# Patient Record
Sex: FEMALE | Race: WHITE | Employment: OTHER | URBAN - METROPOLITAN AREA
[De-identification: names, ages, dates, MRNs, and addresses within clinical notes are randomized per-mention and may not be internally consistent; named-entity substitution may affect disease eponyms.]

---

## 2017-08-07 ENCOUNTER — OP HISTORICAL/CONVERTED ENCOUNTER (OUTPATIENT)
Dept: OTHER | Age: 42
End: 2017-08-07

## 2018-01-10 ENCOUNTER — OP HISTORICAL/CONVERTED ENCOUNTER (OUTPATIENT)
Dept: OTHER | Age: 43
End: 2018-01-10

## 2018-01-18 ENCOUNTER — ED HISTORICAL/CONVERTED ENCOUNTER (OUTPATIENT)
Dept: OTHER | Age: 43
End: 2018-01-18

## 2018-01-18 ENCOUNTER — OP HISTORICAL/CONVERTED ENCOUNTER (OUTPATIENT)
Dept: OTHER | Age: 43
End: 2018-01-18

## 2018-01-20 ENCOUNTER — ED HISTORICAL/CONVERTED ENCOUNTER (OUTPATIENT)
Dept: OTHER | Age: 43
End: 2018-01-20

## 2018-02-09 ENCOUNTER — OP HISTORICAL/CONVERTED ENCOUNTER (OUTPATIENT)
Dept: OTHER | Age: 43
End: 2018-02-09

## 2018-03-23 ENCOUNTER — OP HISTORICAL/CONVERTED ENCOUNTER (OUTPATIENT)
Dept: OTHER | Age: 43
End: 2018-03-23

## 2018-04-11 ENCOUNTER — ED HISTORICAL/CONVERTED ENCOUNTER (OUTPATIENT)
Dept: OTHER | Age: 43
End: 2018-04-11

## 2018-06-26 ENCOUNTER — ED HISTORICAL/CONVERTED ENCOUNTER (OUTPATIENT)
Dept: OTHER | Age: 43
End: 2018-06-26

## 2018-09-12 ENCOUNTER — OP HISTORICAL/CONVERTED ENCOUNTER (OUTPATIENT)
Dept: OTHER | Age: 43
End: 2018-09-12

## 2018-10-02 ENCOUNTER — OP HISTORICAL/CONVERTED ENCOUNTER (OUTPATIENT)
Dept: OTHER | Age: 43
End: 2018-10-02

## 2018-11-29 ENCOUNTER — ED HISTORICAL/CONVERTED ENCOUNTER (OUTPATIENT)
Dept: OTHER | Age: 43
End: 2018-11-29

## 2019-01-08 ENCOUNTER — OP HISTORICAL/CONVERTED ENCOUNTER (OUTPATIENT)
Dept: OTHER | Age: 44
End: 2019-01-08

## 2019-03-05 ENCOUNTER — OP HISTORICAL/CONVERTED ENCOUNTER (OUTPATIENT)
Dept: OTHER | Age: 44
End: 2019-03-05

## 2020-09-03 ENCOUNTER — ED HISTORICAL/CONVERTED ENCOUNTER (OUTPATIENT)
Dept: OTHER | Age: 45
End: 2020-09-03

## 2020-09-04 ENCOUNTER — ED HISTORICAL/CONVERTED ENCOUNTER (OUTPATIENT)
Dept: OTHER | Age: 45
End: 2020-09-04

## 2020-12-14 ENCOUNTER — TRANSCRIBE ORDER (OUTPATIENT)
Dept: SCHEDULING | Age: 45
End: 2020-12-14

## 2020-12-14 DIAGNOSIS — Z12.31 BREAST CANCER SCREENING BY MAMMOGRAM: Primary | ICD-10-CM

## 2020-12-16 ENCOUNTER — HOSPITAL ENCOUNTER (EMERGENCY)
Age: 45
Discharge: HOME | End: 2020-12-16
Admitting: EMERGENCY MEDICINE
Payer: MEDICARE

## 2020-12-16 ENCOUNTER — HOSPITAL ENCOUNTER (OUTPATIENT)
Dept: GENERAL RADIOLOGY | Age: 45
Discharge: HOME | End: 2020-12-16
Attending: EMERGENCY MEDICINE

## 2020-12-16 VITALS
RESPIRATION RATE: 18 BRPM | HEIGHT: 69 IN | OXYGEN SATURATION: 97 % | BODY MASS INDEX: 39.55 KG/M2 | SYSTOLIC BLOOD PRESSURE: 130 MMHG | WEIGHT: 267 LBS | TEMPERATURE: 98.1 F | HEART RATE: 61 BPM | DIASTOLIC BLOOD PRESSURE: 72 MMHG

## 2020-12-16 DIAGNOSIS — K29.70 GASTRITIS WITHOUT BLEEDING, UNSPECIFIED CHRONICITY, UNSPECIFIED GASTRITIS TYPE: Primary | ICD-10-CM

## 2020-12-16 LAB
ALBUMIN SERPL-MCNC: 3.1 G/DL (ref 3.5–5)
ALBUMIN/GLOB SERPL: 0.7 {RATIO} (ref 1.1–2.2)
ALP SERPL-CCNC: 79 U/L (ref 45–117)
ALT SERPL-CCNC: 17 U/L (ref 12–78)
ANION GAP SERPL CALC-SCNC: 8 MMOL/L (ref 5–15)
AST SERPL W P-5'-P-CCNC: 14 U/L (ref 15–37)
BASOPHILS # BLD: 0 K/UL (ref 0–0.1)
BASOPHILS NFR BLD: 0 % (ref 0–1)
BILIRUB SERPL-MCNC: 0.5 MG/DL (ref 0.2–1)
BUN SERPL-MCNC: 63 MG/DL (ref 6–20)
BUN/CREAT SERPL: 23 (ref 12–20)
CA-I BLD-MCNC: 9.5 MG/DL (ref 8.5–10.1)
CHLORIDE SERPL-SCNC: 101 MMOL/L (ref 97–108)
CO2 SERPL-SCNC: 27 MMOL/L (ref 21–32)
CREAT SERPL-MCNC: 2.79 MG/DL (ref 0.55–1.02)
DIFFERENTIAL METHOD BLD: ABNORMAL
EOSINOPHIL # BLD: 0.1 K/UL (ref 0–0.4)
EOSINOPHIL NFR BLD: 1 % (ref 0–7)
ERYTHROCYTE [DISTWIDTH] IN BLOOD BY AUTOMATED COUNT: 14.1 % (ref 11.5–14.5)
GLOBULIN SER CALC-MCNC: 4.5 G/DL (ref 2–4)
GLUCOSE SERPL-MCNC: 177 MG/DL (ref 65–100)
HCT VFR BLD AUTO: 32.8 % (ref 35–47)
HGB BLD-MCNC: 10.5 G/DL (ref 11.5–16)
IMM GRANULOCYTES # BLD AUTO: 0 K/UL (ref 0–0.04)
IMM GRANULOCYTES NFR BLD AUTO: 0 % (ref 0–0.5)
LIPASE SERPL-CCNC: 340 U/L (ref 73–393)
LYMPHOCYTES # BLD: 1.8 K/UL (ref 0.8–3.5)
LYMPHOCYTES NFR BLD: 21 % (ref 12–49)
MCH RBC QN AUTO: 28.2 PG (ref 26–34)
MCHC RBC AUTO-ENTMCNC: 32 G/DL (ref 30–36.5)
MCV RBC AUTO: 88.2 FL (ref 80–99)
MONOCYTES # BLD: 0.8 K/UL (ref 0–1)
MONOCYTES NFR BLD: 9 % (ref 5–13)
NEUTS SEG # BLD: 6 K/UL (ref 1.8–8)
NEUTS SEG NFR BLD: 69 % (ref 32–75)
PLATELET # BLD AUTO: 275 K/UL (ref 150–400)
PMV BLD AUTO: 11.5 FL (ref 8.9–12.9)
POTASSIUM SERPL-SCNC: 3.7 MMOL/L (ref 3.5–5.1)
PROT SERPL-MCNC: 7.6 G/DL (ref 6.4–8.2)
RBC # BLD AUTO: 3.72 M/UL (ref 3.8–5.2)
SODIUM SERPL-SCNC: 136 MMOL/L (ref 136–145)
WBC # BLD AUTO: 8.7 K/UL (ref 3.6–11)

## 2020-12-16 PROCEDURE — 85025 COMPLETE CBC W/AUTO DIFF WBC: CPT

## 2020-12-16 PROCEDURE — 99282 EMERGENCY DEPT VISIT SF MDM: CPT

## 2020-12-16 PROCEDURE — 83690 ASSAY OF LIPASE: CPT

## 2020-12-16 PROCEDURE — 36415 COLL VENOUS BLD VENIPUNCTURE: CPT

## 2020-12-16 PROCEDURE — 80053 COMPREHEN METABOLIC PANEL: CPT

## 2020-12-16 PROCEDURE — 74018 RADEX ABDOMEN 1 VIEW: CPT

## 2020-12-16 RX ORDER — KETOROLAC TROMETHAMINE 15 MG/ML
INJECTION, SOLUTION INTRAMUSCULAR; INTRAVENOUS
Status: DISCONTINUED
Start: 2020-12-16 | End: 2020-12-16 | Stop reason: HOSPADM

## 2020-12-16 RX ORDER — FAMOTIDINE 10 MG/ML
INJECTION INTRAVENOUS
Status: DISCONTINUED
Start: 2020-12-16 | End: 2020-12-16 | Stop reason: HOSPADM

## 2020-12-16 RX ORDER — LIDOCAINE HYDROCHLORIDE 20 MG/ML
SOLUTION OROPHARYNGEAL
Status: DISCONTINUED
Start: 2020-12-16 | End: 2020-12-16 | Stop reason: HOSPADM

## 2020-12-16 RX ORDER — ADHESIVE BANDAGE
BANDAGE TOPICAL
Status: DISCONTINUED
Start: 2020-12-16 | End: 2020-12-16 | Stop reason: HOSPADM

## 2020-12-17 NOTE — ED PROVIDER NOTES
EMERGENCY DEPARTMENT HISTORY AND PHYSICAL EXAM      Date: 2020  Patient Name: Charu Meier    History of Presenting Illness     Chief Complaint   Patient presents with    Abdominal Pain       History Provided By: Patient    HPI: Charu Meier, 39 y.o. female with a past medical history significant Diabetes, hypertension, stage IV renal failure, CVA presents to the ED with cc of patient reports this morning began with upper abdominal pain described as a fullness in her upper abdomen. Associated with some nausea no vomiting. Radiates somewhat into her chest as a burning pain as well. No loose stools or diarrhea. She states she was recently at outside facility where she had her new fistula in her left arm arteriogram was complicated by worsening renal function and volume overload. She was discharged several days ago started on several new medications including an antibiotic which she finished. There are no other complaints, changes, or physical findings at this time. PCP: Ricardo Rosa MD    No current facility-administered medications on file prior to encounter. No current outpatient medications on file prior to encounter.        Past History     Past Medical History:  Past Medical History:   Diagnosis Date    Allergic rhinitis     [facility]A [facility]cere[facility] accident) ([facility] [address] 2014    Diabetes ([facility] [address]     Dyslipidemia     Hypertension     IBS (irritable bowel syndrome)     Renal failure        Past Surgical History:  Past Surgical History:   Procedure Laterality Date    HX  SECTION      HX CHOLECYSTECTOMY      HX TONSILLECTOMY  26       Family History:  Family History   Problem Relation Age of Onset    Hypertension Mother     Diabetes Mother     Heart Disease Mother     Hypertension Father     Diabetes Father     Heart Disease Father        Social History:  Social History     Tobacco Use    Smoking status: Never Smoker    Smokeless tobacco: Never Used   Substance Use Topics    Alcohol use: Never     Frequency: Never    Drug use: Never       Allergies: Allergies   Allergen Reactions    Fentanyl Anxiety    Zjcyhdrqc-Yt-Pm-Acetaminophen Nausea and Vomiting    Sulfa (Sulfonamide Antibiotics) Nausea and Vomiting    Morphine Itching         Review of Systems     Review of Systems   Constitutional: Positive for activity change. Negative for appetite change, fatigue and fever. HENT: Negative for congestion, ear pain, sinus pressure, sinus pain and sore throat. Eyes: Negative for redness and visual disturbance. Respiratory: Negative for cough, chest tightness and shortness of breath. Cardiovascular: Positive for chest pain. Negative for palpitations and leg swelling. Gastrointestinal: Positive for abdominal pain and nausea. Negative for diarrhea and vomiting. Genitourinary: Negative for dysuria and flank pain. Musculoskeletal: Negative for arthralgias, back pain, gait problem and myalgias. Skin: Negative for rash. Neurological: Negative for dizziness, speech difficulty, light-headedness, numbness and headaches. Psychiatric/Behavioral: Negative for suicidal ideas. The patient is not nervous/anxious. All other systems reviewed and are negative. Physical Exam     Physical Exam  Vitals signs and nursing note reviewed. Constitutional:       General: She is not in acute distress. Appearance: Normal appearance. She is not ill-appearing. Comments: Uncomfortable appearing female   HENT:      Head: Normocephalic and atraumatic. Nose: Nose normal.      Mouth/Throat:      Mouth: Mucous membranes are moist.   Eyes:      Pupils: Pupils are equal, round, and reactive to light. Neck:      Musculoskeletal: Normal range of motion and neck supple. Cardiovascular:      Rate and Rhythm: Normal rate and regular rhythm. Pulmonary:      Effort: Pulmonary effort is normal.      Breath sounds: Normal breath sounds.    Abdominal:      General: Abdomen is flat. Bowel sounds are normal.      Palpations: Abdomen is soft. There is no shifting dullness or fluid wave. Tenderness: There is abdominal tenderness in the right upper quadrant, epigastric area and left upper quadrant. There is guarding. There is no rebound. Musculoskeletal: Normal range of motion. Skin:     General: Skin is warm and dry. Capillary Refill: Capillary refill takes less than 2 seconds. Neurological:      General: No focal deficit present. Mental Status: She is alert. Psychiatric:         Mood and Affect: Mood normal.         Lab and Diagnostic Study Results     Labs -   Results for Michelle Chawla (MRN [id number]) as of 12/17/2020 03:33   Ref. Range 12/16/2020 03:00 12/16/2020 03:20   WBC Latest Ref Range: 3.6 - 11.0 K/uL 8.7    RBC Latest Ref Range: 3.80 - 5.20 M/uL 3.72 (L)    HGB Latest Ref Range: 11.5 - 16.0 g/dL 10.5 (L)    HCT Latest Ref Range: 35.0 - 47.0 % 32.8 (L)    MCV Latest Ref Range: 80.0 - 99.0 FL 88.2    MCH Latest Ref Range: 26.0 - 34.0 PG 28.2    MCHC Latest Ref Range: 30.0 - 36.5 g/dL 32.0    RDW Latest Ref Range: 11.5 - 14.5 % 14.1    PLATELET Latest Ref Range: 150 - 400 K/uL 275    MPV Latest Ref Range: 8.9 - 12.9 FL 11.5    NEUTROPHILS Latest Ref Range: 32 - 75 % 69    LYMPHOCYTES Latest Ref Range: 12 - 49 % 21    MONOCYTES Latest Ref Range: 5 - 13 % 9    EOSINOPHILS Latest Ref Range: 0 - 7 % 1    BASOPHILS Latest Ref Range: 0 - 1 % 0    IMMATURE GRANULOCYTES Latest Ref Range: 0.0 - 0.5 % 0    DF Latest Units:   AUTOMATED    ABS. NEUTROPHILS Latest Ref Range: 1.8 - 8.0 K/UL 6.0    ABS. IMM. GRANS. Latest Ref Range: 0.00 - 0.04 K/UL 0    ABS. LYMPHOCYTES Latest Ref Range: 0.8 - 3.5 K/UL 1.8    ABS. MONOCYTES Latest Ref Range: 0.0 - 1.0 K/UL 0.8    ABS. EOSINOPHILS Latest Ref Range: 0.0 - 0.4 K/UL 0.1    ABS.  BASOPHILS Latest Ref Range: 0.0 - 0.1 K/UL 0.0    Sodium Latest Ref Range: 136 - 145 mmol/L 136    Potassium Latest Ref Range: 3.5 - 5.1 mmol/L 3.7 Chloride Latest Ref Range: 97 - 108 mmol/L 101    CO2 Latest Ref Range: 21 - 32 mmol/L 27    Anion gap Latest Ref Range: 5 - 15 mmol/L 8    Glucose Latest Ref Range: 65 - 100 mg/dL 177 (H)    BUN Latest Ref Range: 6 - 20 mg/dL 63 (H)    Creatinine Latest Ref Range: 0.55 - 1.02 mg/dL 2.79 (H)    BUN/Creatinine ratio Latest Ref Range: 12 - 20   23 (H)    Calcium Latest Ref Range: 8.5 - 10.1 mg/dL 9.5    GFR est non-AA Latest Ref Range: >60 ml/min/1.73m2 18 (L)    GFR est AA Latest Ref Range: >60 ml/min/1.73m2 22 (L)    Bilirubin, total Latest Ref Range: 0.2 - 1.0 mg/dL 0.5    Protein, total Latest Ref Range: 6.4 - 8.2 g/dL 7.6    Albumin Latest Ref Range: 3.5 - 5.0 g/dL 3.1 (L)    Globulin Latest Ref Range: 2.0 - 4.0 g/dL 4.5 (H)    A-G Ratio Latest Ref Range: 1.1 - 2.2   0.7 (L)    ALT Latest Ref Range: 12 - 78 U/L 17    AST Latest Ref Range: 15 - 37 U/L 14 (L)    Alk. phosphatase Latest Ref Range: 45 - 117 U/L 79    Lipase Latest Ref Range: 73 - 393 U/L 340        Radiologic Studies -   [email or web address], 2 views     Bowel gas pattern unremarkable; no plain film evidence for bowel obstruction. No  excess stool through colon. Vascular calcifications. On submitted two-view study, there is no free intraperitoneal air. Medical Decision Making   - I am the first provider for this patient. - I reviewed the vital signs, available nursing notes, past medical history, past surgical history, family history and social history. - Initial assessment performed. The patients presenting problems have been discussed, and they are in agreement with the care plan formulated and outlined with them. I have encouraged them to ask questions as they arise throughout their visit. Vital Signs-Reviewed the patient's vital signs. No data found.     Records Reviewed: Nursing Notes       ED Course:          Provider Notes (Medical Decision Making):   66-year-old female presenting with epigastric abdominal pain most consistent with acute gastritis or GERD however she did not report any relief with GI cocktail given her medical history we did labs which were pretty consistent with her baseline and she is reporting more relief after second dose of medications. Denies any indication for imaging at this time. We will follow-up with her primary doctor. MDM           Disposition   Disposition:       Discharged    DISCHARGE PLAN:  1. Cannot display discharge medications since this patient is not currently admitted. 2.   Follow-up Information    None       3. Return to ED if worse   4. There are no discharge medications for this patient. Diagnosis     Clinical Impression:   1. Gastritis without bleeding, unspecified chronicity, unspecified gastritis type        Attestations:    Jovi Lora MD    Please note that this dictation was completed with [employer], the computer voice recognition software. Quite often unanticipated grammatical, syntax, homophones, and other interpretive errors are inadvertently transcribed by the computer software. Please disregard these errors. Please excuse any errors that have escaped final proofreading. Thank you.

## 2022-06-29 ENCOUNTER — HOSPITAL ENCOUNTER (OUTPATIENT)
Age: 47
Setting detail: OBSERVATION
Discharge: HOME | End: 2022-07-01
Attending: STUDENT IN AN ORGANIZED HEALTH CARE EDUCATION/TRAINING PROGRAM | Admitting: HOSPITALIST
Payer: MEDICARE

## 2022-06-29 ENCOUNTER — APPOINTMENT (OUTPATIENT)
Dept: CT IMAGING | Age: 47
End: 2022-06-29
Attending: PHYSICIAN ASSISTANT
Payer: MEDICARE

## 2022-06-29 DIAGNOSIS — R19.7 DIARRHEA OF PRESUMED INFECTIOUS ORIGIN: ICD-10-CM

## 2022-06-29 DIAGNOSIS — R10.32 ABDOMINAL PAIN, LLQ (LEFT LOWER QUADRANT): ICD-10-CM

## 2022-06-29 DIAGNOSIS — K52.9 GASTROENTERITIS: Primary | ICD-10-CM

## 2022-06-29 PROBLEM — R11.2 INTRACTABLE NAUSEA AND VOMITING: Status: ACTIVE | Noted: 2022-06-29

## 2022-06-29 LAB
ALBUMIN SERPL-MCNC: 3 G/DL (ref 3.5–5)
ALBUMIN/GLOB SERPL: 0.7 {RATIO} (ref 1.1–2.2)
ALP SERPL-CCNC: 142 U/L (ref 45–117)
ALT SERPL-CCNC: 10 U/L (ref 12–78)
ANION GAP SERPL CALC-SCNC: 7 MMOL/L (ref 5–15)
AST SERPL W P-5'-P-CCNC: 9 U/L (ref 15–37)
BASOPHILS # BLD: 0.1 K/UL (ref 0–0.1)
BASOPHILS NFR BLD: 1 % (ref 0–1)
BILIRUB SERPL-MCNC: 0.4 MG/DL (ref 0.2–1)
BUN SERPL-MCNC: 30 MG/DL (ref 6–20)
BUN/CREAT SERPL: 9 (ref 12–20)
CA-I BLD-MCNC: 9.6 MG/DL (ref 8.5–10.1)
CHLORIDE SERPL-SCNC: 97 MMOL/L (ref 97–108)
CO2 SERPL-SCNC: 28 MMOL/L (ref 21–32)
CREAT SERPL-MCNC: 3.3 MG/DL (ref 0.55–1.02)
DIFFERENTIAL METHOD BLD: ABNORMAL
EOSINOPHIL # BLD: 0.3 K/UL (ref 0–0.4)
EOSINOPHIL NFR BLD: 2 % (ref 0–7)
ERYTHROCYTE [DISTWIDTH] IN BLOOD BY AUTOMATED COUNT: 13.2 % (ref 11.5–14.5)
GLOBULIN SER CALC-MCNC: 4.6 G/DL (ref 2–4)
GLUCOSE BLD STRIP.AUTO-MCNC: 179 MG/DL (ref 65–117)
GLUCOSE BLD STRIP.AUTO-MCNC: 195 MG/DL (ref 65–117)
GLUCOSE SERPL-MCNC: 342 MG/DL (ref 65–100)
HCT VFR BLD AUTO: 31.9 % (ref 35–47)
HGB BLD-MCNC: 10.3 G/DL (ref 11.5–16)
IMM GRANULOCYTES # BLD AUTO: 0.1 K/UL (ref 0–0.04)
IMM GRANULOCYTES NFR BLD AUTO: 0 % (ref 0–0.5)
LIPASE SERPL-CCNC: 170 U/L (ref 73–393)
LYMPHOCYTES # BLD: 2 K/UL (ref 0.8–3.5)
LYMPHOCYTES NFR BLD: 15 % (ref 12–49)
MCH RBC QN AUTO: 29.9 PG (ref 26–34)
MCHC RBC AUTO-ENTMCNC: 32.3 G/DL (ref 30–36.5)
MCV RBC AUTO: 92.7 FL (ref 80–99)
MONOCYTES # BLD: 0.8 K/UL (ref 0–1)
MONOCYTES NFR BLD: 6 % (ref 5–13)
NEUTS SEG # BLD: 10.9 K/UL (ref 1.8–8)
NEUTS SEG NFR BLD: 76 % (ref 32–75)
NRBC # BLD: 0 K/UL (ref 0–0.01)
NRBC BLD-RTO: 0 PER 100 WBC
PERFORMED BY, TECHID: ABNORMAL
PERFORMED BY, TECHID: ABNORMAL
PLATELET # BLD AUTO: 457 K/UL (ref 150–400)
PMV BLD AUTO: 9.6 FL (ref 8.9–12.9)
POTASSIUM SERPL-SCNC: 4.2 MMOL/L (ref 3.5–5.1)
PROT SERPL-MCNC: 7.6 G/DL (ref 6.4–8.2)
RBC # BLD AUTO: 3.44 M/UL (ref 3.8–5.2)
SODIUM SERPL-SCNC: 132 MMOL/L (ref 136–145)
WBC # BLD AUTO: 14.1 K/UL (ref 3.6–11)

## 2022-06-29 PROCEDURE — 99285 EMERGENCY DEPT VISIT HI MDM: CPT

## 2022-06-29 PROCEDURE — 74011000636 HC RX REV CODE- 636: Performed by: STUDENT IN AN ORGANIZED HEALTH CARE EDUCATION/TRAINING PROGRAM

## 2022-06-29 PROCEDURE — 87340 HEPATITIS B SURFACE AG IA: CPT

## 2022-06-29 PROCEDURE — 74011250637 HC RX REV CODE- 250/637

## 2022-06-29 PROCEDURE — C9113 INJ PANTOPRAZOLE SODIUM, VIA: HCPCS | Performed by: NURSE PRACTITIONER

## 2022-06-29 PROCEDURE — G0378 HOSPITAL OBSERVATION PER HR: HCPCS

## 2022-06-29 PROCEDURE — 74011636637 HC RX REV CODE- 636/637: Performed by: NURSE PRACTITIONER

## 2022-06-29 PROCEDURE — 96372 THER/PROPH/DIAG INJ SC/IM: CPT

## 2022-06-29 PROCEDURE — G0257 UNSCHED DIALYSIS ESRD PT HOS: HCPCS

## 2022-06-29 PROCEDURE — 83690 ASSAY OF LIPASE: CPT

## 2022-06-29 PROCEDURE — 96374 THER/PROPH/DIAG INJ IV PUSH: CPT

## 2022-06-29 PROCEDURE — 80053 COMPREHEN METABOLIC PANEL: CPT

## 2022-06-29 PROCEDURE — 83036 HEMOGLOBIN GLYCOSYLATED A1C: CPT

## 2022-06-29 PROCEDURE — 36415 COLL VENOUS BLD VENIPUNCTURE: CPT

## 2022-06-29 PROCEDURE — 74011000250 HC RX REV CODE- 250: Performed by: NURSE PRACTITIONER

## 2022-06-29 PROCEDURE — 74011250637 HC RX REV CODE- 250/637: Performed by: NURSE PRACTITIONER

## 2022-06-29 PROCEDURE — 74011250636 HC RX REV CODE- 250/636: Performed by: NURSE PRACTITIONER

## 2022-06-29 PROCEDURE — 74177 CT ABD & PELVIS W/CONTRAST: CPT

## 2022-06-29 PROCEDURE — 85025 COMPLETE CBC W/AUTO DIFF WBC: CPT

## 2022-06-29 PROCEDURE — 82962 GLUCOSE BLOOD TEST: CPT

## 2022-06-29 PROCEDURE — 74011250636 HC RX REV CODE- 250/636: Performed by: PHYSICIAN ASSISTANT

## 2022-06-29 PROCEDURE — 96376 TX/PRO/DX INJ SAME DRUG ADON: CPT

## 2022-06-29 RX ORDER — DEXTROSE MONOHYDRATE 100 MG/ML
0-250 INJECTION, SOLUTION INTRAVENOUS AS NEEDED
Status: DISCONTINUED | OUTPATIENT
Start: 2022-06-29 | End: 2022-07-01 | Stop reason: HOSPADM

## 2022-06-29 RX ORDER — FUROSEMIDE 40 MG/1
80 TABLET ORAL DAILY
Status: DISCONTINUED | OUTPATIENT
Start: 2022-06-30 | End: 2022-07-01 | Stop reason: HOSPADM

## 2022-06-29 RX ORDER — ATORVASTATIN CALCIUM 20 MG/1
20 TABLET, FILM COATED ORAL DAILY
COMMUNITY

## 2022-06-29 RX ORDER — MAGNESIUM SULFATE 100 %
4 CRYSTALS MISCELLANEOUS AS NEEDED
Status: DISCONTINUED | OUTPATIENT
Start: 2022-06-29 | End: 2022-07-01 | Stop reason: HOSPADM

## 2022-06-29 RX ORDER — INSULIN LISPRO 100 [IU]/ML
INJECTION, SOLUTION INTRAVENOUS; SUBCUTANEOUS
Status: DISCONTINUED | OUTPATIENT
Start: 2022-06-29 | End: 2022-07-01 | Stop reason: HOSPADM

## 2022-06-29 RX ORDER — METRONIDAZOLE 500 MG/100ML
500 INJECTION, SOLUTION INTRAVENOUS EVERY 8 HOURS
Status: DISCONTINUED | OUTPATIENT
Start: 2022-06-29 | End: 2022-07-01 | Stop reason: HOSPADM

## 2022-06-29 RX ORDER — SEVELAMER CARBONATE 800 MG/1
800 TABLET, FILM COATED ORAL
Status: DISCONTINUED | OUTPATIENT
Start: 2022-06-29 | End: 2022-07-01 | Stop reason: HOSPADM

## 2022-06-29 RX ORDER — ONDANSETRON 2 MG/ML
4 INJECTION INTRAMUSCULAR; INTRAVENOUS
Status: DISCONTINUED | OUTPATIENT
Start: 2022-06-29 | End: 2022-07-01 | Stop reason: HOSPADM

## 2022-06-29 RX ORDER — SODIUM CHLORIDE 0.9 % (FLUSH) 0.9 %
5-40 SYRINGE (ML) INJECTION AS NEEDED
Status: DISCONTINUED | OUTPATIENT
Start: 2022-06-29 | End: 2022-07-01 | Stop reason: HOSPADM

## 2022-06-29 RX ORDER — ACETAMINOPHEN 650 MG/1
650 SUPPOSITORY RECTAL
Status: DISCONTINUED | OUTPATIENT
Start: 2022-06-29 | End: 2022-07-01 | Stop reason: HOSPADM

## 2022-06-29 RX ORDER — GABAPENTIN 300 MG/1
300 CAPSULE ORAL
COMMUNITY

## 2022-06-29 RX ORDER — HYDROXYZINE 25 MG/1
TABLET, FILM COATED ORAL
COMMUNITY
Start: 2022-06-08

## 2022-06-29 RX ORDER — INSULIN GLARGINE 100 [IU]/ML
62 INJECTION, SOLUTION SUBCUTANEOUS
Status: DISCONTINUED | OUTPATIENT
Start: 2022-06-29 | End: 2022-07-01 | Stop reason: HOSPADM

## 2022-06-29 RX ORDER — SEVELAMER CARBONATE 800 MG/1
800 TABLET, FILM COATED ORAL
COMMUNITY
Start: 2022-05-14

## 2022-06-29 RX ORDER — HYDROXYZINE 25 MG/1
25 TABLET, FILM COATED ORAL
Status: DISCONTINUED | OUTPATIENT
Start: 2022-06-29 | End: 2022-07-01 | Stop reason: HOSPADM

## 2022-06-29 RX ORDER — SPIRONOLACTONE 50 MG/1
50 TABLET, FILM COATED ORAL DAILY
COMMUNITY
Start: 2022-06-10

## 2022-06-29 RX ORDER — POLYETHYLENE GLYCOL 3350 17 G/17G
17 POWDER, FOR SOLUTION ORAL DAILY PRN
Status: DISCONTINUED | OUTPATIENT
Start: 2022-06-29 | End: 2022-07-01 | Stop reason: HOSPADM

## 2022-06-29 RX ORDER — DEXTROAMPHETAMINE SACCHARATE, AMPHETAMINE ASPARTATE, DEXTROAMPHETAMINE SULFATE AND AMPHETAMINE SULFATE 5; 5; 5; 5 MG/1; MG/1; MG/1; MG/1
20 TABLET ORAL 3 TIMES DAILY
COMMUNITY

## 2022-06-29 RX ORDER — CARVEDILOL 12.5 MG/1
12.5 TABLET ORAL 2 TIMES DAILY
Status: DISCONTINUED | OUTPATIENT
Start: 2022-06-29 | End: 2022-07-01 | Stop reason: HOSPADM

## 2022-06-29 RX ORDER — ALPRAZOLAM 0.25 MG/1
TABLET ORAL
Status: COMPLETED
Start: 2022-06-29 | End: 2022-06-29

## 2022-06-29 RX ORDER — CARVEDILOL 12.5 MG/1
12.5 TABLET ORAL 2 TIMES DAILY
Status: ON HOLD | COMMUNITY
Start: 2022-04-24 | End: 2022-07-01 | Stop reason: SDUPTHER

## 2022-06-29 RX ORDER — ONDANSETRON 2 MG/ML
4 INJECTION INTRAMUSCULAR; INTRAVENOUS
Status: COMPLETED | OUTPATIENT
Start: 2022-06-29 | End: 2022-06-29

## 2022-06-29 RX ORDER — ACETAMINOPHEN 325 MG/1
650 TABLET ORAL
Status: DISCONTINUED | OUTPATIENT
Start: 2022-06-29 | End: 2022-07-01 | Stop reason: HOSPADM

## 2022-06-29 RX ORDER — OMEPRAZOLE 20 MG/1
20 CAPSULE, DELAYED RELEASE ORAL DAILY
COMMUNITY

## 2022-06-29 RX ORDER — FUROSEMIDE 80 MG/1
80 TABLET ORAL DAILY
COMMUNITY
Start: 2022-06-10

## 2022-06-29 RX ORDER — ONDANSETRON 4 MG/1
4 TABLET, ORALLY DISINTEGRATING ORAL
Qty: 10 TABLET | Refills: 0 | Status: SHIPPED | OUTPATIENT
Start: 2022-06-29

## 2022-06-29 RX ORDER — INSULIN DETEMIR 100 [IU]/ML
62 INJECTION, SOLUTION SUBCUTANEOUS
COMMUNITY
Start: 2022-05-14

## 2022-06-29 RX ORDER — SPIRONOLACTONE 25 MG/1
50 TABLET ORAL DAILY
Status: DISCONTINUED | OUTPATIENT
Start: 2022-06-30 | End: 2022-07-01 | Stop reason: HOSPADM

## 2022-06-29 RX ORDER — HEPARIN SODIUM 5000 [USP'U]/ML
5000 INJECTION, SOLUTION INTRAVENOUS; SUBCUTANEOUS EVERY 8 HOURS
Status: DISCONTINUED | OUTPATIENT
Start: 2022-06-29 | End: 2022-07-01 | Stop reason: HOSPADM

## 2022-06-29 RX ORDER — HYDRALAZINE HYDROCHLORIDE 20 MG/ML
20 INJECTION INTRAMUSCULAR; INTRAVENOUS
Status: DISCONTINUED | OUTPATIENT
Start: 2022-06-29 | End: 2022-07-01 | Stop reason: HOSPADM

## 2022-06-29 RX ORDER — ONDANSETRON 4 MG/1
4 TABLET, ORALLY DISINTEGRATING ORAL
Status: DISCONTINUED | OUTPATIENT
Start: 2022-06-29 | End: 2022-07-01 | Stop reason: HOSPADM

## 2022-06-29 RX ORDER — ALPRAZOLAM 0.5 MG/1
1 TABLET ORAL
Status: COMPLETED | OUTPATIENT
Start: 2022-06-29 | End: 2022-06-29

## 2022-06-29 RX ORDER — SODIUM CHLORIDE 0.9 % (FLUSH) 0.9 %
5-40 SYRINGE (ML) INJECTION EVERY 8 HOURS
Status: DISCONTINUED | OUTPATIENT
Start: 2022-06-29 | End: 2022-07-01 | Stop reason: HOSPADM

## 2022-06-29 RX ORDER — LEVOFLOXACIN 500 MG/1
500 TABLET, FILM COATED ORAL DAILY
Qty: 3 TABLET | Refills: 0 | Status: SHIPPED | OUTPATIENT
Start: 2022-06-29 | End: 2022-07-02

## 2022-06-29 RX ORDER — TRAMADOL HYDROCHLORIDE 50 MG/1
50 TABLET ORAL
COMMUNITY

## 2022-06-29 RX ADMIN — SODIUM CHLORIDE, PRESERVATIVE FREE 10 ML: 5 INJECTION INTRAVENOUS at 21:16

## 2022-06-29 RX ADMIN — INSULIN GLARGINE 62 UNITS: 100 INJECTION, SOLUTION SUBCUTANEOUS at 21:00

## 2022-06-29 RX ADMIN — HYDROXYZINE HYDROCHLORIDE 25 MG: 25 TABLET ORAL at 21:00

## 2022-06-29 RX ADMIN — HEPARIN SODIUM 5000 UNITS: 5000 INJECTION INTRAVENOUS; SUBCUTANEOUS at 21:00

## 2022-06-29 RX ADMIN — ONDANSETRON 4 MG: 2 INJECTION INTRAMUSCULAR; INTRAVENOUS at 10:11

## 2022-06-29 RX ADMIN — IOPAMIDOL 100 ML: 755 INJECTION, SOLUTION INTRAVENOUS at 11:24

## 2022-06-29 RX ADMIN — SODIUM CHLORIDE, PRESERVATIVE FREE 10 ML: 5 INJECTION INTRAVENOUS at 15:50

## 2022-06-29 RX ADMIN — CARVEDILOL 12.5 MG: 12.5 TABLET, FILM COATED ORAL at 20:59

## 2022-06-29 RX ADMIN — ACETAMINOPHEN 650 MG: 325 TABLET ORAL at 21:00

## 2022-06-29 RX ADMIN — ALPRAZOLAM 1 MG: 0.5 TABLET ORAL at 14:21

## 2022-06-29 RX ADMIN — METRONIDAZOLE 500 MG: 500 INJECTION, SOLUTION INTRAVENOUS at 18:49

## 2022-06-29 RX ADMIN — SODIUM CHLORIDE, PRESERVATIVE FREE 40 MG: 5 INJECTION INTRAVENOUS at 21:01

## 2022-06-29 RX ADMIN — SEVELAMER CARBONATE 800 MG: 800 TABLET, FILM COATED ORAL at 18:49

## 2022-06-29 RX ADMIN — INSULIN LISPRO 3 UNITS: 100 INJECTION, SOLUTION INTRAVENOUS; SUBCUTANEOUS at 18:49

## 2022-06-29 RX ADMIN — ALPRAZOLAM 1 MG: 0.25 TABLET ORAL at 14:21

## 2022-06-29 NOTE — ED PROVIDER NOTES
EMERGENCY DEPARTMENT HISTORY AND PHYSICAL EXAM      Date: 6/29/2022  Patient Name: Aroldo Da Silva    History of Presenting Illness     Chief Complaint   Patient presents with    Vomiting    Nausea    Diarrhea    Abdominal Pain       History Provided By: Patient    HPI: Aroldo Da Silva, 52 y.o. female with a past medical history significant DM, HTN, renal failure on dialysis, CVA, PAD presents to the ED with cc of left lower quadrant abdominal pain onset 2 days ago with associated diarrhea. Patient also reports nausea without vomiting. Patient was not fully dialyzed on Monday due to her feeling ill. She is Monday, Wednesday, Friday dialysis. She missed dialysis today due to illness. She was told that she could not have dialysis tomorrow because her dialysis center would be closed. She specifically denies fever, chills, body aches, blood in stool, sick contacts, chest pain, shortness of breath. There are no other complaints, changes, or physical findings at this time. PCP: Corby Hightower NP    No current facility-administered medications on file prior to encounter. Current Outpatient Medications on File Prior to Encounter   Medication Sig Dispense Refill    furosemide (LASIX) 80 mg tablet Take 80 mg by mouth daily.  hydrOXYzine HCL (ATARAX) 25 mg tablet Take  by mouth every eight (8) hours as needed. Take 1-2 tablets      Levemir FlexTouch U-100 Insuln 100 unit/mL (3 mL) inpn 62 Units by SubCUTAneous route nightly.  sevelamer carbonate (RENVELA) 800 mg tab tab Take 800 mg by mouth three (3) times daily (with meals).  spironolactone (ALDACTONE) 50 mg tablet Take 50 mg by mouth daily.  dextroamphetamine-amphetamine (AdderalL) 20 mg tablet Take 20 mg by mouth three (3) times daily.  traMADoL (ULTRAM) 50 mg tablet Take 50 mg by mouth two (2) times daily as needed for Pain.  atorvastatin (LIPITOR) 20 mg tablet Take 20 mg by mouth daily.       gabapentin (NEURONTIN) 300 mg capsule Take 300 mg by mouth nightly.  omeprazole (PRILOSEC) 20 mg capsule Take 20 mg by mouth daily.  [DISCONTINUED] carvediloL (COREG) 12.5 mg tablet Take 12.5 mg by mouth two (2) times a day. Past History     Past Medical History:  Past Medical History:   Diagnosis Date    Allergic rhinitis     CVA (cerebral vascular accident) ([facility] [address] 2014    Diabetes ([facility] [address]     Dyslipidemia     Hypertension     IBS (irritable bowel syndrome)     Renal failure        Past Surgical History:  Past Surgical History:   Procedure Laterality Date    HX  SECTION      HX CHOLECYSTECTOMY      HX TONSILLECTOMY  26       Family History:  Family History   Problem Relation Age of Onset    Hypertension Mother     Diabetes Mother     Heart Disease Mother     Hypertension Father     Diabetes Father     Heart Disease Father        Social History:  Social History     Tobacco Use    Smoking status: Never Smoker    Smokeless tobacco: Never Used   Substance Use Topics    Alcohol use: Never    Drug use: Never       Allergies: Allergies   Allergen Reactions    Fentanyl Anxiety    Loqmidxvr-Xi-Dp-Acetaminophen Nausea and Vomiting    Sulfa (Sulfonamide Antibiotics) Nausea and Vomiting    Zithromax [Azithromycin] Unknown (comments)    Morphine Itching         Review of Systems   Review of Systems   Constitutional: Positive for fatigue. Negative for activity change, chills and fever. HENT: Negative for congestion, ear pain, rhinorrhea, sneezing and sore throat. Eyes: Negative for pain and visual disturbance. Respiratory: Negative for cough and shortness of breath. Cardiovascular: Negative for chest pain. Gastrointestinal: Positive for abdominal pain, diarrhea and nausea. Negative for blood in stool and vomiting. Genitourinary: Negative for dysuria and hematuria. Musculoskeletal: Negative for gait problem. Skin: Negative for rash.    Neurological: Negative for speech difficulty, weakness and headaches. Psychiatric/Behavioral: The patient is not nervous/anxious. All other systems reviewed and are negative. Physical Exam   Physical Exam  Vitals and nursing note reviewed. Constitutional:       General: She is not in acute distress. Appearance: Normal appearance. She is obese. She is not ill-appearing or toxic-appearing. HENT:      Head: Normocephalic and atraumatic. Nose: Nose normal.      Mouth/Throat:      Mouth: Mucous membranes are moist.   Eyes:      Extraocular Movements: Extraocular movements intact. Conjunctiva/sclera: Conjunctivae normal.      Pupils: Pupils are equal, round, and reactive to light. Cardiovascular:      Rate and Rhythm: Normal rate. Pulses: Decreased pulses (PAD). Heart sounds: Normal heart sounds. Pulmonary:      Effort: Pulmonary effort is normal. No respiratory distress. Breath sounds: Normal breath sounds. Abdominal:      General: Bowel sounds are normal.      Palpations: Abdomen is soft. Tenderness: There is abdominal tenderness in the left lower quadrant. There is no guarding or rebound. Musculoskeletal:         General: No deformity or signs of injury. Normal range of motion. Cervical back: Normal range of motion. Right lower leg: No edema. Left lower leg: No edema. Comments: Splint noted to right ankle   Skin:     General: Skin is warm and dry. Capillary Refill: Capillary refill takes less than 2 seconds. Findings: No rash. Neurological:      General: No focal deficit present. Mental Status: She is alert and oriented to person, place, and time. Cranial Nerves: No cranial nerve deficit.    Psychiatric:         Mood and Affect: Mood normal.         Diagnostic Study Results     Labs -     Recent Results (from the past 48 hour(s))   GLUCOSE, POC    Collection Time: 06/29/22  6:16 PM   Result Value Ref Range    Glucose (POC) 195 (H) 65 - 117 mg/dL Performed by Debora Holbrook    GLUCOSE, POC    Collection Time: 06/29/22  9:04 PM   Result Value Ref Range    Glucose (POC) 179 (H) 65 - 117 mg/dL    Performed by [address], POC    Collection Time: 06/30/22  8:13 AM   Result Value Ref Range    Glucose (POC) 331 (H) 65 - 117 mg/dL    Performed by [place], POC    Collection Time: 06/30/22 11:44 AM   Result Value Ref Range    Glucose (POC) 317 (H) 65 - 117 mg/dL    Performed by [facility]    CBC WITH AUTOMATED DIFF    Collection Time: 06/30/22 12:26 PM   Result Value Ref Range    WBC 12.5 (H) 3.6 - 11.0 K/uL    RBC 3.33 (L) 3.80 - 5.20 M/uL    HGB 9.9 (L) 11.5 - 16.0 g/dL    HCT 31.7 (L) 35.0 - 47.0 %    MCV 95.2 80.0 - 99.0 FL    MCH 29.7 26.0 - 34.0 PG    MCHC 31.2 30.0 - 36.5 g/dL    RDW 13.2 11.5 - 14.5 %    PLATELET 982 959 - 675 K/uL    MPV 9.6 8.9 - 12.9 FL    NRBC 0.0 0.0  WBC    ABSOLUTE NRBC 0.00 0.00 - 0.01 K/uL    NEUTROPHILS 76 (H) 32 - 75 %    LYMPHOCYTES 13 12 - 49 %    MONOCYTES 7 5 - 13 %    EOSINOPHILS 2 0 - 7 %    BASOPHILS 1 0 - 1 %    IMMATURE GRANULOCYTES 1 (H) 0 - 0.5 %    ABS. NEUTROPHILS 9.6 (H) 1.8 - 8.0 K/UL    ABS. LYMPHOCYTES 1.7 0.8 - 3.5 K/UL    ABS. MONOCYTES 0.9 0.0 - 1.0 K/UL    ABS. EOSINOPHILS 0.3 0.0 - 0.4 K/UL    ABS. BASOPHILS 0.1 0.0 - 0.1 K/UL    ABS. IMM.  GRANS. 0.1 (H) 0.00 - 0.04 K/UL    DF AUTOMATED     GLUCOSE, POC    Collection Time: 06/30/22  4:51 PM   Result Value Ref Range    Glucose (POC) 272 (H) 65 - 117 mg/dL    Performed by [address] [place] [address], POC    Collection Time: 06/30/22  8:04 PM   Result Value Ref Range    Glucose (POC) 238 (H) 65 - 117 mg/dL    Performed by Flor Bryant    GLUCOSE, POC    Collection Time: 07/01/22  8:06 AM   Result Value Ref Range    Glucose (POC) 266 (H) 65 - 117 mg/dL    Performed by [place], POC    Collection Time: 07/01/22  1:14 PM   Result Value Ref Range    Glucose (POC) 118 (H) 65 - 117 mg/dL Performed by Araceli Leavitt        Radiologic Studies -   Results from [facility] Encounter encounter on 12/16/20    XR ABD (KUB)    Narrative  Abdomen, 2 views    Bowel gas pattern unremarkable; no plain film evidence for bowel obstruction. No  excess stool through colon. Vascular calcifications. On submitted two-view study, there is no free intraperitoneal air. CT Results  (Last 48 hours)    None          Medical Decision Making   I am the first provider for this patient. I reviewed the vital signs, available nursing notes, past medical history, past surgical history, family history and social history. Vital Signs-Reviewed the patient's vital signs. Patient Vitals for the past 12 hrs:   Temp Pulse Resp BP SpO2   07/01/22 1215 -- 73 -- (!) 126/54 --   07/01/22 1145 -- 68 -- (!) 122/49 --   07/01/22 1115 -- 69 -- 124/62 --   07/01/22 1045 -- 72 -- (!) 104/49 --   07/01/22 1015 -- 73 -- (!) 111/58 --   07/01/22 0945 98.2 °F (36.8 °C) 74 20 129/78 --   07/01/22 0746 98.2 °F (36.8 °C) 80 20 132/62 96 %       Records Reviewed: Nursing Notes and Old Medical Records    Provider Notes (Medical Decision Making):     MDM  Number of Diagnoses or Management Options  Abdominal pain, LLQ (left lower quadrant)  Diarrhea of presumed infectious origin  Gastroenteritis  Diagnosis management comments: 25-year-old female presenting with left lower quadrant abdominal pain as well as diarrhea for a few days. Differential considerations include colitis, diverticulitis, dehydration, UTI, gastroenteritis. Discussed the case with nephrology because patient missed dialysis today and I would like to give her IV contrast.  Cleared for CT with IV contrast by nephrology. The patient is afebrile, mildly elevated white count. Her CT reveals no evidence of diverticulitis however secondary to her elevated white blood cell count and pain, I suspect an infectious source of diarrhea.   Patient was dialyzed, I spoke with her nephrologist who suggested medical admission for observation. Discussed case with hospitalist who agreed with admission. Patient stable and in dialysis unit at time of admission. Amount and/or Complexity of Data Reviewed  Clinical lab tests: ordered and reviewed  Tests in the radiology section of CPT®: ordered and reviewed  Review and summarize past medical records: yes        ED Course:   Initial assessment performed. The patients presenting problems have been discussed, and they are in agreement with the care plan formulated and outlined with them. I have encouraged them to ask questions as they arise throughout their visit. 10:33 AM  Spoke with Dr. Lopez Unger, nephrology, who agrees with giving IV contrast and he will find a way for the patient to be dialyzed as she missed dialysis today. ED Course as of 07/01/22 1602   Wed Jun 29, 2022   1251 12:51 PM  Patient is a planned discharge, she will be going to dialysis in 15-20 minutes. Will send Zofran and Flagyl to pharmacy for presumed infectious source of diarrhea 2/2 leukocytosis. Her CT scan is negative for acute process [EC]   1528 3:28 PM  Spoke with Dr. Lopez Unger who is seeing the patient in dialysis and reports that patient appears un-well and is still c/o significant abdominal pain. Will reach out to hospitalist for observation [EC]   [id number] 18 [id number] 19 3:29 PM  Spoke with Dr. Guicho Taveras who agrees with OBS for gatroenteritis and abdominal pain [EC]      ED Course User Index  [EC] Priscilla Kuhn PA-C         PROCEDURES    Procedures       Disposition     Disposition: Admitted to Floor Medical Floor the case was discussed with the admitting physician Dr. Guicho Taveras    Admitted         Diagnosis     Clinical Impression:   1. Gastroenteritis    2. Diarrhea of presumed infectious origin    3.  Abdominal pain, LLQ (left lower quadrant)

## 2022-06-29 NOTE — CONSULTS
Renal Consult Note    Admit Date: 2022      HPI:   52 yr old lady with h/o ESRD on HD on a MWF schedule at [facility] presented to the ER with abdominal pain and diarrhea. She had a CT of the abdomen done and it showed no acute findings. WBC was elevated at 14.1. At present she is c/o pain in the L lower quadrant of her abdomen. Appetite is fairly good. No sob. No cp. No dysuria. No current facility-administered medications for this encounter. Current Outpatient Medications   Medication Sig    ondansetron (ZOFRAN ODT) 4 mg disintegrating tablet Take 1 Tablet by mouth every eight (8) hours as needed for Nausea or Vomiting.  levoFLOXacin (Levaquin) 500 mg tablet Take 1 Tablet by mouth daily for 3 days. Past Medical History:   Diagnosis Date    Allergic rhinitis     CVA (cerebral vascular accident) ([facility] [address] 2014    Diabetes ([facility] [address]     Dyslipidemia     Hypertension     IBS (irritable bowel syndrome)     Renal failure       Past Surgical History:   Procedure Laterality Date    HX  SECTION      HX CHOLECYSTECTOMY      HX TONSILLECTOMY  1983     Family History   Problem Relation Age of Onset    Hypertension Mother     Diabetes Mother     Heart Disease Mother     Hypertension Father     Diabetes Father     Heart Disease Father       Social History     Tobacco Use    Smoking status: Never Smoker    Smokeless tobacco: Never Used   Substance Use Topics    Alcohol use: Never         Review of Systems    Review of Systems   Constitutional: Negative for fever. Respiratory: Negative for cough and shortness of breath. Gastrointestinal: Positive for abdominal pain and diarrhea. Negative for vomiting. Neurological: Negative for headaches. Physical Exam:     Physical Exam  Cardiovascular:      Rate and Rhythm: Regular rhythm. Pulmonary:      Breath sounds: Normal breath sounds.    Abdominal:      General: Bowel sounds are normal.      Palpations: Abdomen is soft. Tenderness: There is abdominal tenderness. Musculoskeletal:         General: No swelling. Neurological:      Mental Status: She is alert. L foot in  immobilizer  L UA AVG functioning well. Patient Vitals for the past 8 hrs:   BP Temp Temp src Pulse Resp SpO2 Height Weight   06/29/22 1345 (!) 111/99 98.2 °F (36.8 °C) Oral 81 16 97 % -- --   06/29/22 0835 117/67 98 °F (36.7 °C) -- 83 18 97 % 5' 9\" (1.753 m) 122.5 kg (270 lb)     No intake/output data recorded. No intake/output data recorded. CT ABD PELV W CONT   Final Result   No acute process is definitively identified. Other findings as   above. Data Review   Recent Labs     06/29/22  0959   WBC 14.1*   HGB 10.3*   HCT 31.9*   *     Recent Labs     06/29/22  0959   *   K 4.2   CL 97   CO2 28   *   BUN 30*   CREA 3.30*   CA 9.6   ALB 3.0*   ALT 10*     No components found for: GLPOC  No results for input(s): PH, PCO2, PO2, HCO3, FIO2 in the last 72 hours. No results for input(s): INR, INREXT, INREXT in the last 72 hours. Assessment:           Active Problems:    * No active hospital problems. *  ESRD  HD dependent. On a MWF schedule. Anemia    DM    HTN    Abdominal pain with leucocytosis and negative CT of the abdomen  ? etiology    Plan:   HD today for 3.5 Hrs  No need for EVI  Volume status is OK  ? Need for obs admission - defer to ER.

## 2022-06-29 NOTE — ED NOTES
TRANSFER - OUT REPORT:    Verbal report given to sweetie (name) on Melissa Sylvester  being transferred to (unit) for routine progression of care       Report consisted of patients Situation, Background, Assessment and   Recommendations(SBAR). Information from the following report(s) SBAR and ED Summary was reviewed with the receiving nurse. Lines:   Peripheral IV 06/29/22 Right Antecubital (Active)   Site Assessment Clean, dry, & intact 06/29/22 0958   Phlebitis Assessment 0 06/29/22 0958   Infiltration Assessment 0 06/29/22 0958   Dressing Status Clean, dry, & intact 06/29/22 0958   Hub Color/Line Status Blue 06/29/22 2611        Opportunity for questions and clarification was provided.       Patient transported with:   Monitor  Tech

## 2022-06-29 NOTE — H&P
Giuseppe DANIELS, BRANDIP-C    History and Physical      Patient: Alexis Robles MRN: [id number]  SSN: [id number]    YOB: 1975  Age: 52 y.o. Sex: female        Chief Complaint   Patient presents with    Vomiting    Nausea    Diarrhea    Abdominal Pain       Subjective:      Alexis Robles is a 52 y.o. female who presents to the ED with cc of intractable nausea and vomiting, diarrhea accompanied with left lower quadrant pain x 1 week. She reports onset after eating at a restaurant in [place]. She reports due to illness she has been unable to complete dialysis treatment. Past medical history significant DM, HTN, renal failure on dialysis, CVA, PAD. Patient was not fully dialyzed on Monday due to her feeling ill. She is Monday, Wednesday, Friday dialysis. She missed dialysis today due to illness. She was told that she could not have dialysis tomorrow because her dialysis center would be closed. Of note she reports recent thrombectomy approximately 2 weeks ago. She endorses no fever, chills, or orthopnea. Denies contact with recent sick individuals. Routine lab work shows leukocytosis and ESRD. Vitals on admission /69 P 70, currently /80 P 73. CT abd/pelv negative for acute findings. Hospitalist team consulted for further evaluation and treatment. Nephrology consulted.   Past Medical History:   Diagnosis Date    Allergic rhinitis     [facility]A [facility]cerebral vascular accident) ([facility] [address] 2014    Diabetes ([facility] [address]     Dyslipidemia     Hypertension     IBS (irritable bowel syndrome)     Renal failure      Past Surgical History:   Procedure Laterality Date    HX  SECTION      HX CHOLECYSTECTOMY      HX TONSILLECTOMY  1983      Family History   Problem Relation Age of Onset    Hypertension Mother     Diabetes Mother     Heart Disease Mother     Hypertension Father     Diabetes Father     Heart Disease Father      Social History     Tobacco Use    Smoking status: Never Smoker    Smokeless tobacco: Never Used   Substance Use Topics    Alcohol use: Never      Prior to Admission medications    Medication Sig Start Date End Date Taking? Authorizing Provider   ondansetron (ZOFRAN ODT) 4 mg disintegrating tablet Take 1 Tablet by mouth every eight (8) hours as needed for Nausea or Vomiting. 6/29/22  Yes Tiana Iqbal PA-C   levoFLOXacin (Levaquin) 500 mg tablet Take 1 Tablet by mouth daily for 3 days. 6/29/22 7/2/22 Yes Tiana Iqbal PA-C   carvediloL (COREG) 12.5 mg tablet Take 12.5 mg by mouth two (2) times a day. 4/24/22   Provider, Historical   furosemide (LASIX) 80 mg tablet TAKE 1 TABLET BY MOUTH 1 TIME EACH DAY. 6/10/22   Provider, Historical   hydrOXYzine HCL (ATARAX) 25 mg tablet 1 OR 2 TABLETS AS NEEDED ORALLY EVERY 8 HRS 30 DAY(S) 6/8/22   Provider, Historical   Levemir FlexTouch U-100 Insuln 100 unit/mL (3 mL) inpn INJECT 62 UNITS SUBCUTANEOUSLY AT BEDTIME X 30 DAYS 5/14/22   Provider, Historical   sevelamer carbonate (RENVELA) 800 mg tab tab PLEASE SEE ATTACHED FOR DETAILED DIRECTIONS 5/14/22   Provider, Historical   spironolactone (ALDACTONE) 50 mg tablet Take 50 mg by mouth daily. 6/10/22   Provider, Historical        Allergies   Allergen Reactions    Fentanyl Anxiety    Ekgquarhl-Io-Mm-Acetaminophen Nausea and Vomiting    Sulfa (Sulfonamide Antibiotics) Nausea and Vomiting    Zithromax [Azithromycin] Unknown (comments)    Morphine Itching       Review of Systems:  Review of Systems   Constitutional: Negative for chills and fever. Respiratory: Negative for cough. Cardiovascular: Negative for chest pain. Gastrointestinal: Positive for abdominal pain, diarrhea and nausea. Genitourinary: Positive for dysuria. Musculoskeletal: Negative for myalgias.         Objective:     Vitals:    06/29/22 1345 06/29/22 1415 06/29/22 1445 06/29/22 1515   BP: (!) 111/99 (!) (P) 179/69 (!) (P) 171/76 (P) 108/80   Pulse: 81 (P) 70 (P) 72 (P) 73   Resp: 16      Temp: 98.2 °F (36.8 °C)      TempSrc: Oral      SpO2: 97%      Weight:       Height:            Physical Exam:  Physical Exam  Vitals and nursing note reviewed. Constitutional:       Appearance: She is obese. Eyes:      Extraocular Movements: Extraocular movements intact. Cardiovascular:      Rate and Rhythm: Normal rate. Heart sounds: Normal heart sounds. Comments: GERALD licona  Pulmonary:      Breath sounds: Normal breath sounds. Abdominal:      General: Bowel sounds are normal.   Skin:     General: Skin is warm and dry. Comments: Right ankle soft cast   Neurological:      Mental Status: She is oriented to person, place, and time. Psychiatric:         Behavior: Behavior normal.          Assessment:     Hospital Problems  Never Reviewed          Codes Class Noted POA    Intractable nausea and vomiting ICD-10-CM: R11.2  ICD-9-CM: 536.2  6/29/2022 Unknown            Intractable nausea and vomiting  Abdominal pain  Acute gastritis  -ct neg for acute findings  -start IV flagyl prophy    DM Type 2  Ac/hs accu check, SSI  Basal insulin    Hypertension  bp currently at goal  Continue lasix, spironolactone, coreg    ESRD on HD  Acute hyponatremia noted  Currently receiving dialysis on examination  MWF HD patient  Nephrology consult for management    Morbid obesity  Dietary and lifestyle discussion to be held      Full Code  GI PROPHYLAXIS protonix  DVT PROPHYLAXIS heparin SQ  Home medications reviewed and reconciled    Above treatment plan reviewed and discussed with patient in detail at bedside, all questions answered.      Time Spent: > 70 minutes    Signed By: Radha Devine NP     June 29, 2022

## 2022-06-30 LAB
BASOPHILS # BLD: 0.1 K/UL (ref 0–0.1)
BASOPHILS NFR BLD: 1 % (ref 0–1)
DIFFERENTIAL METHOD BLD: ABNORMAL
EOSINOPHIL # BLD: 0.3 K/UL (ref 0–0.4)
EOSINOPHIL NFR BLD: 2 % (ref 0–7)
ERYTHROCYTE [DISTWIDTH] IN BLOOD BY AUTOMATED COUNT: 13.2 % (ref 11.5–14.5)
EST. AVERAGE GLUCOSE BLD GHB EST-MCNC: 243 MG/DL
GLUCOSE BLD STRIP.AUTO-MCNC: 238 MG/DL (ref 65–117)
GLUCOSE BLD STRIP.AUTO-MCNC: 272 MG/DL (ref 65–117)
GLUCOSE BLD STRIP.AUTO-MCNC: 317 MG/DL (ref 65–117)
GLUCOSE BLD STRIP.AUTO-MCNC: 331 MG/DL (ref 65–117)
HBA1C MFR BLD: 10.1 % (ref 4–5.6)
HBV SURFACE AG SER QL: <0.1 INDEX
HBV SURFACE AG SER QL: NEGATIVE
HCT VFR BLD AUTO: 31.7 % (ref 35–47)
HGB BLD-MCNC: 9.9 G/DL (ref 11.5–16)
IMM GRANULOCYTES # BLD AUTO: 0.1 K/UL (ref 0–0.04)
IMM GRANULOCYTES NFR BLD AUTO: 1 % (ref 0–0.5)
LYMPHOCYTES # BLD: 1.7 K/UL (ref 0.8–3.5)
LYMPHOCYTES NFR BLD: 13 % (ref 12–49)
MCH RBC QN AUTO: 29.7 PG (ref 26–34)
MCHC RBC AUTO-ENTMCNC: 31.2 G/DL (ref 30–36.5)
MCV RBC AUTO: 95.2 FL (ref 80–99)
MONOCYTES # BLD: 0.9 K/UL (ref 0–1)
MONOCYTES NFR BLD: 7 % (ref 5–13)
NEUTS SEG # BLD: 9.6 K/UL (ref 1.8–8)
NEUTS SEG NFR BLD: 76 % (ref 32–75)
NRBC # BLD: 0 K/UL (ref 0–0.01)
NRBC BLD-RTO: 0 PER 100 WBC
PERFORMED BY, TECHID: ABNORMAL
PLATELET # BLD AUTO: 356 K/UL (ref 150–400)
PMV BLD AUTO: 9.6 FL (ref 8.9–12.9)
RBC # BLD AUTO: 3.33 M/UL (ref 3.8–5.2)
WBC # BLD AUTO: 12.5 K/UL (ref 3.6–11)

## 2022-06-30 PROCEDURE — 85025 COMPLETE CBC W/AUTO DIFF WBC: CPT

## 2022-06-30 PROCEDURE — 74011250636 HC RX REV CODE- 250/636: Performed by: NURSE PRACTITIONER

## 2022-06-30 PROCEDURE — 36415 COLL VENOUS BLD VENIPUNCTURE: CPT

## 2022-06-30 PROCEDURE — G0378 HOSPITAL OBSERVATION PER HR: HCPCS

## 2022-06-30 PROCEDURE — 74011636637 HC RX REV CODE- 636/637: Performed by: NURSE PRACTITIONER

## 2022-06-30 PROCEDURE — 96372 THER/PROPH/DIAG INJ SC/IM: CPT

## 2022-06-30 PROCEDURE — 74011250637 HC RX REV CODE- 250/637: Performed by: INTERNAL MEDICINE

## 2022-06-30 PROCEDURE — 96376 TX/PRO/DX INJ SAME DRUG ADON: CPT

## 2022-06-30 PROCEDURE — C9113 INJ PANTOPRAZOLE SODIUM, VIA: HCPCS | Performed by: NURSE PRACTITIONER

## 2022-06-30 PROCEDURE — 74011000250 HC RX REV CODE- 250: Performed by: NURSE PRACTITIONER

## 2022-06-30 PROCEDURE — 74011250637 HC RX REV CODE- 250/637: Performed by: NURSE PRACTITIONER

## 2022-06-30 PROCEDURE — 82962 GLUCOSE BLOOD TEST: CPT

## 2022-06-30 RX ORDER — DICYCLOMINE HYDROCHLORIDE 10 MG/1
20 CAPSULE ORAL 3 TIMES DAILY
Status: DISCONTINUED | OUTPATIENT
Start: 2022-06-30 | End: 2022-07-01 | Stop reason: HOSPADM

## 2022-06-30 RX ORDER — CHOLESTYRAMINE 4 G/4.8G
4 POWDER, FOR SUSPENSION ORAL 2 TIMES DAILY WITH MEALS
Status: DISCONTINUED | OUTPATIENT
Start: 2022-06-30 | End: 2022-06-30

## 2022-06-30 RX ORDER — GABAPENTIN 300 MG/1
300 CAPSULE ORAL
Status: DISCONTINUED | OUTPATIENT
Start: 2022-06-30 | End: 2022-07-01 | Stop reason: HOSPADM

## 2022-06-30 RX ORDER — LOPERAMIDE HYDROCHLORIDE 2 MG/1
2 CAPSULE ORAL
Status: DISCONTINUED | OUTPATIENT
Start: 2022-06-30 | End: 2022-07-01 | Stop reason: HOSPADM

## 2022-06-30 RX ORDER — CHOLESTYRAMINE 4 G/4.8G
4 POWDER, FOR SUSPENSION ORAL
Status: DISCONTINUED | OUTPATIENT
Start: 2022-06-30 | End: 2022-07-01 | Stop reason: HOSPADM

## 2022-06-30 RX ADMIN — DICYCLOMINE HYDROCHLORIDE 20 MG: 10 CAPSULE ORAL at 21:29

## 2022-06-30 RX ADMIN — HEPARIN SODIUM 5000 UNITS: 5000 INJECTION INTRAVENOUS; SUBCUTANEOUS at 21:29

## 2022-06-30 RX ADMIN — INSULIN LISPRO 10 UNITS: 100 INJECTION, SOLUTION INTRAVENOUS; SUBCUTANEOUS at 08:40

## 2022-06-30 RX ADMIN — SEVELAMER CARBONATE 800 MG: 800 TABLET, FILM COATED ORAL at 08:40

## 2022-06-30 RX ADMIN — GABAPENTIN 300 MG: 300 CAPSULE ORAL at 21:29

## 2022-06-30 RX ADMIN — HEPARIN SODIUM 5000 UNITS: 5000 INJECTION INTRAVENOUS; SUBCUTANEOUS at 05:13

## 2022-06-30 RX ADMIN — INSULIN LISPRO 10 UNITS: 100 INJECTION, SOLUTION INTRAVENOUS; SUBCUTANEOUS at 12:37

## 2022-06-30 RX ADMIN — SPIRONOLACTONE 50 MG: 25 TABLET ORAL at 08:39

## 2022-06-30 RX ADMIN — GABAPENTIN 300 MG: 300 CAPSULE ORAL at 05:13

## 2022-06-30 RX ADMIN — SODIUM CHLORIDE, PRESERVATIVE FREE 40 MG: 5 INJECTION INTRAVENOUS at 08:40

## 2022-06-30 RX ADMIN — FUROSEMIDE 80 MG: 40 TABLET ORAL at 08:40

## 2022-06-30 RX ADMIN — SODIUM CHLORIDE, PRESERVATIVE FREE 10 ML: 5 INJECTION INTRAVENOUS at 05:25

## 2022-06-30 RX ADMIN — SODIUM CHLORIDE, PRESERVATIVE FREE 5 ML: 5 INJECTION INTRAVENOUS at 21:44

## 2022-06-30 RX ADMIN — SEVELAMER CARBONATE 800 MG: 800 TABLET, FILM COATED ORAL at 12:37

## 2022-06-30 RX ADMIN — INSULIN LISPRO 7 UNITS: 100 INJECTION, SOLUTION INTRAVENOUS; SUBCUTANEOUS at 17:43

## 2022-06-30 RX ADMIN — SEVELAMER CARBONATE 800 MG: 800 TABLET, FILM COATED ORAL at 17:43

## 2022-06-30 RX ADMIN — SODIUM CHLORIDE, PRESERVATIVE FREE 10 ML: 5 INJECTION INTRAVENOUS at 14:00

## 2022-06-30 RX ADMIN — METRONIDAZOLE 500 MG: 500 INJECTION, SOLUTION INTRAVENOUS at 17:44

## 2022-06-30 RX ADMIN — HEPARIN SODIUM 5000 UNITS: 5000 INJECTION INTRAVENOUS; SUBCUTANEOUS at 14:45

## 2022-06-30 RX ADMIN — INSULIN GLARGINE 62 UNITS: 100 INJECTION, SOLUTION SUBCUTANEOUS at 21:40

## 2022-06-30 RX ADMIN — SODIUM CHLORIDE, PRESERVATIVE FREE 40 MG: 5 INJECTION INTRAVENOUS at 21:29

## 2022-06-30 RX ADMIN — CHOLESTYRAMINE 4 G: 4 POWDER, FOR SUSPENSION ORAL at 18:54

## 2022-06-30 RX ADMIN — METRONIDAZOLE 500 MG: 500 INJECTION, SOLUTION INTRAVENOUS at 00:20

## 2022-06-30 RX ADMIN — INSULIN LISPRO 2 UNITS: 100 INJECTION, SOLUTION INTRAVENOUS; SUBCUTANEOUS at 21:40

## 2022-06-30 RX ADMIN — LOPERAMIDE HYDROCHLORIDE 2 MG: 2 CAPSULE ORAL at 16:15

## 2022-06-30 RX ADMIN — METRONIDAZOLE 500 MG: 500 INJECTION, SOLUTION INTRAVENOUS at 08:43

## 2022-06-30 NOTE — PROGRESS NOTES
DC order noted pending GI clearance. Reason for Admission:                     RUR Score:   N/A                  Plan for utilizing home health:  None       PCP: First and Last name:  Urvashi Solis NP     Name of Practice:    Are you a current patient: Yes/No:    Approximate date of last visit:    Can you participate in a virtual visit with your PCP:                     Current Advanced Directive/Advance Care Plan: Full Code    Healthcare Decision Maker:   Click here to complete [address] including selection of the [address] Relationship (ie \"Primary\")           Spouse: Norma Wild- [phone]                  Transition of Care Plan: CM met with patient at bedside to complete assessment. Address verified on chart. Patient states that she lives with her spouse. Patient drives, uses no DME and is independent in all care. DCP: home, self care and patient needs a Medicaid cab called upon DC as her car is in the shop.

## 2022-06-30 NOTE — CONSULTS
Gastroenterology Consult     Referring Physician: Madonna Post NP     Consult Date: 2022     Subjective:     Chief Complaint: vomiting, nausea, diarrhea, and abdominal pain    History of Present Illness: Felicity Mancilla is a 52 y.o. female who is seen in consultation for Nausea, vomiting, diarrhea, and abdominal pain. Ms. Fabiola Christopher has a past medical history significant for type 2 diabetes, hypertension ESRD on hemodialysis, CVA, PAD, and chronic constipation. She presented to the ED on  with complaint of abdominal pain, nausea, vomiting, and diarrhea which she reports started on Tuesday and has progressively worsened. She reports the pain is increased on the left lower quadrant. She states she is having diarrhea 4-5 times daily. She was started on Flagy on . CT of abdomen showing no acute process. Her last colonoscopy and EGD were in . EGD showed esophagitis and gastritis, colonoscopy showed hemorrhoids otherwise normal. At time of exam she reports her nausea and vomiting have improved but she continues with the diarrhea and left lower quadrant pain. Started on dicyclomine and Welchol. Stool panel pending. CT abdomen 22: IMPRESSION  No acute process is definitively identified. Other findings as  above.     Past Medical History:   Diagnosis Date    Allergic rhinitis     CVA (cerebral vascular accident) ([facility] [address] 2014    Diabetes ([facility] [address]     Dyslipidemia     Hypertension     IBS (irritable bowel syndrome)     Renal failure      Past Surgical History:   Procedure Laterality Date    HX  SECTION      HX CHOLECYSTECTOMY      HX TONSILLECTOMY  1983      Family History   Problem Relation Age of Onset    Hypertension Mother     Diabetes Mother     Heart Disease Mother     Hypertension Father     Diabetes Father     Heart Disease Father      Social History     Tobacco Use    Smoking status: Never Smoker    Smokeless tobacco: Never Used   Substance Use Topics    Alcohol use: Never      Allergies   Allergen Reactions    Fentanyl Anxiety    Pkweidibb-Lu-Vm-Acetaminophen Nausea and Vomiting    Sulfa (Sulfonamide Antibiotics) Nausea and Vomiting    Zithromax [Azithromycin] Unknown (comments)    Morphine Itching     Current Facility-Administered Medications   Medication Dose Route Frequency    gabapentin (NEURONTIN) capsule 300 mg  300 mg Oral QHS    loperamide (IMODIUM) capsule 2 mg  2 mg Oral Q6H PRN    sodium chloride (NS) flush 5-40 mL  5-40 mL IntraVENous Q8H    sodium chloride (NS) flush 5-40 mL  5-40 mL IntraVENous PRN    acetaminophen (TYLENOL) tablet 650 mg  650 mg Oral Q6H PRN    Or    acetaminophen (TYLENOL) suppository 650 mg  650 mg Rectal Q6H PRN    polyethylene glycol (MIRALAX) packet 17 g  17 g Oral DAILY PRN    ondansetron (ZOFRAN ODT) tablet 4 mg  4 mg Oral Q8H PRN    Or    ondansetron (ZOFRAN) injection 4 mg  4 mg IntraVENous Q6H PRN    heparin (porcine) injection 5,000 Units  5,000 Units SubCUTAneous Q8H    [Held by provider] carvediloL (COREG) tablet 12.5 mg  12.5 mg Oral BID    furosemide (LASIX) tablet 80 mg  80 mg Oral DAILY    hydrOXYzine HCL (ATARAX) tablet 25 mg  25 mg Oral TID PRN    insulin glargine (LANTUS) injection 62 Units  62 Units SubCUTAneous QHS    sevelamer carbonate (RENVELA) tab 800 mg  800 mg Oral TID WITH MEALS    spironolactone (ALDACTONE) tablet 50 mg  50 mg Oral DAILY    metroNIDAZOLE (FLAGYL) IVPB premix 500 mg  500 mg IntraVENous Q8H    glucose chewable tablet 16 g  4 Tablet Oral PRN    dextrose 10% infusion 0-250 mL  0-250 mL IntraVENous PRN    glucagon (GLUCAGEN) injection 1 mg  1 mg IntraMUSCular PRN    insulin lispro (HUMALOG) injection   SubCUTAneous AC&HS    pantoprazole (PROTONIX) 40 mg in 0.9% sodium chloride 10 mL injection  40 mg IntraVENous Q12H    hydrALAZINE (APRESOLINE) 20 mg/mL injection 20 mg  20 mg IntraVENous Q6H PRN        Review of Systems:  A detailed 10 organ review of systems is obtained with pertinent positives as listed in the History of Present Illness and Past Medical History. All others are negative. Objective:     Physical Exam:  Visit Vitals  BP (!) 141/54 (BP 1 Location: Left upper arm, BP Patient Position: At rest;Lying)   Pulse 75   Temp 98.4 °F (36.9 °C)   Resp 18   Ht 5' 9\" (1.753 m)   Wt 122.5 kg (270 lb)   SpO2 98%   Breastfeeding No   BMI 39.87 kg/m²        Skin:  Extremities and face reveal no rashes. No guerrero erythema. No telangiectasias on the chest wall. HEENT: Sclerae anicteric. Extra-occular muscles are intact. No oral ulcers. No abnormal pigmentation of the lips. The neck is supple. Cardiovascular: Regular rate and rhythm. Respiratory:  Comfortable breathing with no accessory muscle use. GI:  Abdomen nondistended, soft, and + tenderness LLQ. Normal active bowel sounds. No enlargement of the liver or spleen. No masses palpable. Rectal:  Deferred  Musculoskeletal: Extremities have good range of motion. Neurological:  Gross memory appears intact. Patient is alert and oriented. Psychiatric:  Mood appears appropriate with judgement intact. Lymphatic:  No cervical or supraclavicular adenopathy. Lab/Data Review:  CMP: No results found for: NA, K, CL, CO2, AGAP, GLU, BUN, CREA, GFRAA, GFRNA, CA, MG, PHOS, ALB, TBIL, TP, ALB, GLOB, AGRAT, ALT  CBC:   Lab Results   Component Value Date/Time    WBC 12.5 (H) 06/30/2022 12:26 PM    HGB 9.9 (L) 06/30/2022 12:26 PM    HCT 31.7 (L) 06/30/2022 12:26 PM     06/30/2022 12:26 PM         Assessment/Plan:   1. Nausea/Vomiting (Resolved)      Symptom management  2. Diarrhea     Welchol TID      Imodium as needed      Stool panel pending      Follow up with GI post discharge  3. Abdominal pain     Dicyclomine 20 mg BID  4. ESRD      Hemodialysis as scheduled    From GI standpoint patient may discharge tomorrow with dicyclomine, Welchol and follow up with GI in 2 weeks.       Thank you for allowing me to participate in this patients care. Plan discussed with Dr. Alex Church and he approves.

## 2022-06-30 NOTE — PROGRESS NOTES
Renal Daily Progress Note    Admit Date: 6/29/2022      Subjective:   She is complaining of diarrhea last night associated with abdominal pain. No vomiting. No fever or chills. Current Facility-Administered Medications   Medication Dose Route Frequency    gabapentin (NEURONTIN) capsule 300 mg  300 mg Oral QHS    sodium chloride (NS) flush 5-40 mL  5-40 mL IntraVENous Q8H    sodium chloride (NS) flush 5-40 mL  5-40 mL IntraVENous PRN    acetaminophen (TYLENOL) tablet 650 mg  650 mg Oral Q6H PRN    Or    acetaminophen (TYLENOL) suppository 650 mg  650 mg Rectal Q6H PRN    polyethylene glycol (MIRALAX) packet 17 g  17 g Oral DAILY PRN    ondansetron (ZOFRAN ODT) tablet 4 mg  4 mg Oral Q8H PRN    Or    ondansetron (ZOFRAN) injection 4 mg  4 mg IntraVENous Q6H PRN    heparin (porcine) injection 5,000 Units  5,000 Units SubCUTAneous Q8H    [Held by provider] carvediloL (COREG) tablet 12.5 mg  12.5 mg Oral BID    furosemide (LASIX) tablet 80 mg  80 mg Oral DAILY    hydrOXYzine HCL (ATARAX) tablet 25 mg  25 mg Oral TID PRN    insulin glargine (LANTUS) injection 62 Units  62 Units SubCUTAneous QHS    sevelamer carbonate (RENVELA) tab 800 mg  800 mg Oral TID WITH MEALS    spironolactone (ALDACTONE) tablet 50 mg  50 mg Oral DAILY    metroNIDAZOLE (FLAGYL) IVPB premix 500 mg  500 mg IntraVENous Q8H    glucose chewable tablet 16 g  4 Tablet Oral PRN    dextrose 10% infusion 0-250 mL  0-250 mL IntraVENous PRN    glucagon (GLUCAGEN) injection 1 mg  1 mg IntraMUSCular PRN    insulin lispro (HUMALOG) injection   SubCUTAneous AC&HS    pantoprazole (PROTONIX) 40 mg in 0.9% sodium chloride 10 mL injection  40 mg IntraVENous Q12H    hydrALAZINE (APRESOLINE) 20 mg/mL injection 20 mg  20 mg IntraVENous Q6H PRN        Review of Systems    Review of Systems   Respiratory: Negative for shortness of breath. Cardiovascular: Negative for chest pain. Gastrointestinal: Positive for abdominal pain and diarrhea. Neurological: Negative for headaches. Objective:     Patient Vitals for the past 8 hrs:   BP Temp Pulse Resp SpO2   06/30/22 0737 (!) 120/52 98.4 °F (36.9 °C) 71 20 100 %     No intake/output data recorded. No intake/output data recorded. Physical Exam:   Physical Exam  Constitutional:       Appearance: She is obese. Cardiovascular:      Rate and Rhythm: Regular rhythm. Pulmonary:      Breath sounds: Normal breath sounds. Abdominal:      General: Bowel sounds are normal.      Palpations: Abdomen is soft. Musculoskeletal:         General: No swelling. Neurological:      General: No focal deficit present. Mental Status: She is alert. L UA AVF + thrill  Less tender in the left lower quadrant than yesterday. CT ABD PELV W CONT   Final Result   No acute process is definitively identified. Other findings as   above. Data Review   Recent Labs     06/29/22  0959   WBC 14.1*   HGB 10.3*   HCT 31.9*   *     Recent Labs     06/29/22  0959   *   K 4.2   CL 97   CO2 28   *   BUN 30*   CREA 3.30*   CA 9.6   ALB 3.0*   ALT 10*     No components found for: GLPOC  No results for input(s): PH, PCO2, PO2, HCO3, FIO2 in the last 72 hours. No results for input(s): INR, INREXT, INREXT in the last 72 hours. Assessment:           Principal Problem:    Intractable nausea and vomiting (6/29/2022)    ESRD on HD on Monday Wednesday Friday schedule    Diabetes mellitus under suboptimal control    Anemia of chronic disease    Secondary hyperparathyroidism    Hypertension. Blood pressure was low earlier this morning. Coreg on hold    Abdominal pain with diarrhea ? Etiology . currently being treated with IV Flagyl    Modest hyponatremia. Will correct this on hemodialysis. Plan:   Repeat a CBC today. Hemodialysis on a Monday Wednesday Friday schedule.

## 2022-06-30 NOTE — PROGRESS NOTES
Medicare Outpatient Observation Notice (MOON)/ [place] Outpatient Observation Notice (Lisa Clark) provided to patient/representative with verbal explanation of the notice. Time allotted for questions regarding the notice. Patient /representative provided a completed copy of the MOON/VOON notice. Copy placed on bedside chart.

## 2022-06-30 NOTE — DISCHARGE SUMMARY
Admit date: 6/29/2022   Admitting Provider: Donavon Nguyen MD    Discharge date: 6/30/2022  Discharging Provider: Belvin Councilman, NP      * Admission Diagnoses: Intractable nausea and vomiting [R11.2]    * Discharge Diagnoses:    Hospital Problems as of 6/30/2022 Never Reviewed          Codes Class Noted - Resolved POA    * (Principal) Intractable nausea and vomiting ICD-10-CM: R11.2  ICD-9-CM: 536.2  6/29/2022 - Present Unknown              * Hospital Course: Alexis Robles is a 52 y.o. female who presents to the ED with cc of intractable nausea and vomiting, diarrhea accompanied with left lower quadrant pain x 1 week. She reports onset after eating at a restaurant in [place]. She reports due to illness she has been unable to complete dialysis treatment. Past medical history significant DM, HTN, renal failure on dialysis, CVA, PAD. Patient was not fully dialyzed on Monday due to her feeling ill.  She is Monday, Wednesday, Friday dialysis.  She missed dialysis today due to illness. Cassie Alfaro was told that she could not have dialysis tomorrow because her dialysis center would be closed. Of note she reports recent thrombectomy approximately 2 weeks ago. She endorses no fever, chills, or orthopnea. Denies contact with recent sick individuals.        Routine lab work shows leukocytosis and ESRD. Vitals on admission /69 P 70, currently /80 P 73. CT abd/pelv negative for acute findings. Hospitalist team consulted for further evaluation and Meredith Godoy is a 52 y.o. female who presents to the ED with cc of intractable nausea and vomiting, diarrhea accompanied with left lower quadrant pain x 1 week. She reports onset after eating at a restaurant in [place]. She reports due to illness she has been unable to complete dialysis treatment. Past medical history significant DM, HTN, renal failure on dialysis, CVA, PAD.  Patient was not fully dialyzed on Monday due to her feeling ill. Cassie Alfaro is Monday, Wednesday, Friday dialysis.  She missed dialysis today due to illness. Michelle Grady was told that she could not have dialysis tomorrow because her dialysis center would be closed. Of note she reports recent thrombectomy approximately 2 weeks ago. She endorses no fever, chills, or orthopnea. Denies contact with recent sick individuals.        Routine lab work shows leukocytosis and ESRD. Vitals on admission /69 P 70, currently /80 P 73. CT abd/pelv negative for acute findings. Hospitalist team consulted for further evaluation and treatment. Patient underwent HD, tolerated without incidence. PRN zofran for nausea. Outpatient Gi follow up. * Procedures:   * No surgery found *      Consults: GI and Nephrology    Significant Diagnostic Studies:   CT Results  (Last 48 hours)               06/29/22 1125  CT ABD PELV W CONT Final result    Impression:  No acute process is definitively identified. Other findings as   above. Narrative:  CT abdomen and pelvis, 6/29/2022       History: Left lower quadrant pain. Diarrhea. Technique: Oral contrast was not given. Multiple contiguous axial images were   obtained from the diaphragm to the inferior pubic rami following intravenous   administration of 100 mL Isovue 370 contrast material.  Coronal and sagittal   reconstruction of images was made. All CT scans at this facility are performed using dose reduction optimization   techniques as appropriate to perform the exam including the following: Automated   exposure control, adjustments of the mA and/or kV according to patient size, or   use iterative reconstruction technique. Comparison:  Including CT abdomen pelvis 4/20/2020. Findings: The included lung bases show minimal atelectasis. Coronary artery   calcifications are noted. Pericardial effusion measures up to 1.6 cm in   thickness at the base. There is a small band of hypoattenuation between the liver and spleen, possibly   artifact. The liver, spleen, pancreas, adrenal glands, kidneys otherwise   demonstrate no focal abnormality. Cholecystectomy is noted. Fat-containing umbilical hernia measures almost 3 cm in widest dimension. The stomach is decompressed. There is no evidence of mechanical bowel   obstruction. The terminal ileum is within normal limits. The appendix images   normally. There is no specific CT evidence of colitis or diverticulitis. The uterus and ovaries show no focal abnormality. Small hypodensities in the   left ovary suggest follicles. No free air or free fluid is present. The abdominal aorta is normal in caliber. Calcified atherosclerotic plaque is   seen in the abdominal aorta, mesenteric and iliac vessels. Degenerative changes are seen in the spine. Severe disc space narrowing and   endplate osteophytes are noted at L5-S1. Chronic appearing compression   deformity involving the L1 superior endplate is noted. Discharge Exam:  [facility] 1690 note reviewed. Constitutional:       Appearance: She is obese. Eyes:      Extraocular Movements: Extraocular movements intact. Cardiovascular:      Rate and Rhythm: Normal rate.      Heart sounds: Normal heart sounds.      Comments: GERALD fustula  Pulmonary:      Breath sounds: Normal breath sounds. Abdominal:      General: Bowel sounds are normal.   Skin:     General: Skin is warm and dry.      Comments: Right ankle soft cast   Neurological:      Mental Status: She is oriented to person, place, and time. Psychiatric:         Behavior: Behavior normal    * Discharge Condition: improved  * Disposition: Home    Discharge Medications:  Current Discharge Medication List      START taking these medications    Details   ondansetron (ZOFRAN ODT) 4 mg disintegrating tablet Take 1 Tablet by mouth every eight (8) hours as needed for Nausea or Vomiting.   Qty: 10 Tablet, Refills: 0  Start date: 6/29/2022      levoFLOXacin (Levaquin) 500 mg tablet Take 1 Tablet by mouth daily for 3 days. Qty: 3 Tablet, Refills: 0  Start date: 6/29/2022, End date: 7/2/2022         CONTINUE these medications which have NOT CHANGED    Details   carvediloL (COREG) 12.5 mg tablet Take 12.5 mg by mouth two (2) times a day. furosemide (LASIX) 80 mg tablet Take 80 mg by mouth daily. hydrOXYzine HCL (ATARAX) 25 mg tablet Take  by mouth every eight (8) hours as needed. Take 1-2 tablets      Levemir FlexTouch U-100 Insuln 100 unit/mL (3 mL) inpn 62 Units by SubCUTAneous route nightly. sevelamer carbonate (RENVELA) 800 mg tab tab Take 800 mg by mouth three (3) times daily (with meals). spironolactone (ALDACTONE) 50 mg tablet Take 50 mg by mouth daily. dextroamphetamine-amphetamine (AdderalL) 20 mg tablet Take 20 mg by mouth three (3) times daily. traMADoL (ULTRAM) 50 mg tablet Take 50 mg by mouth two (2) times daily as needed for Pain. atorvastatin (LIPITOR) 20 mg tablet Take 20 mg by mouth daily. gabapentin (NEURONTIN) 300 mg capsule Take 300 mg by mouth nightly. omeprazole (PRILOSEC) 20 mg capsule Take 20 mg by mouth daily. * Follow-up Care/Patient Instructions:   Activity: Activity as tolerated  Diet: Diabetic Diet  Wound Care: None needed    Follow-up Information     Follow up With Specialties Details Why Contact Info    Rodger Hutson NP Nurse Practitioner Schedule an appointment as soon as possible for a visit  for follow up from ER visit SHELDON Schmidt [phone] EMERGENCY DEPT Emergency Medicine  As needed, If symptoms worsen Peter Alba [address]  Rodríguez Dalal MD Gastroenterology In 1 week abdominal pain [address]  [address] [address]  [phone]            Time Spent: > 35 minutes    Signed:  Vitaly Gama NP  6/30/2022  2:25 PM

## 2022-06-30 NOTE — ADT AUTH CERT NOTES
NEW OBSERVATION PATIENT -22    PLEASE USE FAX#[phone]  ATT: Krista [address]    [address] Name: [facility]             NPI- [id number]     ORDER PATIENT CLASS IS OBSERVATION [387]  DX: Intractable nausea and vomiting [R11.2]    Admitting provider: Jim Abreu MD- HOSPITALIST                    Patient Demographics    Patient Name   Jim Abreu Legal Sex   Female    1975 Address   [address] [address] Phone   [phone] (Home)   [phone] [facility])     Hospital Account    Name Acct ID Class Status Primary Coverage   Jim Abreu [id number] [facility]            Guarantor Account (for Hospital Account [de-identified])    Name Relation to Pt Service Area Active? Acct Type   Jim Crabtreeira Self [facility] Yes Personal/Family   Address Phone     [address]   [phone](B)              Coverage Information (for Hospital Account [de-identified])      1. [facility] [address] [facility] MEDICARE HMO    F/O Payor/Plan Subscriber  Subscriber Sex Precert #   BLUE CROSS [id number] [facility]    Subscriber Subscriber #   Jim Abreu NAME [facility] # Group Name       Address Phone   [address] [facility] [address] [facility] [phone]   Policy Number Status Effective Date Benefits Phone   [facility] [id number] [facility]  -   Auth/Cert        2. [facility]/[place] ANTHEM [facility]    F/O Payor/Plan Subscriber  Subscriber Sex Precert #   [facility] MEDICAID/VA ANTHEM [facility] 75 F    Subscriber Subscriber #   Jim Abreu [id number]   [facility] # Group Name   [id number]    Address Phone   [address]    Policy Number Status Effective Date Benefits Phone   [id number] -  -   Auth/Cert                 Diagnosis     Codes Comments   Gastroenteritis  ICD-10-CM: K52.9   ICD-9-CM: 414. 9             Admission Information    Arrival Date/Time: 2022 0819 Admit Date/Time: 2022 4453 IP Adm. Date/Time:    Admission Type: Emergency Point of Origin: Non-health Care Facility/self Admit Category:    Means of Arrival: Car Primary Service: Medicine Secondary Service: N/A   Transfer Source:  Service Area: [facility] Unit: [address] Provider: Fernando Romero MD Attending Provider: Robert Aaron MD Referring Provider:      Admission Information    Attending Provider Admission Dx Admitted on   Fernando Romero MD Intractable nausea and vomiting 22   Service Isolation Code Status   Medicine -- Full Code   Allergies Advance Care Planning    Fentanyl, Ollmovqyv-na-gs-acetaminophen, Sulfa (Sulfonamide Antibiotics), Zithromax [Azithromycin], Morphine Jump to the Activity       Admission Information    Unit/Bed: [address]/ Service: Medicine   Admitting provider: Fernando Romero MD Phone: [phone]   Attending provider: Fernando Romero MD Phone: [phone]   PCP: Urvashi Solis NP Phone: [phone]   Admission dx:  Patient class: V   Admission type: ER       Patient Demographics    Patient Name   Juan Diego Ibarra   [id number] Legal Sex   Female    1975 Address   [address] [place] [address] #   [address] [place] [address] Phone   [phone] (Home)   [phone] (Mobile)     H&P Notes       H&P by Mariama Arias NP at 22 6920 documented on ED to Hosp-Admission (Current) from 2022 in [facility]    Author: Mariama Arias NP Author Type: Nurse Practitioner Filed: 22 [id number]   Note Status: Cosign Needed Cosign: Cosign Required Date of Service: 22 2135   : Mariama Arias NP (Nurse Practitioner)                    JULISSA Panchal     History and Physical        Patient: Haja Cox MRN: [id number]  SSN: [id number]    YOB: 1975  Age: 52 y.o.   Sex: female              Chief Complaint Patient presents with    Vomiting    Nausea    Diarrhea    Abdominal Pain         Subjective:      Deidra Borges is a 52 y.o. female who presents to the ED with cc of intractable nausea and vomiting, diarrhea accompanied with left lower quadrant pain x 1 week. She reports onset after eating at a restaurant in [place]. She reports due to illness she has been unable to complete dialysis treatment. Past medical history significant DM, HTN, renal failure on dialysis, CVA, PAD. Patient was not fully dialyzed on Monday due to her feeling ill.  She is Monday, Wednesday, Friday dialysis.  She missed dialysis today due to illness. Lia Yan was told that she could not have dialysis tomorrow because her dialysis center would be closed. Of note she reports recent thrombectomy approximately 2 weeks ago. She endorses no fever, chills, or orthopnea. Denies contact with recent sick individuals.        Routine lab work shows leukocytosis and ESRD. Vitals on admission /69 P 70, currently /80 P 73. CT abd/pelv negative for acute findings. Hospitalist team consulted for further evaluation and treatment. Nephrology consulted.        Past Medical History:   Diagnosis Date    Allergic rhinitis      [facility]    Diabetes ([facility]      Dyslipidemia      Hypertension      IBS (irritable bowel syndrome)      Renal failure              Past Surgical History:   Procedure Laterality Date    HX  SECTION       HX CHOLECYSTECTOMY       HX TONSILLECTOMY   26            Family History   Problem Relation Age of Onset    Hypertension Mother      Diabetes Mother      Heart Disease Mother      Hypertension Father      Diabetes Father      Heart Disease Father        Social History           Tobacco Use    Smoking status: Never Smoker    Smokeless tobacco: Never Used   Substance Use Topics    Alcohol use: Never              Prior to Admission medications Medication Sig Start Date End Date Taking? Authorizing Provider   ondansetron (ZOFRAN ODT) 4 mg disintegrating tablet Take 1 Tablet by mouth every eight (8) hours as needed for Nausea or Vomiting. 6/29/22   Yes Tiana Iqbal PA-C   levoFLOXacin (Levaquin) 500 mg tablet Take 1 Tablet by mouth daily for 3 days. 6/29/22 7/2/22 Yes Tiana Iqbal PA-C   carvediloL (COREG) 12.5 mg tablet Take 12.5 mg by mouth two (2) times a day. 4/24/22     Provider, Historical   furosemide (LASIX) 80 mg tablet TAKE 1 TABLET BY MOUTH 1 TIME EACH DAY. 6/10/22     Provider, Historical   hydrOXYzine HCL (ATARAX) 25 mg tablet 1 OR 2 TABLETS AS NEEDED ORALLY EVERY 8 HRS 30 DAY(S) 6/8/22     Provider, Historical   Levemir FlexTouch U-100 Insuln 100 unit/mL (3 mL) inpn INJECT 62 UNITS SUBCUTANEOUSLY AT BEDTIME X 30 DAYS 5/14/22     Provider, Historical   sevelamer carbonate (RENVELA) 800 mg tab tab PLEASE SEE ATTACHED FOR DETAILED DIRECTIONS 5/14/22     Provider, Historical   spironolactone (ALDACTONE) 50 mg tablet Take 50 mg by mouth daily. 6/10/22     Provider, Historical              Allergies   Allergen Reactions    Fentanyl Anxiety    Oeekvjegf-Af-Tx-Acetaminophen Nausea and Vomiting    Sulfa (Sulfonamide Antibiotics) Nausea and Vomiting    Zithromax [Azithromycin] Unknown (comments)    Morphine Itching         Review of Systems:  Review of Systems   Constitutional: Negative for chills and fever. Respiratory: Negative for cough. Cardiovascular: Negative for chest pain. Gastrointestinal: Positive for abdominal pain, diarrhea and nausea. Genitourinary: Positive for dysuria.    Musculoskeletal: Negative for myalgias.         Objective:             Vitals:     06/29/22 1345 06/29/22 1415 06/29/22 1445 06/29/22 1515   BP: (!) 111/99 (!) (P) 179/69 (!) (P) 171/76 (P) 108/80   Pulse: 81 (P) 70 (P) 72 (P) 73   Resp: 16         Temp: 98.2 °F (36.8 °C)         TempSrc: Oral         SpO2: 97%         Weight:           Height:                 Physical Exam:  Physical Exam  Vitals and nursing note reviewed. Constitutional:       Appearance: She is obese. Eyes:      Extraocular Movements: Extraocular movements intact. Cardiovascular:      Rate and Rhythm: Normal rate. Heart sounds: Normal heart sounds. Comments: GERALD fustula  Pulmonary:      Breath sounds: Normal breath sounds. Abdominal:      General: Bowel sounds are normal.   Skin:     General: Skin is warm and dry. Comments: Right ankle soft cast   Neurological:      Mental Status: She is oriented to person, place, and time.    Psychiatric:         Behavior: Behavior normal.            Assessment:                  Hospital Problems  Never Reviewed           Codes Class Noted POA     Intractable nausea and vomiting ICD-10-CM: R11.2  ICD-9-CM: 536.2   2022 Unknown                Intractable nausea and vomiting  Abdominal pain  Acute gastritis  -ct neg for acute findings  -start IV flagyl prophy     DM Type 2  Ac/hs accu check, SSI  Basal insulin     Hypertension  bp currently at goal  Continue lasix, spironolactone, coreg     ESRD on HD  Acute hyponatremia noted  Currently receiving dialysis on examination  MWF HD patient  Nephrology consult for management     Morbid obesity  Dietary and lifestyle discussion to be held        Full Code  GI PROPHYLAXIS protonix  DVT PROPHYLAXIS heparin SQ  Home medications reviewed and reconciled     Above treatment plan reviewed and discussed with patient in detail at bedside, all questions answered.      Time Spent: > 70 minutes     Signed By: Shorty Mckeon, ELDER      2022                    Patient Demographics    Patient Name   Ericka Swift   [id number] Legal Sex   Female    1975 Address   [address] [place] [address] [address] [address] Phone   [phone] (Home)   [phone] (Mobile)   CSN:   [id number]     [address] [place] [address] Date: Admit Time Room Bed   2022  8:38  [77712] 01 [32084]       Attending Providers    Provider Pager From To   Terry Allen MD  06/29/22 06/29/22   Srinivasan Duffy NP  06/29/22 06/30/22   Bakari Mejia MD  06/30/22      Emergency Contact(s)    Name Relation Home Work CHRISTY Valdes Spouse [phone]       Utilization Reviews         Vomiting - Care Day 1 (6/29/2022) by Johanna Chaparro       Review Entered Review Status   6/30/2022 08:58 Completed      Criteria Review      Care Day: 1 Care Date: 6/29/2022 Level of Care:    Guideline Day 1    Clinical Status    ( ) * Clinical Indications met    Activity    (X) Activity as tolerated    6/30/2022 08:58:19 EDT by Myrna Bruno up ad farhat    Routes    ( ) NPO or liquid diet    6/30/2022 08:58:19 EDT by Johanna Chaparro      ADULT DIET Regular    Interventions    (X) CBC, chemistries    6/30/2022 08:58:19 EDT by Johanna Chaparro      6/29/2022 09:59  WBC: 14.1 (H)  RBC: 3.44 (L)  HGB: 10.3 (L)  HCT: 31.9 (L)  PLATELET: 421 (H)  NEUTROPHILS: 76 (H)  ABS. NEUTROPHILS: 10.9 (H)  ABS. IMM. GRANS.: 0.1 (H)  Sodium: 132 (L)  Glucose: 342 (H)  BUN: 30 (H)  Creatinine: 3.30 (H)    (X) Possible abdominal imaging    6/30/2022 08:58:19 EDT by Johanna Chaparro      Abd/Pel CT - No acute process is definitively identified.  Other findings as  above. * Milestone   Additional Notes   DATE: 6/29/2022 Initial OBS Review/Medical      Internal medicine progress note:   Chief Complaint/ED Presentation: Vomiting, Nausea, Diarrhea and Abdominal Pain      Physical Exam   Vitals and nursing note reviewed. Constitutional:        Appearance: She is obese    Eyes:       Extraocular Movements: Extraocular movements intact. Cardiovascular:       Rate and Rhythm: Normal rate.       Heart sounds: Normal heart sounds.       Comments: GERALD fustula   Pulmonary:       Breath sounds: Normal breath sounds.     Abdominal:       General: Bowel sounds are normal.    Skin:      General: Skin is warm and dry.       Comments: Right ankle soft cast    Neurological:       Mental Status: She is oriented to person, place, and time. Psychiatric:          Behavior: Behavior normal.        Assessment:     Intractable nausea and vomiting ICD-10-CM: R11.2   ICD-9-CM: 536.2   6/29/2022 Unknown       Intractable nausea and vomiting   Abdominal pain   Acute gastritis   -ct neg for acute findings   -start IV flagyl prophy       DM Type 2   Ac/hs accu check, SSI   Basal insulin       Hypertension   bp currently at goal   Continue lasix, spironolactone, coreg       ESRD on HD   Acute hyponatremia noted   Currently receiving dialysis on examination   MWF HD patient   Nephrology consult for management      Relevant baselines: (lab values, vitals, o2 amount/delivery, etc.)   6/29/2022 09:59   BUN/Creatinine ratio: 9 (L)   GFR est non-AA: 15 (L)   GFR est AA: 18 (L)   Albumin: 3.0 (L)   Globulin: 4.6 (H)   A-G Ratio: 0.7 (L)   ALT: 10 (L)   AST: 9 (L)   Alk.  phosphatase: 142 (H)   Lipase: 170      Vitals: Temp 98.2, HR 73, R 18, B/P 111/99, 171/76, 110/42 and O2 97% RA      Medications:   Tylenol 650mg PO x1   Coreg 12.5mg PO BID   Heparin 5,000units SC TID   Hydroxyzine 25mg PO x1   Lantus 62units SC QD   Humalog SSI SC QID   Flagyl 500mg IV TID   Protonix 40mg IV BID   Renvela 800mg PO TID   Zofran 4mg IV x1              Vomiting - Clinical Indications for Admission to Inpatient Care by Antonio Taveras       Review Entered Review Status   6/30/2022 08:54 Completed      Criteria Review      Clinical Indications for Admission to Inpatient Care    Most Recent : Antonio Taveras Most Recent Date: 6/30/2022 08:54:29 EDT

## 2022-06-30 NOTE — PROGRESS NOTES
Hospitalist Progress Note         JEREMIAH Garcia, FNP-C    Daily Progress Note: 6/30/2022      Subjective:   Subjective   Patient examined alert and oriented lying in bed. Continues to report abdominal pain. No acute distress noted on examination. Review of Systems:   Review of Systems   Constitutional: Negative for fever. HENT: Negative for hearing loss. Respiratory: Negative for cough and shortness of breath. Cardiovascular: Negative for chest pain. Gastrointestinal: Positive for abdominal pain and nausea. Genitourinary: Negative for dysuria. Musculoskeletal: Negative for myalgias. Objective:   Objective      Vitals:  Patient Vitals for the past 12 hrs:   Temp Pulse Resp BP SpO2   06/30/22 0737 98.4 °F (36.9 °C) 71 20 (!) 120/52 100 %        Physical Exam:  Physical Exam   Vitals and nursing note reviewed. Constitutional:       Appearance: She is obese. Eyes:      Extraocular Movements: Extraocular movements intact. Cardiovascular:      Rate and Rhythm: Normal rate. Heart sounds: Normal heart sounds. Comments: GERALD fustula  Pulmonary:      Breath sounds: Normal breath sounds. Abdominal:      General: Bowel sounds are normal.   Skin:     General: Skin is warm and dry. Comments: Right ankle soft cast   Neurological:      Mental Status: She is oriented to person, place, and time. Psychiatric:         Behavior: Behavior normal  Lab Results:  Recent Results (from the past 24 hour(s))   HEP B SURFACE AG    Collection Time: 06/29/22  2:32 PM   Result Value Ref Range    Hepatitis B surface Ag <0.10 Index    Hep B surface Ag Interp.  Negative Negative   GLUCOSE, POC    Collection Time: 06/29/22  6:16 PM   Result Value Ref Range    Glucose (POC) 195 (H) 65 - 117 mg/dL    Performed by [place], POC    Collection Time: 06/29/22  9:04 PM   Result Value Ref Range    Glucose (POC) 179 (H) 65 - 117 mg/dL    Performed by [address], [facility] Collection Time: 06/30/22  8:13 AM   Result Value Ref Range    Glucose (POC) 331 (H) 65 - 117 mg/dL    Performed by Moy Rome    GLUCOSE, POC    Collection Time: 06/30/22 11:44 AM   Result Value Ref Range    Glucose (POC) 317 (H) 65 - 117 mg/dL    Performed by Magi Stallings           Diagnostic Images:  CT Results  (Last 48 hours)               06/29/22 1125  CT ABD PELV W CONT Final result    Impression:  No acute process is definitively identified. Other findings as   above. Narrative:  CT abdomen and pelvis, 6/29/2022       History: Left lower quadrant pain. Diarrhea. Technique: Oral contrast was not given. Multiple contiguous axial images were   obtained from the diaphragm to the inferior pubic rami following intravenous   administration of 100 mL Isovue 370 contrast material.  Coronal and sagittal   reconstruction of images was made. All CT scans at this facility are performed using dose reduction optimization   techniques as appropriate to perform the exam including the following: Automated   exposure control, adjustments of the mA and/or kV according to patient size, or   use iterative reconstruction technique. Comparison:  Including CT abdomen pelvis 4/20/2020. Findings: The included lung bases show minimal atelectasis. Coronary artery   calcifications are noted. Pericardial effusion measures up to 1.6 cm in   thickness at the base. There is a small band of hypoattenuation between the liver and spleen, possibly   artifact. The liver, spleen, pancreas, adrenal glands, kidneys otherwise   demonstrate no focal abnormality. Cholecystectomy is noted. Fat-containing umbilical hernia measures almost 3 cm in widest dimension. The stomach is decompressed. There is no evidence of mechanical bowel   obstruction. The terminal ileum is within normal limits. The appendix images   normally. There is no specific CT evidence of colitis or diverticulitis. The uterus and ovaries show no focal abnormality. Small hypodensities in the   left ovary suggest follicles. No free air or free fluid is present. The abdominal aorta is normal in caliber. Calcified atherosclerotic plaque is   seen in the abdominal aorta, mesenteric and iliac vessels. Degenerative changes are seen in the spine. Severe disc space narrowing and   endplate osteophytes are noted at L5-S1. Chronic appearing compression   deformity involving the L1 superior endplate is noted.                  Current Medications:    Current Facility-Administered Medications:     gabapentin (NEURONTIN) capsule 300 mg, 300 mg, Oral, QHS, Kait, Therese Dubin, MD, 300 mg at 06/30/22 0513    sodium chloride (NS) flush 5-40 mL, 5-40 mL, IntraVENous, Q8H, Pooja Fuentes NP, 10 mL at 06/30/22 0525    sodium chloride (NS) flush 5-40 mL, 5-40 mL, IntraVENous, PRN, Isai Angel NP    acetaminophen (TYLENOL) tablet 650 mg, 650 mg, Oral, Q6H PRN, 650 mg at 06/29/22 2100 **OR** acetaminophen (TYLENOL) suppository 650 mg, 650 mg, Rectal, Q6H PRN, Isai Angel NP    polyethylene glycol (MIRALAX) packet 17 g, 17 g, Oral, DAILY PRN, Isai Angel NP    ondansetron (ZOFRAN ODT) tablet 4 mg, 4 mg, Oral, Q8H PRN **OR** ondansetron (ZOFRAN) injection 4 mg, 4 mg, IntraVENous, Q6H PRN, Isai Angel NP    heparin (porcine) injection 5,000 Units, 5,000 Units, SubCUTAneous, Q8H, Pooja Fuentes NP, 5,000 Units at 06/30/22 0513    [Held by provider] carvediloL (COREG) tablet 12.5 mg, 12.5 mg, Oral, BID, Isai Angel NP, 12.5 mg at 06/29/22 2059    furosemide (LASIX) tablet 80 mg, 80 mg, Oral, DAILY, Pooja Fuentes NP, 80 mg at 06/30/22 0840    hydrOXYzine HCL (ATARAX) tablet 25 mg, 25 mg, Oral, TID PRN, Isai Angel NP, 25 mg at 06/29/22 2100    insulin glargine (LANTUS) injection 62 Units, 62 Units, SubCUTAneous, QHS, Isai Angel NP, 62 Units at 06/29/22 2100    sevelamer carbonate (RENVELA) tab 800 mg, 800 mg, Oral, TID WITH MEALS, Elizebeth Barley, NP, 800 mg at 06/30/22 0840    spironolactone (ALDACTONE) tablet 50 mg, 50 mg, Oral, DAILY, Elizebeth Barley, NP, 50 mg at 06/30/22 0839    metroNIDAZOLE (FLAGYL) IVPB premix 500 mg, 500 mg, IntraVENous, Q8H, Stacy Grijalva, NP, Last Rate: 100 mL/hr at 06/30/22 0843, 500 mg at 06/30/22 0843    glucose chewable tablet 16 g, 4 Tablet, Oral, PRN, Stacy Grijalva, NP    dextrose 10% infusion 0-250 mL, 0-250 mL, IntraVENous, PRN, Stacy Grijalva, NP    glucagon (GLUCAGEN) injection 1 mg, 1 mg, IntraMUSCular, PRN, Emmybesteven Grijalva, NP    insulin lispro (HUMALOG) injection, , SubCUTAneous, AC&HS, Stacy Grijalva, NP, 10 Units at 06/30/22 0840    pantoprazole (PROTONIX) 40 mg in 0.9% sodium chloride 10 mL injection, 40 mg, IntraVENous, Q12H, Bugg, Talbot Runner, NP, 40 mg at 06/30/22 0840    hydrALAZINE (APRESOLINE) 20 mg/mL injection 20 mg, 20 mg, IntraVENous, Q6H PRN, Stacy Grijalva NP       ASSESSMENT:  Steve Freedman is a 52 y.o. female who presents to the ED with cc of intractable nausea and vomiting, diarrhea accompanied with left lower quadrant pain x 1 week. She reports onset after eating at a restaurant in [place]. She reports due to illness she has been unable to complete dialysis treatment. Past medical history significant DM, HTN, renal failure on dialysis, CVA, PAD. Patient was not fully dialyzed on Monday due to her feeling ill.  She is Monday, Wednesday, Friday dialysis.  She missed dialysis today due to illness. Charlotte Cerrato was told that she could not have dialysis tomorrow because her dialysis center would be closed. Of note she reports recent thrombectomy approximately 2 weeks ago. She endorses no fever, chills, or orthopnea. Denies contact with recent sick individuals.        Routine lab work shows leukocytosis and ESRD. Vitals on admission /69 P 70, currently /80 P 73. CT abd/pelv negative for acute findings.  Hospitalist team consulted for further evaluation and treatment. Nephrology consulted. Intractable nausea and vomiting  Abdominal pain  Acute gastritis  -ct neg for acute findings  -continue IV flagyl prophy  -consult GI     DM Type 2  Ac/hs accu check, SSI  Basal insulin     Hypertension  bp currently at goal  Continue lasix, spironolactone, coreg     ESRD on HD  Acute hyponatremia noted  Currently receiving dialysis on examination  MWF HD patient  Nephrology consult for management     Morbid obesity  Dietary and lifestyle discussion to be held        Full Code  GI PROPHYLAXIS protonix  DVT PROPHYLAXIS heparin SQ  Discharge barrier:  -pending GI eval    Above treatment plan reviewed and discussed with patient in detail at bedside, all questions answered. Care Plan discussed with: Interdisciplinary team    Total time spent with patient: 35 minutes.     Greyson Treviño NP

## 2022-07-01 VITALS
DIASTOLIC BLOOD PRESSURE: 54 MMHG | BODY MASS INDEX: 41.86 KG/M2 | RESPIRATION RATE: 20 BRPM | WEIGHT: 282.63 LBS | OXYGEN SATURATION: 96 % | HEIGHT: 69 IN | SYSTOLIC BLOOD PRESSURE: 126 MMHG | HEART RATE: 73 BPM | TEMPERATURE: 98.2 F

## 2022-07-01 LAB
GLUCOSE BLD STRIP.AUTO-MCNC: 118 MG/DL (ref 65–117)
GLUCOSE BLD STRIP.AUTO-MCNC: 266 MG/DL (ref 65–117)
PERFORMED BY, TECHID: ABNORMAL
PERFORMED BY, TECHID: ABNORMAL

## 2022-07-01 PROCEDURE — 90935 HEMODIALYSIS ONE EVALUATION: CPT

## 2022-07-01 PROCEDURE — 74011636637 HC RX REV CODE- 636/637: Performed by: NURSE PRACTITIONER

## 2022-07-01 PROCEDURE — 74011250636 HC RX REV CODE- 250/636: Performed by: NURSE PRACTITIONER

## 2022-07-01 PROCEDURE — 96376 TX/PRO/DX INJ SAME DRUG ADON: CPT

## 2022-07-01 PROCEDURE — G0378 HOSPITAL OBSERVATION PER HR: HCPCS

## 2022-07-01 PROCEDURE — 74011250637 HC RX REV CODE- 250/637: Performed by: NURSE PRACTITIONER

## 2022-07-01 PROCEDURE — 74011000250 HC RX REV CODE- 250: Performed by: NURSE PRACTITIONER

## 2022-07-01 PROCEDURE — 82962 GLUCOSE BLOOD TEST: CPT

## 2022-07-01 PROCEDURE — 96372 THER/PROPH/DIAG INJ SC/IM: CPT

## 2022-07-01 RX ORDER — CHOLESTYRAMINE 4 G/4.8G
4 POWDER, FOR SUSPENSION ORAL
Qty: 63 PACKET | Refills: 0 | Status: SHIPPED | OUTPATIENT
Start: 2022-07-01 | End: 2022-07-22

## 2022-07-01 RX ORDER — CARVEDILOL 12.5 MG/1
6.25 TABLET ORAL 2 TIMES DAILY
Qty: 30 TABLET | Refills: 0 | Status: SHIPPED | OUTPATIENT
Start: 2022-07-01 | End: 2022-07-31

## 2022-07-01 RX ORDER — DICYCLOMINE HYDROCHLORIDE 10 MG/1
20 CAPSULE ORAL 3 TIMES DAILY
Qty: 180 CAPSULE | Refills: 0 | Status: SHIPPED | OUTPATIENT
Start: 2022-07-01 | End: 2022-07-31

## 2022-07-01 RX ADMIN — INSULIN LISPRO 7 UNITS: 100 INJECTION, SOLUTION INTRAVENOUS; SUBCUTANEOUS at 08:55

## 2022-07-01 RX ADMIN — METRONIDAZOLE 500 MG: 500 INJECTION, SOLUTION INTRAVENOUS at 00:13

## 2022-07-01 RX ADMIN — SEVELAMER CARBONATE 800 MG: 800 TABLET, FILM COATED ORAL at 13:19

## 2022-07-01 RX ADMIN — HEPARIN SODIUM 5000 UNITS: 5000 INJECTION INTRAVENOUS; SUBCUTANEOUS at 06:00

## 2022-07-01 RX ADMIN — SODIUM CHLORIDE, PRESERVATIVE FREE 10 ML: 5 INJECTION INTRAVENOUS at 06:00

## 2022-07-01 NOTE — PROGRESS NOTES
Problem: Falls - Risk of  Goal: *Absence of Falls  Description: Document Guilherme Valdes Fall Risk and appropriate interventions in the flowsheet. Outcome: Progressing Towards Goal  Note: Fall Risk Interventions:  Mobility Interventions: Utilize walker, cane, or other assistive device,Patient to call before getting OOB         Medication Interventions: Teach patient to arise slowly         History of Falls Interventions:  Investigate reason for fall,Bed/chair exit alarm         Problem: Patient Education: Go to Patient Education Activity  Goal: Patient/Family Education  Outcome: Progressing Towards Goal

## 2022-07-01 NOTE — PROGRESS NOTES
Tx initiated via a patent LUAF. No s/s of infection or skin breakdown.  Net fluid removal goal is 1kg and 3.5 hours

## 2022-07-01 NOTE — DISCHARGE SUMMARY
Admit date: 6/29/2022   Admitting Provider: Leanna Stark MD    Discharge date: 7/1/2022  Discharging Provider: Tammy Granados NP      * Admission Diagnoses: Intractable nausea and vomiting [R11.2]    * Discharge Diagnoses:    Hospital Problems as of 7/1/2022 Never Reviewed          Codes Class Noted - Resolved POA    * (Principal) Intractable nausea and vomiting ICD-10-CM: R11.2  ICD-9-CM: 536.2  6/29/2022 - Present Unknown              * Hospital Course: Haja Cox is a 52 y.o. female who presents to the ED with cc of intractable nausea and vomiting, diarrhea accompanied with left lower quadrant pain x 1 week. She reports onset after eating at a restaurant in [place]. She reports due to illness she has been unable to complete dialysis treatment. Past medical history significant DM, HTN, renal failure on dialysis, CVA, PAD. Patient was not fully dialyzed on Monday due to her feeling ill.  She is Monday, Wednesday, Friday dialysis.  She missed dialysis today due to illness. Ramona Ho was told that she could not have dialysis tomorrow because her dialysis center would be closed. Of note she reports recent thrombectomy approximately 2 weeks ago. She endorses no fever, chills, or orthopnea. Denies contact with recent sick individuals.        Routine lab work shows leukocytosis and ESRD. Vitals on admission /69 P 70, currently /80 P 73. CT abd/pelv negative for acute findings. Hospitalist team consulted for further evaluation and Kelsy Calhoun is a 52 y.o. female who presents to the ED with cc of intractable nausea and vomiting, diarrhea accompanied with left lower quadrant pain x 1 week. She reports onset after eating at a restaurant in [place]. She reports due to illness she has been unable to complete dialysis treatment. Past medical history significant DM, HTN, renal failure on dialysis, CVA, PAD.  Patient was not fully dialyzed on Monday due to her feeling ill. Ramona Ho is Monday, Wednesday, Friday dialysis.  She missed dialysis today due to illness. Shailesh Moore was told that she could not have dialysis tomorrow because her dialysis center would be closed. Of note she reports recent thrombectomy approximately 2 weeks ago. She endorses no fever, chills, or orthopnea. Denies contact with recent sick individuals.        Routine lab work shows leukocytosis and ESRD. Vitals on admission /69 P 70, currently /80 P 73. CT abd/pelv negative for acute findings. Hospitalist team consulted for further evaluation and treatment. Patient underwent HD, tolerated without incidence. PRN zofran for nausea. Gi consulted, Started on dicyclomine and Welchol. Outpatient Gi follow up. * Procedures:   * No surgery found *      Consults: GI and Nephrology    Significant Diagnostic Studies:   CT Results  (Last 48 hours)               06/29/22 1125  CT ABD PELV W CONT Final result    Impression:  No acute process is definitively identified. Other findings as   above. Narrative:  CT abdomen and pelvis, 6/29/2022       History: Left lower quadrant pain. Diarrhea. Technique: Oral contrast was not given. Multiple contiguous axial images were   obtained from the diaphragm to the inferior pubic rami following intravenous   administration of 100 mL Isovue 370 contrast material.  Coronal and sagittal   reconstruction of images was made. All CT scans at this facility are performed using dose reduction optimization   techniques as appropriate to perform the exam including the following: Automated   exposure control, adjustments of the mA and/or kV according to patient size, or   use iterative reconstruction technique. Comparison:  Including CT abdomen pelvis 4/20/2020. Findings: The included lung bases show minimal atelectasis. Coronary artery   calcifications are noted. Pericardial effusion measures up to 1.6 cm in   thickness at the base.        There is a small band of hypoattenuation between the liver and spleen, possibly   artifact. The liver, spleen, pancreas, adrenal glands, kidneys otherwise   demonstrate no focal abnormality. Cholecystectomy is noted. Fat-containing umbilical hernia measures almost 3 cm in widest dimension. The stomach is decompressed. There is no evidence of mechanical bowel   obstruction. The terminal ileum is within normal limits. The appendix images   normally. There is no specific CT evidence of colitis or diverticulitis. The uterus and ovaries show no focal abnormality. Small hypodensities in the   left ovary suggest follicles. No free air or free fluid is present. The abdominal aorta is normal in caliber. Calcified atherosclerotic plaque is   seen in the abdominal aorta, mesenteric and iliac vessels. Degenerative changes are seen in the spine. Severe disc space narrowing and   endplate osteophytes are noted at L5-S1. Chronic appearing compression   deformity involving the L1 superior endplate is noted. Discharge Exam:  [facility] 1690 note reviewed. Constitutional:       Appearance: She is obese. Eyes:      Extraocular Movements: Extraocular movements intact. Cardiovascular:      Rate and Rhythm: Normal rate.      Heart sounds: Normal heart sounds.      Comments: GERALD fustula  Pulmonary:      Breath sounds: Normal breath sounds. Abdominal:      General: Bowel sounds are normal.   Skin:     General: Skin is warm and dry.      Comments: Right ankle soft cast   Neurological:      Mental Status: She is oriented to person, place, and time. Psychiatric:         Behavior: Behavior normal    * Discharge Condition: improved  * Disposition: Home    Discharge Medications:  Current Discharge Medication List      START taking these medications    Details   cholestyramine-aspartame (QUESTRAN LIGHT) 4 gram packet Take 1 Packet by mouth three (3) times daily (with meals) for 21 days.   Qty: 61 Packet, Refills: 0  Start date: 7/1/2022, End date: 7/22/2022      dicyclomine (BENTYL) 10 mg capsule Take 2 Capsules by mouth three (3) times daily for 30 days. Qty: 180 Capsule, Refills: 0  Start date: 7/1/2022, End date: 7/31/2022      ondansetron (ZOFRAN ODT) 4 mg disintegrating tablet Take 1 Tablet by mouth every eight (8) hours as needed for Nausea or Vomiting. Qty: 10 Tablet, Refills: 0  Start date: 6/29/2022      levoFLOXacin (Levaquin) 500 mg tablet Take 1 Tablet by mouth daily for 3 days. Qty: 3 Tablet, Refills: 0  Start date: 6/29/2022, End date: 7/2/2022         CONTINUE these medications which have CHANGED    Details   carvediloL (COREG) 12.5 mg tablet Take 0.5 Tablets by mouth two (2) times a day for 30 days. Qty: 30 Tablet, Refills: 0  Start date: 7/1/2022, End date: 7/31/2022         CONTINUE these medications which have NOT CHANGED    Details   furosemide (LASIX) 80 mg tablet Take 80 mg by mouth daily. hydrOXYzine HCL (ATARAX) 25 mg tablet Take  by mouth every eight (8) hours as needed. Take 1-2 tablets      Levemir FlexTouch U-100 Insuln 100 unit/mL (3 mL) inpn 62 Units by SubCUTAneous route nightly. sevelamer carbonate (RENVELA) 800 mg tab tab Take 800 mg by mouth three (3) times daily (with meals). spironolactone (ALDACTONE) 50 mg tablet Take 50 mg by mouth daily. dextroamphetamine-amphetamine (AdderalL) 20 mg tablet Take 20 mg by mouth three (3) times daily. traMADoL (ULTRAM) 50 mg tablet Take 50 mg by mouth two (2) times daily as needed for Pain. atorvastatin (LIPITOR) 20 mg tablet Take 20 mg by mouth daily. gabapentin (NEURONTIN) 300 mg capsule Take 300 mg by mouth nightly. omeprazole (PRILOSEC) 20 mg capsule Take 20 mg by mouth daily. * Follow-up Care/Patient Instructions:   Activity: Activity as tolerated  Diet: Diabetic Diet  Wound Care: None needed    Follow-up Information     Follow up With Specialties Details Why Jessica Dang, Anna Hackett NP Nurse Practitioner Schedule an appointment as soon as possible for a visit  for follow up from ER visit [address] [facility]  [phone]      [facility] EMERGENCY DEPT Emergency Medicine  As needed, If symptoms worsen Peter [address] 29  Bhupinder Monroy MD Gastroenterology In 1 week abdominal pain [address] [facility]  [address] [facility]  [phone]            Time Spent: > 35 minutes    Signed:  Reina Hope NP  7/1/2022  2:25 PM

## 2022-07-01 NOTE — PROGRESS NOTES
5783: Chart reviewed. Discharge summary/order noted. 06/30/2022 GI note cleared patient for discharge. Patient to discharge home, self care via Medicaid cab. : Spouse, Dolly Jack [phone]. Discharge plan of care/case management plan validated with provider discharge order.

## 2022-07-01 NOTE — DIALYSIS
Pt had 2.5 hours of dialysis tolerated fairly well. Pt clotted the machine in the last hour of tx. Pt may need access reassessed. Pt requested to get off and was educated on the risk of cutting tx. Pt still wanted to get off the machine. Pt AMA signed. Pt had 680ml removed with 300ml rinseback.

## 2022-07-01 NOTE — PROGRESS NOTES
Renal Daily Progress Note    Admit Date: 6/29/2022      Subjective:   She was seen on hemodialysis today. She was seen by GI and started on dicyclomine and Welchol. No diarrhea at this morning. She denies abdominal pain.     Current Facility-Administered Medications   Medication Dose Route Frequency    gabapentin (NEURONTIN) capsule 300 mg  300 mg Oral QHS    loperamide (IMODIUM) capsule 2 mg  2 mg Oral Q6H PRN    dicyclomine (BENTYL) capsule 20 mg  20 mg Oral TID    cholestyramine-aspartame (QUESTRAN LIGHT) packet 4 g  4 g Oral TID WITH MEALS    sodium chloride (NS) flush 5-40 mL  5-40 mL IntraVENous Q8H    sodium chloride (NS) flush 5-40 mL  5-40 mL IntraVENous PRN    acetaminophen (TYLENOL) tablet 650 mg  650 mg Oral Q6H PRN    Or    acetaminophen (TYLENOL) suppository 650 mg  650 mg Rectal Q6H PRN    polyethylene glycol (MIRALAX) packet 17 g  17 g Oral DAILY PRN    ondansetron (ZOFRAN ODT) tablet 4 mg  4 mg Oral Q8H PRN    Or    ondansetron (ZOFRAN) injection 4 mg  4 mg IntraVENous Q6H PRN    heparin (porcine) injection 5,000 Units  5,000 Units SubCUTAneous Q8H    [Held by provider] carvediloL (COREG) tablet 12.5 mg  12.5 mg Oral BID    furosemide (LASIX) tablet 80 mg  80 mg Oral DAILY    hydrOXYzine HCL (ATARAX) tablet 25 mg  25 mg Oral TID PRN    insulin glargine (LANTUS) injection 62 Units  62 Units SubCUTAneous QHS    sevelamer carbonate (RENVELA) tab 800 mg  800 mg Oral TID WITH MEALS    spironolactone (ALDACTONE) tablet 50 mg  50 mg Oral DAILY    metroNIDAZOLE (FLAGYL) IVPB premix 500 mg  500 mg IntraVENous Q8H    glucose chewable tablet 16 g  4 Tablet Oral PRN    dextrose 10% infusion 0-250 mL  0-250 mL IntraVENous PRN    glucagon (GLUCAGEN) injection 1 mg  1 mg IntraMUSCular PRN    insulin lispro (HUMALOG) injection   SubCUTAneous AC&HS    pantoprazole (PROTONIX) 40 mg in 0.9% sodium chloride 10 mL injection  40 mg IntraVENous Q12H    hydrALAZINE (APRESOLINE) 20 mg/mL injection 20 mg  20 mg IntraVENous Q6H PRN        Review of Systems    Review of Systems   Respiratory: Negative for shortness of breath. Cardiovascular: Negative for chest pain. Gastrointestinal: Negative for abdominal pain. Neurological: Negative for headaches. Objective:     Patient Vitals for the past 8 hrs:   BP Temp Temp src Pulse Resp SpO2 Weight   07/01/22 1015 (!) 111/58 -- -- 73 -- -- --   07/01/22 0945 129/78 98.2 °F (36.8 °C) Oral 74 20 -- --   07/01/22 0746 132/62 98.2 °F (36.8 °C) -- 80 20 96 % --   07/01/22 0558 -- -- -- -- -- -- 128.2 kg (282 lb 10.1 oz)     No intake/output data recorded. No intake/output data recorded. Physical Exam:   Physical Exam  Constitutional:       Appearance: She is obese. Cardiovascular:      Rate and Rhythm: Regular rhythm. Pulmonary:      Breath sounds: Normal breath sounds. Abdominal:      General: Bowel sounds are normal.      Palpations: Abdomen is soft. Musculoskeletal:         General: No swelling. Neurological:      General: No focal deficit present. Mental Status: She is alert. L UA AVF functioning well. CT ABD PELV W CONT   Final Result   No acute process is definitively identified. Other findings as   above. Data Review   Recent Labs     06/30/22  1226 06/29/22  0959   WBC 12.5* 14.1*   HGB 9.9* 10.3*   HCT 31.7* 31.9*    457*     Recent Labs     06/29/22  0959   *   K 4.2   CL 97   CO2 28   *   BUN 30*   CREA 3.30*   CA 9.6   ALB 3.0*   ALT 10*     No components found for: GLPOC  No results for input(s): PH, PCO2, PO2, HCO3, FIO2 in the last 72 hours. No results for input(s): INR, INREXT, INREXT, INREXT in the last 72 hours. Assessment:           Principal Problem:    Intractable nausea and vomiting (6/29/2022)    ESRD on HD on Monday Wednesday Friday schedule    Diabetes mellitus under suboptimal control    Anemia of chronic disease    Secondary hyperparathyroidism    Hypertension. Abdominal pain with diarrhea -as per GI on dicyclomine and WelChol    Modest hyponatremia. Will correct this on hemodialysis. Plan:   Stable on hemodialysis.   Leukocytosis is improving.  stable from a renal standpoint to be discharged home

## 2022-07-01 NOTE — DIALYSIS
This pt has Hep B antibodies (324) as of 08/13/2021 as cited from [address] [facility] in the chart.

## 2022-08-09 ENCOUNTER — HOSPITAL ENCOUNTER (EMERGENCY)
Age: 47
Discharge: HOME | End: 2022-08-09
Attending: EMERGENCY MEDICINE
Payer: MEDICARE

## 2022-08-09 ENCOUNTER — APPOINTMENT (OUTPATIENT)
Dept: CT IMAGING | Age: 47
End: 2022-08-09
Attending: NURSE PRACTITIONER
Payer: MEDICARE

## 2022-08-09 VITALS
SYSTOLIC BLOOD PRESSURE: 139 MMHG | HEART RATE: 85 BPM | OXYGEN SATURATION: 97 % | WEIGHT: 270 LBS | TEMPERATURE: 98.4 F | RESPIRATION RATE: 18 BRPM | DIASTOLIC BLOOD PRESSURE: 71 MMHG | BODY MASS INDEX: 39.99 KG/M2 | HEIGHT: 69 IN

## 2022-08-09 DIAGNOSIS — R10.84 ABDOMINAL PAIN, GENERALIZED: Primary | ICD-10-CM

## 2022-08-09 LAB
ALBUMIN SERPL-MCNC: 2.7 G/DL (ref 3.5–5)
ALBUMIN/GLOB SERPL: 0.6 {RATIO} (ref 1.1–2.2)
ALP SERPL-CCNC: 103 U/L (ref 45–117)
ALT SERPL-CCNC: 13 U/L (ref 12–78)
ANION GAP SERPL CALC-SCNC: 10 MMOL/L (ref 5–15)
APPEARANCE UR: ABNORMAL
AST SERPL W P-5'-P-CCNC: 16 U/L (ref 15–37)
BACTERIA URNS QL MICRO: ABNORMAL /HPF
BASOPHILS # BLD: 0 K/UL (ref 0–0.1)
BASOPHILS NFR BLD: 0 % (ref 0–1)
BILIRUB SERPL-MCNC: 0.3 MG/DL (ref 0.2–1)
BILIRUB UR QL: NEGATIVE
BUN SERPL-MCNC: 44 MG/DL (ref 6–20)
BUN/CREAT SERPL: 11 (ref 12–20)
CA-I BLD-MCNC: 8.7 MG/DL (ref 8.5–10.1)
CHLORIDE SERPL-SCNC: 96 MMOL/L (ref 97–108)
CO2 SERPL-SCNC: 23 MMOL/L (ref 21–32)
COLOR UR: YELLOW
CREAT SERPL-MCNC: 4 MG/DL (ref 0.55–1.02)
DIFFERENTIAL METHOD BLD: ABNORMAL
EOSINOPHIL # BLD: 0.2 K/UL (ref 0–0.4)
EOSINOPHIL NFR BLD: 2 % (ref 0–7)
ERYTHROCYTE [DISTWIDTH] IN BLOOD BY AUTOMATED COUNT: 13.2 % (ref 11.5–14.5)
GLOBULIN SER CALC-MCNC: 4.3 G/DL (ref 2–4)
GLUCOSE BLD STRIP.AUTO-MCNC: 225 MG/DL (ref 65–117)
GLUCOSE SERPL-MCNC: 400 MG/DL (ref 65–100)
GLUCOSE UR STRIP.AUTO-MCNC: >1000 MG/DL
HCT VFR BLD AUTO: 31 % (ref 35–47)
HGB BLD-MCNC: 10.1 G/DL (ref 11.5–16)
HGB UR QL STRIP: ABNORMAL
IMM GRANULOCYTES # BLD AUTO: 0 K/UL (ref 0–0.04)
IMM GRANULOCYTES NFR BLD AUTO: 0 % (ref 0–0.5)
KETONES UR QL STRIP.AUTO: NEGATIVE MG/DL
LEUKOCYTE ESTERASE UR QL STRIP.AUTO: NEGATIVE
LIPASE SERPL-CCNC: 232 U/L (ref 73–393)
LYMPHOCYTES # BLD: 2 K/UL (ref 0.8–3.5)
LYMPHOCYTES NFR BLD: 16 % (ref 12–49)
MCH RBC QN AUTO: 29.9 PG (ref 26–34)
MCHC RBC AUTO-ENTMCNC: 32.6 G/DL (ref 30–36.5)
MCV RBC AUTO: 91.7 FL (ref 80–99)
MONOCYTES # BLD: 0.6 K/UL (ref 0–1)
MONOCYTES NFR BLD: 5 % (ref 5–13)
MUCOUS THREADS URNS QL MICRO: ABNORMAL /LPF
NEUTS SEG # BLD: 9.4 K/UL (ref 1.8–8)
NEUTS SEG NFR BLD: 77 % (ref 32–75)
NITRITE UR QL STRIP.AUTO: NEGATIVE
NRBC # BLD: 0 K/UL (ref 0–0.01)
NRBC BLD-RTO: 0 PER 100 WBC
PERFORMED BY, TECHID: ABNORMAL
PH UR STRIP: 5 [PH] (ref 5–8)
PLATELET # BLD AUTO: 324 K/UL (ref 150–400)
PMV BLD AUTO: 10.7 FL (ref 8.9–12.9)
POTASSIUM SERPL-SCNC: 3.9 MMOL/L (ref 3.5–5.1)
PROT SERPL-MCNC: 7 G/DL (ref 6.4–8.2)
PROT UR STRIP-MCNC: ABNORMAL MG/DL
RBC # BLD AUTO: 3.38 M/UL (ref 3.8–5.2)
RBC #/AREA URNS HPF: ABNORMAL /HPF (ref 0–5)
SODIUM SERPL-SCNC: 129 MMOL/L (ref 136–145)
SP GR UR REFRACTOMETRY: 1.01 (ref 1–1.03)
UA: UC IF INDICATED,UAUC: ABNORMAL
UROBILINOGEN UR QL STRIP.AUTO: NEGATIVE EU/DL (ref 0.1–1)
WBC # BLD AUTO: 12.2 K/UL (ref 3.6–11)
WBC URNS QL MICRO: ABNORMAL /HPF (ref 0–4)
YEAST URNS QL MICRO: PRESENT

## 2022-08-09 PROCEDURE — 93005 ELECTROCARDIOGRAM TRACING: CPT

## 2022-08-09 PROCEDURE — 74176 CT ABD & PELVIS W/O CONTRAST: CPT

## 2022-08-09 PROCEDURE — 36415 COLL VENOUS BLD VENIPUNCTURE: CPT

## 2022-08-09 PROCEDURE — 74011636637 HC RX REV CODE- 636/637: Performed by: NURSE PRACTITIONER

## 2022-08-09 PROCEDURE — 74011000250 HC RX REV CODE- 250: Performed by: NURSE PRACTITIONER

## 2022-08-09 PROCEDURE — 82962 GLUCOSE BLOOD TEST: CPT

## 2022-08-09 PROCEDURE — 74011250637 HC RX REV CODE- 250/637: Performed by: NURSE PRACTITIONER

## 2022-08-09 PROCEDURE — 99284 EMERGENCY DEPT VISIT MOD MDM: CPT

## 2022-08-09 PROCEDURE — 85025 COMPLETE CBC W/AUTO DIFF WBC: CPT

## 2022-08-09 PROCEDURE — 80053 COMPREHEN METABOLIC PANEL: CPT

## 2022-08-09 PROCEDURE — 83690 ASSAY OF LIPASE: CPT

## 2022-08-09 PROCEDURE — 81001 URINALYSIS AUTO W/SCOPE: CPT

## 2022-08-09 RX ORDER — MAG HYDROX/ALUMINUM HYD/SIMETH 200-200-20
30 SUSPENSION, ORAL (FINAL DOSE FORM) ORAL ONCE
Status: COMPLETED | OUTPATIENT
Start: 2022-08-09 | End: 2022-08-09

## 2022-08-09 RX ORDER — ONDANSETRON 4 MG/1
4 TABLET, ORALLY DISINTEGRATING ORAL
Qty: 10 TABLET | Refills: 0 | Status: SHIPPED | OUTPATIENT
Start: 2022-08-09

## 2022-08-09 RX ORDER — HYDROCODONE BITARTRATE AND ACETAMINOPHEN 5; 325 MG/1; MG/1
1 TABLET ORAL
Qty: 12 TABLET | Refills: 0 | Status: SHIPPED | OUTPATIENT
Start: 2022-08-09 | End: 2022-08-12

## 2022-08-09 RX ORDER — LIDOCAINE HYDROCHLORIDE 20 MG/ML
15 SOLUTION OROPHARYNGEAL
Status: COMPLETED | OUTPATIENT
Start: 2022-08-09 | End: 2022-08-09

## 2022-08-09 RX ADMIN — INSULIN HUMAN 15 UNITS: 100 INJECTION, SOLUTION PARENTERAL at 10:07

## 2022-08-09 RX ADMIN — LIDOCAINE HYDROCHLORIDE 15 ML: 20 SOLUTION ORAL; TOPICAL at 08:58

## 2022-08-09 RX ADMIN — ALUMINUM HYDROXIDE, MAGNESIUM HYDROXIDE, AND SIMETHICONE 30 ML: 200; 200; 20 SUSPENSION ORAL at 08:58

## 2022-08-09 NOTE — ED TRIAGE NOTES
Referred by: Milo Stroud PA-C; Medical Diagnosis (from order):      Physical Therapy -  Daily Treatment Note    Visit:  5     SUBJECTIVE                                                                                                             Patient states that she has been on her feet all day today. She is very sore. She was only able to ice her right achilles once today. She reports feeling better after last appointment.       OBJECTIVE                                                                                                                        TREATMENT                                                                                                                  Manual Therapy:  Soft tissue mobilization along right medial achilles tendon insertion with patient in sitting position.     Skilled input: verbal instruction/cues, as detailed above and tactile instruction/cues  clinical decision-making and application of: iontophoresis     Writer verbally educated and received verbal consent for hand placement, positioning of patient, and techniques to be performed today from patient for clothing adjustments for techniques, hand placement and palpation for techniques and modality application as described above and how they are pertinent to the patient's plan of care.    Home Exercise Program:   Access Code: [email or web address]   URL: [email or web address]   Date: 11/19/2020   Prepared by: Lynn Rob      Program Notes   Ice right heel for 10-15 minutes 3x/day     Exercises Supine Single Leg Ankle Pumps- 10 reps- 3x daily- 7x weekly   Supine Ankle Inversion and Eversion AROM- 10 reps- 3x daily- 7x weekly   Seated Ankle Circles- 10 reps- 3x daily- 7x weekly   Isometric Ankle Inversion- 10 reps- 5 sec hold- 2x daily- 7x weekly   Isometric Ankle Dorsiflexion and Plantarflexion- 10 reps- 5 sec hold- 2x daily- 7x weekly   Isometric Ankle Eversion with Self Resistance- 10 reps- 5 sec hold- 2x daily-  Reports she has been having abd  pain - LUQ - since last Thursday. Denies n/v but has had \"watery stools\",last stool last pm. Reports admitted a couple of weeks ago for same sx but never told what was wrong. Reports she has followed up with GI who wants to do an Endoscopy.  Reports she missed Dialysis yesterday, tried to get it done last PM at [facility] but unsuccessful 7x weekly   Long Sitting Isometric Ankle Plantarflexion with Ball at [place]- 10 reps- 5 sec hold- 2x daily- 7x weekly       Iontophoresis (00469)  Number of applications: 3  Location: right achilles   2.0 mL dexamethasone   Dosage: 40 mA min  Time: 14 minutes     Results: decreased pain  Reaction: no adverse reaction to treatment      ASSESSMENT                                                                                                             Patient continues to respond well to manual therapy and iontophoresis. However, when patient increases her activity level her pain returns. Patient reported less pain following today's appointment. Per patient, her pain was \"120\" at start of session and \"10\" at the end.     Patient Education:   Results of above outlined education: Verbalizes understanding and Demonstrates understanding      PLAN                                                                                                                           Suggestions for next session as indicated: Progress per plan of care. Continue with iontophoresis and soft tissue mobilization.           Procedures and total treatment time documented Time Entry flowsheet.

## 2022-08-09 NOTE — ED PROVIDER NOTES
EMERGENCY DEPARTMENT HISTORY AND PHYSICAL EXAM      Date: 8/9/2022  Patient Name: Kaylee Mcclain    History of Presenting Illness     Chief Complaint   Patient presents with    Abdominal Pain       History Provided By: Patient    HPI: Kaylee Mcclain, 52 y.o. female with a significant past medical history of hypertension, end-stage renal disease presents to the ED with diarrhea reports she has been having abd  pain - LUQ - since last Thursday. Denies n/v but has had \"watery stools\",last stool last pm. Reports admitted a couple of weeks ago for same sx but never told what was wrong. Reports she has followed up with GI who wants to do an Endoscopy. Reports she missed Dialysis yesterday, tried to get it done last PM at [facility] but unsuccessful    There are no other complaints, changes, or physical findings at this time. PCP: Junior Joshua NP    No current facility-administered medications on file prior to encounter. Current Outpatient Medications on File Prior to Encounter   Medication Sig Dispense Refill    carvediloL (COREG) 12.5 mg tablet Take 0.5 Tablets by mouth two (2) times a day for 30 days. 30 Tablet 0    ondansetron (ZOFRAN ODT) 4 mg disintegrating tablet Take 1 Tablet by mouth every eight (8) hours as needed for Nausea or Vomiting. 10 Tablet 0    furosemide (LASIX) 80 mg tablet Take 80 mg by mouth daily. hydrOXYzine HCL (ATARAX) 25 mg tablet Take  by mouth every eight (8) hours as needed. Take 1-2 tablets      Levemir FlexTouch U-100 Insuln 100 unit/mL (3 mL) inpn 62 Units by SubCUTAneous route nightly. sevelamer carbonate (RENVELA) 800 mg tab tab Take 800 mg by mouth three (3) times daily (with meals). spironolactone (ALDACTONE) 50 mg tablet Take 50 mg by mouth daily. dextroamphetamine-amphetamine (AdderalL) 20 mg tablet Take 20 mg by mouth three (3) times daily. traMADoL (ULTRAM) 50 mg tablet Take 50 mg by mouth two (2) times daily as needed for Pain.       atorvastatin (LIPITOR) 20 mg tablet Take 20 mg by mouth daily. gabapentin (NEURONTIN) 300 mg capsule Take 300 mg by mouth nightly. omeprazole (PRILOSEC) 20 mg capsule Take 20 mg by mouth daily. Past History     Past Medical History:  Past Medical History:   Diagnosis Date    Allergic rhinitis     CVA (cerebral vascular accident) ([facility] [address] 2014    Diabetes ([facility] [address]     Dyslipidemia     Hypertension     IBS (irritable bowel syndrome)     Renal failure        Past Surgical History:  Past Surgical History:   Procedure Laterality Date    HX  SECTION      HX CHOLECYSTECTOMY      HX TONSILLECTOMY  1983       Family History:  Family History   Problem Relation Age of Onset    Hypertension Mother     Diabetes Mother     Heart Disease Mother     Hypertension Father     Diabetes Father     Heart Disease Father        Social History:  Social History     Tobacco Use    Smoking status: Never    Smokeless tobacco: Never   Substance Use Topics    Alcohol use: Never    Drug use: Never       Allergies: Allergies   Allergen Reactions    Fentanyl Anxiety    Buseskohc-Po-Vr-Acetaminophen Nausea and Vomiting    Sulfa (Sulfonamide Antibiotics) Nausea and Vomiting    Zithromax [Azithromycin] Unknown (comments)    Morphine Itching       Review of Systems   Review of Systems   Constitutional:  Negative for chills, fatigue and fever. HENT:  Negative for congestion, sinus pressure and trouble swallowing. Eyes:  Negative for photophobia and pain. Respiratory:  Negative for cough and shortness of breath. Cardiovascular:  Negative for chest pain and leg swelling. Gastrointestinal:  Positive for diarrhea. Negative for abdominal pain, nausea and vomiting. Endocrine: Negative for polydipsia, polyphagia and polyuria. Genitourinary:  Negative for decreased urine volume, difficulty urinating, dysuria, hematuria and urgency. Musculoskeletal:  Negative for back pain, gait problem, myalgias and neck pain.    Skin: Negative for pallor and rash. Allergic/Immunologic: Negative for environmental allergies and food allergies. Neurological:  Negative for dizziness, facial asymmetry, speech difficulty, numbness and headaches. Hematological:  Negative for adenopathy. Does not bruise/bleed easily. Psychiatric/Behavioral:  Negative for agitation, self-injury and suicidal ideas. The patient is not nervous/anxious. Physical Exam   Physical Exam  Vitals and nursing note reviewed. Constitutional:       Appearance: Normal appearance. HENT:      Head: Atraumatic. Right Ear: Tympanic membrane and external ear normal.      Left Ear: Tympanic membrane and external ear normal.      Nose: Nose normal.      Mouth/Throat:      Mouth: Mucous membranes are moist.   Eyes:      Extraocular Movements: Extraocular movements intact. Pupils: Pupils are equal, round, and reactive to light. Cardiovascular:      Rate and Rhythm: Normal rate and regular rhythm. Pulses: Normal pulses. Heart sounds: Normal heart sounds. Pulmonary:      Breath sounds: Normal breath sounds. Abdominal:      General: Abdomen is flat. Bowel sounds are increased. Palpations: Abdomen is soft. Tenderness: There is generalized abdominal tenderness. Musculoskeletal:         General: Normal range of motion. Cervical back: Normal range of motion and neck supple. Skin:     General: Skin is warm and dry. Capillary Refill: Capillary refill takes less than 2 seconds. Neurological:      General: No focal deficit present. Mental Status: She is alert and oriented to person, place, and time. Mental status is at baseline. Psychiatric:         Mood and Affect: Mood normal.         Behavior: Behavior normal.       Lab and Diagnostic Study Results   Labs -   No results found for this or any previous visit (from the past 12 hour(s)).       Radiologic Studies -   @lastxrresult@  CT Results  (Last 48 hours)      None          CXR Results  (Last 48 hours)      None            Medical Decision Making and ED Course   - I am the first provider for this patient. I reviewed the vital signs, available nursing notes, past medical history, past surgical history, family history and social history. - Initial assessment performed. The patients presenting problems have been discussed, and they are in agreement with the care plan formulated and outlined with them. I have encouraged them to ask questions as they arise throughout their visit. Vital Signs-Reviewed the patient's vital signs. No data found. Differential Diagnosis & Medical Decision Making Provider Note:   Pt presents with acute abdominal pain; vital signs stable with currently a non-peritoneal exam; DDx includes: Gastroenteritis, hepatitis, pancreatitis, obstruction, appendicitis, viral illness, IBD, diverticulitis, mesenteric ischemia, AAA or descending dissection, ACS, kidney stone. Will get labs, treat symptomatically and obtain serial abdominal exams to determine if additional imaging is indicated. Will reassess and monitor closely. [facility]       ED Course:          Procedures   Performed by: Nayana Zarate NP  Procedures      Disposition   Disposition: DC- Adult Discharges: All of the diagnostic tests were reviewed and questions answered. Diagnosis, care plan and treatment options were discussed. The patient understands the instructions and will follow up as directed. The patients results have been reviewed with them. They have been counseled regarding their diagnosis. The patient verbally convey understanding and agreement of the signs, symptoms, diagnosis, treatment and prognosis and additionally agrees to follow up as recommended with their PCP in 24 - 48 hours. They also agree with the care-plan and convey that all of their questions have been answered.   I have also put together some discharge instructions for them that include: 1) educational information regarding their diagnosis, 2) how to care for their diagnosis at home, as well a 3) list of reasons why they would want to return to the ED prior to their follow-up appointment, should their condition change. Discharged    DISCHARGE PLAN:  1. Current Discharge Medication List        CONTINUE these medications which have NOT CHANGED    Details   carvediloL (COREG) 12.5 mg tablet Take 0.5 Tablets by mouth two (2) times a day for 30 days. Qty: 30 Tablet, Refills: 0      ondansetron (ZOFRAN ODT) 4 mg disintegrating tablet Take 1 Tablet by mouth every eight (8) hours as needed for Nausea or Vomiting. Qty: 10 Tablet, Refills: 0      furosemide (LASIX) 80 mg tablet Take 80 mg by mouth daily. hydrOXYzine HCL (ATARAX) 25 mg tablet Take  by mouth every eight (8) hours as needed. Take 1-2 tablets      Levemir FlexTouch U-100 Insuln 100 unit/mL (3 mL) inpn 62 Units by SubCUTAneous route nightly. sevelamer carbonate (RENVELA) 800 mg tab tab Take 800 mg by mouth three (3) times daily (with meals). spironolactone (ALDACTONE) 50 mg tablet Take 50 mg by mouth daily. dextroamphetamine-amphetamine (AdderalL) 20 mg tablet Take 20 mg by mouth three (3) times daily. traMADoL (ULTRAM) 50 mg tablet Take 50 mg by mouth two (2) times daily as needed for Pain. atorvastatin (LIPITOR) 20 mg tablet Take 20 mg by mouth daily. gabapentin (NEURONTIN) 300 mg capsule Take 300 mg by mouth nightly. omeprazole (PRILOSEC) 20 mg capsule Take 20 mg by mouth daily. 2.   Follow-up Information       Follow up With Specialties Details Why Contact Eliazar Roland NP Nurse Practitioner   [phone]            3. Return to ED if worse   4.    Discharge Medication List as of 8/9/2022 12:07 PM        START taking these medications    Details   HYDROcodone-acetaminophen (Lorcet, HYDROcodone,) 5-325 mg per tablet Take 1 Tablet by mouth every six (6) hours as needed for Pain for up to 3 days. Max Daily Amount: 4 Tablets., Normal, Disp-12 Tablet, R-0      !! ondansetron (ZOFRAN ODT) 4 mg disintegrating tablet Take 1 Tablet by mouth every eight (8) hours as needed for Nausea or Vomiting., Normal, Disp-10 Tablet, R-0       !! - Potential duplicate medications found. Please discuss with provider. CONTINUE these medications which have NOT CHANGED    Details   carvediloL (COREG) 12.5 mg tablet Take 0.5 Tablets by mouth two (2) times a day for 30 days. , Normal, Disp-30 Tablet, R-0      !! ondansetron (ZOFRAN ODT) 4 mg disintegrating tablet Take 1 Tablet by mouth every eight (8) hours as needed for Nausea or Vomiting., Normal, Disp-10 Tablet, R-0      furosemide (LASIX) 80 mg tablet Take 80 mg by mouth daily. , Historical Med      hydrOXYzine HCL (ATARAX) 25 mg tablet Take  by mouth every eight (8) hours as needed. Take 1-2 tablets, Historical Med      Levemir FlexTouch U-100 Insuln 100 unit/mL (3 mL) inpn 62 Units by SubCUTAneous route nightly., Historical Med, BRANDI      sevelamer carbonate (RENVELA) 800 mg tab tab Take 800 mg by mouth three (3) times daily (with meals). , Historical Med      spironolactone (ALDACTONE) 50 mg tablet Take 50 mg by mouth daily. , Historical Med      dextroamphetamine-amphetamine (AdderalL) 20 mg tablet Take 20 mg by mouth three (3) times daily. , Historical Med      traMADoL (ULTRAM) 50 mg tablet Take 50 mg by mouth two (2) times daily as needed for Pain., Historical Med      atorvastatin (LIPITOR) 20 mg tablet Take 20 mg by mouth daily. , Historical Med      gabapentin (NEURONTIN) 300 mg capsule Take 300 mg by mouth nightly., Historical Med      omeprazole (PRILOSEC) 20 mg capsule Take 20 mg by mouth daily. , Historical Med       !! - Potential duplicate medications found. Please discuss with provider. Diagnosis/Clinical Impression     Clinical Impression:   1.  Abdominal pain, generalized        Attestations: I, Ty Short, NP, am the primary clinician of record. Please note that this dictation was completed with [employer], the computer voice recognition software. Quite often unanticipated grammatical, syntax, homophones, and other interpretive errors are inadvertently transcribed by the computer software. Please disregard these errors. Please excuse any errors that have escaped final proofreading. Thank you.

## 2022-08-10 LAB
ATRIAL RATE: 77 BPM
CALCULATED P AXIS, ECG09: 12 DEGREES
CALCULATED R AXIS, ECG10: -12 DEGREES
CALCULATED T AXIS, ECG11: 106 DEGREES
DIAGNOSIS, 93000: NORMAL
P-R INTERVAL, ECG05: 170 MS
Q-T INTERVAL, ECG07: 386 MS
QRS DURATION, ECG06: 84 MS
QTC CALCULATION (BEZET), ECG08: 436 MS
VENTRICULAR RATE, ECG03: 77 BPM

## 2022-09-15 NOTE — PERIOP NOTES
During PAT pt stated she is currently taking xarelto and stated she was not told when to stop taking medication prior to procedure. Pt was informed she would need to call Dr. Leonid Kumari office to clarify when to stop her Xarelto prior to egd procedure.

## 2022-09-20 ENCOUNTER — APPOINTMENT (OUTPATIENT)
Dept: ENDOSCOPY | Age: 47
End: 2022-09-20
Attending: INTERNAL MEDICINE
Payer: MEDICARE

## 2022-09-20 ENCOUNTER — ANESTHESIA (OUTPATIENT)
Dept: ENDOSCOPY | Age: 47
End: 2022-09-20
Payer: MEDICARE

## 2022-09-20 ENCOUNTER — ANESTHESIA EVENT (OUTPATIENT)
Dept: ENDOSCOPY | Age: 47
End: 2022-09-20
Payer: MEDICARE

## 2022-09-20 ENCOUNTER — HOSPITAL ENCOUNTER (OUTPATIENT)
Age: 47
Setting detail: OUTPATIENT SURGERY
Discharge: HOME | End: 2022-09-20
Attending: INTERNAL MEDICINE | Admitting: INTERNAL MEDICINE
Payer: MEDICARE

## 2022-09-20 VITALS
TEMPERATURE: 98.1 F | WEIGHT: 260 LBS | SYSTOLIC BLOOD PRESSURE: 168 MMHG | DIASTOLIC BLOOD PRESSURE: 68 MMHG | HEIGHT: 69 IN | BODY MASS INDEX: 38.51 KG/M2 | OXYGEN SATURATION: 98 % | RESPIRATION RATE: 18 BRPM | HEART RATE: 76 BPM

## 2022-09-20 LAB
CA-I BLD-MCNC: 1.03 MMOL/L (ref 1.12–1.32)
CHLORIDE BLD-SCNC: 102 MMOL/L (ref 98–107)
CREAT UR-MCNC: 3.81 MG/DL (ref 0.6–1.3)
GLUCOSE BLD STRIP.AUTO-MCNC: 217 MG/DL (ref 65–100)
GLUCOSE BLD STRIP.AUTO-MCNC: 222 MG/DL (ref 65–100)
PERFORMED BY, TECHID: ABNORMAL
POTASSIUM BLD-SCNC: 4.5 MMOL/L (ref 3.5–5.5)
SODIUM BLD-SCNC: 138 MMOL/L (ref 136–145)

## 2022-09-20 PROCEDURE — 76040000007: Performed by: INTERNAL MEDICINE

## 2022-09-20 PROCEDURE — 80047 BASIC METABLC PNL IONIZED CA: CPT

## 2022-09-20 PROCEDURE — 77030021593 HC FCPS BIOP ENDOSC BSC -A: Performed by: INTERNAL MEDICINE

## 2022-09-20 PROCEDURE — 77030019988 HC FCPS ENDOSC DISP BSC -B: Performed by: INTERNAL MEDICINE

## 2022-09-20 PROCEDURE — 82962 GLUCOSE BLOOD TEST: CPT

## 2022-09-20 PROCEDURE — 76060000032 HC ANESTHESIA 0.5 TO 1 HR: Performed by: INTERNAL MEDICINE

## 2022-09-20 PROCEDURE — 88305 TISSUE EXAM BY PATHOLOGIST: CPT

## 2022-09-20 PROCEDURE — 74011000250 HC RX REV CODE- 250: Performed by: NURSE PRACTITIONER

## 2022-09-20 PROCEDURE — 74011000258 HC RX REV CODE- 258: Performed by: NURSE PRACTITIONER

## 2022-09-20 PROCEDURE — 74011250636 HC RX REV CODE- 250/636: Performed by: NURSE PRACTITIONER

## 2022-09-20 PROCEDURE — 2709999900 HC NON-CHARGEABLE SUPPLY: Performed by: INTERNAL MEDICINE

## 2022-09-20 RX ORDER — SODIUM CHLORIDE 0.9 % (FLUSH) 0.9 %
5-40 SYRINGE (ML) INJECTION AS NEEDED
Status: DISCONTINUED | OUTPATIENT
Start: 2022-09-20 | End: 2022-09-20 | Stop reason: HOSPADM

## 2022-09-20 RX ORDER — PROPOFOL 10 MG/ML
INJECTION, EMULSION INTRAVENOUS AS NEEDED
Status: DISCONTINUED | OUTPATIENT
Start: 2022-09-20 | End: 2022-09-20 | Stop reason: HOSPADM

## 2022-09-20 RX ORDER — LIDOCAINE HYDROCHLORIDE 20 MG/ML
INJECTION, SOLUTION EPIDURAL; INFILTRATION; INTRACAUDAL; PERINEURAL AS NEEDED
Status: DISCONTINUED | OUTPATIENT
Start: 2022-09-20 | End: 2022-09-20 | Stop reason: HOSPADM

## 2022-09-20 RX ORDER — GLYCOPYRROLATE 0.2 MG/ML
INJECTION INTRAMUSCULAR; INTRAVENOUS AS NEEDED
Status: DISCONTINUED | OUTPATIENT
Start: 2022-09-20 | End: 2022-09-20 | Stop reason: HOSPADM

## 2022-09-20 RX ORDER — SODIUM CHLORIDE, SODIUM LACTATE, POTASSIUM CHLORIDE, CALCIUM CHLORIDE 600; 310; 30; 20 MG/100ML; MG/100ML; MG/100ML; MG/100ML
75 INJECTION, SOLUTION INTRAVENOUS CONTINUOUS
Status: DISCONTINUED | OUTPATIENT
Start: 2022-09-20 | End: 2022-09-20 | Stop reason: HOSPADM

## 2022-09-20 RX ORDER — SODIUM CHLORIDE 0.9 % (FLUSH) 0.9 %
5-40 SYRINGE (ML) INJECTION EVERY 8 HOURS
Status: CANCELLED | OUTPATIENT
Start: 2022-09-20

## 2022-09-20 RX ORDER — SODIUM CHLORIDE 0.9 % (FLUSH) 0.9 %
5-40 SYRINGE (ML) INJECTION AS NEEDED
Status: CANCELLED | OUTPATIENT
Start: 2022-09-20

## 2022-09-20 RX ORDER — SODIUM CHLORIDE 0.9 % (FLUSH) 0.9 %
5-40 SYRINGE (ML) INJECTION EVERY 8 HOURS
Status: DISCONTINUED | OUTPATIENT
Start: 2022-09-20 | End: 2022-09-20 | Stop reason: HOSPADM

## 2022-09-20 RX ORDER — SODIUM CHLORIDE 9 MG/ML
INJECTION, SOLUTION INTRAVENOUS
Status: DISCONTINUED | OUTPATIENT
Start: 2022-09-20 | End: 2022-09-20 | Stop reason: HOSPADM

## 2022-09-20 RX ORDER — SODIUM CHLORIDE 9 MG/ML
25 INJECTION, SOLUTION INTRAVENOUS CONTINUOUS
Status: DISCONTINUED | OUTPATIENT
Start: 2022-09-20 | End: 2022-09-20

## 2022-09-20 RX ADMIN — SODIUM CHLORIDE: 9 INJECTION, SOLUTION INTRAVENOUS at 08:25

## 2022-09-20 RX ADMIN — PROPOFOL 50 MG: 10 INJECTION, EMULSION INTRAVENOUS at 08:28

## 2022-09-20 RX ADMIN — PROPOFOL 50 MG: 10 INJECTION, EMULSION INTRAVENOUS at 08:29

## 2022-09-20 RX ADMIN — LIDOCAINE HYDROCHLORIDE 45 MG: 20 INJECTION, SOLUTION EPIDURAL; INFILTRATION; INTRACAUDAL; PERINEURAL at 08:26

## 2022-09-20 RX ADMIN — GLYCOPYRROLATE 0.2 MG: 0.2 INJECTION INTRAMUSCULAR; INTRAVENOUS at 08:26

## 2022-09-20 RX ADMIN — PROPOFOL 50 MG: 10 INJECTION, EMULSION INTRAVENOUS at 08:27

## 2022-09-20 NOTE — ANESTHESIA POSTPROCEDURE EVALUATION
Procedure(s):  ESOPHAGOGASTRODUODENOSCOPY (EGD). MAC    Anesthesia Post Evaluation      Multimodal analgesia: multimodal analgesia not used between 6 hours prior to anesthesia start to PACU discharge  Patient location during evaluation: bedside (Endoscopy suite)  Patient participation: complete - patient cannot participate  Level of consciousness: sleepy but conscious  Pain score: 0  Pain management: adequate  Airway patency: patent  Anesthetic complications: no  Cardiovascular status: acceptable  Respiratory status: acceptable and nasal cannula  Hydration status: acceptable  Comments: This patient remained on the stretcher. The patient was handed off to the endoscopy nursing team.  All questions regarding pre-, intra-, and postoperative care were answered. Post anesthesia nausea and vomiting:  none      INITIAL Post-op Vital signs: No vitals data found for the desired time range.

## 2022-09-20 NOTE — PROGRESS NOTES
Discharge instructions reviewed with , brain, verbalized understanding, IV out and waiting for Dr. Donnald Heimlich to be seen at bedside.

## 2022-09-20 NOTE — ANESTHESIA PREPROCEDURE EVALUATION
Relevant Problems   No relevant active problems       Anesthetic History     PONV          Review of Systems / Medical History  Patient summary reviewed, nursing notes reviewed and pertinent labs reviewed    Pulmonary                   Neuro/Psych       CVA  TIA     Cardiovascular    Hypertension          Hyperlipidemia         GI/Hepatic/Renal         Renal disease: CRI       Endo/Other    Diabetes    Morbid obesity     Other Findings                   Anesthetic Plan    ASA: 3  Anesthesia type: MAC          Induction: Intravenous  Anesthetic plan and risks discussed with: Patient

## 2022-12-28 ENCOUNTER — TRANSCRIBE ORDER (OUTPATIENT)
Dept: SCHEDULING | Age: 47
End: 2022-12-28

## 2022-12-28 DIAGNOSIS — K31.84 GASTROPARESIS: Primary | ICD-10-CM

## 2023-01-12 ENCOUNTER — APPOINTMENT (OUTPATIENT)
Dept: GENERAL RADIOLOGY | Age: 48
DRG: 177 | End: 2023-01-12
Attending: EMERGENCY MEDICINE
Payer: MEDICARE

## 2023-01-12 ENCOUNTER — HOSPITAL ENCOUNTER (INPATIENT)
Age: 48
LOS: 6 days | Discharge: HOME | DRG: 177 | End: 2023-01-18
Attending: STUDENT IN AN ORGANIZED HEALTH CARE EDUCATION/TRAINING PROGRAM | Admitting: FAMILY MEDICINE
Payer: MEDICARE

## 2023-01-12 DIAGNOSIS — J96.01 ACUTE HYPOXEMIC RESPIRATORY FAILURE (HCC): ICD-10-CM

## 2023-01-12 DIAGNOSIS — E87.70 HYPERVOLEMIA, UNSPECIFIED HYPERVOLEMIA TYPE: ICD-10-CM

## 2023-01-12 DIAGNOSIS — R53.1 WEAKNESS GENERALIZED: ICD-10-CM

## 2023-01-12 DIAGNOSIS — U07.1 COVID-19: ICD-10-CM

## 2023-01-12 DIAGNOSIS — R09.02 HYPOXIA: Primary | ICD-10-CM

## 2023-01-12 LAB
ALBUMIN SERPL-MCNC: 2.8 G/DL (ref 3.5–5)
ALBUMIN/GLOB SERPL: 0.7 (ref 1.1–2.2)
ALP SERPL-CCNC: 84 U/L (ref 45–117)
ALT SERPL-CCNC: 8 U/L (ref 12–78)
ANION GAP SERPL CALC-SCNC: 9 MMOL/L (ref 5–15)
AST SERPL W P-5'-P-CCNC: ABNORMAL U/L (ref 15–37)
ATRIAL RATE: 70 BPM
BASOPHILS # BLD: 0 K/UL (ref 0–0.1)
BASOPHILS NFR BLD: 0 % (ref 0–1)
BILIRUB SERPL-MCNC: 0.7 MG/DL (ref 0.2–1)
BNP SERPL-MCNC: ABNORMAL PG/ML
BUN SERPL-MCNC: 48 MG/DL (ref 6–20)
BUN/CREAT SERPL: 10 (ref 12–20)
CA-I BLD-MCNC: 8.5 MG/DL (ref 8.5–10.1)
CALCULATED P AXIS, ECG09: 20 DEGREES
CALCULATED R AXIS, ECG10: -19 DEGREES
CALCULATED T AXIS, ECG11: 79 DEGREES
CHLORIDE SERPL-SCNC: 94 MMOL/L (ref 97–108)
CO2 SERPL-SCNC: 27 MMOL/L (ref 21–32)
COVID-19 RAPID TEST, COVR: DETECTED
CREAT SERPL-MCNC: 5.02 MG/DL (ref 0.55–1.02)
DIAGNOSIS, 93000: NORMAL
DIFFERENTIAL METHOD BLD: ABNORMAL
EOSINOPHIL # BLD: 0 K/UL (ref 0–0.4)
EOSINOPHIL NFR BLD: 0 % (ref 0–7)
ERYTHROCYTE [DISTWIDTH] IN BLOOD BY AUTOMATED COUNT: 13.2 % (ref 11.5–14.5)
FLUAV AG NPH QL IA: NEGATIVE
FLUBV AG NOSE QL IA: NEGATIVE
GLOBULIN SER CALC-MCNC: 4 G/DL (ref 2–4)
GLUCOSE BLD STRIP.AUTO-MCNC: 209 MG/DL (ref 65–100)
GLUCOSE BLD STRIP.AUTO-MCNC: 321 MG/DL (ref 65–100)
GLUCOSE SERPL-MCNC: 372 MG/DL (ref 65–100)
HCT VFR BLD AUTO: 29.7 % (ref 35–47)
HGB BLD-MCNC: 9.3 G/DL (ref 11.5–16)
IMM GRANULOCYTES # BLD AUTO: 0.1 K/UL (ref 0–0.04)
IMM GRANULOCYTES NFR BLD AUTO: 1 % (ref 0–0.5)
LACTATE SERPL-SCNC: 2.4 MMOL/L (ref 0.4–2)
LYMPHOCYTES # BLD: 1.4 K/UL (ref 0.8–3.5)
LYMPHOCYTES NFR BLD: 10 % (ref 12–49)
MCH RBC QN AUTO: 29.4 PG (ref 26–34)
MCHC RBC AUTO-ENTMCNC: 31.3 G/DL (ref 30–36.5)
MCV RBC AUTO: 94 FL (ref 80–99)
MONOCYTES # BLD: 1 K/UL (ref 0–1)
MONOCYTES NFR BLD: 7 % (ref 5–13)
MRSA DNA SPEC QL NAA+PROBE: NOT DETECTED
NEUTS SEG # BLD: 11.9 K/UL (ref 1.8–8)
NEUTS SEG NFR BLD: 82 % (ref 32–75)
NRBC # BLD: 0 K/UL (ref 0–0.01)
NRBC BLD-RTO: 0 PER 100 WBC
P-R INTERVAL, ECG05: 162 MS
PERFORMED BY, TECHID: ABNORMAL
PERFORMED BY, TECHID: ABNORMAL
PLATELET # BLD AUTO: 335 K/UL (ref 150–400)
PMV BLD AUTO: 10.9 FL (ref 8.9–12.9)
POTASSIUM SERPL-SCNC: ABNORMAL MMOL/L (ref 3.5–5.1)
PROT SERPL-MCNC: 6.8 G/DL (ref 6.4–8.2)
Q-T INTERVAL, ECG07: 406 MS
QRS DURATION, ECG06: 74 MS
QTC CALCULATION (BEZET), ECG08: 438 MS
RBC # BLD AUTO: 3.16 M/UL (ref 3.8–5.2)
SODIUM SERPL-SCNC: 130 MMOL/L (ref 136–145)
TROPONIN-HIGH SENSITIVITY: 55 NG/L (ref 0–51)
VENTRICULAR RATE, ECG03: 70 BPM
WBC # BLD AUTO: 14.4 K/UL (ref 3.6–11)

## 2023-01-12 PROCEDURE — 84484 ASSAY OF TROPONIN QUANT: CPT

## 2023-01-12 PROCEDURE — 85025 COMPLETE CBC W/AUTO DIFF WBC: CPT

## 2023-01-12 PROCEDURE — 83036 HEMOGLOBIN GLYCOSYLATED A1C: CPT

## 2023-01-12 PROCEDURE — 74011636637 HC RX REV CODE- 636/637: Performed by: FAMILY MEDICINE

## 2023-01-12 PROCEDURE — 83605 ASSAY OF LACTIC ACID: CPT

## 2023-01-12 PROCEDURE — 36415 COLL VENOUS BLD VENIPUNCTURE: CPT

## 2023-01-12 PROCEDURE — 74011000258 HC RX REV CODE- 258: Performed by: FAMILY MEDICINE

## 2023-01-12 PROCEDURE — 74011250636 HC RX REV CODE- 250/636: Performed by: FAMILY MEDICINE

## 2023-01-12 PROCEDURE — 74011250637 HC RX REV CODE- 250/637: Performed by: INTERNAL MEDICINE

## 2023-01-12 PROCEDURE — 80053 COMPREHEN METABOLIC PANEL: CPT

## 2023-01-12 PROCEDURE — 83880 ASSAY OF NATRIURETIC PEPTIDE: CPT

## 2023-01-12 PROCEDURE — 77010033711 HC HIGH FLOW OXYGEN

## 2023-01-12 PROCEDURE — 87040 BLOOD CULTURE FOR BACTERIA: CPT

## 2023-01-12 PROCEDURE — 87340 HEPATITIS B SURFACE AG IA: CPT

## 2023-01-12 PROCEDURE — 90935 HEMODIALYSIS ONE EVALUATION: CPT

## 2023-01-12 PROCEDURE — 71045 X-RAY EXAM CHEST 1 VIEW: CPT

## 2023-01-12 PROCEDURE — 65610000006 HC RM INTENSIVE CARE

## 2023-01-12 PROCEDURE — 82962 GLUCOSE BLOOD TEST: CPT

## 2023-01-12 PROCEDURE — 87641 MR-STAPH DNA AMP PROBE: CPT

## 2023-01-12 PROCEDURE — 74011250637 HC RX REV CODE- 250/637: Performed by: FAMILY MEDICINE

## 2023-01-12 PROCEDURE — 87635 SARS-COV-2 COVID-19 AMP PRB: CPT

## 2023-01-12 PROCEDURE — 99222 1ST HOSP IP/OBS MODERATE 55: CPT | Performed by: INTERNAL MEDICINE

## 2023-01-12 PROCEDURE — 99285 EMERGENCY DEPT VISIT HI MDM: CPT

## 2023-01-12 PROCEDURE — 93005 ELECTROCARDIOGRAM TRACING: CPT

## 2023-01-12 PROCEDURE — 87804 INFLUENZA ASSAY W/OPTIC: CPT

## 2023-01-12 RX ORDER — INSULIN GLARGINE 100 [IU]/ML
40 INJECTION, SOLUTION SUBCUTANEOUS EVERY 12 HOURS
Status: DISCONTINUED | OUTPATIENT
Start: 2023-01-12 | End: 2023-01-18 | Stop reason: HOSPADM

## 2023-01-12 RX ORDER — PANTOPRAZOLE SODIUM 20 MG/1
20 TABLET, DELAYED RELEASE ORAL DAILY
Status: DISCONTINUED | OUTPATIENT
Start: 2023-01-13 | End: 2023-01-18 | Stop reason: HOSPADM

## 2023-01-12 RX ORDER — SEVELAMER CARBONATE 800 MG/1
800 TABLET, FILM COATED ORAL
Status: DISCONTINUED | OUTPATIENT
Start: 2023-01-12 | End: 2023-01-18 | Stop reason: HOSPADM

## 2023-01-12 RX ORDER — DEXAMETHASONE SODIUM PHOSPHATE 4 MG/ML
4 INJECTION, SOLUTION INTRA-ARTICULAR; INTRALESIONAL; INTRAMUSCULAR; INTRAVENOUS; SOFT TISSUE EVERY 8 HOURS
Status: DISCONTINUED | OUTPATIENT
Start: 2023-01-12 | End: 2023-01-12

## 2023-01-12 RX ORDER — INSULIN LISPRO 100 [IU]/ML
INJECTION, SOLUTION INTRAVENOUS; SUBCUTANEOUS
Status: DISCONTINUED | OUTPATIENT
Start: 2023-01-12 | End: 2023-01-13

## 2023-01-12 RX ORDER — ONDANSETRON 2 MG/ML
4 INJECTION INTRAMUSCULAR; INTRAVENOUS
Status: DISCONTINUED | OUTPATIENT
Start: 2023-01-12 | End: 2023-01-18 | Stop reason: HOSPADM

## 2023-01-12 RX ORDER — NIFEDIPINE 60 MG/1
60 TABLET, EXTENDED RELEASE ORAL DAILY
COMMUNITY

## 2023-01-12 RX ORDER — ATORVASTATIN CALCIUM 20 MG/1
20 TABLET, FILM COATED ORAL DAILY
Status: DISCONTINUED | OUTPATIENT
Start: 2023-01-13 | End: 2023-01-18 | Stop reason: HOSPADM

## 2023-01-12 RX ORDER — HEPARIN SODIUM 5000 [USP'U]/ML
5000 INJECTION, SOLUTION INTRAVENOUS; SUBCUTANEOUS EVERY 8 HOURS
Status: DISCONTINUED | OUTPATIENT
Start: 2023-01-12 | End: 2023-01-12

## 2023-01-12 RX ORDER — GABAPENTIN 300 MG/1
300 CAPSULE ORAL
Status: DISCONTINUED | OUTPATIENT
Start: 2023-01-12 | End: 2023-01-18 | Stop reason: HOSPADM

## 2023-01-12 RX ORDER — ACETAMINOPHEN 650 MG/1
650 SUPPOSITORY RECTAL
Status: DISCONTINUED | OUTPATIENT
Start: 2023-01-12 | End: 2023-01-18 | Stop reason: HOSPADM

## 2023-01-12 RX ORDER — ACETAMINOPHEN 325 MG/1
650 TABLET ORAL
Status: DISCONTINUED | OUTPATIENT
Start: 2023-01-12 | End: 2023-01-18 | Stop reason: HOSPADM

## 2023-01-12 RX ORDER — TRAMADOL HYDROCHLORIDE 50 MG/1
50 TABLET ORAL
Status: DISCONTINUED | OUTPATIENT
Start: 2023-01-12 | End: 2023-01-12

## 2023-01-12 RX ORDER — BENZONATATE 100 MG/1
100 CAPSULE ORAL
Status: DISCONTINUED | OUTPATIENT
Start: 2023-01-12 | End: 2023-01-18 | Stop reason: HOSPADM

## 2023-01-12 RX ORDER — TRAMADOL HYDROCHLORIDE 50 MG/1
50 TABLET ORAL
Status: DISCONTINUED | OUTPATIENT
Start: 2023-01-13 | End: 2023-01-18 | Stop reason: HOSPADM

## 2023-01-12 RX ORDER — ACETAMINOPHEN 325 MG/1
650 TABLET ORAL
Status: DISCONTINUED | OUTPATIENT
Start: 2023-01-12 | End: 2023-01-13 | Stop reason: SDUPTHER

## 2023-01-12 RX ORDER — OMEPRAZOLE 20 MG/1
20 CAPSULE, DELAYED RELEASE ORAL DAILY
Status: DISCONTINUED | OUTPATIENT
Start: 2023-01-13 | End: 2023-01-12

## 2023-01-12 RX ORDER — POLYETHYLENE GLYCOL 3350 17 G/17G
17 POWDER, FOR SOLUTION ORAL DAILY PRN
Status: DISCONTINUED | OUTPATIENT
Start: 2023-01-12 | End: 2023-01-18 | Stop reason: HOSPADM

## 2023-01-12 RX ORDER — IBUPROFEN 200 MG
4 TABLET ORAL AS NEEDED
Status: DISCONTINUED | OUTPATIENT
Start: 2023-01-12 | End: 2023-01-18 | Stop reason: HOSPADM

## 2023-01-12 RX ORDER — DEXTROMETHORPHAN POLISTIREX 30 MG/5ML
30 SUSPENSION ORAL EVERY 12 HOURS
Status: DISCONTINUED | OUTPATIENT
Start: 2023-01-12 | End: 2023-01-18 | Stop reason: HOSPADM

## 2023-01-12 RX ORDER — DEXAMETHASONE SODIUM PHOSPHATE 4 MG/ML
6 INJECTION, SOLUTION INTRA-ARTICULAR; INTRALESIONAL; INTRAMUSCULAR; INTRAVENOUS; SOFT TISSUE DAILY
Status: DISCONTINUED | OUTPATIENT
Start: 2023-01-12 | End: 2023-01-17

## 2023-01-12 RX ORDER — FUROSEMIDE 40 MG/1
80 TABLET ORAL DAILY
Status: DISCONTINUED | OUTPATIENT
Start: 2023-01-13 | End: 2023-01-18 | Stop reason: HOSPADM

## 2023-01-12 RX ORDER — ONDANSETRON 4 MG/1
4 TABLET, ORALLY DISINTEGRATING ORAL
Status: DISCONTINUED | OUTPATIENT
Start: 2023-01-12 | End: 2023-01-18 | Stop reason: HOSPADM

## 2023-01-12 RX ADMIN — TRAMADOL HYDROCHLORIDE 50 MG: 50 TABLET, COATED ORAL at 17:25

## 2023-01-12 RX ADMIN — PIPERACILLIN AND TAZOBACTAM 4.5 G: 4; .5 INJECTION, POWDER, FOR SOLUTION INTRAVENOUS at 17:25

## 2023-01-12 RX ADMIN — PIPERACILLIN AND TAZOBACTAM 3.38 G: 3; .375 INJECTION, POWDER, LYOPHILIZED, FOR SOLUTION INTRAVENOUS at 22:43

## 2023-01-12 RX ADMIN — DEXAMETHASONE SODIUM PHOSPHATE 6 MG: 4 INJECTION, SOLUTION INTRA-ARTICULAR; INTRALESIONAL; INTRAMUSCULAR; INTRAVENOUS; SOFT TISSUE at 17:25

## 2023-01-12 RX ADMIN — GABAPENTIN 300 MG: 300 CAPSULE ORAL at 21:04

## 2023-01-12 RX ADMIN — Medication 30 MG: at 21:03

## 2023-01-12 RX ADMIN — EPOETIN ALFA-EPBX 8000 UNITS: 4000 INJECTION, SOLUTION INTRAVENOUS; SUBCUTANEOUS at 22:42

## 2023-01-12 RX ADMIN — INSULIN GLARGINE 40 UNITS: 100 INJECTION, SOLUTION SUBCUTANEOUS at 18:13

## 2023-01-12 RX ADMIN — RIVAROXABAN 2.5 MG: 2.5 TABLET, FILM COATED ORAL at 21:04

## 2023-01-12 RX ADMIN — SEVELAMER CARBONATE 800 MG: 800 TABLET, FILM COATED ORAL at 17:25

## 2023-01-12 RX ADMIN — INSULIN LISPRO 4 UNITS: 100 INJECTION, SOLUTION INTRAVENOUS; SUBCUTANEOUS at 18:13

## 2023-01-12 RX ADMIN — INSULIN LISPRO 5 UNITS: 100 INJECTION, SOLUTION INTRAVENOUS; SUBCUTANEOUS at 22:41

## 2023-01-12 RX ADMIN — ACETAMINOPHEN 650 MG: 325 TABLET ORAL at 13:57

## 2023-01-12 NOTE — MED STUDENT NOTES
History and Physical    NAME: Edna Mata   :  1975   MRN:  [id number]     Date/Time:  2023 12:56 PM    Patient PCP: Marlin Glynn NP  ______________________________________________________________________             Subjective:     CHIEF COMPLAINT:     Shortness of breath    HISTORY OF PRESENT ILLNESS:       Patient is a 52y.o. year old female with past medical history of CVA, diabetes, CKD stage 5,on HD  hypertension, and cardiomegaly who presented to the ED today with shortness of breath. Patient states that over the last few days she has been feeling very sick and because of that she could only get 2 hours of dialysis done on Monday and 30 minutes yesterday. And then today she started feeling very short of breath and called EMS. On arrival to the ED patient was noted to be hypoxic in the 60s. She was placed on non-breather with improvement of oxygenation. Pt was tested positive for COVID test done in the ER. This is her first time getting diagnosed with COVID. She also has chills, cough and chest pain associated with it, nausea, and vomiting. She is currently on high flow oxygen. CBC shows leukocytosis 14,400, BNP 27,904, lactic acid 2.4, sodium 130, chloride 94, troponin 55. Glucose 372. Chest x ray done in the ED shows:  1. Slightly asymmetric CHF pattern is suspected. Alternatively, correlate  clinically for bilateral infection, right greater than left. .  .  2. Stable cardiomegaly. 3. Technically compromised by patient size and position. A standard 2 view  examination would be more useful when clinically possible.     Past Medical History:   Diagnosis Date    Allergic rhinitis     Blood clot in vein     CVA (cerebral vascular accident) ([facility] [address] 2014    Diabetes ([facility] [address]     Dyslipidemia     Hypertension     IBS (irritable bowel syndrome)     Renal failure         Past Surgical History:   Procedure Laterality Date    HX  SECTION      HX CHOLECYSTECTOMY      HX TONSILLECTOMY  1983    ME UNLISTED PROCEDURE VASCULAR SURGERY         Social History     Tobacco Use    Smoking status: Never    Smokeless tobacco: Never   Substance Use Topics    Alcohol use: Never        Family History   Problem Relation Age of Onset    Hypertension Mother     Diabetes Mother     Heart Disease Mother     Hypertension Father     Diabetes Father     Heart Disease Father        Allergies   Allergen Reactions    Fentanyl Anxiety    Srlptmrsg-Gy-Pn-Acetaminophen Nausea and Vomiting    Sulfa (Sulfonamide Antibiotics) Nausea and Vomiting    Zithromax [Azithromycin] Unknown (comments)    Morphine Itching        Prior to Admission medications    Medication Sig Start Date End Date Taking? Authorizing Provider   rivaroxaban (XARELTO) 10 mg tablet Take  by mouth daily. Provider, Historical   ondansetron (ZOFRAN ODT) 4 mg disintegrating tablet Take 1 Tablet by mouth every eight (8) hours as needed for Nausea or Vomiting. 8/9/22   Kwan Bullard NP   carvediloL (COREG) 12.5 mg tablet Take 0.5 Tablets by mouth two (2) times a day for 30 days. 7/1/22 7/31/22  Lian Shaffer NP   ondansetron (ZOFRAN ODT) 4 mg disintegrating tablet Take 1 Tablet by mouth every eight (8) hours as needed for Nausea or Vomiting. 6/29/22   Laurel LARSON PA-C   furosemide (LASIX) 80 mg tablet Take 80 mg by mouth daily. 6/10/22   Provider, Historical   hydrOXYzine HCL (ATARAX) 25 mg tablet Take  by mouth every eight (8) hours as needed. Take 1-2 tablets 6/8/22   Provider, Historical   Levemir FlexTouch U-100 Insuln 100 unit/mL (3 mL) inpn 62 Units by SubCUTAneous route nightly. 5/14/22   Provider, Historical   sevelamer carbonate (RENVELA) 800 mg tab tab Take 800 mg by mouth three (3) times daily (with meals). 5/14/22   Provider, Historical   spironolactone (ALDACTONE) 50 mg tablet Take 50 mg by mouth daily.  6/10/22   Provider, Historical   dextroamphetamine-amphetamine (ADDERALL) 20 mg tablet Take 20 mg by mouth three (3) times daily.    Provider, Historical   traMADoL (ULTRAM) 50 mg tablet Take 50 mg by mouth two (2) times daily as needed for Pain. Provider, Historical   atorvastatin (LIPITOR) 20 mg tablet Take 20 mg by mouth daily. Provider, Historical   gabapentin (NEURONTIN) 300 mg capsule Take 300 mg by mouth nightly. Provider, Historical   omeprazole (PRILOSEC) 20 mg capsule Take 20 mg by mouth daily.     Provider, Historical         Current Facility-Administered Medications:     epoetin jodie-epbx (RETACRIT) injection 8,000 Units, 8,000 Units, IntraVENous, DIALYSIS TUE, THU & SAT, Jax Brito MD    acetaminophen (TYLENOL) tablet 650 mg, 650 mg, Oral, Q6H PRN, Justin Brito MD, 650 mg at 01/12/23 1357    traMADoL (ULTRAM) tablet 50 mg, 50 mg, Oral, BID PRN, Jax Brito MD Aurelia Robert ON 1/13/2023] atorvastatin (LIPITOR) tablet 20 mg, 20 mg, Oral, DAILY, Jax Brito MD    [START ON 1/13/2023] furosemide (LASIX) tablet 80 mg, 80 mg, Oral, DAILY, Justin Brito MD    gabapentin (NEURONTIN) capsule 300 mg, 300 mg, Oral, QHS, Justin Brito MD    .PHARMACY TO SUBSTITUTE PER PROTOCOL (Reordered from: Levemir FlexTouch U-100 Insuln 100 unit/mL (3 mL) inpn), , , Per Protocol, Jax Brito MD Aurelia Robert ON 1/13/2023] omeprazole (PRILOSEC) capsule 20 mg, 20 mg, Oral, DAILY, Jax Brito MD    sevelamer carbonate (RENVELA) tab 800 mg, 800 mg, Oral, TID WITH MEALS, Jax Brito MD    insulin lispro (HUMALOG) injection, , SubCUTAneous, AC&HS, Jax Brito MD    glucose chewable tablet 16 g, 4 Tablet, Oral, PRN, Justin Brito MD    glucagon (GLUCAGEN) injection 1 mg, 1 mg, IntraMUSCular, PRN, Justin Brito MD    acetaminophen (TYLENOL) tablet 650 mg, 650 mg, Oral, Q6H PRN **OR** acetaminophen (TYLENOL) suppository 650 mg, 650 mg, Rectal, Q6H PRN, Justin Brito MD    polyethylene glycol (MIRALAX) packet 17 g, 17 g, Oral, DAILY PRN, Jax Brito MD    ondansetron (ZOFRAN ODT) tablet 4 mg, 4 mg, Oral, Q8H PRN **OR** ondansetron (ZOFRAN) injection 4 mg, 4 mg, IntraVENous, Q6H PRN, Justin Brito MD    heparin (porcine) injection 5,000 Units, 5,000 Units, SubCUTAneous, Q8H, Justin Brito MD    dexamethasone (DECADRON) 4 mg/mL injection 4 mg, 4 mg, IntraVENous, Q8H, Justin Brito MD    piperacillin-tazobactam (ZOSYN) 2.25 g in 0.9% sodium chloride (MBP/ADV) 50 mL MBP, 2.25 g, IntraVENous, Q12H, Justin Brito MD    LAB DATA REVIEWED:    Recent Results (from the past 24 hour(s))   INFLUENZA A & B AG (RAPID TEST)    Collection Time: 01/12/23 10:10 AM   Result Value Ref Range    Influenza A Antigen Negative Negative      Influenza B Antigen Negative Negative     COVID-19 RAPID TEST    Collection Time: 01/12/23 10:10 AM   Result Value Ref Range    COVID-19 rapid test DETECTED (A) Not Detected     CBC WITH AUTOMATED DIFF    Collection Time: 01/12/23 10:11 AM   Result Value Ref Range    WBC 14.4 (H) 3.6 - 11.0 K/uL    RBC 3.16 (L) 3.80 - 5.20 M/uL    HGB 9.3 (L) 11.5 - 16.0 g/dL    HCT 29.7 (L) 35.0 - 47.0 %    MCV 94.0 80.0 - 99.0 FL    MCH 29.4 26.0 - 34.0 PG    MCHC 31.3 30.0 - 36.5 g/dL    RDW 13.2 11.5 - 14.5 %    PLATELET 364 147 - 779 K/uL    MPV 10.9 8.9 - 12.9 FL    NRBC 0.0 0.0  WBC    ABSOLUTE NRBC 0.00 0.00 - 0.01 K/uL    NEUTROPHILS 82 (H) 32 - 75 %    LYMPHOCYTES 10 (L) 12 - 49 %    MONOCYTES 7 5 - 13 %    EOSINOPHILS 0 0 - 7 %    BASOPHILS 0 0 - 1 %    IMMATURE GRANULOCYTES 1 (H) 0 - 0.5 %    ABS. NEUTROPHILS 11.9 (H) 1.8 - 8.0 K/UL    ABS. LYMPHOCYTES 1.4 0.8 - 3.5 K/UL    ABS. MONOCYTES 1.0 0.0 - 1.0 K/UL    ABS. EOSINOPHILS 0.0 0.0 - 0.4 K/UL    ABS. BASOPHILS 0.0 0.0 - 0.1 K/UL    ABS. IMM.  GRANS. 0.1 (H) 0.00 - 0.04 K/UL    DF AUTOMATED     METABOLIC PANEL, COMPREHENSIVE    Collection Time: 01/12/23 10:11 AM   Result Value Ref Range    Sodium 130 (L) 136 - 145 mmol/L    Potassium Specimen hemolyzed, results may be affected 3.5 - 5.1 mmol/L    Chloride 94 (L) 97 - 108 mmol/L    CO2 27 21 - 32 mmol/L    Anion gap 9 5 - 15 mmol/L    Glucose 372 (H) 65 - 100 mg/dL    BUN 48 (H) 6 - 20 mg/dL    Creatinine 5.02 (H) 0.55 - 1.02 mg/dL    BUN/Creatinine ratio 10 (L) 12 - 20      eGFR 10 (L) >60 ml/min/1.73m2    Calcium 8.5 8.5 - 10.1 mg/dL    Bilirubin, total 0.7 0.2 - 1.0 mg/dL    AST (SGOT) Specimen hemolyzed, results may be affected 15 - 37 U/L    ALT (SGPT) 8 (L) 12 - 78 U/L    Alk. phosphatase 84 45 - 117 U/L    Protein, total 6.8 6.4 - 8.2 g/dL    Albumin 2.8 (L) 3.5 - 5.0 g/dL    Globulin 4.0 2.0 - 4.0 g/dL    A-G Ratio 0.7 (L) 1.1 - 2.2     LACTIC ACID    Collection Time: 01/12/23 10:11 AM   Result Value Ref Range    Lactic acid 2.4 (HH) 0.4 - 2.0 mmol/L   TROPONIN-HIGH SENSITIVITY    Collection Time: 01/12/23 10:11 AM   Result Value Ref Range    Troponin-High Sensitivity 55 (H) 0 - 51 ng/L   NT-PRO BNP    Collection Time: 01/12/23 10:11 AM   Result Value Ref Range    NT pro-BNP 27,904 (H) <125 pg/mL       XR Results (most recent):  Results from Hospital Encounter encounter on 01/12/23    XR CHEST SNGL V    Narrative  INDICATION:  weakness/sob/hypoxia    EXAM: Chest single view. COMPARISON: 11/29/2018. FINDINGS: A single frontal view of the chest at 1018 hours shows diffuse  moderately severe interstitial infiltrate with poor inspiratory effort and  central vascular engorgement, new since the prior study, worse on the right than  on the left. .. The heart, mediastinum and pulmonary vasculature are stable with  cardiomegaly. .  The bony thorax is unremarkable for age. . Image quality is  degraded by large patient size and portable technique. Impression  1. Slightly asymmetric CHF pattern is suspected. Alternatively, correlate  clinically for bilateral infection, right greater than left. .  .  2. Stable cardiomegaly. 3. Technically compromised by patient size and position. A standard 2 view  examination would be more useful when clinically possible.          XR CHEST SNGL V   Final Result      1. Slightly asymmetric CHF pattern is suspected. Alternatively, correlate   clinically for bilateral infection, right greater than left. .  .   2. Stable cardiomegaly. 3. Technically compromised by patient size and position. A standard 2 view   examination would be more useful when clinically possible. Review of Systems:  Constitutional: Negative for fever. Positive for chills. HENT: Negative. Eyes: Negative. Respiratory: Negative. Positive for cough and shortness of breath  Cardiovascular: Negative. Gastrointestinal: Negative for abdominal pain and nausea. Skin: Negative. Neurological: Negative. Objective:   VITALS:    Visit Vitals  BP (!) 127/55   Pulse 66   Temp 98.9 °F (37.2 °C)   Resp 19   Ht 5' 9\" (1.753 m)   Wt 117.9 kg (260 lb)   SpO2 97%   BMI 38.40 kg/m²       Physical Exam:   Constitutional: pt is oriented to person, place, and time. HENT:   Head: Normocephalic and atraumatic. Eyes: Pupils are equal, round, and reactive to light. EOM are normal.   Cardiovascular: Normal rate, regular rhythm and normal heart sounds. Mild leg edema bilaterally. Pulmonary/Chest: Decreased breath sounds. No wheezes. No rales. Exhibits no tenderness. Abdominal: Soft. Bowel sounds are normal. There is no abdominal tenderness. There is no rebound and no guarding. Musculoskeletal: Normal range of motion. Neurological: pt is alert and oriented to person, place, and time. Alert. Normal strength. No cranial nerve deficit or sensory deficit. Displays a negative Romberg sign. ASSESSMENT & PLAN:    1. COVID-19  - CXR shows bilateral infection, right greater than left  2. Acute hypoxic respiratory failure  - secondary to Covid infection  - currently on high flow oxygen  - SpO2 97%   3. Leukocytosis  - due to Covid infection  4. Lactic acidosis  - likely due to respiratory failure and renal failure  5.  Acute congestive heart failure/from fluid overload  - BNP 27,904  6. Type 2 diabetes  - pt on Lantus and Humalog outpatient   7. CKD Stage 5  - dialysis on M/W/F  - follows Dr. Anaya Sample outpatient  8. Hypertension    9. Hx of cardiomegaly  - CXR shows stable cardiomegaly  10.  Hx of CVA    Admit to ICU     Lasix 80 mg  Repeat BNP  Repeat labs  Sliding scale insulin  Start on Decadron   Start on heparin for DVT prophylaxis    Nephrology consult  Pulmonology consult  infectious disease  ________________________________________________________________________    Signed: Katharine Mccallum MD

## 2023-01-12 NOTE — Clinical Note
Status[de-identified] INPATIENT [101]   Type of Bed: Intensive Care [6]   Cardiac Monitoring Required?: Yes   Inpatient Hospitalization Certified Necessary for the Following Reasons: 4.  Patient requires ICU level of care interventions (further clarification in H&P documentation)   Admitting Diagnosis: Fluid overload [3150593]   Admitting Diagnosis: Hypoxia [854032]   Admitting Diagnosis: COVID-19 [[id number]]   Admitting Physician: Jami Garcia   Attending Physician: Constanza Luciano [[id number]]   Estimated Length of Stay: 2 Midnights   Discharge Plan[de-identified] Home with Office Follow-up

## 2023-01-12 NOTE — PROGRESS NOTES
Zosyn Extended-Infusion Dosing/Monitoring  Current regimen:  new start    Recent Labs     01/12/23  1011   CREA 5.02*   BUN 48*     Estimated CrCl:  n/a mL/min; ESRD on HD    Plan: Change to 4.5 g IV x 1 over 30 minutes followed by 3.375 g IV over 240 minutes every 12 hours per [facility] P&T Committee Protocol with respect to extended-infusion ?-lactam antibiotics. Pharmacy will continue to monitor patient daily and will make dosage adjustments based upon changing renal function.

## 2023-01-12 NOTE — ED PROVIDER NOTES
EMERGENCY DEPARTMENT HISTORY AND PHYSICAL EXAM      Date: 1/12/2023  Patient Name: Kelsi Yancey    History of Presenting Illness     Chief Complaint   Patient presents with    Shortness of Breath       History Provided By: Patient    HPI: Kelsi Yancey, 52 y.o. female with a past medical history significant  CVA, diabetes, end-stage renal disease  presents to the ED with cc of shortness of breath. Patient states that over the last several days she has felt worsening shortness of breath, states that she has had to cut short or miss dialysis entirely over the last 2 sessions due to generalized weakness. Last dialyzed 2 days ago, states she could only tolerate 20 minutes. Today complaining of worsening shortness of breath called EMS, on ER service arrival patient noted to be hypoxic in the 60s. Placed on nonrebreather with improvement of oxygenation. Denies any chest pain denies any fevers chills, nonproductive cough. There are no other complaints, changes, or physical findings at this time. PCP: Amauri Tsai NP    Current Facility-Administered Medications   Medication Dose Route Frequency Provider Last Rate Last Admin    epoetin jodie-epbx (RETACRIT) injection 8,000 Units  8,000 Units IntraVENous DIALYSIS GISELA BINGHAM & Cliff Gold MD         Current Outpatient Medications   Medication Sig Dispense Refill    rivaroxaban (XARELTO) 10 mg tablet Take  by mouth daily. ondansetron (ZOFRAN ODT) 4 mg disintegrating tablet Take 1 Tablet by mouth every eight (8) hours as needed for Nausea or Vomiting. 10 Tablet 0    carvediloL (COREG) 12.5 mg tablet Take 0.5 Tablets by mouth two (2) times a day for 30 days. 30 Tablet 0    ondansetron (ZOFRAN ODT) 4 mg disintegrating tablet Take 1 Tablet by mouth every eight (8) hours as needed for Nausea or Vomiting. 10 Tablet 0    furosemide (LASIX) 80 mg tablet Take 80 mg by mouth daily.       hydrOXYzine HCL (ATARAX) 25 mg tablet Take  by mouth every eight (8) hours as needed. Take 1-2 tablets      Levemir FlexTouch U-100 Insuln 100 unit/mL (3 mL) inpn 62 Units by SubCUTAneous route nightly. sevelamer carbonate (RENVELA) 800 mg tab tab Take 800 mg by mouth three (3) times daily (with meals). spironolactone (ALDACTONE) 50 mg tablet Take 50 mg by mouth daily. dextroamphetamine-amphetamine (ADDERALL) 20 mg tablet Take 20 mg by mouth three (3) times daily. traMADoL (ULTRAM) 50 mg tablet Take 50 mg by mouth two (2) times daily as needed for Pain. atorvastatin (LIPITOR) 20 mg tablet Take 20 mg by mouth daily. gabapentin (NEURONTIN) 300 mg capsule Take 300 mg by mouth nightly. omeprazole (PRILOSEC) 20 mg capsule Take 20 mg by mouth daily. Past History     Past Medical History:  Past Medical History:   Diagnosis Date    Allergic rhinitis     Blood clot in vein     CVA (cerebral vascular accident) ([facility] [address] 2014    Diabetes ([facility] [address]     Dyslipidemia     Hypertension     IBS (irritable bowel syndrome)     Renal failure        Past Surgical History:  Past Surgical History:   Procedure Laterality Date    HX  SECTION      HX CHOLECYSTECTOMY      HX TONSILLECTOMY  1983    OK UNLISTED PROCEDURE VASCULAR SURGERY         Family History:  Family History   Problem Relation Age of Onset    Hypertension Mother     Diabetes Mother     Heart Disease Mother     Hypertension Father     Diabetes Father     Heart Disease Father        Social History:  Social History     Tobacco Use    Smoking status: Never    Smokeless tobacco: Never   Substance Use Topics    Alcohol use: Never    Drug use: Never       Allergies: Allergies   Allergen Reactions    Fentanyl Anxiety    Kpfjdiggw-Up-Fs-Acetaminophen Nausea and Vomiting    Sulfa (Sulfonamide Antibiotics) Nausea and Vomiting    Zithromax [Azithromycin] Unknown (comments)    Morphine Itching         Review of Systems     Review of Systems   Constitutional:  Positive for chills.  Negative for activity change and fever. HENT:  Positive for congestion. Negative for sore throat. Eyes:  Negative for visual disturbance. Respiratory:  Positive for cough and shortness of breath. Negative for chest tightness. Cardiovascular:  Negative for chest pain and palpitations. Gastrointestinal:  Negative for abdominal pain, nausea and vomiting. Genitourinary:  Negative for dysuria. Musculoskeletal:  Negative for arthralgias, back pain and myalgias. Neurological:  Negative for dizziness, weakness and headaches. Physical Exam     Physical Exam  Vitals and nursing note reviewed. Constitutional:       General: She is in acute distress. Appearance: Normal appearance. She is normal weight. She is ill-appearing. HENT:      Head: Normocephalic and atraumatic. Nose: Nose normal.      Mouth/Throat:      Mouth: Mucous membranes are moist.   Eyes:      Extraocular Movements: Extraocular movements intact. Pupils: Pupils are equal, round, and reactive to light. Cardiovascular:      Rate and Rhythm: Normal rate and regular rhythm. Pulses: Normal pulses. Pulmonary:      Effort: Pulmonary effort is normal. Tachypnea present. Breath sounds: Decreased breath sounds present. Abdominal:      General: Abdomen is flat. Bowel sounds are normal.      Palpations: Abdomen is soft. Tenderness: There is no abdominal tenderness. There is no guarding. Musculoskeletal:         General: No tenderness. Normal range of motion. Cervical back: Normal range of motion and neck supple. No muscular tenderness. Right lower leg: No edema. Left lower leg: No edema. Skin:     General: Skin is warm and dry. Neurological:      General: No focal deficit present. Mental Status: She is alert and oriented to person, place, and time. Sensory: No sensory deficit. Motor: No weakness.        Diagnostic Study Results     Labs -     Recent Results (from the past 12 hour(s)) INFLUENZA A & B AG (RAPID TEST)    Collection Time: 01/12/23 10:10 AM   Result Value Ref Range    Influenza A Antigen Negative Negative      Influenza B Antigen Negative Negative     COVID-19 RAPID TEST    Collection Time: 01/12/23 10:10 AM   Result Value Ref Range    COVID-19 rapid test DETECTED (A) Not Detected     CBC WITH AUTOMATED DIFF    Collection Time: 01/12/23 10:11 AM   Result Value Ref Range    WBC 14.4 (H) 3.6 - 11.0 K/uL    RBC 3.16 (L) 3.80 - 5.20 M/uL    HGB 9.3 (L) 11.5 - 16.0 g/dL    HCT 29.7 (L) 35.0 - 47.0 %    MCV 94.0 80.0 - 99.0 FL    MCH 29.4 26.0 - 34.0 PG    MCHC 31.3 30.0 - 36.5 g/dL    RDW 13.2 11.5 - 14.5 %    PLATELET 907 458 - 908 K/uL    MPV 10.9 8.9 - 12.9 FL    NRBC 0.0 0.0  WBC    ABSOLUTE NRBC 0.00 0.00 - 0.01 K/uL    NEUTROPHILS 82 (H) 32 - 75 %    LYMPHOCYTES 10 (L) 12 - 49 %    MONOCYTES 7 5 - 13 %    EOSINOPHILS 0 0 - 7 %    BASOPHILS 0 0 - 1 %    IMMATURE GRANULOCYTES 1 (H) 0 - 0.5 %    ABS. NEUTROPHILS 11.9 (H) 1.8 - 8.0 K/UL    ABS. LYMPHOCYTES 1.4 0.8 - 3.5 K/UL    ABS. MONOCYTES 1.0 0.0 - 1.0 K/UL    ABS. EOSINOPHILS 0.0 0.0 - 0.4 K/UL    ABS. BASOPHILS 0.0 0.0 - 0.1 K/UL    ABS. IMM. GRANS. 0.1 (H) 0.00 - 0.04 K/UL    DF AUTOMATED     METABOLIC PANEL, COMPREHENSIVE    Collection Time: 01/12/23 10:11 AM   Result Value Ref Range    Sodium 130 (L) 136 - 145 mmol/L    Potassium Specimen hemolyzed, results may be affected 3.5 - 5.1 mmol/L    Chloride 94 (L) 97 - 108 mmol/L    CO2 27 21 - 32 mmol/L    Anion gap 9 5 - 15 mmol/L    Glucose 372 (H) 65 - 100 mg/dL    BUN 48 (H) 6 - 20 mg/dL    Creatinine 5.02 (H) 0.55 - 1.02 mg/dL    BUN/Creatinine ratio 10 (L) 12 - 20      eGFR 10 (L) >60 ml/min/1.73m2    Calcium 8.5 8.5 - 10.1 mg/dL    Bilirubin, total 0.7 0.2 - 1.0 mg/dL    AST (SGOT) Specimen hemolyzed, results may be affected 15 - 37 U/L    ALT (SGPT) 8 (L) 12 - 78 U/L    Alk.  phosphatase 84 45 - 117 U/L    Protein, total 6.8 6.4 - 8.2 g/dL    Albumin 2.8 (L) 3.5 - 5.0 g/dL    Globulin 4.0 2.0 - 4.0 g/dL    A-G Ratio 0.7 (L) 1.1 - 2.2     LACTIC ACID    Collection Time: 01/12/23 10:11 AM   Result Value Ref Range    Lactic acid 2.4 (HH) 0.4 - 2.0 mmol/L   TROPONIN-HIGH SENSITIVITY    Collection Time: 01/12/23 10:11 AM   Result Value Ref Range    Troponin-High Sensitivity 55 (H) 0 - 51 ng/L   NT-PRO BNP    Collection Time: 01/12/23 10:11 AM   Result Value Ref Range    NT pro-BNP 27,904 (H) <125 pg/mL       Radiologic Studies -   [unfilled]  CT Results  (Last 48 hours)      None          CXR Results  (Last 48 hours)                 01/12/23 1023  XR CHEST SNGL V Final result    Impression:      1. Slightly asymmetric CHF pattern is suspected. Alternatively, correlate   clinically for bilateral infection, right greater than left. .  .   2. Stable cardiomegaly. 3. Technically compromised by patient size and position. A standard 2 view   examination would be more useful when clinically possible. Narrative:  INDICATION:  weakness/sob/hypoxia        EXAM: Chest single view. COMPARISON: 11/29/2018. FINDINGS: A single frontal view of the chest at 1018 hours shows diffuse   moderately severe interstitial infiltrate with poor inspiratory effort and   central vascular engorgement, new since the prior study, worse on the right than   on the left. .. The heart, mediastinum and pulmonary vasculature are stable with   cardiomegaly. .  The bony thorax is unremarkable for age. . Image quality is   degraded by large patient size and portable technique. Medical Decision Making and ED Course   I am the first provider for this patient. I reviewed the vital signs, available nursing notes, past medical history, past surgical history, family history and social history. Vital Signs-Reviewed the patient's vital signs.   Patient Vitals for the past 12 hrs:   Temp Pulse Resp BP SpO2   01/12/23 1023 -- -- -- -- 99 %   01/12/23 1006 99.4 °F (37.4 °C) 72 18 127/68 93 %       EKG interpretation: (Preliminary)  Normal sinus rhythm 70, no ST elevations, normal axis    Records Reviewed: Nursing Notes and Old Medical Records    The patient presents with shortness of breath with a differential diagnosis of fluid overload, pneumonia, pulmonary edema, COVID-19, influenza      Provider Notes (Medical Decision Making):     MDM     80-year-old female, end-stage renal dialysis dependent, presents emergency department for evaluation of redness of breath. Patient missed 2 episodes of dialysis due to not feeling well, chills, productive cough. Patient noted to be hypoxic in field 60%, placed on nonrebreather with improvement of oxygenation. Physical exam shows tachypneic female however completing full sentences, no significant respiratory distress noted on nonrebreather. No significant lower extremity swelling noted, normotensive. Will attempt to transition patient to BiPAP and/or high flow, draw basic lab work, chest x-ray, COVID and influenza swab. ED Course:   Initial assessment performed. The patients presenting problems have been discussed, and they are in agreement with the care plan formulated and outlined with them. I have encouraged them to ask questions as they arise throughout their visit. ED Course as of 01/12/23 1126   Thu Jan 12, 2023   1041 Discussed case with Dr. Steven Phillip, will arrange for dialysis today, pending clinical improvement after dialysis will decide to admit or discharge. [PZ]   1556 Patient's lab work resulting, CBC shows leukocytosis 14,000, BNP 32,735, metabolic panel shows hemolyzed potassium, BUN 48 creatinine 5 bicarb 27. EKG does not show signs of acute hyperkalemic changes. Patient transition to high flow, tolerating well. COVID test resulted positive. Given hypoxia positive COVID and fluid overload feel that patient would benefit from inpatient admission.   Will admit patient, discussed case with Dr. Tho Sanchez.  [PZ]      ED Course User Index  [PZ] Ann Marie Cheung MD         Procedures       Shayan Alonzo MD  Procedures               CRITICAL CARE NOTE :  10:35 AM  Amount of Critical Care Time: __45__(minutes)__    IMPENDING DETERIORATION -Airway, Respiratory, Cardiovascular, and Renal  ASSOCIATED RISK FACTORS - Hypoxia, Dysrhythmia, and Metabolic changes  MANAGEMENT- Bedside Assessment and Supervision of Care  INTERPRETATION -  Xrays, ECG, and Blood Pressure  INTERVENTIONS - vent mngmt and Metobolic interventions  CASE REVIEW - Hospitalist/Intensivist and Medical Sub-Specialist  TREATMENT RESPONSE -Stable  PERFORMED BY - Self    NOTES   :  I have spent critical care time involved in lab review, consultations with specialist, family decision- making, bedside attention and documentation. This time excludes time spent in any separate billed procedures. During this entire length of time I was immediately available to the patient . Shayan Alonzo MD        Disposition     Admit  Admitted      Diagnosis     Clinical Impression:   1. Hypoxia    2. Hypervolemia, unspecified hypervolemia type    3. COVID-19        Attestations:    Shayan Alonzo MD    Please note that this dictation was completed with [employer], the computer voice recognition software. Quite often unanticipated grammatical, syntax, homophones, and other interpretive errors are inadvertently transcribed by the computer software. Please disregard these errors. Please excuse any errors that have escaped final proofreading. Thank you.

## 2023-01-12 NOTE — CONSULTS
Infectious Disease Consult Note    Reason for Consult: COVID +  Date of Consultation: January 12, 2023  Date of Admission: 1/12/2023  Referring Physician: Dr Marsha Jones     HPI:47 y.o WF who presented w dyspnea found to be positive for COVID 19 for which ID has been consulted. Her medical history significant for morbid obesity, ESRD on HD, DM and CVA. She did not receive COVID vaccination due to concerns of side effect, but wears a mask in public. She reports progressive dyspnea and generalized weakness since 01/10/23 whichu precluded completion of her last 2 HD treatments. She mentions not being able to breathe this morning which prompted her ED visit. She does not recall any sick contact, although her  also tested positive for COVID 23 and at home recuperating. Her O2Sats were in the 60s per EMS, improved with nonrebreather O2 mask. She was afebrile on assessment in the ED,  normothermic, O2 sats dropped on non-rebreather mask, currently on HFNC at 50% FIO2. Today's labs show WBC of 14.4, and acid 2.4. CXR findings are concerning for CHF and cardiomegaly, no focal infilrates reported. She is on Zosyn and decadron. . Pt was undergoing HD during my assessment, reported feeling much better since presentation.      Review of Systems:     Gen: generalized weakness, no fever/chills    CV:  Negative for chest pain, dyspnea on exertion, leg edema   Lungs: shortness of breath, non-productive cough   Abdomen: Negative for abdominal pain, nausea, vomiting, diarrhea, constipation   Genitourinary: Negative for genital pain or genital discharge     Neuro: headache, Negative for numbness, tingling, extremity weakness   Skin: Negative for rash, sores/open wounds   Musculoskeletal: Negative for joint pain, joint swelling, joint erythema    Psych: Negative for manic behavior       Past Medical History:  Past Medical History:   Diagnosis Date    Allergic rhinitis     Blood clot in vein     CVA (cerebral vascular accident) ([facility] [address] 2014    Diabetes ([facility]     Dyslipidemia     Hypertension     IBS (irritable bowel syndrome)     Renal failure        Past surgical history   Past Surgical History:   Procedure Laterality Date    HX  SECTION      HX CHOLECYSTECTOMY      HX TONSILLECTOMY      VT UNLISTED PROCEDURE VASCULAR SURGERY          Social History   Social History     Tobacco Use    Smoking status: Never    Smokeless tobacco: Never   Substance Use Topics    Alcohol use: Never    Drug use: Never        Family history   Family History   Problem Relation Age of Onset    Hypertension Mother     Diabetes Mother     Heart Disease Mother     Hypertension Father     Diabetes Father     Heart Disease Father           Allergies: Allergies   Allergen Reactions    Fentanyl Anxiety    Sulfa (Sulfonamide Antibiotics) Nausea and Vomiting    Zithromax [Azithromycin] Unknown (comments)    Morphine Itching         Medications:  No current facility-administered medications on file prior to encounter. Current Outpatient Medications on File Prior to Encounter   Medication Sig Dispense Refill    rivaroxaban (XARELTO) 10 mg tablet Take  by mouth daily. ondansetron (ZOFRAN ODT) 4 mg disintegrating tablet Take 1 Tablet by mouth every eight (8) hours as needed for Nausea or Vomiting. 10 Tablet 0    carvediloL (COREG) 12.5 mg tablet Take 0.5 Tablets by mouth two (2) times a day for 30 days. 30 Tablet 0    ondansetron (ZOFRAN ODT) 4 mg disintegrating tablet Take 1 Tablet by mouth every eight (8) hours as needed for Nausea or Vomiting. 10 Tablet 0    furosemide (LASIX) 80 mg tablet Take 80 mg by mouth daily. hydrOXYzine HCL (ATARAX) 25 mg tablet Take  by mouth every eight (8) hours as needed. Take 1-2 tablets      Levemir FlexTouch U-100 Insuln 100 unit/mL (3 mL) inpn 62 Units by SubCUTAneous route nightly. sevelamer carbonate (RENVELA) 800 mg tab tab Take 800 mg by mouth three (3) times daily (with meals). spironolactone (ALDACTONE) 50 mg tablet Take 50 mg by mouth daily. dextroamphetamine-amphetamine (ADDERALL) 20 mg tablet Take 20 mg by mouth three (3) times daily. traMADoL (ULTRAM) 50 mg tablet Take 50 mg by mouth two (2) times daily as needed for Pain. atorvastatin (LIPITOR) 20 mg tablet Take 20 mg by mouth daily. gabapentin (NEURONTIN) 300 mg capsule Take 300 mg by mouth nightly. omeprazole (PRILOSEC) 20 mg capsule Take 20 mg by mouth daily. Physical Exam:    Vitals: Patient Vitals for the past 24 hrs:   Temp Pulse Resp BP SpO2   01/12/23 1530 -- 66 15 (!) 115/59 96 %   01/12/23 1527 -- -- -- -- 95 %   01/12/23 1515 -- 64 17 (!) 115/51 97 %   01/12/23 1500 98.8 °F (37.1 °C) 66 22 111/80 96 %   01/12/23 1445 -- 67 18 (!) 116/47 96 %   01/12/23 1430 -- 65 16 (!) 116/53 98 %   01/12/23 1415 -- 67 18 (!) 119/49 95 %   01/12/23 1400 -- 66 19 (!) 127/55 97 %   01/12/23 1345 -- 66 26 (!) 119/53 98 %   01/12/23 1330 -- 68 17 (!) 126/59 94 %   01/12/23 1315 -- 67 19 (!) 113/43 97 %   01/12/23 1310 -- 67 28 (!) 118/47 97 %   01/12/23 1145 98.9 °F (37.2 °C) 72 20 114/65 95 %   01/12/23 1051 -- 71 23 (!) 109/49 --   01/12/23 1045 -- -- -- -- 99 %   01/12/23 1036 -- 74 23 107/63 (!) 86 %   01/12/23 1023 -- -- -- -- 99 %   01/12/23 1006 99.4 °F (37.4 °C) 72 18 127/68 93 %     GEN: NAD, AAO x 4  HEENT: NCAT, PERRLA, HFNC in place   HEART: S1, S2+, RRR, No murmur   Lungs: CTA B/l, decreased at the bases, no wheeze/rhonchi  Abdomen: soft, ND, NT, +BS   Genitourinary: no genital discharge, no odom  Extremities: no edema, left arm AVF   Skin: no chronic wounds or ulcers     Labs:   Recent Labs     01/12/23  1011   WBC 14.4*   HGB 9.3*   HCT 29.7*        Recent Labs     01/12/23  1011   BUN 48*   CREA 5.02*       Microbiology Data:   - Blood Cx 01/12: Pending                Imaging:   CXR 01/12:   1. Slightly asymmetric CHF pattern is suspected.  Alternatively, correlate  clinically for bilateral infection, right greater than left. .  .  2. Stable cardiomegaly. 3. Technically compromised by patient size and position. A standard 2 view  examination would be more useful when clinically possible. Assessment / Plan:     Acute hypoxic respiratory failure due to COVID pneumonitis w superimposed CHF         Worsening dyspnea prior to presentation, found to be COVID + in the ED         Afebrile, moderate leukocytosis, maintaining O2 sats on HFNC, + non-productive cough         CHF and cardiomegaly on CXR, no focal consolidation         Agree w Decadron, Baricitinib CI w renal failure, does not meet [facility] criteria for antiviral therapy         Will leave on Zosyn for now, but too early for bacteria superinfection         Routine labs and CXR in AM, delsym ordered for cough     2. Acute CHF exacerbation, Pulmonary edema and cardiomegaly on CXR       Consider 2 D echo per clinical progression     3. Generalized weakness, likely due to # 1    4. ESRD on HD, + left arm AVF, being evaluated by [facility] for transplant     5. H/o uncontrolled DM A1c of 10.1 in 06/2022    6. Headache frontal, ? Sinus related, requesting some stronger than tylenol.  Will defer mgt to attending     Ailyn Beasley MD     1/12/2023

## 2023-01-12 NOTE — CONSULTS
Renal Consult Note    Admit Date: 1/12/2023      HPI:   77-year-old unvaccinated ( COVID ) lady with history of end-stage renal failure who is on maintenance hemodialysis on a Tuesday Thursday Saturday schedule. She has been feeling poorly for the last 3 days. She did not go for dialysis on Tuesday of this week. She presents to the emergency room with shortness of breath. She had low-grade fever and her COVID test was positive. She denies anosmia and altered taste. She denies pleuritic chest pain. She does not have an appetite. She denies diarrhea. Her  is apparently sick at home with COVID as well.       Current Facility-Administered Medications   Medication Dose Route Frequency    epoetin jodie-epbx (RETACRIT) injection 8,000 Units  8,000 Units IntraVENous DIALYSIS TUE, THU & SAT    acetaminophen (TYLENOL) tablet 650 mg  650 mg Oral Q6H PRN    traMADoL (ULTRAM) tablet 50 mg  50 mg Oral Q12H PRN    [START ON 1/13/2023] atorvastatin (LIPITOR) tablet 20 mg  20 mg Oral DAILY    [START ON 1/13/2023] furosemide (LASIX) tablet 80 mg  80 mg Oral DAILY    gabapentin (NEURONTIN) capsule 300 mg  300 mg Oral QHS    sevelamer carbonate (RENVELA) tab 800 mg  800 mg Oral TID WITH MEALS    insulin lispro (HUMALOG) injection   SubCUTAneous AC&HS    glucose chewable tablet 16 g  4 Tablet Oral PRN    glucagon (GLUCAGEN) injection 1 mg  1 mg IntraMUSCular PRN    acetaminophen (TYLENOL) tablet 650 mg  650 mg Oral Q6H PRN    Or    acetaminophen (TYLENOL) suppository 650 mg  650 mg Rectal Q6H PRN    polyethylene glycol (MIRALAX) packet 17 g  17 g Oral DAILY PRN    ondansetron (ZOFRAN ODT) tablet 4 mg  4 mg Oral Q8H PRN    Or    ondansetron (ZOFRAN) injection 4 mg  4 mg IntraVENous Q6H PRN    heparin (porcine) injection 5,000 Units  5,000 Units SubCUTAneous Q8H    [START ON 1/13/2023] pantoprazole (PROTONIX) tablet 20 mg  20 mg Oral DAILY    piperacillin-tazobactam (ZOSYN) 4.5 g in 0.9% sodium chloride (MBP/ADV) 100 mL MBP  4.5 g IntraVENous ONCE    Followed by    piperacillin-tazobactam (ZOSYN) 3.375 g in 0.9% sodium chloride (MBP/ADV) 100 mL MBP  3.375 g IntraVENous Q12H    insulin glargine (LANTUS) injection 40 Units  40 Units SubCUTAneous Q12H    dexamethasone (DECADRON) 4 mg/mL injection 6 mg  6 mg IntraVENous DAILY        Past Medical History:   Diagnosis Date    Allergic rhinitis     Blood clot in vein     CVA (cerebral vascular accident) ([facility] [address] 2014    Diabetes ([facility] [address]     Dyslipidemia     Hypertension     IBS (irritable bowel syndrome)     Renal failure       Past Surgical History:   Procedure Laterality Date    HX  SECTION      HX CHOLECYSTECTOMY      HX TONSILLECTOMY      [place] UNLISTED PROCEDURE VASCULAR SURGERY       Family History   Problem Relation Age of Onset    Hypertension Mother     Diabetes Mother     Heart Disease Mother     Hypertension Father     Diabetes Father     Heart Disease Father       Social History     Tobacco Use    Smoking status: Never    Smokeless tobacco: Never   Substance Use Topics    Alcohol use: Never         Review of Systems    Review of Systems   Constitutional:  Negative for fever. Respiratory:  Positive for cough and shortness of breath. Cardiovascular:  Negative for chest pain. Gastrointestinal:  Negative for abdominal pain. Neurological:  Negative for headaches. Physical Exam:     Physical Exam  Cardiovascular:      Rate and Rhythm: Regular rhythm. Pulmonary:      Comments: Decreased BS  Abdominal:      General: Bowel sounds are normal.      Palpations: Abdomen is soft. Neurological:      General: No focal deficit present. Mental Status: She is alert. Left AV fistula functioning well. Minimal edema in the lower extremities.       Patient Vitals for the past 8 hrs:   BP Temp Pulse Resp SpO2 Height Weight   23 1530 (!) 115/59 -- 66 15 96 % -- --   23 1527 -- -- -- -- 95 % -- --   23 1515 (!) 115/51 -- 64 17 97 % -- --   01/12/23 1500 111/80 98.8 °F (37.1 °C) 66 22 96 % -- --   01/12/23 1445 (!) 116/47 -- 67 18 96 % -- --   01/12/23 1430 (!) 116/53 -- 65 16 98 % -- --   01/12/23 1415 (!) 119/49 -- 67 18 95 % -- --   01/12/23 1400 (!) 127/55 -- 66 19 97 % -- --   01/12/23 1345 (!) 119/53 -- 66 26 98 % -- --   01/12/23 1330 (!) 126/59 -- 68 17 94 % -- --   01/12/23 1315 (!) 113/43 -- 67 19 97 % -- --   01/12/23 1310 (!) 118/47 -- 67 28 97 % -- --   01/12/23 1145 114/65 98.9 °F (37.2 °C) 72 20 95 % -- --   01/12/23 1051 (!) 109/49 -- 71 23 -- -- --   01/12/23 1045 -- -- -- -- 99 % -- --   01/12/23 1036 107/63 -- 74 23 (!) 86 % -- --   01/12/23 1023 -- -- -- -- 99 % -- --   01/12/23 1006 127/68 99.4 °F (37.4 °C) 72 18 93 % 5' 9\" (1.753 m) 117.9 kg (260 lb)     No intake/output data recorded. No intake/output data recorded. XR CHEST SNGL V   Final Result      1. Slightly asymmetric CHF pattern is suspected. Alternatively, correlate   clinically for bilateral infection, right greater than left. .  .   2. Stable cardiomegaly. 3. Technically compromised by patient size and position. A standard 2 view   examination would be more useful when clinically possible. Data Review   Recent Labs     01/12/23  1011   WBC 14.4*   HGB 9.3*   HCT 29.7*        Recent Labs     01/12/23  1011   *   K Specimen hemolyzed, results may be affected   CL 94*   CO2 27   *   BUN 48*   CREA 5.02*   CA 8.5   ALB 2.8*   ALT 8*     No components found for: GLPOC  No results for input(s): PH, PCO2, PO2, HCO3, FIO2 in the last 72 hours. No results for input(s): INR, INREXT, INREXT in the last 72 hours.       Assessment:           Active Problems:    Hypoxia (1/12/2023)      Fluid overload (1/12/2023)      COVID-19 (1/12/2023)      Acute hypoxemic respiratory failure ([facility] [address] (1/12/2023)    ESRD hemodialysis dependent on a Tuesday Thursday Saturday schedule    COVID-19 infection    Anemia of chronic disease    Hypertension    Diabetes mellitus    Secondary hyperparathyroidism. Plan:     Hemodialysis today with 3 kg fluid removal.  Will follow electrolytes and renal function.   Check a phosphorus level  Retacrit on dialysis  Thank you for this consult

## 2023-01-12 NOTE — ACP (ADVANCE CARE PLANNING)
Advance Care Planning   Healthcare Decision Maker:       Primary Decision Maker: Yolette Briceño [facility] - [phone]

## 2023-01-12 NOTE — MED STUDENT NOTES
History and Physical    NAME: Neri Rodríguez   :  1975   MRN:  [id number]     Date/Time:  2023 12:56 PM    Patient PCP: Chema Pena NP  ______________________________________________________________________             Subjective:     CHIEF COMPLAINT:     Shortness of breath    HISTORY OF PRESENT ILLNESS:       Patient is a 52y.o. year old female with past medical history of CVA, diabetes, CKD stage 5, hypertension, and cardiomegaly who presented to the ED today with shortness of breath. Patient states that over the last few days she has been feeling very sick and because of that she could only get 2 hours of dialysis done on Monday and 30 minutes yesterday. And then today she started feeling very short of breath and called EMS. On arrival to the ED patient was noted to be hypoxic in the 60s. She was placed on non-breather with improvement of oxygenation. Pt was tested positive for COVID test done in the ER. This is her first time getting diagnosed with COVID. She also has chills, cough and chest pain associated with it, nausea, and vomiting. She is currently on high flow oxygen. CBC shows leukocytosis 14,400, BNP 27,904, lactic acid 2.4, sodium 130, chloride 94, troponin 55. Glucose 372. Chest x ray done in the ED shows:  1. Slightly asymmetric CHF pattern is suspected. Alternatively, correlate  clinically for bilateral infection, right greater than left. .  .  2. Stable cardiomegaly. 3. Technically compromised by patient size and position. A standard 2 view  examination would be more useful when clinically possible.     Past Medical History:   Diagnosis Date    Allergic rhinitis     Blood clot in vein     CVA (cerebral vascular accident) ([facility] [address] 2014    Diabetes ([facility] [address]     Dyslipidemia     Hypertension     IBS (irritable bowel syndrome)     Renal failure         Past Surgical History:   Procedure Laterality Date    HX  SECTION      HX CHOLECYSTECTOMY      HX TONSILLECTOMY  1983    [place] UNLISTED PROCEDURE VASCULAR SURGERY         Social History     Tobacco Use    Smoking status: Never    Smokeless tobacco: Never   Substance Use Topics    Alcohol use: Never        Family History   Problem Relation Age of Onset    Hypertension Mother     Diabetes Mother     Heart Disease Mother     Hypertension Father     Diabetes Father     Heart Disease Father        Allergies   Allergen Reactions    Fentanyl Anxiety    Apvrsuunk-Pm-Un-Acetaminophen Nausea and Vomiting    Sulfa (Sulfonamide Antibiotics) Nausea and Vomiting    Zithromax [Azithromycin] Unknown (comments)    Morphine Itching        Prior to Admission medications    Medication Sig Start Date End Date Taking? Authorizing Provider   rivaroxaban (XARELTO) 10 mg tablet Take  by mouth daily. Provider, Historical   ondansetron (ZOFRAN ODT) 4 mg disintegrating tablet Take 1 Tablet by mouth every eight (8) hours as needed for Nausea or Vomiting. 8/9/22   Dm Greco NP   carvediloL (COREG) 12.5 mg tablet Take 0.5 Tablets by mouth two (2) times a day for 30 days. 7/1/22 7/31/22  Kasey Clark NP   ondansetron (ZOFRAN ODT) 4 mg disintegrating tablet Take 1 Tablet by mouth every eight (8) hours as needed for Nausea or Vomiting. 6/29/22   Lilo LARSON PA-C   furosemide (LASIX) 80 mg tablet Take 80 mg by mouth daily. 6/10/22   Provider, Historical   hydrOXYzine HCL (ATARAX) 25 mg tablet Take  by mouth every eight (8) hours as needed. Take 1-2 tablets 6/8/22   Provider, Historical   Levemir FlexTouch U-100 Insuln 100 unit/mL (3 mL) inpn 62 Units by SubCUTAneous route nightly. 5/14/22   Provider, Historical   sevelamer carbonate (RENVELA) 800 mg tab tab Take 800 mg by mouth three (3) times daily (with meals). 5/14/22   Provider, Historical   spironolactone (ALDACTONE) 50 mg tablet Take 50 mg by mouth daily.  6/10/22   Provider, Historical   dextroamphetamine-amphetamine (ADDERALL) 20 mg tablet Take 20 mg by mouth three (3) times daily.    Provider, Historical   traMADoL (ULTRAM) 50 mg tablet Take 50 mg by mouth two (2) times daily as needed for Pain. Provider, Historical   atorvastatin (LIPITOR) 20 mg tablet Take 20 mg by mouth daily. Provider, Historical   gabapentin (NEURONTIN) 300 mg capsule Take 300 mg by mouth nightly. Provider, Historical   omeprazole (PRILOSEC) 20 mg capsule Take 20 mg by mouth daily. Provider, Historical         Current Facility-Administered Medications:     epoetin jodie-epbx (RETACRIT) injection 8,000 Units, 8,000 Units, IntraVENous, DIALYSIS TUE, THU & SAT, Cliff Blount MD    LAB DATA REVIEWED:    Recent Results (from the past 24 hour(s))   INFLUENZA A & B AG (RAPID TEST)    Collection Time: 01/12/23 10:10 AM   Result Value Ref Range    Influenza A Antigen Negative Negative      Influenza B Antigen Negative Negative     COVID-19 RAPID TEST    Collection Time: 01/12/23 10:10 AM   Result Value Ref Range    COVID-19 rapid test DETECTED (A) Not Detected     CBC WITH AUTOMATED DIFF    Collection Time: 01/12/23 10:11 AM   Result Value Ref Range    WBC 14.4 (H) 3.6 - 11.0 K/uL    RBC 3.16 (L) 3.80 - 5.20 M/uL    HGB 9.3 (L) 11.5 - 16.0 g/dL    HCT 29.7 (L) 35.0 - 47.0 %    MCV 94.0 80.0 - 99.0 FL    MCH 29.4 26.0 - 34.0 PG    MCHC 31.3 30.0 - 36.5 g/dL    RDW 13.2 11.5 - 14.5 %    PLATELET 787 470 - 274 K/uL    MPV 10.9 8.9 - 12.9 FL    NRBC 0.0 0.0  WBC    ABSOLUTE NRBC 0.00 0.00 - 0.01 K/uL    NEUTROPHILS 82 (H) 32 - 75 %    LYMPHOCYTES 10 (L) 12 - 49 %    MONOCYTES 7 5 - 13 %    EOSINOPHILS 0 0 - 7 %    BASOPHILS 0 0 - 1 %    IMMATURE GRANULOCYTES 1 (H) 0 - 0.5 %    ABS. NEUTROPHILS 11.9 (H) 1.8 - 8.0 K/UL    ABS. LYMPHOCYTES 1.4 0.8 - 3.5 K/UL    ABS. MONOCYTES 1.0 0.0 - 1.0 K/UL    ABS. EOSINOPHILS 0.0 0.0 - 0.4 K/UL    ABS. BASOPHILS 0.0 0.0 - 0.1 K/UL    ABS. IMM.  GRANS. 0.1 (H) 0.00 - 0.04 K/UL    DF AUTOMATED     METABOLIC PANEL, COMPREHENSIVE    Collection Time: 01/12/23 10:11 AM Result Value Ref Range    Sodium 130 (L) 136 - 145 mmol/L    Potassium Specimen hemolyzed, results may be affected 3.5 - 5.1 mmol/L    Chloride 94 (L) 97 - 108 mmol/L    CO2 27 21 - 32 mmol/L    Anion gap 9 5 - 15 mmol/L    Glucose 372 (H) 65 - 100 mg/dL    BUN 48 (H) 6 - 20 mg/dL    Creatinine 5.02 (H) 0.55 - 1.02 mg/dL    BUN/Creatinine ratio 10 (L) 12 - 20      eGFR 10 (L) >60 ml/min/1.73m2    Calcium 8.5 8.5 - 10.1 mg/dL    Bilirubin, total 0.7 0.2 - 1.0 mg/dL    AST (SGOT) Specimen hemolyzed, results may be affected 15 - 37 U/L    ALT (SGPT) 8 (L) 12 - 78 U/L    Alk. phosphatase 84 45 - 117 U/L    Protein, total 6.8 6.4 - 8.2 g/dL    Albumin 2.8 (L) 3.5 - 5.0 g/dL    Globulin 4.0 2.0 - 4.0 g/dL    A-G Ratio 0.7 (L) 1.1 - 2.2     LACTIC ACID    Collection Time: 01/12/23 10:11 AM   Result Value Ref Range    Lactic acid 2.4 (HH) 0.4 - 2.0 mmol/L   TROPONIN-HIGH SENSITIVITY    Collection Time: 01/12/23 10:11 AM   Result Value Ref Range    Troponin-High Sensitivity 55 (H) 0 - 51 ng/L   NT-PRO BNP    Collection Time: 01/12/23 10:11 AM   Result Value Ref Range    NT pro-BNP 27,904 (H) <125 pg/mL       XR Results (most recent):  Results from Hospital Encounter encounter on 01/12/23    XR CHEST SNGL V    Narrative  INDICATION:  weakness/sob/hypoxia    EXAM: Chest single view. COMPARISON: 11/29/2018. FINDINGS: A single frontal view of the chest at 1018 hours shows diffuse  moderately severe interstitial infiltrate with poor inspiratory effort and  central vascular engorgement, new since the prior study, worse on the right than  on the left. .. The heart, mediastinum and pulmonary vasculature are stable with  cardiomegaly. .  The bony thorax is unremarkable for age. . Image quality is  degraded by large patient size and portable technique. Impression  1. Slightly asymmetric CHF pattern is suspected. Alternatively, correlate  clinically for bilateral infection, right greater than left. .  .  2. Stable cardiomegaly. 3. Technically compromised by patient size and position. A standard 2 view  examination would be more useful when clinically possible. XR CHEST SNGL V   Final Result      1. Slightly asymmetric CHF pattern is suspected. Alternatively, correlate   clinically for bilateral infection, right greater than left. .  .   2. Stable cardiomegaly. 3. Technically compromised by patient size and position. A standard 2 view   examination would be more useful when clinically possible. Review of Systems:  Constitutional: Negative for fever. Positive for chills. HENT: Negative. Eyes: Negative. Respiratory: Negative. Positive for cough and shortness of breath  Cardiovascular: Negative. Gastrointestinal: Negative for abdominal pain and nausea. Skin: Negative. Neurological: Negative. Objective:   VITALS:    Visit Vitals  /65   Pulse 72   Temp 98.9 °F (37.2 °C)   Resp 20   Ht 5' 9\" (1.753 m)   Wt 117.9 kg (260 lb)   SpO2 95%   BMI 38.40 kg/m²       Physical Exam:   Constitutional: pt is oriented to person, place, and time. HENT:   Head: Normocephalic and atraumatic. Eyes: Pupils are equal, round, and reactive to light. EOM are normal.   Cardiovascular: Normal rate, regular rhythm and normal heart sounds. Mild leg edema bilaterally. Pulmonary/Chest: Decreased breath sounds. No wheezes. No rales. Exhibits no tenderness. Abdominal: Soft. Bowel sounds are normal. There is no abdominal tenderness. There is no rebound and no guarding. Musculoskeletal: Normal range of motion. Neurological: pt is alert and oriented to person, place, and time. Alert. Normal strength. No cranial nerve deficit or sensory deficit. Displays a negative Romberg sign. ASSESSMENT & PLAN:    1. COVID-19  - CXR shows bilateral infection, right greater than left  2. Acute hypoxic respiratory failure  - secondary to Covid infection  - currently on high flow oxygen  - SpO2 97%   3. Leukocytosis  - due to Covid infection  4. Lactic acidosis  - likely due to respiratory failure and renal failure  5. Acute congestive heart failure  - BNP 27,904  6. Type 2 diabetes  - pt on Lantus and Humalog outpatient   7. CKD Stage 5  - dialysis on M/W/F  - follows Dr. Cristobal Marking outpatient  8. Hypertension  - takes nifedipine and carvedilol per patient  9. Hx of cardiomegaly  - CXR shows stable cardiomegaly  10. Hx of CVA    Admit to ICU   IV Fluids  Lasix 80 mg  Repeat BNP  Repeat labs  Sliding scale insulin  Start on Decadron   Start on heparin for DVT prophylaxis    Nephrology consult  Pulmonology consult  ________________________________________________________________________    Signed: Beka Moran     *ATTENTION:  This note has been created by a medical student for educational purposes only. Please do not refer to the content of this note for clinical decision-making, billing, or other purposes. Please see attending physicians note to obtain clinical information on this patient. *

## 2023-01-12 NOTE — DIALYSIS
Patient tolerated treatment. 3000 ml of fluid was removed. Patient was alert and oriented during treatment. 66.5 liters of blood was processed. Report given to primary nurse Telma Matt.

## 2023-01-12 NOTE — ROUTINE PROCESS
TRANSFER - OUT REPORT:    Verbal report given to [facility] on Miley Wiggins  being transferred to [facility] for routine progression of care       Report consisted of patients Situation, Background, Assessment and   Recommendations(SBAR). Information from the following report(s) SBAR, ED Summary, and Med Rec Status was reviewed with the receiving nurse. Lines:   Peripheral IV 01/12/23 Right Antecubital (Active)   Site Assessment Clean, dry, & intact 01/12/23 1018   Phlebitis Assessment 0 01/12/23 1018   Infiltration Assessment 0 01/12/23 1018   Dressing Status Clean, dry, & intact 01/12/23 1018   Hub Color/Line Status Pink 01/12/23 1018        Opportunity for questions and clarification was provided.       Patient transported with:   Monitor  RN  RT High Maykel VUONG

## 2023-01-13 ENCOUNTER — APPOINTMENT (OUTPATIENT)
Dept: GENERAL RADIOLOGY | Age: 48
DRG: 177 | End: 2023-01-13
Attending: INTERNAL MEDICINE
Payer: MEDICARE

## 2023-01-13 LAB
ALBUMIN SERPL-MCNC: 2.5 G/DL (ref 3.5–5)
ALBUMIN/GLOB SERPL: 0.5 (ref 1.1–2.2)
ALP SERPL-CCNC: 76 U/L (ref 45–117)
ALT SERPL-CCNC: 7 U/L (ref 12–78)
ANION GAP SERPL CALC-SCNC: 8 MMOL/L (ref 5–15)
APPEARANCE UR: ABNORMAL
ARTERIAL PATENCY WRIST A: YES
AST SERPL W P-5'-P-CCNC: 5 U/L (ref 15–37)
BACTERIA URNS QL MICRO: NEGATIVE /HPF
BACTERIA URNS QL MICRO: NEGATIVE /HPF
BASE DEFICIT BLDA-SCNC: 1.1 MMOL/L
BASOPHILS # BLD: 0 K/UL (ref 0–0.1)
BASOPHILS NFR BLD: 0 % (ref 0–1)
BDY SITE: ABNORMAL
BILIRUB SERPL-MCNC: 0.6 MG/DL (ref 0.2–1)
BILIRUB UR QL: NEGATIVE
BODY TEMPERATURE: 98.6
BUN SERPL-MCNC: 41 MG/DL (ref 6–20)
BUN/CREAT SERPL: 9 (ref 12–20)
CA-I BLD-MCNC: 8.6 MG/DL (ref 8.5–10.1)
CHLORIDE SERPL-SCNC: 93 MMOL/L (ref 97–108)
CO2 SERPL-SCNC: 26 MMOL/L (ref 21–32)
COHGB MFR BLD: 0.4 % (ref 1–2)
COLOR UR: ABNORMAL
CREAT SERPL-MCNC: 4.62 MG/DL (ref 0.55–1.02)
CRP SERPL-MCNC: 24.4 MG/DL (ref 0–0.6)
DIFFERENTIAL METHOD BLD: ABNORMAL
EOSINOPHIL # BLD: 0 K/UL (ref 0–0.4)
EOSINOPHIL NFR BLD: 0 % (ref 0–7)
EPITH CASTS URNS QL MICRO: ABNORMAL /LPF
ERYTHROCYTE [DISTWIDTH] IN BLOOD BY AUTOMATED COUNT: 13.1 % (ref 11.5–14.5)
EST. AVERAGE GLUCOSE BLD GHB EST-MCNC: 214 MG/DL
FERRITIN SERPL-MCNC: 1107 NG/ML (ref 26–388)
FIO2 ON VENT: 50 %
GLOBULIN SER CALC-MCNC: 5 G/DL (ref 2–4)
GLUCOSE BLD STRIP.AUTO-MCNC: 306 MG/DL (ref 65–100)
GLUCOSE BLD STRIP.AUTO-MCNC: 318 MG/DL (ref 65–100)
GLUCOSE BLD STRIP.AUTO-MCNC: 327 MG/DL (ref 65–100)
GLUCOSE BLD STRIP.AUTO-MCNC: 403 MG/DL (ref 65–100)
GLUCOSE BLD STRIP.AUTO-MCNC: 433 MG/DL (ref 65–100)
GLUCOSE BLD STRIP.AUTO-MCNC: 98 MG/DL (ref 65–100)
GLUCOSE SERPL-MCNC: 430 MG/DL (ref 65–100)
GLUCOSE UR STRIP.AUTO-MCNC: 50 MG/DL
HBA1C MFR BLD: 9.1 % (ref 4–5.6)
HBV SURFACE AG SER QL: <0.1 INDEX
HBV SURFACE AG SER QL: NEGATIVE
HCO3 BLDA-SCNC: 26 MMOL/L (ref 22–26)
HCT VFR BLD AUTO: 29.1 % (ref 35–47)
HGB BLD-MCNC: 9.4 G/DL (ref 11.5–16)
HGB UR QL STRIP: ABNORMAL
IMM GRANULOCYTES # BLD AUTO: 0.1 K/UL (ref 0–0.04)
IMM GRANULOCYTES NFR BLD AUTO: 1 % (ref 0–0.5)
KETONES UR QL STRIP.AUTO: NEGATIVE MG/DL
LDH SERPL L TO P-CCNC: 183 U/L (ref 81–246)
LEUKOCYTE ESTERASE UR QL STRIP.AUTO: ABNORMAL
LYMPHOCYTES # BLD: 0.8 K/UL (ref 0.8–3.5)
LYMPHOCYTES NFR BLD: 6 % (ref 12–49)
MCH RBC QN AUTO: 29.7 PG (ref 26–34)
MCHC RBC AUTO-ENTMCNC: 32.3 G/DL (ref 30–36.5)
MCV RBC AUTO: 92.1 FL (ref 80–99)
METHGB MFR BLD: 0.3 % (ref 0–1.4)
MONOCYTES # BLD: 0.5 K/UL (ref 0–1)
MONOCYTES NFR BLD: 4 % (ref 5–13)
MUCOUS THREADS URNS QL MICRO: ABNORMAL /LPF
MUCOUS THREADS URNS QL MICRO: ABNORMAL /LPF
NEUTS SEG # BLD: 10.6 K/UL (ref 1.8–8)
NEUTS SEG NFR BLD: 89 % (ref 32–75)
NITRITE UR QL STRIP.AUTO: NEGATIVE
NRBC # BLD: 0 K/UL (ref 0–0.01)
NRBC BLD-RTO: 0 PER 100 WBC
OXYHGB MFR BLD: 94.8 % (ref 95–99)
PCO2 BLDA: 52 MMHG (ref 35–45)
PERFORMED BY, TECHID: ABNORMAL
PERFORMED BY, TECHID: NORMAL
PH BLDA: 7.32 (ref 7.35–7.45)
PH UR STRIP: 5 (ref 5–8)
PHOSPHATE SERPL-MCNC: 5.3 MG/DL (ref 2.6–4.7)
PLATELET # BLD AUTO: 304 K/UL (ref 150–400)
PMV BLD AUTO: 11 FL (ref 8.9–12.9)
PO2 BLDA: 87 MMHG (ref 80–100)
POTASSIUM SERPL-SCNC: 5.1 MMOL/L (ref 3.5–5.1)
PROCALCITONIN SERPL-MCNC: 4.07 NG/ML
PROT SERPL-MCNC: 7.5 G/DL (ref 6.4–8.2)
PROT UR STRIP-MCNC: NEGATIVE MG/DL
RBC # BLD AUTO: 3.16 M/UL (ref 3.8–5.2)
RBC #/AREA URNS HPF: ABNORMAL /HPF (ref 0–5)
RBC #/AREA URNS HPF: ABNORMAL /HPF (ref 0–5)
SAO2 % BLD: 96 % (ref 95–99)
SAO2% DEVICE SAO2% SENSOR NAME: ABNORMAL
SODIUM SERPL-SCNC: 127 MMOL/L (ref 136–145)
SP GR UR REFRACTOMETRY: 1.02 (ref 1–1.03)
SPECIMEN SITE: ABNORMAL
UROBILINOGEN UR QL STRIP.AUTO: 0.1 EU/DL (ref 0.1–1)
WBC # BLD AUTO: 11.9 K/UL (ref 3.6–11)
WBC URNS QL MICRO: ABNORMAL /HPF (ref 0–4)
WBC URNS QL MICRO: ABNORMAL /HPF (ref 0–4)

## 2023-01-13 PROCEDURE — 71045 X-RAY EXAM CHEST 1 VIEW: CPT

## 2023-01-13 PROCEDURE — 86140 C-REACTIVE PROTEIN: CPT

## 2023-01-13 PROCEDURE — 36415 COLL VENOUS BLD VENIPUNCTURE: CPT

## 2023-01-13 PROCEDURE — 80053 COMPREHEN METABOLIC PANEL: CPT

## 2023-01-13 PROCEDURE — 74011250637 HC RX REV CODE- 250/637: Performed by: FAMILY MEDICINE

## 2023-01-13 PROCEDURE — 83615 LACTATE (LD) (LDH) ENZYME: CPT

## 2023-01-13 PROCEDURE — 74011250636 HC RX REV CODE- 250/636: Performed by: FAMILY MEDICINE

## 2023-01-13 PROCEDURE — 36600 WITHDRAWAL OF ARTERIAL BLOOD: CPT

## 2023-01-13 PROCEDURE — 85025 COMPLETE CBC W/AUTO DIFF WBC: CPT

## 2023-01-13 PROCEDURE — 74011250637 HC RX REV CODE- 250/637: Performed by: INTERNAL MEDICINE

## 2023-01-13 PROCEDURE — 84145 PROCALCITONIN (PCT): CPT

## 2023-01-13 PROCEDURE — 74011000258 HC RX REV CODE- 258: Performed by: INTERNAL MEDICINE

## 2023-01-13 PROCEDURE — 99232 SBSQ HOSP IP/OBS MODERATE 35: CPT | Performed by: INTERNAL MEDICINE

## 2023-01-13 PROCEDURE — 82728 ASSAY OF FERRITIN: CPT

## 2023-01-13 PROCEDURE — 82962 GLUCOSE BLOOD TEST: CPT

## 2023-01-13 PROCEDURE — 74011000258 HC RX REV CODE- 258: Performed by: FAMILY MEDICINE

## 2023-01-13 PROCEDURE — 74011636637 HC RX REV CODE- 636/637: Performed by: INTERNAL MEDICINE

## 2023-01-13 PROCEDURE — 81001 URINALYSIS AUTO W/SCOPE: CPT

## 2023-01-13 PROCEDURE — 65610000006 HC RM INTENSIVE CARE

## 2023-01-13 PROCEDURE — 84100 ASSAY OF PHOSPHORUS: CPT

## 2023-01-13 PROCEDURE — 74011636637 HC RX REV CODE- 636/637: Performed by: FAMILY MEDICINE

## 2023-01-13 PROCEDURE — 82803 BLOOD GASES ANY COMBINATION: CPT

## 2023-01-13 PROCEDURE — 77010033711 HC HIGH FLOW OXYGEN

## 2023-01-13 RX ORDER — INSULIN LISPRO 100 [IU]/ML
INJECTION, SOLUTION INTRAVENOUS; SUBCUTANEOUS EVERY 4 HOURS
Status: DISCONTINUED | OUTPATIENT
Start: 2023-01-13 | End: 2023-01-18 | Stop reason: HOSPADM

## 2023-01-13 RX ORDER — INSULIN LISPRO 100 [IU]/ML
10 INJECTION, SOLUTION INTRAVENOUS; SUBCUTANEOUS
Status: DISCONTINUED | OUTPATIENT
Start: 2023-01-13 | End: 2023-01-18 | Stop reason: HOSPADM

## 2023-01-13 RX ORDER — INSULIN LISPRO 100 [IU]/ML
15 INJECTION, SOLUTION INTRAVENOUS; SUBCUTANEOUS
Status: COMPLETED | OUTPATIENT
Start: 2023-01-13 | End: 2023-01-13

## 2023-01-13 RX ORDER — SORBITOL SOLUTION 70 %
60 SOLUTION, ORAL MISCELLANEOUS DAILY PRN
Status: DISCONTINUED | OUTPATIENT
Start: 2023-01-13 | End: 2023-01-18 | Stop reason: HOSPADM

## 2023-01-13 RX ADMIN — DEXAMETHASONE SODIUM PHOSPHATE 6 MG: 4 INJECTION, SOLUTION INTRA-ARTICULAR; INTRALESIONAL; INTRAMUSCULAR; INTRAVENOUS; SOFT TISSUE at 09:39

## 2023-01-13 RX ADMIN — ATORVASTATIN CALCIUM 20 MG: 20 TABLET, FILM COATED ORAL at 09:39

## 2023-01-13 RX ADMIN — FUROSEMIDE 80 MG: 40 TABLET ORAL at 09:39

## 2023-01-13 RX ADMIN — Medication 30 MG: at 21:54

## 2023-01-13 RX ADMIN — INSULIN LISPRO 10 UNITS: 100 INJECTION, SOLUTION INTRAVENOUS; SUBCUTANEOUS at 15:46

## 2023-01-13 RX ADMIN — SEVELAMER CARBONATE 800 MG: 800 TABLET, FILM COATED ORAL at 09:42

## 2023-01-13 RX ADMIN — SORBITOL SOLUTION (BULK) 60 ML: 70 SOLUTION at 15:35

## 2023-01-13 RX ADMIN — GABAPENTIN 300 MG: 300 CAPSULE ORAL at 21:54

## 2023-01-13 RX ADMIN — TOCILIZUMAB 810 MG: 180 INJECTION, SOLUTION SUBCUTANEOUS at 10:58

## 2023-01-13 RX ADMIN — INSULIN LISPRO 5 UNITS: 100 INJECTION, SOLUTION INTRAVENOUS; SUBCUTANEOUS at 19:45

## 2023-01-13 RX ADMIN — ACETAMINOPHEN 650 MG: 325 TABLET ORAL at 04:41

## 2023-01-13 RX ADMIN — INSULIN LISPRO 5 UNITS: 100 INJECTION, SOLUTION INTRAVENOUS; SUBCUTANEOUS at 23:23

## 2023-01-13 RX ADMIN — SEVELAMER CARBONATE 800 MG: 800 TABLET, FILM COATED ORAL at 12:07

## 2023-01-13 RX ADMIN — INSULIN LISPRO 15 UNITS: 100 INJECTION, SOLUTION INTRAVENOUS; SUBCUTANEOUS at 12:49

## 2023-01-13 RX ADMIN — PIPERACILLIN AND TAZOBACTAM 3.38 G: 3; .375 INJECTION, POWDER, LYOPHILIZED, FOR SOLUTION INTRAVENOUS at 10:56

## 2023-01-13 RX ADMIN — TRAMADOL HYDROCHLORIDE 50 MG: 50 TABLET, COATED ORAL at 00:26

## 2023-01-13 RX ADMIN — PANTOPRAZOLE SODIUM 20 MG: 20 TABLET, DELAYED RELEASE ORAL at 09:39

## 2023-01-13 RX ADMIN — Medication 30 MG: at 09:39

## 2023-01-13 RX ADMIN — SEVELAMER CARBONATE 800 MG: 800 TABLET, FILM COATED ORAL at 15:44

## 2023-01-13 RX ADMIN — PIPERACILLIN AND TAZOBACTAM 3.38 G: 3; .375 INJECTION, POWDER, LYOPHILIZED, FOR SOLUTION INTRAVENOUS at 23:16

## 2023-01-13 RX ADMIN — INSULIN LISPRO 15 UNITS: 100 INJECTION, SOLUTION INTRAVENOUS; SUBCUTANEOUS at 09:00

## 2023-01-13 RX ADMIN — INSULIN GLARGINE 40 UNITS: 100 INJECTION, SOLUTION SUBCUTANEOUS at 09:00

## 2023-01-13 NOTE — ADT AUTH CERT NOTES
[facility]     FACILITY NPI : [id number]  FACILITY TAX ID : [id number]     [place] [address] [address]  [address] [address]  [address] [place] [address]: [phone]  FX: [phone]       Patient Name :Hellen Singh   : 1975 (47 yrs)  MRN : [id number]     Patient Mailing Address [address] [47] , [address]                                                               . Insurance Plan Payor: BLUE CROSS MEDICARE / [facility] [id number] / Product Type: Managed Care Medicare /      Primary Coverage Subscriber ID : [id number]     Secondary Coverage:  BLUE CROSS MEDICAID     Secondary Subscriber ID :  [id number]      Current Patient Class : INPATIENT  Admit Date : 2023     REQUESTED LEVEL OF CARE: INPATIENT [101]                                                           Diagnosis : Acute hypoxemic respiratory failure (HCC);COVID-19                          ICD10 Code : Fluid overload [E87.70]  Hypoxia [R09.02]  COVID-19 [U07.1]  Acute hypoxemic respiratory failure ([facility] [address] [J96.01]     Current Room and Bed 276/01     Admitting and Attending Info:  Admitting Provider : Belia Julio MD   NPI: [id number]  Admitting Provider Phone.  [phone]  Admitting Provider Address: [facility] access [facility] [address]     Attending Provider Servando Soriano, [place]   [id number]  Attending Provider Address:  [address] [address]     Attending Provider Phone: Tahmina shea phone: [phone]      Utilization Reviews       Viral Illness, Acute - Care Day 2 (2023) by Lizz Nguyen RN       Review Entered Review Status   2023 1302 Completed Chronic illness Criteria Review      Care Day: 2 Care Date: 1/13/2023 Level of Care:    Guideline Day 2    Level Of Care    ( ) Floor    1/13/2023 13:02:05 EST by Saroj Guidry      ICU BED 97.7, 64, RR  14, 131/65, 100% ON  50 % FI02 HEATED HIGH FLOW NC    Clinical Status    (X) * Hypotension absent    1/13/2023 13:02:05 EST by Saroj Guidry      131/65 - 144/78    (X) * No requirement for mechanical ventilation    ( ) * Oxygenation at baseline or improved    1/13/2023 13:02:06 EST by Saroj Guidry      REMAINS % ON  50 % FI02 HEATED HIGH FLOW NC    (X) * Mental status at baseline    Activity    (X) Advance activity as tolerated    1/13/2023 13:02:06 EST by Saroj Guidry      ACTIVITY AS TOLERATED WITH ASSIST, OOB WITH ASSIST    Routes    (X) * Oral hydration    (X) Oral or IV medications    1/13/2023 13:02:06 EST by Saroj Guidry      ACTEMRA  810 MG IV ONCE, ULTRAM 50 MG PO Q6H PRN - X1, HUMALOG  15U SQ NOW X2    (X) Usual diet    1/13/2023 13:02:06 EST by Saroj Guidry      DIABETIC CARDIAC DIET    Interventions    (X) Pulse oximetry    1/13/2023 13:02:06 EST by Saroj Guidry      OXIMETRY CHECK PRN    (X) Possible oxygen    Medications    (X) Possible antibiotic (eg, for bacterial coinfection or superinfection)    1/13/2023 13:02:06 EST by Manuel Ramos  3.375G IV Q12H    (X) Possible corticosteroid    1/13/2023 13:02:06 EST by Saroj Guidry      DECADRON  6 MG IV QD    1/13/2023 13:02:05 EST by Saroj Guidry    Subject: Additional Clinical Information    NEPHROLOGY CONSULT    NEUROVASCULAR CHECKS Q4H       * Milestone   Additional Notes   1/13      IM NOTE - 52y.o. year old female with past medical history of CVA, diabetes, CKD stage 5,on HD  hypertension, and cardiomegaly who presented to the ED today with shortness of breath.  Patient states that over the last few days she has been feeling very sick and because of that she could only get 2 hours of dialysis done on Monday and 30 minutes yesterday. And then today she started feeling very short of breath and called EMS. On arrival to the ED patient was noted to be hypoxic in the 60s. She was placed on non-breather with improvement of oxygenation. Pt was tested positive for COVID test done in the ER. This is her first time getting diagnosed with COVID. She also has chills, cough and chest pain associated with it, nausea, and vomiting. She is currently on high flow oxygen. Assessment:       Acute hypoxic respiratory failure on high flow   COVID-19 pneumonitis   Leukocytosis   Acute congestive heart failure from fluid overload   Hyperglycemia secondary to steroids   History of type 2 diabetes   End-stage renal disease on hemodialysis   Hypertension   History of CVA   Morbid obesity   Hyponatremia           Plan:       Lipitor 20 mg daily   Decadron 6 mg daily Lasix 80 mg daily   Lasix 80 mg daily   Gabapentin 300 mg at bedtime   Lantus 40 units subcu every 12 hours   Sliding scale insulin   Xarelto 2.5 mg daily           Repeat the labs continue dialysis 3 times a week         ID NOTE - Subjective:   Pt seen and examined at bedside. Stable, remains on HFNC, reports sore throat, headache and cough are better. Afebrile, leukocytosis trending down on todays labs    Assessment/Plan           1. Acute hypoxic respiratory failure due to COVID pneumonitis w superimposed CHF          Afebrile, remains on HFNC, Cardiomegaly and diffuse airspace disease on CXR          Denies productive cough, afebrile, leukocytosis trending down on todays labs          CRP 24.40, Procal 4.07, , ferritin is pending          Received a dose of Actemra today, remains on decadron and Zosyn          Continue on Zosyn. Routine labs including DD and CXR in AM        2. Acute CHF exacerbation, Pulmonary edema and cardiomegaly on CXR        3. Sore throat, No oral thrush or pharyngeal exudates on exam        Chloraseptic spay ordered        4.  ESRD on HD, + left arm AVF missed HD prior to presentation due to acute illness        5. H/o uncontrolled DM A1c of 10.1 in 06/2022       6. Headache frontal, continue symptomatic mgt            Viral Illness, Acute - Care Day 1 (1/12/2023) by Francesca Antunez RN       Review Entered Review Status   1/13/2023 0856 Completed      Criteria Review      Care Day: 1 Care Date: 1/12/2023 Level of Care:    Guideline Day 1    Level Of Care    (X) ICU or floor    1/13/2023 08:56:29 EST by Braxton Ewing      ICU BED 99.4, HR  72, RR  18-23, 127/68, 86% ON 10L NRB MASK , PLACED ON  50%FI02 HEATED HIGH FLOW NC 96%    Clinical Status    (X) * Clinical Indications met    ( ) Possible Fever    1/13/2023 08:56:29 EST by Braxton Ewing      99.4    (X) Possible Tachypnea    1/13/2023 08:56:29 EST by Braxton Ewing      RR 18-23    (X) Possible Hypoxemia    1/13/2023 08:56:29 EST by Braxton Ewing      hypoxic in the 62s.  PER EMS,  86% ON 10L NRB MASK , PLACED ON  50%FI02 HEATED HIGH FLOW NC 96%    Activity    (X) Activity as tolerated    Routes    (X) Oral or IV hydration    (X) Parenteral or oral medications    (X) Liquid or usual diet    1/13/2023 08:56:29 EST by Braxton Ewing      DIABETIC CARDIAC DIET    Interventions    (X) Possible Isolation    1/13/2023 08:56:29 EST by Braxton Ewing      DROPLET PLUS ISOLATION    (X) CBC with differential, chemistries, renal and hepatic function testing, C-reactive protein, coagulation panel    1/13/2023 08:56:29 EST by Braxton Ewing      BLOOD CULT PENDING  WBC  14.4, RBC  3.16, HGB  9.3 HCT  29.7, NEUT 82, , CL 94, GLUC 372, BUN 48 CRT  5.02, BUN/CRT RATIO 10, EGFR  10, ALB  2.8, A-G RATI  0.7, ALT 8, LACTIC ACID  2.4, HIGH SENS TROP  55, PRO BNP  27,904    (X) PCR for viral pathogens    1/13/2023 08:56:29 EST by Braxton Ewing      COVID 19 RAPID DETECTED    (X) Possible oxygen    (X) Possible chest x-ray    1/13/2023 08:56:29 EST by Braxton Ewing      Slightly asymmetric CHF pattern is suspected. Alternatively, correlate  clinically for bilateral infection, right greater than left. .  .  2. Stable cardiomegaly. 3. Technically compromised by patient size and position. A standard 2 view examination wou    Medications    (X) Possible antibiotic (eg, for bacterial coinfection or superinfection)    1/13/2023 08:56:29 EST by Gunner Ramos 4.5G IV ONCE THEN  3.375G IV Q12H    (X) Possible corticosteroid    1/13/2023 08:56:29 EST by Everet Lunch      DECADRON 6 MG IV QD    1/13/2023 08:56:29 EST by Everet Lunch    Subject: Additional Clinical Information    NEPHROLOGY CONSULT    NEUROVASCULAR CHECKS Q4H       * Milestone   Additional Notes   EKG- Normal sinus rhythm    Anterior infarct , age undetermined   CXR-Slightly asymmetric CHF pattern is suspected. Alternatively, correlate   clinically for bilateral infection, right greater than left. .  .   2. Stable cardiomegaly. 3. Technically compromised by patient size and position. A standard 2 view   examination would be more useful when clinically possible. ED NOTE - 52 y.o. female with a past medical history significant  CVA, diabetes, end-stage renal disease  presents to the ED with cc of shortness of breath. Patient states that over the last several days she has felt worsening shortness of breath, states that she has had to cut short or miss dialysis entirely over the last 2 sessions due to generalized weakness. Last dialyzed 2 days ago, states she could only tolerate 20 minutes. Today complaining of worsening shortness of breath called EMS, on ER service arrival patient noted to be hypoxic in the 60s. Placed on nonrebreather with improvement of oxygenation. Denies any chest pain denies any fevers chills, nonproductive cough. Physical Exam   Vitals and nursing note reviewed. Constitutional:        General: She is in acute distress. Appearance: Normal appearance. She is normal weight. She is ill-appearing. HENT:       Head: Normocephalic and atraumatic. Nose: Nose normal.       Mouth/Throat:       Mouth: Mucous membranes are moist.    Eyes:       Extraocular Movements: Extraocular movements intact. Pupils: Pupils are equal, round, and reactive to light. Cardiovascular:       Rate and Rhythm: Normal rate and regular rhythm. Pulses: Normal pulses. Pulmonary:       Effort: Pulmonary effort is normal. Tachypnea present. Breath sounds: Decreased breath sounds present. Abdominal:       General: Abdomen is flat. Bowel sounds are normal.       Palpations: Abdomen is soft. Tenderness: There is no abdominal tenderness. There is no guarding. Musculoskeletal:          General: No tenderness. Normal range of motion. Cervical back: Normal range of motion and neck supple. No muscular tenderness. Right lower leg: No edema. Left lower leg: No edema. Skin:      General: Skin is warm and dry. Neurological:       General: No focal deficit present. Mental Status: She is alert and oriented to person, place, and time. Sensory: No sensory deficit. Motor: No weakness. H&P- ASSESSMENT & PLAN:       1. COVID-19   - CXR shows bilateral infection, right greater than left   2. Acute hypoxic respiratory failure   - secondary to Covid infection   - currently on high flow oxygen   - SpO2 97%    3. Leukocytosis   - due to Covid infection   4. Lactic acidosis   - likely due to respiratory failure and renal failure   5. Acute congestive heart failure/from fluid overload   - BNP 27,904   6. Type 2 diabetes   - pt on Lantus and Humalog outpatient    7. CKD Stage 5   - dialysis on M/W/F   - follows Dr. Bishnu Gayle outpatient   8. Hypertension       9. Hx of cardiomegaly   - CXR shows stable cardiomegaly   10.  Hx of CVA       Admit to ICU        Lasix 80 mg   Repeat BNP   Repeat labs   Sliding scale insulin   Start on Decadron    Start on heparin for DVT prophylaxis Nephrology consult   Pulmonology consult   infectious disease      RENAL CONSULT - Assessment:                   Active Problems:     Hypoxia (1/12/2023)         Fluid overload (1/12/2023)         COVID-19 (1/12/2023)         Acute hypoxemic respiratory failure ([address] (1/12/2023)       ESRD hemodialysis dependent on a Tuesday Thursday Saturday schedule       COVID-19 infection       Anemia of chronic disease       Hypertension       Diabetes mellitus       Secondary hyperparathyroidism. Plan:       Hemodialysis today with 3 kg fluid removal.   Will follow electrolytes and renal function. Check a phosphorus level   Retacrit on dialysis         ID CONSULT - Assessment / Plan:        1. Acute hypoxic respiratory failure due to COVID pneumonitis w superimposed CHF          Worsening dyspnea prior to presentation, found to be COVID + in the ED          Afebrile, moderate leukocytosis, maintaining O2 sats on HFNC, + non-productive cough          CHF and cardiomegaly on CXR, no focal consolidation          Agree w Decadron, Baricitinib CI w renal failure, does not meet [facility] criteria for antiviral therapy          Will leave on Zosyn for now, but too early for bacteria superinfection          Routine labs and CXR in AM, delsym ordered for cough        2. Acute CHF exacerbation, Pulmonary edema and cardiomegaly on CXR        Consider 2 D echo per clinical progression        3. Generalized weakness, likely due to # 1       4. ESRD on HD, + left arm AVF, [place] [facility] [place]        5. H/o uncontrolled DM A1c of 6.9 in 06/2022       6. Headache frontal, ? Sinus related, requesting some stronger than tylenol. Will defer mgt to attending       ID CONSULT - 52 y.o WF who presented w dyspnea found to be positive for COVID 19 for which ID has been consulted. Her medical history significant for morbid obesity, ESRD on HD, DM and CVA.   She did not receive COVID vaccination due to concerns of side effect, but wears a mask in public. She reports progressive dyspnea and generalized weakness since 01/10/23 whichu precluded completion of her last 2 HD treatments. She mentions not being able to breathe this morning which prompted her ED visit. She does not recall any sick contact, although her  also tested positive for COVID 23 and at home recuperating. Her O2Sats were in the 60s per EMS, improved with nonrebreather O2 mask. She was afebrile on assessment in the ED,  normothermic, O2 sats dropped on non-rebreather mask, currently on HFNC at 50% FIO2. Today's labs show WBC of 14.4, and acid 2.4. CXR findings are concerning for CHF and cardiomegaly, no focal infilrates reported. She is on Zosyn and decadron. . Pt was undergoing HD during my assessment, reported feeling much better since presentation. Assessment / Plan:        1. Acute hypoxic respiratory failure due to COVID pneumonitis w superimposed CHF          Worsening dyspnea prior to presentation, found to be COVID + in the ED          Afebrile, moderate leukocytosis, maintaining O2 sats on HFNC, + non-productive cough          CHF and cardiomegaly on CXR, no focal consolidation          Agree w Decadron, Baricitinib CI w renal failure, does not meet [facility] criteria for antiviral therapy          Will leave on Zosyn for now, but too early for bacteria superinfection          Routine labs and CXR in AM, delsym ordered for cough        2. Acute CHF exacerbation, Pulmonary edema and cardiomegaly on CXR        Consider 2 D echo per clinical progression        3. Generalized weakness, likely due to # 1       4. ESRD on HD, + left arm AVF, being evaluated by [facility] for transplant        5. H/o uncontrolled DM A1c of 10.1 in 06/2022       6. Headache frontal, ? Sinus related, requesting some stronger than tylenol.  Will defer mgt to attending         Viral Illness, Acute - Clinical Indications for Admission to Inpatient Care by Cecil Ramos RN Review Entered Review Status   2023 0849 Completed      Criteria Review      Clinical Indications for Admission to Inpatient Care    Most Recent : Dennise Tomlinson Most Recent Date: 2023 08:49:12 EST    (X) Admission is indicated for  1 or more  of the following  [A] [B] (1) (2) (3) (4) (5) (6) (7)    (8):       (X) Pulmonary manifestation, as indicated by  1 or more  of the following :          (X) Hypoxemia          2023 08:49:12 EST by Dennise Tomlinson            hypoxic in the 62s. 86% ON 10L NRB MASK , PLACED ON  50%FI02 HEATED HIGH FLOW NC 96%          (X) Tachypnea that persists despite observation care          2023 08:49:12 EST by Lizzie Solis      H&P Notes     H&P by Jaylene Bland MD at 23 documented on ED to Hosp-Admission (Current) from 2023 in [facility] 2 CCU    Author: Jaylene Bland MD Author Type: Physician Filed: 23 [id number]   Note Status: Signed Cosign: Cosign Not Required Date of Service: 23   : Jaylene Bland MD (Physician)               Expand All Collapse All  History and Physical     NAME:            Ximena Redd   :               1975   MRN:               [id number]      Date/Time:      2023 12:56 PM     Patient PCP: Elvia Johnston NP  ______________________________________________________________________                            Subjective:      CHIEF COMPLAINT:      Shortness of breath     HISTORY OF PRESENT ILLNESS:        Patient is a 52y.o. year old female with past medical history of CVA, diabetes, CKD stage 5,on HD  hypertension, and cardiomegaly who presented to the ED today with shortness of breath. Patient states that over the last few days she has been feeling very sick and because of that she could only get 2 hours of dialysis done on Monday and 30 minutes yesterday. And then today she started feeling very short of breath and called EMS.  On arrival to the ED patient was noted to be hypoxic in the 60s. She was placed on non-breather with improvement of oxygenation. Pt was tested positive for COVID test done in the ER. This is her first time getting diagnosed with COVID. She also has chills, cough and chest pain associated with it, nausea, and vomiting. She is currently on high flow oxygen. CBC shows leukocytosis 14,400, BNP 27,904, lactic acid 2.4, sodium 130, chloride 94, troponin 55. Glucose 372. Chest x ray done in the ED shows:  1. Slightly asymmetric CHF pattern is suspected. Alternatively, correlate  clinically for bilateral infection, right greater than left. .  .  2. Stable cardiomegaly. 3. Technically compromised by patient size and position. A standard 2 view  examination would be more useful when clinically possible. Past Medical History:   Diagnosis Date    Allergic rhinitis      Blood clot in vein      Chronic kidney disease      CVA (cerebral vascular accident) ([facility] [address] 2014    Diabetes ([facility] [address]      Dyslipidemia      Hypertension      IBS (irritable bowel syndrome)      Ill-defined condition      Renal failure                 Past Surgical History:   Procedure Laterality Date    HX  SECTION       HX CHOLECYSTECTOMY       HX LAP CHOLECYSTECTOMY        HX TONSILLECTOMY       FL UNLISTED PROCEDURE VASCULAR SURGERY             Social History           Tobacco Use    Smoking status: Never    Smokeless tobacco: Never   Substance Use Topics    Alcohol use: Never               Family History   Problem Relation Age of Onset    Hypertension Mother      Diabetes Mother      Heart Disease Mother      Hypertension Father      Diabetes Father      Heart Disease Father                Allergies   Allergen Reactions    Fentanyl Anxiety    Sulfa (Sulfonamide Antibiotics) Nausea and Vomiting    Zithromax [Azithromycin] Unknown (comments)    Morphine Itching                 Prior to Admission medications    Medication Sig Start Date End Date Taking? Authorizing Provider   rivaroxaban (XARELTO) 2.5 mg tablet Take 2.5 mg by mouth two (2) times a day. Yes Provider, Historical   NIFEdipine ER (PROCARDIA XL) 60 mg ER tablet Take 60 mg by mouth daily. Yes Provider, Historical   carvediloL (COREG) 12.5 mg tablet Take 0.5 Tablets by mouth two (2) times a day for 30 days. 7/1/22 1/12/23 Yes Karan Gilmore NP   Levemir FlexTouch U-100 Insuln 100 unit/mL (3 mL) inpn 62 Units by SubCUTAneous route nightly. 5/14/22   Yes Provider, Historical   ondansetron (ZOFRAN ODT) 4 mg disintegrating tablet Take 1 Tablet by mouth every eight (8) hours as needed for Nausea or Vomiting. 8/9/22     Roney Rea NP   ondansetron (ZOFRAN ODT) 4 mg disintegrating tablet Take 1 Tablet by mouth every eight (8) hours as needed for Nausea or Vomiting. 6/29/22     Eden LARSON PA-C   furosemide (LASIX) 80 mg tablet Take 80 mg by mouth daily. 6/10/22     Provider, Historical   hydrOXYzine HCL (ATARAX) 25 mg tablet Take  by mouth every eight (8) hours as needed. Take 1-2 tablets 6/8/22     Provider, Historical   sevelamer carbonate (RENVELA) 800 mg tab tab Take 800 mg by mouth three (3) times daily (with meals). 5/14/22     Provider, Historical   spironolactone (ALDACTONE) 50 mg tablet Take 50 mg by mouth daily. 6/10/22     Provider, Historical   dextroamphetamine-amphetamine (ADDERALL) 20 mg tablet Take 20 mg by mouth three (3) times daily. Provider, Historical   traMADoL (ULTRAM) 50 mg tablet Take 50 mg by mouth two (2) times daily as needed for Pain. Provider, Historical   atorvastatin (LIPITOR) 20 mg tablet Take 20 mg by mouth daily. Provider, Historical   gabapentin (NEURONTIN) 300 mg capsule Take 300 mg by mouth nightly. Provider, Historical   omeprazole (PRILOSEC) 20 mg capsule Take 20 mg by mouth daily.        Provider, Historical            Current Facility-Administered Medications:     insulin lispro (HUMALOG) injection 15 Units, 15 Units, SubCUTAneous, NOW, Angela Brito MD    epoetin jodie-epbx (RETACRIT) injection 8,000 Units, 8,000 Units, IntraVENous, DIALYSIS TUE, THU & SAT, Justin Brito MD, 8,000 Units at 01/12/23 2242    acetaminophen (TYLENOL) tablet 650 mg, 650 mg, Oral, Q6H PRN, Justin Brito MD, 650 mg at 01/13/23 0441    atorvastatin (LIPITOR) tablet 20 mg, 20 mg, Oral, DAILY, Angela Brito MD    furosemide (LASIX) tablet 80 mg, 80 mg, Oral, DAILY, Angela Brito MD    gabapentin (NEURONTIN) capsule 300 mg, 300 mg, Oral, QHS, Justin Brito MD, 300 mg at 01/12/23 2104    sevelamer carbonate (RENVELA) tab 800 mg, 800 mg, Oral, TID WITH MEALS, Angela Brito MD, 800 mg at 01/12/23 1725    insulin lispro (HUMALOG) injection, , SubCUTAneous, AC&HS, Justin Brito MD, 5 Units at 01/12/23 2241    glucose chewable tablet 16 g, 4 Tablet, Oral, PRN, Angela Brito MD    glucagon (GLUCAGEN) injection 1 mg, 1 mg, IntraMUSCular, PRN, Angela Brito MD    acetaminophen (TYLENOL) tablet 650 mg, 650 mg, Oral, Q6H PRN **OR** acetaminophen (TYLENOL) suppository 650 mg, 650 mg, Rectal, Q6H PRN, Justin Brito MD    polyethylene glycol (MIRALAX) packet 17 g, 17 g, Oral, DAILY PRN, Justin Brito MD    ondansetron (ZOFRAN ODT) tablet 4 mg, 4 mg, Oral, Q8H PRN **OR** ondansetron (ZOFRAN) injection 4 mg, 4 mg, IntraVENous, Q6H PRN, Justin Brito MD    pantoprazole (PROTONIX) tablet 20 mg, 20 mg, Oral, DAILY, Justin Brito MD    [COMPLETED] piperacillin-tazobactam (ZOSYN) 4.5 g in 0.9% sodium chloride (MBP/ADV) 100 mL MBP, 4.5 g, IntraVENous, ONCE, Last Rate: 200 mL/hr at 01/12/23 1725, 4.5 g at 01/12/23 1725 **FOLLOWED BY** piperacillin-tazobactam (ZOSYN) 3.375 g in 0.9% sodium chloride (MBP/ADV) 100 mL MBP, 3.375 g, IntraVENous, Q12H, Justin Brito MD, Last Rate: 25 mL/hr at 01/12/23 2243, 3.375 g at 01/12/23 2243    insulin glargine (LANTUS) injection 40 Units, 40 Units, SubCUTAneous, Q12H, Daryl, Branda Oyster, MD, 40 Units at 01/12/23 1813    dexamethasone (DECADRON) 4 mg/mL injection 6 mg, 6 mg, IntraVENous, DAILY, Justin Brito MD, 6 mg at 01/12/23 1725    dextromethorphan (DELSYM) 30 mg/5 mL syrup 30 mg, 30 mg, Oral, Q12H, Ansley Nickerson MD, 30 mg at 01/12/23 2103    benzonatate (TESSALON) capsule 100 mg, 100 mg, Oral, TID PRN, Erica Ayala MD    rivaroxaban (XARELTO) tablet 2.5 mg, 2.5 mg, Oral, DAILY, Justin Brito MD, 2.5 mg at 01/12/23 2104    traMADoL (ULTRAM) tablet 50 mg, 50 mg, Oral, Q6H PRN, Justin Brito MD, 50 mg at 01/13/23 0026     LAB DATA REVIEWED:    Recent Results          Recent Results (from the past 24 hour(s))   INFLUENZA A & B AG (RAPID TEST)     Collection Time: 01/12/23 10:10 AM   Result Value Ref Range     Influenza A Antigen Negative Negative       Influenza B Antigen Negative Negative     COVID-19 RAPID TEST     Collection Time: 01/12/23 10:10 AM   Result Value Ref Range     COVID-19 rapid test DETECTED (A) Not Detected     CBC WITH AUTOMATED DIFF     Collection Time: 01/12/23 10:11 AM   Result Value Ref Range     WBC 14.4 (H) 3.6 - 11.0 K/uL     RBC 3.16 (L) 3.80 - 5.20 M/uL     HGB 9.3 (L) 11.5 - 16.0 g/dL     HCT 29.7 (L) 35.0 - 47.0 %     MCV 94.0 80.0 - 99.0 FL     MCH 29.4 26.0 - 34.0 PG     MCHC 31.3 30.0 - 36.5 g/dL     RDW 13.2 11.5 - 14.5 %     PLATELET 607 899 - 856 K/uL     MPV 10.9 8.9 - 12.9 FL     NRBC 0.0 0.0  WBC     ABSOLUTE NRBC 0.00 0.00 - 0.01 K/uL     NEUTROPHILS 82 (H) 32 - 75 %     LYMPHOCYTES 10 (L) 12 - 49 %     MONOCYTES 7 5 - 13 %     EOSINOPHILS 0 0 - 7 %     BASOPHILS 0 0 - 1 %     IMMATURE GRANULOCYTES 1 (H) 0 - 0.5 %     ABS. NEUTROPHILS 11.9 (H) 1.8 - 8.0 K/UL     ABS. LYMPHOCYTES 1.4 0.8 - 3.5 K/UL     ABS. MONOCYTES 1.0 0.0 - 1.0 K/UL     ABS. EOSINOPHILS 0.0 0.0 - 0.4 K/UL     ABS. BASOPHILS 0.0 0.0 - 0.1 K/UL     ABS. IMM.  GRANS. 0.1 (H) 0.00 - 0.04 K/UL     DF AUTOMATED     METABOLIC PANEL, COMPREHENSIVE     Collection Time: 01/12/23 10:11 AM   Result Value Ref Range     Sodium 130 (L) 136 - 145 mmol/L     Potassium Specimen hemolyzed, results may be affected 3.5 - 5.1 mmol/L     Chloride 94 (L) 97 - 108 mmol/L     CO2 27 21 - 32 mmol/L     Anion gap 9 5 - 15 mmol/L     Glucose 372 (H) 65 - 100 mg/dL     BUN 48 (H) 6 - 20 mg/dL     Creatinine 5.02 (H) 0.55 - 1.02 mg/dL     BUN/Creatinine ratio 10 (L) 12 - 20       eGFR 10 (L) >60 ml/min/1.73m2     Calcium 8.5 8.5 - 10.1 mg/dL     Bilirubin, total 0.7 0.2 - 1.0 mg/dL     AST (SGOT) Specimen hemolyzed, results may be affected 15 - 37 U/L     ALT (SGPT) 8 (L) 12 - 78 U/L     Alk.  phosphatase 84 45 - 117 U/L     Protein, total 6.8 6.4 - 8.2 g/dL     Albumin 2.8 (L) 3.5 - 5.0 g/dL     Globulin 4.0 2.0 - 4.0 g/dL     A-G Ratio 0.7 (L) 1.1 - 2.2     LACTIC ACID     Collection Time: 01/12/23 10:11 AM   Result Value Ref Range     Lactic acid 2.4 (HH) 0.4 - 2.0 mmol/L   TROPONIN-HIGH SENSITIVITY     Collection Time: 01/12/23 10:11 AM   Result Value Ref Range     Troponin-High Sensitivity 55 (H) 0 - 51 ng/L   NT-PRO BNP     Collection Time: 01/12/23 10:11 AM   Result Value Ref Range     NT pro-BNP 27,904 (H) <125 pg/mL   EKG, 12 LEAD, INITIAL     Collection Time: 01/12/23 10:14 AM   Result Value Ref Range     Ventricular Rate 70 BPM     Atrial Rate 70 BPM     P-R Interval 162 ms     QRS Duration 74 ms     Q-T Interval 406 ms     QTC Calculation (Bezet) 438 ms     Calculated P Axis 20 degrees     Calculated R Axis -19 degrees     Calculated T Axis 79 degrees     Diagnosis           Normal sinus rhythm  Anterior infarct , age undetermined  Abnormal ECG  When compared with ECG of 09-AUG-2022 08:53,  No significant change was found  Confirmed by Nati Stauffer ([id number]) on 1/12/2023 2:39:51 PM      GLUCOSE, POC     Collection Time: 01/12/23  5:33 PM   Result Value Ref Range     Glucose (POC) 209 (H) 65 - 100 mg/dL     Performed by Haritha Forrest     MRSA SCREEN - PCR (NASAL)     Collection Time: 01/12/23 7:51 PM   Result Value Ref Range     MRSA by PCR, Nasal Not Detected Not Detected     GLUCOSE, POC     Collection Time: 01/12/23 10:03 PM   Result Value Ref Range     Glucose (POC) 321 (H) 65 - 100 mg/dL     Performed by Timothy Balbuena (PCT)     METABOLIC PANEL, COMPREHENSIVE     Collection Time: 01/13/23  4:55 AM   Result Value Ref Range     Sodium 127 (L) 136 - 145 mmol/L     Potassium 5.1 3.5 - 5.1 mmol/L     Chloride 93 (L) 97 - 108 mmol/L     CO2 26 21 - 32 mmol/L     Anion gap 8 5 - 15 mmol/L     Glucose 430 (H) 65 - 100 mg/dL     BUN 41 (H) 6 - 20 mg/dL     Creatinine 4.62 (H) 0.55 - 1.02 mg/dL     BUN/Creatinine ratio 9 (L) 12 - 20       eGFR 11 (L) >60 ml/min/1.73m2     Calcium 8.6 8.5 - 10.1 mg/dL     Bilirubin, total 0.6 0.2 - 1.0 mg/dL     AST (SGOT) 5 (L) 15 - 37 U/L     ALT (SGPT) 7 (L) 12 - 78 U/L     Alk. phosphatase 76 45 - 117 U/L     Protein, total 7.5 6.4 - 8.2 g/dL     Albumin 2.5 (L) 3.5 - 5.0 g/dL     Globulin 5.0 (H) 2.0 - 4.0 g/dL     A-G Ratio 0.5 (L) 1.1 - 2.2     CBC WITH AUTOMATED DIFF     Collection Time: 01/13/23  4:55 AM   Result Value Ref Range     WBC 11.9 (H) 3.6 - 11.0 K/uL     RBC 3.16 (L) 3.80 - 5.20 M/uL     HGB 9.4 (L) 11.5 - 16.0 g/dL     HCT 29.1 (L) 35.0 - 47.0 %     MCV 92.1 80.0 - 99.0 FL     MCH 29.7 26.0 - 34.0 PG     MCHC 32.3 30.0 - 36.5 g/dL     RDW 13.1 11.5 - 14.5 %     PLATELET 118 672 - 390 K/uL     MPV 11.0 8.9 - 12.9 FL     NRBC 0.0 0.0  WBC     ABSOLUTE NRBC 0.00 0.00 - 0.01 K/uL     NEUTROPHILS 89 (H) 32 - 75 %     LYMPHOCYTES 6 (L) 12 - 49 %     MONOCYTES 4 (L) 5 - 13 %     EOSINOPHILS 0 0 - 7 %     BASOPHILS 0 0 - 1 %     IMMATURE GRANULOCYTES 1 (H) 0 - 0.5 %     ABS. NEUTROPHILS 10.6 (H) 1.8 - 8.0 K/UL     ABS. LYMPHOCYTES 0.8 0.8 - 3.5 K/UL     ABS. MONOCYTES 0.5 0.0 - 1.0 K/UL     ABS. EOSINOPHILS 0.0 0.0 - 0.4 K/UL     ABS. BASOPHILS 0.0 0.0 - 0.1 K/UL     ABS. IMM.  GRANS. 0.1 (H) 0.00 - 0.04 K/UL     DF AUTOMATED     LD     Collection Time: 01/13/23  4:55 AM   Result Value Ref Range      81 - 246 U/L   C REACTIVE PROTEIN, QT     Collection Time: 01/13/23  4:55 AM   Result Value Ref Range     C-Reactive protein 24.40 (H) 0.00 - 0.60 mg/dL   PROCALCITONIN     Collection Time: 01/13/23  4:55 AM   Result Value Ref Range     Procalcitonin 4.07 (H) 0 ng/mL   GLUCOSE, POC     Collection Time: 01/13/23  8:07 AM   Result Value Ref Range     Glucose (POC) 433 (H) 65 - 100 mg/dL     Performed by Marques Pendleton              XR Results (most recent):  Results from Hospital Encounter encounter on 01/12/23     XR CHEST PORT     Narrative  INDICATION: COVID PNA, pulmonary edema     EXAMINATION:  AP CHEST, PORTABLE     COMPARISON: 1/12/2023     FINDINGS: Single AP portable view of the chest demonstrates cardiomegaly. Lungs  are adequately expanded with diffuse bilateral airspace disease. No  pneumothorax. Impression  Cardiomegaly and diffuse airspace disease similar to prior study. XR CHEST PORT   Final Result   Cardiomegaly and diffuse airspace disease similar to prior study. XR CHEST SNGL V   Final Result       1. Slightly asymmetric CHF pattern is suspected. Alternatively, correlate   clinically for bilateral infection, right greater than left. .  .   2. Stable cardiomegaly. 3. Technically compromised by patient size and position. A standard 2 view   examination would be more useful when clinically possible. XR CHEST PORT    (Results Pending)         Review of Systems:  Constitutional: Negative for fever. Positive for chills. HENT: Negative. Eyes: Negative. Respiratory: Negative. Positive for cough and shortness of breath  Cardiovascular: Negative. Gastrointestinal: Negative for abdominal pain and nausea. Skin: Negative. Neurological: Negative.        Objective:   VITALS:    Visit Vitals  BP (!) 123/53 (BP 1 Location: Right lower arm, BP Patient Position: At rest)   Pulse 60   Temp (!) 60 °F (15.6 °C)   Resp 18 Ht 5' 8\" (1.727 m)   Wt 123.3 kg (271 lb 13.2 oz)   SpO2 98%   BMI 41.33 kg/m²         Physical Exam:   Constitutional: pt is oriented to person, place, and time. HENT:   Head: Normocephalic and atraumatic. Eyes: Pupils are equal, round, and reactive to light. EOM are normal.   Cardiovascular: Normal rate, regular rhythm and normal heart sounds. Mild leg edema bilaterally. Pulmonary/Chest: Decreased breath sounds. No wheezes. No rales. Exhibits no tenderness. Abdominal: Soft. Bowel sounds are normal. There is no abdominal tenderness. There is no rebound and no guarding. Musculoskeletal: Normal range of motion. Neurological: pt is alert and oriented to person, place, and time. Alert. Normal strength. No cranial nerve deficit or sensory deficit. Displays a negative Romberg sign. ASSESSMENT & PLAN:     1. COVID-19  - CXR shows bilateral infection, right greater than left  2. Acute hypoxic respiratory failure  - secondary to Covid infection  - currently on high flow oxygen  - SpO2 97%   3. Leukocytosis  - due to Covid infection  4. Lactic acidosis  - likely due to respiratory failure and renal failure  5. Acute congestive heart failure/from fluid overload  - BNP 27,904  6. Type 2 diabetes  - pt on Lantus and Humalog outpatient   7. CKD Stage 5  - dialysis on M/W/F  - follows Dr. Keira Lambert outpatient  8. Hypertension     9. Hx of cardiomegaly  - CXR shows stable cardiomegaly  10.  Hx of CVA     Admit to ICU      Lasix 80 mg  Repeat BNP  Repeat labs  Sliding scale insulin  Start on Decadron   Start on heparin for DVT prophylaxis     Nephrology consult  Pulmonology consult  infectious disease  ________________________________________________________________________     Signed: [id number] Teodoro Alejandra MD

## 2023-01-13 NOTE — MED STUDENT NOTES
General Daily Progress Note          Patient Name:   Cortney Gates       YOB: 1975       Age:  52 y.o. Admit Date: 1/12/2023      Subjective:     Patient is a 52y.o. year old female with past medical history of CVA, diabetes, CKD stage 5,on HD  hypertension, and cardiomegaly who presented to the ED today with shortness of breath. Patient states that over the last few days she has been feeling very sick and because of that she could only get 2 hours of dialysis done on Monday and 30 minutes yesterday. And then today she started feeling very short of breath and called EMS. On arrival to the ED patient was noted to be hypoxic in the 60s. She was placed on non-breather with improvement of oxygenation. Pt was tested positive for COVID test done in the ER. This is her first time getting diagnosed with COVID. She also has chills, cough and chest pain associated with it, nausea, and vomiting. She is currently on high flow oxygen. CBC shows leukocytosis 14,400, BNP 27,904, lactic acid 2.4, sodium 130, chloride 94, troponin 55. Glucose 372. Chest x ray done in the ED shows:  1. Slightly asymmetric CHF pattern is suspected. Alternatively, correlate  clinically for bilateral infection, right greater than left. .  .  2. Stable cardiomegaly. 3. Technically compromised by patient size and position. A standard 2 view  examination would be more useful when clinically possible. 1/13  Patient seen in the ICU today. She complains of sore throat, headache, and cough. She received tramadol for headache, tylenol for sore throat, and delsym for cough but according to patient they are not helping much. Patient is currently on 30L high flow oxygen. WBC 14.4 -> 11.9, Hgb, sodium 127, chloride 93, BUN 41, Creatinine 4.62, glucose 430. CXR shows cardiomegaly and diffuse airspace disease similar to prior study.       Objective:     Visit Vitals  /75 (BP 1 Location: Right lower arm, BP Patient Position: At rest)   Pulse 60   Temp 98.5 °F (36.9 °C)   Resp 9   Ht 5' 8\" (1.727 m)   Wt 123.3 kg (271 lb 13.2 oz)   SpO2 95%   BMI 41.33 kg/m²        Recent Results (from the past 24 hour(s))   INFLUENZA A & B AG (RAPID TEST)    Collection Time: 01/12/23 10:10 AM   Result Value Ref Range    Influenza A Antigen Negative Negative      Influenza B Antigen Negative Negative     COVID-19 RAPID TEST    Collection Time: 01/12/23 10:10 AM   Result Value Ref Range    COVID-19 rapid test DETECTED (A) Not Detected     CBC WITH AUTOMATED DIFF    Collection Time: 01/12/23 10:11 AM   Result Value Ref Range    WBC 14.4 (H) 3.6 - 11.0 K/uL    RBC 3.16 (L) 3.80 - 5.20 M/uL    HGB 9.3 (L) 11.5 - 16.0 g/dL    HCT 29.7 (L) 35.0 - 47.0 %    MCV 94.0 80.0 - 99.0 FL    MCH 29.4 26.0 - 34.0 PG    MCHC 31.3 30.0 - 36.5 g/dL    RDW 13.2 11.5 - 14.5 %    PLATELET 441 731 - 685 K/uL    MPV 10.9 8.9 - 12.9 FL    NRBC 0.0 0.0  WBC    ABSOLUTE NRBC 0.00 0.00 - 0.01 K/uL    NEUTROPHILS 82 (H) 32 - 75 %    LYMPHOCYTES 10 (L) 12 - 49 %    MONOCYTES 7 5 - 13 %    EOSINOPHILS 0 0 - 7 %    BASOPHILS 0 0 - 1 %    IMMATURE GRANULOCYTES 1 (H) 0 - 0.5 %    ABS. NEUTROPHILS 11.9 (H) 1.8 - 8.0 K/UL    ABS. LYMPHOCYTES 1.4 0.8 - 3.5 K/UL    ABS. MONOCYTES 1.0 0.0 - 1.0 K/UL    ABS. EOSINOPHILS 0.0 0.0 - 0.4 K/UL    ABS. BASOPHILS 0.0 0.0 - 0.1 K/UL    ABS. IMM.  GRANS. 0.1 (H) 0.00 - 0.04 K/UL    DF AUTOMATED     METABOLIC PANEL, COMPREHENSIVE    Collection Time: 01/12/23 10:11 AM   Result Value Ref Range    Sodium 130 (L) 136 - 145 mmol/L    Potassium Specimen hemolyzed, results may be affected 3.5 - 5.1 mmol/L    Chloride 94 (L) 97 - 108 mmol/L    CO2 27 21 - 32 mmol/L    Anion gap 9 5 - 15 mmol/L    Glucose 372 (H) 65 - 100 mg/dL    BUN 48 (H) 6 - 20 mg/dL    Creatinine 5.02 (H) 0.55 - 1.02 mg/dL    BUN/Creatinine ratio 10 (L) 12 - 20      eGFR 10 (L) >60 ml/min/1.73m2    Calcium 8.5 8.5 - 10.1 mg/dL    Bilirubin, total 0.7 0.2 - 1.0 mg/dL    AST (SGOT) Specimen hemolyzed, results may be affected 15 - 37 U/L    ALT (SGPT) 8 (L) 12 - 78 U/L    Alk.  phosphatase 84 45 - 117 U/L    Protein, total 6.8 6.4 - 8.2 g/dL    Albumin 2.8 (L) 3.5 - 5.0 g/dL    Globulin 4.0 2.0 - 4.0 g/dL    A-G Ratio 0.7 (L) 1.1 - 2.2     LACTIC ACID    Collection Time: 01/12/23 10:11 AM   Result Value Ref Range    Lactic acid 2.4 (HH) 0.4 - 2.0 mmol/L   TROPONIN-HIGH SENSITIVITY    Collection Time: 01/12/23 10:11 AM   Result Value Ref Range    Troponin-High Sensitivity 55 (H) 0 - 51 ng/L   NT-PRO BNP    Collection Time: 01/12/23 10:11 AM   Result Value Ref Range    NT pro-BNP 27,904 (H) <125 pg/mL   EKG, 12 LEAD, INITIAL    Collection Time: 01/12/23 10:14 AM   Result Value Ref Range    Ventricular Rate 70 BPM    Atrial Rate 70 BPM    P-R Interval 162 ms    QRS Duration 74 ms    Q-T Interval 406 ms    QTC Calculation (Bezet) 438 ms    Calculated P Axis 20 degrees    Calculated R Axis -19 degrees    Calculated T Axis 79 degrees    Diagnosis       Normal sinus rhythm  Anterior infarct , age undetermined  Abnormal ECG  When compared with ECG of 09-AUG-2022 08:53,  No significant change was found  Confirmed by Nati Stauffer ([id number]) on 1/12/2023 2:39:51 PM     GLUCOSE, POC    Collection Time: 01/12/23  5:33 PM   Result Value Ref Range    Glucose (POC) 209 (H) 65 - 100 mg/dL    Performed by Marcela Eldridge    MRSA SCREEN - PCR (NASAL)    Collection Time: 01/12/23  7:51 PM   Result Value Ref Range    MRSA by PCR, Nasal Not Detected Not Detected     GLUCOSE, POC    Collection Time: 01/12/23 10:03 PM   Result Value Ref Range    Glucose (POC) 321 (H) 65 - 100 mg/dL    Performed by Zafar Cortez ([facility])    METABOLIC PANEL, COMPREHENSIVE    Collection Time: 01/13/23  4:55 AM   Result Value Ref Range    Sodium 127 (L) 136 - 145 mmol/L    Potassium 5.1 3.5 - 5.1 mmol/L    Chloride 93 (L) 97 - 108 mmol/L    CO2 26 21 - 32 mmol/L    Anion gap 8 5 - 15 mmol/L    Glucose 430 (H) 65 - 100 mg/dL    BUN 41 (H) 6 - 20 mg/dL    Creatinine 4.62 (H) 0.55 - 1.02 mg/dL    BUN/Creatinine ratio 9 (L) 12 - 20      eGFR 11 (L) >60 ml/min/1.73m2    Calcium 8.6 8.5 - 10.1 mg/dL    Bilirubin, total 0.6 0.2 - 1.0 mg/dL    AST (SGOT) 5 (L) 15 - 37 U/L    ALT (SGPT) 7 (L) 12 - 78 U/L    Alk. phosphatase 76 45 - 117 U/L    Protein, total 7.5 6.4 - 8.2 g/dL    Albumin 2.5 (L) 3.5 - 5.0 g/dL    Globulin 5.0 (H) 2.0 - 4.0 g/dL    A-G Ratio 0.5 (L) 1.1 - 2.2     CBC WITH AUTOMATED DIFF    Collection Time: 01/13/23  4:55 AM   Result Value Ref Range    WBC 11.9 (H) 3.6 - 11.0 K/uL    RBC 3.16 (L) 3.80 - 5.20 M/uL    HGB 9.4 (L) 11.5 - 16.0 g/dL    HCT 29.1 (L) 35.0 - 47.0 %    MCV 92.1 80.0 - 99.0 FL    MCH 29.7 26.0 - 34.0 PG    MCHC 32.3 30.0 - 36.5 g/dL    RDW 13.1 11.5 - 14.5 %    PLATELET 666 744 - 608 K/uL    MPV 11.0 8.9 - 12.9 FL    NRBC 0.0 0.0  WBC    ABSOLUTE NRBC 0.00 0.00 - 0.01 K/uL    NEUTROPHILS 89 (H) 32 - 75 %    LYMPHOCYTES 6 (L) 12 - 49 %    MONOCYTES 4 (L) 5 - 13 %    EOSINOPHILS 0 0 - 7 %    BASOPHILS 0 0 - 1 %    IMMATURE GRANULOCYTES 1 (H) 0 - 0.5 %    ABS. NEUTROPHILS 10.6 (H) 1.8 - 8.0 K/UL    ABS. LYMPHOCYTES 0.8 0.8 - 3.5 K/UL    ABS. MONOCYTES 0.5 0.0 - 1.0 K/UL    ABS. EOSINOPHILS 0.0 0.0 - 0.4 K/UL    ABS. BASOPHILS 0.0 0.0 - 0.1 K/UL    ABS. IMM. GRANS. 0.1 (H) 0.00 - 0.04 K/UL    DF AUTOMATED     LD    Collection Time: 01/13/23  4:55 AM   Result Value Ref Range     81 - 246 U/L   C REACTIVE PROTEIN, QT    Collection Time: 01/13/23  4:55 AM   Result Value Ref Range    C-Reactive protein 24.40 (H) 0.00 - 0.60 mg/dL   PROCALCITONIN    Collection Time: 01/13/23  4:55 AM   Result Value Ref Range    Procalcitonin 4.07 (H) 0 ng/mL     [unfilled]      Review of Systems    Constitutional: Negative for chills and fever. HENT: Negative. Eyes: Negative. Respiratory: Negative. Cardiovascular: Negative. Gastrointestinal: Negative for abdominal pain and nausea. Skin: Negative. Neurological: Negative. Physical Exam:      Constitutional: pt is oriented to person, place, and time. HENT:   Head: Normocephalic and atraumatic. Eyes: Pupils are equal, round, and reactive to light. EOM are normal.   Cardiovascular: Normal rate, regular rhythm and normal heart sounds. Pulmonary/Chest: Breath sounds normal. No wheezes. No rales. Exhibits no tenderness. Abdominal: Soft. Bowel sounds are normal. There is no abdominal tenderness. There is no rebound and no guarding. Musculoskeletal: Normal range of motion. Neurological: pt is alert and oriented to person, place, and time. XR CHEST PORT   Final Result   Cardiomegaly and diffuse airspace disease similar to prior study. XR CHEST SNGL V   Final Result      1. Slightly asymmetric CHF pattern is suspected. Alternatively, correlate   clinically for bilateral infection, right greater than left. .  .   2. Stable cardiomegaly. 3. Technically compromised by patient size and position. A standard 2 view   examination would be more useful when clinically possible.               Recent Results (from the past 24 hour(s))   INFLUENZA A & B AG (RAPID TEST)    Collection Time: 01/12/23 10:10 AM   Result Value Ref Range    Influenza A Antigen Negative Negative      Influenza B Antigen Negative Negative     COVID-19 RAPID TEST    Collection Time: 01/12/23 10:10 AM   Result Value Ref Range    COVID-19 rapid test DETECTED (A) Not Detected     CBC WITH AUTOMATED DIFF    Collection Time: 01/12/23 10:11 AM   Result Value Ref Range    WBC 14.4 (H) 3.6 - 11.0 K/uL    RBC 3.16 (L) 3.80 - 5.20 M/uL    HGB 9.3 (L) 11.5 - 16.0 g/dL    HCT 29.7 (L) 35.0 - 47.0 %    MCV 94.0 80.0 - 99.0 FL    MCH 29.4 26.0 - 34.0 PG    MCHC 31.3 30.0 - 36.5 g/dL    RDW 13.2 11.5 - 14.5 %    PLATELET 697 124 - 319 K/uL    MPV 10.9 8.9 - 12.9 FL    NRBC 0.0 0.0  WBC    ABSOLUTE NRBC 0.00 0.00 - 0.01 K/uL    NEUTROPHILS 82 (H) 32 - 75 %    LYMPHOCYTES 10 (L) 12 - 49 %    MONOCYTES 7 5 - 13 %    EOSINOPHILS 0 0 - 7 %    BASOPHILS 0 0 - 1 %    IMMATURE GRANULOCYTES 1 (H) 0 - 0.5 %    ABS. NEUTROPHILS 11.9 (H) 1.8 - 8.0 K/UL    ABS. LYMPHOCYTES 1.4 0.8 - 3.5 K/UL    ABS. MONOCYTES 1.0 0.0 - 1.0 K/UL    ABS. EOSINOPHILS 0.0 0.0 - 0.4 K/UL    ABS. BASOPHILS 0.0 0.0 - 0.1 K/UL    ABS. IMM. GRANS. 0.1 (H) 0.00 - 0.04 K/UL    DF AUTOMATED     METABOLIC PANEL, COMPREHENSIVE    Collection Time: 01/12/23 10:11 AM   Result Value Ref Range    Sodium 130 (L) 136 - 145 mmol/L    Potassium Specimen hemolyzed, results may be affected 3.5 - 5.1 mmol/L    Chloride 94 (L) 97 - 108 mmol/L    CO2 27 21 - 32 mmol/L    Anion gap 9 5 - 15 mmol/L    Glucose 372 (H) 65 - 100 mg/dL    BUN 48 (H) 6 - 20 mg/dL    Creatinine 5.02 (H) 0.55 - 1.02 mg/dL    BUN/Creatinine ratio 10 (L) 12 - 20      eGFR 10 (L) >60 ml/min/1.73m2    Calcium 8.5 8.5 - 10.1 mg/dL    Bilirubin, total 0.7 0.2 - 1.0 mg/dL    AST (SGOT) Specimen hemolyzed, results may be affected 15 - 37 U/L    ALT (SGPT) 8 (L) 12 - 78 U/L    Alk.  phosphatase 84 45 - 117 U/L    Protein, total 6.8 6.4 - 8.2 g/dL    Albumin 2.8 (L) 3.5 - 5.0 g/dL    Globulin 4.0 2.0 - 4.0 g/dL    A-G Ratio 0.7 (L) 1.1 - 2.2     LACTIC ACID    Collection Time: 01/12/23 10:11 AM   Result Value Ref Range    Lactic acid 2.4 (HH) 0.4 - 2.0 mmol/L   TROPONIN-HIGH SENSITIVITY    Collection Time: 01/12/23 10:11 AM   Result Value Ref Range    Troponin-High Sensitivity 55 (H) 0 - 51 ng/L   NT-PRO BNP    Collection Time: 01/12/23 10:11 AM   Result Value Ref Range    NT pro-BNP 27,904 (H) <125 pg/mL   EKG, 12 LEAD, INITIAL    Collection Time: 01/12/23 10:14 AM   Result Value Ref Range    Ventricular Rate 70 BPM    Atrial Rate 70 BPM    P-R Interval 162 ms    QRS Duration 74 ms    Q-T Interval 406 ms    QTC Calculation (Bezet) 438 ms    Calculated P Axis 20 degrees    Calculated R Axis -19 degrees    Calculated T Axis 79 degrees    Diagnosis       Normal sinus rhythm  Anterior infarct , age undetermined  Abnormal ECG  When compared with ECG of 09-AUG-2022 08:53,  No significant change was found  Confirmed by Nati Stauffer ([id number]) on 1/12/2023 2:39:51 PM     GLUCOSE, POC    Collection Time: 01/12/23  5:33 PM   Result Value Ref Range    Glucose (POC) 209 (H) 65 - 100 mg/dL    Performed by Mackenzie Juniata    MRSA SCREEN - PCR (NASAL)    Collection Time: 01/12/23  7:51 PM   Result Value Ref Range    MRSA by PCR, Nasal Not Detected Not Detected     GLUCOSE, POC    Collection Time: 01/12/23 10:03 PM   Result Value Ref Range    Glucose (POC) 321 (H) 65 - 100 mg/dL    Performed by Georges Parker (PCT)    METABOLIC PANEL, COMPREHENSIVE    Collection Time: 01/13/23  4:55 AM   Result Value Ref Range    Sodium 127 (L) 136 - 145 mmol/L    Potassium 5.1 3.5 - 5.1 mmol/L    Chloride 93 (L) 97 - 108 mmol/L    CO2 26 21 - 32 mmol/L    Anion gap 8 5 - 15 mmol/L    Glucose 430 (H) 65 - 100 mg/dL    BUN 41 (H) 6 - 20 mg/dL    Creatinine 4.62 (H) 0.55 - 1.02 mg/dL    BUN/Creatinine ratio 9 (L) 12 - 20      eGFR 11 (L) >60 ml/min/1.73m2    Calcium 8.6 8.5 - 10.1 mg/dL    Bilirubin, total 0.6 0.2 - 1.0 mg/dL    AST (SGOT) 5 (L) 15 - 37 U/L    ALT (SGPT) 7 (L) 12 - 78 U/L    Alk.  phosphatase 76 45 - 117 U/L    Protein, total 7.5 6.4 - 8.2 g/dL    Albumin 2.5 (L) 3.5 - 5.0 g/dL    Globulin 5.0 (H) 2.0 - 4.0 g/dL    A-G Ratio 0.5 (L) 1.1 - 2.2     CBC WITH AUTOMATED DIFF    Collection Time: 01/13/23  4:55 AM   Result Value Ref Range    WBC 11.9 (H) 3.6 - 11.0 K/uL    RBC 3.16 (L) 3.80 - 5.20 M/uL    HGB 9.4 (L) 11.5 - 16.0 g/dL    HCT 29.1 (L) 35.0 - 47.0 %    MCV 92.1 80.0 - 99.0 FL    MCH 29.7 26.0 - 34.0 PG    MCHC 32.3 30.0 - 36.5 g/dL    RDW 13.1 11.5 - 14.5 %    PLATELET 527 325 - 163 K/uL    MPV 11.0 8.9 - 12.9 FL    NRBC 0.0 0.0  WBC    ABSOLUTE NRBC 0.00 0.00 - 0.01 K/uL    NEUTROPHILS 89 (H) 32 - 75 %    LYMPHOCYTES 6 (L) 12 - 49 %    MONOCYTES 4 (L) 5 - 13 %    EOSINOPHILS 0 0 - 7 %    BASOPHILS 0 0 - 1 %    IMMATURE GRANULOCYTES 1 (H) 0 - 0.5 %    ABS. NEUTROPHILS 10.6 (H) 1.8 - 8.0 K/UL    ABS. LYMPHOCYTES 0.8 0.8 - 3.5 K/UL    ABS. MONOCYTES 0.5 0.0 - 1.0 K/UL    ABS. EOSINOPHILS 0.0 0.0 - 0.4 K/UL    ABS. BASOPHILS 0.0 0.0 - 0.1 K/UL    ABS. IMM. GRANS. 0.1 (H) 0.00 - 0.04 K/UL    DF AUTOMATED     LD    Collection Time: 01/13/23  4:55 AM   Result Value Ref Range     81 - 246 U/L   C REACTIVE PROTEIN, QT    Collection Time: 01/13/23  4:55 AM   Result Value Ref Range    C-Reactive protein 24.40 (H) 0.00 - 0.60 mg/dL   PROCALCITONIN    Collection Time: 01/13/23  4:55 AM   Result Value Ref Range    Procalcitonin 4.07 (H) 0 ng/mL       Results       Procedure Component Value Units Date/Time    MRSA SCREEN - PCR (NASAL) [[id number]] Collected: 01/12/23 1951    Order Status: Completed Specimen: Swab Updated: 01/12/23 2148     MRSA by PCR, Nasal Not Detected       CULTURE, BLOOD #1 [[id number]]     Order Status: Sent Specimen: Blood     CULTURE, BLOOD #2 [[id number]]     Order Status: Sent Specimen: Blood     CULTURE, BLOOD, PAIRED [[id number]] Collected: 01/12/23 1011    Order Status: Sent Specimen: Blood Updated: 01/12/23 1019    INFLUENZA A & B AG (RAPID TEST) [[id number]] Collected: 01/12/23 1010    Order Status: Completed Specimen: Nasopharyngeal from Nasal washing Updated: 01/12/23 1036     Influenza A Antigen Negative        Influenza B Antigen Negative       COVID-19 RAPID TEST [[id number]]  (Abnormal) Collected: 01/12/23 1010    Order Status: Completed Specimen: Nasopharyngeal Updated: 01/12/23 1047     COVID-19 rapid test DETECTED        Comment: Rapid [employer] ID Now   The specimen is POSITIVE for SARS-CoV-2, the novel coronavirus associated with COVID-19. This test has been authorized by the FDA under an Emergency Use Authorization (EUA) for use by authorized laboratories.    Fact sheet for Healthcare Providers:  http://www.[facility]rizales-[place]as.[facility]ius/ Fact sheet for Patients: http://www.[place].cornelius/   Methodology: Isothermal Nucleic Acid Amplification Results verified, phoned to and read back by BRANDI MENA RN ON   1/12/2023 @1045/MAB                  Labs:     Recent Labs     01/13/23 0455 01/12/23  1011   WBC 11.9* 14.4*   HGB 9.4* 9.3*   HCT 29.1* 29.7*    335     Recent Labs     01/13/23 0455 01/12/23  1011   * 130*   K 5.1 Specimen hemolyzed, results may be affected   CL 93* 94*   CO2 26 27   BUN 41* 48*   CREA 4.62* 5.02*   * 372*   CA 8.6 8.5     Recent Labs     01/13/23 0455 01/12/23  1011   ALT 7* 8*   AP 76 84   TBILI 0.6 0.7   TP 7.5 6.8   ALB 2.5* 2.8*   GLOB 5.0* 4.0     No results for input(s): INR, PTP, APTT, INREXT in the last 72 hours. No results for input(s): FE, TIBC, PSAT, FERR in the last 72 hours. No results found for: FOL, RBCF   No results for input(s): PH, PCO2, PO2 in the last 72 hours. No results for input(s): CPK, CKNDX, TROIQ in the last 72 hours.     No lab exists for component: CPKMB  No results found for: CHOL, CHOLX, CHLST, CHOLV, HDL, HDLP, LDL, LDLC, DLDLP, TGLX, TRIGL, TRIGP, CHHD, CHHDX  Lab Results   Component Value Date/Time    Glucose (POC) 321 (H) 01/12/2023 10:03 PM    Glucose (POC) 209 (H) 01/12/2023 05:33 PM    Glucose (POC) 217 (H) 09/20/2022 07:47 AM    Glucose (POC) 225 (H) 08/09/2022 11:34 AM    Glucose (POC) 118 (H) 07/01/2022 01:14 PM    Glucose,  (H) 09/20/2022 07:52 AM     Lab Results   Component Value Date/Time    Color Yellow 08/09/2022 08:51 AM    Appearance Cloudy (A) 08/09/2022 08:51 AM    Specific gravity 1.015 08/09/2022 08:51 AM    pH (UA) 5.0 08/09/2022 08:51 AM    Protein Trace (A) 08/09/2022 08:51 AM    Glucose >1,000 (A) 08/09/2022 08:51 AM    Ketone Negative 08/09/2022 08:51 AM    Bilirubin Negative 08/09/2022 08:51 AM    Urobilinogen Negative 08/09/2022 08:51 AM    Nitrites Negative 08/09/2022 08:51 AM    Leukocyte Esterase Negative 08/09/2022 08:51 AM    Bacteria 2+ (A) 08/09/2022 08:51 AM    WBC 0-4 08/09/2022 08:51 AM    RBC 10-20 08/09/2022 08:51 AM         Assessment:     COVID-19  Acute hypoxic respiratory failure  Leukocytosis  Lactic acidosis  Acute congestive heart failure from fluid overload  Type 2 diabetes  CKD Stage 5  Hypertension  Hx of cardiomegaly  Hx of CVA      Plan:     Lipitor 20 mg daily  Decadron 6 mg daily Lasix 80 mg daily  Lasix 80 mg daily  Gabapentin 300 mg at bedtime  Lantus 40 units subcu every 12 hours  Sliding scale insulin  Xarelto 2.5 mg daily  Continue IV Zosyn for now    Repeat the labs continue dialysis 3 times a week      Current Facility-Administered Medications:     epoetin jodie-epbx (RETACRIT) injection 8,000 Units, 8,000 Units, IntraVENous, [facility], Carlo Zuñiga MD, 8,000 Units at 01/12/23 2242    acetaminophen (TYLENOL) tablet 650 mg, 650 mg, Oral, Q6H PRN, Justin Brito MD, 650 mg at 01/13/23 0441    atorvastatin (LIPITOR) tablet 20 mg, 20 mg, Oral, DAILY, Kierra Brito MD    furosemide (LASIX) tablet 80 mg, 80 mg, Oral, DAILY, Kierra Brito MD    gabapentin (NEURONTIN) capsule 300 mg, 300 mg, Oral, QHS, Justin Brito MD, 300 mg at 01/12/23 2104    sevelamer carbonate (RENVELA) tab 800 mg, 800 mg, Oral, TID WITH MEALS, Kierra Brito MD, 800 mg at 01/12/23 1725    insulin lispro (HUMALOG) injection, , SubCUTAneous, AC&HS, Justin Brito MD, 5 Units at 01/12/23 2241    glucose chewable tablet 16 g, 4 Tablet, Oral, PRN, Kierra Brito MD    glucagon (GLUCAGEN) injection 1 mg, 1 mg, IntraMUSCular, PRN, Kierra Brito MD    acetaminophen (TYLENOL) tablet 650 mg, 650 mg, Oral, Q6H PRN **OR** acetaminophen (TYLENOL) suppository 650 mg, 650 mg, Rectal, Q6H PRN, Justin Brito MD    polyethylene glycol (MIRALAX) packet 17 g, 17 g, Oral, DAILY PRN, Justin Brito MD    ondansetron (ZOFRAN ODT) tablet 4 mg, 4 mg, Oral, Q8H PRN **OR** ondansetron (ZOFRAN) injection 4 mg, 4 mg, IntraVENous, Q6H PRN, Bennie Martinez MD    pantoprazole (PROTONIX) tablet 20 mg, 20 mg, Oral, DAILY, Justin Brito MD    [COMPLETED] piperacillin-tazobactam (ZOSYN) 4.5 g in 0.9% sodium chloride (MBP/ADV) 100 mL MBP, 4.5 g, IntraVENous, ONCE, Last Rate: 200 mL/hr at 01/12/23 1725, 4.5 g at 01/12/23 1725 **FOLLOWED BY** piperacillin-tazobactam (ZOSYN) 3.375 g in 0.9% sodium chloride (MBP/ADV) 100 mL MBP, 3.375 g, IntraVENous, Q12H, Justin Brito MD, Last Rate: 25 mL/hr at 01/12/23 2243, 3.375 g at 01/12/23 2243    insulin glargine (LANTUS) injection 40 Units, 40 Units, SubCUTAneous, Q12H, Justin Brito MD, 40 Units at 01/12/23 1813    dexamethasone (DECADRON) 4 mg/mL injection 6 mg, 6 mg, IntraVENous, DAILY, Justin Brito MD, 6 mg at 01/12/23 1725    dextromethorphan (DELSYM) 30 mg/5 mL syrup 30 mg, 30 mg, Oral, Q12H, Ansley Nickerson MD, 30 mg at 01/12/23 2103    benzonatate (TESSALON) capsule 100 mg, 100 mg, Oral, TID PRN, Ansley Kevin MD    rivaroxaban (XARELTO) tablet 2.5 mg, 2.5 mg, Oral, DAILY, Justin Brito MD, 2.5 mg at 01/12/23 2104    traMADoL (ULTRAM) tablet 50 mg, 50 mg, Oral, Q6H PRN, Justin Brito MD, 50 mg at 01/13/23 0026                       *ATTENTION:  This note has been created by a medical student for educational purposes only. Please do not refer to the content of this note for clinical decision-making, billing, or other purposes. Please see attending physicians note to obtain clinical information on this patient. *

## 2023-01-13 NOTE — PROGRESS NOTES
General Daily Progress Note          Patient Name:   Edna Mata       YOB: 1975       Age:  52 y.o. Admit Date: 1/12/2023      Subjective:       Patient is a 52y.o. year old female with past medical history of CVA, diabetes, CKD stage 5,on HD  hypertension, and cardiomegaly who presented to the ED today with shortness of breath. Patient states that over the last few days she has been feeling very sick and because of that she could only get 2 hours of dialysis done on Monday and 30 minutes yesterday. And then today she started feeling very short of breath and called EMS. On arrival to the ED patient was noted to be hypoxic in the 60s. She was placed on non-breather with improvement of oxygenation. Pt was tested positive for COVID test done in the ER. This is her first time getting diagnosed with COVID. She also has chills, cough and chest pain associated with it, nausea, and vomiting. She is currently on high flow oxygen. CBC shows leukocytosis 14,400, BNP 27,904, lactic acid 2.4, sodium 130, chloride 94, troponin 55. Glucose 372. Chest x ray done in the ED shows:  1. Slightly asymmetric CHF pattern is suspected. Alternatively, correlate  clinically for bilateral infection, right greater than left. .  .  2. Stable cardiomegaly. 3. Technically compromised by patient size and position. A standard 2 view  examination would be more useful when clinically possible.            Objective:     Visit Vitals  BP (!) 123/53 (BP 1 Location: Right lower arm, BP Patient Position: At rest)   Pulse 60   Temp (!) 60 °F (15.6 °C)   Resp 18   Ht 5' 8\" (1.727 m)   Wt 123.3 kg (271 lb 13.2 oz)   SpO2 98%   BMI 41.33 kg/m²        Recent Results (from the past 24 hour(s))   INFLUENZA A & B AG (RAPID TEST)    Collection Time: 01/12/23 10:10 AM   Result Value Ref Range    Influenza A Antigen Negative Negative      Influenza B Antigen Negative Negative     COVID-19 RAPID TEST    Collection Time: 01/12/23 10:10 AM   Result Value Ref Range    COVID-19 rapid test DETECTED (A) Not Detected     CBC WITH AUTOMATED DIFF    Collection Time: 01/12/23 10:11 AM   Result Value Ref Range    WBC 14.4 (H) 3.6 - 11.0 K/uL    RBC 3.16 (L) 3.80 - 5.20 M/uL    HGB 9.3 (L) 11.5 - 16.0 g/dL    HCT 29.7 (L) 35.0 - 47.0 %    MCV 94.0 80.0 - 99.0 FL    MCH 29.4 26.0 - 34.0 PG    MCHC 31.3 30.0 - 36.5 g/dL    RDW 13.2 11.5 - 14.5 %    PLATELET 339 090 - 484 K/uL    MPV 10.9 8.9 - 12.9 FL    NRBC 0.0 0.0  WBC    ABSOLUTE NRBC 0.00 0.00 - 0.01 K/uL    NEUTROPHILS 82 (H) 32 - 75 %    LYMPHOCYTES 10 (L) 12 - 49 %    MONOCYTES 7 5 - 13 %    EOSINOPHILS 0 0 - 7 %    BASOPHILS 0 0 - 1 %    IMMATURE GRANULOCYTES 1 (H) 0 - 0.5 %    ABS. NEUTROPHILS 11.9 (H) 1.8 - 8.0 K/UL    ABS. LYMPHOCYTES 1.4 0.8 - 3.5 K/UL    ABS. MONOCYTES 1.0 0.0 - 1.0 K/UL    ABS. EOSINOPHILS 0.0 0.0 - 0.4 K/UL    ABS. BASOPHILS 0.0 0.0 - 0.1 K/UL    ABS. IMM. GRANS. 0.1 (H) 0.00 - 0.04 K/UL    DF AUTOMATED     METABOLIC PANEL, COMPREHENSIVE    Collection Time: 01/12/23 10:11 AM   Result Value Ref Range    Sodium 130 (L) 136 - 145 mmol/L    Potassium Specimen hemolyzed, results may be affected 3.5 - 5.1 mmol/L    Chloride 94 (L) 97 - 108 mmol/L    CO2 27 21 - 32 mmol/L    Anion gap 9 5 - 15 mmol/L    Glucose 372 (H) 65 - 100 mg/dL    BUN 48 (H) 6 - 20 mg/dL    Creatinine 5.02 (H) 0.55 - 1.02 mg/dL    BUN/Creatinine ratio 10 (L) 12 - 20      eGFR 10 (L) >60 ml/min/1.73m2    Calcium 8.5 8.5 - 10.1 mg/dL    Bilirubin, total 0.7 0.2 - 1.0 mg/dL    AST (SGOT) Specimen hemolyzed, results may be affected 15 - 37 U/L    ALT (SGPT) 8 (L) 12 - 78 U/L    Alk.  phosphatase 84 45 - 117 U/L    Protein, total 6.8 6.4 - 8.2 g/dL    Albumin 2.8 (L) 3.5 - 5.0 g/dL    Globulin 4.0 2.0 - 4.0 g/dL    A-G Ratio 0.7 (L) 1.1 - 2.2     LACTIC ACID    Collection Time: 01/12/23 10:11 AM   Result Value Ref Range    Lactic acid 2.4 (HH) 0.4 - 2.0 mmol/L   TROPONIN-HIGH SENSITIVITY    Collection Time: 01/12/23 10:11 AM   Result Value Ref Range    Troponin-High Sensitivity 55 (H) 0 - 51 ng/L   NT-PRO BNP    Collection Time: 01/12/23 10:11 AM   Result Value Ref Range    NT pro-BNP 27,904 (H) <125 pg/mL   EKG, 12 LEAD, INITIAL    Collection Time: 01/12/23 10:14 AM   Result Value Ref Range    Ventricular Rate 70 BPM    Atrial Rate 70 BPM    P-R Interval 162 ms    QRS Duration 74 ms    Q-T Interval 406 ms    QTC Calculation (Bezet) 438 ms    Calculated P Axis 20 degrees    Calculated R Axis -19 degrees    Calculated T Axis 79 degrees    Diagnosis       Normal sinus rhythm  Anterior infarct , age undetermined  Abnormal ECG  When compared with ECG of 09-AUG-2022 08:53,  No significant change was found  Confirmed by Nati Stauffer ([id number]) on 1/12/2023 2:39:51 PM     GLUCOSE, POC    Collection Time: 01/12/23  5:33 PM   Result Value Ref Range    Glucose (POC) 209 (H) 65 - 100 mg/dL    Performed by Mackenzie Ajith    MRSA SCREEN - PCR (NASAL)    Collection Time: 01/12/23  7:51 PM   Result Value Ref Range    MRSA by PCR, Nasal Not Detected Not Detected     GLUCOSE, POC    Collection Time: 01/12/23 10:03 PM   Result Value Ref Range    Glucose (POC) 321 (H) 65 - 100 mg/dL    Performed by Georges Parker (PCT)    METABOLIC PANEL, COMPREHENSIVE    Collection Time: 01/13/23  4:55 AM   Result Value Ref Range    Sodium 127 (L) 136 - 145 mmol/L    Potassium 5.1 3.5 - 5.1 mmol/L    Chloride 93 (L) 97 - 108 mmol/L    CO2 26 21 - 32 mmol/L    Anion gap 8 5 - 15 mmol/L    Glucose 430 (H) 65 - 100 mg/dL    BUN 41 (H) 6 - 20 mg/dL    Creatinine 4.62 (H) 0.55 - 1.02 mg/dL    BUN/Creatinine ratio 9 (L) 12 - 20      eGFR 11 (L) >60 ml/min/1.73m2    Calcium 8.6 8.5 - 10.1 mg/dL    Bilirubin, total 0.6 0.2 - 1.0 mg/dL    AST (SGOT) 5 (L) 15 - 37 U/L    ALT (SGPT) 7 (L) 12 - 78 U/L    Alk.  phosphatase 76 45 - 117 U/L    Protein, total 7.5 6.4 - 8.2 g/dL    Albumin 2.5 (L) 3.5 - 5.0 g/dL    Globulin 5.0 (H) 2.0 - 4.0 g/dL    A-G Ratio 0.5 (L) 1.1 - 2.2     CBC WITH AUTOMATED DIFF    Collection Time: 01/13/23  4:55 AM   Result Value Ref Range    WBC 11.9 (H) 3.6 - 11.0 K/uL    RBC 3.16 (L) 3.80 - 5.20 M/uL    HGB 9.4 (L) 11.5 - 16.0 g/dL    HCT 29.1 (L) 35.0 - 47.0 %    MCV 92.1 80.0 - 99.0 FL    MCH 29.7 26.0 - 34.0 PG    MCHC 32.3 30.0 - 36.5 g/dL    RDW 13.1 11.5 - 14.5 %    PLATELET 442 779 - 971 K/uL    MPV 11.0 8.9 - 12.9 FL    NRBC 0.0 0.0  WBC    ABSOLUTE NRBC 0.00 0.00 - 0.01 K/uL    NEUTROPHILS 89 (H) 32 - 75 %    LYMPHOCYTES 6 (L) 12 - 49 %    MONOCYTES 4 (L) 5 - 13 %    EOSINOPHILS 0 0 - 7 %    BASOPHILS 0 0 - 1 %    IMMATURE GRANULOCYTES 1 (H) 0 - 0.5 %    ABS. NEUTROPHILS 10.6 (H) 1.8 - 8.0 K/UL    ABS. LYMPHOCYTES 0.8 0.8 - 3.5 K/UL    ABS. MONOCYTES 0.5 0.0 - 1.0 K/UL    ABS. EOSINOPHILS 0.0 0.0 - 0.4 K/UL    ABS. BASOPHILS 0.0 0.0 - 0.1 K/UL    ABS. IMM. GRANS. 0.1 (H) 0.00 - 0.04 K/UL    DF AUTOMATED     LD    Collection Time: 01/13/23  4:55 AM   Result Value Ref Range     81 - 246 U/L   C REACTIVE PROTEIN, QT    Collection Time: 01/13/23  4:55 AM   Result Value Ref Range    C-Reactive protein 24.40 (H) 0.00 - 0.60 mg/dL   PROCALCITONIN    Collection Time: 01/13/23  4:55 AM   Result Value Ref Range    Procalcitonin 4.07 (H) 0 ng/mL   GLUCOSE, POC    Collection Time: 01/13/23  8:07 AM   Result Value Ref Range    Glucose (POC) 433 (H) 65 - 100 mg/dL    Performed by Kit Horton      [unfilled]      Review of Systems    Constitutional: Negative for chills and fever. HENT: Negative. Eyes: Negative. Respiratory: Negative. Cardiovascular: Negative. Gastrointestinal: Negative for abdominal pain and nausea. Skin: Negative. Neurological: Negative. Physical Exam:      Constitutional: pt is oriented to person, place, and time. HENT:   Head: Normocephalic and atraumatic. Eyes: Pupils are equal, round, and reactive to light.  EOM are normal.   Cardiovascular: Normal rate, regular rhythm and normal heart sounds. Pulmonary/Chest: Breath sounds normal. No wheezes. No rales. Exhibits no tenderness. Abdominal: Soft. Bowel sounds are normal. There is no abdominal tenderness. There is no rebound and no guarding. Musculoskeletal: Normal range of motion. Neurological: pt is alert and oriented to person, place, and time. XR CHEST PORT   Final Result   Cardiomegaly and diffuse airspace disease similar to prior study. XR CHEST SNGL V   Final Result      1. Slightly asymmetric CHF pattern is suspected. Alternatively, correlate   clinically for bilateral infection, right greater than left. .  .   2. Stable cardiomegaly. 3. Technically compromised by patient size and position. A standard 2 view   examination would be more useful when clinically possible. XR CHEST PORT    (Results Pending)        Recent Results (from the past 24 hour(s))   INFLUENZA A & B AG (RAPID TEST)    Collection Time: 01/12/23 10:10 AM   Result Value Ref Range    Influenza A Antigen Negative Negative      Influenza B Antigen Negative Negative     COVID-19 RAPID TEST    Collection Time: 01/12/23 10:10 AM   Result Value Ref Range    COVID-19 rapid test DETECTED (A) Not Detected     CBC WITH AUTOMATED DIFF    Collection Time: 01/12/23 10:11 AM   Result Value Ref Range    WBC 14.4 (H) 3.6 - 11.0 K/uL    RBC 3.16 (L) 3.80 - 5.20 M/uL    HGB 9.3 (L) 11.5 - 16.0 g/dL    HCT 29.7 (L) 35.0 - 47.0 %    MCV 94.0 80.0 - 99.0 FL    MCH 29.4 26.0 - 34.0 PG    MCHC 31.3 30.0 - 36.5 g/dL    RDW 13.2 11.5 - 14.5 %    PLATELET 541 961 - 521 K/uL    MPV 10.9 8.9 - 12.9 FL    NRBC 0.0 0.0  WBC    ABSOLUTE NRBC 0.00 0.00 - 0.01 K/uL    NEUTROPHILS 82 (H) 32 - 75 %    LYMPHOCYTES 10 (L) 12 - 49 %    MONOCYTES 7 5 - 13 %    EOSINOPHILS 0 0 - 7 %    BASOPHILS 0 0 - 1 %    IMMATURE GRANULOCYTES 1 (H) 0 - 0.5 %    ABS. NEUTROPHILS 11.9 (H) 1.8 - 8.0 K/UL    ABS. LYMPHOCYTES 1.4 0.8 - 3.5 K/UL    ABS. MONOCYTES 1.0 0.0 - 1.0 K/UL    ABS.  EOSINOPHILS 0.0 0.0 - 0.4 K/UL    ABS. BASOPHILS 0.0 0.0 - 0.1 K/UL    ABS. IMM. GRANS. 0.1 (H) 0.00 - 0.04 K/UL    DF AUTOMATED     METABOLIC PANEL, COMPREHENSIVE    Collection Time: 01/12/23 10:11 AM   Result Value Ref Range    Sodium 130 (L) 136 - 145 mmol/L    Potassium Specimen hemolyzed, results may be affected 3.5 - 5.1 mmol/L    Chloride 94 (L) 97 - 108 mmol/L    CO2 27 21 - 32 mmol/L    Anion gap 9 5 - 15 mmol/L    Glucose 372 (H) 65 - 100 mg/dL    BUN 48 (H) 6 - 20 mg/dL    Creatinine 5.02 (H) 0.55 - 1.02 mg/dL    BUN/Creatinine ratio 10 (L) 12 - 20      eGFR 10 (L) >60 ml/min/1.73m2    Calcium 8.5 8.5 - 10.1 mg/dL    Bilirubin, total 0.7 0.2 - 1.0 mg/dL    AST (SGOT) Specimen hemolyzed, results may be affected 15 - 37 U/L    ALT (SGPT) 8 (L) 12 - 78 U/L    Alk.  phosphatase 84 45 - 117 U/L    Protein, total 6.8 6.4 - 8.2 g/dL    Albumin 2.8 (L) 3.5 - 5.0 g/dL    Globulin 4.0 2.0 - 4.0 g/dL    A-G Ratio 0.7 (L) 1.1 - 2.2     LACTIC ACID    Collection Time: 01/12/23 10:11 AM   Result Value Ref Range    Lactic acid 2.4 (HH) 0.4 - 2.0 mmol/L   TROPONIN-HIGH SENSITIVITY    Collection Time: 01/12/23 10:11 AM   Result Value Ref Range    Troponin-High Sensitivity 55 (H) 0 - 51 ng/L   NT-PRO BNP    Collection Time: 01/12/23 10:11 AM   Result Value Ref Range    NT pro-BNP 27,904 (H) <125 pg/mL   EKG, 12 LEAD, INITIAL    Collection Time: 01/12/23 10:14 AM   Result Value Ref Range    Ventricular Rate 70 BPM    Atrial Rate 70 BPM    P-R Interval 162 ms    QRS Duration 74 ms    Q-T Interval 406 ms    QTC Calculation (Bezet) 438 ms    Calculated P Axis 20 degrees    Calculated R Axis -19 degrees    Calculated T Axis 79 degrees    Diagnosis       Normal sinus rhythm  Anterior infarct , age undetermined  Abnormal ECG  When compared with ECG of 09-AUG-2022 08:53,  No significant change was found  Confirmed by Nati Stauffer ([id number]) on 1/12/2023 2:39:51 PM     GLUCOSE, POC    Collection Time: 01/12/23  5:33 PM   Result Value Ref Range Glucose (POC) 209 (H) 65 - 100 mg/dL    Performed by [facility]    MRSA SCREEN - PCR (NASAL)    Collection Time: 01/12/23  7:51 PM   Result Value Ref Range    MRSA by PCR, Nasal Not Detected Not Detected     GLUCOSE, POC    Collection Time: 01/12/23 10:03 PM   Result Value Ref Range    Glucose (POC) 321 (H) 65 - 100 mg/dL    Performed by Carolyn Vazquez (PCT)    METABOLIC PANEL, COMPREHENSIVE    Collection Time: 01/13/23  4:55 AM   Result Value Ref Range    Sodium 127 (L) 136 - 145 mmol/L    Potassium 5.1 3.5 - 5.1 mmol/L    Chloride 93 (L) 97 - 108 mmol/L    CO2 26 21 - 32 mmol/L    Anion gap 8 5 - 15 mmol/L    Glucose 430 (H) 65 - 100 mg/dL    BUN 41 (H) 6 - 20 mg/dL    Creatinine 4.62 (H) 0.55 - 1.02 mg/dL    BUN/Creatinine ratio 9 (L) 12 - 20      eGFR 11 (L) >60 ml/min/1.73m2    Calcium 8.6 8.5 - 10.1 mg/dL    Bilirubin, total 0.6 0.2 - 1.0 mg/dL    AST (SGOT) 5 (L) 15 - 37 U/L    ALT (SGPT) 7 (L) 12 - 78 U/L    Alk. phosphatase 76 45 - 117 U/L    Protein, total 7.5 6.4 - 8.2 g/dL    Albumin 2.5 (L) 3.5 - 5.0 g/dL    Globulin 5.0 (H) 2.0 - 4.0 g/dL    A-G Ratio 0.5 (L) 1.1 - 2.2     CBC WITH AUTOMATED DIFF    Collection Time: 01/13/23  4:55 AM   Result Value Ref Range    WBC 11.9 (H) 3.6 - 11.0 K/uL    RBC 3.16 (L) 3.80 - 5.20 M/uL    HGB 9.4 (L) 11.5 - 16.0 g/dL    HCT 29.1 (L) 35.0 - 47.0 %    MCV 92.1 80.0 - 99.0 FL    MCH 29.7 26.0 - 34.0 PG    MCHC 32.3 30.0 - 36.5 g/dL    RDW 13.1 11.5 - 14.5 %    PLATELET 426 239 - 911 K/uL    MPV 11.0 8.9 - 12.9 FL    NRBC 0.0 0.0  WBC    ABSOLUTE NRBC 0.00 0.00 - 0.01 K/uL    NEUTROPHILS 89 (H) 32 - 75 %    LYMPHOCYTES 6 (L) 12 - 49 %    MONOCYTES 4 (L) 5 - 13 %    EOSINOPHILS 0 0 - 7 %    BASOPHILS 0 0 - 1 %    IMMATURE GRANULOCYTES 1 (H) 0 - 0.5 %    ABS. NEUTROPHILS 10.6 (H) 1.8 - 8.0 K/UL    ABS. LYMPHOCYTES 0.8 0.8 - 3.5 K/UL    ABS. MONOCYTES 0.5 0.0 - 1.0 K/UL    ABS. EOSINOPHILS 0.0 0.0 - 0.4 K/UL    ABS. BASOPHILS 0.0 0.0 - 0.1 K/UL    ABS. IMM. GRANS. 0.1 (H) 0.00 - 0.04 K/UL    DF AUTOMATED     LD    Collection Time: 01/13/23  4:55 AM   Result Value Ref Range     81 - 246 U/L   C REACTIVE PROTEIN, QT    Collection Time: 01/13/23  4:55 AM   Result Value Ref Range    C-Reactive protein 24.40 (H) 0.00 - 0.60 mg/dL   PROCALCITONIN    Collection Time: 01/13/23  4:55 AM   Result Value Ref Range    Procalcitonin 4.07 (H) 0 ng/mL   GLUCOSE, POC    Collection Time: 01/13/23  8:07 AM   Result Value Ref Range    Glucose (POC) 433 (H) 65 - 100 mg/dL    Performed by Bianca Wilhelm        Results       Procedure Component Value Units Date/Time    MRSA SCREEN - PCR (NASAL) [[id number]] Collected: 01/12/23 1951    Order Status: Completed Specimen: Swab Updated: 01/12/23 2148     MRSA by PCR, Nasal Not Detected       CULTURE, BLOOD #1 [[id number]]     Order Status: Sent Specimen: Blood     CULTURE, BLOOD #2 [[id number]]     Order Status: Sent Specimen: Blood     CULTURE, BLOOD, PAIRED [[id number]] Collected: 01/12/23 1011    Order Status: Sent Specimen: Blood Updated: 01/12/23 1019    INFLUENZA A & B AG (RAPID TEST) [[id number]] Collected: 01/12/23 1010    Order Status: Completed Specimen: Nasopharyngeal from Nasal washing Updated: 01/12/23 1036     Influenza A Antigen Negative        Influenza B Antigen Negative       COVID-19 RAPID TEST [[id number]]  (Abnormal) Collected: 01/12/23 1010    Order Status: Completed Specimen: Nasopharyngeal Updated: 01/12/23 1047     COVID-19 rapid test DETECTED        Comment: Rapid [employer] ID Now   The specimen is POSITIVE for SARS-CoV-2, the novel coronavirus associated with COVID-19. This test has been authorized by the FDA under an Emergency Use Authorization (EUA) for use by authorized laboratories.    Fact sheet for Healthcare Providers:  http://www.[facility]ille.[employer]elius/ Fact sheet for Patients: http://www.all-josue.cornelius/   Methodology: Isothermal Nucleic Acid Amplification Results verified, phoned to and read back by BRANDI MENA RN ON   1/12/2023 @1045/MAB                  Labs:     Recent Labs     01/13/23  0455 01/12/23  1011   WBC 11.9* 14.4*   HGB 9.4* 9.3*   HCT 29.1* 29.7*    335     Recent Labs     01/13/23  0455 01/12/23  1011   * 130*   K 5.1 Specimen hemolyzed, results may be affected   CL 93* 94*   CO2 26 27   BUN 41* 48*   CREA 4.62* 5.02*   * 372*   CA 8.6 8.5     Recent Labs     01/13/23  0455 01/12/23  1011   ALT 7* 8*   AP 76 84   TBILI 0.6 0.7   TP 7.5 6.8   ALB 2.5* 2.8*   GLOB 5.0* 4.0     No results for input(s): INR, PTP, APTT, INREXT in the last 72 hours. No results for input(s): FE, TIBC, PSAT, FERR in the last 72 hours. No results found for: FOL, RBCF   No results for input(s): PH, PCO2, PO2 in the last 72 hours. No results for input(s): CPK, CKNDX, TROIQ in the last 72 hours.     No lab exists for component: CPKMB  No results found for: CHOL, CHOLX, CHLST, CHOLV, HDL, HDLP, LDL, LDLC, DLDLP, TGLX, TRIGL, TRIGP, CHHD, CHHDX  Lab Results   Component Value Date/Time    Glucose (POC) 433 (H) 01/13/2023 08:07 AM    Glucose (POC) 321 (H) 01/12/2023 10:03 PM    Glucose (POC) 209 (H) 01/12/2023 05:33 PM    Glucose (POC) 217 (H) 09/20/2022 07:47 AM    Glucose (POC) 225 (H) 08/09/2022 11:34 AM    Glucose,  (H) 09/20/2022 07:52 AM     Lab Results   Component Value Date/Time    Color Yellow 08/09/2022 08:51 AM    Appearance Cloudy (A) 08/09/2022 08:51 AM    Specific gravity 1.015 08/09/2022 08:51 AM    pH (UA) 5.0 08/09/2022 08:51 AM    Protein Trace (A) 08/09/2022 08:51 AM    Glucose >1,000 (A) 08/09/2022 08:51 AM    Ketone Negative 08/09/2022 08:51 AM    Bilirubin Negative 08/09/2022 08:51 AM    Urobilinogen Negative 08/09/2022 08:51 AM    Nitrites Negative 08/09/2022 08:51 AM    Leukocyte Esterase Negative 08/09/2022 08:51 AM    Bacteria 2+ (A) 08/09/2022 08:51 AM    WBC 0-4 08/09/2022 08:51 AM    RBC 10-20 08/09/2022 08:51 AM         Assessment:     Acute hypoxic respiratory failure on high flow  COVID-19 pneumonitis  Leukocytosis  Acute congestive heart failure from fluid overload  Hyperglycemia secondary to steroids  History of type 2 diabetes  End-stage renal disease on hemodialysis  Hypertension  History of CVA  Morbid obesity  Hyponatremia      Plan:     Lipitor 20 mg daily  Decadron 6 mg daily Lasix 80 mg daily  Lasix 80 mg daily  Gabapentin 300 mg at bedtime  Lantus 40 units subcu every 12 hours  Sliding scale insulin  Xarelto 2.5 mg daily      Repeat the labs continue dialysis 3 times a week        Current Facility-Administered Medications:     insulin lispro (HUMALOG) injection 15 Units, 15 Units, SubCUTAneous, NOW, Justin Brito MD    epoetin jodie-epbx (RETACRIT) injection 8,000 Units, 8,000 Units, IntraVENous, DIALYSIS TUE, THU & SAT, Justin Brito MD, 8,000 Units at 01/12/23 2242    acetaminophen (TYLENOL) tablet 650 mg, 650 mg, Oral, Q6H PRN, Justin Brito MD, 650 mg at 01/13/23 0441    atorvastatin (LIPITOR) tablet 20 mg, 20 mg, Oral, DAILY, Philly Brito MD    furosemide (LASIX) tablet 80 mg, 80 mg, Oral, DAILY, Justin Birto MD    gabapentin (NEURONTIN) capsule 300 mg, 300 mg, Oral, QHS, Justin Brito MD, 300 mg at 01/12/23 2104    sevelamer carbonate (RENVELA) tab 800 mg, 800 mg, Oral, TID WITH MEALS, Justin Brito MD, 800 mg at 01/12/23 1725    insulin lispro (HUMALOG) injection, , SubCUTAneous, AC&HS, Justin Brito MD, 5 Units at 01/12/23 2241    glucose chewable tablet 16 g, 4 Tablet, Oral, PRN, Philly Brito MD    glucagon (GLUCAGEN) injection 1 mg, 1 mg, IntraMUSCular, PRN, Philly Brito MD    acetaminophen (TYLENOL) tablet 650 mg, 650 mg, Oral, Q6H PRN **OR** acetaminophen (TYLENOL) suppository 650 mg, 650 mg, Rectal, Q6H PRN, Justin Brito MD    polyethylene glycol (MIRALAX) packet 17 g, 17 g, Oral, DAILY PRN, Justin Brito MD    ondansetron (ZOFRAN ODT) tablet 4 mg, 4 mg, Oral, Q8H PRN **OR** ondansetron (ZOFRAN) injection 4 mg, 4 mg, IntraVENous, Q6H PRN, Justin Brito MD    pantoprazole (PROTONIX) tablet 20 mg, 20 mg, Oral, DAILY, Justin Brito MD    [COMPLETED] piperacillin-tazobactam (ZOSYN) 4.5 g in 0.9% sodium chloride (MBP/ADV) 100 mL MBP, 4.5 g, IntraVENous, ONCE, Last Rate: 200 mL/hr at 01/12/23 1725, 4.5 g at 01/12/23 1725 **FOLLOWED BY** piperacillin-tazobactam (ZOSYN) 3.375 g in 0.9% sodium chloride (MBP/ADV) 100 mL MBP, 3.375 g, IntraVENous, Q12H, Justin Brito MD, Last Rate: 25 mL/hr at 01/12/23 2243, 3.375 g at 01/12/23 2243    insulin glargine (LANTUS) injection 40 Units, 40 Units, SubCUTAneous, Q12H, Justin Brito MD, 40 Units at 01/12/23 1813    dexamethasone (DECADRON) 4 mg/mL injection 6 mg, 6 mg, IntraVENous, DAILY, Justin Brito MD, 6 mg at 01/12/23 1725    dextromethorphan (DELSYM) 30 mg/5 mL syrup 30 mg, 30 mg, Oral, Q12H, Ansley Nickerson MD, 30 mg at 01/12/23 2103    benzonatate (TESSALON) capsule 100 mg, 100 mg, Oral, TID PRN, Ansley Cabrera MD    rivaroxaban (XARELTO) tablet 2.5 mg, 2.5 mg, Oral, DAILY, Justin Brito MD, 2.5 mg at 01/12/23 2104    traMADoL (ULTRAM) tablet 50 mg, 50 mg, Oral, Q6H PRN, Justin Brito MD, 50 mg at 01/13/23 6050

## 2023-01-13 NOTE — PROGRESS NOTES
Infectious Disease Progress Note             Subjective:   Pt seen and examined at bedside. Stable, remains on HFNC, reports sore throat, headache and cough are better. Afebrile, leukocytosis trending down on todays labs   Objective:   Physical Exam:     Visit Vitals  /65   Pulse 64   Temp (!) 60 °F (15.6 °C)   Resp 14   Ht 5' 8\" (1.727 m)   Wt 271 lb 13.2 oz (123.3 kg)   SpO2 100%   BMI 41.33 kg/m²    O2 Flow Rate (L/min): 30 l/min O2 Device: Heated, Hi flow nasal cannula    Temp (24hrs), Av.8 °F (32.7 °C), Min:60 °F (15.6 °C), Max:98.8 °F (37.1 °C)    No intake/output data recorded.     190 -  0700  In: 120 [I.V.:120]  Out: 3000     General: NAD, AAO x 4  HEENT: PHILIP, Moist mucosa, HFNC in place   Lungs: CTA b/l, decreased at the bases, no whee/rhonchi   Heart: S1S2+, RRR, no murmur  Abdo: Soft, NT, ND, +BS   : No odom cath   Exts: Left arm AVF   Skin: No chronic wounds or ulcers       Data Review:       Recent Days:  Recent Labs     23  0455 23  1011   WBC 11.9* 14.4*   HGB 9.4* 9.3*   HCT 29.1* 29.7*    335     Recent Labs     23  0455 23  1011   BUN 41* 48*   CREA 4.62* 5.02*       Lab Results   Component Value Date/Time    C-Reactive protein 24.40 (H) 2023 04:55 AM      Microbiology     Results       Procedure Component Value Units Date/Time    MRSA SCREEN - PCR (NASAL) [[id number]] Collected: 23    Order Status: Completed Specimen: Swab Updated: 23     MRSA by PCR, Nasal Not Detected       CULTURE, BLOOD #1 [[id number]] Collected: 23 141    Order Status: Canceled Specimen: Blood     CULTURE, BLOOD #2 [[id number]] Collected: 23 1415    Order Status: Canceled Specimen: Blood     CULTURE, BLOOD, PAIRED [[id number]] Collected: 23 1011    Order Status: Sent Specimen: Blood Updated: 23 1019    INFLUENZA A & B AG (RAPID TEST) [[id number]] Collected: 23 1010    Order Status: Completed Specimen: Nasopharyngeal from Nasal washing Updated: 01/12/23 1036     Influenza A Antigen Negative        Influenza B Antigen Negative       COVID-19 RAPID TEST [[id number]]  (Abnormal) Collected: 01/12/23 1010    Order Status: Completed Specimen: Nasopharyngeal Updated: 01/12/23 1047     COVID-19 rapid test DETECTED        Comment: Rapid Abbott ID Now   The specimen is POSITIVE for SARS-CoV-2, the novel coronavirus associated with COVID-19. This test has been authorized by the FDA under an Emergency Use Authorization (EUA) for use by authorized laboratories. Fact sheet for Healthcare Providers:  http://www.sha[place]ille.cornelius/ Fact sheet for Patients: http://www.all-josue.cornelius/   Methodology: Isothermal Nucleic Acid Amplification Results verified, phoned to and read back by BRANDI MENA RN ON   1/12/2023 @[facility]                    Diagnostics   CXR Results  (Last 48 hours)                 01/13/23 0333  XR CHEST PORT Final result    Impression:  Cardiomegaly and diffuse airspace disease similar to prior study. Narrative:  INDICATION: COVID PNA, pulmonary edema       EXAMINATION:  AP CHEST, PORTABLE       COMPARISON: 1/12/2023       FINDINGS: Single AP portable view of the chest demonstrates cardiomegaly. Lungs   are adequately expanded with diffuse bilateral airspace disease. No   pneumothorax.           01/12/23 1023  XR CHEST SNGL V Final result    Impression:      1. Slightly asymmetric CHF pattern is suspected. Alternatively, correlate   clinically for bilateral infection, right greater than left. .  .   2. Stable cardiomegaly. 3. Technically compromised by patient size and position. A standard 2 view   examination would be more useful when clinically possible. Narrative:  INDICATION:  weakness/sob/hypoxia        EXAM: Chest single view. COMPARISON: 11/29/2018.        FINDINGS: A single frontal view of the chest at 1018 hours shows diffuse   moderately severe interstitial infiltrate with poor inspiratory effort and   central vascular engorgement, new since the prior study, worse on the right than   on the left. .. The heart, mediastinum and pulmonary vasculature are stable with   cardiomegaly. .  The bony thorax is unremarkable for age. . Image quality is   degraded by large patient size and portable technique. Assessment/Plan        Acute hypoxic respiratory failure due to COVID pneumonitis w superimposed CHF         Afebrile, remains on HFNC, Cardiomegaly and diffuse airspace disease on CXR         Denies productive cough, afebrile, leukocytosis trending down on todays labs         CRP 24.40, Procal 4.07, , ferritin is pending         Received a dose of Actemra today, remains on decadron and Zosyn         Continue on Zosyn. Routine labs including DD and CXR in AM      2. Acute CHF exacerbation, Pulmonary edema and cardiomegaly on CXR      3. Sore throat, No oral thrush or pharyngeal exudates on exam       Chloraseptic spay ordered      4. ESRD on HD, + left arm AVF missed HD prior to presentation due to acute illness      5. H/o uncontrolled DM A1c of 10.1 in 06/2022     6.  Headache frontal, continue symptomatic mgt     Luz Rose MD    1/13/2023

## 2023-01-13 NOTE — PROGRESS NOTES
Bedside and Verbal shift change report given to Tao Vizcarra RN (oncoming nurse) by Gilma Olivares RN (offgoing nurse). Report included the following information SBAR, Kardex, Procedure Summary, Intake/Output, MAR, Recent Results, Cardiac Rhythm  , and Dual Neuro Assessment.

## 2023-01-13 NOTE — PROGRESS NOTES
Renal Daily Progress Note    Admit Date: 1/12/2023      Subjective:   She is still on high flow nasal O2. She tells me that her breathing is better. She does not have an appetite. She denies chest pain or palpitation. Current Facility-Administered Medications   Medication Dose Route Frequency    insulin lispro (HUMALOG) injection   SubCUTAneous Q4H    phenol throat spray (CHLORASEPTIC) 1 Spray  1 Spray Oral PRN    sorbitoL 70 % solution 60 mL  60 mL Oral DAILY PRN    epoetin jodie-epbx (RETACRIT) injection 8,000 Units  8,000 Units IntraVENous DIALYSIS TUE, THU & SAT    atorvastatin (LIPITOR) tablet 20 mg  20 mg Oral DAILY    furosemide (LASIX) tablet 80 mg  80 mg Oral DAILY    gabapentin (NEURONTIN) capsule 300 mg  300 mg Oral QHS    sevelamer carbonate (RENVELA) tab 800 mg  800 mg Oral TID WITH MEALS    glucose chewable tablet 16 g  4 Tablet Oral PRN    glucagon (GLUCAGEN) injection 1 mg  1 mg IntraMUSCular PRN    acetaminophen (TYLENOL) tablet 650 mg  650 mg Oral Q6H PRN    Or    acetaminophen (TYLENOL) suppository 650 mg  650 mg Rectal Q6H PRN    polyethylene glycol (MIRALAX) packet 17 g  17 g Oral DAILY PRN    ondansetron (ZOFRAN ODT) tablet 4 mg  4 mg Oral Q8H PRN    Or    ondansetron (ZOFRAN) injection 4 mg  4 mg IntraVENous Q6H PRN    pantoprazole (PROTONIX) tablet 20 mg  20 mg Oral DAILY    piperacillin-tazobactam (ZOSYN) 3.375 g in 0.9% sodium chloride (MBP/ADV) 100 mL MBP  3.375 g IntraVENous Q12H    insulin glargine (LANTUS) injection 40 Units  40 Units SubCUTAneous Q12H    dexamethasone (DECADRON) 4 mg/mL injection 6 mg  6 mg IntraVENous DAILY    dextromethorphan (DELSYM) 30 mg/5 mL syrup 30 mg  30 mg Oral Q12H    benzonatate (TESSALON) capsule 100 mg  100 mg Oral TID PRN    rivaroxaban (XARELTO) tablet 2.5 mg  2.5 mg Oral DAILY    traMADoL (ULTRAM) tablet 50 mg  50 mg Oral Q6H PRN        Review of Systems    Review of Systems   Respiratory:  Positive for cough. Negative for shortness of breath. Cardiovascular:  Negative for chest pain. Gastrointestinal:  Negative for abdominal pain. Neurological:  Negative for headaches. Objective:     Patient Vitals for the past 8 hrs:   BP Temp Pulse Resp SpO2   01/13/23 1229 131/65 97.7 °F (36.5 °C) 64 14 100 %   01/13/23 1222 -- -- -- -- 100 %   01/13/23 1200 -- -- (!) 58 9 97 %   01/13/23 1100 -- -- 60 14 97 %   01/13/23 1000 (!) 144/78 -- 63 16 100 %   01/13/23 0900 (!) 116/105 -- 60 16 98 %   01/13/23 0800 (!) 127/58 -- 65 16 100 %   01/13/23 0700 (!) 123/53 97.5 °F (36.4 °C) 60 18 98 %     01/13 0701 - 01/13 1900  In: 204.5 [I.V.:204.5]  Out: 50 [Urine:50]  01/11 1901 - 01/13 0700  In: 220 [I.V.:220]  Out: 3000     Physical Exam:   Physical Exam  Cardiovascular:      Rate and Rhythm: Regular rhythm. Pulmonary:      Breath sounds: Normal breath sounds. Abdominal:      General: Bowel sounds are normal.      Palpations: Abdomen is soft. Neurological:      Mental Status: She is alert. No edema  Left upper arm AV fistula with good thrill and bruit        XR CHEST PORT   Final Result   Cardiomegaly and diffuse airspace disease similar to prior study. XR CHEST SNGL V   Final Result      1. Slightly asymmetric CHF pattern is suspected. Alternatively, correlate   clinically for bilateral infection, right greater than left. .  .   2. Stable cardiomegaly. 3. Technically compromised by patient size and position. A standard 2 view   examination would be more useful when clinically possible.          XR CHEST PORT    (Results Pending)        Data Review   Recent Labs     01/13/23  0455 01/12/23  1011   WBC 11.9* 14.4*   HGB 9.4* 9.3*   HCT 29.1* 29.7*    335     Recent Labs     01/13/23  0455 01/12/23  1011   * 130*   K 5.1 Specimen hemolyzed, results may be affected   CL 93* 94*   CO2 26 27   * 372*   BUN 41* 48*   CREA 4.62* 5.02*   CA 8.6 8.5   ALB 2.5* 2.8*   ALT 7* 8*     No components found for: [address] [place] [address] [address] 01/13/23  1051   PH 7.32*   PCO2 52*   PO2 87   HCO3 26   FIO2 50     No results for input(s): INR, INREXT, INREXT in the last 72 hours. Assessment:           Active Problems:    Hypoxia (1/12/2023)      Fluid overload (1/12/2023)      COVID-19 (1/12/2023)      Acute hypoxemic respiratory failure ([address] (1/12/2023)    ESRD hemodialysis dependent on a Tuesday Thursday Saturday schedule    Hypertension    COVID-19 infection    Secondary hyperparathyroidism    Anemia of chronic disease on Retacrit    Hyponatremia. Her corrected sodium is 131 mEq. Diabetes mellitus. Plan:   Hemodialysis on a Tuesday Thursday Saturday schedule. Will correct sodium on hemodialysis. Check a phosphorus level.

## 2023-01-13 NOTE — H&P
History and Physical    NAME: Tyrone Ricks   :  1975   MRN:  [id number]     Date/Time:  2023 12:56 PM    Patient PCP: Benjy Garcia NP  ______________________________________________________________________             Subjective:     CHIEF COMPLAINT:     Shortness of breath    HISTORY OF PRESENT ILLNESS:       Patient is a 52y.o. year old female with past medical history of CVA, diabetes, CKD stage 5,on HD  hypertension, and cardiomegaly who presented to the ED today with shortness of breath. Patient states that over the last few days she has been feeling very sick and because of that she could only get 2 hours of dialysis done on Monday and 30 minutes yesterday. And then today she started feeling very short of breath and called EMS. On arrival to the ED patient was noted to be hypoxic in the 60s. She was placed on non-breather with improvement of oxygenation. Pt was tested positive for COVID test done in the ER. This is her first time getting diagnosed with COVID. She also has chills, cough and chest pain associated with it, nausea, and vomiting. She is currently on high flow oxygen. CBC shows leukocytosis 14,400, BNP 27,904, lactic acid 2.4, sodium 130, chloride 94, troponin 55. Glucose 372. Chest x ray done in the ED shows:  1. Slightly asymmetric CHF pattern is suspected. Alternatively, correlate  clinically for bilateral infection, right greater than left. .  .  2. Stable cardiomegaly. 3. Technically compromised by patient size and position. A standard 2 view  examination would be more useful when clinically possible.     Past Medical History:   Diagnosis Date    Allergic rhinitis     Blood clot in vein     Chronic kidney disease     CVA (cerebral vascular accident) ([facility] [address] 2014    Diabetes ([facility] [address]     Dyslipidemia     Hypertension     IBS (irritable bowel syndrome)     Ill-defined condition     Renal failure         Past Surgical History:   Procedure Laterality Date    HX  SECTION  2001    HX CHOLECYSTECTOMY  2010    HX LAP CHOLECYSTECTOMY      HX TONSILLECTOMY  1983    [place] UNLISTED PROCEDURE VASCULAR SURGERY         Social History     Tobacco Use    Smoking status: Never    Smokeless tobacco: Never   Substance Use Topics    Alcohol use: Never        Family History   Problem Relation Age of Onset    Hypertension Mother     Diabetes Mother     Heart Disease Mother     Hypertension Father     Diabetes Father     Heart Disease Father        Allergies   Allergen Reactions    Fentanyl Anxiety    Sulfa (Sulfonamide Antibiotics) Nausea and Vomiting    Zithromax [Azithromycin] Unknown (comments)    Morphine Itching        Prior to Admission medications    Medication Sig Start Date End Date Taking? Authorizing Provider   rivaroxaban (XARELTO) 2.5 mg tablet Take 2.5 mg by mouth two (2) times a day. Yes Provider, Historical   NIFEdipine ER (PROCARDIA XL) 60 mg ER tablet Take 60 mg by mouth daily. Yes Provider, Historical   carvediloL (COREG) 12.5 mg tablet Take 0.5 Tablets by mouth two (2) times a day for 30 days. 7/1/22 1/12/23 Yes Maddison Don NP   Levemir FlexTouch U-100 Insuln 100 unit/mL (3 mL) inpn 62 Units by SubCUTAneous route nightly. 5/14/22  Yes Provider, Historical   ondansetron (ZOFRAN ODT) 4 mg disintegrating tablet Take 1 Tablet by mouth every eight (8) hours as needed for Nausea or Vomiting. 8/9/22   Luis Alas NP   ondansetron (ZOFRAN ODT) 4 mg disintegrating tablet Take 1 Tablet by mouth every eight (8) hours as needed for Nausea or Vomiting. 6/29/22   Dana LARSON PA-C   furosemide (LASIX) 80 mg tablet Take 80 mg by mouth daily. 6/10/22   Provider, Historical   hydrOXYzine HCL (ATARAX) 25 mg tablet Take  by mouth every eight (8) hours as needed. Take 1-2 tablets 6/8/22   Provider, Historical   sevelamer carbonate (RENVELA) 800 mg tab tab Take 800 mg by mouth three (3) times daily (with meals).  5/14/22   Provider, Historical   spironolactone (ALDACTONE) 50 mg tablet Take 50 mg by mouth daily. 6/10/22   Provider, Historical   dextroamphetamine-amphetamine (ADDERALL) 20 mg tablet Take 20 mg by mouth three (3) times daily. Provider, Historical   traMADoL (ULTRAM) 50 mg tablet Take 50 mg by mouth two (2) times daily as needed for Pain. Provider, Historical   atorvastatin (LIPITOR) 20 mg tablet Take 20 mg by mouth daily. Provider, Historical   gabapentin (NEURONTIN) 300 mg capsule Take 300 mg by mouth nightly. Provider, Historical   omeprazole (PRILOSEC) 20 mg capsule Take 20 mg by mouth daily.     Provider, Historical         Current Facility-Administered Medications:     insulin lispro (HUMALOG) injection 15 Units, 15 Units, SubCUTAneous, NOW, Justin Brito MD    epoetin jodie-epbx (RETACRIT) injection 8,000 Units, 8,000 Units, IntraVENous, DIALYSIS TUE, THU & SAT, Marcy Hylton MD, 8,000 Units at 01/12/23 2242    acetaminophen (TYLENOL) tablet 650 mg, 650 mg, Oral, Q6H PRN, Justin Brito MD, 650 mg at 01/13/23 0441    atorvastatin (LIPITOR) tablet 20 mg, 20 mg, Oral, DAILY, Justin Brito MD    furosemide (LASIX) tablet 80 mg, 80 mg, Oral, DAILY, Justin Brito MD    gabapentin (NEURONTIN) capsule 300 mg, 300 mg, Oral, QHS, Justin Brito MD, 300 mg at 01/12/23 2104    sevelamer carbonate (RENVELA) tab 800 mg, 800 mg, Oral, TID WITH MEALS, Justin Brito MD, 800 mg at 01/12/23 1725    insulin lispro (HUMALOG) injection, , SubCUTAneous, AC&HS, Justin Brito MD, 5 Units at 01/12/23 2241    glucose chewable tablet 16 g, 4 Tablet, Oral, PRN, Catia Brito MD    glucagon (GLUCAGEN) injection 1 mg, 1 mg, IntraMUSCular, PRN, Catia Brito MD    acetaminophen (TYLENOL) tablet 650 mg, 650 mg, Oral, Q6H PRN **OR** acetaminophen (TYLENOL) suppository 650 mg, 650 mg, Rectal, Q6H PRN, Justin Brito MD    polyethylene glycol (MIRALAX) packet 17 g, 17 g, Oral, DAILY PRN, Justin Brito MD    ondansetron (ZOFRAN ODT) tablet 4 mg, 4 mg, Oral, Q8H PRN **OR** ondansetron (ZOFRAN) injection 4 mg, 4 mg, IntraVENous, Q6H PRN, Justin Brito MD    pantoprazole (PROTONIX) tablet 20 mg, 20 mg, Oral, DAILY, Justin Brito MD    [COMPLETED] piperacillin-tazobactam (ZOSYN) 4.5 g in 0.9% sodium chloride (MBP/ADV) 100 mL MBP, 4.5 g, IntraVENous, ONCE, Last Rate: 200 mL/hr at 01/12/23 1725, 4.5 g at 01/12/23 1725 **FOLLOWED BY** piperacillin-tazobactam (ZOSYN) 3.375 g in 0.9% sodium chloride (MBP/ADV) 100 mL MBP, 3.375 g, IntraVENous, Q12H, Justin Brito MD, Last Rate: 25 mL/hr at 01/12/23 2243, 3.375 g at 01/12/23 2243    insulin glargine (LANTUS) injection 40 Units, 40 Units, SubCUTAneous, Q12H, Justin Brito MD, 40 Units at 01/12/23 1813    dexamethasone (DECADRON) 4 mg/mL injection 6 mg, 6 mg, IntraVENous, DAILY, Justin Brito MD, 6 mg at 01/12/23 1725    dextromethorphan (DELSYM) 30 mg/5 mL syrup 30 mg, 30 mg, Oral, Q12H, Ansley Nickerson MD, 30 mg at 01/12/23 2103    benzonatate (TESSALON) capsule 100 mg, 100 mg, Oral, TID PRN, Erica Araujo MD    rivaroxaban (XARELTO) tablet 2.5 mg, 2.5 mg, Oral, DAILY, Justin Brito MD, 2.5 mg at 01/12/23 2104    traMADoL (ULTRAM) tablet 50 mg, 50 mg, Oral, Q6H PRN, Justin Brito MD, 50 mg at 01/13/23 0026    LAB DATA REVIEWED:    Recent Results (from the past 24 hour(s))   INFLUENZA A & B AG (RAPID TEST)    Collection Time: 01/12/23 10:10 AM   Result Value Ref Range    Influenza A Antigen Negative Negative      Influenza B Antigen Negative Negative     COVID-19 RAPID TEST    Collection Time: 01/12/23 10:10 AM   Result Value Ref Range    COVID-19 rapid test DETECTED (A) Not Detected     CBC WITH AUTOMATED DIFF    Collection Time: 01/12/23 10:11 AM   Result Value Ref Range    WBC 14.4 (H) 3.6 - 11.0 K/uL    RBC 3.16 (L) 3.80 - 5.20 M/uL    HGB 9.3 (L) 11.5 - 16.0 g/dL    HCT 29.7 (L) 35.0 - 47.0 %    MCV 94.0 80.0 - 99.0 FL    MCH 29.4 26.0 - 34.0 PG    MCHC 31.3 30.0 - 36.5 g/dL    RDW 13.2 11.5 - 14.5 %    PLATELET 709 753 - 410 K/uL    MPV 10.9 8.9 - 12.9 FL    NRBC 0.0 0.0  WBC    ABSOLUTE NRBC 0.00 0.00 - 0.01 K/uL    NEUTROPHILS 82 (H) 32 - 75 %    LYMPHOCYTES 10 (L) 12 - 49 %    MONOCYTES 7 5 - 13 %    EOSINOPHILS 0 0 - 7 %    BASOPHILS 0 0 - 1 %    IMMATURE GRANULOCYTES 1 (H) 0 - 0.5 %    ABS. NEUTROPHILS 11.9 (H) 1.8 - 8.0 K/UL    ABS. LYMPHOCYTES 1.4 0.8 - 3.5 K/UL    ABS. MONOCYTES 1.0 0.0 - 1.0 K/UL    ABS. EOSINOPHILS 0.0 0.0 - 0.4 K/UL    ABS. BASOPHILS 0.0 0.0 - 0.1 K/UL    ABS. IMM. GRANS. 0.1 (H) 0.00 - 0.04 K/UL    DF AUTOMATED     METABOLIC PANEL, COMPREHENSIVE    Collection Time: 01/12/23 10:11 AM   Result Value Ref Range    Sodium 130 (L) 136 - 145 mmol/L    Potassium Specimen hemolyzed, results may be affected 3.5 - 5.1 mmol/L    Chloride 94 (L) 97 - 108 mmol/L    CO2 27 21 - 32 mmol/L    Anion gap 9 5 - 15 mmol/L    Glucose 372 (H) 65 - 100 mg/dL    BUN 48 (H) 6 - 20 mg/dL    Creatinine 5.02 (H) 0.55 - 1.02 mg/dL    BUN/Creatinine ratio 10 (L) 12 - 20      eGFR 10 (L) >60 ml/min/1.73m2    Calcium 8.5 8.5 - 10.1 mg/dL    Bilirubin, total 0.7 0.2 - 1.0 mg/dL    AST (SGOT) Specimen hemolyzed, results may be affected 15 - 37 U/L    ALT (SGPT) 8 (L) 12 - 78 U/L    Alk.  phosphatase 84 45 - 117 U/L    Protein, total 6.8 6.4 - 8.2 g/dL    Albumin 2.8 (L) 3.5 - 5.0 g/dL    Globulin 4.0 2.0 - 4.0 g/dL    A-G Ratio 0.7 (L) 1.1 - 2.2     LACTIC ACID    Collection Time: 01/12/23 10:11 AM   Result Value Ref Range    Lactic acid 2.4 (HH) 0.4 - 2.0 mmol/L   TROPONIN-HIGH SENSITIVITY    Collection Time: 01/12/23 10:11 AM   Result Value Ref Range    Troponin-High Sensitivity 55 (H) 0 - 51 ng/L   NT-PRO BNP    Collection Time: 01/12/23 10:11 AM   Result Value Ref Range    NT pro-BNP 27,904 (H) <125 pg/mL   EKG, 12 LEAD, INITIAL    Collection Time: 01/12/23 10:14 AM   Result Value Ref Range    Ventricular Rate 70 BPM    Atrial Rate 70 BPM    P-R Interval 162 ms    QRS Duration 74 ms Q-T Interval 406 ms    QTC Calculation (Bezet) 438 ms    Calculated P Axis 20 degrees    Calculated R Axis -19 degrees    Calculated T Axis 79 degrees    Diagnosis       Normal sinus rhythm  Anterior infarct , age undetermined  Abnormal ECG  When compared with ECG of 09-AUG-2022 08:53,  No significant change was found  Confirmed by Nati Stauffer ([id number]) on 1/12/2023 2:39:51 PM     GLUCOSE, POC    Collection Time: 01/12/23  5:33 PM   Result Value Ref Range    Glucose (POC) 209 (H) 65 - 100 mg/dL    Performed by Fili Reeder    MRSA SCREEN - PCR (NASAL)    Collection Time: 01/12/23  7:51 PM   Result Value Ref Range    MRSA by PCR, Nasal Not Detected Not Detected     GLUCOSE, POC    Collection Time: 01/12/23 10:03 PM   Result Value Ref Range    Glucose (POC) 321 (H) 65 - 100 mg/dL    Performed by Neville Parra (PCT)    METABOLIC PANEL, COMPREHENSIVE    Collection Time: 01/13/23  4:55 AM   Result Value Ref Range    Sodium 127 (L) 136 - 145 mmol/L    Potassium 5.1 3.5 - 5.1 mmol/L    Chloride 93 (L) 97 - 108 mmol/L    CO2 26 21 - 32 mmol/L    Anion gap 8 5 - 15 mmol/L    Glucose 430 (H) 65 - 100 mg/dL    BUN 41 (H) 6 - 20 mg/dL    Creatinine 4.62 (H) 0.55 - 1.02 mg/dL    BUN/Creatinine ratio 9 (L) 12 - 20      eGFR 11 (L) >60 ml/min/1.73m2    Calcium 8.6 8.5 - 10.1 mg/dL    Bilirubin, total 0.6 0.2 - 1.0 mg/dL    AST (SGOT) 5 (L) 15 - 37 U/L    ALT (SGPT) 7 (L) 12 - 78 U/L    Alk.  phosphatase 76 45 - 117 U/L    Protein, total 7.5 6.4 - 8.2 g/dL    Albumin 2.5 (L) 3.5 - 5.0 g/dL    Globulin 5.0 (H) 2.0 - 4.0 g/dL    A-G Ratio 0.5 (L) 1.1 - 2.2     CBC WITH AUTOMATED DIFF    Collection Time: 01/13/23  4:55 AM   Result Value Ref Range    WBC 11.9 (H) 3.6 - 11.0 K/uL    RBC 3.16 (L) 3.80 - 5.20 M/uL    HGB 9.4 (L) 11.5 - 16.0 g/dL    HCT 29.1 (L) 35.0 - 47.0 %    MCV 92.1 80.0 - 99.0 FL    MCH 29.7 26.0 - 34.0 PG    MCHC 32.3 30.0 - 36.5 g/dL    RDW 13.1 11.5 - 14.5 %    PLATELET 276 965 - 568 K/uL    MPV 11.0 8.9 - 12.9 FL    NRBC 0.0 0.0  WBC    ABSOLUTE NRBC 0.00 0.00 - 0.01 K/uL    NEUTROPHILS 89 (H) 32 - 75 %    LYMPHOCYTES 6 (L) 12 - 49 %    MONOCYTES 4 (L) 5 - 13 %    EOSINOPHILS 0 0 - 7 %    BASOPHILS 0 0 - 1 %    IMMATURE GRANULOCYTES 1 (H) 0 - 0.5 %    ABS. NEUTROPHILS 10.6 (H) 1.8 - 8.0 K/UL    ABS. LYMPHOCYTES 0.8 0.8 - 3.5 K/UL    ABS. MONOCYTES 0.5 0.0 - 1.0 K/UL    ABS. EOSINOPHILS 0.0 0.0 - 0.4 K/UL    ABS. BASOPHILS 0.0 0.0 - 0.1 K/UL    ABS. IMM. GRANS. 0.1 (H) 0.00 - 0.04 K/UL    DF AUTOMATED     LD    Collection Time: 01/13/23  4:55 AM   Result Value Ref Range     81 - 246 U/L   C REACTIVE PROTEIN, QT    Collection Time: 01/13/23  4:55 AM   Result Value Ref Range    C-Reactive protein 24.40 (H) 0.00 - 0.60 mg/dL   PROCALCITONIN    Collection Time: 01/13/23  4:55 AM   Result Value Ref Range    Procalcitonin 4.07 (H) 0 ng/mL   GLUCOSE, POC    Collection Time: 01/13/23  8:07 AM   Result Value Ref Range    Glucose (POC) 433 (H) 65 - 100 mg/dL    Performed by Alicia Lefort        XR Results (most recent):  Results from [place] encounter on 01/12/23    XR CHEST PORT    Narrative  INDICATION: COVID PNA, pulmonary edema    EXAMINATION:  AP CHEST, PORTABLE    COMPARISON: 1/12/2023    FINDINGS: Single AP portable view of the chest demonstrates cardiomegaly. Lungs  are adequately expanded with diffuse bilateral airspace disease. No  pneumothorax. Impression  Cardiomegaly and diffuse airspace disease similar to prior study. XR CHEST PORT   Final Result   Cardiomegaly and diffuse airspace disease similar to prior study. XR CHEST SNGL V   Final Result      1. Slightly asymmetric CHF pattern is suspected. Alternatively, correlate   clinically for bilateral infection, right greater than left. .  .   2. Stable cardiomegaly. 3. Technically compromised by patient size and position. A standard 2 view   examination would be more useful when clinically possible.          XR CHEST PORT (Results Pending)        Review of Systems:  Constitutional: Negative for fever. Positive for chills. HENT: Negative. Eyes: Negative. Respiratory: Negative. Positive for cough and shortness of breath  Cardiovascular: Negative. Gastrointestinal: Negative for abdominal pain and nausea. Skin: Negative. Neurological: Negative. Objective:   VITALS:    Visit Vitals  BP (!) 123/53 (BP 1 Location: Right lower arm, BP Patient Position: At rest)   Pulse 60   Temp (!) 60 °F (15.6 °C)   Resp 18   Ht 5' 8\" (1.727 m)   Wt 123.3 kg (271 lb 13.2 oz)   SpO2 98%   BMI 41.33 kg/m²       Physical Exam:   Constitutional: pt is oriented to person, place, and time. HENT:   Head: Normocephalic and atraumatic. Eyes: Pupils are equal, round, and reactive to light. EOM are normal.   Cardiovascular: Normal rate, regular rhythm and normal heart sounds. Mild leg edema bilaterally. Pulmonary/Chest: Decreased breath sounds. No wheezes. No rales. Exhibits no tenderness. Abdominal: Soft. Bowel sounds are normal. There is no abdominal tenderness. There is no rebound and no guarding. Musculoskeletal: Normal range of motion. Neurological: pt is alert and oriented to person, place, and time. Alert. Normal strength. No cranial nerve deficit or sensory deficit. Displays a negative Romberg sign. ASSESSMENT & PLAN:    1. COVID-19  - CXR shows bilateral infection, right greater than left  2. Acute hypoxic respiratory failure  - secondary to Covid infection  - currently on high flow oxygen  - SpO2 97%   3. Leukocytosis  - due to Covid infection  4. Lactic acidosis  - likely due to respiratory failure and renal failure  5. Acute congestive heart failure/from fluid overload  - BNP 27,904  6. Type 2 diabetes  - pt on Lantus and Humalog outpatient   7. CKD Stage 5  - dialysis on M/W/F  - follows Dr. Js Noonan outpatient  8. Hypertension    9. Hx of cardiomegaly  - CXR shows stable cardiomegaly  10.  Hx of CVA    Admit to ICU Lasix 80 mg  Repeat BNP  Repeat labs  Sliding scale insulin  Start on Decadron   Start on heparin for DVT prophylaxis    Nephrology consult  Pulmonology consult  infectious disease  ________________________________________________________________________    Signed: Aracelis Trimble MD

## 2023-01-13 NOTE — CONSULTS
IMPRESSION:   Acute hypoxic respiratory failure  COVID-19  Sepsis  CHF with fluid overload  Chronic kidney disease stage V on dialysis  Hypertension and diabetes by history  Additional workup outlined below  Pt is at high risk of sudden decline and decompensation with life threatening consequenses and continued end organ dysfunction and failure  Pt is critically ill. Time spent with pt and staff actively rendering care, managing pt and coordinating care as stated below;  55 minutes, exclusive of any procedures      RECOMMENDATIONS/PLAN:   ICU monitoring  63-year-old obese lady came in because of shortness of breath and dyspnea patient has COVID-19 also patient has history of chronic kidney disease on hemodialysis and she missed 2 dialysis before   Continue with dialysis she was getting it Monday Wednesday Friday we will repeat dialysis chest x-ray shows CHF fluid overload  For COVID-19 started patient on Decadron cannot give baricitinib we will try to give her Actemra  CRP 24.7 procalcitonin 4.0 BNP 90323  CXR shows bilateral congestive changes superimposed infiltrate  Intubate and place on vent if NIV fails  Agree with Empiric IV antibiotics pending culture results   Follow culture results  CVP monitoring  IV vasopressors for circulatory shock refractory to fluids to maintain SBP> 90  High flow nasal Cannula oxygen as salvage oxygen delivery device to provide high concentration of oxygen to overcome refractory hypoxia;    Patient requiring restraints due to threat of injury to self, interference with medical devices  Transfuse prn to maintain Hgb > 7  Labs to follow electrolytes, renal function and and blood counts  Bronchial hygiene with respiratory therapy techniques, bronchodilators  Pt needs IV fluids with additives and Drug therapy requiring intensive monitoring for toxicity  Prescription drug management with home med reconciliation reviewed  DVT, SUP prophylaxis  Will be available to assist in medical management while in the CCU pending disposition     [x] High complexity decision making was performed  [x] See my orders for details  HPI  59-year-old lady came in because of shortness of breath and dyspnea significant past medical history of chronic kidney disease stage V on hemodialysis hypertension CVA and diabetes mellitus. She went to [facility] and also she gets dialysis but she is to dialysis and then she was diagnosed with COVID-19 also having cough fever chills chest pain nausea and vomiting came to the emergency room she was put on high flow nasal cannula saturation was low BNP was elevated glucose was also elevated chest x-ray shows CHF fluid overload with superimposed possible COVID picture so admitted and critical care consult was called. PMH:  has a past medical history of Allergic rhinitis, Blood clot in vein, Chronic kidney disease, CVA (cerebral vascular accident) ([facility] [address] (2014), Diabetes ([facility] [address] Dyslipidemia, Hypertension, IBS (irritable bowel syndrome), Ill-defined condition, and Renal failure. PSH:   has a past surgical history that includes hx cholecystectomy (); hx tonsillectomy (); hx  section (); pr unlisted procedure vascular surgery; and hx lap cholecystectomy. FHX: family history includes Diabetes in her father and mother; Heart Disease in her father and mother; Hypertension in her father and mother. SHX:  reports that she has never smoked. She has never used smokeless tobacco. She reports that she does not drink alcohol and does not use drugs.     ALL:   Allergies   Allergen Reactions    Fentanyl Anxiety    Sulfa (Sulfonamide Antibiotics) Nausea and Vomiting    Zithromax [Azithromycin] Unknown (comments)    Morphine Itching        MEDS:   [x] Reviewed - As Below   [] Not reviewed    Current Facility-Administered Medications   Medication    epoetin jodie-epbx (RETACRIT) injection 8,000 Units    acetaminophen (TYLENOL) tablet 650 mg    atorvastatin (LIPITOR) tablet 20 mg    furosemide (LASIX) tablet 80 mg    gabapentin (NEURONTIN) capsule 300 mg    sevelamer carbonate (RENVELA) tab 800 mg    insulin lispro (HUMALOG) injection    glucose chewable tablet 16 g    glucagon (GLUCAGEN) injection 1 mg    acetaminophen (TYLENOL) tablet 650 mg    Or    acetaminophen (TYLENOL) suppository 650 mg    polyethylene glycol (MIRALAX) packet 17 g    ondansetron (ZOFRAN ODT) tablet 4 mg    Or    ondansetron (ZOFRAN) injection 4 mg    pantoprazole (PROTONIX) tablet 20 mg    piperacillin-tazobactam (ZOSYN) 3.375 g in 0.9% sodium chloride (MBP/ADV) 100 mL MBP    insulin glargine (LANTUS) injection 40 Units    dexamethasone (DECADRON) 4 mg/mL injection 6 mg    dextromethorphan (DELSYM) 30 mg/5 mL syrup 30 mg    benzonatate (TESSALON) capsule 100 mg    rivaroxaban (XARELTO) tablet 2.5 mg    traMADoL (ULTRAM) tablet 50 mg      MAR reviewed and pertinent medications noted or modified as needed   Current Facility-Administered Medications   Medication    epoetin jodie-epbx (RETACRIT) injection 8,000 Units    acetaminophen (TYLENOL) tablet 650 mg    atorvastatin (LIPITOR) tablet 20 mg    furosemide (LASIX) tablet 80 mg    gabapentin (NEURONTIN) capsule 300 mg    sevelamer carbonate (RENVELA) tab 800 mg    insulin lispro (HUMALOG) injection    glucose chewable tablet 16 g    glucagon (GLUCAGEN) injection 1 mg    acetaminophen (TYLENOL) tablet 650 mg    Or    acetaminophen (TYLENOL) suppository 650 mg    polyethylene glycol (MIRALAX) packet 17 g    ondansetron (ZOFRAN ODT) tablet 4 mg    Or    ondansetron (ZOFRAN) injection 4 mg    pantoprazole (PROTONIX) tablet 20 mg    piperacillin-tazobactam (ZOSYN) 3.375 g in 0.9% sodium chloride (MBP/ADV) 100 mL MBP    insulin glargine (LANTUS) injection 40 Units    dexamethasone (DECADRON) 4 mg/mL injection 6 mg    dextromethorphan (DELSYM) 30 mg/5 mL syrup 30 mg    benzonatate (TESSALON) capsule 100 mg    rivaroxaban (XARELTO) tablet 2.5 mg    traMADoL Melba Alvarado) tablet 50 mg      PMH:  has a past medical history of Allergic rhinitis, Blood clot in vein, Chronic kidney disease, CVA (cerebral vascular accident) ([facility] [address] (2014), Diabetes ([facility] [address] Dyslipidemia, Hypertension, IBS (irritable bowel syndrome), Ill-defined condition, and Renal failure. PSH:   has a past surgical history that includes hx cholecystectomy (); hx tonsillectomy (); hx  section (); pr unlisted procedure vascular surgery; and hx lap cholecystectomy. FHX: family history includes Diabetes in her father and mother; Heart Disease in her father and mother; Hypertension in her father and mother. SHX:  reports that she has never smoked. She has never used smokeless tobacco. She reports that she does not drink alcohol and does not use drugs. ROS:A comprehensive review of systems was negative except for that written in the HPI. Hemodynamics:    CO:    CI:    CVP:    SVR:   PAP Systolic:    PAP Diastolic:    PVR:    XO73:        Ventilator Settings:      Mode Rate TV Press PEEP FiO2 PIP Min. Vent               50 %              Vital Signs: Telemetry:    normal sinus rhythm Intake/Output:   Visit Vitals  BP (!) 123/53 (BP 1 Location: Right lower arm, BP Patient Position: At rest)   Pulse 60   Temp (!) 60 °F (15.6 °C)   Resp 18   Ht 5' 8\" (1.727 m)   Wt 123.3 kg (271 lb 13.2 oz)   SpO2 98%   BMI 41.33 kg/m²       Temp (24hrs), Av.2 °F (34 °C), Min:60 °F (15.6 °C), Max:99.4 °F (37.4 °C)        O2 Device: Heated, Hi flow nasal cannula O2 Flow Rate (L/min): 30 l/min       Wt Readings from Last 4 Encounters:   23 123.3 kg (271 lb 13.2 oz)   09/15/22 117.9 kg (260 lb)   22 122.5 kg (270 lb)   22 128.2 kg (282 lb 10.1 oz)          Intake/Output Summary (Last 24 hours) at 2023 0802  Last data filed at 2023 0600  Gross per 24 hour   Intake 120 ml   Output 3000 ml   Net -2880 ml       Last shift:      No intake/output data recorded.   Last 3 shifts: 01/11 1901 - 01/13 0700  In: 120 [I.V.:120]  Out: 3000        Physical Exam:     General: HFNC; alert awake  HEENT: NCAT, poor dentition, lips and mucosa dry  Eyes: anicteric; conjunctiva clear  Neck: no nodes,  trach midline; no accessory MM use. Chest: no deformity,   Cardiac: IR regular; no murmur;   Lungs: distant breath sounds; bilateral rales  Abd: soft, NT, hypoactive BS  Ext: no edema; no joint swelling;  No clubbing  : NO odom, clear urine  Neuro: Alert awake  Psych- no agitation, oriented to person;   Skin: warm, dry, no cyanosis;   Pulses: 1-2+ Bilateral pedal, radial  Capillary: brisk; pale      DATA:    MAR reviewed and pertinent medications noted or modified as needed  MEDS:   Current Facility-Administered Medications   Medication    epoetin jodie-epbx (RETACRIT) injection 8,000 Units    acetaminophen (TYLENOL) tablet 650 mg    atorvastatin (LIPITOR) tablet 20 mg    furosemide (LASIX) tablet 80 mg    gabapentin (NEURONTIN) capsule 300 mg    sevelamer carbonate (RENVELA) tab 800 mg    insulin lispro (HUMALOG) injection    glucose chewable tablet 16 g    glucagon (GLUCAGEN) injection 1 mg    acetaminophen (TYLENOL) tablet 650 mg    Or    acetaminophen (TYLENOL) suppository 650 mg    polyethylene glycol (MIRALAX) packet 17 g    ondansetron (ZOFRAN ODT) tablet 4 mg    Or    ondansetron (ZOFRAN) injection 4 mg    pantoprazole (PROTONIX) tablet 20 mg    piperacillin-tazobactam (ZOSYN) 3.375 g in 0.9% sodium chloride (MBP/ADV) 100 mL MBP    insulin glargine (LANTUS) injection 40 Units    dexamethasone (DECADRON) 4 mg/mL injection 6 mg    dextromethorphan (DELSYM) 30 mg/5 mL syrup 30 mg    benzonatate (TESSALON) capsule 100 mg    rivaroxaban (XARELTO) tablet 2.5 mg    traMADoL (ULTRAM) tablet 50 mg        Labs:    Recent Labs     01/13/23  0455 01/12/23  1011   WBC 11.9* 14.4*   HGB 9.4* 9.3*    335     Recent Labs     01/13/23  0455 01/12/23  1011   * 130*   K 5.1 Specimen hemolyzed, results may be affected   CL 93* 94*   CO2 26 27   * 372*   BUN 41* 48*   CREA 4.62* 5.02*   CA 8.6 8.5   LAC  --  2.4*   ALB 2.5* 2.8*   ALT 7* 8*     No results for input(s): PH, PCO2, PO2, HCO3, FIO2 in the last 72 hours. No results for input(s): CPK, CKNDX, TROIQ in the last 72 hours. No lab exists for component: CPKMB  No results found for: BNPP, BNP   No results found for: CULT  No results found for: TSH, TSHEXT     Imaging:    Results from Hospital Encounter encounter on 01/12/23    XR CHEST PORT    Narrative  INDICATION: COVID PNA, pulmonary edema    EXAMINATION:  AP CHEST, PORTABLE    COMPARISON: 1/12/2023    FINDINGS: Single AP portable view of the chest demonstrates cardiomegaly. Lungs  are adequately expanded with diffuse bilateral airspace disease. No  pneumothorax. Impression  Cardiomegaly and diffuse airspace disease similar to prior study. Results from [place] encounter on 08/09/22    CT ABD PELV WO CONT    Narrative  EXAM: CT ABD PELV WO CONT    INDICATION: left flank pain    COMPARISON: 6/29/2022    IV CONTRAST: None. ORAL CONTRAST:    TECHNIQUE:  Thin axial images were obtained through the abdomen and pelvis. Coronal and  sagittal reformats were generated. CT dose reduction was achieved through use of  a standardized protocol tailored for this examination and automatic exposure  control for dose modulation. The absence of intravenous contrast material reduces the sensitivity for  evaluation of the vasculature and solid organs. FINDINGS:  LOWER THORAX: Pericardial effusion is stable. Cardiomegaly is stable there is  minor left basilar atelectasis. LIVER: No mass. BILIARY TREE: Gallbladder is within normal limits. CBD is not dilated. SPLEEN: within normal limits. PANCREAS: No focal abnormality. ADRENALS: Unremarkable. KIDNEYS/URETERS: No calculus or hydronephrosis. STOMACH: Unremarkable. SMALL BOWEL: No dilatation or wall thickening.   COLON: No dilatation or wall thickening. Sigmoid colon is tortuous. APPENDIX: Normal  PERITONEUM: No ascites or pneumoperitoneum. RETROPERITONEUM: No lymphadenopathy or aortic aneurysm. There are extensive  atherosclerotic changes  REPRODUCTIVE ORGANS: Uterus is normal  URINARY BLADDER: Incompletely distended  BONES: No destructive bone lesion. ABDOMINAL WALL: There is a small umbilical hernia containing fat  ADDITIONAL COMMENTS: There is increased intra-abdominal fat    Impression  No acute abnormality identified. There has been no significant change. There are  extensive atherosclerotic changes. This care involved high complexity decision making which includes independently reviewing the patient's past medical records, current laboratory results, medication profiles that were immediately available to me and actual Xray images at the bedside in order to assess, support vital system function, and to treat this degree of vital organ system failure, and to prevent further life threatening deterioration of the patients condition. I was in direct communication with the nursing staff throughout this time. Medical Decision Making Today  Reviewed the flowsheet and previous days notes  Reviewed and summarized records or history from previous days note or discussions with staff, family  Parenteral controlled substances - Reviewed/ Adjusted / Rosenda Ni / Started  High Risk Drug therapy requiring intensive monitoring for toxicity: eg steroids, pressors, antibiotics  Review and order of Clinical lab tests  Review and Order of Radiology tests  Review and Order of Medicine tests  Independent visualization of radiologic Images  Reviewed Ventilator / NiPPV  I have personally reviewed the patients ECG / Telemetry  Diagnostic endoscopies with identified risk factors    I have provided total of 55  minutes of critical care time rendering care exclusive of any procedures.  During this entire length of time the patient's condition was unstable, unpredictable and critically ill in the CCU/ ICU. I was immediately available to the patient whose care required several interactions with nursing, multidisciplinary team members leading to multiple interventions with fluid resuscitation and medication adjustments to optimize respiratory support, hemodynamic treatment, medication changes based on repeat labs results, reviews, exams and assessments. The reason for providing this level of medical care was due to a critical illness that impaired one or more vital organ systems, such that there was a high probability of sudden or life threatening deterioration in the patient's condition.

## 2023-01-14 ENCOUNTER — APPOINTMENT (OUTPATIENT)
Dept: GENERAL RADIOLOGY | Age: 48
DRG: 177 | End: 2023-01-14
Attending: INTERNAL MEDICINE
Payer: MEDICARE

## 2023-01-14 LAB
ALBUMIN SERPL-MCNC: 2.8 G/DL (ref 3.5–5)
ALBUMIN/GLOB SERPL: 0.6 (ref 1.1–2.2)
ALP SERPL-CCNC: 80 U/L (ref 45–117)
ALT SERPL-CCNC: 8 U/L (ref 12–78)
ANION GAP SERPL CALC-SCNC: 9 MMOL/L (ref 5–15)
ARTERIAL PATENCY WRIST A: YES
AST SERPL W P-5'-P-CCNC: 4 U/L (ref 15–37)
BASE DEFICIT BLDA-SCNC: 3.8 MMOL/L
BASOPHILS # BLD: 0 K/UL (ref 0–0.1)
BASOPHILS NFR BLD: 0 % (ref 0–1)
BDY SITE: ABNORMAL
BILIRUB SERPL-MCNC: 0.5 MG/DL (ref 0.2–1)
BODY TEMPERATURE: 98
BUN SERPL-MCNC: 65 MG/DL (ref 6–20)
BUN/CREAT SERPL: 11 (ref 12–20)
CA-I BLD-MCNC: 8.4 MG/DL (ref 8.5–10.1)
CHLORIDE SERPL-SCNC: 93 MMOL/L (ref 97–108)
CO2 SERPL-SCNC: 25 MMOL/L (ref 21–32)
COHGB MFR BLD: 1.8 % (ref 1–2)
CREAT SERPL-MCNC: 5.79 MG/DL (ref 0.55–1.02)
DIFFERENTIAL METHOD BLD: ABNORMAL
EOSINOPHIL # BLD: 0 K/UL (ref 0–0.4)
EOSINOPHIL NFR BLD: 0 % (ref 0–7)
ERYTHROCYTE [DISTWIDTH] IN BLOOD BY AUTOMATED COUNT: 13.1 % (ref 11.5–14.5)
FIO2 ON VENT: 45 %
GAS FLOW.O2 O2 DELIVERY SYS: 35 L/MIN
GLOBULIN SER CALC-MCNC: 4.7 G/DL (ref 2–4)
GLUCOSE BLD STRIP.AUTO-MCNC: 164 MG/DL (ref 65–100)
GLUCOSE BLD STRIP.AUTO-MCNC: 218 MG/DL (ref 65–100)
GLUCOSE BLD STRIP.AUTO-MCNC: 263 MG/DL (ref 65–100)
GLUCOSE BLD STRIP.AUTO-MCNC: 280 MG/DL (ref 65–100)
GLUCOSE BLD STRIP.AUTO-MCNC: 286 MG/DL (ref 65–100)
GLUCOSE BLD STRIP.AUTO-MCNC: 301 MG/DL (ref 65–100)
GLUCOSE SERPL-MCNC: 339 MG/DL (ref 65–100)
HCO3 BLDA-SCNC: 20 MMOL/L (ref 22–26)
HCT VFR BLD AUTO: 32.1 % (ref 35–47)
HGB BLD-MCNC: 10.4 G/DL (ref 11.5–16)
IMM GRANULOCYTES # BLD AUTO: 0.1 K/UL (ref 0–0.04)
IMM GRANULOCYTES NFR BLD AUTO: 1 % (ref 0–0.5)
LYMPHOCYTES # BLD: 1 K/UL (ref 0.8–3.5)
LYMPHOCYTES NFR BLD: 8 % (ref 12–49)
MCH RBC QN AUTO: 29.1 PG (ref 26–34)
MCHC RBC AUTO-ENTMCNC: 32.4 G/DL (ref 30–36.5)
MCV RBC AUTO: 89.9 FL (ref 80–99)
METHGB MFR BLD: 0.3 % (ref 0–1.4)
MONOCYTES # BLD: 0.7 K/UL (ref 0–1)
MONOCYTES NFR BLD: 5 % (ref 5–13)
NEUTS SEG # BLD: 10.9 K/UL (ref 1.8–8)
NEUTS SEG NFR BLD: 86 % (ref 32–75)
NRBC # BLD: 0 K/UL (ref 0–0.01)
NRBC BLD-RTO: 0 PER 100 WBC
OXYHGB MFR BLD: 96.7 % (ref 95–99)
PCO2 BLDA: 32 MMHG (ref 35–45)
PERFORMED BY, TECHID: ABNORMAL
PH BLDA: 7.41 (ref 7.35–7.45)
PLATELET # BLD AUTO: 419 K/UL (ref 150–400)
PMV BLD AUTO: 10.6 FL (ref 8.9–12.9)
PO2 BLDA: 142 MMHG (ref 80–100)
POTASSIUM SERPL-SCNC: 5.1 MMOL/L (ref 3.5–5.1)
PROT SERPL-MCNC: 7.5 G/DL (ref 6.4–8.2)
RBC # BLD AUTO: 3.57 M/UL (ref 3.8–5.2)
SAO2 % BLD: 99 % (ref 95–99)
SAO2% DEVICE SAO2% SENSOR NAME: ABNORMAL
SODIUM SERPL-SCNC: 127 MMOL/L (ref 136–145)
SPECIMEN SITE: ABNORMAL
WBC # BLD AUTO: 12.7 K/UL (ref 3.6–11)

## 2023-01-14 PROCEDURE — 85025 COMPLETE CBC W/AUTO DIFF WBC: CPT

## 2023-01-14 PROCEDURE — 74011250636 HC RX REV CODE- 250/636: Performed by: FAMILY MEDICINE

## 2023-01-14 PROCEDURE — 74011250637 HC RX REV CODE- 250/637: Performed by: INTERNAL MEDICINE

## 2023-01-14 PROCEDURE — 74011636637 HC RX REV CODE- 636/637: Performed by: FAMILY MEDICINE

## 2023-01-14 PROCEDURE — 82803 BLOOD GASES ANY COMBINATION: CPT

## 2023-01-14 PROCEDURE — 80053 COMPREHEN METABOLIC PANEL: CPT

## 2023-01-14 PROCEDURE — 74011250637 HC RX REV CODE- 250/637: Performed by: FAMILY MEDICINE

## 2023-01-14 PROCEDURE — 74011000258 HC RX REV CODE- 258: Performed by: FAMILY MEDICINE

## 2023-01-14 PROCEDURE — 36600 WITHDRAWAL OF ARTERIAL BLOOD: CPT

## 2023-01-14 PROCEDURE — 36415 COLL VENOUS BLD VENIPUNCTURE: CPT

## 2023-01-14 PROCEDURE — 65610000006 HC RM INTENSIVE CARE

## 2023-01-14 PROCEDURE — 71045 X-RAY EXAM CHEST 1 VIEW: CPT

## 2023-01-14 PROCEDURE — 77010033711 HC HIGH FLOW OXYGEN

## 2023-01-14 PROCEDURE — 99232 SBSQ HOSP IP/OBS MODERATE 35: CPT | Performed by: INTERNAL MEDICINE

## 2023-01-14 PROCEDURE — 82962 GLUCOSE BLOOD TEST: CPT

## 2023-01-14 PROCEDURE — 90935 HEMODIALYSIS ONE EVALUATION: CPT

## 2023-01-14 RX ORDER — CARVEDILOL 3.12 MG/1
6.25 TABLET ORAL 2 TIMES DAILY WITH MEALS
Status: DISCONTINUED | OUTPATIENT
Start: 2023-01-14 | End: 2023-01-18 | Stop reason: HOSPADM

## 2023-01-14 RX ORDER — NIFEDIPINE 60 MG/1
60 TABLET, EXTENDED RELEASE ORAL 2 TIMES DAILY
Status: DISCONTINUED | OUTPATIENT
Start: 2023-01-14 | End: 2023-01-18 | Stop reason: HOSPADM

## 2023-01-14 RX ORDER — LANOLIN ALCOHOL/MO/W.PET/CERES
3 CREAM (GRAM) TOPICAL
Status: DISCONTINUED | OUTPATIENT
Start: 2023-01-14 | End: 2023-01-18 | Stop reason: HOSPADM

## 2023-01-14 RX ORDER — HYDROXYZINE PAMOATE 25 MG/1
25 CAPSULE ORAL
Status: DISCONTINUED | OUTPATIENT
Start: 2023-01-14 | End: 2023-01-18 | Stop reason: HOSPADM

## 2023-01-14 RX ADMIN — ATORVASTATIN CALCIUM 20 MG: 20 TABLET, FILM COATED ORAL at 08:13

## 2023-01-14 RX ADMIN — SEVELAMER CARBONATE 800 MG: 800 TABLET, FILM COATED ORAL at 08:13

## 2023-01-14 RX ADMIN — PIPERACILLIN AND TAZOBACTAM 3.38 G: 3; .375 INJECTION, POWDER, LYOPHILIZED, FOR SOLUTION INTRAVENOUS at 22:16

## 2023-01-14 RX ADMIN — INSULIN GLARGINE 40 UNITS: 100 INJECTION, SOLUTION SUBCUTANEOUS at 09:54

## 2023-01-14 RX ADMIN — INSULIN LISPRO 10 UNITS: 100 INJECTION, SOLUTION INTRAVENOUS; SUBCUTANEOUS at 17:05

## 2023-01-14 RX ADMIN — NIFEDIPINE 60 MG: 60 TABLET, FILM COATED, EXTENDED RELEASE ORAL at 21:05

## 2023-01-14 RX ADMIN — INSULIN LISPRO 5 UNITS: 100 INJECTION, SOLUTION INTRAVENOUS; SUBCUTANEOUS at 04:25

## 2023-01-14 RX ADMIN — CARVEDILOL 6.25 MG: 3.12 TABLET, FILM COATED ORAL at 21:05

## 2023-01-14 RX ADMIN — Medication 30 MG: at 08:12

## 2023-01-14 RX ADMIN — DEXAMETHASONE SODIUM PHOSPHATE 6 MG: 4 INJECTION, SOLUTION INTRA-ARTICULAR; INTRALESIONAL; INTRAMUSCULAR; INTRAVENOUS; SOFT TISSUE at 08:13

## 2023-01-14 RX ADMIN — INSULIN LISPRO 7 UNITS: 100 INJECTION, SOLUTION INTRAVENOUS; SUBCUTANEOUS at 11:33

## 2023-01-14 RX ADMIN — INSULIN LISPRO 10 UNITS: 100 INJECTION, SOLUTION INTRAVENOUS; SUBCUTANEOUS at 11:32

## 2023-01-14 RX ADMIN — INSULIN LISPRO 10 UNITS: 100 INJECTION, SOLUTION INTRAVENOUS; SUBCUTANEOUS at 08:11

## 2023-01-14 RX ADMIN — MELATONIN TAB 3 MG 3 MG: 3 TAB at 22:16

## 2023-01-14 RX ADMIN — INSULIN GLARGINE 40 UNITS: 100 INJECTION, SOLUTION SUBCUTANEOUS at 21:05

## 2023-01-14 RX ADMIN — PANTOPRAZOLE SODIUM 20 MG: 20 TABLET, DELAYED RELEASE ORAL at 08:13

## 2023-01-14 RX ADMIN — FUROSEMIDE 80 MG: 40 TABLET ORAL at 08:13

## 2023-01-14 RX ADMIN — SEVELAMER CARBONATE 800 MG: 800 TABLET, FILM COATED ORAL at 17:07

## 2023-01-14 RX ADMIN — SEVELAMER CARBONATE 800 MG: 800 TABLET, FILM COATED ORAL at 11:33

## 2023-01-14 RX ADMIN — INSULIN LISPRO 4 UNITS: 100 INJECTION, SOLUTION INTRAVENOUS; SUBCUTANEOUS at 08:12

## 2023-01-14 RX ADMIN — INSULIN LISPRO 3 UNITS: 100 INJECTION, SOLUTION INTRAVENOUS; SUBCUTANEOUS at 15:18

## 2023-01-14 RX ADMIN — TRAMADOL HYDROCHLORIDE 50 MG: 50 TABLET, COATED ORAL at 04:32

## 2023-01-14 RX ADMIN — RIVAROXABAN 2.5 MG: 2.5 TABLET, FILM COATED ORAL at 08:13

## 2023-01-14 RX ADMIN — PIPERACILLIN AND TAZOBACTAM 3.38 G: 3; .375 INJECTION, POWDER, LYOPHILIZED, FOR SOLUTION INTRAVENOUS at 11:33

## 2023-01-14 RX ADMIN — Medication 30 MG: at 21:05

## 2023-01-14 RX ADMIN — INSULIN LISPRO 7 UNITS: 100 INJECTION, SOLUTION INTRAVENOUS; SUBCUTANEOUS at 23:59

## 2023-01-14 RX ADMIN — INSULIN LISPRO 4 UNITS: 100 INJECTION, SOLUTION INTRAVENOUS; SUBCUTANEOUS at 20:07

## 2023-01-14 RX ADMIN — GABAPENTIN 300 MG: 300 CAPSULE ORAL at 22:16

## 2023-01-14 NOTE — PROGRESS NOTES
IMPRESSION:   Acute hypoxic respiratory failure  COVID-19  Sepsis  CHF with fluid overload  Chronic kidney disease stage V on dialysis  Hypertension and diabetes by history  Hyponatremia  Additional workup outlined below  Pt is at high risk of sudden decline and decompensation with life threatening consequenses and continued end organ dysfunction and failure  Pt is critically ill. Time spent with pt and staff actively rendering care, managing pt and coordinating care as stated below; 30 minutes, exclusive of any procedures      RECOMMENDATIONS/PLAN:   ICU monitoring  54-year-old obese lady came in because of shortness of breath and dyspnea patient has COVID-19 also patient has history of chronic kidney disease on hemodialysis and she missed 2 dialysis before   Continue with dialysis she was getting it Monday Wednesday Friday we will repeat dialysis chest x-ray shows CHF fluid overload  For COVID-19 started patient on Decadron and received Actemra  CRP 24.7 procalcitonin 4.0 BNP 70629  Will repeat level sodium 127  CXR shows bilateral congestive changes superimposed infiltrate  High flow nasal Cannula oxygen as salvage oxygen delivery device to provide high concentration of oxygen to overcome refractory hypoxia; Will be available to assist in medical management while in the CCU pending disposition     [x] High complexity decision making was performed  [x] See my orders for details  HPI  54-year-old lady came in because of shortness of breath and dyspnea significant past medical history of chronic kidney disease stage V on hemodialysis hypertension CVA and diabetes mellitus.   She went to [facility] and also she gets dialysis but she is to dialysis and then she was diagnosed with COVID-19 also having cough fever chills chest pain nausea and vomiting came to the emergency room she was put on high flow nasal cannula saturation was low BNP was elevated glucose was also elevated chest x-ray shows CHF fluid overload with superimposed possible COVID picture so admitted and critical care consult was called. PMH:  has a past medical history of Allergic rhinitis, Blood clot in vein, Chronic kidney disease, CVA (cerebral vascular accident) ([facility] [address] (2014), Diabetes ([facility] [address] Dyslipidemia, Hypertension, IBS (irritable bowel syndrome), Ill-defined condition, and Renal failure. PSH:   has a past surgical history that includes hx cholecystectomy (); hx tonsillectomy (); hx  section (); pr unlisted procedure vascular surgery; and hx lap cholecystectomy. FHX: family history includes Diabetes in her father and mother; Heart Disease in her father and mother; Hypertension in her father and mother. SHX:  reports that she has never smoked. She has never used smokeless tobacco. She reports that she does not drink alcohol and does not use drugs.     ALL:   Allergies   Allergen Reactions    Fentanyl Anxiety    Sulfa (Sulfonamide Antibiotics) Nausea and Vomiting    Zithromax [Azithromycin] Unknown (comments)    Morphine Itching        MEDS:   [x] Reviewed - As Below   [] Not reviewed    Current Facility-Administered Medications   Medication    insulin lispro (HUMALOG) injection    phenol throat spray (CHLORASEPTIC) 1 Spray    sorbitoL 70 % solution 60 mL    insulin lispro (HUMALOG) injection 10 Units    epoetin jodie-epbx (RETACRIT) injection 8,000 Units    atorvastatin (LIPITOR) tablet 20 mg    furosemide (LASIX) tablet 80 mg    gabapentin (NEURONTIN) capsule 300 mg    sevelamer carbonate (RENVELA) tab 800 mg    glucose chewable tablet 16 g    glucagon (GLUCAGEN) injection 1 mg    acetaminophen (TYLENOL) tablet 650 mg    Or    acetaminophen (TYLENOL) suppository 650 mg    polyethylene glycol (MIRALAX) packet 17 g    ondansetron (ZOFRAN ODT) tablet 4 mg    Or    ondansetron (ZOFRAN) injection 4 mg    pantoprazole (PROTONIX) tablet 20 mg    piperacillin-tazobactam (ZOSYN) 3.375 g in 0.9% sodium chloride (MBP/ADV) 100 mL MBP    insulin glargine (LANTUS) injection 40 Units    dexamethasone (DECADRON) 4 mg/mL injection 6 mg    dextromethorphan (DELSYM) 30 mg/5 mL syrup 30 mg    benzonatate (TESSALON) capsule 100 mg    rivaroxaban (XARELTO) tablet 2.5 mg    traMADoL (ULTRAM) tablet 50 mg      MAR reviewed and pertinent medications noted or modified as needed   Current Facility-Administered Medications   Medication    insulin lispro (HUMALOG) injection    phenol throat spray (CHLORASEPTIC) 1 Spray    sorbitoL 70 % solution 60 mL    insulin lispro (HUMALOG) injection 10 Units    epoetin jodie-epbx (RETACRIT) injection 8,000 Units    atorvastatin (LIPITOR) tablet 20 mg    furosemide (LASIX) tablet 80 mg    gabapentin (NEURONTIN) capsule 300 mg    sevelamer carbonate (RENVELA) tab 800 mg    glucose chewable tablet 16 g    glucagon (GLUCAGEN) injection 1 mg    acetaminophen (TYLENOL) tablet 650 mg    Or    acetaminophen (TYLENOL) suppository 650 mg    polyethylene glycol (MIRALAX) packet 17 g    ondansetron (ZOFRAN ODT) tablet 4 mg    Or    ondansetron (ZOFRAN) injection 4 mg    pantoprazole (PROTONIX) tablet 20 mg    piperacillin-tazobactam (ZOSYN) 3.375 g in 0.9% sodium chloride (MBP/ADV) 100 mL MBP    insulin glargine (LANTUS) injection 40 Units    dexamethasone (DECADRON) 4 mg/mL injection 6 mg    dextromethorphan (DELSYM) 30 mg/5 mL syrup 30 mg    benzonatate (TESSALON) capsule 100 mg    rivaroxaban (XARELTO) tablet 2.5 mg    traMADoL (ULTRAM) tablet 50 mg      PMH:  has a past medical history of Allergic rhinitis, Blood clot in vein, Chronic kidney disease, CVA (cerebral vascular accident) ([facility] [address] (2014), Diabetes ([facility] [address] Dyslipidemia, Hypertension, IBS (irritable bowel syndrome), Ill-defined condition, and Renal failure. PSH:   has a past surgical history that includes hx cholecystectomy (); hx tonsillectomy (); hx  section (); pr unlisted procedure vascular surgery; and hx lap cholecystectomy.    FHX: family history includes Diabetes in her father and mother; Heart Disease in her father and mother; Hypertension in her father and mother. SHX:  reports that she has never smoked. She has never used smokeless tobacco. She reports that she does not drink alcohol and does not use drugs. ROS:A comprehensive review of systems was negative except for that written in the HPI. Hemodynamics:    CO:    CI:    CVP:    SVR:   PAP Systolic:    PAP Diastolic:    PVR:    PB07:        Ventilator Settings:      Mode Rate TV Press PEEP FiO2 PIP Min. Vent               40 %              Vital Signs: Telemetry:    normal sinus rhythm Intake/Output:   Visit Vitals  BP (!) 140/114   Pulse 64   Temp 97.5 °F (36.4 °C)   Resp 20   Ht 5' 8\" (1.727 m)   Wt 123.3 kg (271 lb 13.2 oz)   SpO2 98%   BMI 41.33 kg/m²       Temp (24hrs), Av.6 °F (36.4 °C), Min:97.5 °F (36.4 °C), Max:97.7 °F (36.5 °C)        O2 Device: Hi flow nasal cannula, Heated O2 Flow Rate (L/min): 30 l/min       Wt Readings from Last 4 Encounters:   23 123.3 kg (271 lb 13.2 oz)   09/15/22 117.9 kg (260 lb)   22 122.5 kg (270 lb)   22 128.2 kg (282 lb 10.1 oz)          Intake/Output Summary (Last 24 hours) at 2023 0900  Last data filed at 2023 0530  Gross per 24 hour   Intake 204.5 ml   Output 51 ml   Net 153.5 ml         Last shift:      No intake/output data recorded. Last 3 shifts: 1901 -  0700  In: 424.5 [I.V.:424.5]  Out: 46 [Urine:50]       Physical Exam:     General: HFNC; alert awake  HEENT: NCAT, poor dentition, lips and mucosa dry  Eyes: anicteric; conjunctiva clear  Neck: no nodes,  trach midline; no accessory MM use. Chest: no deformity,   Cardiac: IR regular; no murmur;   Lungs: distant breath sounds; bilateral rales  Abd: soft, NT, hypoactive BS  Ext: no edema; no joint swelling;  No clubbing  : NO odom, clear urine  Neuro: Alert awake  Psych- no agitation, oriented to person;   Skin: warm, dry, no cyanosis; Pulses: 1-2+ Bilateral pedal, radial  Capillary: brisk; pale      DATA:    MAR reviewed and pertinent medications noted or modified as needed  MEDS:   Current Facility-Administered Medications   Medication    insulin lispro (HUMALOG) injection    phenol throat spray (CHLORASEPTIC) 1 Spray    sorbitoL 70 % solution 60 mL    insulin lispro (HUMALOG) injection 10 Units    epoetin jodie-epbx (RETACRIT) injection 8,000 Units    atorvastatin (LIPITOR) tablet 20 mg    furosemide (LASIX) tablet 80 mg    gabapentin (NEURONTIN) capsule 300 mg    sevelamer carbonate (RENVELA) tab 800 mg    glucose chewable tablet 16 g    glucagon (GLUCAGEN) injection 1 mg    acetaminophen (TYLENOL) tablet 650 mg    Or    acetaminophen (TYLENOL) suppository 650 mg    polyethylene glycol (MIRALAX) packet 17 g    ondansetron (ZOFRAN ODT) tablet 4 mg    Or    ondansetron (ZOFRAN) injection 4 mg    pantoprazole (PROTONIX) tablet 20 mg    piperacillin-tazobactam (ZOSYN) 3.375 g in 0.9% sodium chloride (MBP/ADV) 100 mL MBP    insulin glargine (LANTUS) injection 40 Units    dexamethasone (DECADRON) 4 mg/mL injection 6 mg    dextromethorphan (DELSYM) 30 mg/5 mL syrup 30 mg    benzonatate (TESSALON) capsule 100 mg    rivaroxaban (XARELTO) tablet 2.5 mg    traMADoL (ULTRAM) tablet 50 mg        Labs:    Recent Labs     01/14/23  0500 01/13/23  0455 01/12/23  1011   WBC 12.7* 11.9* 14.4*   HGB 10.4* 9.4* 9.3*   * 304 335       Recent Labs     01/14/23  0500 01/13/23  0455 01/12/23  1011   * 127* 130*   K 5.1 5.1 Specimen hemolyzed, results may be affected   CL 93* 93* 94*   CO2 25 26 27   * 430* 372*   BUN 65* 41* 48*   CREA 5.79* 4.62* 5.02*   CA 8.4* 8.6 8.5   PHOS  --  5.3*  --    LAC  --   --  2.4*   ALB 2.8* 2.5* 2.8*   ALT 8* 7* 8*       Recent Labs     01/14/23  0455 01/13/23  1051   PH 7.41 7.32*   PCO2 32* 52*   PO2 142* 87   HCO3 20* 26   FIO2 45 50     No results for input(s): CPK, CKNDX, TROIQ in the last 72 hours.     No lab exists for component: CPKMB  No results found for: BNPP, BNP   No results found for: CULT  No results found for: TSH, TSHEXT, TSHEXT     Imaging:    Results from Hospital Encounter encounter on 01/12/23    XR CHEST PORT    Narrative  INDICATION: chf    EXAMINATION:  AP CHEST, PORTABLE    COMPARISON: 1/13/2023    FINDINGS: Single AP portable view of the chest demonstrates cardiomegaly. Patient rotated toward the left. Slight improvement in pulmonary edema. Impression  Slight improvement in pulmonary edema. Results from [place] encounter on 08/09/22    CT ABD PELV WO CONT    Narrative  EXAM: CT ABD PELV WO CONT    INDICATION: left flank pain    COMPARISON: 6/29/2022    IV CONTRAST: None. ORAL CONTRAST:    TECHNIQUE:  Thin axial images were obtained through the abdomen and pelvis. Coronal and  sagittal reformats were generated. CT dose reduction was achieved through use of  a standardized protocol tailored for this examination and automatic exposure  control for dose modulation. The absence of intravenous contrast material reduces the sensitivity for  evaluation of the vasculature and solid organs. FINDINGS:  LOWER THORAX: Pericardial effusion is stable. Cardiomegaly is stable there is  minor left basilar atelectasis. LIVER: No mass. BILIARY TREE: Gallbladder is within normal limits. CBD is not dilated. SPLEEN: within normal limits. PANCREAS: No focal abnormality. ADRENALS: Unremarkable. KIDNEYS/URETERS: No calculus or hydronephrosis. STOMACH: Unremarkable. SMALL BOWEL: No dilatation or wall thickening. COLON: No dilatation or wall thickening. Sigmoid colon is tortuous. APPENDIX: Normal  PERITONEUM: No ascites or pneumoperitoneum. RETROPERITONEUM: No lymphadenopathy or aortic aneurysm. There are extensive  atherosclerotic changes  REPRODUCTIVE ORGANS: Uterus is normal  URINARY BLADDER: Incompletely distended  BONES: No destructive bone lesion.   ABDOMINAL WALL: There is a small umbilical hernia containing fat  ADDITIONAL COMMENTS: There is increased intra-abdominal fat    Impression  No acute abnormality identified. There has been no significant change. There are  extensive atherosclerotic changes.     1/14 on high flow 45% FiO2 85 flow at 40 L ABG acceptable we will decrease FiO2 PO2 is 142, chest x-ray shows improvement in the pulmonary edema she received Actemra yesterday we will continue to wean for hemodialysis today

## 2023-01-14 NOTE — PROGRESS NOTES
General Daily Progress Note          Patient Name:   Gary English       YOB: 1975       Age:  52 y.o. Admit Date: 1/12/2023      Subjective:       Patient is a 52y.o. year old female with past medical history of CVA, diabetes, CKD stage 5,on HD  hypertension, and cardiomegaly who presented to the ED today with shortness of breath. Patient states that over the last few days she has been feeling very sick and because of that she could only get 2 hours of dialysis done on Monday and 30 minutes yesterday. And then today she started feeling very short of breath and called EMS. On arrival to the ED patient was noted to be hypoxic in the 60s. She was placed on non-breather with improvement of oxygenation. Pt was tested positive for COVID test done in the ER. This is her first time getting diagnosed with COVID. She also has chills, cough and chest pain associated with it, nausea, and vomiting. She is currently on high flow oxygen. CBC shows leukocytosis 14,400, BNP 27,904, lactic acid 2.4, sodium 130, chloride 94, troponin 55. Glucose 372. Chest x ray done in the ED shows:  1. Slightly asymmetric CHF pattern is suspected. Alternatively, correlate  clinically for bilateral infection, right greater than left. .  .  2. Stable cardiomegaly. 3. Technically compromised by patient size and position. A standard 2 view  examination would be more useful when clinically possible.     1/14    Patient resting in the bed alert awake on high flow 40% O2  Blood sugar running high due to steroids  Still complaining of cough congestion           Objective:     Visit Vitals  BP (!) 161/87   Pulse 63   Temp 97.5 °F (36.4 °C)   Resp 9   Ht 5' 8\" (1.727 m)   Wt 123.3 kg (271 lb 13.2 oz)   SpO2 96%   BMI 41.33 kg/m²        Recent Results (from the past 24 hour(s))   BLOOD GAS, ARTERIAL    Collection Time: 01/13/23 10:51 AM   Result Value Ref Range    pH 7.32 (L) 7.35 - 7.45      PCO2 52 (H) 35 - 45 mmHg    PO2 87 80 - 100 mmHg    O2 SATURATION 96 95 - 99 %    BICARBONATE 26 22 - 26 mmol/L    BASE DEFICIT 1.1 mmol/L    O2 METHOD High Flow Nasal Cannula      FIO2 50 %    Sample source Arterial      SITE Right Radial      DIA'S TEST YES      Carboxy-Hgb 0.4 (L) 1 - 2 %    Methemoglobin 0.3 0 - 1.4 %    Oxyhemoglobin 94.8 (L) 95 - 99 %    Performed by Angelica Martin     TEMPERATURE 98.6     GLUCOSE, POC    Collection Time: 01/13/23 11:56 AM   Result Value Ref Range    Glucose (POC) 98 65 - 100 mg/dL    Performed by Julieth Garcia    GLUCOSE, POC    Collection Time: 01/13/23 12:10 PM   Result Value Ref Range    Glucose (POC) 403 (H) 65 - 100 mg/dL    Performed by Julieth Garcia    URINALYSIS W/ RFLX MICROSCOPIC    Collection Time: 01/13/23  1:20 PM   Result Value Ref Range    Color Dark Yellow      Appearance Turbid (A) Clear      Specific gravity 1.019 1.003 - 1.030      pH (UA) 5.0 5.0 - 8.0      Protein Negative Negative mg/dL    Glucose 50 (A) Negative mg/dL    Ketone Negative Negative mg/dL    Bilirubin Negative Negative      Blood Small (A) Negative      Urobilinogen 0.1 0.1 - 1.0 EU/dL    Nitrites Negative Negative      Leukocyte Esterase Large (A) Negative      WBC 10-20 0 - 4 /hpf    RBC 0-5 0 - 5 /hpf    Bacteria Negative Negative /hpf    Mucus Trace /lpf   URINE MICROSCOPIC    Collection Time: 01/13/23  1:20 PM   Result Value Ref Range    WBC 10-20 0 - 4 /hpf    RBC 0-5 0 - 5 /hpf    Epithelial cells Many (A) Few /lpf    Bacteria Negative Negative /hpf    Mucus Trace (A) Negative /lpf   GLUCOSE, POC    Collection Time: 01/13/23  3:45 PM   Result Value Ref Range    Glucose (POC) 327 (H) 65 - 100 mg/dL    Performed by Julieth Garcia    GLUCOSE, POC    Collection Time: 01/13/23  7:33 PM   Result Value Ref Range    Glucose (POC) 318 (H) 65 - 100 mg/dL    Performed by [address] [place] [address], POC    Collection Time: 01/13/23 11:13 PM   Result Value Ref Range    Glucose (POC) 306 (H) 65 - 100 mg/dL    Performed by LALI OKEEFE    GLUCOSE, POC    Collection Time: 01/14/23  3:34 AM   Result Value Ref Range    Glucose (POC) 301 (H) 65 - 100 mg/dL    Performed by Robbie Gambino (PCT)    BLOOD GAS, ARTERIAL    Collection Time: 01/14/23  4:55 AM   Result Value Ref Range    pH 7.41 7.35 - 7.45      PCO2 32 (L) 35 - 45 mmHg    PO2 142 (H) 80 - 100 mmHg    O2 SATURATION 99 95 - 99 %    BICARBONATE 20 (L) 22 - 26 mmol/L    BASE DEFICIT 3.8 mmol/L    O2 METHOD High Flow Nasal Cannula      O2 FLOW RATE 35.00 L/min    FIO2 45 %    Sample source Arterial      SITE Right Radial      DIA'S TEST YES      Carboxy-Hgb 1.8 1 - 2 %    Methemoglobin 0.3 0 - 1.4 %    Oxyhemoglobin 96.7 95 - 99 %    Performed by Larry Saunders     TEMPERATURE 98.0     CBC WITH AUTOMATED DIFF    Collection Time: 01/14/23  5:00 AM   Result Value Ref Range    WBC 12.7 (H) 3.6 - 11.0 K/uL    RBC 3.57 (L) 3.80 - 5.20 M/uL    HGB 10.4 (L) 11.5 - 16.0 g/dL    HCT 32.1 (L) 35.0 - 47.0 %    MCV 89.9 80.0 - 99.0 FL    MCH 29.1 26.0 - 34.0 PG    MCHC 32.4 30.0 - 36.5 g/dL    RDW 13.1 11.5 - 14.5 %    PLATELET 123 (H) 084 - 400 K/uL    MPV 10.6 8.9 - 12.9 FL    NRBC 0.0 0.0  WBC    ABSOLUTE NRBC 0.00 0.00 - 0.01 K/uL    NEUTROPHILS 86 (H) 32 - 75 %    LYMPHOCYTES 8 (L) 12 - 49 %    MONOCYTES 5 5 - 13 %    EOSINOPHILS 0 0 - 7 %    BASOPHILS 0 0 - 1 %    IMMATURE GRANULOCYTES 1 (H) 0 - 0.5 %    ABS. NEUTROPHILS 10.9 (H) 1.8 - 8.0 K/UL    ABS. LYMPHOCYTES 1.0 0.8 - 3.5 K/UL    ABS. MONOCYTES 0.7 0.0 - 1.0 K/UL    ABS. EOSINOPHILS 0.0 0.0 - 0.4 K/UL    ABS. BASOPHILS 0.0 0.0 - 0.1 K/UL    ABS. IMM.  GRANS. 0.1 (H) 0.00 - 0.04 K/UL    DF AUTOMATED     METABOLIC PANEL, COMPREHENSIVE    Collection Time: 01/14/23  5:00 AM   Result Value Ref Range    Sodium 127 (L) 136 - 145 mmol/L    Potassium 5.1 3.5 - 5.1 mmol/L    Chloride 93 (L) 97 - 108 mmol/L    CO2 25 21 - 32 mmol/L    Anion gap 9 5 - 15 mmol/L    Glucose 339 (H) 65 - 100 mg/dL    BUN 65 (H) 6 - 20 mg/dL    Creatinine 5.79 (H) 0.55 - 1.02 mg/dL    BUN/Creatinine ratio 11 (L) 12 - 20      eGFR 9 (L) >60 ml/min/1.73m2    Calcium 8.4 (L) 8.5 - 10.1 mg/dL    Bilirubin, total 0.5 0.2 - 1.0 mg/dL    AST (SGOT) 4 (L) 15 - 37 U/L    ALT (SGPT) 8 (L) 12 - 78 U/L    Alk. phosphatase 80 45 - 117 U/L    Protein, total 7.5 6.4 - 8.2 g/dL    Albumin 2.8 (L) 3.5 - 5.0 g/dL    Globulin 4.7 (H) 2.0 - 4.0 g/dL    A-G Ratio 0.6 (L) 1.1 - 2.2     GLUCOSE, POC    Collection Time: 01/14/23  7:11 AM   Result Value Ref Range    Glucose (POC) 280 (H) 65 - 100 mg/dL    Performed by Jl Canales      [unfilled]      Review of Systems    Constitutional: Negative for chills and fever. HENT: Negative. Eyes: Negative. Respiratory: Negative. Cardiovascular: Negative. Gastrointestinal: Negative for abdominal pain and nausea. Skin: Negative. Neurological: Negative. Physical Exam:      Constitutional: pt is oriented to person, place, and time. HENT:   Head: Normocephalic and atraumatic. Eyes: Pupils are equal, round, and reactive to light. EOM are normal.   Cardiovascular: Normal rate, regular rhythm and normal heart sounds. Pulmonary/Chest: Breath sounds normal. No wheezes. No rales. Exhibits no tenderness. Abdominal: Soft. Bowel sounds are normal. There is no abdominal tenderness. There is no rebound and no guarding. Musculoskeletal: Normal range of motion. Neurological: pt is alert and oriented to person, place, and time. XR CHEST PORT   Final Result   Slight improvement in pulmonary edema. XR CHEST PORT   Final Result   Cardiomegaly and diffuse airspace disease similar to prior study. XR CHEST SNGL V   Final Result      1. Slightly asymmetric CHF pattern is suspected. Alternatively, correlate   clinically for bilateral infection, right greater than left. .  .   2. Stable cardiomegaly. 3. Technically compromised by patient size and position.  A standard 2 view   examination would be more useful when clinically possible.          XR CHEST PORT    (Results Pending)        Recent Results (from the past 24 hour(s))   BLOOD GAS, ARTERIAL    Collection Time: 01/13/23 10:51 AM   Result Value Ref Range    pH 7.32 (L) 7.35 - 7.45      PCO2 52 (H) 35 - 45 mmHg    PO2 87 80 - 100 mmHg    O2 SATURATION 96 95 - 99 %    BICARBONATE 26 22 - 26 mmol/L    BASE DEFICIT 1.1 mmol/L    O2 METHOD High Flow Nasal Cannula      FIO2 50 %    Sample source Arterial      SITE Right Radial      DIA'S TEST YES      Carboxy-Hgb 0.4 (L) 1 - 2 %    Methemoglobin 0.3 0 - 1.4 %    Oxyhemoglobin 94.8 (L) 95 - 99 %    Performed by Roselyn Callahan     TEMPERATURE 98.6     GLUCOSE, POC    Collection Time: 01/13/23 11:56 AM   Result Value Ref Range    Glucose (POC) 98 65 - 100 mg/dL    Performed by Norris Bullard    GLUCOSE, POC    Collection Time: 01/13/23 12:10 PM   Result Value Ref Range    Glucose (POC) 403 (H) 65 - 100 mg/dL    Performed by Norris Bullard    URINALYSIS W/ RFLX MICROSCOPIC    Collection Time: 01/13/23  1:20 PM   Result Value Ref Range    Color Dark Yellow      Appearance Turbid (A) Clear      Specific gravity 1.019 1.003 - 1.030      pH (UA) 5.0 5.0 - 8.0      Protein Negative Negative mg/dL    Glucose 50 (A) Negative mg/dL    Ketone Negative Negative mg/dL    Bilirubin Negative Negative      Blood Small (A) Negative      Urobilinogen 0.1 0.1 - 1.0 EU/dL    Nitrites Negative Negative      Leukocyte Esterase Large (A) Negative      WBC 10-20 0 - 4 /hpf    RBC 0-5 0 - 5 /hpf    Bacteria Negative Negative /hpf    Mucus Trace /lpf   URINE MICROSCOPIC    Collection Time: 01/13/23  1:20 PM   Result Value Ref Range    WBC 10-20 0 - 4 /hpf    RBC 0-5 0 - 5 /hpf    Epithelial cells Many (A) Few /lpf    Bacteria Negative Negative /hpf    Mucus Trace (A) Negative /lpf   GLUCOSE, POC    Collection Time: 01/13/23  3:45 PM   Result Value Ref Range    Glucose (POC) 327 (H) 65 - 100 mg/dL    Performed by Norris Juan Miguel    GLUCOSE, POC Collection Time: 01/13/23  7:33 PM   Result Value Ref Range    Glucose (POC) 318 (H) 65 - 100 mg/dL    Performed by [facility], POC    Collection Time: 01/13/23 11:13 PM   Result Value Ref Range    Glucose (POC) 306 (H) 65 - 100 mg/dL    Performed by [facility], POC    Collection Time: 01/14/23  3:34 AM   Result Value Ref Range    Glucose (POC) 301 (H) 65 - 100 mg/dL    Performed by Verlie Kocher (PCT)    BLOOD GAS, ARTERIAL    Collection Time: 01/14/23  4:55 AM   Result Value Ref Range    pH 7.41 7.35 - 7.45      PCO2 32 (L) 35 - 45 mmHg    PO2 142 (H) 80 - 100 mmHg    O2 SATURATION 99 95 - 99 %    BICARBONATE 20 (L) 22 - 26 mmol/L    BASE DEFICIT 3.8 mmol/L    O2 METHOD High Flow Nasal Cannula      O2 FLOW RATE 35.00 L/min    FIO2 45 %    Sample source Arterial      SITE Right Radial      DIA'S TEST YES      Carboxy-Hgb 1.8 1 - 2 %    Methemoglobin 0.3 0 - 1.4 %    Oxyhemoglobin 96.7 95 - 99 %    Performed by Boyd Adan     TEMPERATURE 98.0     CBC WITH AUTOMATED DIFF    Collection Time: 01/14/23  5:00 AM   Result Value Ref Range    WBC 12.7 (H) 3.6 - 11.0 K/uL    RBC 3.57 (L) 3.80 - 5.20 M/uL    HGB 10.4 (L) 11.5 - 16.0 g/dL    HCT 32.1 (L) 35.0 - 47.0 %    MCV 89.9 80.0 - 99.0 FL    MCH 29.1 26.0 - 34.0 PG    MCHC 32.4 30.0 - 36.5 g/dL    RDW 13.1 11.5 - 14.5 %    PLATELET 628 (H) 882 - 400 K/uL    MPV 10.6 8.9 - 12.9 FL    NRBC 0.0 0.0  WBC    ABSOLUTE NRBC 0.00 0.00 - 0.01 K/uL    NEUTROPHILS 86 (H) 32 - 75 %    LYMPHOCYTES 8 (L) 12 - 49 %    MONOCYTES 5 5 - 13 %    EOSINOPHILS 0 0 - 7 %    BASOPHILS 0 0 - 1 %    IMMATURE GRANULOCYTES 1 (H) 0 - 0.5 %    ABS. NEUTROPHILS 10.9 (H) 1.8 - 8.0 K/UL    ABS. LYMPHOCYTES 1.0 0.8 - 3.5 K/UL    ABS. MONOCYTES 0.7 0.0 - 1.0 K/UL    ABS. EOSINOPHILS 0.0 0.0 - 0.4 K/UL    ABS. BASOPHILS 0.0 0.0 - 0.1 K/UL    ABS. IMM.  GRANS. 0.1 (H) 0.00 - 0.04 K/UL    DF AUTOMATED     METABOLIC PANEL, COMPREHENSIVE    Collection Time: 01/14/23  5:00 AM   Result Value Ref Range    Sodium 127 (L) 136 - 145 mmol/L    Potassium 5.1 3.5 - 5.1 mmol/L    Chloride 93 (L) 97 - 108 mmol/L    CO2 25 21 - 32 mmol/L    Anion gap 9 5 - 15 mmol/L    Glucose 339 (H) 65 - 100 mg/dL    BUN 65 (H) 6 - 20 mg/dL    Creatinine 5.79 (H) 0.55 - 1.02 mg/dL    BUN/Creatinine ratio 11 (L) 12 - 20      eGFR 9 (L) >60 ml/min/1.73m2    Calcium 8.4 (L) 8.5 - 10.1 mg/dL    Bilirubin, total 0.5 0.2 - 1.0 mg/dL    AST (SGOT) 4 (L) 15 - 37 U/L    ALT (SGPT) 8 (L) 12 - 78 U/L    Alk. phosphatase 80 45 - 117 U/L    Protein, total 7.5 6.4 - 8.2 g/dL    Albumin 2.8 (L) 3.5 - 5.0 g/dL    Globulin 4.7 (H) 2.0 - 4.0 g/dL    A-G Ratio 0.6 (L) 1.1 - 2.2     GLUCOSE, POC    Collection Time: 01/14/23  7:11 AM   Result Value Ref Range    Glucose (POC) 280 (H) 65 - 100 mg/dL    Performed by [facility]        Results       Procedure Component Value Units Date/Time    MRSA SCREEN - PCR (NASAL) [[id number]] Collected: 01/12/23 1951    Order Status: Completed Specimen: Swab Updated: 01/12/23 2148     MRSA by PCR, Nasal Not Detected       CULTURE, BLOOD #1 [[id number]] Collected: 01/12/23 1415    Order Status: Canceled Specimen: Blood     CULTURE, BLOOD #2 [[id number]] Collected: 01/12/23 1415    Order Status: Canceled Specimen: Blood     CULTURE, BLOOD, PAIRED [[id number]] Collected: 01/12/23 1011    Order Status: Sent Specimen: Blood Updated: 01/12/23 1019    INFLUENZA A & B AG (RAPID TEST) [[id number]] Collected: 01/12/23 1010    Order Status: Completed Specimen: Nasopharyngeal from Nasal washing Updated: 01/12/23 1036     Influenza A Antigen Negative        Influenza B Antigen Negative       COVID-19 RAPID TEST [[id number]]  (Abnormal) Collected: 01/12/23 1010    Order Status: Completed Specimen: Nasopharyngeal Updated: 01/12/23 1047     COVID-19 rapid test DETECTED        Comment: Rapid Abbott ID Now   The specimen is POSITIVE for SARS-CoV-2, the novel coronavirus associated with COVID-19.    This test has been authorized by the FDA under an Emergency Use Authorization (EUA) for use by authorized laboratories. Fact sheet for Healthcare Providers:  http://www.[place].[facility]/ Fact sheet for Patients: http://www.[place]/   Methodology: Isothermal Nucleic Acid Amplification Results verified, phoned to and read back by BRANDI MENA RN ON   1/12/2023 @1045/MAB                  Labs:     Recent Labs     01/14/23  0500 01/13/23 0455   WBC 12.7* 11.9*   HGB 10.4* 9.4*   HCT 32.1* 29.1*   * 304       Recent Labs     01/14/23  0500 01/13/23 0455 01/12/23  1011   * 127* 130*   K 5.1 5.1 Specimen hemolyzed, results may be affected   CL 93* 93* 94*   CO2 25 26 27   BUN 65* 41* 48*   CREA 5.79* 4.62* 5.02*   * 430* 372*   CA 8.4* 8.6 8.5   PHOS  --  5.3*  --        Recent Labs     01/14/23  0500 01/13/23 0455 01/12/23  1011   ALT 8* 7* 8*   AP 80 76 84   TBILI 0.5 0.6 0.7   TP 7.5 7.5 6.8   ALB 2.8* 2.5* 2.8*   GLOB 4.7* 5.0* 4.0       No results for input(s): INR, PTP, APTT, INREXT, INREXT in the last 72 hours. Recent Labs     01/13/23 0455   FERR 1,107*          No results found for: FOL, RBCF     Recent Labs     01/14/23 0455 01/13/23  1051   PH 7.41 7.32*   PCO2 32* 52*   PO2 142* 87     No results for input(s): CPK, CKNDX, TROIQ in the last 72 hours.     No lab exists for component: CPKMB  No results found for: CHOL, CHOLX, CHLST, CHOLV, HDL, HDLP, LDL, LDLC, DLDLP, TGLX, TRIGL, TRIGP, CHHD, CHHDX  Lab Results   Component Value Date/Time    Glucose (POC) 280 (H) 01/14/2023 07:11 AM    Glucose (POC) 301 (H) 01/14/2023 03:34 AM    Glucose (POC) 306 (H) 01/13/2023 11:13 PM    Glucose (POC) 318 (H) 01/13/2023 07:33 PM    Glucose (POC) 327 (H) 01/13/2023 03:45 PM     Lab Results   Component Value Date/Time    Color Dark Yellow 01/13/2023 01:20 PM    Appearance Turbid (A) 01/13/2023 01:20 PM    Specific gravity 1.019 01/13/2023 01:20 PM    pH (UA) 5.0 01/13/2023 01:20 PM Protein Negative 01/13/2023 01:20 PM    Glucose 50 (A) 01/13/2023 01:20 PM    Ketone Negative 01/13/2023 01:20 PM    Bilirubin Negative 01/13/2023 01:20 PM    Urobilinogen 0.1 01/13/2023 01:20 PM    Nitrites Negative 01/13/2023 01:20 PM    Leukocyte Esterase Large (A) 01/13/2023 01:20 PM    Epithelial cells Many (A) 01/13/2023 01:20 PM    Bacteria Negative 01/13/2023 01:20 PM    Bacteria Negative 01/13/2023 01:20 PM    WBC 10-20 01/13/2023 01:20 PM    WBC 10-20 01/13/2023 01:20 PM    RBC 0-5 01/13/2023 01:20 PM    RBC 0-5 01/13/2023 01:20 PM         Assessment:     Acute hypoxic respiratory failure on high flow 40% O2  COVID-19 pneumonitis  Leukocytosis  Acute congestive heart failure from fluid overload  Hyperglycemia secondary to steroids  History of type 2 diabetes  End-stage renal disease on hemodialysis  Hypertension  History of CVA  Morbid obesity  Hyponatremia      Plan:     Lipitor 20 mg daily  Decadron 6 mg daily Lasix 80 mg daily  Lasix 80 mg daily  Gabapentin 300 mg at bedtime  Lantus 40 units subcu every 12 hours  Sliding scale insulin  Xarelto 2.5 mg daily  Protonix 20 daily    Repeat the labs continue dialysis 3 times a week        Current Facility-Administered Medications:     insulin lispro (HUMALOG) injection, , SubCUTAneous, Q4H, Justin Brito MD, 4 Units at 01/14/23 0812    phenol throat spray (CHLORASEPTIC) 1 Spray, 1 Spray, Oral, PRN, Ansley Nickerson MD    sorbitoL 70 % solution 60 mL, 60 mL, Oral, DAILY PRN, Papa Atkinson MD, 60 mL at 01/13/23 1535    insulin lispro (HUMALOG) injection 10 Units, 10 Units, SubCUTAneous, TIDAC, Justin Brito MD, 10 Units at 01/14/23 0811    epoetin jodie-epbx (RETACRIT) injection 8,000 Units, 8,000 Units, IntraVENous, DIALYSIS TUE, THU & SAT, Justin Brito MD, 8,000 Units at 01/12/23 2242    atorvastatin (LIPITOR) tablet 20 mg, 20 mg, Oral, DAILY, Justin Brito MD, 20 mg at 01/14/23 0813    furosemide (LASIX) tablet 80 mg, 80 mg, Oral, Kaylee Dykes MD, 80 mg at 01/14/23 0813    gabapentin (NEURONTIN) capsule 300 mg, 300 mg, Oral, QHS, Justin Brito MD, 300 mg at 01/13/23 2154    sevelamer carbonate (RENVELA) tab 800 mg, 800 mg, Oral, TID WITH MEALS, Gomez Brito MD, 800 mg at 01/14/23 0813    glucose chewable tablet 16 g, 4 Tablet, Oral, PRN, Gomez Brito MD    glucagon (GLUCAGEN) injection 1 mg, 1 mg, IntraMUSCular, PRN, Gomez Brito MD    acetaminophen (TYLENOL) tablet 650 mg, 650 mg, Oral, Q6H PRN **OR** acetaminophen (TYLENOL) suppository 650 mg, 650 mg, Rectal, Q6H PRN, Justin Brito MD    polyethylene glycol (MIRALAX) packet 17 g, 17 g, Oral, DAILY PRN, Justin Brito MD    ondansetron (ZOFRAN ODT) tablet 4 mg, 4 mg, Oral, Q8H PRN **OR** ondansetron (ZOFRAN) injection 4 mg, 4 mg, IntraVENous, Q6H PRN, Justin Brito MD    pantoprazole (PROTONIX) tablet 20 mg, 20 mg, Oral, DAILY, Justin Brito MD, 20 mg at 01/14/23 0813    [COMPLETED] piperacillin-tazobactam (ZOSYN) 4.5 g in 0.9% sodium chloride (MBP/ADV) 100 mL MBP, 4.5 g, IntraVENous, ONCE, Last Rate: 200 mL/hr at 01/12/23 1725, 4.5 g at 01/12/23 1725 **FOLLOWED BY** piperacillin-tazobactam (ZOSYN) 3.375 g in 0.9% sodium chloride (MBP/ADV) 100 mL MBP, 3.375 g, IntraVENous, Q12H, Justin Brito MD, Last Rate: 25 mL/hr at 01/13/23 2316, 3.375 g at 01/13/23 2316    insulin glargine (LANTUS) injection 40 Units, 40 Units, SubCUTAneous, Q12H, Justin Brito MD, 40 Units at 01/14/23 0954    dexamethasone (DECADRON) 4 mg/mL injection 6 mg, 6 mg, IntraVENous, DAILY, Justin Brito MD, 6 mg at 01/14/23 0813    dextromethorphan (DELSYM) 30 mg/5 mL syrup 30 mg, 30 mg, Oral, Q12H, Ansley Nickerson MD, 30 mg at 01/14/23 5470    benzonatate (TESSALON) capsule 100 mg, 100 mg, Oral, TID PRN, Juan Francisco Garsia MD    rivaroxaban (XARELTO) tablet 2.5 mg, 2.5 mg, Oral, DAILY, Justin Brito MD, 2.5 mg at 01/14/23 0813    traMADoL (ULTRAM) tablet 50 mg, 50 mg, Oral, Q6H PRN, Justin Brito MD, 50 mg at 01/14/23 3030

## 2023-01-14 NOTE — PROGRESS NOTES
Renal Daily Progress Note    Admit Date: 1/12/2023      Subjective:   Pt is sitting in the chair and looking well - seen from out side of ROOM in ICU   She will be dialyzed today for 3 hrs and we will try to remove 3 liters fluid   CXR - slight improvement in pulmonary edema    Current Facility-Administered Medications   Medication Dose Route Frequency    insulin lispro (HUMALOG) injection   SubCUTAneous Q4H    phenol throat spray (CHLORASEPTIC) 1 Spray  1 Spray Oral PRN    sorbitoL 70 % solution 60 mL  60 mL Oral DAILY PRN    insulin lispro (HUMALOG) injection 10 Units  10 Units SubCUTAneous TIDAC    epoetin jodie-epbx (RETACRIT) injection 8,000 Units  8,000 Units IntraVENous DIALYSIS TUE, THU & SAT    atorvastatin (LIPITOR) tablet 20 mg  20 mg Oral DAILY    furosemide (LASIX) tablet 80 mg  80 mg Oral DAILY    gabapentin (NEURONTIN) capsule 300 mg  300 mg Oral QHS    sevelamer carbonate (RENVELA) tab 800 mg  800 mg Oral TID WITH MEALS    glucose chewable tablet 16 g  4 Tablet Oral PRN    glucagon (GLUCAGEN) injection 1 mg  1 mg IntraMUSCular PRN    acetaminophen (TYLENOL) tablet 650 mg  650 mg Oral Q6H PRN    Or    acetaminophen (TYLENOL) suppository 650 mg  650 mg Rectal Q6H PRN    polyethylene glycol (MIRALAX) packet 17 g  17 g Oral DAILY PRN    ondansetron (ZOFRAN ODT) tablet 4 mg  4 mg Oral Q8H PRN    Or    ondansetron (ZOFRAN) injection 4 mg  4 mg IntraVENous Q6H PRN    pantoprazole (PROTONIX) tablet 20 mg  20 mg Oral DAILY    piperacillin-tazobactam (ZOSYN) 3.375 g in 0.9% sodium chloride (MBP/ADV) 100 mL MBP  3.375 g IntraVENous Q12H    insulin glargine (LANTUS) injection 40 Units  40 Units SubCUTAneous Q12H    dexamethasone (DECADRON) 4 mg/mL injection 6 mg  6 mg IntraVENous DAILY    dextromethorphan (DELSYM) 30 mg/5 mL syrup 30 mg  30 mg Oral Q12H    benzonatate (TESSALON) capsule 100 mg  100 mg Oral TID PRN    rivaroxaban (XARELTO) tablet 2.5 mg  2.5 mg Oral DAILY    traMADoL (ULTRAM) tablet 50 mg  50 mg Oral Q6H PRN        Review of Systems    Review of Systems   Respiratory:  Positive for cough. Negative for shortness of breath. Cardiovascular:  Negative for chest pain. Gastrointestinal:  Negative for abdominal pain. Neurological:  Negative for headaches. Objective:     Patient Vitals for the past 8 hrs:   BP Temp Pulse Resp SpO2   01/14/23 0700 (!) 140/114 97.5 °F (36.4 °C) 64 20 98 %   01/14/23 0614 (!) 152/80 -- -- -- --   01/14/23 0600 -- -- 62 12 98 %   01/14/23 0500 -- -- 67 15 100 %   01/14/23 0458 -- -- -- -- 99 %   01/14/23 0400 127/62 -- 65 13 100 %   01/14/23 0300 (!) 163/94 -- 65 12 100 %   01/14/23 0200 (!) 150/78 -- 61 17 100 %   01/14/23 0100 (!) 145/96 -- 64 16 100 %       No intake/output data recorded. 01/12 1901 - 01/14 0700  In: 524.5 [I.V.:524.5]  Out: 46 [Urine:50]  Not examined today   Physical Exam:   Physical Exam  Cardiovascular:      Rate and Rhythm: Regular rhythm. Pulmonary:      Breath sounds: Normal breath sounds. Abdominal:      General: Bowel sounds are normal.      Palpations: Abdomen is soft. Neurological:      Mental Status: She is alert. No edema  Left upper arm AV fistula with good thrill and bruit        XR CHEST PORT   Final Result   Slight improvement in pulmonary edema. XR CHEST PORT   Final Result   Cardiomegaly and diffuse airspace disease similar to prior study. XR CHEST SNGL V   Final Result      1. Slightly asymmetric CHF pattern is suspected. Alternatively, correlate   clinically for bilateral infection, right greater than left. .  .   2. Stable cardiomegaly. 3. Technically compromised by patient size and position. A standard 2 view   examination would be more useful when clinically possible.               Data Review   Recent Labs     01/14/23  0500 01/13/23  0455 01/12/23  1011   WBC 12.7* 11.9* 14.4*   HGB 10.4* 9.4* 9.3*   HCT 32.1* 29.1* 29.7*   * 304 335       Recent Labs     01/14/23  0500 01/13/23  0455 01/12/23  1011 * 127* 130*   K 5.1 5.1 Specimen hemolyzed, results may be affected   CL 93* 93* 94*   CO2 25 26 27   * 430* 372*   BUN 65* 41* 48*   CREA 5.79* 4.62* 5.02*   CA 8.4* 8.6 8.5   PHOS  --  5.3*  --    ALB 2.8* 2.5* 2.8*   ALT 8* 7* 8*       No components found for: [place]  Recent Labs     01/14/23  0455 01/13/23  1051   PH 7.41 7.32*   PCO2 32* 52*   PO2 142* 87   HCO3 20* 26   FIO2 45 50       No results for input(s): INR, INREXT, INREXT, INREXT in the last 72 hours. Assessment:     ESRD on HD - m/w/f schedule as an out patient   Hyponatremia - to be corrected with HD and better control of Diabetes   Pulmonary edema - will remove 3 liters of fluid   Active Problems:    Hypoxia (1/12/2023)      Fluid overload (1/12/2023)      COVID-19 (1/12/2023)      Acute hypoxemic respiratory failure ([address] (1/12/2023)      Hypertension    COVID-19 infection    Secondary hyperparathyroidism    Anemia of chronic disease on Retacrit    Hyponatremia. Her corrected sodium is 131 mEq. Diabetes mellitus- not controlled due to steroids and covid -19    Plan:     HD for 3 HRS TODAY - can go back on M/W/F schedule next week. 3 liter fluid removal   C/w epogen today only as Hgb is better   C/w renvela - no dosage change   Repeat labs in A.M   Thank you.

## 2023-01-14 NOTE — PROGRESS NOTES
Infectious Disease Progress Note             Subjective:   Doing well, decreasing O2 requirements, no acute events since last seen, remains on HFNC. [place] constipated last night, bowel regimen ordered.  Remains on decadron, S/p Actemra   Objective:   Physical Exam:     Visit Vitals  BP (!) 170/77   Pulse 70   Temp 97.5 °F (36.4 °C)   Resp 21   Ht 5' 8\" (1.727 m)   Wt 248 lb 10.9 oz (112.8 kg)   SpO2 95%   BMI 37.81 kg/m²    O2 Flow Rate (L/min): 1.5 l/min O2 Device: Nasal cannula (mid lilo)    Temp (24hrs), Av.6 °F (36.4 °C), Min:97.5 °F (36.4 °C), Max:97.7 °F (36.5 °C)    701 - 1900  In: 240 [P.O.:240]  Out: -    1901 -  0700  In: 524.5 [I.V.:524.5]  Out: 51 [Urine:50]    General: NAD, AAO x 4  HEENT: PHILIP, Moist mucosa, HFNC in place   Lungs: CTA b/l, decreased at the bases, no whee/rhonchi   Heart: S1S2+, RRR, no murmur  Abdo: Soft, NT, ND, +BS   : No odom cath   Exts: Left arm AVF   Skin: No chronic wounds or ulcers       Data Review:       Recent Days:  Recent Labs     23  0500 23  0455 23  1011   WBC 12.7* 11.9* 14.4*   HGB 10.4* 9.4* 9.3*   HCT 32.1* 29.1* 29.7*   * 304 335       Recent Labs     23  0500 23  0455 23  1011   BUN 65* 41* 48*   CREA 5.79* 4.62* 5.02*         Lab Results   Component Value Date/Time    C-Reactive protein 24.40 (H) 2023 04:55 AM        Microbiology     Results       Procedure Component Value Units Date/Time    MRSA SCREEN - PCR (NASAL) [[id number]] Collected: 23    Order Status: Completed Specimen: Swab Updated: 23     MRSA by PCR, Nasal Not Detected       CULTURE, BLOOD #1 [[id number]] Collected: 23    Order Status: Canceled Specimen: Blood     CULTURE, BLOOD #2 [[id number]] Collected: 23    Order Status: Canceled Specimen: Blood     CULTURE, BLOOD, PAIRED [[id number]] Collected: 23 1011    Order Status: Completed Specimen: Blood Updated: 01/14/23 1031     Special Requests: No Special Requests        Culture result: No growth 2 days       INFLUENZA A & B AG (RAPID TEST) [[id number]] Collected: 01/12/23 1010    Order Status: Completed Specimen: Nasopharyngeal from Nasal washing Updated: 01/12/23 1036     Influenza A Antigen Negative        Influenza B Antigen Negative       COVID-19 RAPID TEST [[id number]]  (Abnormal) Collected: 01/12/23 1010    Order Status: Completed Specimen: Nasopharyngeal Updated: 01/12/23 1047     COVID-19 rapid test DETECTED        Comment: Rapid Abbott ID Now   The specimen is POSITIVE for SARS-CoV-2, the novel coronavirus associated with COVID-19. This test has been authorized by the FDA under an Emergency Use Authorization (EUA) for use by authorized laboratories. Fact sheet for Healthcare Providers:  http://www.[place].[facility]/ Fact sheet for Patients: http://www.[place].[facility]/   Methodology: Isothermal Nucleic Acid Amplification Results verified, phoned to and read back by BRANDI MENA RN ON   1/12/2023 @[facility]                    Diagnostics   CXR Results  (Last 48 hours)                 01/14/23 0255  XR CHEST PORT Final result    Impression:  Slight improvement in pulmonary edema. Narrative:  INDICATION: chf       EXAMINATION:  AP CHEST, PORTABLE       COMPARISON: 1/13/2023       FINDINGS: Single AP portable view of the chest demonstrates cardiomegaly. Patient rotated toward the left. Slight improvement in pulmonary edema. 01/13/23 0333  XR CHEST PORT Final result    Impression:  Cardiomegaly and diffuse airspace disease similar to prior study. Narrative:  INDICATION: COVID PNA, pulmonary edema       EXAMINATION:  AP CHEST, PORTABLE       COMPARISON: 1/12/2023       FINDINGS: Single AP portable view of the chest demonstrates cardiomegaly. Lungs   are adequately expanded with diffuse bilateral airspace disease. No   pneumothorax.                       Assessment/Plan Acute hypoxic respiratory failure due to COVID pneumonitis w superimposed CHF         Remains on high flow w decreasing O2 requirements, clear lungs on exam         CRP 24.40, Procal 4.07, , ferritin 1,107 (01/13)         Slight improvement in pulmonary edema on CXR (01/14)         On day # 3/7 of Zosyn, continue on decadron, S/p Actemra on 01/13        Routine labs in the morning         2. Sore throat, subjectively better       Continue symptomatic mgt w Chloraseptic spay      3.  ESRD on HD, + left arm AVF     4. H/o uncontrolled DM A1c of 10.1 in 06/2022    Nasra Mancini MD    1/14/2023

## 2023-01-15 ENCOUNTER — APPOINTMENT (OUTPATIENT)
Dept: GENERAL RADIOLOGY | Age: 48
DRG: 177 | End: 2023-01-15
Attending: INTERNAL MEDICINE
Payer: MEDICARE

## 2023-01-15 LAB
ALBUMIN SERPL-MCNC: 2.4 G/DL (ref 3.5–5)
ALBUMIN/GLOB SERPL: 0.5 (ref 1.1–2.2)
ALP SERPL-CCNC: 64 U/L (ref 45–117)
ALT SERPL-CCNC: 8 U/L (ref 12–78)
ANION GAP SERPL CALC-SCNC: 8 MMOL/L (ref 5–15)
ARTERIAL PATENCY WRIST A: YES
AST SERPL W P-5'-P-CCNC: 6 U/L (ref 15–37)
BASE EXCESS BLDA CALC-SCNC: 2 MMOL/L (ref 0–3)
BASOPHILS # BLD: 0 K/UL (ref 0–0.1)
BASOPHILS NFR BLD: 0 % (ref 0–1)
BDY SITE: ABNORMAL
BILIRUB SERPL-MCNC: 0.3 MG/DL (ref 0.2–1)
BODY TEMPERATURE: 98.8
BUN SERPL-MCNC: 53 MG/DL (ref 6–20)
BUN/CREAT SERPL: 12 (ref 12–20)
CA-I BLD-MCNC: 8.2 MG/DL (ref 8.5–10.1)
CHLORIDE SERPL-SCNC: 99 MMOL/L (ref 97–108)
CO2 SERPL-SCNC: 27 MMOL/L (ref 21–32)
COHGB MFR BLD: 0.1 % (ref 1–2)
CREAT SERPL-MCNC: 4.57 MG/DL (ref 0.55–1.02)
CRP SERPL-MCNC: 6.16 MG/DL (ref 0–0.6)
D DIMER PPP FEU-MCNC: 1.09 UG/ML(FEU)
DIFFERENTIAL METHOD BLD: ABNORMAL
EOSINOPHIL # BLD: 0 K/UL (ref 0–0.4)
EOSINOPHIL NFR BLD: 0 % (ref 0–7)
ERYTHROCYTE [DISTWIDTH] IN BLOOD BY AUTOMATED COUNT: 13 % (ref 11.5–14.5)
FERRITIN SERPL-MCNC: 698 NG/ML (ref 8–252)
FIO2 ON VENT: 24 %
GAS FLOW.O2 O2 DELIVERY SYS: 1 L/MIN
GLOBULIN SER CALC-MCNC: 4.6 G/DL (ref 2–4)
GLUCOSE BLD STRIP.AUTO-MCNC: 180 MG/DL (ref 65–100)
GLUCOSE BLD STRIP.AUTO-MCNC: 189 MG/DL (ref 65–100)
GLUCOSE BLD STRIP.AUTO-MCNC: 221 MG/DL (ref 65–100)
GLUCOSE BLD STRIP.AUTO-MCNC: 232 MG/DL (ref 65–100)
GLUCOSE BLD STRIP.AUTO-MCNC: 247 MG/DL (ref 65–100)
GLUCOSE SERPL-MCNC: 202 MG/DL (ref 65–100)
HCO3 BLDA-SCNC: 28 MMOL/L (ref 22–26)
HCT VFR BLD AUTO: 30.7 % (ref 35–47)
HGB BLD-MCNC: 9.9 G/DL (ref 11.5–16)
IMM GRANULOCYTES # BLD AUTO: 0.2 K/UL (ref 0–0.04)
IMM GRANULOCYTES NFR BLD AUTO: 1 % (ref 0–0.5)
LDH SERPL L TO P-CCNC: 155 U/L (ref 81–246)
LYMPHOCYTES # BLD: 1.8 K/UL (ref 0.8–3.5)
LYMPHOCYTES NFR BLD: 13 % (ref 12–49)
MCH RBC QN AUTO: 29.6 PG (ref 26–34)
MCHC RBC AUTO-ENTMCNC: 32.2 G/DL (ref 30–36.5)
MCV RBC AUTO: 91.6 FL (ref 80–99)
METHGB MFR BLD: 0.5 % (ref 0–1.4)
MONOCYTES # BLD: 0.8 K/UL (ref 0–1)
MONOCYTES NFR BLD: 6 % (ref 5–13)
NEUTS SEG # BLD: 11.2 K/UL (ref 1.8–8)
NEUTS SEG NFR BLD: 80 % (ref 32–75)
NRBC # BLD: 0 K/UL (ref 0–0.01)
NRBC BLD-RTO: 0 PER 100 WBC
OXYHGB MFR BLD: 96.7 % (ref 95–99)
PCO2 BLDA: 50 MMHG (ref 35–45)
PERFORMED BY, TECHID: ABNORMAL
PH BLDA: 7.37 (ref 7.35–7.45)
PLATELET # BLD AUTO: 396 K/UL (ref 150–400)
PMV BLD AUTO: 10 FL (ref 8.9–12.9)
PO2 BLDA: 102 MMHG (ref 80–100)
POTASSIUM SERPL-SCNC: 4.2 MMOL/L (ref 3.5–5.1)
PROCALCITONIN SERPL-MCNC: 1.99 NG/ML
PROT SERPL-MCNC: 7 G/DL (ref 6.4–8.2)
RBC # BLD AUTO: 3.35 M/UL (ref 3.8–5.2)
SAO2 % BLD: 97 % (ref 95–99)
SAO2% DEVICE SAO2% SENSOR NAME: ABNORMAL
SODIUM SERPL-SCNC: 134 MMOL/L (ref 136–145)
SPECIMEN SITE: ABNORMAL
WBC # BLD AUTO: 13.9 K/UL (ref 3.6–11)

## 2023-01-15 PROCEDURE — 36415 COLL VENOUS BLD VENIPUNCTURE: CPT

## 2023-01-15 PROCEDURE — 74011250636 HC RX REV CODE- 250/636: Performed by: FAMILY MEDICINE

## 2023-01-15 PROCEDURE — 86140 C-REACTIVE PROTEIN: CPT

## 2023-01-15 PROCEDURE — 65270000029 HC RM PRIVATE

## 2023-01-15 PROCEDURE — 85025 COMPLETE CBC W/AUTO DIFF WBC: CPT

## 2023-01-15 PROCEDURE — 82962 GLUCOSE BLOOD TEST: CPT

## 2023-01-15 PROCEDURE — 82803 BLOOD GASES ANY COMBINATION: CPT

## 2023-01-15 PROCEDURE — 36600 WITHDRAWAL OF ARTERIAL BLOOD: CPT

## 2023-01-15 PROCEDURE — 74011636637 HC RX REV CODE- 636/637: Performed by: FAMILY MEDICINE

## 2023-01-15 PROCEDURE — 77010033678 HC OXYGEN DAILY

## 2023-01-15 PROCEDURE — 82728 ASSAY OF FERRITIN: CPT

## 2023-01-15 PROCEDURE — 74011250637 HC RX REV CODE- 250/637: Performed by: FAMILY MEDICINE

## 2023-01-15 PROCEDURE — 99232 SBSQ HOSP IP/OBS MODERATE 35: CPT | Performed by: INTERNAL MEDICINE

## 2023-01-15 PROCEDURE — 74011250637 HC RX REV CODE- 250/637: Performed by: INTERNAL MEDICINE

## 2023-01-15 PROCEDURE — 80053 COMPREHEN METABOLIC PANEL: CPT

## 2023-01-15 PROCEDURE — 83615 LACTATE (LD) (LDH) ENZYME: CPT

## 2023-01-15 PROCEDURE — 71045 X-RAY EXAM CHEST 1 VIEW: CPT

## 2023-01-15 PROCEDURE — 85379 FIBRIN DEGRADATION QUANT: CPT

## 2023-01-15 PROCEDURE — 74011000258 HC RX REV CODE- 258: Performed by: FAMILY MEDICINE

## 2023-01-15 PROCEDURE — 84145 PROCALCITONIN (PCT): CPT

## 2023-01-15 RX ADMIN — INSULIN LISPRO 4 UNITS: 100 INJECTION, SOLUTION INTRAVENOUS; SUBCUTANEOUS at 20:08

## 2023-01-15 RX ADMIN — INSULIN GLARGINE 40 UNITS: 100 INJECTION, SOLUTION SUBCUTANEOUS at 08:31

## 2023-01-15 RX ADMIN — INSULIN LISPRO 10 UNITS: 100 INJECTION, SOLUTION INTRAVENOUS; SUBCUTANEOUS at 08:30

## 2023-01-15 RX ADMIN — NIFEDIPINE 60 MG: 60 TABLET, FILM COATED, EXTENDED RELEASE ORAL at 21:17

## 2023-01-15 RX ADMIN — INSULIN LISPRO 3 UNITS: 100 INJECTION, SOLUTION INTRAVENOUS; SUBCUTANEOUS at 04:24

## 2023-01-15 RX ADMIN — CARVEDILOL 6.25 MG: 3.12 TABLET, FILM COATED ORAL at 17:09

## 2023-01-15 RX ADMIN — INSULIN GLARGINE 40 UNITS: 100 INJECTION, SOLUTION SUBCUTANEOUS at 21:18

## 2023-01-15 RX ADMIN — SEVELAMER CARBONATE 800 MG: 800 TABLET, FILM COATED ORAL at 08:33

## 2023-01-15 RX ADMIN — MELATONIN TAB 3 MG 3 MG: 3 TAB at 21:18

## 2023-01-15 RX ADMIN — NIFEDIPINE 60 MG: 60 TABLET, FILM COATED, EXTENDED RELEASE ORAL at 08:33

## 2023-01-15 RX ADMIN — PIPERACILLIN AND TAZOBACTAM 3.38 G: 3; .375 INJECTION, POWDER, LYOPHILIZED, FOR SOLUTION INTRAVENOUS at 22:59

## 2023-01-15 RX ADMIN — CARVEDILOL 6.25 MG: 3.12 TABLET, FILM COATED ORAL at 08:33

## 2023-01-15 RX ADMIN — INSULIN LISPRO 10 UNITS: 100 INJECTION, SOLUTION INTRAVENOUS; SUBCUTANEOUS at 11:30

## 2023-01-15 RX ADMIN — INSULIN LISPRO 4 UNITS: 100 INJECTION, SOLUTION INTRAVENOUS; SUBCUTANEOUS at 17:00

## 2023-01-15 RX ADMIN — INSULIN LISPRO 4 UNITS: 100 INJECTION, SOLUTION INTRAVENOUS; SUBCUTANEOUS at 11:40

## 2023-01-15 RX ADMIN — ONDANSETRON 4 MG: 2 INJECTION INTRAMUSCULAR; INTRAVENOUS at 06:03

## 2023-01-15 RX ADMIN — Medication 30 MG: at 21:18

## 2023-01-15 RX ADMIN — FUROSEMIDE 80 MG: 40 TABLET ORAL at 08:33

## 2023-01-15 RX ADMIN — PANTOPRAZOLE SODIUM 20 MG: 20 TABLET, DELAYED RELEASE ORAL at 08:33

## 2023-01-15 RX ADMIN — DEXAMETHASONE SODIUM PHOSPHATE 6 MG: 4 INJECTION, SOLUTION INTRA-ARTICULAR; INTRALESIONAL; INTRAMUSCULAR; INTRAVENOUS; SOFT TISSUE at 08:33

## 2023-01-15 RX ADMIN — PIPERACILLIN AND TAZOBACTAM 3.38 G: 3; .375 INJECTION, POWDER, LYOPHILIZED, FOR SOLUTION INTRAVENOUS at 11:38

## 2023-01-15 RX ADMIN — RIVAROXABAN 2.5 MG: 2.5 TABLET, FILM COATED ORAL at 08:32

## 2023-01-15 RX ADMIN — ATORVASTATIN CALCIUM 20 MG: 20 TABLET, FILM COATED ORAL at 08:33

## 2023-01-15 RX ADMIN — INSULIN LISPRO 3 UNITS: 100 INJECTION, SOLUTION INTRAVENOUS; SUBCUTANEOUS at 08:31

## 2023-01-15 RX ADMIN — INSULIN LISPRO 10 UNITS: 100 INJECTION, SOLUTION INTRAVENOUS; SUBCUTANEOUS at 17:07

## 2023-01-15 RX ADMIN — SEVELAMER CARBONATE 800 MG: 800 TABLET, FILM COATED ORAL at 17:08

## 2023-01-15 RX ADMIN — Medication 30 MG: at 08:32

## 2023-01-15 RX ADMIN — SEVELAMER CARBONATE 800 MG: 800 TABLET, FILM COATED ORAL at 11:39

## 2023-01-15 RX ADMIN — GABAPENTIN 300 MG: 300 CAPSULE ORAL at 21:17

## 2023-01-15 NOTE — PROGRESS NOTES
Infectious Disease Progress Note             Subjective:   Decreasing O2 requirements, no acute events events since last seen. Denies new complaints, no change in pulmonary edema and cardiomegaly on CXR. Off High flow NC. Unable to rest well in the ICU   Objective:   Physical Exam:     Visit Vitals  BP (!) 146/53   Pulse 64   Temp 97.7 °F (36.5 °C)   Resp 12   Ht 5' 8\" (1.727 m)   Wt 280 lb 10.3 oz (127.3 kg)   SpO2 96%   BMI 42.67 kg/m²    O2 Flow Rate (L/min): 1.5 l/min O2 Device: Nasal cannula    Temp (24hrs), Av.1 °F (36.7 °C), Min:97.5 °F (36.4 °C), Max:98.8 °F (37.1 °C)    01/15 0701 - 01/15 1900  In: 400 [P.O.:400]  Out: -    1901 - 01/15 0700  In: 3580 [P.O.:890;  I.V.:300]  Out:      General: NAD, AAO x 4  HEENT: PHILIP, Moist mucosa  Lungs: CTA b/l, decreased at the bases, no whee/rhonchi   Heart: S1S2+, RRR, no murmur  Abdo: Soft, NT, ND, +BS   : No odom cath   Exts: Left arm AVF   Skin: No chronic wounds or ulcers       Data Review:       Recent Days:  Recent Labs     01/15/23  0530 23  0500 23  0455   WBC 13.9* 12.7* 11.9*   HGB 9.9* 10.4* 9.4*   HCT 30.7* 32.1* 29.1*    419* 304       Recent Labs     01/15/23  0530 23  0500 23  0455   BUN 53* 65* 41*   CREA 4.57* 5.79* 4.62*         Lab Results   Component Value Date/Time    C-Reactive protein 6.16 (H) 01/15/2023 05:30 AM        Microbiology     Results       Procedure Component Value Units Date/Time    MRSA SCREEN - PCR (NASAL) [[id number]] Collected: 23    Order Status: Completed Specimen: Swab Updated: 23     MRSA by PCR, Nasal Not Detected       CULTURE, BLOOD #1 [[id number]] Collected: 23 141    Order Status: Canceled Specimen: Blood     CULTURE, BLOOD #2 [[id number]] Collected: 23 141    Order Status: Canceled Specimen: Blood     CULTURE, BLOOD, PAIRED [[id number]] Collected: 23 1011    Order Status: Completed Specimen: Blood Updated: 01/15/23 5038     Special Requests: No Special Requests        Culture result: No growth 3 days       INFLUENZA A & B AG (RAPID TEST) [[id number]] Collected: 01/12/23 1010    Order Status: Completed Specimen: Nasopharyngeal from Nasal washing Updated: 01/12/23 1036     Influenza A Antigen Negative        Influenza B Antigen Negative       COVID-19 RAPID TEST [[id number]]  (Abnormal) Collected: 01/12/23 1010    Order Status: Completed Specimen: Nasopharyngeal Updated: 01/12/23 1047     COVID-19 rapid test DETECTED        Comment: Rapid Abbott ID Now   The specimen is POSITIVE for SARS-CoV-2, the novel coronavirus associated with COVID-19. This test has been authorized by the FDA under an Emergency Use Authorization (EUA) for use by authorized laboratories. Fact sheet for Healthcare Providers:  http://www.[place].[employer]/ Fact sheet for Patients: http://www.[place].[employer]/   Methodology: Isothermal Nucleic Acid Amplification Results verified, phoned to and read back by BRANDI MENA RN ON   1/12/2023 @[facility]                    Diagnostics   CXR Results  (Last 48 hours)                 01/15/23 0257  XR CHEST PORT Final result    Impression:  No definite change in cardiomegaly and pulmonary edema with technical factors as   above. Narrative:  INDICATION: chf       EXAMINATION:  AP CHEST, PORTABLE       COMPARISON: 1/14/2023       FINDINGS: Single AP portable view of the chest demonstrates low lung volumes   with image quality degraded by body habitus. Cardiomegaly is unchanged. Pulmonary edema without definite change. 01/14/23 0255  XR CHEST PORT Final result    Impression:  Slight improvement in pulmonary edema. Narrative:  INDICATION: chf       EXAMINATION:  AP CHEST, PORTABLE       COMPARISON: 1/13/2023       FINDINGS: Single AP portable view of the chest demonstrates cardiomegaly. Patient rotated toward the left. Slight improvement in pulmonary edema. Assessment/Plan        Acute hypoxic respiratory failure due to COVID pneumonitis w superimposed CHF         Off HFNC, currently on regular NC, coughing less, clear lungs on exam         Ferritin 1,107 (01/13) down to 698 today, LDH remains WNLs        CRP and Procal are both trending down on todays labs         On day # 4/7 of Zosyn and decadron, S/p Actemra on 01/13        Continue supportive care, wean off O2 as tolerated, routine labs and CXR in AM         2. Sore throat, subjectively better, still no oral thrush on exam     3. ESRD on HD, + left arm AVF     4. H/o uncontrolled DM A1c of 10.1 in 06/2022, repeat on 01/12 is 9.1    5.  Insomnia: continued melatonin and prn vistaril     Okay to transfer out of the ICU     Gary Gill MD    1/15/2023

## 2023-01-15 NOTE — PROGRESS NOTES
IMPRESSION:   Acute hypoxic respiratory failure  COVID-19  Sepsis  CHF with fluid overload  Chronic kidney disease stage V on dialysis  Hypertension and diabetes by history  Hyponatremia  Additional workup outlined below  Pt is at high risk of sudden decline and decompensation with life threatening consequenses and continued end organ dysfunction and failure  Pt is critically ill. Time spent with pt and staff actively rendering care, managing pt and coordinating care as stated below; 30 minutes, exclusive of any procedures      RECOMMENDATIONS/PLAN:   ICU monitoring  51-year-old obese lady came in because of shortness of breath and dyspnea patient has COVID-19 also patient has history of chronic kidney disease on hemodialysis and she missed 2 dialysis before   Continue with dialysis she was getting it Monday Wednesday Friday we will repeat dialysis chest x-ray shows CHF fluid overload  Now she is on mid flow 1-1/2 L  For COVID-19 patient on Decadron and received Actemra  CRP 24.7 procalcitonin 4.0 BNP 37466  Repeat sodium much improved  CXR shows bilateral congestive changes superimposed infiltrate  High flow nasal Cannula oxygen as salvage oxygen delivery device to provide high concentration of oxygen to overcome refractory hypoxia; Will be available to assist in medical management while in the CCU pending disposition     [x] High complexity decision making was performed  [x] See my orders for details  HPI  51-year-old lady came in because of shortness of breath and dyspnea significant past medical history of chronic kidney disease stage V on hemodialysis hypertension CVA and diabetes mellitus.   She went to [facility] and also she gets dialysis but she is to dialysis and then she was diagnosed with COVID-19 also having cough fever chills chest pain nausea and vomiting came to the emergency room she was put on high flow nasal cannula saturation was low BNP was elevated glucose was also elevated chest x-ray shows CHF fluid overload with superimposed possible COVID picture so admitted and critical care consult was called. PMH:  has a past medical history of Allergic rhinitis, Blood clot in vein, Chronic kidney disease, CVA (cerebral vascular accident) ([facility] [address] (2014), Diabetes ([facility] [address] Dyslipidemia, Hypertension, IBS (irritable bowel syndrome), Ill-defined condition, and Renal failure. PSH:   has a past surgical history that includes hx cholecystectomy (); hx tonsillectomy (); hx  section (); pr unlisted procedure vascular surgery; and hx lap cholecystectomy. FHX: family history includes Diabetes in her father and mother; Heart Disease in her father and mother; Hypertension in her father and mother. SHX:  reports that she has never smoked. She has never used smokeless tobacco. She reports that she does not drink alcohol and does not use drugs.     ALL:   Allergies   Allergen Reactions    Fentanyl Anxiety    Sulfa (Sulfonamide Antibiotics) Nausea and Vomiting    Zithromax [Azithromycin] Unknown (comments)    Morphine Itching        MEDS:   [x] Reviewed - As Below   [] Not reviewed    Current Facility-Administered Medications   Medication    melatonin tablet 3 mg    hydrOXYzine pamoate (VISTARIL) capsule 25 mg    carvediloL (COREG) tablet 6.25 mg    NIFEdipine ER (PROCARDIA XL) tablet 60 mg    insulin lispro (HUMALOG) injection    phenol throat spray (CHLORASEPTIC) 1 Spray    sorbitoL 70 % solution 60 mL    insulin lispro (HUMALOG) injection 10 Units    epoetin jodie-epbx (RETACRIT) injection 8,000 Units    atorvastatin (LIPITOR) tablet 20 mg    furosemide (LASIX) tablet 80 mg    gabapentin (NEURONTIN) capsule 300 mg    sevelamer carbonate (RENVELA) tab 800 mg    glucose chewable tablet 16 g    glucagon (GLUCAGEN) injection 1 mg    acetaminophen (TYLENOL) tablet 650 mg    Or    acetaminophen (TYLENOL) suppository 650 mg    polyethylene glycol (MIRALAX) packet 17 g    ondansetron (ZOFRAN ODT) tablet 4 mg    Or    ondansetron (ZOFRAN) injection 4 mg    pantoprazole (PROTONIX) tablet 20 mg    piperacillin-tazobactam (ZOSYN) 3.375 g in 0.9% sodium chloride (MBP/ADV) 100 mL MBP    insulin glargine (LANTUS) injection 40 Units    dexamethasone (DECADRON) 4 mg/mL injection 6 mg    dextromethorphan (DELSYM) 30 mg/5 mL syrup 30 mg    benzonatate (TESSALON) capsule 100 mg    rivaroxaban (XARELTO) tablet 2.5 mg    traMADoL (ULTRAM) tablet 50 mg      MAR reviewed and pertinent medications noted or modified as needed   Current Facility-Administered Medications   Medication    melatonin tablet 3 mg    hydrOXYzine pamoate (VISTARIL) capsule 25 mg    carvediloL (COREG) tablet 6.25 mg    NIFEdipine ER (PROCARDIA XL) tablet 60 mg    insulin lispro (HUMALOG) injection    phenol throat spray (CHLORASEPTIC) 1 Spray    sorbitoL 70 % solution 60 mL    insulin lispro (HUMALOG) injection 10 Units    epoetin jodie-epbx (RETACRIT) injection 8,000 Units    atorvastatin (LIPITOR) tablet 20 mg    furosemide (LASIX) tablet 80 mg    gabapentin (NEURONTIN) capsule 300 mg    sevelamer carbonate (RENVELA) tab 800 mg    glucose chewable tablet 16 g    glucagon (GLUCAGEN) injection 1 mg    acetaminophen (TYLENOL) tablet 650 mg    Or    acetaminophen (TYLENOL) suppository 650 mg    polyethylene glycol (MIRALAX) packet 17 g    ondansetron (ZOFRAN ODT) tablet 4 mg    Or    ondansetron (ZOFRAN) injection 4 mg    pantoprazole (PROTONIX) tablet 20 mg    piperacillin-tazobactam (ZOSYN) 3.375 g in 0.9% sodium chloride (MBP/ADV) 100 mL MBP    insulin glargine (LANTUS) injection 40 Units    dexamethasone (DECADRON) 4 mg/mL injection 6 mg    dextromethorphan (DELSYM) 30 mg/5 mL syrup 30 mg    benzonatate (TESSALON) capsule 100 mg    rivaroxaban (XARELTO) tablet 2.5 mg    traMADoL (ULTRAM) tablet 50 mg      PMH:  has a past medical history of Allergic rhinitis, Blood clot in vein, Chronic kidney disease, CVA (cerebral vascular accident) ([facility] [address] (05/20/2014), Diabetes ([facility] [address] Dyslipidemia, Hypertension, IBS (irritable bowel syndrome), Ill-defined condition, and Renal failure. PSH:   has a past surgical history that includes hx cholecystectomy (); hx tonsillectomy (); hx  section (); pr unlisted procedure vascular surgery; and hx lap cholecystectomy. FHX: family history includes Diabetes in her father and mother; Heart Disease in her father and mother; Hypertension in her father and mother. SHX:  reports that she has never smoked. She has never used smokeless tobacco. She reports that she does not drink alcohol and does not use drugs. ROS:A comprehensive review of systems was negative except for that written in the HPI. Hemodynamics:    CO:    CI:    CVP:    SVR:   PAP Systolic:    PAP Diastolic:    PVR:    IC63:        Ventilator Settings:      Mode Rate TV Press PEEP FiO2 PIP Min. Vent               40 %              Vital Signs: Telemetry:    normal sinus rhythm Intake/Output:   Visit Vitals  BP (!) 157/73   Pulse 76   Temp 97.7 °F (36.5 °C)   Resp 13   Ht 5' 8\" (1.727 m)   Wt 127.3 kg (280 lb 10.3 oz)   SpO2 99%   BMI 42.67 kg/m²       Temp (24hrs), Av °F (36.7 °C), Min:97.5 °F (36.4 °C), Max:98.8 °F (37.1 °C)        O2 Device: Nasal cannula O2 Flow Rate (L/min): 1.5 l/min       Wt Readings from Last 4 Encounters:   01/15/23 127.3 kg (280 lb 10.3 oz)   09/15/22 117.9 kg (260 lb)   22 122.5 kg (270 lb)   22 128.2 kg (282 lb 10.1 oz)          Intake/Output Summary (Last 24 hours) at 1/15/2023 0919  Last data filed at 1/15/2023 0600  Gross per 24 hour   Intake 1090 ml   Output 2000 ml   Net -910 ml         Last shift:      No intake/output data recorded. Last 3 shifts:  1901 - 01/15 0700  In: 4539 [P.O.:890;  I.V.:300]  Out:         Physical Exam:     General: HFNC; alert awake  HEENT: NCAT, poor dentition, lips and mucosa dry  Eyes: anicteric; conjunctiva clear  Neck: no nodes,  trach midline; no accessory MM use.  Chest: no deformity,   Cardiac: IR regular; no murmur;   Lungs: distant breath sounds; bilateral rales  Abd: soft, NT, hypoactive BS  Ext: no edema; no joint swelling;  No clubbing  : NO odom, clear urine  Neuro: Alert awake  Psych- no agitation, oriented to person;   Skin: warm, dry, no cyanosis;   Pulses: 1-2+ Bilateral pedal, radial  Capillary: brisk; pale      DATA:    MAR reviewed and pertinent medications noted or modified as needed  MEDS:   Current Facility-Administered Medications   Medication    melatonin tablet 3 mg    hydrOXYzine pamoate (VISTARIL) capsule 25 mg    carvediloL (COREG) tablet 6.25 mg    NIFEdipine ER (PROCARDIA XL) tablet 60 mg    insulin lispro (HUMALOG) injection    phenol throat spray (CHLORASEPTIC) 1 Spray    sorbitoL 70 % solution 60 mL    insulin lispro (HUMALOG) injection 10 Units    epoetin jodie-epbx (RETACRIT) injection 8,000 Units    atorvastatin (LIPITOR) tablet 20 mg    furosemide (LASIX) tablet 80 mg    gabapentin (NEURONTIN) capsule 300 mg    sevelamer carbonate (RENVELA) tab 800 mg    glucose chewable tablet 16 g    glucagon (GLUCAGEN) injection 1 mg    acetaminophen (TYLENOL) tablet 650 mg    Or    acetaminophen (TYLENOL) suppository 650 mg    polyethylene glycol (MIRALAX) packet 17 g    ondansetron (ZOFRAN ODT) tablet 4 mg    Or    ondansetron (ZOFRAN) injection 4 mg    pantoprazole (PROTONIX) tablet 20 mg    piperacillin-tazobactam (ZOSYN) 3.375 g in 0.9% sodium chloride (MBP/ADV) 100 mL MBP    insulin glargine (LANTUS) injection 40 Units    dexamethasone (DECADRON) 4 mg/mL injection 6 mg    dextromethorphan (DELSYM) 30 mg/5 mL syrup 30 mg    benzonatate (TESSALON) capsule 100 mg    rivaroxaban (XARELTO) tablet 2.5 mg    traMADoL (ULTRAM) tablet 50 mg        Labs:    Recent Labs     01/15/23  0530 01/14/23  0500 01/13/23  0455   WBC 13.9* 12.7* 11.9*   HGB 9.9* 10.4* 9.4*    419* 304       Recent Labs     01/15/23  0530 01/14/23  0500 01/13/23  0455 01/12/23  1011   * 127* 127* 130*   K 4.2 5.1 5.1 Specimen hemolyzed, results may be affected   CL 99 93* 93* 94*   CO2 27 25 26 27   * 339* 430* 372*   BUN 53* 65* 41* 48*   CREA 4.57* 5.79* 4.62* 5.02*   CA 8.2* 8.4* 8.6 8.5   PHOS  --   --  5.3*  --    LAC  --   --   --  2.4*   ALB 2.4* 2.8* 2.5* 2.8*   ALT 8* 8* 7* 8*       Recent Labs     01/15/23  0539 01/14/23  0455 01/13/23  1051   PH 7.37 7.41 7.32*   PCO2 50* 32* 52*   PO2 102* 142* 87   HCO3 28* 20* 26   FIO2 24.0 45 50       No results for input(s): CPK, CKNDX, TROIQ in the last 72 hours. No lab exists for component: CPKMB  No results found for: BNPP, BNP   Lab Results   Component Value Date/Time    Culture result: No growth 2 days 01/12/2023 10:11 AM     No results found for: TSH, TSHEXT, TSHEXT     Imaging:    Results from Hospital Encounter encounter on 01/12/23    XR CHEST PORT    Narrative  INDICATION: chf    EXAMINATION:  AP CHEST, PORTABLE    COMPARISON: 1/14/2023    FINDINGS: Single AP portable view of the chest demonstrates low lung volumes  with image quality degraded by body habitus. Cardiomegaly is unchanged. Pulmonary edema without definite change. Impression  No definite change in cardiomegaly and pulmonary edema with technical factors as  above. Results from [place] encounter on 08/09/22    CT ABD PELV WO CONT    Narrative  EXAM: CT ABD PELV WO CONT    INDICATION: left flank pain    COMPARISON: 6/29/2022    IV CONTRAST: None. ORAL CONTRAST:    TECHNIQUE:  Thin axial images were obtained through the abdomen and pelvis. Coronal and  sagittal reformats were generated. CT dose reduction was achieved through use of  a standardized protocol tailored for this examination and automatic exposure  control for dose modulation. The absence of intravenous contrast material reduces the sensitivity for  evaluation of the vasculature and solid organs. FINDINGS:  LOWER THORAX: Pericardial effusion is stable. Cardiomegaly is stable there is  minor left basilar atelectasis. LIVER: No mass. BILIARY TREE: Gallbladder is within normal limits. CBD is not dilated. SPLEEN: within normal limits. PANCREAS: No focal abnormality. ADRENALS: Unremarkable. KIDNEYS/URETERS: No calculus or hydronephrosis. STOMACH: Unremarkable. SMALL BOWEL: No dilatation or wall thickening. COLON: No dilatation or wall thickening. Sigmoid colon is tortuous. APPENDIX: Normal  PERITONEUM: No ascites or pneumoperitoneum. RETROPERITONEUM: No lymphadenopathy or aortic aneurysm. There are extensive  atherosclerotic changes  REPRODUCTIVE ORGANS: Uterus is normal  URINARY BLADDER: Incompletely distended  BONES: No destructive bone lesion. ABDOMINAL WALL: There is a small umbilical hernia containing fat  ADDITIONAL COMMENTS: There is increased intra-abdominal fat    Impression  No acute abnormality identified. There has been no significant change. There are  extensive atherosclerotic changes.     1/14 on high flow 45% FiO2 85 flow at 40 L ABG acceptable we will decrease FiO2 PO2 is 142, chest x-ray shows improvement in the pulmonary edema she received Actemra yesterday we will continue to wean for hemodialysis today  1/15 on mid flow tolerating well improving PCO2 elevated will try to wean her to regular nasal cannula chest x-ray still shows congestive changes most likely secondary to COVID continue with the dialysis, continue with Lasix sore throat improved

## 2023-01-15 NOTE — PROGRESS NOTES
General Daily Progress Note          Patient Name:   Ximena Redd       YOB: 1975       Age:  52 y.o. Admit Date: 1/12/2023      Subjective:       Patient is a 52y.o. year old female with past medical history of CVA, diabetes, CKD stage 5,on HD  hypertension, and cardiomegaly who presented to the ED today with shortness of breath. Patient states that over the last few days she has been feeling very sick and because of that she could only get 2 hours of dialysis done on Monday and 30 minutes yesterday. And then today she started feeling very short of breath and called EMS. On arrival to the ED patient was noted to be hypoxic in the 60s. She was placed on non-breather with improvement of oxygenation. Pt was tested positive for COVID test done in the ER. This is her first time getting diagnosed with COVID. She also has chills, cough and chest pain associated with it, nausea, and vomiting. She is currently on high flow oxygen. CBC shows leukocytosis 14,400, BNP 27,904, lactic acid 2.4, sodium 130, chloride 94, troponin 55. Glucose 372. Chest x ray done in the ED shows:  1. Slightly asymmetric CHF pattern is suspected. Alternatively, correlate  clinically for bilateral infection, right greater than left. .  .  2. Stable cardiomegaly. 3. Technically compromised by patient size and position. A standard 2 view  examination would be more useful when clinically possible.     1/14    Patient resting in the bed alert awake on high flow 40% O2  Blood sugar running high due to steroids  Still complaining of cough congestion    1/15  Patient alert awake not in distress breathing much better  Now on nasal cannula 1.5 L           Objective:     Visit Vitals  BP (!) 137/48   Pulse 70   Temp 97.7 °F (36.5 °C)   Resp 28   Ht 5' 8\" (1.727 m)   Wt 127.3 kg (280 lb 10.3 oz)   SpO2 98%   BMI 42.67 kg/m²        Recent Results (from the past 24 hour(s))   GLUCOSE, POC    Collection Time: 01/14/23 11:21 AM Result Value Ref Range    Glucose (POC) 263 (H) 65 - 100 mg/dL    Performed by Maria Dolores Mancilla    GLUCOSE, POC    Collection Time: 01/14/23  3:06 PM   Result Value Ref Range    Glucose (POC) 164 (H) 65 - 100 mg/dL    Performed by Katy Pickard    GLUCOSE, POC    Collection Time: 01/14/23  7:59 PM   Result Value Ref Range    Glucose (POC) 218 (H) 65 - 100 mg/dL    Performed by Georgette Hdz RN (Tvlr)    GLUCOSE, POC    Collection Time: 01/14/23 11:56 PM   Result Value Ref Range    Glucose (POC) 286 (H) 65 - 100 mg/dL    Performed by Georgette Hdz RN (Tvlr)    GLUCOSE, POC    Collection Time: 01/15/23  4:19 AM   Result Value Ref Range    Glucose (POC) 189 (H) 65 - 100 mg/dL    Performed by Georgette Hdz RN (Tvlr)    CBC WITH AUTOMATED DIFF    Collection Time: 01/15/23  5:30 AM   Result Value Ref Range    WBC 13.9 (H) 3.6 - 11.0 K/uL    RBC 3.35 (L) 3.80 - 5.20 M/uL    HGB 9.9 (L) 11.5 - 16.0 g/dL    HCT 30.7 (L) 35.0 - 47.0 %    MCV 91.6 80.0 - 99.0 FL    MCH 29.6 26.0 - 34.0 PG    MCHC 32.2 30.0 - 36.5 g/dL    RDW 13.0 11.5 - 14.5 %    PLATELET 386 110 - 250 K/uL    MPV 10.0 8.9 - 12.9 FL    NRBC 0.0 0.0  WBC    ABSOLUTE NRBC 0.00 0.00 - 0.01 K/uL    NEUTROPHILS 80 (H) 32 - 75 %    LYMPHOCYTES 13 12 - 49 %    MONOCYTES 6 5 - 13 %    EOSINOPHILS 0 0 - 7 %    BASOPHILS 0 0 - 1 %    IMMATURE GRANULOCYTES 1 (H) 0 - 0.5 %    ABS. NEUTROPHILS 11.2 (H) 1.8 - 8.0 K/UL    ABS. LYMPHOCYTES 1.8 0.8 - 3.5 K/UL    ABS. MONOCYTES 0.8 0.0 - 1.0 K/UL    ABS. EOSINOPHILS 0.0 0.0 - 0.4 K/UL    ABS. BASOPHILS 0.0 0.0 - 0.1 K/UL    ABS. IMM.  GRANS. 0.2 (H) 0.00 - 0.04 K/UL    DF AUTOMATED     C REACTIVE PROTEIN, QT    Collection Time: 01/15/23  5:30 AM   Result Value Ref Range    C-Reactive protein 6.16 (H) 0.00 - 0.60 mg/dL   PROCALCITONIN    Collection Time: 01/15/23  5:30 AM   Result Value Ref Range    Procalcitonin 1.99 (H) 0 ng/mL   LD    Collection Time: 01/15/23  5:30 AM   Result Value Ref Range     81 - 246 U/L   D DIMER    Collection Time: 01/15/23  5:30 AM   Result Value Ref Range    D DIMER 1.09 (H) <0.50 ug/ml(FEU)   METABOLIC PANEL, COMPREHENSIVE    Collection Time: 01/15/23  5:30 AM   Result Value Ref Range    Sodium 134 (L) 136 - 145 mmol/L    Potassium 4.2 3.5 - 5.1 mmol/L    Chloride 99 97 - 108 mmol/L    CO2 27 21 - 32 mmol/L    Anion gap 8 5 - 15 mmol/L    Glucose 202 (H) 65 - 100 mg/dL    BUN 53 (H) 6 - 20 mg/dL    Creatinine 4.57 (H) 0.55 - 1.02 mg/dL    BUN/Creatinine ratio 12 12 - 20      eGFR 11 (L) >60 ml/min/1.73m2    Calcium 8.2 (L) 8.5 - 10.1 mg/dL    Bilirubin, total 0.3 0.2 - 1.0 mg/dL    AST (SGOT) 6 (L) 15 - 37 U/L    ALT (SGPT) 8 (L) 12 - 78 U/L    Alk. phosphatase 64 45 - 117 U/L    Protein, total 7.0 6.4 - 8.2 g/dL    Albumin 2.4 (L) 3.5 - 5.0 g/dL    Globulin 4.6 (H) 2.0 - 4.0 g/dL    A-G Ratio 0.5 (L) 1.1 - 2.2     BLOOD GAS, ARTERIAL    Collection Time: 01/15/23  5:39 AM   Result Value Ref Range    pH 7.37 7.35 - 7.45      PCO2 50 (H) 35 - 45 mmHg    PO2 102 (H) 80 - 100 mmHg    O2 SATURATION 97 95 - 99 %    BICARBONATE 28 (H) 22 - 26 mmol/L    BASE EXCESS 2.0 0 - 3 mmol/L    O2 METHOD Nasal Cannula      O2 FLOW RATE 1.00 L/min    FIO2 24.0 %    Sample source Arterial      SITE Right Radial      DIA'S TEST YES      Carboxy-Hgb 0.1 (L) 1 - 2 %    Methemoglobin 0.5 0 - 1.4 %    Oxyhemoglobin 96.7 95 - 99 %    Performed by Glory Jerez     TEMPERATURE 98.8     GLUCOSE, POC    Collection Time: 01/15/23  7:20 AM   Result Value Ref Range    Glucose (POC) 180 (H) 65 - 100 mg/dL    Performed by Trav Melendez      [unfilled]      Review of Systems    Constitutional: Negative for chills and fever. HENT: Negative. Eyes: Negative. Respiratory: Negative. Cardiovascular: Negative. Gastrointestinal: Negative for abdominal pain and nausea. Skin: Negative. Neurological: Negative. Physical Exam:      Constitutional: pt is oriented to person, place, and time.    HENT:   Head: Normocephalic and atraumatic. Eyes: Pupils are equal, round, and reactive to light. EOM are normal.   Cardiovascular: Normal rate, regular rhythm and normal heart sounds. Pulmonary/Chest: Breath sounds normal. No wheezes. No rales. Exhibits no tenderness. Abdominal: Soft. Bowel sounds are normal. There is no abdominal tenderness. There is no rebound and no guarding. Musculoskeletal: Normal range of motion. Neurological: pt is alert and oriented to person, place, and time. XR CHEST PORT   Final Result   No definite change in cardiomegaly and pulmonary edema with technical factors as   above. XR CHEST PORT   Final Result   Slight improvement in pulmonary edema. XR CHEST PORT   Final Result   Cardiomegaly and diffuse airspace disease similar to prior study. XR CHEST SNGL V   Final Result      1. Slightly asymmetric CHF pattern is suspected. Alternatively, correlate   clinically for bilateral infection, right greater than left. .  .   2. Stable cardiomegaly. 3. Technically compromised by patient size and position. A standard 2 view   examination would be more useful when clinically possible.          XR CHEST PORT    (Results Pending)        Recent Results (from the past 24 hour(s))   GLUCOSE, POC    Collection Time: 01/14/23 11:21 AM   Result Value Ref Range    Glucose (POC) 263 (H) 65 - 100 mg/dL    Performed by [address] [place] [address], POC    Collection Time: 01/14/23  3:06 PM   Result Value Ref Range    Glucose (POC) 164 (H) 65 - 100 mg/dL    Performed by Bennett Doran, POC    Collection Time: 01/14/23  7:59 PM   Result Value Ref Range    Glucose (POC) 218 (H) 65 - 100 mg/dL    Performed by Isela Ridley RN (nory)    GLUCOSE, POC    Collection Time: 01/14/23 11:56 PM   Result Value Ref Range    Glucose (POC) 286 (H) 65 - 100 mg/dL    Performed by Isela Ridley RN ([place])    GLUCOSE, POC    Collection Time: 01/15/23  4:19 AM   Result Value Ref Range    Glucose (POC) 189 (H) 65 - 100 mg/dL    Performed by Kalen Lemos RN (Tv)    CBC WITH AUTOMATED DIFF    Collection Time: 01/15/23  5:30 AM   Result Value Ref Range    WBC 13.9 (H) 3.6 - 11.0 K/uL    RBC 3.35 (L) 3.80 - 5.20 M/uL    HGB 9.9 (L) 11.5 - 16.0 g/dL    HCT 30.7 (L) 35.0 - 47.0 %    MCV 91.6 80.0 - 99.0 FL    MCH 29.6 26.0 - 34.0 PG    MCHC 32.2 30.0 - 36.5 g/dL    RDW 13.0 11.5 - 14.5 %    PLATELET 175 630 - 187 K/uL    MPV 10.0 8.9 - 12.9 FL    NRBC 0.0 0.0  WBC    ABSOLUTE NRBC 0.00 0.00 - 0.01 K/uL    NEUTROPHILS 80 (H) 32 - 75 %    LYMPHOCYTES 13 12 - 49 %    MONOCYTES 6 5 - 13 %    EOSINOPHILS 0 0 - 7 %    BASOPHILS 0 0 - 1 %    IMMATURE GRANULOCYTES 1 (H) 0 - 0.5 %    ABS. NEUTROPHILS 11.2 (H) 1.8 - 8.0 K/UL    ABS. LYMPHOCYTES 1.8 0.8 - 3.5 K/UL    ABS. MONOCYTES 0.8 0.0 - 1.0 K/UL    ABS. EOSINOPHILS 0.0 0.0 - 0.4 K/UL    ABS. BASOPHILS 0.0 0.0 - 0.1 K/UL    ABS. IMM.  GRANS. 0.2 (H) 0.00 - 0.04 K/UL    DF AUTOMATED     C REACTIVE PROTEIN, QT    Collection Time: 01/15/23  5:30 AM   Result Value Ref Range    C-Reactive protein 6.16 (H) 0.00 - 0.60 mg/dL   PROCALCITONIN    Collection Time: 01/15/23  5:30 AM   Result Value Ref Range    Procalcitonin 1.99 (H) 0 ng/mL   LD    Collection Time: 01/15/23  5:30 AM   Result Value Ref Range     81 - 246 U/L   D DIMER    Collection Time: 01/15/23  5:30 AM   Result Value Ref Range    D DIMER 1.09 (H) <0.50 ug/ml(FEU)   METABOLIC PANEL, COMPREHENSIVE    Collection Time: 01/15/23  5:30 AM   Result Value Ref Range    Sodium 134 (L) 136 - 145 mmol/L    Potassium 4.2 3.5 - 5.1 mmol/L    Chloride 99 97 - 108 mmol/L    CO2 27 21 - 32 mmol/L    Anion gap 8 5 - 15 mmol/L    Glucose 202 (H) 65 - 100 mg/dL    BUN 53 (H) 6 - 20 mg/dL    Creatinine 4.57 (H) 0.55 - 1.02 mg/dL    BUN/Creatinine ratio 12 12 - 20      eGFR 11 (L) >60 ml/min/1.73m2    Calcium 8.2 (L) 8.5 - 10.1 mg/dL    Bilirubin, total 0.3 0.2 - 1.0 mg/dL    AST (SGOT) 6 (L) 15 - 37 U/L    ALT (SGPT) 8 (L) 12 - 78 U/L Alk. phosphatase 64 45 - 117 U/L    Protein, total 7.0 6.4 - 8.2 g/dL    Albumin 2.4 (L) 3.5 - 5.0 g/dL    Globulin 4.6 (H) 2.0 - 4.0 g/dL    A-G Ratio 0.5 (L) 1.1 - 2.2     BLOOD GAS, ARTERIAL    Collection Time: 01/15/23  5:39 AM   Result Value Ref Range    pH 7.37 7.35 - 7.45      PCO2 50 (H) 35 - 45 mmHg    PO2 102 (H) 80 - 100 mmHg    O2 SATURATION 97 95 - 99 %    BICARBONATE 28 (H) 22 - 26 mmol/L    BASE EXCESS 2.0 0 - 3 mmol/L    O2 METHOD Nasal Cannula      O2 FLOW RATE 1.00 L/min    FIO2 24.0 %    Sample source Arterial      SITE Right Radial      DIA'S TEST YES      Carboxy-Hgb 0.1 (L) 1 - 2 %    Methemoglobin 0.5 0 - 1.4 %    Oxyhemoglobin 96.7 95 - 99 %    Performed by Glory Jerez     TEMPERATURE 98.8     GLUCOSE, POC    Collection Time: 01/15/23  7:20 AM   Result Value Ref Range    Glucose (POC) 180 (H) 65 - 100 mg/dL    Performed by Trav Double        Results       Procedure Component Value Units Date/Time    MRSA SCREEN - PCR (NASAL) [[id number]] Collected: 01/12/23 1951    Order Status: Completed Specimen: Swab Updated: 01/12/23 2148     MRSA by PCR, Nasal Not Detected       CULTURE, BLOOD #1 [[id number]] Collected: 01/12/23 1415    Order Status: Canceled Specimen: Blood     CULTURE, BLOOD #2 [[id number]] Collected: 01/12/23 1415    Order Status: Canceled Specimen: Blood     CULTURE, BLOOD, PAIRED [[id number]] Collected: 01/12/23 1011    Order Status: Completed Specimen: Blood Updated: 01/15/23 0920     Special Requests: No Special Requests        Culture result: No growth 3 days       INFLUENZA A & B AG (RAPID TEST) [[id number]] Collected: 01/12/23 1010    Order Status: Completed Specimen: Nasopharyngeal from Nasal washing Updated: 01/12/23 1036     Influenza A Antigen Negative        Influenza B Antigen Negative       COVID-19 RAPID TEST [[id number]]  (Abnormal) Collected: 01/12/23 1010    Order Status: Completed Specimen: Nasopharyngeal Updated: 01/12/23 1047     COVID-19 rapid test DETECTED Comment: Rapid [employer] ID Now   The specimen is POSITIVE for SARS-CoV-2, the novel coronavirus associated with COVID-19. This test has been authorized by the FDA under an Emergency Use Authorization (EUA) for use by authorized laboratories. Fact sheet for Healthcare Providers:  ID Watchdog.fausto Fact sheet for Patients: ID Watchdog.za   Methodology: Isothermal Nucleic Acid Amplification Results verified, phoned to and read back by BRANDI MENA RN ON   1/12/2023 @[facility]                  Labs:     Recent Labs     01/15/23  0530 01/14/23  0500   WBC 13.9* 12.7*   HGB 9.9* 10.4*   HCT 30.7* 32.1*    419*       Recent Labs     01/15/23  0530 01/14/23  0500 01/13/23  0455   * 127* 127*   K 4.2 5.1 5.1   CL 99 93* 93*   CO2 27 25 26   BUN 53* 65* 41*   CREA 4.57* 5.79* 4.62*   * 339* 430*   CA 8.2* 8.4* 8.6   PHOS  --   --  5.3*       Recent Labs     01/15/23  0530 01/14/23  0500 01/13/23  0455   ALT 8* 8* 7*   AP 64 80 76   TBILI 0.3 0.5 0.6   TP 7.0 7.5 7.5   ALB 2.4* 2.8* 2.5*   GLOB 4.6* 4.7* 5.0*       No results for input(s): INR, PTP, APTT, INREXT, INREXT in the last 72 hours. Recent Labs     01/13/23  0455   FERR 1,107*        No results found for: FOL, RBCF     Recent Labs     01/15/23  0539 01/14/23  0455   PH 7.37 7.41   PCO2 50* 32*   PO2 102* 142*       No results for input(s): CPK, CKNDX, TROIQ in the last 72 hours.     No lab exists for component: CPKMB  No results found for: CHOL, CHOLX, CHLST, CHOLV, HDL, HDLP, LDL, LDLC, DLDLP, TGLX, TRIGL, TRIGP, CHHD, CHHDX  Lab Results   Component Value Date/Time    Glucose (POC) 180 (H) 01/15/2023 07:20 AM    Glucose (POC) 189 (H) 01/15/2023 04:19 AM    Glucose (POC) 286 (H) 01/14/2023 11:56 PM    Glucose (POC) 218 (H) 01/14/2023 07:59 PM    Glucose (POC) 164 (H) 01/14/2023 03:06 PM     Lab Results   Component Value Date/Time    Color Dark Yellow 01/13/2023 01:20 PM    Appearance Turbid (A) 01/13/2023 01:20 PM    Specific gravity 1.019 01/13/2023 01:20 PM    pH (UA) 5.0 01/13/2023 01:20 PM    Protein Negative 01/13/2023 01:20 PM    Glucose 50 (A) 01/13/2023 01:20 PM    Ketone Negative 01/13/2023 01:20 PM    Bilirubin Negative 01/13/2023 01:20 PM    Urobilinogen 0.1 01/13/2023 01:20 PM    Nitrites Negative 01/13/2023 01:20 PM    Leukocyte Esterase Large (A) 01/13/2023 01:20 PM    Epithelial cells Many (A) 01/13/2023 01:20 PM    Bacteria Negative 01/13/2023 01:20 PM    Bacteria Negative 01/13/2023 01:20 PM    WBC 10-20 01/13/2023 01:20 PM    WBC 10-20 01/13/2023 01:20 PM    RBC 0-5 01/13/2023 01:20 PM    RBC 0-5 01/13/2023 01:20 PM         Assessment:     Acute hypoxic respiratory failure on 1 1/2 L per  COVID-19 pneumonitis  Leukocytosis  Acute congestive heart failure from fluid overload  Hyperglycemia secondary to steroids  History of type 2 diabetes  End-stage renal disease on hemodialysis  Hypertension  History of CVA  Morbid obesity  Hyponatremia      Plan:     Lipitor 20 mg daily  Decadron 6 mg daily Lasix 80 mg daily  Lasix 80 mg daily  Gabapentin 300 mg at bedtime  Lantus 40 units subcu every 12 hours  Sliding scale insulin  Xarelto 2.5 mg daily  Protonix 20 daily    Repeat the labs continue dialysis 3 times a week    Transfer to medical telemetry for        Current Facility-Administered Medications:     melatonin tablet 3 mg, 3 mg, Oral, QHS, Ansley Nickerson MD, 3 mg at 01/14/23 2216    hydrOXYzine pamoate (VISTARIL) capsule 25 mg, 25 mg, Oral, Q4H PRN, Ansley Nickerson MD    carvediloL (COREG) tablet 6.25 mg, 6.25 mg, Oral, BID WITH MEALS, Justin Brito MD, 6.25 mg at 01/15/23 2609    NIFEdipine ER (PROCARDIA XL) tablet 60 mg, 60 mg, Oral, BID, Justin Brito MD, 60 mg at 01/15/23 0833    insulin lispro (HUMALOG) injection, , SubCUTAneous, Q4H, Justin Brito MD, 3 Units at 01/15/23 0831    phenol throat spray (CHLORASEPTIC) 1 Spray, 1 Spray, Oral, PRN, Ansley Nickerson, MD    sorbitoL 70 % solution 60 mL, 60 mL, Oral, DAILY PRN, Papa Atkinson MD, 60 mL at 01/13/23 1535    insulin lispro (HUMALOG) injection 10 Units, 10 Units, SubCUTAneous, TIDAC, Justin Brito MD, 10 Units at 01/15/23 0830    epoetin jodie-epbx (RETACRIT) injection 8,000 Units, 8,000 Units, IntraVENous, DIALYSIS TUE, THU & SAT, Trevon Rose MD, 8,000 Units at 01/12/23 2242    atorvastatin (LIPITOR) tablet 20 mg, 20 mg, Oral, DAILY, Justin Brito MD, 20 mg at 01/15/23 6247    furosemide (LASIX) tablet 80 mg, 80 mg, Oral, DAILY, Justin Brito MD, 80 mg at 01/15/23 3894    gabapentin (NEURONTIN) capsule 300 mg, 300 mg, Oral, QHS, Justin Brito MD, 300 mg at 01/14/23 2216    sevelamer carbonate (RENVELA) tab 800 mg, 800 mg, Oral, TID WITH MEALS, Luisa Brito MD, 800 mg at 01/15/23 4820    glucose chewable tablet 16 g, 4 Tablet, Oral, PRN, Luisa Brito MD    glucagon (GLUCAGEN) injection 1 mg, 1 mg, IntraMUSCular, PRN, Luisa Brito MD    acetaminophen (TYLENOL) tablet 650 mg, 650 mg, Oral, Q6H PRN **OR** acetaminophen (TYLENOL) suppository 650 mg, 650 mg, Rectal, Q6H PRN, Justin Birto MD    polyethylene glycol (MIRALAX) packet 17 g, 17 g, Oral, DAILY PRN, Justin Brito MD    ondansetron (ZOFRAN ODT) tablet 4 mg, 4 mg, Oral, Q8H PRN **OR** ondansetron (ZOFRAN) injection 4 mg, 4 mg, IntraVENous, Q6H PRN, Justin Brito MD, 4 mg at 01/15/23 0603    pantoprazole (PROTONIX) tablet 20 mg, 20 mg, Oral, DAILY, Justin Brito MD, 20 mg at 01/15/23 0833    [COMPLETED] piperacillin-tazobactam (ZOSYN) 4.5 g in 0.9% sodium chloride (MBP/ADV) 100 mL MBP, 4.5 g, IntraVENous, ONCE, Last Rate: 200 mL/hr at 01/12/23 1725, 4.5 g at 01/12/23 1725 **FOLLOWED BY** piperacillin-tazobactam (ZOSYN) 3.375 g in 0.9% sodium chloride (MBP/ADV) 100 mL MBP, 3.375 g, IntraVENous, Q12H, Justin Brito MD, Last Rate: 25 mL/hr at 01/14/23 2216, 3.375 g at 01/14/23 2216    insulin glargine (LANTUS) injection 40 Units, 40 Units, SubCUTAneous, Q12H, Justin Brito MD, 40 Units at 01/15/23 0831    dexamethasone (DECADRON) 4 mg/mL injection 6 mg, 6 mg, IntraVENous, DAILY, Justin Brito MD, 6 mg at 01/15/23 4815    dextromethorphan (DELSYM) 30 mg/5 mL syrup 30 mg, 30 mg, Oral, Q12H, Ansley Nickerson MD, 30 mg at 01/15/23 3356    benzonatate (TESSALON) capsule 100 mg, 100 mg, Oral, TID PRN, Ansley Hernandez MD    rivaroxaban (XARELTO) tablet 2.5 mg, 2.5 mg, Oral, DAILY, Justin Brito MD, 2.5 mg at 01/15/23 0832    traMADoL (ULTRAM) tablet 50 mg, 50 mg, Oral, Q6H PRN, Justin Brito MD, 50 mg at 01/14/23 3009

## 2023-01-15 NOTE — PROGRESS NOTES
Renal Daily Progress Note    Admit Date: 1/12/2023      Subjective:     Pt is seen and examined . Feeling better. Labs and x-ray report reviewed with pt   Informed her that she will get RX in a.m   CXR done today  with out improvement in pulmonary edema .   Attempted to remove 3 liters fluid with HD yesterday but she tolerated only 2 liters fluid removal.   Will hold Nifedipine A.M dose and dialyze pt for fluid removal and get her back on M/W/F schedule     Current Facility-Administered Medications   Medication Dose Route Frequency    melatonin tablet 3 mg  3 mg Oral QHS    hydrOXYzine pamoate (VISTARIL) capsule 25 mg  25 mg Oral Q4H PRN    carvediloL (COREG) tablet 6.25 mg  6.25 mg Oral BID WITH MEALS    NIFEdipine ER (PROCARDIA XL) tablet 60 mg  60 mg Oral BID    insulin lispro (HUMALOG) injection   SubCUTAneous Q4H    phenol throat spray (CHLORASEPTIC) 1 Spray  1 Spray Oral PRN    sorbitoL 70 % solution 60 mL  60 mL Oral DAILY PRN    insulin lispro (HUMALOG) injection 10 Units  10 Units SubCUTAneous TIDAC    epoetin jodie-epbx (RETACRIT) injection 8,000 Units  8,000 Units IntraVENous DIALYSIS TUE, THU & SAT    atorvastatin (LIPITOR) tablet 20 mg  20 mg Oral DAILY    furosemide (LASIX) tablet 80 mg  80 mg Oral DAILY    gabapentin (NEURONTIN) capsule 300 mg  300 mg Oral QHS    sevelamer carbonate (RENVELA) tab 800 mg  800 mg Oral TID WITH MEALS    glucose chewable tablet 16 g  4 Tablet Oral PRN    glucagon (GLUCAGEN) injection 1 mg  1 mg IntraMUSCular PRN    acetaminophen (TYLENOL) tablet 650 mg  650 mg Oral Q6H PRN    Or    acetaminophen (TYLENOL) suppository 650 mg  650 mg Rectal Q6H PRN    polyethylene glycol (MIRALAX) packet 17 g  17 g Oral DAILY PRN    ondansetron (ZOFRAN ODT) tablet 4 mg  4 mg Oral Q8H PRN    Or    ondansetron (ZOFRAN) injection 4 mg  4 mg IntraVENous Q6H PRN    pantoprazole (PROTONIX) tablet 20 mg  20 mg Oral DAILY    piperacillin-tazobactam (ZOSYN) 3.375 g in 0.9% sodium chloride (MBP/ADV) 100 mL MBP  3.375 g IntraVENous Q12H    insulin glargine (LANTUS) injection 40 Units  40 Units SubCUTAneous Q12H    dexamethasone (DECADRON) 4 mg/mL injection 6 mg  6 mg IntraVENous DAILY    dextromethorphan (DELSYM) 30 mg/5 mL syrup 30 mg  30 mg Oral Q12H    benzonatate (TESSALON) capsule 100 mg  100 mg Oral TID PRN    rivaroxaban (XARELTO) tablet 2.5 mg  2.5 mg Oral DAILY    traMADoL (ULTRAM) tablet 50 mg  50 mg Oral Q6H PRN        Review of Systems    Review of Systems   Respiratory:  Negative for cough and shortness of breath. Cardiovascular:  Negative for chest pain. Gastrointestinal:  Negative for abdominal pain. Neurological:  Negative for headaches. Objective:     Patient Vitals for the past 8 hrs:   BP Temp Pulse Resp SpO2 Weight   01/15/23 1200 (!) 146/53 -- 64 12 96 % --   01/15/23 1100 (!) 150/53 97.7 °F (36.5 °C) 63 22 97 % --   01/15/23 1000 (!) 137/48 -- 70 28 98 % --   01/15/23 0900 (!) 76/21 -- 65 17 98 % --   01/15/23 0830 -- -- -- -- 99 % --   01/15/23 0800 (!) 154/70 -- 65 12 99 % --   01/15/23 0700 (!) 157/73 97.7 °F (36.5 °C) 76 13 99 % 127.3 kg (280 lb 10.3 oz)   01/15/23 0615 137/63 -- 68 17 97 % --       01/15 0701 - 01/15 1900  In: 400 [P.O.:400]  Out: -   01/13 1901 - 01/15 0700  In: 4190 [P.O.:890; I.V.:300]  Out: 2001   Not examined today   Physical Exam:   Physical Exam  Cardiovascular:      Rate and Rhythm: Regular rhythm. Pulmonary:      Breath sounds: Normal breath sounds. Abdominal:      General: Bowel sounds are normal.      Palpations: Abdomen is soft. Neurological:      Mental Status: She is alert. No edema  Left upper arm AV fistula with good thrill and bruit        XR CHEST PORT   Final Result   No definite change in cardiomegaly and pulmonary edema with technical factors as   above. XR CHEST PORT   Final Result   Slight improvement in pulmonary edema.       XR CHEST PORT   Final Result   Cardiomegaly and diffuse airspace disease similar to prior study.      XR CHEST SNGL V   Final Result      1. Slightly asymmetric CHF pattern is suspected. Alternatively, correlate   clinically for bilateral infection, right greater than left. .  .   2. Stable cardiomegaly. 3. Technically compromised by patient size and position. A standard 2 view   examination would be more useful when clinically possible. XR CHEST PORT    (Results Pending)        Data Review   Recent Labs     01/15/23  0530 01/14/23  0500 01/13/23  0455   WBC 13.9* 12.7* 11.9*   HGB 9.9* 10.4* 9.4*   HCT 30.7* 32.1* 29.1*    419* 304       Recent Labs     01/15/23  0530 01/14/23  0500 01/13/23  0455   * 127* 127*   K 4.2 5.1 5.1   CL 99 93* 93*   CO2 27 25 26   * 339* 430*   BUN 53* 65* 41*   CREA 4.57* 5.79* 4.62*   CA 8.2* 8.4* 8.6   PHOS  --   --  5.3*   ALB 2.4* 2.8* 2.5*   ALT 8* 8* 7*       No components found for: [place]  Recent Labs     01/15/23  0539 01/14/23  0455 01/13/23  1051   PH 7.37 7.41 7.32*   PCO2 50* 32* 52*   PO2 102* 142* 87   HCO3 28* 20* 26   FIO2 24.0 45 50       No results for input(s): INR, INREXT, INREXT, INREXT in the last 72 hours. Assessment:     ESRD on HD - m/w/f schedule as an out patient   Hyponatremia - with improved sodium   Pulmonary edema -not resolved on CXR -clinically doing well   Needing  less oxygen 24%. Active Problems:    Hypoxia (1/12/2023)      Fluid overload (1/12/2023)      COVID-19 (1/12/2023)      Acute hypoxemic respiratory failure ([address] (1/12/2023)      Hypertension    COVID-19 infection    Secondary hyperparathyroidism    Anemia of chronic disease on Retacrit    Hyponatremia. Her corrected sodium is 131 mEq. Diabetes mellitus- not controlled due to steroids and covid -19    Plan:     HD for 3 hrs in A.m - on M/W/F schedule   3 liter fluid removal with HD   Hold B.P rx in a.m   Epogen 4,000 units .   Thank you

## 2023-01-16 ENCOUNTER — APPOINTMENT (OUTPATIENT)
Dept: GENERAL RADIOLOGY | Age: 48
DRG: 177 | End: 2023-01-16
Attending: INTERNAL MEDICINE
Payer: MEDICARE

## 2023-01-16 LAB
ALBUMIN SERPL-MCNC: 2.6 G/DL (ref 3.5–5)
ALBUMIN/GLOB SERPL: 0.6 (ref 1.1–2.2)
ALP SERPL-CCNC: 59 U/L (ref 45–117)
ALT SERPL-CCNC: 8 U/L (ref 12–78)
ANION GAP SERPL CALC-SCNC: 9 MMOL/L (ref 5–15)
ARTERIAL PATENCY WRIST A: YES
AST SERPL W P-5'-P-CCNC: 5 U/L (ref 15–37)
BASE EXCESS BLDA CALC-SCNC: 2.1 MMOL/L (ref 0–3)
BDY SITE: ABNORMAL
BILIRUB SERPL-MCNC: 0.3 MG/DL (ref 0.2–1)
BODY TEMPERATURE: 97.9
BUN SERPL-MCNC: 76 MG/DL (ref 6–20)
BUN/CREAT SERPL: 15 (ref 12–20)
CA-I BLD-MCNC: 8.2 MG/DL (ref 8.5–10.1)
CHLORIDE SERPL-SCNC: 100 MMOL/L (ref 97–108)
CO2 SERPL-SCNC: 26 MMOL/L (ref 21–32)
COHGB MFR BLD: 0.3 % (ref 1–2)
CREAT SERPL-MCNC: 4.91 MG/DL (ref 0.55–1.02)
ERYTHROCYTE [DISTWIDTH] IN BLOOD BY AUTOMATED COUNT: 12.9 % (ref 11.5–14.5)
FIO2 ON VENT: 24 %
GAS FLOW.O2 O2 DELIVERY SYS: 1 L/MIN
GLOBULIN SER CALC-MCNC: 4.3 G/DL (ref 2–4)
GLUCOSE BLD STRIP.AUTO-MCNC: 111 MG/DL (ref 65–100)
GLUCOSE BLD STRIP.AUTO-MCNC: 152 MG/DL (ref 65–100)
GLUCOSE BLD STRIP.AUTO-MCNC: 167 MG/DL (ref 65–100)
GLUCOSE BLD STRIP.AUTO-MCNC: 177 MG/DL (ref 65–100)
GLUCOSE BLD STRIP.AUTO-MCNC: 182 MG/DL (ref 65–100)
GLUCOSE BLD STRIP.AUTO-MCNC: 208 MG/DL (ref 65–100)
GLUCOSE BLD STRIP.AUTO-MCNC: 263 MG/DL (ref 65–100)
GLUCOSE SERPL-MCNC: 164 MG/DL (ref 65–100)
HCO3 BLDA-SCNC: 26 MMOL/L (ref 22–26)
HCT VFR BLD AUTO: 29.9 % (ref 35–47)
HGB BLD-MCNC: 9.8 G/DL (ref 11.5–16)
MCH RBC QN AUTO: 29.5 PG (ref 26–34)
MCHC RBC AUTO-ENTMCNC: 32.8 G/DL (ref 30–36.5)
MCV RBC AUTO: 90.1 FL (ref 80–99)
METHGB MFR BLD: 0.4 % (ref 0–1.4)
NRBC # BLD: 0 K/UL (ref 0–0.01)
NRBC BLD-RTO: 0 PER 100 WBC
OXYHGB MFR BLD: 97.5 % (ref 95–99)
PCO2 BLDA: 37 MMHG (ref 35–45)
PERFORMED BY, TECHID: ABNORMAL
PH BLDA: 7.46 (ref 7.35–7.45)
PHOSPHATE SERPL-MCNC: 4.3 MG/DL (ref 2.6–4.7)
PLATELET # BLD AUTO: 404 K/UL (ref 150–400)
PMV BLD AUTO: 10.3 FL (ref 8.9–12.9)
PO2 BLDA: 131 MMHG (ref 80–100)
POTASSIUM SERPL-SCNC: 4.1 MMOL/L (ref 3.5–5.1)
PROT SERPL-MCNC: 6.9 G/DL (ref 6.4–8.2)
RBC # BLD AUTO: 3.32 M/UL (ref 3.8–5.2)
SAO2 % BLD: 98 % (ref 95–99)
SAO2% DEVICE SAO2% SENSOR NAME: ABNORMAL
SODIUM SERPL-SCNC: 135 MMOL/L (ref 136–145)
SPECIMEN SITE: ABNORMAL
WBC # BLD AUTO: 15.8 K/UL (ref 3.6–11)

## 2023-01-16 PROCEDURE — 90935 HEMODIALYSIS ONE EVALUATION: CPT

## 2023-01-16 PROCEDURE — 74011250636 HC RX REV CODE- 250/636: Performed by: LEGAL MEDICINE

## 2023-01-16 PROCEDURE — 74011250636 HC RX REV CODE- 250/636: Performed by: FAMILY MEDICINE

## 2023-01-16 PROCEDURE — 84100 ASSAY OF PHOSPHORUS: CPT

## 2023-01-16 PROCEDURE — 65270000029 HC RM PRIVATE

## 2023-01-16 PROCEDURE — 82962 GLUCOSE BLOOD TEST: CPT

## 2023-01-16 PROCEDURE — 77010033678 HC OXYGEN DAILY

## 2023-01-16 PROCEDURE — 82803 BLOOD GASES ANY COMBINATION: CPT

## 2023-01-16 PROCEDURE — 85027 COMPLETE CBC AUTOMATED: CPT

## 2023-01-16 PROCEDURE — 74011636637 HC RX REV CODE- 636/637: Performed by: FAMILY MEDICINE

## 2023-01-16 PROCEDURE — 74011000258 HC RX REV CODE- 258: Performed by: FAMILY MEDICINE

## 2023-01-16 PROCEDURE — 74011250637 HC RX REV CODE- 250/637: Performed by: INTERNAL MEDICINE

## 2023-01-16 PROCEDURE — 36415 COLL VENOUS BLD VENIPUNCTURE: CPT

## 2023-01-16 PROCEDURE — 74011250637 HC RX REV CODE- 250/637: Performed by: FAMILY MEDICINE

## 2023-01-16 PROCEDURE — 80053 COMPREHEN METABOLIC PANEL: CPT

## 2023-01-16 PROCEDURE — 71045 X-RAY EXAM CHEST 1 VIEW: CPT

## 2023-01-16 PROCEDURE — 36600 WITHDRAWAL OF ARTERIAL BLOOD: CPT

## 2023-01-16 RX ADMIN — INSULIN LISPRO 3 UNITS: 100 INJECTION, SOLUTION INTRAVENOUS; SUBCUTANEOUS at 04:13

## 2023-01-16 RX ADMIN — INSULIN LISPRO 3 UNITS: 100 INJECTION, SOLUTION INTRAVENOUS; SUBCUTANEOUS at 08:57

## 2023-01-16 RX ADMIN — INSULIN LISPRO 10 UNITS: 100 INJECTION, SOLUTION INTRAVENOUS; SUBCUTANEOUS at 16:07

## 2023-01-16 RX ADMIN — INSULIN LISPRO 10 UNITS: 100 INJECTION, SOLUTION INTRAVENOUS; SUBCUTANEOUS at 12:04

## 2023-01-16 RX ADMIN — NIFEDIPINE 60 MG: 60 TABLET, FILM COATED, EXTENDED RELEASE ORAL at 20:14

## 2023-01-16 RX ADMIN — PIPERACILLIN AND TAZOBACTAM 3.38 G: 3; .375 INJECTION, POWDER, LYOPHILIZED, FOR SOLUTION INTRAVENOUS at 11:51

## 2023-01-16 RX ADMIN — Medication 30 MG: at 08:56

## 2023-01-16 RX ADMIN — INSULIN LISPRO 3 UNITS: 100 INJECTION, SOLUTION INTRAVENOUS; SUBCUTANEOUS at 12:03

## 2023-01-16 RX ADMIN — PANTOPRAZOLE SODIUM 20 MG: 20 TABLET, DELAYED RELEASE ORAL at 08:56

## 2023-01-16 RX ADMIN — INSULIN GLARGINE 40 UNITS: 100 INJECTION, SOLUTION SUBCUTANEOUS at 08:57

## 2023-01-16 RX ADMIN — CARVEDILOL 6.25 MG: 3.12 TABLET, FILM COATED ORAL at 16:07

## 2023-01-16 RX ADMIN — INSULIN LISPRO 3 UNITS: 100 INJECTION, SOLUTION INTRAVENOUS; SUBCUTANEOUS at 20:09

## 2023-01-16 RX ADMIN — INSULIN LISPRO 7 UNITS: 100 INJECTION, SOLUTION INTRAVENOUS; SUBCUTANEOUS at 23:56

## 2023-01-16 RX ADMIN — SEVELAMER CARBONATE 800 MG: 800 TABLET, FILM COATED ORAL at 11:52

## 2023-01-16 RX ADMIN — SEVELAMER CARBONATE 800 MG: 800 TABLET, FILM COATED ORAL at 08:57

## 2023-01-16 RX ADMIN — INSULIN LISPRO 4 UNITS: 100 INJECTION, SOLUTION INTRAVENOUS; SUBCUTANEOUS at 00:03

## 2023-01-16 RX ADMIN — INSULIN GLARGINE 40 UNITS: 100 INJECTION, SOLUTION SUBCUTANEOUS at 21:46

## 2023-01-16 RX ADMIN — ATORVASTATIN CALCIUM 20 MG: 20 TABLET, FILM COATED ORAL at 08:57

## 2023-01-16 RX ADMIN — GABAPENTIN 300 MG: 300 CAPSULE ORAL at 21:46

## 2023-01-16 RX ADMIN — Medication 30 MG: at 20:14

## 2023-01-16 RX ADMIN — DEXAMETHASONE SODIUM PHOSPHATE 6 MG: 4 INJECTION, SOLUTION INTRA-ARTICULAR; INTRALESIONAL; INTRAMUSCULAR; INTRAVENOUS; SOFT TISSUE at 08:57

## 2023-01-16 RX ADMIN — MELATONIN TAB 3 MG 3 MG: 3 TAB at 21:46

## 2023-01-16 RX ADMIN — INSULIN LISPRO 10 UNITS: 100 INJECTION, SOLUTION INTRAVENOUS; SUBCUTANEOUS at 08:58

## 2023-01-16 RX ADMIN — SEVELAMER CARBONATE 800 MG: 800 TABLET, FILM COATED ORAL at 16:07

## 2023-01-16 RX ADMIN — PIPERACILLIN AND TAZOBACTAM 3.38 G: 3; .375 INJECTION, POWDER, LYOPHILIZED, FOR SOLUTION INTRAVENOUS at 23:50

## 2023-01-16 RX ADMIN — RIVAROXABAN 2.5 MG: 2.5 TABLET, FILM COATED ORAL at 08:56

## 2023-01-16 RX ADMIN — EPOETIN ALFA-EPBX 4000 UNITS: 4000 INJECTION, SOLUTION INTRAVENOUS; SUBCUTANEOUS at 21:46

## 2023-01-16 NOTE — PROGRESS NOTES
Infectious Disease Progress Note             Subjective:   Pt seen and examined at bedside, stable, denies new complaints, no acute events since last seen, rising WBC on todays labs   Objective:   Physical Exam:     Visit Vitals  BP (!) 154/80   Pulse 67   Temp 97.7 °F (36.5 °C)   Resp 16   Ht 5' 8\" (1.727 m)   Wt 280 lb 10.3 oz (127.3 kg)   SpO2 100%   BMI 42.67 kg/m²    O2 Flow Rate (L/min): 1 l/min O2 Device: Nasal cannula    Temp (24hrs), Av °F (36.7 °C), Min:97.7 °F (36.5 °C), Max:98.6 °F (37 °C)    No intake/output data recorded.  1901 -  0700  In:  [P.O.:1700;  I.V.:300]  Out: -     General: NAD, AAO x 4  HEENT: PHILIP, Moist mucosa  Lungs: CTA b/l, decreased at the bases, no whee/rhonchi   Heart: S1S2+, RRR, no murmur  Abdo: Soft, NT, ND, +BS   : No odom cath   Exts: Left arm AVF   Skin: No chronic wounds or ulcers       Data Review:       Recent Days:  Recent Labs     23  0600 01/15/23  0530 23  0500   WBC 15.8* 13.9* 12.7*   HGB 9.8* 9.9* 10.4*   HCT 29.9* 30.7* 32.1*   * 396 419*       Recent Labs     23  0600 01/15/23  0530 23  0500   BUN 76* 53* 65*   CREA 4.91* 4.57* 5.79*         Lab Results   Component Value Date/Time    C-Reactive protein 6.16 (H) 01/15/2023 05:30 AM        Microbiology     Results       Procedure Component Value Units Date/Time    MRSA SCREEN - PCR (NASAL) [[id number]] Collected: 23    Order Status: Completed Specimen: Swab Updated: 23     MRSA by PCR, Nasal Not Detected       CULTURE, BLOOD #1 [[id number]] Collected: 23 1415    Order Status: Canceled Specimen: Blood     CULTURE, BLOOD #2 [[id number]] Collected: 23 1415    Order Status: Canceled Specimen: Blood     CULTURE, BLOOD, PAIRED [[id number]] Collected: 23 1011    Order Status: Completed Specimen: Blood Updated: 23 0917     Special Requests: No Special Requests        Culture result: No growth 4 days INFLUENZA A & B AG (RAPID TEST) [[id number]] Collected: 01/12/23 1010    Order Status: Completed Specimen: Nasopharyngeal from Nasal washing Updated: 01/12/23 1036     Influenza A Antigen Negative        Influenza B Antigen Negative       COVID-19 RAPID TEST [[id number]]  (Abnormal) Collected: 01/12/23 1010    Order Status: Completed Specimen: Nasopharyngeal Updated: 01/12/23 1047     COVID-19 rapid test DETECTED        Comment: Rapid Abbott ID Now   The specimen is POSITIVE for SARS-CoV-2, the novel coronavirus associated with COVID-19. This test has been authorized by the FDA under an Emergency Use Authorization (EUA) for use by authorized laboratories. Fact sheet for Healthcare Providers:  http://www.[facility]/ Fact sheet for Patients: http://www.[facility].[place]/   Methodology: Isothermal Nucleic Acid Amplification Results verified, phoned to and read back by BRANDI MENA RN ON   1/12/2023 @[facility]                Diagnostics   CXR Results  (Last 48 hours)                 01/16/23 0332  XR CHEST PORT Final result    Impression:  No significant change. Narrative:  INDICATION: chf       EXAMINATION:  AP CHEST, PORTABLE       COMPARISON: 1/15/2020       FINDINGS: Single AP portable view of the chest demonstrates diffuse bilateral   airspace disease. The cardiomediastinal silhouette is unchanged. Probable left   pleural effusion. No pneumothorax. 01/15/23 0257  XR CHEST PORT Final result    Impression:  No definite change in cardiomegaly and pulmonary edema with technical factors as   above. Narrative:  INDICATION: chf       EXAMINATION:  AP CHEST, PORTABLE       COMPARISON: 1/14/2023       FINDINGS: Single AP portable view of the chest demonstrates low lung volumes   with image quality degraded by body habitus. Cardiomegaly is unchanged. Pulmonary edema without definite change.                     Assessment/Plan     Acute hypoxic respiratory failure due to COVID pneumonitis w superimposed CHF         Continued clinical improvement. Decreasing O2 requirements, no change on CXR         Ferritin 1,107 (01/13) down to 698 (01/16) LDH remains WNL        Afebrile, mild rise in WBC on todays labs         On day # 5/7 of Zosyn and decadron, S/p Actemra on 01/13        Continue on Zosyn, routine labs in AM, wean off O2 as tolerated     2. H/o uncontrolled DM A1c of 10.1 in 06/2022, repeat on 01/12 is 9.1    3. ESRD on HD, + left arm AVF    4.  Insomnia: continued melatonin and prn halinataril     Angus Potts MD    1/16/2023

## 2023-01-16 NOTE — PROGRESS NOTES
Currently on 1L NC. Wean as appropriate. No home O2. Current [facility] dialysis patient MWF. Spouse to transport at time of discharge.          Eri Weeks, MSW

## 2023-01-16 NOTE — PROGRESS NOTES
General Daily Progress Note          Patient Name:   Ebony Hsieh       YOB: 1975       Age:  52 y.o. Admit Date: 1/12/2023      Subjective:     Patient is a 52y.o. year old female with past medical history of CVA, diabetes, CKD stage 5,on HD  hypertension, and cardiomegaly who presented to the ED today with shortness of breath. Patient states that over the last few days she has been feeling very sick and because of that she could only get 2 hours of dialysis done on Monday and 30 minutes yesterday. And then today she started feeling very short of breath and called EMS. On arrival to the ED patient was noted to be hypoxic in the 60s. She was placed on non-breather with improvement of oxygenation. Pt was tested positive for COVID test done in the ER. This is her first time getting diagnosed with COVID. She also has chills, cough and chest pain associated with it, nausea, and vomiting. She is currently on high flow oxygen. CBC shows leukocytosis 14,400, BNP 27,904, lactic acid 2.4, sodium 130, chloride 94, troponin 55. Glucose 372. Chest x ray done in the ED shows:  1. Slightly asymmetric CHF pattern is suspected. Alternatively, correlate  clinically for bilateral infection, right greater than left. .  .  2. Stable cardiomegaly. 3. Technically compromised by patient size and position. A standard 2 view  examination would be more useful when clinically possible. 1/14  Patient resting in the bed alert awake on high flow 40% O2  Blood sugar running high due to steroids  Still complaining of cough congestion    1/15  Patient alert awake not in distress breathing much better  Now on nasal cannula 1.5 L     1/16  Patient seen and examined in ICU today. She is resting in bed, alert and awake. Patient still complains of cough and congestion but sore throat is getting better. Currently on 1L of nasal cannula.  Pt on schedule for dialysis today (M/W/F schedule)    Lab shows wbc 15.8, sodium 135.   CXR done today shows no significant change from prior. Objective:     Visit Vitals  BP (!) 163/69   Pulse 64   Temp 97.7 °F (36.5 °C)   Resp (!) 1   Ht 5' 8\" (1.727 m)   Wt 127.3 kg (280 lb 10.3 oz)   SpO2 99%   BMI 42.67 kg/m²        Recent Results (from the past 24 hour(s))   GLUCOSE, POC    Collection Time: 01/15/23  3:20 PM   Result Value Ref Range    Glucose (POC) 221 (H) 65 - 100 mg/dL    Performed by [address] [place] [address] Dr Copeland, POC    Collection Time: 01/15/23  8:05 PM   Result Value Ref Range    Glucose (POC) 247 (H) 65 - 100 mg/dL    Performed by Shelbie Crocker RN ([facility])    GLUCOSE, POC    Collection Time: 01/16/23 12:00 AM   Result Value Ref Range    Glucose (POC) 208 (H) 65 - 100 mg/dL    Performed by Shelbie Crocker RN ([facility])    GLUCOSE, POC    Collection Time: 01/16/23  4:04 AM   Result Value Ref Range    Glucose (POC) 152 (H) 65 - 100 mg/dL    Performed by Shelbie Crocker RN ([facility])    CBC W/O DIFF    Collection Time: 01/16/23  6:00 AM   Result Value Ref Range    WBC 15.8 (H) 3.6 - 11.0 K/uL    RBC 3.32 (L) 3.80 - 5.20 M/uL    HGB 9.8 (L) 11.5 - 16.0 g/dL    HCT 29.9 (L) 35.0 - 47.0 %    MCV 90.1 80.0 - 99.0 FL    MCH 29.5 26.0 - 34.0 PG    MCHC 32.8 30.0 - 36.5 g/dL    RDW 12.9 11.5 - 14.5 %    PLATELET 385 (H) 895 - 400 K/uL    MPV 10.3 8.9 - 12.9 FL    NRBC 0.0 0.0  WBC    ABSOLUTE NRBC 0.00 0.00 - 9.25 K/uL   METABOLIC PANEL, COMPREHENSIVE    Collection Time: 01/16/23  6:00 AM   Result Value Ref Range    Sodium 135 (L) 136 - 145 mmol/L    Potassium 4.1 3.5 - 5.1 mmol/L    Chloride 100 97 - 108 mmol/L    CO2 26 21 - 32 mmol/L    Anion gap 9 5 - 15 mmol/L    Glucose 164 (H) 65 - 100 mg/dL    BUN 76 (H) 6 - 20 mg/dL    Creatinine 4.91 (H) 0.55 - 1.02 mg/dL    BUN/Creatinine ratio 15 12 - 20      eGFR 10 (L) >60 ml/min/1.73m2    Calcium 8.2 (L) 8.5 - 10.1 mg/dL    Bilirubin, total 0.3 0.2 - 1.0 mg/dL    AST (SGOT) 5 (L) 15 - 37 U/L    ALT (SGPT) 8 (L) 12 - 78 U/L    Alk.  phosphatase 59 45 - 117 U/L    Protein, total 6.9 6.4 - 8.2 g/dL    Albumin 2.6 (L) 3.5 - 5.0 g/dL    Globulin 4.3 (H) 2.0 - 4.0 g/dL    A-G Ratio 0.6 (L) 1.1 - 2.2     BLOOD GAS, ARTERIAL    Collection Time: 01/16/23  6:05 AM   Result Value Ref Range    pH 7.46 (H) 7.35 - 7.45      PCO2 37 35 - 45 mmHg    PO2 131 (H) 80 - 100 mmHg    O2 SATURATION 98 95 - 99 %    BICARBONATE 26 22 - 26 mmol/L    BASE EXCESS 2.1 0 - 3 mmol/L    O2 METHOD Nasal Cannula      O2 FLOW RATE 1.00 L/min    FIO2 24.0 %    Sample source Arterial      SITE Right Brachial      DIA'S TEST YES      Carboxy-Hgb 0.3 (L) 1 - 2 %    Methemoglobin 0.4 0 - 1.4 %    Oxyhemoglobin 97.5 95 - 99 %    Performed by Paulo Gandara     TEMPERATURE 97.9     GLUCOSE, POC    Collection Time: 01/16/23  7:29 AM   Result Value Ref Range    Glucose (POC) 177 (H) 65 - 100 mg/dL    Performed by Sushma Castro      [unfilled]      Review of Systems    Constitutional: Negative for chills and fever. HENT: Negative. Eyes: Negative. Respiratory: Negative. Cardiovascular: Negative. Gastrointestinal: Negative for abdominal pain and nausea. Skin: Negative. Neurological: Negative. Physical Exam:      Constitutional: pt is oriented to person, place, and time. HENT:   Head: Normocephalic and atraumatic. Eyes: Pupils are equal, round, and reactive to light. EOM are normal.   Cardiovascular: Normal rate, regular rhythm and normal heart sounds. Pulmonary/Chest: Breath sounds normal. No wheezes. No rales. Exhibits no tenderness. Abdominal: Soft. Bowel sounds are normal. There is no abdominal tenderness. There is no rebound and no guarding. Musculoskeletal: Normal range of motion. Neurological: pt is alert and oriented to person, place, and time. XR CHEST PORT   Final Result   No significant change. XR CHEST PORT   Final Result   No definite change in cardiomegaly and pulmonary edema with technical factors as   above.       XR CHEST PORT   Final Result Slight improvement in pulmonary edema. XR CHEST PORT   Final Result   Cardiomegaly and diffuse airspace disease similar to prior study. XR CHEST SNGL V   Final Result      1. Slightly asymmetric CHF pattern is suspected. Alternatively, correlate   clinically for bilateral infection, right greater than left. .  .   2. Stable cardiomegaly. 3. Technically compromised by patient size and position. A standard 2 view   examination would be more useful when clinically possible.               Recent Results (from the past 24 hour(s))   GLUCOSE, POC    Collection Time: 01/15/23  3:20 PM   Result Value Ref Range    Glucose (POC) 221 (H) 65 - 100 mg/dL    Performed by Trav Melendez    GLUCOSE, POC    Collection Time: 01/15/23  8:05 PM   Result Value Ref Range    Glucose (POC) 247 (H) 65 - 100 mg/dL    Performed by Sanjana Cloud RN ([facility])    GLUCOSE, POC    Collection Time: 01/16/23 12:00 AM   Result Value Ref Range    Glucose (POC) 208 (H) 65 - 100 mg/dL    Performed by Sanjana Cloud RN ([facility])    GLUCOSE, POC    Collection Time: 01/16/23  4:04 AM   Result Value Ref Range    Glucose (POC) 152 (H) 65 - 100 mg/dL    Performed by Sanjana Cloud RN ([facility])    CBC W/O DIFF    Collection Time: 01/16/23  6:00 AM   Result Value Ref Range    WBC 15.8 (H) 3.6 - 11.0 K/uL    RBC 3.32 (L) 3.80 - 5.20 M/uL    HGB 9.8 (L) 11.5 - 16.0 g/dL    HCT 29.9 (L) 35.0 - 47.0 %    MCV 90.1 80.0 - 99.0 FL    MCH 29.5 26.0 - 34.0 PG    MCHC 32.8 30.0 - 36.5 g/dL    RDW 12.9 11.5 - 14.5 %    PLATELET 656 (H) 316 - 400 K/uL    MPV 10.3 8.9 - 12.9 FL    NRBC 0.0 0.0  WBC    ABSOLUTE NRBC 0.00 0.00 - 1.64 K/uL   METABOLIC PANEL, COMPREHENSIVE    Collection Time: 01/16/23  6:00 AM   Result Value Ref Range    Sodium 135 (L) 136 - 145 mmol/L    Potassium 4.1 3.5 - 5.1 mmol/L    Chloride 100 97 - 108 mmol/L    CO2 26 21 - 32 mmol/L    Anion gap 9 5 - 15 mmol/L    Glucose 164 (H) 65 - 100 mg/dL    BUN 76 (H) 6 - 20 mg/dL    Creatinine 4.91 (H) 0.55 - 1.02 mg/dL    BUN/Creatinine ratio 15 12 - 20      eGFR 10 (L) >60 ml/min/1.73m2    Calcium 8.2 (L) 8.5 - 10.1 mg/dL    Bilirubin, total 0.3 0.2 - 1.0 mg/dL    AST (SGOT) 5 (L) 15 - 37 U/L    ALT (SGPT) 8 (L) 12 - 78 U/L    Alk.  phosphatase 59 45 - 117 U/L    Protein, total 6.9 6.4 - 8.2 g/dL    Albumin 2.6 (L) 3.5 - 5.0 g/dL    Globulin 4.3 (H) 2.0 - 4.0 g/dL    A-G Ratio 0.6 (L) 1.1 - 2.2     BLOOD GAS, ARTERIAL    Collection Time: 01/16/23  6:05 AM   Result Value Ref Range    pH 7.46 (H) 7.35 - 7.45      PCO2 37 35 - 45 mmHg    PO2 131 (H) 80 - 100 mmHg    O2 SATURATION 98 95 - 99 %    BICARBONATE 26 22 - 26 mmol/L    BASE EXCESS 2.1 0 - 3 mmol/L    O2 METHOD Nasal Cannula      O2 FLOW RATE 1.00 L/min    FIO2 24.0 %    Sample source Arterial      SITE Right Brachial      DIA'S TEST YES      Carboxy-Hgb 0.3 (L) 1 - 2 %    Methemoglobin 0.4 0 - 1.4 %    Oxyhemoglobin 97.5 95 - 99 %    Performed by Kimber Jacobsen     TEMPERATURE 97.9     GLUCOSE, POC    Collection Time: 01/16/23  7:29 AM   Result Value Ref Range    Glucose (POC) 177 (H) 65 - 100 mg/dL    Performed by Mallory Mckeon        Results       Procedure Component Value Units Date/Time    MRSA SCREEN - PCR (NASAL) [[id number]] Collected: 01/12/23 1951    Order Status: Completed Specimen: Swab Updated: 01/12/23 2148     MRSA by PCR, Nasal Not Detected       CULTURE, BLOOD #1 [[id number]] Collected: 01/12/23 1415    Order Status: Canceled Specimen: Blood     CULTURE, BLOOD #2 [[id number]] Collected: 01/12/23 1415    Order Status: Canceled Specimen: Blood     CULTURE, BLOOD, PAIRED [[id number]] Collected: 01/12/23 1011    Order Status: Completed Specimen: Blood Updated: 01/16/23 0917     Special Requests: No Special Requests        Culture result: No growth 4 days       INFLUENZA A & B AG (RAPID TEST) [[id number]] Collected: 01/12/23 1010    Order Status: Completed Specimen: Nasopharyngeal from Nasal washing Updated: 01/12/23 1036     Influenza A Antigen Negative        Influenza B Antigen Negative       COVID-19 RAPID TEST [[id number]]  (Abnormal) Collected: 01/12/23 1010    Order Status: Completed Specimen: Nasopharyngeal Updated: 01/12/23 1047     COVID-19 rapid test DETECTED        Comment: Rapid Abbott ID Now   The specimen is POSITIVE for SARS-CoV-2, the novel coronavirus associated with COVID-19. This test has been authorized by the FDA under an Emergency Use Authorization (EUA) for use by authorized laboratories. Fact sheet for Healthcare Providers:  http://www.[facility]/ Fact sheet for Patients: http://www.[place]/   Methodology: Isothermal Nucleic Acid Amplification Results verified, phoned to and read back by BRANDI MENA RN ON   1/12/2023 @[facility]                  Labs:     Recent Labs     01/16/23  0600 01/15/23  0530   WBC 15.8* 13.9*   HGB 9.8* 9.9*   HCT 29.9* 30.7*   * 396       Recent Labs     01/16/23  0600 01/15/23  0530 01/14/23  0500   * 134* 127*   K 4.1 4.2 5.1    99 93*   CO2 26 27 25   BUN 76* 53* 65*   CREA 4.91* 4.57* 5.79*   * 202* 339*   CA 8.2* 8.2* 8.4*       Recent Labs     01/16/23  0600 01/15/23  0530 01/14/23  0500   ALT 8* 8* 8*   AP 59 64 80   TBILI 0.3 0.3 0.5   TP 6.9 7.0 7.5   ALB 2.6* 2.4* 2.8*   GLOB 4.3* 4.6* 4.7*       No results for input(s): INR, PTP, APTT, INREXT, INREXT in the last 72 hours. Recent Labs     01/15/23  0530   FERR 698*        No results found for: FOL, RBCF     Recent Labs     01/16/23  0605 01/15/23  0539   PH 7.46* 7.37   PCO2 37 50*   PO2 131* 102*       No results for input(s): CPK, CKNDX, TROIQ in the last 72 hours.     No lab exists for component: CPKMB  No results found for: CHOL, CHOLX, CHLST, CHOLV, HDL, HDLP, LDL, LDLC, DLDLP, TGLX, TRIGL, TRIGP, CHHD, CHHDX  Lab Results   Component Value Date/Time    Glucose (POC) 177 (H) 01/16/2023 07:29 AM    Glucose (POC) 152 (H) 01/16/2023 04:04 AM    Glucose (POC) 208 (H) 01/16/2023 12:00 AM    Glucose (POC) 247 (H) 01/15/2023 08:05 PM    Glucose (POC) 221 (H) 01/15/2023 03:20 PM     Lab Results   Component Value Date/Time    Color Dark Yellow 01/13/2023 01:20 PM    Appearance Turbid (A) 01/13/2023 01:20 PM    Specific gravity 1.019 01/13/2023 01:20 PM    pH (UA) 5.0 01/13/2023 01:20 PM    Protein Negative 01/13/2023 01:20 PM    Glucose 50 (A) 01/13/2023 01:20 PM    Ketone Negative 01/13/2023 01:20 PM    Bilirubin Negative 01/13/2023 01:20 PM    Urobilinogen 0.1 01/13/2023 01:20 PM    Nitrites Negative 01/13/2023 01:20 PM    Leukocyte Esterase Large (A) 01/13/2023 01:20 PM    Epithelial cells Many (A) 01/13/2023 01:20 PM    Bacteria Negative 01/13/2023 01:20 PM    Bacteria Negative 01/13/2023 01:20 PM    WBC 10-20 01/13/2023 01:20 PM    WBC 10-20 01/13/2023 01:20 PM    RBC 0-5 01/13/2023 01:20 PM    RBC 0-5 01/13/2023 01:20 PM         Assessment:     Acute hypoxic respiratory failure on 1 L per  COVID-19 pneumonitis  Leukocytosis  Acute congestive heart failure from fluid overload  Hyperglycemia secondary to steroids  History of type 2 diabetes  End-stage renal disease on hemodialysis  Hypertension  History of CVA  Morbid obesity  Hyponatremia      Plan:     Lipitor 20 mg daily  Decadron 6 mg daily Lasix 80 mg daily  Lasix 80 mg daily  Gabapentin 300 mg at bedtime  Lantus 40 units subcu every 12 hours  Sliding scale insulin  Xarelto 2.5 mg daily  Protonix 20 daily    Repeat the labs continue dialysis 3 times a week          Current Facility-Administered Medications:     epoetin jodie-epbx (RETACRIT) injection 4,000 Units, 4,000 Units, IntraVENous, Q MON, WED & FRI, Chasidy Link MD    melatonin tablet 3 mg, 3 mg, Oral, QHS, Ansley Nickerson MD, 3 mg at 01/15/23 2118    hydrOXYzine pamoate (VISTARIL) capsule 25 mg, 25 mg, Oral, Q4H PRN, Ansley Nickerson MD    carvediloL (COREG) tablet 6.25 mg, 6.25 mg, Oral, BID WITH MEALS, Justin Brito MD, 6.25 mg at 01/15/23 7112 NIFEdipine ER (PROCARDIA XL) tablet 60 mg, 60 mg, Oral, BID, Justin Brito MD, 60 mg at 01/15/23 2117    insulin lispro (HUMALOG) injection, , SubCUTAneous, Q4H, Justin Brito MD, 3 Units at 01/16/23 0857    phenol throat spray (CHLORASEPTIC) 1 Spray, 1 Spray, Oral, PRN, Ansley Nickerson MD    sorbitoL 70 % solution 60 mL, 60 mL, Oral, DAILY PRN, Papa Atkinson MD, 60 mL at 01/13/23 1535    insulin lispro (HUMALOG) injection 10 Units, 10 Units, SubCUTAneous, TIDAC, Justin Brito MD, 10 Units at 01/16/23 0858    atorvastatin (LIPITOR) tablet 20 mg, 20 mg, Oral, DAILY, Justin Brito MD, 20 mg at 01/16/23 0857    furosemide (LASIX) tablet 80 mg, 80 mg, Oral, DAILY, Justin Brito MD, 80 mg at 01/15/23 5177    gabapentin (NEURONTIN) capsule 300 mg, 300 mg, Oral, QHS, Justin Brito MD, 300 mg at 01/15/23 2117    sevelamer carbonate (RENVELA) tab 800 mg, 800 mg, Oral, TID WITH MEALS, Eri Brito MD, 800 mg at 01/16/23 0857    glucose chewable tablet 16 g, 4 Tablet, Oral, PRN, Eri Brito MD    glucagon (GLUCAGEN) injection 1 mg, 1 mg, IntraMUSCular, PRN, Eri Brito MD    acetaminophen (TYLENOL) tablet 650 mg, 650 mg, Oral, Q6H PRN **OR** acetaminophen (TYLENOL) suppository 650 mg, 650 mg, Rectal, Q6H PRN, Justin Brito MD    polyethylene glycol (MIRALAX) packet 17 g, 17 g, Oral, DAILY PRN, Justin Brito MD    ondansetron (ZOFRAN ODT) tablet 4 mg, 4 mg, Oral, Q8H PRN **OR** ondansetron (ZOFRAN) injection 4 mg, 4 mg, IntraVENous, Q6H PRN, Justin Brito MD, 4 mg at 01/15/23 0603    pantoprazole (PROTONIX) tablet 20 mg, 20 mg, Oral, DAILY, Justin Brito MD, 20 mg at 01/16/23 0856    [COMPLETED] piperacillin-tazobactam (ZOSYN) 4.5 g in 0.9% sodium chloride (MBP/ADV) 100 mL MBP, 4.5 g, IntraVENous, ONCE, Last Rate: 200 mL/hr at 01/12/23 1725, 4.5 g at 01/12/23 1725 **FOLLOWED BY** piperacillin-tazobactam (ZOSYN) 3.375 g in 0.9% sodium chloride (MBP/ADV) 100 mL MBP, 3.375 g, IntraVENous, Q12H, Justin Brito MD, Last Rate: 25 mL/hr at 01/15/23 2259, 3.375 g at 01/15/23 2259    insulin glargine (LANTUS) injection 40 Units, 40 Units, SubCUTAneous, Q12H, Justin Brito MD, 40 Units at 01/16/23 0857    dexamethasone (DECADRON) 4 mg/mL injection 6 mg, 6 mg, IntraVENous, DAILY, Justin Brito MD, 6 mg at 01/16/23 0857    dextromethorphan (DELSYM) 30 mg/5 mL syrup 30 mg, 30 mg, Oral, Q12H, Ansley Nickerson MD, 30 mg at 01/16/23 0856    benzonatate (TESSALON) capsule 100 mg, 100 mg, Oral, TID PRN, Ansley Nevarez MD    rivaroxaban (XARELTO) tablet 2.5 mg, 2.5 mg, Oral, DAILY, Justin Brito MD, 2.5 mg at 01/16/23 0856    traMADoL (ULTRAM) tablet 50 mg, 50 mg, Oral, Q6H PRN, Justin Brito MD, 50 mg at 01/14/23 5056

## 2023-01-16 NOTE — PROGRESS NOTES
General Daily Progress Note          Patient Name:   Luis Antonio Salas       YOB: 1975       Age:  52 y.o. Admit Date: 1/12/2023      Subjective:     Patient is a 52y.o. year old female with past medical history of CVA, diabetes, CKD stage 5,on HD  hypertension, and cardiomegaly who presented to the ED today with shortness of breath. Patient states that over the last few days she has been feeling very sick and because of that she could only get 2 hours of dialysis done on Monday and 30 minutes yesterday. And then today she started feeling very short of breath and called EMS. On arrival to the ED patient was noted to be hypoxic in the 60s. She was placed on non-breather with improvement of oxygenation. Pt was tested positive for COVID test done in the ER. This is her first time getting diagnosed with COVID. She also has chills, cough and chest pain associated with it, nausea, and vomiting. She is currently on high flow oxygen. CBC shows leukocytosis 14,400, BNP 27,904, lactic acid 2.4, sodium 130, chloride 94, troponin 55. Glucose 372. Chest x ray done in the ED shows:  1. Slightly asymmetric CHF pattern is suspected. Alternatively, correlate  clinically for bilateral infection, right greater than left. .  .  2. Stable cardiomegaly. 3. Technically compromised by patient size and position. A standard 2 view  examination would be more useful when clinically possible. 1/14  Patient resting in the bed alert awake on high flow 40% O2  Blood sugar running high due to steroids  Still complaining of cough congestion    1/15  Patient alert awake not in distress breathing much better  Now on nasal cannula 1.5 L     1/16  Patient seen and examined in ICU today. She is resting in bed, alert and awake. Patient still complains of cough and congestion but sore throat is getting better. Currently on 1L of nasal cannula.  Pt on schedule for dialysis today (M/W/F schedule)    Lab shows wbc 15.8, sodium 135.   CXR done today shows no significant change from prior.       Objective:     Visit Vitals  BP (!) 154/75 (BP 1 Location: Right lower arm, BP Patient Position: At rest)   Pulse 62   Temp 97.7 °F (36.5 °C)   Resp 11   Ht 5' 8\" (1.727 m)   Wt 127.3 kg (280 lb 10.3 oz)   SpO2 100%   BMI 42.67 kg/m²        Recent Results (from the past 24 hour(s))   GLUCOSE, POC    Collection Time: 01/15/23 11:17 AM   Result Value Ref Range    Glucose (POC) 232 (H) 65 - 100 mg/dL    Performed by [address] [place] [address] Dr Copeland, POC    Collection Time: 01/15/23  3:20 PM   Result Value Ref Range    Glucose (POC) 221 (H) 65 - 100 mg/dL    Performed by [address] [place] [address] Dr Copeland, POC    Collection Time: 01/15/23  8:05 PM   Result Value Ref Range    Glucose (POC) 247 (H) 65 - 100 mg/dL    Performed by Rachna Rodriguez RN ([facility])    GLUCOSE, POC    Collection Time: 01/16/23 12:00 AM   Result Value Ref Range    Glucose (POC) 208 (H) 65 - 100 mg/dL    Performed by Rachna Rodriguez RN ([facility])    GLUCOSE, POC    Collection Time: 01/16/23  4:04 AM   Result Value Ref Range    Glucose (POC) 152 (H) 65 - 100 mg/dL    Performed by Rachna Rodriguez RN ([facility])    CBC W/O DIFF    Collection Time: 01/16/23  6:00 AM   Result Value Ref Range    WBC 15.8 (H) 3.6 - 11.0 K/uL    RBC 3.32 (L) 3.80 - 5.20 M/uL    HGB 9.8 (L) 11.5 - 16.0 g/dL    HCT 29.9 (L) 35.0 - 47.0 %    MCV 90.1 80.0 - 99.0 FL    MCH 29.5 26.0 - 34.0 PG    MCHC 32.8 30.0 - 36.5 g/dL    RDW 12.9 11.5 - 14.5 %    PLATELET 442 (H) 844 - 400 K/uL    MPV 10.3 8.9 - 12.9 FL    NRBC 0.0 0.0  WBC    ABSOLUTE NRBC 0.00 0.00 - 8.71 K/uL   METABOLIC PANEL, COMPREHENSIVE    Collection Time: 01/16/23  6:00 AM   Result Value Ref Range    Sodium 135 (L) 136 - 145 mmol/L    Potassium 4.1 3.5 - 5.1 mmol/L    Chloride 100 97 - 108 mmol/L    CO2 26 21 - 32 mmol/L    Anion gap 9 5 - 15 mmol/L    Glucose 164 (H) 65 - 100 mg/dL    BUN 76 (H) 6 - 20 mg/dL    Creatinine 4.91 (H) 0.55 - 1.02 mg/dL    BUN/Creatinine ratio 15 12 - 20      eGFR 10 (L) >60 ml/min/1.73m2    Calcium 8.2 (L) 8.5 - 10.1 mg/dL    Bilirubin, total 0.3 0.2 - 1.0 mg/dL    AST (SGOT) 5 (L) 15 - 37 U/L    ALT (SGPT) 8 (L) 12 - 78 U/L    Alk. phosphatase 59 45 - 117 U/L    Protein, total 6.9 6.4 - 8.2 g/dL    Albumin 2.6 (L) 3.5 - 5.0 g/dL    Globulin 4.3 (H) 2.0 - 4.0 g/dL    A-G Ratio 0.6 (L) 1.1 - 2.2     GLUCOSE, POC    Collection Time: 01/16/23  7:29 AM   Result Value Ref Range    Glucose (POC) 177 (H) 65 - 100 mg/dL    Performed by Jl Canales      [unfilled]      Review of Systems    Constitutional: Negative for chills and fever. HENT: Negative. Eyes: Negative. Respiratory: Negative. Cardiovascular: Negative. Gastrointestinal: Negative for abdominal pain and nausea. Skin: Negative. Neurological: Negative. Physical Exam:      Constitutional: pt is oriented to person, place, and time. HENT:   Head: Normocephalic and atraumatic. Eyes: Pupils are equal, round, and reactive to light. EOM are normal.   Cardiovascular: Normal rate, regular rhythm and normal heart sounds. Pulmonary/Chest: Breath sounds normal. No wheezes. No rales. Exhibits no tenderness. Abdominal: Soft. Bowel sounds are normal. There is no abdominal tenderness. There is no rebound and no guarding. Musculoskeletal: Normal range of motion. Neurological: pt is alert and oriented to person, place, and time. XR CHEST PORT   Final Result   No significant change. XR CHEST PORT   Final Result   No definite change in cardiomegaly and pulmonary edema with technical factors as   above. XR CHEST PORT   Final Result   Slight improvement in pulmonary edema. XR CHEST PORT   Final Result   Cardiomegaly and diffuse airspace disease similar to prior study. XR CHEST SNGL V   Final Result      1. Slightly asymmetric CHF pattern is suspected. Alternatively, correlate   clinically for bilateral infection, right greater than left. .  .   2. Stable cardiomegaly. 3. Technically compromised by patient size and position. A standard 2 view   examination would be more useful when clinically possible.               Recent Results (from the past 24 hour(s))   GLUCOSE, POC    Collection Time: 01/15/23 11:17 AM   Result Value Ref Range    Glucose (POC) 232 (H) 65 - 100 mg/dL    Performed by Trav Melendez    GLUCOSE, POC    Collection Time: 01/15/23  3:20 PM   Result Value Ref Range    Glucose (POC) 221 (H) 65 - 100 mg/dL    Performed by Trav Melendez    GLUCOSE, POC    Collection Time: 01/15/23  8:05 PM   Result Value Ref Range    Glucose (POC) 247 (H) 65 - 100 mg/dL    Performed by Sanjana Cloud RN (Tv)    GLUCOSE, POC    Collection Time: 01/16/23 12:00 AM   Result Value Ref Range    Glucose (POC) 208 (H) 65 - 100 mg/dL    Performed by Sanjana Cloud RN ([facility])    GLUCOSE, POC    Collection Time: 01/16/23  4:04 AM   Result Value Ref Range    Glucose (POC) 152 (H) 65 - 100 mg/dL    Performed by Sanjana Cloud RN ([facility])    CBC W/O DIFF    Collection Time: 01/16/23  6:00 AM   Result Value Ref Range    WBC 15.8 (H) 3.6 - 11.0 K/uL    RBC 3.32 (L) 3.80 - 5.20 M/uL    HGB 9.8 (L) 11.5 - 16.0 g/dL    HCT 29.9 (L) 35.0 - 47.0 %    MCV 90.1 80.0 - 99.0 FL    MCH 29.5 26.0 - 34.0 PG    MCHC 32.8 30.0 - 36.5 g/dL    RDW 12.9 11.5 - 14.5 %    PLATELET 408 (H) 807 - 400 K/uL    MPV 10.3 8.9 - 12.9 FL    NRBC 0.0 0.0  WBC    ABSOLUTE NRBC 0.00 0.00 - 2.43 K/uL   METABOLIC PANEL, COMPREHENSIVE    Collection Time: 01/16/23  6:00 AM   Result Value Ref Range    Sodium 135 (L) 136 - 145 mmol/L    Potassium 4.1 3.5 - 5.1 mmol/L    Chloride 100 97 - 108 mmol/L    CO2 26 21 - 32 mmol/L    Anion gap 9 5 - 15 mmol/L    Glucose 164 (H) 65 - 100 mg/dL    BUN 76 (H) 6 - 20 mg/dL    Creatinine 4.91 (H) 0.55 - 1.02 mg/dL    BUN/Creatinine ratio 15 12 - 20      eGFR 10 (L) >60 ml/min/1.73m2    Calcium 8.2 (L) 8.5 - 10.1 mg/dL    Bilirubin, total 0.3 0.2 - 1.0 mg/dL    AST (SGOT) 5 (L) 15 - 37 U/L    ALT (SGPT) 8 (L) 12 - 78 U/L    Alk. phosphatase 59 45 - 117 U/L    Protein, total 6.9 6.4 - 8.2 g/dL    Albumin 2.6 (L) 3.5 - 5.0 g/dL    Globulin 4.3 (H) 2.0 - 4.0 g/dL    A-G Ratio 0.6 (L) 1.1 - 2.2     GLUCOSE, POC    Collection Time: 01/16/23  7:29 AM   Result Value Ref Range    Glucose (POC) 177 (H) 65 - 100 mg/dL    Performed by Shruti Malave        Results       Procedure Component Value Units Date/Time    MRSA SCREEN - PCR (NASAL) [[id number]] Collected: 01/12/23 1951    Order Status: Completed Specimen: Swab Updated: 01/12/23 2148     MRSA by PCR, Nasal Not Detected       CULTURE, BLOOD #1 [[id number]] Collected: 01/12/23 1415    Order Status: Canceled Specimen: Blood     CULTURE, BLOOD #2 [[id number]] Collected: 01/12/23 1415    Order Status: Canceled Specimen: Blood     CULTURE, BLOOD, PAIRED [[id number]] Collected: 01/12/23 1011    Order Status: Completed Specimen: Blood Updated: 01/15/23 0920     Special Requests: No Special Requests        Culture result: No growth 3 days       INFLUENZA A & B AG (RAPID TEST) [[id number]] Collected: 01/12/23 1010    Order Status: Completed Specimen: Nasopharyngeal from Nasal washing Updated: 01/12/23 1036     Influenza A Antigen Negative        Influenza B Antigen Negative       COVID-19 RAPID TEST [[id number]]  (Abnormal) Collected: 01/12/23 1010    Order Status: Completed Specimen: Nasopharyngeal Updated: 01/12/23 1047     COVID-19 rapid test DETECTED        Comment: Rapid Abbott ID Now   The specimen is POSITIVE for SARS-CoV-2, the novel coronavirus associated with COVID-19. This test has been authorized by the FDA under an Emergency Use Authorization (EUA) for use by authorized laboratories.    Fact sheet for Healthcare Providers:  http://www.shaquille.cornelius/ Fact sheet for Patients: http://www.all-josue.cornelius/   Methodology: Isothermal Nucleic Acid Amplification Results verified, phoned to and read back by BRANDI MENA RN ON 1/12/2023 @1045/MAB                  Labs:     Recent Labs     01/16/23  0600 01/15/23  0530   WBC 15.8* 13.9*   HGB 9.8* 9.9*   HCT 29.9* 30.7*   * 396       Recent Labs     01/16/23  0600 01/15/23  0530 01/14/23  0500   * 134* 127*   K 4.1 4.2 5.1    99 93*   CO2 26 27 25   BUN 76* 53* 65*   CREA 4.91* 4.57* 5.79*   * 202* 339*   CA 8.2* 8.2* 8.4*       Recent Labs     01/16/23  0600 01/15/23  0530 01/14/23  0500   ALT 8* 8* 8*   AP 59 64 80   TBILI 0.3 0.3 0.5   TP 6.9 7.0 7.5   ALB 2.6* 2.4* 2.8*   GLOB 4.3* 4.6* 4.7*       No results for input(s): INR, PTP, APTT, INREXT, INREXT in the last 72 hours. Recent Labs     01/15/23  0530   FERR 698*        No results found for: FOL, RBCF     Recent Labs     01/15/23  0539 01/14/23  0455   PH 7.37 7.41   PCO2 50* 32*   PO2 102* 142*       No results for input(s): CPK, CKNDX, TROIQ in the last 72 hours.     No lab exists for component: CPKMB  No results found for: CHOL, CHOLX, CHLST, CHOLV, HDL, HDLP, LDL, LDLC, DLDLP, TGLX, TRIGL, TRIGP, CHHD, CHHDX  Lab Results   Component Value Date/Time    Glucose (POC) 177 (H) 01/16/2023 07:29 AM    Glucose (POC) 152 (H) 01/16/2023 04:04 AM    Glucose (POC) 208 (H) 01/16/2023 12:00 AM    Glucose (POC) 247 (H) 01/15/2023 08:05 PM    Glucose (POC) 221 (H) 01/15/2023 03:20 PM     Lab Results   Component Value Date/Time    Color Dark Yellow 01/13/2023 01:20 PM    Appearance Turbid (A) 01/13/2023 01:20 PM    Specific gravity 1.019 01/13/2023 01:20 PM    pH (UA) 5.0 01/13/2023 01:20 PM    Protein Negative 01/13/2023 01:20 PM    Glucose 50 (A) 01/13/2023 01:20 PM    Ketone Negative 01/13/2023 01:20 PM    Bilirubin Negative 01/13/2023 01:20 PM    Urobilinogen 0.1 01/13/2023 01:20 PM    Nitrites Negative 01/13/2023 01:20 PM    Leukocyte Esterase Large (A) 01/13/2023 01:20 PM    Epithelial cells Many (A) 01/13/2023 01:20 PM    Bacteria Negative 01/13/2023 01:20 PM    Bacteria Negative 01/13/2023 01:20 PM    WBC 10-20 01/13/2023 01:20 PM    WBC 10-20 01/13/2023 01:20 PM    RBC 0-5 01/13/2023 01:20 PM    RBC 0-5 01/13/2023 01:20 PM         Assessment:     Acute hypoxic respiratory failure on 1 1/2 L per  COVID-19 pneumonitis  Leukocytosis  Acute congestive heart failure from fluid overload  Hyperglycemia secondary to steroids  History of type 2 diabetes  End-stage renal disease on hemodialysis  Hypertension  History of CVA  Morbid obesity  Hyponatremia      Plan:     Lipitor 20 mg daily  Decadron 6 mg daily Lasix 80 mg daily  Lasix 80 mg daily  Gabapentin 300 mg at bedtime  Lantus 40 units subcu every 12 hours  Sliding scale insulin  Xarelto 2.5 mg daily  Protonix 20 daily    Repeat the labs continue dialysis 3 times a week          Current Facility-Administered Medications:     epoetin jodie-epbx (RETACRIT) injection 4,000 Units, 4,000 Units, IntraVENous, Q MON, WED & FRI, Joyce Link MD    melatonin tablet 3 mg, 3 mg, Oral, QHS, Ansley Nickerson MD, 3 mg at 01/15/23 2118    hydrOXYzine pamoate (VISTARIL) capsule 25 mg, 25 mg, Oral, Q4H PRN, Ansley Nickerson MD    carvediloL (COREG) tablet 6.25 mg, 6.25 mg, Oral, BID WITH MEALS, Justin Brito MD, 6.25 mg at 01/15/23 1709    NIFEdipine ER (PROCARDIA XL) tablet 60 mg, 60 mg, Oral, BID, Justin Brito MD, 60 mg at 01/15/23 2117    insulin lispro (HUMALOG) injection, , SubCUTAneous, Q4H, Justin Brito MD, 3 Units at 01/16/23 0413    phenol throat spray (CHLORASEPTIC) 1 Spray, 1 Spray, Oral, PRN, Ansley Nickerson MD    sorbitoL 70 % solution 60 mL, 60 mL, Oral, DAILY PRN, Papa Atkinson MD, 60 mL at 01/13/23 1535    insulin lispro (HUMALOG) injection 10 Units, 10 Units, SubCUTAneous, TIDAC, Justin Brito MD, 10 Units at 01/15/23 1707    atorvastatin (LIPITOR) tablet 20 mg, 20 mg, Oral, DAILY, Justin Brito MD, 20 mg at 01/15/23 4772    furosemide (LASIX) tablet 80 mg, 80 mg, Oral, DAILY, Justin Brito MD, 80 mg at 01/15/23 4665 gabapentin (NEURONTIN) capsule 300 mg, 300 mg, Oral, QHS, Justin Brito MD, 300 mg at 01/15/23 2117    sevelamer carbonate (RENVELA) tab 800 mg, 800 mg, Oral, TID WITH MEALS, Luisa Brito MD, 800 mg at 01/15/23 1708    glucose chewable tablet 16 g, 4 Tablet, Oral, PRN, Luisa Brito MD    glucagon (GLUCAGEN) injection 1 mg, 1 mg, IntraMUSCular, PRN, Luisa Brito MD    acetaminophen (TYLENOL) tablet 650 mg, 650 mg, Oral, Q6H PRN **OR** acetaminophen (TYLENOL) suppository 650 mg, 650 mg, Rectal, Q6H PRN, Justin Brito MD    polyethylene glycol (MIRALAX) packet 17 g, 17 g, Oral, DAILY PRN, Justin Brito MD    ondansetron (ZOFRAN ODT) tablet 4 mg, 4 mg, Oral, Q8H PRN **OR** ondansetron (ZOFRAN) injection 4 mg, 4 mg, IntraVENous, Q6H PRN, Justin Brito MD, 4 mg at 01/15/23 0603    pantoprazole (PROTONIX) tablet 20 mg, 20 mg, Oral, DAILY, Justin Brito MD, 20 mg at 01/15/23 0833    [COMPLETED] piperacillin-tazobactam (ZOSYN) 4.5 g in 0.9% sodium chloride (MBP/ADV) 100 mL MBP, 4.5 g, IntraVENous, ONCE, Last Rate: 200 mL/hr at 01/12/23 1725, 4.5 g at 01/12/23 1725 **FOLLOWED BY** piperacillin-tazobactam (ZOSYN) 3.375 g in 0.9% sodium chloride (MBP/ADV) 100 mL MBP, 3.375 g, IntraVENous, Q12H, Justin Brito MD, Last Rate: 25 mL/hr at 01/15/23 2259, 3.375 g at 01/15/23 2259    insulin glargine (LANTUS) injection 40 Units, 40 Units, SubCUTAneous, Q12H, Justin Brito MD, 40 Units at 01/15/23 2118    dexamethasone (DECADRON) 4 mg/mL injection 6 mg, 6 mg, IntraVENous, DAILY, Justin Brito MD, 6 mg at 01/15/23 1693    dextromethorphan (DELSYM) 30 mg/5 mL syrup 30 mg, 30 mg, Oral, Q12H, Ansley Nickerson MD, 30 mg at 01/15/23 2118    benzonatate (TESSALON) capsule 100 mg, 100 mg, Oral, TID PRN, Ansley Hernandez MD    rivaroxaban (XARELTO) tablet 2.5 mg, 2.5 mg, Oral, DAILY, Justin Brito MD, 2.5 mg at 01/15/23 0832    traMADoL (ULTRAM) tablet 50 mg, 50 mg, Oral, Q6H PRN, Dearl MD Jillian, 50 mg at 01/14/23 1812

## 2023-01-16 NOTE — PROGRESS NOTES
ABG DRAWN AT 0600. .. ABG RESULTS: PH=7.46, PaCO2=37, TjN1=009, HCO3=26, BE= 2.1, SpO2= 98%. ..  O2  AT 1/LPM VIA NC.

## 2023-01-16 NOTE — DIALYSIS
Patient tolerated treatment. 3500 ml of fluid was removed. Patient was alert and oriented during treatment. 68.3 liters of blood was processed. Report was given to primary nurse Tyrone.

## 2023-01-16 NOTE — PROGRESS NOTES
IMPRESSION:   Acute hypoxic respiratory failure  COVID-19  Sepsis  CHF with fluid overload  Chronic kidney disease stage V on dialysis  Hypertension and diabetes by history  Hyponatremia  Additional workup outlined below  Pt is at high risk of sudden decline and decompensation with life threatening consequenses and continued end organ dysfunction and failure  Pt is critically ill. Time spent with pt and staff actively rendering care, managing pt and coordinating care as stated below; 30 minutes, exclusive of any procedures      RECOMMENDATIONS/PLAN:   ICU monitoring  51-year-old obese lady came in because of shortness of breath and dyspnea patient has COVID-19 also patient has history of chronic kidney disease on hemodialysis and she missed 2 dialysis before   Continue with dialysis she was getting it Monday Wednesday Friday we will repeat dialysis chest x-ray shows CHF fluid overload  Now she is on mid flow 1-1/2 L we will continue to wean  For COVID-19 patient on Decadron and received Actemra  CRP 24.7 procalcitonin 4.0 BNP 33367  Repeat sodium much improved  CXR shows bilateral congestive changes superimposed infiltrate  Will be available to assist in medical management while in the CCU pending disposition     [x] High complexity decision making was performed  [x] See my orders for details  HPI  51-year-old lady came in because of shortness of breath and dyspnea significant past medical history of chronic kidney disease stage V on hemodialysis hypertension CVA and diabetes mellitus. She went to [address] [place] [address] and also she gets dialysis but she is to dialysis and then she was diagnosed with COVID-19 also having cough fever chills chest pain nausea and vomiting came to the emergency room she was put on high flow nasal cannula saturation was low BNP was elevated glucose was also elevated chest x-ray shows CHF fluid overload with superimposed possible COVID picture so admitted and critical care consult was called.     PMH: has a past medical history of Allergic rhinitis, Blood clot in vein, Chronic kidney disease, CVA (cerebral vascular accident) ([facility] [address] (2014), Diabetes ([facility] [address] Dyslipidemia, Hypertension, IBS (irritable bowel syndrome), Ill-defined condition, and Renal failure. PSH:   has a past surgical history that includes hx cholecystectomy (); hx tonsillectomy (); hx  section (); pr unlisted procedure vascular surgery; and hx lap cholecystectomy. FHX: family history includes Diabetes in her father and mother; Heart Disease in her father and mother; Hypertension in her father and mother. SHX:  reports that she has never smoked. She has never used smokeless tobacco. She reports that she does not drink alcohol and does not use drugs.     ALL:   Allergies   Allergen Reactions    Fentanyl Anxiety    Sulfa (Sulfonamide Antibiotics) Nausea and Vomiting    Zithromax [Azithromycin] Unknown (comments)    Morphine Itching        MEDS:   [x] Reviewed - As Below   [] Not reviewed    Current Facility-Administered Medications   Medication    epoetin jodie-epbx (RETACRIT) injection 4,000 Units    melatonin tablet 3 mg    hydrOXYzine pamoate (VISTARIL) capsule 25 mg    carvediloL (COREG) tablet 6.25 mg    NIFEdipine ER (PROCARDIA XL) tablet 60 mg    insulin lispro (HUMALOG) injection    phenol throat spray (CHLORASEPTIC) 1 Spray    sorbitoL 70 % solution 60 mL    insulin lispro (HUMALOG) injection 10 Units    atorvastatin (LIPITOR) tablet 20 mg    furosemide (LASIX) tablet 80 mg    gabapentin (NEURONTIN) capsule 300 mg    sevelamer carbonate (RENVELA) tab 800 mg    glucose chewable tablet 16 g    glucagon (GLUCAGEN) injection 1 mg    acetaminophen (TYLENOL) tablet 650 mg    Or    acetaminophen (TYLENOL) suppository 650 mg    polyethylene glycol (MIRALAX) packet 17 g    ondansetron (ZOFRAN ODT) tablet 4 mg    Or    ondansetron (ZOFRAN) injection 4 mg    pantoprazole (PROTONIX) tablet 20 mg piperacillin-tazobactam (ZOSYN) 3.375 g in 0.9% sodium chloride (MBP/ADV) 100 mL MBP    insulin glargine (LANTUS) injection 40 Units    dexamethasone (DECADRON) 4 mg/mL injection 6 mg    dextromethorphan (DELSYM) 30 mg/5 mL syrup 30 mg    benzonatate (TESSALON) capsule 100 mg    rivaroxaban (XARELTO) tablet 2.5 mg    traMADoL (ULTRAM) tablet 50 mg      MAR reviewed and pertinent medications noted or modified as needed   Current Facility-Administered Medications   Medication    epoetin jodie-epbx (RETACRIT) injection 4,000 Units    melatonin tablet 3 mg    hydrOXYzine pamoate (VISTARIL) capsule 25 mg    carvediloL (COREG) tablet 6.25 mg    NIFEdipine ER (PROCARDIA XL) tablet 60 mg    insulin lispro (HUMALOG) injection    phenol throat spray (CHLORASEPTIC) 1 Spray    sorbitoL 70 % solution 60 mL    insulin lispro (HUMALOG) injection 10 Units    atorvastatin (LIPITOR) tablet 20 mg    furosemide (LASIX) tablet 80 mg    gabapentin (NEURONTIN) capsule 300 mg    sevelamer carbonate (RENVELA) tab 800 mg    glucose chewable tablet 16 g    glucagon (GLUCAGEN) injection 1 mg    acetaminophen (TYLENOL) tablet 650 mg    Or    acetaminophen (TYLENOL) suppository 650 mg    polyethylene glycol (MIRALAX) packet 17 g    ondansetron (ZOFRAN ODT) tablet 4 mg    Or    ondansetron (ZOFRAN) injection 4 mg    pantoprazole (PROTONIX) tablet 20 mg    piperacillin-tazobactam (ZOSYN) 3.375 g in 0.9% sodium chloride (MBP/ADV) 100 mL MBP    insulin glargine (LANTUS) injection 40 Units    dexamethasone (DECADRON) 4 mg/mL injection 6 mg    dextromethorphan (DELSYM) 30 mg/5 mL syrup 30 mg    benzonatate (TESSALON) capsule 100 mg    rivaroxaban (XARELTO) tablet 2.5 mg    traMADoL (ULTRAM) tablet 50 mg      PMH:  has a past medical history of Allergic rhinitis, Blood clot in vein, Chronic kidney disease, CVA (cerebral vascular accident) ([facility] [address] (05/20/2014), Diabetes ([facility] Dyslipidemia, Hypertension, IBS (irritable bowel syndrome), Ill-defined condition, and Renal failure. PSH:   has a past surgical history that includes hx cholecystectomy (); hx tonsillectomy (); hx  section (); pr unlisted procedure vascular surgery; and hx lap cholecystectomy. FHX: family history includes Diabetes in her father and mother; Heart Disease in her father and mother; Hypertension in her father and mother. SHX:  reports that she has never smoked. She has never used smokeless tobacco. She reports that she does not drink alcohol and does not use drugs. ROS:A comprehensive review of systems was negative except for that written in the HPI. Hemodynamics:    CO:    CI:    CVP:    SVR:   PAP Systolic:    PAP Diastolic:    PVR:    QO32:        Ventilator Settings:      Mode Rate TV Press PEEP FiO2 PIP Min. Vent               40 %              Vital Signs: Telemetry:    normal sinus rhythm Intake/Output:   Visit Vitals  BP (!) 144/75   Pulse 65   Temp 97.7 °F (36.5 °C)   Resp 14   Ht 5' 8\" (1.727 m)   Wt 127.3 kg (280 lb 10.3 oz)   SpO2 100%   BMI 42.67 kg/m²       Temp (24hrs), Av °F (36.7 °C), Min:97.7 °F (36.5 °C), Max:98.6 °F (37 °C)        O2 Device: Nasal cannula O2 Flow Rate (L/min): 1.5 l/min       Wt Readings from Last 4 Encounters:   01/15/23 127.3 kg (280 lb 10.3 oz)   09/15/22 117.9 kg (260 lb)   22 122.5 kg (270 lb)   22 128.2 kg (282 lb 10.1 oz)          Intake/Output Summary (Last 24 hours) at 2023 0942  Last data filed at 2023 0600  Gross per 24 hour   Intake 1550 ml   Output --   Net 1550 ml         Last shift:      No intake/output data recorded. Last 3 shifts:  1901 -  0700  In:  [P.O.:1700; I.V.:300]  Out: -        Physical Exam:     General: HFNC; alert awake  HEENT: NCAT, poor dentition, lips and mucosa dry  Eyes: anicteric; conjunctiva clear  Neck: no nodes,  trach midline; no accessory MM use.   Chest: no deformity,   Cardiac: IR regular; no murmur;   Lungs: distant breath sounds; bilateral rales  Abd: soft, NT, hypoactive BS  Ext: no edema; no joint swelling;  No clubbing  : NO odom, clear urine  Neuro: Alert awake  Psych- no agitation, oriented to person;   Skin: warm, dry, no cyanosis;   Pulses: 1-2+ Bilateral pedal, radial  Capillary: brisk; pale      DATA:    MAR reviewed and pertinent medications noted or modified as needed  MEDS:   Current Facility-Administered Medications   Medication    epoetin jodie-epbx (RETACRIT) injection 4,000 Units    melatonin tablet 3 mg    hydrOXYzine pamoate (VISTARIL) capsule 25 mg    carvediloL (COREG) tablet 6.25 mg    NIFEdipine ER (PROCARDIA XL) tablet 60 mg    insulin lispro (HUMALOG) injection    phenol throat spray (CHLORASEPTIC) 1 Spray    sorbitoL 70 % solution 60 mL    insulin lispro (HUMALOG) injection 10 Units    atorvastatin (LIPITOR) tablet 20 mg    furosemide (LASIX) tablet 80 mg    gabapentin (NEURONTIN) capsule 300 mg    sevelamer carbonate (RENVELA) tab 800 mg    glucose chewable tablet 16 g    glucagon (GLUCAGEN) injection 1 mg    acetaminophen (TYLENOL) tablet 650 mg    Or    acetaminophen (TYLENOL) suppository 650 mg    polyethylene glycol (MIRALAX) packet 17 g    ondansetron (ZOFRAN ODT) tablet 4 mg    Or    ondansetron (ZOFRAN) injection 4 mg    pantoprazole (PROTONIX) tablet 20 mg    piperacillin-tazobactam (ZOSYN) 3.375 g in 0.9% sodium chloride (MBP/ADV) 100 mL MBP    insulin glargine (LANTUS) injection 40 Units    dexamethasone (DECADRON) 4 mg/mL injection 6 mg    dextromethorphan (DELSYM) 30 mg/5 mL syrup 30 mg    benzonatate (TESSALON) capsule 100 mg    rivaroxaban (XARELTO) tablet 2.5 mg    traMADoL (ULTRAM) tablet 50 mg        Labs:    Recent Labs     01/16/23  0600 01/15/23  0530 01/14/23  0500   WBC 15.8* 13.9* 12.7*   HGB 9.8* 9.9* 10.4*   * 396 419*       Recent Labs     01/16/23  0600 01/15/23  0530 01/14/23  0500   * 134* 127*   K 4.1 4.2 5.1    99 93*   CO2 26 27 25   * 202* 339*   BUN 76* 53* 65*   CREA 4.91* 4.57* 5.79*   CA 8.2* 8.2* 8.4*   ALB 2.6* 2.4* 2.8*   ALT 8* 8* 8*       Recent Labs     01/16/23  0605 01/15/23  0539 01/14/23  0455   PH 7.46* 7.37 7.41   PCO2 37 50* 32*   PO2 131* 102* 142*   HCO3 26 28* 20*   FIO2 24.0 24.0 45       No results for input(s): CPK, CKNDX, TROIQ in the last 72 hours. No lab exists for component: CPKMB  No results found for: BNPP, BNP   Lab Results   Component Value Date/Time    Culture result: No growth 4 days 01/12/2023 10:11 AM     No results found for: TSH, TSHEXT, TSHEXT     Imaging:    Results from Hospital Encounter encounter on 01/12/23    XR CHEST PORT    Narrative  INDICATION: chf    EXAMINATION:  AP CHEST, PORTABLE    COMPARISON: 1/15/2020    FINDINGS: Single AP portable view of the chest demonstrates diffuse bilateral  airspace disease. The cardiomediastinal silhouette is unchanged. Probable left  pleural effusion. No pneumothorax. Impression  No significant change. Results from [place] encounter on 08/09/22    CT ABD PELV WO CONT    Narrative  EXAM: CT ABD PELV WO CONT    INDICATION: left flank pain    COMPARISON: 6/29/2022    IV CONTRAST: None. ORAL CONTRAST:    TECHNIQUE:  Thin axial images were obtained through the abdomen and pelvis. Coronal and  sagittal reformats were generated. CT dose reduction was achieved through use of  a standardized protocol tailored for this examination and automatic exposure  control for dose modulation. The absence of intravenous contrast material reduces the sensitivity for  evaluation of the vasculature and solid organs. FINDINGS:  LOWER THORAX: Pericardial effusion is stable. Cardiomegaly is stable there is  minor left basilar atelectasis. LIVER: No mass. BILIARY TREE: Gallbladder is within normal limits. CBD is not dilated. SPLEEN: within normal limits. PANCREAS: No focal abnormality. ADRENALS: Unremarkable. KIDNEYS/URETERS: No calculus or hydronephrosis.   STOMACH: Unremarkable. SMALL BOWEL: No dilatation or wall thickening. COLON: No dilatation or wall thickening. Sigmoid colon is tortuous. APPENDIX: Normal  PERITONEUM: No ascites or pneumoperitoneum. RETROPERITONEUM: No lymphadenopathy or aortic aneurysm. There are extensive  atherosclerotic changes  REPRODUCTIVE ORGANS: Uterus is normal  URINARY BLADDER: Incompletely distended  BONES: No destructive bone lesion. ABDOMINAL WALL: There is a small umbilical hernia containing fat  ADDITIONAL COMMENTS: There is increased intra-abdominal fat    Impression  No acute abnormality identified. There has been no significant change. There are  extensive atherosclerotic changes.     1/14 on high flow 45% FiO2 85 flow at 40 L ABG acceptable we will decrease FiO2 PO2 is 142, chest x-ray shows improvement in the pulmonary edema she received Actemra yesterday we will continue to wean for hemodialysis today  1/15 on mid flow tolerating well improving PCO2 elevated will try to wean her to regular nasal cannula chest x-ray still shows congestive changes most likely secondary to COVID continue with the dialysis, continue with Lasix sore throat improved  1/16 condition improving continue with Decadron and Zosyn and dialysis chest x-ray improved continue to wean oxygen per protocol

## 2023-01-16 NOTE — PROGRESS NOTES
Renal Daily Progress Note    Admit Date: 1/12/2023      Subjective:     Pt is seen and examined on hemodialysis today. She does not have an appetite. She denies shortness of breath. No headache. She does not have diarrhea. She is unable to smell food.     Current Facility-Administered Medications   Medication Dose Route Frequency    epoetin jodie-epbx (RETACRIT) injection 4,000 Units  4,000 Units IntraVENous Q MON, WED & FRI    melatonin tablet 3 mg  3 mg Oral QHS    hydrOXYzine pamoate (VISTARIL) capsule 25 mg  25 mg Oral Q4H PRN    carvediloL (COREG) tablet 6.25 mg  6.25 mg Oral BID WITH MEALS    NIFEdipine ER (PROCARDIA XL) tablet 60 mg  60 mg Oral BID    insulin lispro (HUMALOG) injection   SubCUTAneous Q4H    phenol throat spray (CHLORASEPTIC) 1 Spray  1 Spray Oral PRN    sorbitoL 70 % solution 60 mL  60 mL Oral DAILY PRN    insulin lispro (HUMALOG) injection 10 Units  10 Units SubCUTAneous TIDAC    atorvastatin (LIPITOR) tablet 20 mg  20 mg Oral DAILY    furosemide (LASIX) tablet 80 mg  80 mg Oral DAILY    gabapentin (NEURONTIN) capsule 300 mg  300 mg Oral QHS    sevelamer carbonate (RENVELA) tab 800 mg  800 mg Oral TID WITH MEALS    glucose chewable tablet 16 g  4 Tablet Oral PRN    glucagon (GLUCAGEN) injection 1 mg  1 mg IntraMUSCular PRN    acetaminophen (TYLENOL) tablet 650 mg  650 mg Oral Q6H PRN    Or    acetaminophen (TYLENOL) suppository 650 mg  650 mg Rectal Q6H PRN    polyethylene glycol (MIRALAX) packet 17 g  17 g Oral DAILY PRN    ondansetron (ZOFRAN ODT) tablet 4 mg  4 mg Oral Q8H PRN    Or    ondansetron (ZOFRAN) injection 4 mg  4 mg IntraVENous Q6H PRN    pantoprazole (PROTONIX) tablet 20 mg  20 mg Oral DAILY    piperacillin-tazobactam (ZOSYN) 3.375 g in 0.9% sodium chloride (MBP/ADV) 100 mL MBP  3.375 g IntraVENous Q12H    insulin glargine (LANTUS) injection 40 Units  40 Units SubCUTAneous Q12H    dexamethasone (DECADRON) 4 mg/mL injection 6 mg  6 mg IntraVENous DAILY    dextromethorphan (DELSYM) 30 mg/5 mL syrup 30 mg  30 mg Oral Q12H    benzonatate (TESSALON) capsule 100 mg  100 mg Oral TID PRN    rivaroxaban (XARELTO) tablet 2.5 mg  2.5 mg Oral DAILY    traMADoL (ULTRAM) tablet 50 mg  50 mg Oral Q6H PRN        Review of Systems    Review of Systems   Respiratory:  Negative for cough and shortness of breath. Cardiovascular:  Negative for chest pain. Gastrointestinal:  Negative for abdominal pain. Neurological:  Negative for headaches. Objective:     Patient Vitals for the past 8 hrs:   BP Temp Pulse Resp SpO2   01/16/23 1245 130/89 -- 66 15 99 %   01/16/23 1230 (!) 133/53 -- 70 18 100 %   01/16/23 1215 (!) 131/55 -- 64 15 99 %   01/16/23 1200 (!) 154/80 -- 67 16 100 %   01/16/23 1145 (!) 164/74 -- 62 13 100 %   01/16/23 1130 (!) 163/69 -- 64 11 99 %   01/16/23 1115 (!) 159/74 -- 65 18 100 %   01/16/23 1100 (!) 154/68 -- 65 18 99 %   01/16/23 1048 -- -- -- -- 98 %   01/16/23 0900 (!) 144/75 -- 65 14 100 %   01/16/23 0700 -- 97.7 °F (36.5 °C) 68 17 96 %       No intake/output data recorded. 01/14 1901 - 01/16 0700  In: 2000 [P.O.:1700; I.V.:300]  Out: -   Not examined today   Physical Exam:   Physical Exam  Cardiovascular:      Rate and Rhythm: Regular rhythm. Pulmonary:      Breath sounds: Normal breath sounds. Abdominal:      General: Bowel sounds are normal.      Palpations: Abdomen is soft. Neurological:      Mental Status: She is alert. No edema  Left upper arm AV fistula functioning well. XR CHEST PORT   Final Result   No significant change. XR CHEST PORT   Final Result   No definite change in cardiomegaly and pulmonary edema with technical factors as   above. XR CHEST PORT   Final Result   Slight improvement in pulmonary edema. XR CHEST PORT   Final Result   Cardiomegaly and diffuse airspace disease similar to prior study. XR CHEST SNGL V   Final Result      1. Slightly asymmetric CHF pattern is suspected.  Alternatively, correlate   clinically for bilateral infection, right greater than left. .  .   2. Stable cardiomegaly. 3. Technically compromised by patient size and position. A standard 2 view   examination would be more useful when clinically possible. Data Review   Recent Labs     01/16/23  0600 01/15/23  0530 01/14/23  0500   WBC 15.8* 13.9* 12.7*   HGB 9.8* 9.9* 10.4*   HCT 29.9* 30.7* 32.1*   * 396 419*       Recent Labs     01/16/23  0600 01/15/23  0530 01/14/23  0500   * 134* 127*   K 4.1 4.2 5.1    99 93*   CO2 26 27 25   * 202* 339*   BUN 76* 53* 65*   CREA 4.91* 4.57* 5.79*   CA 8.2* 8.2* 8.4*   ALB 2.6* 2.4* 2.8*   ALT 8* 8* 8*       No components found for: [place]  Recent Labs     01/16/23  0605 01/15/23  0539 01/14/23  0455   PH 7.46* 7.37 7.41   PCO2 37 50* 32*   PO2 131* 102* 142*   HCO3 26 28* 20*   FIO2 24.0 24.0 45       No results for input(s): INR, INREXT, INREXT, INREXT in the last 72 hours. Assessment:          Active Problems:    Hypoxia (1/12/2023)      Fluid overload (1/12/2023)      COVID-19 (1/12/2023)      Acute hypoxemic respiratory failure ([facility] [address] (1/12/2023)  ESRD hemodialysis dependent. She normally dialyzes on a Monday Wednesday Friday schedule as an outpatient. Hypertension    COVID-19 infection    Secondary hyperparathyroidism    Anemia of chronic disease on Retacrit    Hyponatremia. Her sodium is better at 135 mEq. Diabetes mellitus- not controlled due to steroids    Plan:   She is stable on hemodialysis  3.5 kg fluid removal on hemodialysis today. Check a phosphorus level  Blood pressures are improving with fluid removal on dialysis.

## 2023-01-17 LAB
ALBUMIN SERPL-MCNC: 2.5 G/DL (ref 3.5–5)
ALBUMIN/GLOB SERPL: 0.6 (ref 1.1–2.2)
ALP SERPL-CCNC: 61 U/L (ref 45–117)
ALT SERPL-CCNC: 10 U/L (ref 12–78)
ANION GAP SERPL CALC-SCNC: 8 MMOL/L (ref 5–15)
AST SERPL W P-5'-P-CCNC: 10 U/L (ref 15–37)
BILIRUB SERPL-MCNC: 0.2 MG/DL (ref 0.2–1)
BUN SERPL-MCNC: 58 MG/DL (ref 6–20)
BUN/CREAT SERPL: 16 (ref 12–20)
CA-I BLD-MCNC: 8.1 MG/DL (ref 8.5–10.1)
CHLORIDE SERPL-SCNC: 100 MMOL/L (ref 97–108)
CO2 SERPL-SCNC: 28 MMOL/L (ref 21–32)
CREAT SERPL-MCNC: 3.74 MG/DL (ref 0.55–1.02)
ERYTHROCYTE [DISTWIDTH] IN BLOOD BY AUTOMATED COUNT: 12.9 % (ref 11.5–14.5)
GLOBULIN SER CALC-MCNC: 4 G/DL (ref 2–4)
GLUCOSE BLD STRIP.AUTO-MCNC: 128 MG/DL (ref 65–100)
GLUCOSE BLD STRIP.AUTO-MCNC: 184 MG/DL (ref 65–100)
GLUCOSE BLD STRIP.AUTO-MCNC: 192 MG/DL (ref 65–100)
GLUCOSE BLD STRIP.AUTO-MCNC: 205 MG/DL (ref 65–100)
GLUCOSE BLD STRIP.AUTO-MCNC: 236 MG/DL (ref 65–100)
GLUCOSE BLD STRIP.AUTO-MCNC: 254 MG/DL (ref 65–100)
GLUCOSE SERPL-MCNC: 188 MG/DL (ref 65–100)
HCT VFR BLD AUTO: 31.6 % (ref 35–47)
HGB BLD-MCNC: 10.2 G/DL (ref 11.5–16)
MCH RBC QN AUTO: 29.2 PG (ref 26–34)
MCHC RBC AUTO-ENTMCNC: 32.3 G/DL (ref 30–36.5)
MCV RBC AUTO: 90.5 FL (ref 80–99)
NRBC # BLD: 0 K/UL (ref 0–0.01)
NRBC BLD-RTO: 0 PER 100 WBC
PERFORMED BY, TECHID: ABNORMAL
PLATELET # BLD AUTO: 378 K/UL (ref 150–400)
PMV BLD AUTO: 9.9 FL (ref 8.9–12.9)
POTASSIUM SERPL-SCNC: 3.9 MMOL/L (ref 3.5–5.1)
PROT SERPL-MCNC: 6.5 G/DL (ref 6.4–8.2)
RBC # BLD AUTO: 3.49 M/UL (ref 3.8–5.2)
SODIUM SERPL-SCNC: 136 MMOL/L (ref 136–145)
WBC # BLD AUTO: 18.2 K/UL (ref 3.6–11)

## 2023-01-17 PROCEDURE — 74011250637 HC RX REV CODE- 250/637: Performed by: INTERNAL MEDICINE

## 2023-01-17 PROCEDURE — 74011250637 HC RX REV CODE- 250/637: Performed by: FAMILY MEDICINE

## 2023-01-17 PROCEDURE — 85027 COMPLETE CBC AUTOMATED: CPT

## 2023-01-17 PROCEDURE — 82962 GLUCOSE BLOOD TEST: CPT

## 2023-01-17 PROCEDURE — 74011000258 HC RX REV CODE- 258: Performed by: FAMILY MEDICINE

## 2023-01-17 PROCEDURE — 74011636637 HC RX REV CODE- 636/637: Performed by: FAMILY MEDICINE

## 2023-01-17 PROCEDURE — 80053 COMPREHEN METABOLIC PANEL: CPT

## 2023-01-17 PROCEDURE — 74011250636 HC RX REV CODE- 250/636: Performed by: FAMILY MEDICINE

## 2023-01-17 PROCEDURE — 99232 SBSQ HOSP IP/OBS MODERATE 35: CPT | Performed by: INTERNAL MEDICINE

## 2023-01-17 PROCEDURE — 36415 COLL VENOUS BLD VENIPUNCTURE: CPT

## 2023-01-17 PROCEDURE — 77010033678 HC OXYGEN DAILY

## 2023-01-17 PROCEDURE — 65270000029 HC RM PRIVATE

## 2023-01-17 RX ORDER — AMOXICILLIN AND CLAVULANATE POTASSIUM 500; 125 MG/1; MG/1
1 TABLET, FILM COATED ORAL DAILY
Status: DISCONTINUED | OUTPATIENT
Start: 2023-01-17 | End: 2023-01-18 | Stop reason: HOSPADM

## 2023-01-17 RX ORDER — DEXAMETHASONE 4 MG/1
4 TABLET ORAL DAILY
Status: DISCONTINUED | OUTPATIENT
Start: 2023-01-18 | End: 2023-01-18 | Stop reason: HOSPADM

## 2023-01-17 RX ADMIN — AMOXICILLIN AND CLAVULANATE POTASSIUM 1 TABLET: 500; 125 TABLET, FILM COATED ORAL at 18:00

## 2023-01-17 RX ADMIN — SEVELAMER CARBONATE 800 MG: 800 TABLET, FILM COATED ORAL at 08:10

## 2023-01-17 RX ADMIN — ATORVASTATIN CALCIUM 20 MG: 20 TABLET, FILM COATED ORAL at 08:10

## 2023-01-17 RX ADMIN — SORBITOL SOLUTION (BULK) 60 ML: 70 SOLUTION at 13:55

## 2023-01-17 RX ADMIN — INSULIN GLARGINE 40 UNITS: 100 INJECTION, SOLUTION SUBCUTANEOUS at 08:08

## 2023-01-17 RX ADMIN — Medication 30 MG: at 20:36

## 2023-01-17 RX ADMIN — INSULIN LISPRO 7 UNITS: 100 INJECTION, SOLUTION INTRAVENOUS; SUBCUTANEOUS at 23:52

## 2023-01-17 RX ADMIN — DEXAMETHASONE SODIUM PHOSPHATE 6 MG: 4 INJECTION, SOLUTION INTRA-ARTICULAR; INTRALESIONAL; INTRAMUSCULAR; INTRAVENOUS; SOFT TISSUE at 08:16

## 2023-01-17 RX ADMIN — INSULIN LISPRO 3 UNITS: 100 INJECTION, SOLUTION INTRAVENOUS; SUBCUTANEOUS at 11:33

## 2023-01-17 RX ADMIN — RIVAROXABAN 2.5 MG: 2.5 TABLET, FILM COATED ORAL at 08:10

## 2023-01-17 RX ADMIN — TRAMADOL HYDROCHLORIDE 50 MG: 50 TABLET, COATED ORAL at 23:46

## 2023-01-17 RX ADMIN — GABAPENTIN 300 MG: 300 CAPSULE ORAL at 21:29

## 2023-01-17 RX ADMIN — ACETAMINOPHEN 650 MG: 325 TABLET ORAL at 08:16

## 2023-01-17 RX ADMIN — PIPERACILLIN AND TAZOBACTAM 3.38 G: 3; .375 INJECTION, POWDER, LYOPHILIZED, FOR SOLUTION INTRAVENOUS at 11:32

## 2023-01-17 RX ADMIN — INSULIN LISPRO 4 UNITS: 100 INJECTION, SOLUTION INTRAVENOUS; SUBCUTANEOUS at 20:35

## 2023-01-17 RX ADMIN — INSULIN LISPRO 10 UNITS: 100 INJECTION, SOLUTION INTRAVENOUS; SUBCUTANEOUS at 08:09

## 2023-01-17 RX ADMIN — SEVELAMER CARBONATE 800 MG: 800 TABLET, FILM COATED ORAL at 11:32

## 2023-01-17 RX ADMIN — INSULIN GLARGINE 40 UNITS: 100 INJECTION, SOLUTION SUBCUTANEOUS at 20:35

## 2023-01-17 RX ADMIN — PANTOPRAZOLE SODIUM 20 MG: 20 TABLET, DELAYED RELEASE ORAL at 08:16

## 2023-01-17 RX ADMIN — CARVEDILOL 6.25 MG: 3.12 TABLET, FILM COATED ORAL at 16:25

## 2023-01-17 RX ADMIN — CARVEDILOL 6.25 MG: 3.12 TABLET, FILM COATED ORAL at 08:09

## 2023-01-17 RX ADMIN — Medication 30 MG: at 08:08

## 2023-01-17 RX ADMIN — INSULIN LISPRO 3 UNITS: 100 INJECTION, SOLUTION INTRAVENOUS; SUBCUTANEOUS at 04:26

## 2023-01-17 RX ADMIN — FUROSEMIDE 80 MG: 40 TABLET ORAL at 08:10

## 2023-01-17 RX ADMIN — NIFEDIPINE 60 MG: 60 TABLET, FILM COATED, EXTENDED RELEASE ORAL at 08:10

## 2023-01-17 RX ADMIN — NIFEDIPINE 60 MG: 60 TABLET, FILM COATED, EXTENDED RELEASE ORAL at 20:36

## 2023-01-17 RX ADMIN — MELATONIN TAB 3 MG 3 MG: 3 TAB at 21:29

## 2023-01-17 NOTE — PROGRESS NOTES
IMPRESSION:   Acute hypoxic respiratory failure  COVID-19  Sepsis  CHF with fluid overload  Chronic kidney disease stage V on dialysis  Hypertension and diabetes by history  Hyponatremia resolved  Pt is critically ill. Time spent with pt and staff actively rendering care, managing pt and coordinating care as stated below; 30 minutes, exclusive of any procedures      RECOMMENDATIONS/PLAN:   ICU monitoring  59-year-old obese lady came in because of shortness of breath and dyspnea patient has COVID-19 also patient has history of chronic kidney disease on hemodialysis and she missed 2 dialysis before   Continue with dialysis she was getting it Monday Wednesday Friday we will repeat dialysis chest x-ray shows CHF fluid overload  She is now on 1 L nasal cannula  For COVID-19 patient on Decadron and received Actemra  CRP 24.7 procalcitonin 4.0 BNP 39760  CXR shows bilateral congestive changes superimposed infiltrate much improved  Will be available to assist in medical management while in the CCU pending disposition     [x] High complexity decision making was performed  [x] See my orders for details  HPI  59-year-old lady came in because of shortness of breath and dyspnea significant past medical history of chronic kidney disease stage V on hemodialysis hypertension CVA and diabetes mellitus. She went to [facility] and also she gets dialysis but she is to dialysis and then she was diagnosed with COVID-19 also having cough fever chills chest pain nausea and vomiting came to the emergency room she was put on high flow nasal cannula saturation was low BNP was elevated glucose was also elevated chest x-ray shows CHF fluid overload with superimposed possible COVID picture so admitted and critical care consult was called.     PMH:  has a past medical history of Allergic rhinitis, Blood clot in vein, Chronic kidney disease, CVA (cerebral vascular accident) ([facility] [address] (05/20/2014), Diabetes ([facility] [address] Dyslipidemia, Hypertension, IBS (irritable bowel syndrome), Ill-defined condition, and Renal failure. PSH:   has a past surgical history that includes hx cholecystectomy (); hx tonsillectomy (); hx  section (); pr unlisted procedure vascular surgery; and hx lap cholecystectomy. FHX: family history includes Diabetes in her father and mother; Heart Disease in her father and mother; Hypertension in her father and mother. SHX:  reports that she has never smoked. She has never used smokeless tobacco. She reports that she does not drink alcohol and does not use drugs.     ALL:   Allergies   Allergen Reactions    Fentanyl Anxiety    Sulfa (Sulfonamide Antibiotics) Nausea and Vomiting    Zithromax [Azithromycin] Unknown (comments)    Morphine Itching        MEDS:   [x] Reviewed - As Below   [] Not reviewed    Current Facility-Administered Medications   Medication    rivaroxaban (XARELTO) tablet 2.5 mg    epoetin jodie-epbx (RETACRIT) injection 4,000 Units    melatonin tablet 3 mg    hydrOXYzine pamoate (VISTARIL) capsule 25 mg    carvediloL (COREG) tablet 6.25 mg    NIFEdipine ER (PROCARDIA XL) tablet 60 mg    insulin lispro (HUMALOG) injection    phenol throat spray (CHLORASEPTIC) 1 Spray    sorbitoL 70 % solution 60 mL    insulin lispro (HUMALOG) injection 10 Units    atorvastatin (LIPITOR) tablet 20 mg    furosemide (LASIX) tablet 80 mg    gabapentin (NEURONTIN) capsule 300 mg    sevelamer carbonate (RENVELA) tab 800 mg    glucose chewable tablet 16 g    glucagon (GLUCAGEN) injection 1 mg    acetaminophen (TYLENOL) tablet 650 mg    Or    acetaminophen (TYLENOL) suppository 650 mg    polyethylene glycol (MIRALAX) packet 17 g    ondansetron (ZOFRAN ODT) tablet 4 mg    Or    ondansetron (ZOFRAN) injection 4 mg    pantoprazole (PROTONIX) tablet 20 mg    piperacillin-tazobactam (ZOSYN) 3.375 g in 0.9% sodium chloride (MBP/ADV) 100 mL MBP    insulin glargine (LANTUS) injection 40 Units    dexamethasone (DECADRON) 4 mg/mL injection 6 mg dextromethorphan (DELSYM) 30 mg/5 mL syrup 30 mg    benzonatate (TESSALON) capsule 100 mg    traMADoL (ULTRAM) tablet 50 mg      MAR reviewed and pertinent medications noted or modified as needed   Current Facility-Administered Medications   Medication    rivaroxaban (XARELTO) tablet 2.5 mg    epoetin jodie-epbx (RETACRIT) injection 4,000 Units    melatonin tablet 3 mg    hydrOXYzine pamoate (VISTARIL) capsule 25 mg    carvediloL (COREG) tablet 6.25 mg    NIFEdipine ER (PROCARDIA XL) tablet 60 mg    insulin lispro (HUMALOG) injection    phenol throat spray (CHLORASEPTIC) 1 Spray    sorbitoL 70 % solution 60 mL    insulin lispro (HUMALOG) injection 10 Units    atorvastatin (LIPITOR) tablet 20 mg    furosemide (LASIX) tablet 80 mg    gabapentin (NEURONTIN) capsule 300 mg    sevelamer carbonate (RENVELA) tab 800 mg    glucose chewable tablet 16 g    glucagon (GLUCAGEN) injection 1 mg    acetaminophen (TYLENOL) tablet 650 mg    Or    acetaminophen (TYLENOL) suppository 650 mg    polyethylene glycol (MIRALAX) packet 17 g    ondansetron (ZOFRAN ODT) tablet 4 mg    Or    ondansetron (ZOFRAN) injection 4 mg    pantoprazole (PROTONIX) tablet 20 mg    piperacillin-tazobactam (ZOSYN) 3.375 g in 0.9% sodium chloride (MBP/ADV) 100 mL MBP    insulin glargine (LANTUS) injection 40 Units    dexamethasone (DECADRON) 4 mg/mL injection 6 mg    dextromethorphan (DELSYM) 30 mg/5 mL syrup 30 mg    benzonatate (TESSALON) capsule 100 mg    traMADoL (ULTRAM) tablet 50 mg      PMH:  has a past medical history of Allergic rhinitis, Blood clot in vein, Chronic kidney disease, CVA (cerebral vascular accident) ([facility] [address] (2014), Diabetes ([facility] [address] Dyslipidemia, Hypertension, IBS (irritable bowel syndrome), Ill-defined condition, and Renal failure. PSH:   has a past surgical history that includes hx cholecystectomy (); hx tonsillectomy (); hx  section (); pr unlisted procedure vascular surgery; and hx lap cholecystectomy.    FHX: family history includes Diabetes in her father and mother; Heart Disease in her father and mother; Hypertension in her father and mother. SHX:  reports that she has never smoked. She has never used smokeless tobacco. She reports that she does not drink alcohol and does not use drugs. ROS:A comprehensive review of systems was negative except for that written in the HPI. Hemodynamics:    CO:    CI:    CVP:    SVR:   PAP Systolic:    PAP Diastolic:    PVR:    PV17:        Ventilator Settings:      Mode Rate TV Press PEEP FiO2 PIP Min. Vent               40 %              Vital Signs: Telemetry:    normal sinus rhythm Intake/Output:   Visit Vitals  BP (!) 149/77 (BP 1 Location: Right lower arm, BP Patient Position: At rest)   Pulse 64   Temp 97.7 °F (36.5 °C)   Resp 12   Ht 5' 8\" (1.727 m)   Wt 127.1 kg (280 lb 3.3 oz)   SpO2 100%   BMI 42.60 kg/m²       Temp (24hrs), Av.7 °F (36.5 °C), Min:97.7 °F (36.5 °C), Max:97.7 °F (36.5 °C)        O2 Device: Nasal cannula O2 Flow Rate (L/min): 1 l/min       Wt Readings from Last 4 Encounters:   23 127.1 kg (280 lb 3.3 oz)   09/15/22 117.9 kg (260 lb)   22 122.5 kg (270 lb)   22 128.2 kg (282 lb 10.1 oz)          Intake/Output Summary (Last 24 hours) at 2023 0845  Last data filed at 2023 0634  Gross per 24 hour   Intake 450 ml   Output 3500 ml   Net -3050 ml         Last shift:      No intake/output data recorded. Last 3 shifts: 01/15 1901 -  0700  In: 1150 [P.O.:850; I.V.:300]  Out: 3500        Physical Exam:     General: HFNC; alert awake  HEENT: NCAT, poor dentition, lips and mucosa dry  Eyes: anicteric; conjunctiva clear  Neck: no nodes,  trach midline; no accessory MM use. Chest: no deformity,   Cardiac: IR regular; no murmur;   Lungs: distant breath sounds; bilateral rales  Abd: soft, NT, hypoactive BS  Ext: no edema; no joint swelling;  No clubbing  : NO odom, clear urine  Neuro: Alert awake  Psych- no agitation, oriented to person;   Skin: warm, dry, no cyanosis;   Pulses: 1-2+ Bilateral pedal, radial  Capillary: brisk; pale      DATA:    MAR reviewed and pertinent medications noted or modified as needed  MEDS:   Current Facility-Administered Medications   Medication    rivaroxaban (XARELTO) tablet 2.5 mg    epoetin jodie-epbx (RETACRIT) injection 4,000 Units    melatonin tablet 3 mg    hydrOXYzine pamoate (VISTARIL) capsule 25 mg    carvediloL (COREG) tablet 6.25 mg    NIFEdipine ER (PROCARDIA XL) tablet 60 mg    insulin lispro (HUMALOG) injection    phenol throat spray (CHLORASEPTIC) 1 Spray    sorbitoL 70 % solution 60 mL    insulin lispro (HUMALOG) injection 10 Units    atorvastatin (LIPITOR) tablet 20 mg    furosemide (LASIX) tablet 80 mg    gabapentin (NEURONTIN) capsule 300 mg    sevelamer carbonate (RENVELA) tab 800 mg    glucose chewable tablet 16 g    glucagon (GLUCAGEN) injection 1 mg    acetaminophen (TYLENOL) tablet 650 mg    Or    acetaminophen (TYLENOL) suppository 650 mg    polyethylene glycol (MIRALAX) packet 17 g    ondansetron (ZOFRAN ODT) tablet 4 mg    Or    ondansetron (ZOFRAN) injection 4 mg    pantoprazole (PROTONIX) tablet 20 mg    piperacillin-tazobactam (ZOSYN) 3.375 g in 0.9% sodium chloride (MBP/ADV) 100 mL MBP    insulin glargine (LANTUS) injection 40 Units    dexamethasone (DECADRON) 4 mg/mL injection 6 mg    dextromethorphan (DELSYM) 30 mg/5 mL syrup 30 mg    benzonatate (TESSALON) capsule 100 mg    traMADoL (ULTRAM) tablet 50 mg        Labs:    Recent Labs     01/17/23 0412 01/16/23  0600 01/15/23  0530   WBC 18.2* 15.8* 13.9*   HGB 10.2* 9.8* 9.9*    404* 396       Recent Labs     01/17/23 0412 01/16/23  0600 01/15/23  0530    135* 134*   K 3.9 4.1 4.2    100 99   CO2 28 26 27   * 164* 202*   BUN 58* 76* 53*   CREA 3.74* 4.91* 4.57*   CA 8.1* 8.2* 8.2*   PHOS  --  4.3  --    ALB 2.5* 2.6* 2.4*   ALT 10* 8* 8*       Recent Labs     01/16/23  0605 01/15/23  0539   PH 7.46* 7.37   PCO2 37 50*   PO2 131* 102*   HCO3 26 28*   FIO2 24.0 24.0       No results for input(s): CPK, CKNDX, TROIQ in the last 72 hours. No lab exists for component: CPKMB  No results found for: BNPP, BNP   Lab Results   Component Value Date/Time    Culture result: No growth 5 days 01/12/2023 10:11 AM     No results found for: TSH, TSHEXT, TSHEXT     Imaging:    Results from Hospital Encounter encounter on 01/12/23    XR CHEST PORT    Narrative  INDICATION: chf    EXAMINATION:  AP CHEST, PORTABLE    COMPARISON: 1/15/2020    FINDINGS: Single AP portable view of the chest demonstrates diffuse bilateral  airspace disease. The cardiomediastinal silhouette is unchanged. Probable left  pleural effusion. No pneumothorax. Impression  No significant change. Results from [place] encounter on 08/09/22    CT ABD PELV WO CONT    Narrative  EXAM: CT ABD PELV WO CONT    INDICATION: left flank pain    COMPARISON: 6/29/2022    IV CONTRAST: None. ORAL CONTRAST:    TECHNIQUE:  Thin axial images were obtained through the abdomen and pelvis. Coronal and  sagittal reformats were generated. CT dose reduction was achieved through use of  a standardized protocol tailored for this examination and automatic exposure  control for dose modulation. The absence of intravenous contrast material reduces the sensitivity for  evaluation of the vasculature and solid organs. FINDINGS:  LOWER THORAX: Pericardial effusion is stable. Cardiomegaly is stable there is  minor left basilar atelectasis. LIVER: No mass. BILIARY TREE: Gallbladder is within normal limits. CBD is not dilated. SPLEEN: within normal limits. PANCREAS: No focal abnormality. ADRENALS: Unremarkable. KIDNEYS/URETERS: No calculus or hydronephrosis. STOMACH: Unremarkable. SMALL BOWEL: No dilatation or wall thickening. COLON: No dilatation or wall thickening. Sigmoid colon is tortuous.   APPENDIX: Normal  PERITONEUM: No ascites or pneumoperitoneum. RETROPERITONEUM: No lymphadenopathy or aortic aneurysm. There are extensive  atherosclerotic changes  REPRODUCTIVE ORGANS: Uterus is normal  URINARY BLADDER: Incompletely distended  BONES: No destructive bone lesion. ABDOMINAL WALL: There is a small umbilical hernia containing fat  ADDITIONAL COMMENTS: There is increased intra-abdominal fat    Impression  No acute abnormality identified. There has been no significant change. There are  extensive atherosclerotic changes.     1/14 on high flow 45% FiO2 85 flow at 40 L ABG acceptable we will decrease FiO2 PO2 is 142, chest x-ray shows improvement in the pulmonary edema she received Actemra yesterday we will continue to wean for hemodialysis today  1/15 on mid flow tolerating well improving PCO2 elevated will try to wean her to regular nasal cannula chest x-ray still shows congestive changes most likely secondary to COVID continue with the dialysis, continue with Lasix sore throat improved  1/16 condition improving continue with Decadron and Zosyn and dialysis chest x-ray improved continue to wean oxygen per protocol  1/17 condition much improved on 1 L nasal cannula continue with dialysis and Decadron

## 2023-01-17 NOTE — PROGRESS NOTES
General Daily Progress Note          Patient Name:   Cooper Santo       YOB: 1975       Age:  52 y.o. Admit Date: 1/12/2023      Subjective:     Patient is a 52y.o. year old female with past medical history of CVA, diabetes, CKD stage 5,on HD  hypertension, and cardiomegaly who presented to the ED today with shortness of breath. Patient states that over the last few days she has been feeling very sick and because of that she could only get 2 hours of dialysis done on Monday and 30 minutes yesterday. And then today she started feeling very short of breath and called EMS. On arrival to the ED patient was noted to be hypoxic in the 60s. She was placed on non-breather with improvement of oxygenation. Pt was tested positive for COVID test done in the ER. This is her first time getting diagnosed with COVID. She also has chills, cough and chest pain associated with it, nausea, and vomiting. She is currently on high flow oxygen. CBC shows leukocytosis 14,400, BNP 27,904, lactic acid 2.4, sodium 130, chloride 94, troponin 55. Glucose 372. Chest x ray done in the ED shows:  1. Slightly asymmetric CHF pattern is suspected. Alternatively, correlate  clinically for bilateral infection, right greater than left. .  .  2. Stable cardiomegaly. 3. Technically compromised by patient size and position. A standard 2 view  examination would be more useful when clinically possible. 1/14  Patient resting in the bed alert awake on high flow 40% O2  Blood sugar running high due to steroids  Still complaining of cough congestion    1/15  Patient alert awake not in distress breathing much better  Now on nasal cannula 1.5 L     1/16  Patient seen and examined in ICU today. She is resting in bed, alert and awake. Patient still complains of cough and congestion but sore throat is getting better. Currently on 1L of nasal cannula.  Pt on schedule for dialysis today (M/W/F schedule)    Lab shows wbc 15.8, sodium 135.   CXR done today shows no significant change from prior. 1/17  Patient resting comfortably in bed. She is alert and awake. Patient received dialysis yesterday and removed 3.5L fluid. Still complains of cough and headache. She is currently on 1L of oxygen via nasal cannula.  Denies any other complaints        Objective:     Visit Vitals  BP (!) 149/77 (BP 1 Location: Right lower arm, BP Patient Position: At rest)   Pulse 64   Temp 97.7 °F (36.5 °C)   Resp 12   Ht 5' 8\" (1.727 m)   Wt 127.1 kg (280 lb 3.3 oz)   SpO2 100%   BMI 42.60 kg/m²        Recent Results (from the past 24 hour(s))   GLUCOSE, POC    Collection Time: 01/16/23 11:49 AM   Result Value Ref Range    Glucose (POC) 167 (H) 65 - 100 mg/dL    Performed by [facility]    GLUCOSE, POC    Collection Time: 01/16/23  3:55 PM   Result Value Ref Range    Glucose (POC) 111 (H) 65 - 100 mg/dL    Performed by [employer]    GLUCOSE, POC    Collection Time: 01/16/23  7:56 PM   Result Value Ref Range    Glucose (POC) 182 (H) 65 - 100 mg/dL    Performed by [employer], POC    Collection Time: 01/16/23 11:51 PM   Result Value Ref Range    Glucose (POC) 263 (H) 65 - 100 mg/dL    Performed by [address], POC    Collection Time: 01/17/23  4:11 AM   Result Value Ref Range    Glucose (POC) 184 (H) 65 - 100 mg/dL    Performed by Bailey Gotti    CBC W/O DIFF    Collection Time: 01/17/23  4:12 AM   Result Value Ref Range    WBC 18.2 (H) 3.6 - 11.0 K/uL    RBC 3.49 (L) 3.80 - 5.20 M/uL    HGB 10.2 (L) 11.5 - 16.0 g/dL    HCT 31.6 (L) 35.0 - 47.0 %    MCV 90.5 80.0 - 99.0 FL    MCH 29.2 26.0 - 34.0 PG    MCHC 32.3 30.0 - 36.5 g/dL    RDW 12.9 11.5 - 14.5 %    PLATELET 513 735 - 157 K/uL    MPV 9.9 8.9 - 12.9 FL    NRBC 0.0 0.0  WBC    ABSOLUTE NRBC 0.00 0.00 - 6.88 K/uL   METABOLIC PANEL, COMPREHENSIVE    Collection Time: 01/17/23  4:12 AM   Result Value Ref Range    Sodium 136 136 - 145 mmol/L    Potassium 3.9 3.5 - 5.1 mmol/L    Chloride 100 97 - 108 mmol/L    CO2 28 21 - 32 mmol/L    Anion gap 8 5 - 15 mmol/L    Glucose 188 (H) 65 - 100 mg/dL    BUN 58 (H) 6 - 20 mg/dL    Creatinine 3.74 (H) 0.55 - 1.02 mg/dL    BUN/Creatinine ratio 16 12 - 20      eGFR 14 (L) >60 ml/min/1.73m2    Calcium 8.1 (L) 8.5 - 10.1 mg/dL    Bilirubin, total 0.2 0.2 - 1.0 mg/dL    AST (SGOT) 10 (L) 15 - 37 U/L    ALT (SGPT) 10 (L) 12 - 78 U/L    Alk. phosphatase 61 45 - 117 U/L    Protein, total 6.5 6.4 - 8.2 g/dL    Albumin 2.5 (L) 3.5 - 5.0 g/dL    Globulin 4.0 2.0 - 4.0 g/dL    A-G Ratio 0.6 (L) 1.1 - 2.2     GLUCOSE, POC    Collection Time: 01/17/23  7:56 AM   Result Value Ref Range    Glucose (POC) 128 (H) 65 - 100 mg/dL    Performed by Karlee Schaeffer      [unfilled]      Review of Systems    Constitutional: Negative for chills and fever. HENT: Negative. Eyes: Negative. Respiratory: Negative. Cardiovascular: Negative. Gastrointestinal: Negative for abdominal pain and nausea. Skin: Negative. Neurological: Negative. Physical Exam:      Constitutional: pt is oriented to person, place, and time. HENT:   Head: Normocephalic and atraumatic. Eyes: Pupils are equal, round, and reactive to light. EOM are normal.   Cardiovascular: Normal rate, regular rhythm and normal heart sounds. Pulmonary/Chest: Breath sounds normal. No wheezes. No rales. Exhibits no tenderness. Abdominal: Soft. Bowel sounds are normal. There is no abdominal tenderness. There is no rebound and no guarding. Musculoskeletal: Normal range of motion. Neurological: pt is alert and oriented to person, place, and time. XR CHEST PORT   Final Result   No significant change. XR CHEST PORT   Final Result   No definite change in cardiomegaly and pulmonary edema with technical factors as   above. XR CHEST PORT   Final Result   Slight improvement in pulmonary edema.       XR CHEST PORT   Final Result   Cardiomegaly and diffuse airspace disease similar to prior study. XR CHEST SNGL V   Final Result      1. Slightly asymmetric CHF pattern is suspected. Alternatively, correlate   clinically for bilateral infection, right greater than left. .  .   2. Stable cardiomegaly. 3. Technically compromised by patient size and position. A standard 2 view   examination would be more useful when clinically possible.               Recent Results (from the past 24 hour(s))   GLUCOSE, POC    Collection Time: 01/16/23 11:49 AM   Result Value Ref Range    Glucose (POC) 167 (H) 65 - 100 mg/dL    Performed by Ethyl Billing    GLUCOSE, POC    Collection Time: 01/16/23  3:55 PM   Result Value Ref Range    Glucose (POC) 111 (H) 65 - 100 mg/dL    Performed by Ethyl Billing    GLUCOSE, POC    Collection Time: 01/16/23  7:56 PM   Result Value Ref Range    Glucose (POC) 182 (H) 65 - 100 mg/dL    Performed by [address], POC    Collection Time: 01/16/23 11:51 PM   Result Value Ref Range    Glucose (POC) 263 (H) 65 - 100 mg/dL    Performed by [address], POC    Collection Time: 01/17/23  4:11 AM   Result Value Ref Range    Glucose (POC) 184 (H) 65 - 100 mg/dL    Performed by Raulito Cleary    CBC W/O DIFF    Collection Time: 01/17/23  4:12 AM   Result Value Ref Range    WBC 18.2 (H) 3.6 - 11.0 K/uL    RBC 3.49 (L) 3.80 - 5.20 M/uL    HGB 10.2 (L) 11.5 - 16.0 g/dL    HCT 31.6 (L) 35.0 - 47.0 %    MCV 90.5 80.0 - 99.0 FL    MCH 29.2 26.0 - 34.0 PG    MCHC 32.3 30.0 - 36.5 g/dL    RDW 12.9 11.5 - 14.5 %    PLATELET 558 015 - 464 K/uL    MPV 9.9 8.9 - 12.9 FL    NRBC 0.0 0.0  WBC    ABSOLUTE NRBC 0.00 0.00 - 1.74 K/uL   METABOLIC PANEL, COMPREHENSIVE    Collection Time: 01/17/23  4:12 AM   Result Value Ref Range    Sodium 136 136 - 145 mmol/L    Potassium 3.9 3.5 - 5.1 mmol/L    Chloride 100 97 - 108 mmol/L    CO2 28 21 - 32 mmol/L    Anion gap 8 5 - 15 mmol/L    Glucose 188 (H) 65 - 100 mg/dL    BUN 58 (H) 6 - 20 mg/dL    Creatinine 3.74 (H) 0.55 - 1.02 mg/dL    BUN/Creatinine ratio 16 12 - 20      eGFR 14 (L) >60 ml/min/1.73m2    Calcium 8.1 (L) 8.5 - 10.1 mg/dL    Bilirubin, total 0.2 0.2 - 1.0 mg/dL    AST (SGOT) 10 (L) 15 - 37 U/L    ALT (SGPT) 10 (L) 12 - 78 U/L    Alk. phosphatase 61 45 - 117 U/L    Protein, total 6.5 6.4 - 8.2 g/dL    Albumin 2.5 (L) 3.5 - 5.0 g/dL    Globulin 4.0 2.0 - 4.0 g/dL    A-G Ratio 0.6 (L) 1.1 - 2.2     GLUCOSE, POC    Collection Time: 01/17/23  7:56 AM   Result Value Ref Range    Glucose (POC) 128 (H) 65 - 100 mg/dL    Performed by Godfrey Fernández        Results       Procedure Component Value Units Date/Time    MRSA SCREEN - PCR (NASAL) [[id number]] Collected: 01/12/23 1951    Order Status: Completed Specimen: Swab Updated: 01/12/23 2148     MRSA by PCR, Nasal Not Detected       CULTURE, BLOOD #1 [[id number]] Collected: 01/12/23 1415    Order Status: Canceled Specimen: Blood     CULTURE, BLOOD #2 [[id number]] Collected: 01/12/23 1415    Order Status: Canceled Specimen: Blood     CULTURE, BLOOD, PAIRED [[id number]] Collected: 01/12/23 1011    Order Status: Completed Specimen: Blood Updated: 01/17/23 0706     Special Requests: No Special Requests        Culture result: No growth 5 days       INFLUENZA A & B AG (RAPID TEST) [[id number]] Collected: 01/12/23 1010    Order Status: Completed Specimen: Nasopharyngeal from Nasal washing Updated: 01/12/23 1036     Influenza A Antigen Negative        Influenza B Antigen Negative       COVID-19 RAPID TEST [[id number]]  (Abnormal) Collected: 01/12/23 1010    Order Status: Completed Specimen: Nasopharyngeal Updated: 01/12/23 1047     COVID-19 rapid test DETECTED        Comment: Rapid Abbott ID Now   The specimen is POSITIVE for SARS-CoV-2, the novel coronavirus associated with COVID-19. This test has been authorized by the FDA under an Emergency Use Authorization (EUA) for use by authorized laboratories.    Fact sheet for Healthcare Providers:  http://www.[place].[employer]/ Fact sheet for Patients: http://www.[place].[employer]ius/   Methodology: Isothermal Nucleic Acid Amplification Results verified, phoned to and read back by BRANDI MENA RN ON   1/12/2023 @1045/MAB                  Labs:     Recent Labs     01/17/23 0412 01/16/23  0600   WBC 18.2* 15.8*   HGB 10.2* 9.8*   HCT 31.6* 29.9*    404*       Recent Labs     01/17/23 0412 01/16/23  0600 01/15/23  0530    135* 134*   K 3.9 4.1 4.2    100 99   CO2 28 26 27   BUN 58* 76* 53*   CREA 3.74* 4.91* 4.57*   * 164* 202*   CA 8.1* 8.2* 8.2*   PHOS  --  4.3  --        Recent Labs     01/17/23 0412 01/16/23  0600 01/15/23  0530   ALT 10* 8* 8*   AP 61 59 64   TBILI 0.2 0.3 0.3   TP 6.5 6.9 7.0   ALB 2.5* 2.6* 2.4*   GLOB 4.0 4.3* 4.6*       No results for input(s): INR, PTP, APTT, INREXT, INREXT in the last 72 hours. Recent Labs     01/15/23  0530   FERR 698*        No results found for: FOL, RBCF     Recent Labs     01/16/23  0605 01/15/23  0539   PH 7.46* 7.37   PCO2 37 50*   PO2 131* 102*       No results for input(s): CPK, CKNDX, TROIQ in the last 72 hours.     No lab exists for component: CPKMB  No results found for: CHOL, CHOLX, CHLST, CHOLV, HDL, HDLP, LDL, LDLC, DLDLP, TGLX, TRIGL, TRIGP, CHHD, CHHDX  Lab Results   Component Value Date/Time    Glucose (POC) 128 (H) 01/17/2023 07:56 AM    Glucose (POC) 184 (H) 01/17/2023 04:11 AM    Glucose (POC) 263 (H) 01/16/2023 11:51 PM    Glucose (POC) 182 (H) 01/16/2023 07:56 PM    Glucose (POC) 111 (H) 01/16/2023 03:55 PM     Lab Results   Component Value Date/Time    Color Dark Yellow 01/13/2023 01:20 PM    Appearance Turbid (A) 01/13/2023 01:20 PM    Specific gravity 1.019 01/13/2023 01:20 PM    pH (UA) 5.0 01/13/2023 01:20 PM    Protein Negative 01/13/2023 01:20 PM    Glucose 50 (A) 01/13/2023 01:20 PM    Ketone Negative 01/13/2023 01:20 PM    Bilirubin Negative 01/13/2023 01:20 PM    Urobilinogen 0.1 01/13/2023 01:20 PM    Nitrites Negative 01/13/2023 01:20 PM    Leukocyte Esterase Large (A) 01/13/2023 01:20 PM    Epithelial cells Many (A) 01/13/2023 01:20 PM    Bacteria Negative 01/13/2023 01:20 PM    Bacteria Negative 01/13/2023 01:20 PM    WBC 10-20 01/13/2023 01:20 PM    WBC 10-20 01/13/2023 01:20 PM    RBC 0-5 01/13/2023 01:20 PM    RBC 0-5 01/13/2023 01:20 PM         Assessment:     Acute hypoxic respiratory failure on 1 L nasal cannula  COVID-19 pneumonitis  Leukocytosis secondary to corticosteroids  Acute congestive heart failure from fluid overload  Hyperglycemia secondary to steroids  History of type 2 diabetes  End-stage renal disease on hemodialysis  Hypertension  History of CVA  Morbid obesity  Hyponatremia, resolved      Plan:     Lipitor 20 mg daily  Decadron 6 mg daily Lasix 80 mg daily  Lasix 80 mg daily  Gabapentin 300 mg at bedtime  Lantus 40 units subcu every 12 hours  Sliding scale insulin  Xarelto 2.5 mg daily  Protonix 20 daily    Repeat the labs continue dialysis 3 times a week    Waiting for bed placement in medical floor          Current Facility-Administered Medications:     rivaroxaban (XARELTO) tablet 2.5 mg, 2.5 mg, Oral, BID, Justin Brito MD, 2.5 mg at 01/17/23 0810    epoetin jodie-epbx (RETACRIT) injection 4,000 Units, 4,000 Units, IntraVENous, Q MON, WED & Prema Rodriguez MD, 4,000 Units at 01/16/23 2146    melatonin tablet 3 mg, 3 mg, Oral, QHS, Ansley Nickerson MD, 3 mg at 01/16/23 2146    hydrOXYzine pamoate (VISTARIL) capsule 25 mg, 25 mg, Oral, Q4H PRN, Ansley Nickerson MD    carvediloL (COREG) tablet 6.25 mg, 6.25 mg, Oral, BID WITH MEALS, Justin Brito MD, 6.25 mg at 01/17/23 0809    NIFEdipine ER (PROCARDIA XL) tablet 60 mg, 60 mg, Oral, BID, Justin Brito MD, 60 mg at 01/17/23 0810    insulin lispro (HUMALOG) injection, , SubCUTAneous, Q4H, Justin Brito MD, 3 Units at 01/17/23 0426    phenol throat spray (CHLORASEPTIC) 1 Spray, 1 Spray, Oral, PRN, Jero Alfaro MD sorbitoL 70 % solution 60 mL, 60 mL, Oral, DAILY PRN, Papa Atkinson MD, 60 mL at 01/13/23 1535    insulin lispro (HUMALOG) injection 10 Units, 10 Units, SubCUTAneous, TIDAC, Justin Brito MD, 10 Units at 01/17/23 0809    atorvastatin (LIPITOR) tablet 20 mg, 20 mg, Oral, DAILY, Justin Brito MD, 20 mg at 01/17/23 0810    furosemide (LASIX) tablet 80 mg, 80 mg, Oral, DAILY, Justin Brito MD, 80 mg at 01/17/23 0810    gabapentin (NEURONTIN) capsule 300 mg, 300 mg, Oral, QHS, Justin Brito MD, 300 mg at 01/16/23 2146    sevelamer carbonate (RENVELA) tab 800 mg, 800 mg, Oral, TID WITH MEALS, Jessica Brito MD, 800 mg at 01/17/23 0810    glucose chewable tablet 16 g, 4 Tablet, Oral, PRN, Jessica Brito MD    glucagon (GLUCAGEN) injection 1 mg, 1 mg, IntraMUSCular, PRN, Jessica Brito MD    acetaminophen (TYLENOL) tablet 650 mg, 650 mg, Oral, Q6H PRN **OR** acetaminophen (TYLENOL) suppository 650 mg, 650 mg, Rectal, Q6H PRN, Justin Brito MD    polyethylene glycol (MIRALAX) packet 17 g, 17 g, Oral, DAILY PRN, Justin Brito MD    ondansetron (ZOFRAN ODT) tablet 4 mg, 4 mg, Oral, Q8H PRN **OR** ondansetron (ZOFRAN) injection 4 mg, 4 mg, IntraVENous, Q6H PRN, Justin Brito MD, 4 mg at 01/15/23 0603    pantoprazole (PROTONIX) tablet 20 mg, 20 mg, Oral, DAILY, Justin Brito MD, 20 mg at 01/16/23 0856    [COMPLETED] piperacillin-tazobactam (ZOSYN) 4.5 g in 0.9% sodium chloride (MBP/ADV) 100 mL MBP, 4.5 g, IntraVENous, ONCE, Last Rate: 200 mL/hr at 01/12/23 1725, 4.5 g at 01/12/23 1725 **FOLLOWED BY** piperacillin-tazobactam (ZOSYN) 3.375 g in 0.9% sodium chloride (MBP/ADV) 100 mL MBP, 3.375 g, IntraVENous, Q12H, Justin Brito MD, Last Rate: 25 mL/hr at 01/16/23 2350, 3.375 g at 01/16/23 2350    insulin glargine (LANTUS) injection 40 Units, 40 Units, SubCUTAneous, Q12H, Justin Brito MD, 40 Units at 01/17/23 0808    dexamethasone (DECADRON) 4 mg/mL injection 6 mg, 6 mg, IntraVENous, DAILY, Justin Brito MD, 6 mg at 01/16/23 0857    dextromethorphan (DELSYM) 30 mg/5 mL syrup 30 mg, 30 mg, Oral, Q12H, Ansley Nickerson MD, 30 mg at 01/17/23 4338    benzonatate (TESSALON) capsule 100 mg, 100 mg, Oral, TID PRN, Ansley Nickerson MD    traMADoL (ULTRAM) tablet 50 mg, 50 mg, Oral, Q6H PRN, Justin Brito MD, 50 mg at 01/14/23 3722

## 2023-01-17 NOTE — PROGRESS NOTES
Renal Daily Progress Note    Admit Date: 1/12/2023      Subjective:   She dialyzed yesterday with 3.5 kg being removed. Blood pressures are better. White count 18.2 today.       Current Facility-Administered Medications   Medication Dose Route Frequency    rivaroxaban (XARELTO) tablet 2.5 mg  2.5 mg Oral BID    epoetin jodie-epbx (RETACRIT) injection 4,000 Units  4,000 Units IntraVENous Q MON, WED & FRI    melatonin tablet 3 mg  3 mg Oral QHS    hydrOXYzine pamoate (VISTARIL) capsule 25 mg  25 mg Oral Q4H PRN    carvediloL (COREG) tablet 6.25 mg  6.25 mg Oral BID WITH MEALS    NIFEdipine ER (PROCARDIA XL) tablet 60 mg  60 mg Oral BID    insulin lispro (HUMALOG) injection   SubCUTAneous Q4H    phenol throat spray (CHLORASEPTIC) 1 Spray  1 Spray Oral PRN    sorbitoL 70 % solution 60 mL  60 mL Oral DAILY PRN    insulin lispro (HUMALOG) injection 10 Units  10 Units SubCUTAneous TIDAC    atorvastatin (LIPITOR) tablet 20 mg  20 mg Oral DAILY    furosemide (LASIX) tablet 80 mg  80 mg Oral DAILY    gabapentin (NEURONTIN) capsule 300 mg  300 mg Oral QHS    sevelamer carbonate (RENVELA) tab 800 mg  800 mg Oral TID WITH MEALS    glucose chewable tablet 16 g  4 Tablet Oral PRN    glucagon (GLUCAGEN) injection 1 mg  1 mg IntraMUSCular PRN    acetaminophen (TYLENOL) tablet 650 mg  650 mg Oral Q6H PRN    Or    acetaminophen (TYLENOL) suppository 650 mg  650 mg Rectal Q6H PRN    polyethylene glycol (MIRALAX) packet 17 g  17 g Oral DAILY PRN    ondansetron (ZOFRAN ODT) tablet 4 mg  4 mg Oral Q8H PRN    Or    ondansetron (ZOFRAN) injection 4 mg  4 mg IntraVENous Q6H PRN    pantoprazole (PROTONIX) tablet 20 mg  20 mg Oral DAILY    piperacillin-tazobactam (ZOSYN) 3.375 g in 0.9% sodium chloride (MBP/ADV) 100 mL MBP  3.375 g IntraVENous Q12H    insulin glargine (LANTUS) injection 40 Units  40 Units SubCUTAneous Q12H    dexamethasone (DECADRON) 4 mg/mL injection 6 mg  6 mg IntraVENous DAILY    dextromethorphan (DELSYM) 30 mg/5 mL syrup 30 mg  30 mg Oral Q12H    benzonatate (TESSALON) capsule 100 mg  100 mg Oral TID PRN    traMADoL (ULTRAM) tablet 50 mg  50 mg Oral Q6H PRN        Review of Systems    Review of Systems   Respiratory:  Negative for cough and shortness of breath. Cardiovascular:  Negative for chest pain. Gastrointestinal:  Negative for abdominal pain. Neurological:  Negative for headaches. Objective:     No data found. No intake/output data recorded. 01/15 1901 - 01/17 0700  In: 1150 [P.O.:850; I.V.:300]  Out: 3500   Not examined today   Physical Exam:   Physical Exam  Cardiovascular:      Rate and Rhythm: Regular rhythm. Pulmonary:      Breath sounds: Normal breath sounds. Abdominal:      General: Bowel sounds are normal.      Palpations: Abdomen is soft. Neurological:      Mental Status: She is alert. No edema  Left upper arm AV fistula patent        XR CHEST PORT   Final Result   No significant change. XR CHEST PORT   Final Result   No definite change in cardiomegaly and pulmonary edema with technical factors as   above. XR CHEST PORT   Final Result   Slight improvement in pulmonary edema. XR CHEST PORT   Final Result   Cardiomegaly and diffuse airspace disease similar to prior study. XR CHEST SNGL V   Final Result      1. Slightly asymmetric CHF pattern is suspected. Alternatively, correlate   clinically for bilateral infection, right greater than left. .  .   2. Stable cardiomegaly. 3. Technically compromised by patient size and position. A standard 2 view   examination would be more useful when clinically possible.               Data Review   Recent Labs     01/17/23  0412 01/16/23  0600 01/15/23  0530   WBC 18.2* 15.8* 13.9*   HGB 10.2* 9.8* 9.9*   HCT 31.6* 29.9* 30.7*    404* 396       Recent Labs     01/17/23  0412 01/16/23  0600 01/15/23  0530    135* 134*   K 3.9 4.1 4.2    100 99   CO2 28 26 27   * 164* 202*   BUN 58* 76* 53*   CREA 3.74* 4.91* 4.57* CA 8.1* 8.2* 8.2*   PHOS  --  4.3  --    ALB 2.5* 2.6* 2.4*   ALT 10* 8* 8*       No components found for: Nichol Robertson     01/16/23  0605 01/15/23  0539   PH 7.46* 7.37   PCO2 37 50*   PO2 131* 102*   HCO3 26 28*   FIO2 24.0 24.0       No results for input(s): INR, INREXT, INREXT, INREXT in the last 72 hours. Assessment:          Active Problems:    Hypoxia (1/12/2023)      Fluid overload (1/12/2023)      COVID-19 (1/12/2023)      Acute hypoxemic respiratory failure ([address] (1/12/2023)  ESRD hemodialysis dependent. She normally dialyzes on a Monday Wednesday Friday schedule as an outpatient. Hypertension    COVID-19 infection    Secondary hyperparathyroidism    Anemia of chronic disease on Retacrit    Hyponatremia. Her sodium is normal at 136 mEq. Diabetes mellitus- not controlled due to steroids    Plan: For dialysis tomorrow. Will attempt 3 kg fluid removal on dialysis again tomorrow  Decrease Retacrit dose.

## 2023-01-17 NOTE — PROGRESS NOTES
General Daily Progress Note          Patient Name:   Lesly Arnold       YOB: 1975       Age:  52 y.o. Admit Date: 1/12/2023      Subjective:     Patient is a 52y.o. year old female with past medical history of CVA, diabetes, CKD stage 5,on HD  hypertension, and cardiomegaly who presented to the ED today with shortness of breath. Patient states that over the last few days she has been feeling very sick and because of that she could only get 2 hours of dialysis done on Monday and 30 minutes yesterday. And then today she started feeling very short of breath and called EMS. On arrival to the ED patient was noted to be hypoxic in the 60s. She was placed on non-breather with improvement of oxygenation. Pt was tested positive for COVID test done in the ER. This is her first time getting diagnosed with COVID. She also has chills, cough and chest pain associated with it, nausea, and vomiting. She is currently on high flow oxygen. CBC shows leukocytosis 14,400, BNP 27,904, lactic acid 2.4, sodium 130, chloride 94, troponin 55. Glucose 372. Chest x ray done in the ED shows:  1. Slightly asymmetric CHF pattern is suspected. Alternatively, correlate  clinically for bilateral infection, right greater than left. .  .  2. Stable cardiomegaly. 3. Technically compromised by patient size and position. A standard 2 view  examination would be more useful when clinically possible. 1/14  Patient resting in the bed alert awake on high flow 40% O2  Blood sugar running high due to steroids  Still complaining of cough congestion    1/15  Patient alert awake not in distress breathing much better  Now on nasal cannula 1.5 L     1/16  Patient seen and examined in ICU today. She is resting in bed, alert and awake. Patient still complains of cough and congestion but sore throat is getting better. Currently on 1L of nasal cannula.  Pt on schedule for dialysis today (M/W/F schedule)    Lab shows wbc 15.8, sodium 135.   CXR done today shows no significant change from prior. 1/17  Patient resting comfortably in bed. She is alert and awake. Patient received dialysis yesterday and removed 3.5L fluid. Still complains of cough and headache. She is currently on 1L of oxygen via nasal cannula.  Denies any other complaints        Objective:     Visit Vitals  BP (!) 149/77 (BP 1 Location: Right lower arm, BP Patient Position: At rest)   Pulse 64   Temp 97.7 °F (36.5 °C)   Resp 12   Ht 5' 8\" (1.727 m)   Wt 127.1 kg (280 lb 3.3 oz)   SpO2 100%   BMI 42.60 kg/m²        Recent Results (from the past 24 hour(s))   GLUCOSE, POC    Collection Time: 01/16/23  3:55 PM   Result Value Ref Range    Glucose (POC) 111 (H) 65 - 100 mg/dL    Performed by Bronson Palomino    GLUCOSE, POC    Collection Time: 01/16/23  7:56 PM   Result Value Ref Range    Glucose (POC) 182 (H) 65 - 100 mg/dL    Performed by Metsa 36, POC    Collection Time: 01/16/23 11:51 PM   Result Value Ref Range    Glucose (POC) 263 (H) 65 - 100 mg/dL    Performed by Metsa 36, POC    Collection Time: 01/17/23  4:11 AM   Result Value Ref Range    Glucose (POC) 184 (H) 65 - 100 mg/dL    Performed by Kirsten Anglin    CBC W/O DIFF    Collection Time: 01/17/23  4:12 AM   Result Value Ref Range    WBC 18.2 (H) 3.6 - 11.0 K/uL    RBC 3.49 (L) 3.80 - 5.20 M/uL    HGB 10.2 (L) 11.5 - 16.0 g/dL    HCT 31.6 (L) 35.0 - 47.0 %    MCV 90.5 80.0 - 99.0 FL    MCH 29.2 26.0 - 34.0 PG    MCHC 32.3 30.0 - 36.5 g/dL    RDW 12.9 11.5 - 14.5 %    PLATELET 655 700 - 771 K/uL    MPV 9.9 8.9 - 12.9 FL    NRBC 0.0 0.0  WBC    ABSOLUTE NRBC 0.00 0.00 - 2.79 K/uL   METABOLIC PANEL, COMPREHENSIVE    Collection Time: 01/17/23  4:12 AM   Result Value Ref Range    Sodium 136 136 - 145 mmol/L    Potassium 3.9 3.5 - 5.1 mmol/L    Chloride 100 97 - 108 mmol/L    CO2 28 21 - 32 mmol/L    Anion gap 8 5 - 15 mmol/L    Glucose 188 (H) 65 - 100 mg/dL    BUN 58 (H) 6 - 20 mg/dL Creatinine 3.74 (H) 0.55 - 1.02 mg/dL    BUN/Creatinine ratio 16 12 - 20      eGFR 14 (L) >60 ml/min/1.73m2    Calcium 8.1 (L) 8.5 - 10.1 mg/dL    Bilirubin, total 0.2 0.2 - 1.0 mg/dL    AST (SGOT) 10 (L) 15 - 37 U/L    ALT (SGPT) 10 (L) 12 - 78 U/L    Alk. phosphatase 61 45 - 117 U/L    Protein, total 6.5 6.4 - 8.2 g/dL    Albumin 2.5 (L) 3.5 - 5.0 g/dL    Globulin 4.0 2.0 - 4.0 g/dL    A-G Ratio 0.6 (L) 1.1 - 2.2     GLUCOSE, POC    Collection Time: 01/17/23  7:56 AM   Result Value Ref Range    Glucose (POC) 128 (H) 65 - 100 mg/dL    Performed by Perfecto Collet    GLUCOSE, POC    Collection Time: 01/17/23 11:24 AM   Result Value Ref Range    Glucose (POC) 192 (H) 65 - 100 mg/dL    Performed by Perfecto Collet      [unfilled]      Review of Systems    Constitutional: Negative for chills and fever. HENT: Negative. Eyes: Negative. Respiratory: Negative. Cardiovascular: Negative. Gastrointestinal: Negative for abdominal pain and nausea. Skin: Negative. Neurological: Negative. Physical Exam:      Constitutional: pt is oriented to person, place, and time. HENT:   Head: Normocephalic and atraumatic. Eyes: Pupils are equal, round, and reactive to light. EOM are normal.   Cardiovascular: Normal rate, regular rhythm and normal heart sounds. Pulmonary/Chest: Breath sounds normal. No wheezes. No rales. Exhibits no tenderness. Abdominal: Soft. Bowel sounds are normal. There is no abdominal tenderness. There is no rebound and no guarding. Musculoskeletal: Normal range of motion. Neurological: pt is alert and oriented to person, place, and time. XR CHEST PORT   Final Result   No significant change. XR CHEST PORT   Final Result   No definite change in cardiomegaly and pulmonary edema with technical factors as   above. XR CHEST PORT   Final Result   Slight improvement in pulmonary edema.       XR CHEST PORT   Final Result   Cardiomegaly and diffuse airspace disease similar to prior study. XR CHEST SNGL V   Final Result      1. Slightly asymmetric CHF pattern is suspected. Alternatively, correlate   clinically for bilateral infection, right greater than left. .  .   2. Stable cardiomegaly. 3. Technically compromised by patient size and position. A standard 2 view   examination would be more useful when clinically possible.               Recent Results (from the past 24 hour(s))   GLUCOSE, POC    Collection Time: 01/16/23  3:55 PM   Result Value Ref Range    Glucose (POC) 111 (H) 65 - 100 mg/dL    Performed by Ashley Maldonado    GLUCOSE, POC    Collection Time: 01/16/23  7:56 PM   Result Value Ref Range    Glucose (POC) 182 (H) 65 - 100 mg/dL    Performed by Metsa 36, POC    Collection Time: 01/16/23 11:51 PM   Result Value Ref Range    Glucose (POC) 263 (H) 65 - 100 mg/dL    Performed by Metsa 36, POC    Collection Time: 01/17/23  4:11 AM   Result Value Ref Range    Glucose (POC) 184 (H) 65 - 100 mg/dL    Performed by Gay Cogan    CBC W/O DIFF    Collection Time: 01/17/23  4:12 AM   Result Value Ref Range    WBC 18.2 (H) 3.6 - 11.0 K/uL    RBC 3.49 (L) 3.80 - 5.20 M/uL    HGB 10.2 (L) 11.5 - 16.0 g/dL    HCT 31.6 (L) 35.0 - 47.0 %    MCV 90.5 80.0 - 99.0 FL    MCH 29.2 26.0 - 34.0 PG    MCHC 32.3 30.0 - 36.5 g/dL    RDW 12.9 11.5 - 14.5 %    PLATELET 687 229 - 632 K/uL    MPV 9.9 8.9 - 12.9 FL    NRBC 0.0 0.0  WBC    ABSOLUTE NRBC 0.00 0.00 - 3.46 K/uL   METABOLIC PANEL, COMPREHENSIVE    Collection Time: 01/17/23  4:12 AM   Result Value Ref Range    Sodium 136 136 - 145 mmol/L    Potassium 3.9 3.5 - 5.1 mmol/L    Chloride 100 97 - 108 mmol/L    CO2 28 21 - 32 mmol/L    Anion gap 8 5 - 15 mmol/L    Glucose 188 (H) 65 - 100 mg/dL    BUN 58 (H) 6 - 20 mg/dL    Creatinine 3.74 (H) 0.55 - 1.02 mg/dL    BUN/Creatinine ratio 16 12 - 20      eGFR 14 (L) >60 ml/min/1.73m2    Calcium 8.1 (L) 8.5 - 10.1 mg/dL    Bilirubin, total 0.2 0.2 - 1.0 mg/dL    AST (SGOT) 10 (L) 15 - 37 U/L    ALT (SGPT) 10 (L) 12 - 78 U/L    Alk. phosphatase 61 45 - 117 U/L    Protein, total 6.5 6.4 - 8.2 g/dL    Albumin 2.5 (L) 3.5 - 5.0 g/dL    Globulin 4.0 2.0 - 4.0 g/dL    A-G Ratio 0.6 (L) 1.1 - 2.2     GLUCOSE, POC    Collection Time: 01/17/23  7:56 AM   Result Value Ref Range    Glucose (POC) 128 (H) 65 - 100 mg/dL    Performed by Perfecto Colleloree    GLUCOSE, POC    Collection Time: 01/17/23 11:24 AM   Result Value Ref Range    Glucose (POC) 192 (H) 65 - 100 mg/dL    Performed by Perfecto Colleloree        Results       Procedure Component Value Units Date/Time    MRSA SCREEN - PCR (NASAL) [[id number]] Collected: 01/12/23 1951    Order Status: Completed Specimen: Swab Updated: 01/12/23 2148     MRSA by PCR, Nasal Not Detected       CULTURE, BLOOD #1 [[id number]] Collected: 01/12/23 1415    Order Status: Canceled Specimen: Blood     CULTURE, BLOOD #2 [[id number]] Collected: 01/12/23 1415    Order Status: Canceled Specimen: Blood     CULTURE, BLOOD, PAIRED [[id number]] Collected: 01/12/23 1011    Order Status: Completed Specimen: Blood Updated: 01/17/23 0706     Special Requests: No Special Requests        Culture result: No growth 5 days       INFLUENZA A & B AG (RAPID TEST) [[id number]] Collected: 01/12/23 1010    Order Status: Completed Specimen: Nasopharyngeal from Nasal washing Updated: 01/12/23 1036     Influenza A Antigen Negative        Influenza B Antigen Negative       COVID-19 RAPID TEST [[id number]]  (Abnormal) Collected: 01/12/23 1010    Order Status: Completed Specimen: Nasopharyngeal Updated: 01/12/23 1047     COVID-19 rapid test DETECTED        Comment: Rapid Abbott ID Now   The specimen is POSITIVE for SARS-CoV-2, the novel coronavirus associated with COVID-19. This test has been authorized by the FDA under an Emergency Use Authorization (EUA) for use by authorized laboratories.    Fact sheet for Healthcare Providers:  http://www.[place]ius/ Fact sheet for Patients: RED INNOVA.co.za   Methodology: Isothermal Nucleic Acid Amplification Results verified, phoned to and read back by BRANDI MENA RN ON   1/12/2023 @1045/MAB                  Labs:     Recent Labs     01/17/23  0412 01/16/23  0600   WBC 18.2* 15.8*   HGB 10.2* 9.8*   HCT 31.6* 29.9*    404*       Recent Labs     01/17/23  0412 01/16/23  0600 01/15/23  0530    135* 134*   K 3.9 4.1 4.2    100 99   CO2 28 26 27   BUN 58* 76* 53*   CREA 3.74* 4.91* 4.57*   * 164* 202*   CA 8.1* 8.2* 8.2*   PHOS  --  4.3  --        Recent Labs     01/17/23  0412 01/16/23  0600 01/15/23  0530   ALT 10* 8* 8*   AP 61 59 64   TBILI 0.2 0.3 0.3   TP 6.5 6.9 7.0   ALB 2.5* 2.6* 2.4*   GLOB 4.0 4.3* 4.6*       No results for input(s): INR, PTP, APTT, INREXT, INREXT in the last 72 hours. Recent Labs     01/15/23  0530   FERR 698*        No results found for: FOL, RBCF     Recent Labs     01/16/23  0605 01/15/23  0539   PH 7.46* 7.37   PCO2 37 50*   PO2 131* 102*       No results for input(s): CPK, CKNDX, TROIQ in the last 72 hours.     No lab exists for component: CPKMB  No results found for: CHOL, CHOLX, CHLST, CHOLV, HDL, HDLP, LDL, LDLC, DLDLP, TGLX, TRIGL, TRIGP, CHHD, CHHDX  Lab Results   Component Value Date/Time    Glucose (POC) 192 (H) 01/17/2023 11:24 AM    Glucose (POC) 128 (H) 01/17/2023 07:56 AM    Glucose (POC) 184 (H) 01/17/2023 04:11 AM    Glucose (POC) 263 (H) 01/16/2023 11:51 PM    Glucose (POC) 182 (H) 01/16/2023 07:56 PM     Lab Results   Component Value Date/Time    Color Dark Yellow 01/13/2023 01:20 PM    Appearance Turbid (A) 01/13/2023 01:20 PM    Specific gravity 1.019 01/13/2023 01:20 PM    pH (UA) 5.0 01/13/2023 01:20 PM    Protein Negative 01/13/2023 01:20 PM    Glucose 50 (A) 01/13/2023 01:20 PM    Ketone Negative 01/13/2023 01:20 PM    Bilirubin Negative 01/13/2023 01:20 PM    Urobilinogen 0.1 01/13/2023 01:20 PM    Nitrites Negative 01/13/2023 01:20 PM    Leukocyte Esterase Large (A) 01/13/2023 01:20 PM    Epithelial cells Many (A) 01/13/2023 01:20 PM    Bacteria Negative 01/13/2023 01:20 PM    Bacteria Negative 01/13/2023 01:20 PM    WBC 10-20 01/13/2023 01:20 PM    WBC 10-20 01/13/2023 01:20 PM    RBC 0-5 01/13/2023 01:20 PM    RBC 0-5 01/13/2023 01:20 PM         Assessment:     Acute hypoxic respiratory failure on 1 L nasal cannula  COVID-19 pneumonitis  Leukocytosis secondary to corticosteroids  Acute congestive heart failure from fluid overload  Hyperglycemia secondary to steroids  History of type 2 diabetes  End-stage renal disease on hemodialysis  Hypertension  History of CVA  Morbid obesity  Hyponatremia, resolved      Plan:     Lipitor 20 mg daily  Decadron 6 mg daily Lasix 80 mg daily  Lasix 80 mg daily  Gabapentin 300 mg at bedtime  Lantus 40 units subcu every 12 hours  Sliding scale insulin  Xarelto 2.5 mg daily  Protonix 20 daily    Repeat the labs continue dialysis 3 times a week    Waiting for bed placement in medical floor    Will do respiratory consult for resting and ambulatory pulse ox for discharge planning    Possible discharge in next 24 hours          Current Facility-Administered Medications:     rivaroxaban (XARELTO) tablet 2.5 mg, 2.5 mg, Oral, BID, Justin Brito MD, 2.5 mg at 01/17/23 0810    epoetin jodie-epbx (RETACRIT) injection 4,000 Units, 4,000 Units, IntraVENous, Q MON, WED & Kerline Leon MD, 4,000 Units at 01/16/23 2146    melatonin tablet 3 mg, 3 mg, Oral, QHS, Ansley Nickerson MD, 3 mg at 01/16/23 2146    hydrOXYzine pamoate (VISTARIL) capsule 25 mg, 25 mg, Oral, Q4H PRN, Ansley Nickerson MD    carvediloL (COREG) tablet 6.25 mg, 6.25 mg, Oral, BID WITH MEALS, Justin Brito MD, 6.25 mg at 01/17/23 0809    NIFEdipine ER (PROCARDIA XL) tablet 60 mg, 60 mg, Oral, BID, Justin Brito MD, 60 mg at 01/17/23 0810    insulin lispro (HUMALOG) injection, , SubCUTAneous, Q4H, Mohiuddin, Dorcus Kehr, MD, 3 Units at 01/17/23 1133    phenol throat spray (CHLORASEPTIC) 1 Spray, 1 Spray, Oral, PRN, Ansley Nickerson MD    sorbitoL 70 % solution 60 mL, 60 mL, Oral, DAILY PRN, Papa Atkinson MD, 60 mL at 01/13/23 1535    insulin lispro (HUMALOG) injection 10 Units, 10 Units, SubCUTAneous, TIDAC, Justin Brito MD, 10 Units at 01/17/23 0809    atorvastatin (LIPITOR) tablet 20 mg, 20 mg, Oral, DAILY, Justin Brito MD, 20 mg at 01/17/23 0810    furosemide (LASIX) tablet 80 mg, 80 mg, Oral, DAILY, Justin Brito MD, 80 mg at 01/17/23 0810    gabapentin (NEURONTIN) capsule 300 mg, 300 mg, Oral, QHS, Justin Brito MD, 300 mg at 01/16/23 2146    sevelamer carbonate (RENVELA) tab 800 mg, 800 mg, Oral, TID WITH MEALS, Luca Brito MD, 800 mg at 01/17/23 1132    glucose chewable tablet 16 g, 4 Tablet, Oral, PRN, Luca Brito MD    glucagon (GLUCAGEN) injection 1 mg, 1 mg, IntraMUSCular, PRN, Luca Birto MD    acetaminophen (TYLENOL) tablet 650 mg, 650 mg, Oral, Q6H PRN, 650 mg at 01/17/23 0816 **OR** acetaminophen (TYLENOL) suppository 650 mg, 650 mg, Rectal, Q6H PRN, Justin Brito MD    polyethylene glycol (MIRALAX) packet 17 g, 17 g, Oral, DAILY PRN, Justin Brito MD    ondansetron (ZOFRAN ODT) tablet 4 mg, 4 mg, Oral, Q8H PRN **OR** ondansetron (ZOFRAN) injection 4 mg, 4 mg, IntraVENous, Q6H PRN, Justin Briot MD, 4 mg at 01/15/23 0603    pantoprazole (PROTONIX) tablet 20 mg, 20 mg, Oral, DAILY, Justin Brito MD, 20 mg at 01/17/23 0816    [COMPLETED] piperacillin-tazobactam (ZOSYN) 4.5 g in 0.9% sodium chloride (MBP/ADV) 100 mL MBP, 4.5 g, IntraVENous, ONCE, Last Rate: 200 mL/hr at 01/12/23 1725, 4.5 g at 01/12/23 1725 **FOLLOWED BY** piperacillin-tazobactam (ZOSYN) 3.375 g in 0.9% sodium chloride (MBP/ADV) 100 mL MBP, 3.375 g, IntraVENous, Q12H, Justin Brito MD, Last Rate: 25 mL/hr at 01/17/23 1132, 3.375 g at 01/17/23 1132    insulin glargine (LANTUS) injection 40 Units, 40 Units, SubCUTAneous, Q12H, Justin Brito MD, 40 Units at 01/17/23 0808    dexamethasone (DECADRON) 4 mg/mL injection 6 mg, 6 mg, IntraVENous, DAILY, Justin Brito MD, 6 mg at 01/17/23 0816    dextromethorphan (DELSYM) 30 mg/5 mL syrup 30 mg, 30 mg, Oral, Q12H, Ansley Nickerson MD, 30 mg at 01/17/23 4059    benzonatate (TESSALON) capsule 100 mg, 100 mg, Oral, TID PRN, Ansley Nickerson MD    traMADoL (ULTRAM) tablet 50 mg, 50 mg, Oral, Q6H PRN, Justin Brito MD, 50 mg at 01/14/23 1049

## 2023-01-17 NOTE — PROGRESS NOTES
Bedside shift change report given to Nicanor Ballard (oncoming nurse) by Jayne Noble (offgoing nurse). Report included the following information SBAR.

## 2023-01-17 NOTE — PROGRESS NOTES
Problem: Risk for Spread of Infection  Goal: Prevent transmission of infectious organism to others  Description: Prevent the transmission of infectious organisms to other patients, staff members, and visitors. Outcome: Progressing Towards Goal     Problem: Patient Education:  Go to Education Activity  Goal: Patient/Family Education  Outcome: Progressing Towards Goal     Problem: Airway Clearance - Ineffective  Goal: Achieve or maintain patent airway  Outcome: Progressing Towards Goal     Problem: Gas Exchange - Impaired  Goal: Absence of hypoxia  Outcome: Progressing Towards Goal  Goal: Promote optimal lung function  Outcome: Progressing Towards Goal     Problem: Breathing Pattern - Ineffective  Goal: Ability to achieve and maintain a regular respiratory rate  Outcome: Progressing Towards Goal     Problem:  Body Temperature -  Risk of, Imbalanced  Goal: Ability to maintain a body temperature within defined limits  Outcome: Progressing Towards Goal  Goal: Will regain or maintain usual level of consciousness  Outcome: Progressing Towards Goal  Goal: Complications related to the disease process, condition or treatment will be avoided or minimized  Outcome: Progressing Towards Goal     Problem: Isolation Precautions - Risk of Spread of Infection  Goal: Prevent transmission of infectious organism to others  Outcome: Progressing Towards Goal     Problem: Nutrition Deficits  Goal: Optimize nutrtional status  Outcome: Progressing Towards Goal     Problem: Risk for Fluid Volume Deficit  Goal: Maintain normal heart rhythm  Outcome: Progressing Towards Goal  Goal: Maintain absence of muscle cramping  Outcome: Progressing Towards Goal  Goal: Maintain normal serum potassium, sodium, calcium, phosphorus, and pH  Outcome: Progressing Towards Goal     Problem: Loneliness or Risk for Loneliness  Goal: Demonstrate positive use of time alone when socialization is not possible  Outcome: Progressing Towards Goal     Problem: Fatigue  Goal: Verbalize increase energy and improved vitality  Outcome: Progressing Towards Goal     Problem: Patient Education: Go to Patient Education Activity  Goal: Patient/Family Education  Outcome: Progressing Towards Goal     Problem: Falls - Risk of  Goal: *Absence of Falls  Description: Document Chai Reveal Fall Risk and appropriate interventions in the flowsheet. Outcome: Progressing Towards Goal  Note: Fall Risk Interventions:            Medication Interventions: Bed/chair exit alarm, Patient to call before getting OOB, Teach patient to arise slowly    Elimination Interventions: Bed/chair exit alarm, Call light in reach, Patient to call for help with toileting needs, Toileting schedule/hourly rounds    History of Falls Interventions: Bed/chair exit alarm         Problem: Patient Education: Go to Patient Education Activity  Goal: Patient/Family Education  Outcome: Progressing Towards Goal     Problem: Diabetes Self-Management  Goal: *Disease process and treatment process  Description: Define diabetes and identify own type of diabetes; list 3 options for treating diabetes. Outcome: Progressing Towards Goal  Goal: *Incorporating nutritional management into lifestyle  Description: Describe effect of type, amount and timing of food on blood glucose; list 3 methods for planning meals. Outcome: Progressing Towards Goal  Goal: *Incorporating physical activity into lifestyle  Description: State effect of exercise on blood glucose levels. Outcome: Progressing Towards Goal  Goal: *Developing strategies to promote health/change behavior  Description: Define the ABC's of diabetes; identify appropriate screenings, schedule and personal plan for screenings. Outcome: Progressing Towards Goal  Goal: *Using medications safely  Description: State effect of diabetes medications on diabetes; name diabetes medication taking, action and side effects.   Outcome: Progressing Towards Goal  Goal: *Monitoring blood glucose, interpreting and using results  Description: Identify recommended blood glucose targets  and personal targets. Outcome: Progressing Towards Goal  Goal: *Prevention, detection, treatment of acute complications  Description: List symptoms of hyper- and hypoglycemia; describe how to treat low blood sugar and actions for lowering  high blood glucose level. Outcome: Progressing Towards Goal  Goal: *Prevention, detection and treatment of chronic complications  Description: Define the natural course of diabetes and describe the relationship of blood glucose levels to long term complications of diabetes.   Outcome: Progressing Towards Goal  Goal: *Developing strategies to address psychosocial issues  Description: Describe feelings about living with diabetes; identify support needed and support network  Outcome: Progressing Towards Goal  Goal: *Insulin pump training  Outcome: Progressing Towards Goal  Goal: *Sick day guidelines  Outcome: Progressing Towards Goal  Goal: *Patient Specific Goal (EDIT GOAL, INSERT TEXT)  Outcome: Progressing Towards Goal     Problem: Patient Education: Go to Patient Education Activity  Goal: Patient/Family Education  Outcome: Progressing Towards Goal     Problem: Discharge Planning  Goal: *Discharge to safe environment  Outcome: Progressing Towards Goal  Goal: *Knowledge of medication management  Outcome: Progressing Towards Goal  Goal: *Knowledge of discharge instructions  Outcome: Progressing Towards Goal     Problem: Patient Education: Go to Patient Education Activity  Goal: Patient/Family Education  Outcome: Progressing Towards Goal

## 2023-01-17 NOTE — PROGRESS NOTES
Infectious Disease Progress Note           Subjective:   Pt seen and examined at bedside. Stable, denies new complaints, on acute events since last seen, decreasing O2 requirements, currently at 1L w O2 sat of 100 %   Objective:   Physical Exam:     Visit Vitals  BP (!) 151/71   Pulse 67   Temp 98.2 °F (36.8 °C)   Resp 19   Ht 5' 8\" (1.727 m)   Wt 280 lb 3.3 oz (127.1 kg)   SpO2 100%   BMI 42.60 kg/m²    O2 Flow Rate (L/min): 1 l/min O2 Device: Nasal cannula    Temp (24hrs), Av °F (36.7 °C), Min:97.7 °F (36.5 °C), Max:98.2 °F (36.8 °C)    No intake/output data recorded. 01/15 1901 -  0700  In: 1150 [P.O.:850;  I.V.:300]  Out: 3500     General: NAD, AAO x 4  HEENT: PHILIP, Moist mucosa  Lungs: CTA b/l, decreased at the bases, no whee/rhonchi   Heart: S1S2+, RRR, no murmur  Abdo: Soft, NT, ND, +BS   : No odom cath   Exts: Left arm AVF   Skin: No chronic wounds or ulcers     Data Review:       Recent Days:  Recent Labs     23  0412 23  0600 01/15/23  0530   WBC 18.2* 15.8* 13.9*   HGB 10.2* 9.8* 9.9*   HCT 31.6* 29.9* 30.7*    404* 396       Recent Labs     23  0412 23  0600 01/15/23  0530   BUN 58* 76* 53*   CREA 3.74* 4.91* 4.57*         Lab Results   Component Value Date/Time    C-Reactive protein 6.16 (H) 01/15/2023 05:30 AM        Microbiology     Results       Procedure Component Value Units Date/Time    MRSA SCREEN - PCR (NASAL) [[id number]] Collected: 23    Order Status: Completed Specimen: Swab Updated: 23     MRSA by PCR, Nasal Not Detected       CULTURE, BLOOD #1 [[id number]] Collected: 23 141    Order Status: Canceled Specimen: Blood     CULTURE, BLOOD #2 [[id number]] Collected: 23 141    Order Status: Canceled Specimen: Blood     CULTURE, BLOOD, PAIRED [[id number]] Collected: 23 1011    Order Status: Completed Specimen: Blood Updated: 23 0706     Special Requests: No Special Requests        Culture result: No growth 5 days       INFLUENZA A & B AG (RAPID TEST) [[id number]] Collected: 01/12/23 1010    Order Status: Completed Specimen: Nasopharyngeal from Nasal washing Updated: 01/12/23 1036     Influenza A Antigen Negative        Influenza B Antigen Negative       COVID-19 RAPID TEST [[id number]]  (Abnormal) Collected: 01/12/23 1010    Order Status: Completed Specimen: Nasopharyngeal Updated: 01/12/23 1047     COVID-19 rapid test DETECTED        Comment: Rapid Abbott ID Now   The specimen is POSITIVE for SARS-CoV-2, the novel coronavirus associated with COVID-19. This test has been authorized by the FDA under an Emergency Use Authorization (EUA) for use by authorized laboratories. Fact sheet for Healthcare Providers:  http://www.shaquille.cornelius/ Fact sheet for Patients: http://www.shaquille.cornelius/   Methodology: Isothermal Nucleic Acid Amplification Results verified, phoned to and read back by BRANDI MENA RN ON   1/12/2023 @[facility]                Diagnostics   CXR Results  (Last 48 hours)                 01/16/23 0332  XR CHEST PORT Final result    Impression:  No significant change. Narrative:  INDICATION: chf       EXAMINATION:  AP CHEST, PORTABLE       COMPARISON: 1/15/2020       FINDINGS: Single AP portable view of the chest demonstrates diffuse bilateral   airspace disease. The cardiomediastinal silhouette is unchanged. Probable left   pleural effusion. No pneumothorax.                     Assessment/Plan     Acute hypoxic respiratory failure due to COVID pneumonitis w superimposed CHF         Continued clinical improvement, maintaining O2 sats on 1L, clear lungs on exam         Ferritin 1,107 (01/13) down to 698 (01/16) LDH remains WNL        Afebril, moderate leukocytosis likely from steroid therapy         On day # 7 of Zosyn and decadron, S/p Actemra on 01/13        D/c Zosyn, start  on Augmentin for 3 more days, change steroid to oral         Wean off O2 to RA, routine labs and CXR in AM     2. H/o uncontrolled DM A1c of 10.1 in 06/2022, repeat on 01/12 is 9.1      Continue to monitor blood sugar while on steroid therapy     3. ESRD on HD, + left arm AVF    4.  Insomnia: continue symptomatic mgt     Adeola Prado MD    1/17/2023

## 2023-01-18 VITALS
HEART RATE: 64 BPM | RESPIRATION RATE: 14 BRPM | WEIGHT: 279.98 LBS | HEIGHT: 68 IN | TEMPERATURE: 98.2 F | DIASTOLIC BLOOD PRESSURE: 51 MMHG | OXYGEN SATURATION: 97 % | BODY MASS INDEX: 42.43 KG/M2 | SYSTOLIC BLOOD PRESSURE: 169 MMHG

## 2023-01-18 LAB
GLUCOSE BLD STRIP.AUTO-MCNC: 196 MG/DL (ref 65–100)
GLUCOSE BLD STRIP.AUTO-MCNC: 203 MG/DL (ref 65–100)
GLUCOSE BLD STRIP.AUTO-MCNC: 253 MG/DL (ref 65–100)
PERFORMED BY, TECHID: ABNORMAL

## 2023-01-18 PROCEDURE — 74011250637 HC RX REV CODE- 250/637: Performed by: INTERNAL MEDICINE

## 2023-01-18 PROCEDURE — 99232 SBSQ HOSP IP/OBS MODERATE 35: CPT | Performed by: INTERNAL MEDICINE

## 2023-01-18 PROCEDURE — 90935 HEMODIALYSIS ONE EVALUATION: CPT

## 2023-01-18 PROCEDURE — 74011250637 HC RX REV CODE- 250/637: Performed by: FAMILY MEDICINE

## 2023-01-18 PROCEDURE — 74011636637 HC RX REV CODE- 636/637: Performed by: FAMILY MEDICINE

## 2023-01-18 PROCEDURE — 82962 GLUCOSE BLOOD TEST: CPT

## 2023-01-18 PROCEDURE — 74011250636 HC RX REV CODE- 250/636: Performed by: INTERNAL MEDICINE

## 2023-01-18 RX ORDER — BENZONATATE 100 MG/1
100 CAPSULE ORAL
Qty: 30 CAPSULE | Refills: 0 | Status: SHIPPED | OUTPATIENT
Start: 2023-01-18 | End: 2023-01-25

## 2023-01-18 RX ORDER — DEXAMETHASONE 4 MG/1
TABLET ORAL
Qty: 10 TABLET | Refills: 0 | Status: SHIPPED | OUTPATIENT
Start: 2023-01-19

## 2023-01-18 RX ORDER — INSULIN GLARGINE 100 [IU]/ML
INJECTION, SOLUTION SUBCUTANEOUS
Qty: 1 ML | Refills: 0 | Status: SHIPPED | OUTPATIENT
Start: 2023-01-18

## 2023-01-18 RX ORDER — HYDROXYZINE PAMOATE 25 MG/1
25 CAPSULE ORAL
Qty: 30 CAPSULE | Refills: 0 | Status: SHIPPED | OUTPATIENT
Start: 2023-01-18 | End: 2023-01-21

## 2023-01-18 RX ORDER — AMOXICILLIN AND CLAVULANATE POTASSIUM 500; 125 MG/1; MG/1
1 TABLET, FILM COATED ORAL DAILY
Qty: 10 TABLET | Refills: 0 | Status: SHIPPED | OUTPATIENT
Start: 2023-01-18 | End: 2023-01-21

## 2023-01-18 RX ADMIN — Medication 30 MG: at 08:51

## 2023-01-18 RX ADMIN — INSULIN LISPRO 3 UNITS: 100 INJECTION, SOLUTION INTRAVENOUS; SUBCUTANEOUS at 04:36

## 2023-01-18 RX ADMIN — ATORVASTATIN CALCIUM 20 MG: 20 TABLET, FILM COATED ORAL at 08:52

## 2023-01-18 RX ADMIN — INSULIN LISPRO 10 UNITS: 100 INJECTION, SOLUTION INTRAVENOUS; SUBCUTANEOUS at 12:06

## 2023-01-18 RX ADMIN — ACETAMINOPHEN 650 MG: 325 TABLET ORAL at 12:09

## 2023-01-18 RX ADMIN — DEXAMETHASONE 4 MG: 4 TABLET ORAL at 08:53

## 2023-01-18 RX ADMIN — RIVAROXABAN 2.5 MG: 2.5 TABLET, FILM COATED ORAL at 11:13

## 2023-01-18 RX ADMIN — PANTOPRAZOLE SODIUM 20 MG: 20 TABLET, DELAYED RELEASE ORAL at 08:53

## 2023-01-18 RX ADMIN — INSULIN GLARGINE 40 UNITS: 100 INJECTION, SOLUTION SUBCUTANEOUS at 08:51

## 2023-01-18 RX ADMIN — INSULIN LISPRO 7 UNITS: 100 INJECTION, SOLUTION INTRAVENOUS; SUBCUTANEOUS at 08:53

## 2023-01-18 RX ADMIN — SEVELAMER CARBONATE 800 MG: 800 TABLET, FILM COATED ORAL at 08:52

## 2023-01-18 RX ADMIN — INSULIN LISPRO 10 UNITS: 100 INJECTION, SOLUTION INTRAVENOUS; SUBCUTANEOUS at 08:51

## 2023-01-18 RX ADMIN — SEVELAMER CARBONATE 800 MG: 800 TABLET, FILM COATED ORAL at 12:06

## 2023-01-18 RX ADMIN — AMOXICILLIN AND CLAVULANATE POTASSIUM 1 TABLET: 500; 125 TABLET, FILM COATED ORAL at 09:00

## 2023-01-18 RX ADMIN — INSULIN LISPRO 4 UNITS: 100 INJECTION, SOLUTION INTRAVENOUS; SUBCUTANEOUS at 12:06

## 2023-01-18 RX ADMIN — ACETAMINOPHEN 650 MG: 325 TABLET ORAL at 06:08

## 2023-01-18 NOTE — PROGRESS NOTES
Discharge order noted and acknowledged. Per consulting MD's, patient to have HD and walking SpO2 done prior to discharge. Contacted inpatient dialysis to coordinate time for HD, dialysis RN stated they would get her in soon, department to call back with time frame.

## 2023-01-18 NOTE — PROGRESS NOTES
Infectious Disease Progress Note           Subjective:   Assessed pt at bedside. Stable, denies new complaints, feels better, off supplemental O2. No acute events since last seen. Plans for d/c home today but pt claims he isnt ready for d/c home   Objective:   Physical Exam:     Visit Vitals  BP (!) 170/83   Pulse 64   Temp 98.2 °F (36.8 °C) (Oral)   Resp 15   Ht 5' 8\" (1.727 m)   Wt 279 lb 15.8 oz (127 kg)   SpO2 97%   BMI 42.57 kg/m²    O2 Flow Rate (L/min): 2 l/min O2 Device: None (Room air)    Temp (24hrs), Av °F (36.7 °C), Min:97.6 °F (36.4 °C), Max:98.2 °F (36.8 °C)    No intake/output data recorded.  1901 -  0700  In: Mauro Oliver [P.O.:1690;  I.V.:200]  Out: 200 [Urine:200]    General: NAD, AAO x 4  HEENT: PHILIP, Moist mucosa  Lungs: CTA b/l, decreased at the bases, no whee/rhonchi   Heart: S1S2+, RRR, no murmur  Abdo: Soft, NT, ND, +BS   : No odom cath   Exts: Left arm AVF   Skin: No chronic wounds or ulcers     Data Review:       Recent Days:  Recent Labs     23  0412 23  0600   WBC 18.2* 15.8*   HGB 10.2* 9.8*   HCT 31.6* 29.9*    404*       Recent Labs     23  0412 23  0600   BUN 58* 76*   CREA 3.74* 4.91*         Lab Results   Component Value Date/Time    C-Reactive protein 6.16 (H) 01/15/2023 05:30 AM        Microbiology     Results       Procedure Component Value Units Date/Time    MRSA SCREEN - PCR (NASAL) [[id number]] Collected: 23    Order Status: Completed Specimen: Swab Updated: 23     MRSA by PCR, Nasal Not Detected       CULTURE, BLOOD #1 [[id number]] Collected: 23 1415    Order Status: Canceled Specimen: Blood     CULTURE, BLOOD #2 [[id number]] Collected: 23 1415    Order Status: Canceled Specimen: Blood     CULTURE, BLOOD, PAIRED [[id number]] Collected: 23 1011    Order Status: Completed Specimen: Blood Updated: 23 0706     Special Requests: No Special Requests        Culture result: No growth 5 days       INFLUENZA A & B AG (RAPID TEST) [[id number]] Collected: 01/12/23 1010    Order Status: Completed Specimen: Nasopharyngeal from Nasal washing Updated: 01/12/23 1036     Influenza A Antigen Negative        Influenza B Antigen Negative       COVID-19 RAPID TEST [[id number]]  (Abnormal) Collected: 01/12/23 1010    Order Status: Completed Specimen: Nasopharyngeal Updated: 01/12/23 1047     COVID-19 rapid test DETECTED        Comment: Rapid Abbott ID Now   The specimen is POSITIVE for SARS-CoV-2, the novel coronavirus associated with COVID-19. This test has been authorized by the FDA under an Emergency Use Authorization (EUA) for use by authorized laboratories. Fact sheet for Healthcare Providers:  http://www.[place]ille.[place]ius/ Fact sheet for Patients: http://www.sha[place]ille.[place]ius/   Methodology: Isothermal Nucleic Acid Amplification Results verified, phoned to and read back by BRANDI MENA RN ON   1/12/2023 @[facility]                Diagnostics   CXR Results  (Last 48 hours)      None             Assessment/Plan     Acute hypoxic respiratory failure due to COVID pneumonitis w superimposed CHF         Clear lungs on exam, off supplemental O2        Ferritin 1,107 (01/13) down to 698 (01/16) LDH remains WNL        On day # 9 antibiotics and decadron, S/p Actemra on 01/13        No acute events since last seen. Routine labs in the morning if not discharged today        2. H/o uncontrolled DM A1c of 10.1 in 06/2022, repeat on 01/12 is 9.1    3. ESRD on HD, + left arm AVF,  no evidence of site infection     4.  Insomnia: Continue symptomatic mgt     Allan Walter MD    1/18/2023

## 2023-01-18 NOTE — PROGRESS NOTES
[facility] dialysis patient MWF. No PT/OT d/t patient ambulating w/o assistance. Spouse to transport home when stale for discharge.          Gabby Go, MSW

## 2023-01-18 NOTE — DISCHARGE INSTRUCTIONS
Discharge Instructions       PATIENT ID: Kelley Yeboah  MRN: [id number]   YOB: 1975    DATE OF ADMISSION: [unfilled]    DATE OF DISCHARGE: 1/18/2023    PRIMARY CARE PROVIDER: @PCP@     ATTENDING PHYSICIAN: [unfilled]  DISCHARGING PROVIDER: Erick Haji MD    To contact this individual call [phone] and ask the  to page. If unavailable ask to be transferred the Adult Hospitalist Department. DISCHARGE DIAGNOSES acute respiratory failure COVID-19    CONSULTATIONS: [unfilled]    PROCEDURES/SURGERIES: * No surgery found *    PENDING TEST RESULTS:   At the time of discharge the following test results are still pending: None    [facility]   [facility]     ADDITIONAL CARE RECOMMENDATIONS: Continue dialysis 3 times a week    DIET: Resume previous diet      ACTIVITY: Activity as tolerated    Wound care: Wound Care Order: submitted to [facility] Please view [email or web address]    EQUIPMENT needed: none      DISCHARGE MEDICATIONS:   See Medication Reconciliation Form    It is important that you take the medication exactly as they are prescribed. Keep your medication in the bottles provided by the pharmacist and keep a list of the medication names, dosages, and times to be taken in your wallet. Do not take other medications without consulting your doctor. NOTIFY YOUR PHYSICIAN FOR ANY OF THE FOLLOWING:   Fever over 101 degrees for 24 hours. Chest pain, shortness of breath, fever, chills, nausea, vomiting, diarrhea, change in mentation, falling, weakness, bleeding. Severe pain or pain not relieved by medications. Or, any other signs or symptoms that you may have questions about.       DISPOSITION:    Home With:   OT  PT  HH  RN       SNF/Inpatient Rehab/LTAC    Independent/assisted living    Hospice    Other:         PROBLEM LIST Updated:  Yes        Signed:   Erick Haji MD  1/18/2023  9:23 AM

## 2023-01-18 NOTE — DISCHARGE SUMMARY
Discharge Summary       PATIENT ID: Navarro Joy  MRN: [id number]   YOB: 1975    DATE OF ADMISSION: 1/12/2023  9:59 AM    DATE OF DISCHARGE:   PRIMARY CARE PROVIDER: Reuben Dickinson NP     ATTENDING PHYSICIAN: Dot Brito  DISCHARGING PROVIDER: Dot Brito      CONSULTATIONS: IP CONSULT TO NEPHROLOGY  IP CONSULT TO NEPHROLOGY  IP CONSULT TO INFECTIOUS DISEASES  IP CONSULT TO HOSPITALIST  IP CONSULT TO PULMONOLOGY    PROCEDURES/SURGERIES: * No surgery found *    ADMITTING DIAGNOSES:    Patient Active Problem List    Diagnosis Date Noted    Hypoxia 01/12/2023    Fluid overload 01/12/2023    COVID-19 01/12/2023    Acute hypoxemic respiratory failure ([address] 01/12/2023    Intractable nausea and vomiting 06/29/2022       DISCHARGE DIAGNOSES / PLAN:      Acute hypoxic respiratory failure resolved   COVID-19 pneumonitis  Leukocytosis secondary to corticosteroids  Acute congestive heart failure from fluid overload  Hyperglycemia secondary to steroids  History of type 2 diabetes  End-stage renal disease on hemodialysis  Hypertension  History of CVA  Morbid obesity  Hyponatremia, resolved         DISCHARGE MEDICATIONS:  Current Discharge Medication List        START taking these medications    Details   amoxicillin-clavulanate (AUGMENTIN) 500-125 mg per tablet Take 1 Tablet by mouth daily for 3 doses. Qty: 10 Tablet, Refills: 0  Start date: 1/18/2023, End date: 1/21/2023      benzonatate (TESSALON) 100 mg capsule Take 1 Capsule by mouth three (3) times daily as needed for Cough for up to 7 days. Qty: 30 Capsule, Refills: 0  Start date: 1/18/2023, End date: 1/25/2023      dexAMETHasone (DECADRON) 4 mg tablet 1 tablet daily  Qty: 10 Tablet, Refills: 0  Start date: 1/19/2023      hydrOXYzine pamoate (VISTARIL) 25 mg capsule Take 1 Capsule by mouth every four (4) hours as needed for Sleep or Anxiety for up to 3 days.   Qty: 30 Capsule, Refills: 0  Start date: 1/18/2023, End date: 1/21/2023      insulin glargine (LANTUS) 100 unit/mL injection Lantus 40 units subcu twice a day  Qty: 1 mL, Refills: 0  Start date: 1/18/2023           CONTINUE these medications which have NOT CHANGED    Details   rivaroxaban (XARELTO) 2.5 mg tablet Take 2.5 mg by mouth two (2) times a day. NIFEdipine ER (PROCARDIA XL) 60 mg ER tablet Take 60 mg by mouth daily. carvediloL (COREG) 12.5 mg tablet Take 0.5 Tablets by mouth two (2) times a day for 30 days. Qty: 30 Tablet, Refills: 0      ondansetron (ZOFRAN ODT) 4 mg disintegrating tablet Take 1 Tablet by mouth every eight (8) hours as needed for Nausea or Vomiting. Qty: 10 Tablet, Refills: 0      furosemide (LASIX) 80 mg tablet Take 80 mg by mouth daily. sevelamer carbonate (RENVELA) 800 mg tab tab Take 800 mg by mouth three (3) times daily (with meals). traMADoL (ULTRAM) 50 mg tablet Take 50 mg by mouth two (2) times daily as needed for Pain. atorvastatin (LIPITOR) 20 mg tablet Take 20 mg by mouth daily. gabapentin (NEURONTIN) 300 mg capsule Take 300 mg by mouth nightly. omeprazole (PRILOSEC) 20 mg capsule Take 20 mg by mouth daily. STOP taking these medications       Levemir FlexTouch U-100 Insuln 100 unit/mL (3 mL) inpn Comments:   Reason for Stopping:         hydrOXYzine HCL (ATARAX) 25 mg tablet Comments:   Reason for Stopping:         spironolactone (ALDACTONE) 50 mg tablet Comments:   Reason for Stopping:         dextroamphetamine-amphetamine (ADDERALL) 20 mg tablet Comments:   Reason for Stopping:                 NOTIFY YOUR PHYSICIAN FOR ANY OF THE FOLLOWING:   Fever over 101 degrees for 24 hours. Chest pain, shortness of breath, fever, chills, nausea, vomiting, diarrhea, change in mentation, falling, weakness, bleeding. Severe pain or pain not relieved by medications. Or, any other signs or symptoms that you may have questions about.     DISPOSITION:  x  Home With:   OT  PT  HH  RN       Long term SNF/Inpatient Rehab Independent/assisted living    Hospice    Other:       PATIENT CONDITION AT DISCHARGE: Stable      PHYSICAL EXAMINATION AT DISCHARGE:  General:          Alert, cooperative, no distress, appears stated age. HEENT:           Atraumatic, anicteric sclerae, pink conjunctivae                          No oral ulcers, mucosa moist, throat clear, dentition fair  Neck:               Supple, symmetrical  Lungs:             Clear to auscultation bilaterally. No Wheezing or Rhonchi. No rales. Chest wall:      No tenderness  No Accessory muscle use. Heart:              Regular  rhythm,  No  murmur   No edema  Abdomen:        Soft, non-tender. Not distended. Bowel sounds normal  Extremities:     No cyanosis. No clubbing,                            Skin turgor normal, Capillary refill normal  Skin:                Not pale. Not Jaundiced  No rashes   Psych:             Not anxious or agitated. Neurologic:      Alert, moves all extremities, answers questions appropriately and responds to commands     XR CHEST PORT   Final Result   No significant change. XR CHEST PORT   Final Result   No definite change in cardiomegaly and pulmonary edema with technical factors as   above. XR CHEST PORT   Final Result   Slight improvement in pulmonary edema. XR CHEST PORT   Final Result   Cardiomegaly and diffuse airspace disease similar to prior study. XR CHEST SNGL V   Final Result      1. Slightly asymmetric CHF pattern is suspected. Alternatively, correlate   clinically for bilateral infection, right greater than left. .  .   2. Stable cardiomegaly. 3. Technically compromised by patient size and position. A standard 2 view   examination would be more useful when clinically possible.               Recent Results (from the past 24 hour(s))   GLUCOSE, POC    Collection Time: 01/17/23 11:24 AM   Result Value Ref Range    Glucose (POC) 192 (H) 65 - 100 mg/dL    Performed by [address] [place] [address], POC    Collection Time: 01/17/23  4:09 PM   Result Value Ref Range    Glucose (POC) 205 (H) 65 - 100 mg/dL    Performed by Sandee Amaya    GLUCOSE, POC    Collection Time: 01/17/23  8:13 PM   Result Value Ref Range    Glucose (POC) 236 (H) 65 - 100 mg/dL    Performed by [address], POC    Collection Time: 01/17/23 11:47 PM   Result Value Ref Range    Glucose (POC) 254 (H) 65 - 100 mg/dL    Performed by Evelyn Dumontod, POC    Collection Time: 01/18/23  4:05 AM   Result Value Ref Range    Glucose (POC) 196 (H) 65 - 100 mg/dL    Performed by Carole Grullon    GLUCOSE, POC    Collection Time: 01/18/23  7:49 AM   Result Value Ref Range    Glucose (POC) 253 (H) 65 - 100 mg/dL    Performed by [address]:    Patient is a 52y.o. year old female with past medical history of CVA, diabetes, CKD stage 5,on HD  hypertension, and cardiomegaly who presented to the ED today with shortness of breath. Patient states that over the last few days she has been feeling very sick and because of that she could only get 2 hours of dialysis done on Monday and 30 minutes yesterday. And then today she started feeling very short of breath and called EMS. On arrival to the ED patient was noted to be hypoxic in the 60s. She was placed on non-breather with improvement of oxygenation. Pt was tested positive for COVID test done in the ER. This is her first time getting diagnosed with COVID. She also has chills, cough and chest pain associated with it, nausea, and vomiting. She is currently on high flow oxygen. CBC shows leukocytosis 14,400, BNP 27,904, lactic acid 2.4, sodium 130, chloride 94, troponin 55. Glucose 372. Chest x ray done in the ED shows:  1. Slightly asymmetric CHF pattern is suspected. Alternatively, correlate  clinically for bilateral infection, right greater than left. .  .  2. Stable cardiomegaly. 3. Technically compromised by patient size and position.  A standard 2 view  examination would be more useful when clinically possible. 1/14  Patient resting in the bed alert awake on high flow 40% O2  Blood sugar running high due to steroids  Still complaining of cough congestion     1/15  Patient alert awake not in distress breathing much better  Now on nasal cannula 1.5 L     1/16  Patient seen and examined in ICU today. She is resting in bed, alert and awake. Patient still complains of cough and congestion but sore throat is getting better. Currently on 1L of nasal cannula. Pt on schedule for dialysis today (M/W/F schedule)     Lab shows wbc 15.8, sodium 135. CXR done today shows no significant change from prior. 1/17  Patient resting comfortably in bed. She is alert and awake. Patient received dialysis yesterday and removed 3.5L fluid. Still complains of cough and headache. She is currently on 1L of oxygen via nasal cannula.  Denies any other complaints      1/18    Patient denies any chest pain shortness of breath nausea no vomiting patient off oxygen this morning walking in her room not any respiratory distress eating drinking fine  No fever no chills      Medication reconciliation done time discharge patient 35 minutes 50% time spent counseling and coordination of care    Discussed with the patient regarding medication    Follow-up with the PCP  Continue dialysis 3 times a week      Signed:   Johnnie Mariano MD  1/18/2023  9:23 AM

## 2023-01-18 NOTE — PROGRESS NOTES
Renal Daily Progress Note    Admit Date: 1/12/2023      Subjective:   She feels well today. Cough is better. No headache. No sore throat.       Current Facility-Administered Medications   Medication Dose Route Frequency    epoetin jodie-epbx (RETACRIT) injection 2,000 Units  2,000 Units IntraVENous Q MON, WED & FRI    amoxicillin-clavulanate (AUGMENTIN) 500-125 mg per tablet 1 Tablet  1 Tablet Oral DAILY    dexAMETHasone (DECADRON) tablet 4 mg  4 mg Oral DAILY    rivaroxaban (XARELTO) tablet 2.5 mg  2.5 mg Oral DAILY WITH BREAKFAST    melatonin tablet 3 mg  3 mg Oral QHS    hydrOXYzine pamoate (VISTARIL) capsule 25 mg  25 mg Oral Q4H PRN    carvediloL (COREG) tablet 6.25 mg  6.25 mg Oral BID WITH MEALS    NIFEdipine ER (PROCARDIA XL) tablet 60 mg  60 mg Oral BID    insulin lispro (HUMALOG) injection   SubCUTAneous Q4H    phenol throat spray (CHLORASEPTIC) 1 Spray  1 Spray Oral PRN    sorbitoL 70 % solution 60 mL  60 mL Oral DAILY PRN    insulin lispro (HUMALOG) injection 10 Units  10 Units SubCUTAneous TIDAC    atorvastatin (LIPITOR) tablet 20 mg  20 mg Oral DAILY    furosemide (LASIX) tablet 80 mg  80 mg Oral DAILY    gabapentin (NEURONTIN) capsule 300 mg  300 mg Oral QHS    sevelamer carbonate (RENVELA) tab 800 mg  800 mg Oral TID WITH MEALS    glucose chewable tablet 16 g  4 Tablet Oral PRN    glucagon (GLUCAGEN) injection 1 mg  1 mg IntraMUSCular PRN    acetaminophen (TYLENOL) tablet 650 mg  650 mg Oral Q6H PRN    Or    acetaminophen (TYLENOL) suppository 650 mg  650 mg Rectal Q6H PRN    polyethylene glycol (MIRALAX) packet 17 g  17 g Oral DAILY PRN    ondansetron (ZOFRAN ODT) tablet 4 mg  4 mg Oral Q8H PRN    Or    ondansetron (ZOFRAN) injection 4 mg  4 mg IntraVENous Q6H PRN    pantoprazole (PROTONIX) tablet 20 mg  20 mg Oral DAILY    insulin glargine (LANTUS) injection 40 Units  40 Units SubCUTAneous Q12H    dextromethorphan (DELSYM) 30 mg/5 mL syrup 30 mg  30 mg Oral Q12H    benzonatate (TESSALON) capsule 100 mg  100 mg Oral TID PRN    traMADoL (ULTRAM) tablet 50 mg  50 mg Oral Q6H PRN        Review of Systems    Review of Systems   Respiratory:  Negative for cough and shortness of breath. Cardiovascular:  Negative for chest pain. Gastrointestinal:  Negative for abdominal pain. Neurological:  Negative for headaches. Objective:     Patient Vitals for the past 8 hrs:   BP Temp Pulse Resp SpO2   01/18/23 1159 -- -- -- -- 97 %   01/18/23 1100 (!) 155/55 98.1 °F (36.7 °C) 73 8 98 %   01/18/23 0700 (!) 142/71 98.2 °F (36.8 °C) 71 22 100 %       No intake/output data recorded. 01/16 1901 - 01/18 0700  In: Donivan Patches [P.O.:1690; I.V.:200]  Out: 200 [Urine:200]  Not examined today   Physical Exam:   Physical Exam  Cardiovascular:      Rate and Rhythm: Regular rhythm. Pulmonary:      Breath sounds: Normal breath sounds. Abdominal:      General: Bowel sounds are normal.      Palpations: Abdomen is soft. Neurological:      Mental Status: She is alert. No edema  Left upper arm AV fistula patent        XR CHEST PORT   Final Result   No significant change. XR CHEST PORT   Final Result   No definite change in cardiomegaly and pulmonary edema with technical factors as   above. XR CHEST PORT   Final Result   Slight improvement in pulmonary edema. XR CHEST PORT   Final Result   Cardiomegaly and diffuse airspace disease similar to prior study. XR CHEST SNGL V   Final Result      1. Slightly asymmetric CHF pattern is suspected. Alternatively, correlate   clinically for bilateral infection, right greater than left. .  .   2. Stable cardiomegaly. 3. Technically compromised by patient size and position. A standard 2 view   examination would be more useful when clinically possible.               Data Review   Recent Labs     01/17/23 0412 01/16/23  0600   WBC 18.2* 15.8*   HGB 10.2* 9.8*   HCT 31.6* 29.9*    404*       Recent Labs     01/17/23 0412 01/16/23  0600    135*   K 3.9 4.1   CL 100 100   CO2 28 26   * 164*   BUN 58* 76*   CREA 3.74* 4.91*   CA 8.1* 8.2*   PHOS  --  4.3   ALB 2.5* 2.6*   ALT 10* 8*       No components found for: [place]  Recent Labs     01/16/23  0605   PH 7.46*   PCO2 37   PO2 131*   HCO3 26   FIO2 24.0       No results for input(s): INR, INREXT, INREXT, INREXT in the last 72 hours. Assessment:          Active Problems:    Hypoxia (1/12/2023)      Fluid overload (1/12/2023)      COVID-19 (1/12/2023)      Acute hypoxemic respiratory failure ([address] (1/12/2023)  ESRD hemodialysis dependent. She normally dialyzes on a Monday Wednesday Friday schedule as an outpatient. Hypertension under better control    COVID-19 infection    Secondary hyperparathyroidism    Anemia of chronic disease on Retacrit    Hyponatremia. Her sodium is normal at 136 mEq. Diabetes mellitus- not controlled due to steroids    Plan:   Hemodialysis today. She is stable from a renal standpoint to be discharged.

## 2023-01-18 NOTE — PROGRESS NOTES
Bedside shift change report given to Angelito Shah (oncoming nurse) by Virgen Pagan (offgoing nurse). Report included the following information SBAR.

## 2023-01-18 NOTE — PROGRESS NOTES
Patient return from HD, VSS, eating dinner, states she is ready to go home. Offered to give PM meds including accucheck and insulin, patient declines stating she will take when she gets home. Patient also states she will make follow up appointment. Addendum [phone] - patient given discharge instructions, opportunity for questions provided. All questions answered at this time. All IV's removed, patient escorted outside via wheelchair by PCT.

## 2023-01-20 LAB
BACTERIA SPEC CULT: NORMAL
SPECIAL REQUESTS,SREQ: NORMAL

## 2023-01-23 NOTE — PROGRESS NOTES
Physician Progress Note      Maria Alejandra Paz  CSN #:                  [id number]  :                       1975  ADMIT DATE:       2023 9:59 AM  DISCH DATE:        2023 6:15 PM  RESPONDING  PROVIDER #:        Pedrito Rodriguez MD          QUERY TEXT:    Pt admitted with COVID-19. Noted documentation of Sepsis on  to  by ordered Pulmonology consultant. If possible, please document in progress notes and discharge summary:    The medical record reflects the following:  Risk Factors: COVID 19, PNA, Acute Resp Failure, ESRD  Clinical Indicators: Pulmo PN: \"Impression: Sepsis\" Lac Acid 2.4, WBC 14.4> 18.2, RR 18-28  Treatment: Nephro, ID, and Pulmo consulted, IV Fluids, IV ABX, Lab monitoring. Thank you,  THI ValdezN, RN, [place], [facility] Specialist  [phone] or [email or web address]  Can also be reached on [employer]  Options provided:  -- Sepsis confirmed present on admission  -- Sepsis confirmed not present on admission  -- Sepsis ruled out  -- Other - I will add my own diagnosis  -- Disagree - Not applicable / Not valid  -- Disagree - Clinically unable to determine / Unknown  -- Refer to Clinical Documentation Reviewer    PROVIDER RESPONSE TEXT:    The diagnosis of Sepsis was confirmed as present on admission.     Query created by: Liliane Shen on 2023 9:45 AM      Electronically signed by:  Pedrito Rodriguez MD 2023 1:04 PM

## 2023-02-03 ENCOUNTER — TRANSCRIBE ORDER (OUTPATIENT)
Dept: SCHEDULING | Age: 48
End: 2023-02-03

## 2023-02-03 DIAGNOSIS — Z12.31 VISIT FOR SCREENING MAMMOGRAM: Primary | ICD-10-CM

## 2023-04-22 DIAGNOSIS — K31.84 GASTROPARESIS: Primary | ICD-10-CM

## 2023-04-22 DIAGNOSIS — Z12.31 VISIT FOR SCREENING MAMMOGRAM: Primary | ICD-10-CM

## 2023-05-18 RX ORDER — DEXAMETHASONE 4 MG/1
1 TABLET ORAL DAILY
COMMUNITY
Start: 2023-01-19 | End: 2023-06-09

## 2023-05-18 RX ORDER — TRAMADOL HYDROCHLORIDE 50 MG/1
50 TABLET ORAL 2 TIMES DAILY PRN
COMMUNITY
End: 2023-06-09

## 2023-05-18 RX ORDER — ONDANSETRON 4 MG/1
4 TABLET, ORALLY DISINTEGRATING ORAL EVERY 8 HOURS PRN
COMMUNITY
Start: 2022-06-29

## 2023-05-18 RX ORDER — ATORVASTATIN CALCIUM 20 MG/1
20 TABLET, FILM COATED ORAL DAILY
COMMUNITY

## 2023-05-18 RX ORDER — GABAPENTIN 300 MG/1
300 CAPSULE ORAL NIGHTLY
COMMUNITY

## 2023-05-18 RX ORDER — SEVELAMER CARBONATE 800 MG/1
800 TABLET, FILM COATED ORAL
Status: ON HOLD | COMMUNITY
Start: 2022-05-14 | End: 2023-06-14 | Stop reason: HOSPADM

## 2023-05-18 RX ORDER — FUROSEMIDE 80 MG
80 TABLET ORAL DAILY
COMMUNITY
Start: 2022-06-10

## 2023-05-18 RX ORDER — NIFEDIPINE 60 MG/1
60 TABLET, EXTENDED RELEASE ORAL 2 TIMES DAILY
Status: ON HOLD | COMMUNITY
End: 2023-06-14 | Stop reason: HOSPADM

## 2023-05-18 RX ORDER — OMEPRAZOLE 20 MG/1
20 CAPSULE, DELAYED RELEASE ORAL DAILY
COMMUNITY

## 2023-05-18 RX ORDER — INSULIN GLARGINE 100 [IU]/ML
INJECTION, SOLUTION SUBCUTANEOUS
COMMUNITY
Start: 2023-01-18 | End: 2023-06-09

## 2023-05-18 RX ORDER — CARVEDILOL 12.5 MG/1
6.25 TABLET ORAL 2 TIMES DAILY
Status: ON HOLD | COMMUNITY
Start: 2022-07-01 | End: 2023-06-14 | Stop reason: HOSPADM

## 2023-06-09 ENCOUNTER — APPOINTMENT (OUTPATIENT)
Facility: HOSPITAL | Age: 48
DRG: 291 | End: 2023-06-09
Payer: MEDICARE

## 2023-06-09 ENCOUNTER — HOSPITAL ENCOUNTER (INPATIENT)
Facility: HOSPITAL | Age: 48
LOS: 6 days | Discharge: HOME | DRG: 291 | End: 2023-06-15
Attending: EMERGENCY MEDICINE | Admitting: INTERNAL MEDICINE
Payer: MEDICARE

## 2023-06-09 DIAGNOSIS — R91.8 RIGHT LOWER LOBE PULMONARY INFILTRATE: ICD-10-CM

## 2023-06-09 DIAGNOSIS — R06.02 SHORTNESS OF BREATH: ICD-10-CM

## 2023-06-09 DIAGNOSIS — N18.6 ESRD (END STAGE RENAL DISEASE) (HCC): ICD-10-CM

## 2023-06-09 DIAGNOSIS — E87.70 HYPERVOLEMIA, UNSPECIFIED HYPERVOLEMIA TYPE: Primary | ICD-10-CM

## 2023-06-09 PROBLEM — E11.9 DIABETES (HCC): Status: ACTIVE | Noted: 2023-06-09

## 2023-06-09 PROBLEM — I10 HYPERTENSION: Status: ACTIVE | Noted: 2023-06-09

## 2023-06-09 PROBLEM — J96.91 RESPIRATORY FAILURE WITH HYPOXIA (HCC): Status: ACTIVE | Noted: 2023-01-12

## 2023-06-09 PROBLEM — D72.829 LEUKOCYTOSIS: Status: ACTIVE | Noted: 2023-06-09

## 2023-06-09 PROBLEM — R19.7 DIARRHEA: Status: ACTIVE | Noted: 2023-06-09

## 2023-06-09 PROBLEM — K58.9 IBS (IRRITABLE BOWEL SYNDROME): Status: ACTIVE | Noted: 2023-06-09

## 2023-06-09 PROBLEM — Z86.718 HX OF BLOOD CLOTS: Status: ACTIVE | Noted: 2023-06-09

## 2023-06-09 PROBLEM — E78.5 DYSLIPIDEMIA: Status: ACTIVE | Noted: 2023-06-09

## 2023-06-09 PROBLEM — E87.1 HYPONATREMIA: Status: ACTIVE | Noted: 2023-06-09

## 2023-06-09 PROBLEM — R53.1 WEAKNESS: Status: ACTIVE | Noted: 2023-06-09

## 2023-06-09 PROBLEM — Z99.2 HEMODIALYSIS PATIENT (HCC): Status: ACTIVE | Noted: 2023-06-09

## 2023-06-09 LAB
ALBUMIN SERPL-MCNC: 3 G/DL (ref 3.5–5)
ALBUMIN/GLOB SERPL: 0.9 (ref 1.1–2.2)
ALP SERPL-CCNC: 138 U/L (ref 45–117)
ALT SERPL-CCNC: 12 U/L (ref 12–78)
ANION GAP SERPL CALC-SCNC: 8 MMOL/L (ref 5–15)
AST SERPL W P-5'-P-CCNC: 7 U/L (ref 15–37)
BASOPHILS # BLD: 0.1 K/UL (ref 0–0.1)
BASOPHILS NFR BLD: 1 % (ref 0–1)
BILIRUB SERPL-MCNC: 0.9 MG/DL (ref 0.2–1)
BNP SERPL-MCNC: ABNORMAL PG/ML
BUN SERPL-MCNC: 81 MG/DL (ref 6–20)
BUN/CREAT SERPL: 15 (ref 12–20)
CA-I BLD-MCNC: 8.2 MG/DL (ref 8.5–10.1)
CHLORIDE SERPL-SCNC: 97 MMOL/L (ref 97–108)
CO2 SERPL-SCNC: 24 MMOL/L (ref 21–32)
CREAT SERPL-MCNC: 5.43 MG/DL (ref 0.55–1.02)
DIFFERENTIAL METHOD BLD: ABNORMAL
EOSINOPHIL # BLD: 0.1 K/UL (ref 0–0.4)
EOSINOPHIL NFR BLD: 1 % (ref 0–7)
ERYTHROCYTE [DISTWIDTH] IN BLOOD BY AUTOMATED COUNT: 13.1 % (ref 11.5–14.5)
GLOBULIN SER CALC-MCNC: 3.5 G/DL (ref 2–4)
GLUCOSE BLD STRIP.AUTO-MCNC: 301 MG/DL (ref 65–100)
GLUCOSE BLD STRIP.AUTO-MCNC: 307 MG/DL (ref 65–100)
GLUCOSE SERPL-MCNC: 471 MG/DL (ref 65–100)
HCT VFR BLD AUTO: 28.6 % (ref 35–47)
HGB BLD-MCNC: 9.3 G/DL (ref 11.5–16)
IMM GRANULOCYTES # BLD AUTO: 0.1 K/UL (ref 0–0.04)
IMM GRANULOCYTES NFR BLD AUTO: 1 % (ref 0–0.5)
LYMPHOCYTES # BLD: 1.2 K/UL (ref 0.8–3.5)
LYMPHOCYTES NFR BLD: 7 % (ref 12–49)
MCH RBC QN AUTO: 29.6 PG (ref 26–34)
MCHC RBC AUTO-ENTMCNC: 32.5 G/DL (ref 30–36.5)
MCV RBC AUTO: 91.1 FL (ref 80–99)
MONOCYTES # BLD: 1.1 K/UL (ref 0–1)
MONOCYTES NFR BLD: 7 % (ref 5–13)
NEUTS SEG # BLD: 14.3 K/UL (ref 1.8–8)
NEUTS SEG NFR BLD: 83 % (ref 32–75)
NRBC # BLD: 0 K/UL (ref 0–0.01)
NRBC BLD-RTO: 0 PER 100 WBC
PERFORMED BY:: ABNORMAL
PERFORMED BY:: ABNORMAL
PLATELET # BLD AUTO: 356 K/UL (ref 150–400)
PMV BLD AUTO: 10.4 FL (ref 8.9–12.9)
POTASSIUM SERPL-SCNC: 5.3 MMOL/L (ref 3.5–5.1)
PROT SERPL-MCNC: 6.5 G/DL (ref 6.4–8.2)
RBC # BLD AUTO: 3.14 M/UL (ref 3.8–5.2)
SARS-COV-2 RDRP RESP QL NAA+PROBE: NOT DETECTED
SODIUM SERPL-SCNC: 129 MMOL/L (ref 136–145)
TROPONIN I SERPL HS-MCNC: 25 NG/L (ref 0–51)
WBC # BLD AUTO: 16.9 K/UL (ref 3.6–11)

## 2023-06-09 PROCEDURE — 87635 SARS-COV-2 COVID-19 AMP PRB: CPT

## 2023-06-09 PROCEDURE — 83036 HEMOGLOBIN GLYCOSYLATED A1C: CPT

## 2023-06-09 PROCEDURE — 96375 TX/PRO/DX INJ NEW DRUG ADDON: CPT

## 2023-06-09 PROCEDURE — 83880 ASSAY OF NATRIURETIC PEPTIDE: CPT

## 2023-06-09 PROCEDURE — 6370000000 HC RX 637 (ALT 250 FOR IP): Performed by: HOSPITALIST

## 2023-06-09 PROCEDURE — 1100000000 HC RM PRIVATE

## 2023-06-09 PROCEDURE — 74176 CT ABD & PELVIS W/O CONTRAST: CPT

## 2023-06-09 PROCEDURE — 71045 X-RAY EXAM CHEST 1 VIEW: CPT

## 2023-06-09 PROCEDURE — 96374 THER/PROPH/DIAG INJ IV PUSH: CPT

## 2023-06-09 PROCEDURE — 5A1D70Z PERFORMANCE OF URINARY FILTRATION, INTERMITTENT, LESS THAN 6 HOURS PER DAY: ICD-10-PCS | Performed by: INTERNAL MEDICINE

## 2023-06-09 PROCEDURE — 87340 HEPATITIS B SURFACE AG IA: CPT

## 2023-06-09 PROCEDURE — 85025 COMPLETE CBC W/AUTO DIFF WBC: CPT

## 2023-06-09 PROCEDURE — 36415 COLL VENOUS BLD VENIPUNCTURE: CPT

## 2023-06-09 PROCEDURE — 6370000000 HC RX 637 (ALT 250 FOR IP): Performed by: NURSE PRACTITIONER

## 2023-06-09 PROCEDURE — 93005 ELECTROCARDIOGRAM TRACING: CPT | Performed by: EMERGENCY MEDICINE

## 2023-06-09 PROCEDURE — 84484 ASSAY OF TROPONIN QUANT: CPT

## 2023-06-09 PROCEDURE — 6360000002 HC RX W HCPCS: Performed by: EMERGENCY MEDICINE

## 2023-06-09 PROCEDURE — 99285 EMERGENCY DEPT VISIT HI MDM: CPT

## 2023-06-09 PROCEDURE — 94762 N-INVAS EAR/PLS OXIMTRY CONT: CPT

## 2023-06-09 PROCEDURE — 2709999900 HC NON-CHARGEABLE SUPPLY

## 2023-06-09 PROCEDURE — 80053 COMPREHEN METABOLIC PANEL: CPT

## 2023-06-09 PROCEDURE — 82962 GLUCOSE BLOOD TEST: CPT

## 2023-06-09 PROCEDURE — 6370000000 HC RX 637 (ALT 250 FOR IP): Performed by: EMERGENCY MEDICINE

## 2023-06-09 PROCEDURE — 6360000002 HC RX W HCPCS: Performed by: NURSE PRACTITIONER

## 2023-06-09 PROCEDURE — 2500000003 HC RX 250 WO HCPCS: Performed by: NURSE PRACTITIONER

## 2023-06-09 PROCEDURE — 90935 HEMODIALYSIS ONE EVALUATION: CPT

## 2023-06-09 RX ORDER — HYDROXYZINE PAMOATE 25 MG/1
25 CAPSULE ORAL 3 TIMES DAILY PRN
Status: DISCONTINUED | OUTPATIENT
Start: 2023-06-09 | End: 2023-06-09

## 2023-06-09 RX ORDER — FUROSEMIDE 40 MG/1
80 TABLET ORAL DAILY
Status: DISCONTINUED | OUTPATIENT
Start: 2023-06-09 | End: 2023-06-15 | Stop reason: HOSPADM

## 2023-06-09 RX ORDER — ACETAMINOPHEN 650 MG/1
650 SUPPOSITORY RECTAL EVERY 6 HOURS PRN
Status: DISCONTINUED | OUTPATIENT
Start: 2023-06-09 | End: 2023-06-15 | Stop reason: HOSPADM

## 2023-06-09 RX ORDER — ACETAMINOPHEN 325 MG/1
650 TABLET ORAL EVERY 6 HOURS PRN
Status: DISCONTINUED | OUTPATIENT
Start: 2023-06-09 | End: 2023-06-15 | Stop reason: HOSPADM

## 2023-06-09 RX ORDER — POTASSIUM CHLORIDE 7.45 MG/ML
10 INJECTION INTRAVENOUS PRN
Status: DISCONTINUED | OUTPATIENT
Start: 2023-06-09 | End: 2023-06-09

## 2023-06-09 RX ORDER — ATORVASTATIN CALCIUM 20 MG/1
20 TABLET, FILM COATED ORAL DAILY
Status: DISCONTINUED | OUTPATIENT
Start: 2023-06-09 | End: 2023-06-15 | Stop reason: HOSPADM

## 2023-06-09 RX ORDER — HYDROXYZINE PAMOATE 25 MG/1
25 CAPSULE ORAL NIGHTLY PRN
Status: DISCONTINUED | OUTPATIENT
Start: 2023-06-09 | End: 2023-06-15 | Stop reason: HOSPADM

## 2023-06-09 RX ORDER — INSULIN DETEMIR 100 [IU]/ML
40 INJECTION, SOLUTION SUBCUTANEOUS 2 TIMES DAILY
COMMUNITY

## 2023-06-09 RX ORDER — SODIUM CHLORIDE 9 MG/ML
INJECTION, SOLUTION INTRAVENOUS CONTINUOUS
Status: DISCONTINUED | OUTPATIENT
Start: 2023-06-09 | End: 2023-06-10

## 2023-06-09 RX ORDER — SORBITOL SOLUTION 70 %
15 SOLUTION, ORAL MISCELLANEOUS DAILY PRN
Status: ON HOLD | COMMUNITY
End: 2023-06-14 | Stop reason: HOSPADM

## 2023-06-09 RX ORDER — ONDANSETRON 2 MG/ML
4 INJECTION INTRAMUSCULAR; INTRAVENOUS EVERY 6 HOURS PRN
Status: DISCONTINUED | OUTPATIENT
Start: 2023-06-09 | End: 2023-06-10

## 2023-06-09 RX ORDER — PANTOPRAZOLE SODIUM 40 MG/1
40 TABLET, DELAYED RELEASE ORAL
Status: DISCONTINUED | OUTPATIENT
Start: 2023-06-10 | End: 2023-06-15 | Stop reason: HOSPADM

## 2023-06-09 RX ORDER — METRONIDAZOLE 500 MG/100ML
500 INJECTION, SOLUTION INTRAVENOUS EVERY 8 HOURS
Status: DISCONTINUED | OUTPATIENT
Start: 2023-06-09 | End: 2023-06-15

## 2023-06-09 RX ORDER — ONDANSETRON 2 MG/ML
4 INJECTION INTRAMUSCULAR; INTRAVENOUS ONCE
Status: COMPLETED | OUTPATIENT
Start: 2023-06-09 | End: 2023-06-09

## 2023-06-09 RX ORDER — MAGNESIUM HYDROXIDE/ALUMINUM HYDROXICE/SIMETHICONE 120; 1200; 1200 MG/30ML; MG/30ML; MG/30ML
30 SUSPENSION ORAL EVERY 6 HOURS PRN
Status: DISCONTINUED | OUTPATIENT
Start: 2023-06-09 | End: 2023-06-15 | Stop reason: HOSPADM

## 2023-06-09 RX ORDER — CARVEDILOL 12.5 MG/1
12.5 TABLET ORAL 2 TIMES DAILY
Status: DISCONTINUED | OUTPATIENT
Start: 2023-06-09 | End: 2023-06-15 | Stop reason: HOSPADM

## 2023-06-09 RX ORDER — HYDROXYZINE HYDROCHLORIDE 25 MG/1
25-50 TABLET, FILM COATED ORAL
COMMUNITY

## 2023-06-09 RX ORDER — DEXTROSE MONOHYDRATE 100 MG/ML
INJECTION, SOLUTION INTRAVENOUS CONTINUOUS PRN
Status: DISCONTINUED | OUTPATIENT
Start: 2023-06-09 | End: 2023-06-15 | Stop reason: HOSPADM

## 2023-06-09 RX ORDER — DEXAMETHASONE 4 MG/1
4 TABLET ORAL DAILY
Status: DISCONTINUED | OUTPATIENT
Start: 2023-06-09 | End: 2023-06-11

## 2023-06-09 RX ORDER — INSULIN LISPRO 100 [IU]/ML
0-4 INJECTION, SOLUTION INTRAVENOUS; SUBCUTANEOUS NIGHTLY
Status: DISCONTINUED | OUTPATIENT
Start: 2023-06-09 | End: 2023-06-15 | Stop reason: HOSPADM

## 2023-06-09 RX ORDER — POLYETHYLENE GLYCOL 3350 17 G/17G
17 POWDER, FOR SOLUTION ORAL DAILY PRN
Status: DISCONTINUED | OUTPATIENT
Start: 2023-06-09 | End: 2023-06-15 | Stop reason: HOSPADM

## 2023-06-09 RX ORDER — GABAPENTIN 300 MG/1
300 CAPSULE ORAL NIGHTLY
Status: DISCONTINUED | OUTPATIENT
Start: 2023-06-09 | End: 2023-06-15 | Stop reason: HOSPADM

## 2023-06-09 RX ORDER — TRAMADOL HYDROCHLORIDE 50 MG/1
50 TABLET ORAL 2 TIMES DAILY PRN
Status: DISCONTINUED | OUTPATIENT
Start: 2023-06-09 | End: 2023-06-10

## 2023-06-09 RX ORDER — SEVELAMER CARBONATE 800 MG/1
800 TABLET, FILM COATED ORAL
Status: DISCONTINUED | OUTPATIENT
Start: 2023-06-09 | End: 2023-06-11

## 2023-06-09 RX ORDER — ONDANSETRON 4 MG/1
4 TABLET, ORALLY DISINTEGRATING ORAL EVERY 8 HOURS PRN
Status: DISCONTINUED | OUTPATIENT
Start: 2023-06-09 | End: 2023-06-10

## 2023-06-09 RX ORDER — INSULIN GLARGINE 100 [IU]/ML
40 INJECTION, SOLUTION SUBCUTANEOUS 2 TIMES DAILY
Status: DISCONTINUED | OUTPATIENT
Start: 2023-06-09 | End: 2023-06-15 | Stop reason: HOSPADM

## 2023-06-09 RX ORDER — NIFEDIPINE 60 MG/1
60 TABLET, EXTENDED RELEASE ORAL 2 TIMES DAILY
Status: DISCONTINUED | OUTPATIENT
Start: 2023-06-09 | End: 2023-06-15 | Stop reason: HOSPADM

## 2023-06-09 RX ORDER — INSULIN LISPRO 100 [IU]/ML
0-8 INJECTION, SOLUTION INTRAVENOUS; SUBCUTANEOUS
Status: DISCONTINUED | OUTPATIENT
Start: 2023-06-09 | End: 2023-06-15 | Stop reason: HOSPADM

## 2023-06-09 RX ADMIN — RIVAROXABAN 2.5 MG: 2.5 TABLET, FILM COATED ORAL at 21:55

## 2023-06-09 RX ADMIN — METRONIDAZOLE 500 MG: 500 INJECTION, SOLUTION INTRAVENOUS at 23:51

## 2023-06-09 RX ADMIN — HYDROXYZINE PAMOATE 25 MG: 25 CAPSULE ORAL at 23:50

## 2023-06-09 RX ADMIN — INSULIN LISPRO 4 UNITS: 100 INJECTION, SOLUTION INTRAVENOUS; SUBCUTANEOUS at 21:56

## 2023-06-09 RX ADMIN — ONDANSETRON 4 MG: 2 INJECTION INTRAMUSCULAR; INTRAVENOUS at 12:07

## 2023-06-09 RX ADMIN — HYDROMORPHONE HYDROCHLORIDE 0.25 MG: 1 INJECTION, SOLUTION INTRAMUSCULAR; INTRAVENOUS; SUBCUTANEOUS at 12:10

## 2023-06-09 RX ADMIN — CARVEDILOL 12.5 MG: 12.5 TABLET, FILM COATED ORAL at 21:58

## 2023-06-09 RX ADMIN — NIFEDIPINE 60 MG: 60 TABLET, FILM COATED, EXTENDED RELEASE ORAL at 21:55

## 2023-06-09 RX ADMIN — INSULIN HUMAN 5 UNITS: 100 INJECTION, SOLUTION PARENTERAL at 12:39

## 2023-06-09 RX ADMIN — EPOETIN ALFA-EPBX 10000 UNITS: 10000 INJECTION, SOLUTION INTRAVENOUS; SUBCUTANEOUS at 17:55

## 2023-06-09 RX ADMIN — GABAPENTIN 300 MG: 300 CAPSULE ORAL at 21:55

## 2023-06-09 RX ADMIN — SEVELAMER CARBONATE 800 MG: 800 TABLET, FILM COATED ORAL at 18:30

## 2023-06-09 RX ADMIN — ONDANSETRON 4 MG: 4 TABLET, ORALLY DISINTEGRATING ORAL at 21:55

## 2023-06-09 RX ADMIN — INSULIN GLARGINE 40 UNITS: 100 INJECTION, SOLUTION SUBCUTANEOUS at 21:55

## 2023-06-09 RX ADMIN — INSULIN LISPRO 6 UNITS: 100 INJECTION, SOLUTION INTRAVENOUS; SUBCUTANEOUS at 18:40

## 2023-06-09 ASSESSMENT — ENCOUNTER SYMPTOMS
DIARRHEA: 1
SHORTNESS OF BREATH: 1
COUGH: 1
ABDOMINAL DISTENTION: 1
ABDOMINAL PAIN: 0

## 2023-06-09 ASSESSMENT — LIFESTYLE VARIABLES
HOW OFTEN DO YOU HAVE A DRINK CONTAINING ALCOHOL: NEVER
HOW MANY STANDARD DRINKS CONTAINING ALCOHOL DO YOU HAVE ON A TYPICAL DAY: PATIENT DOES NOT DRINK
HOW OFTEN DO YOU HAVE A DRINK CONTAINING ALCOHOL: NEVER

## 2023-06-09 ASSESSMENT — PAIN SCALES - GENERAL
PAINLEVEL_OUTOF10: 0
PAINLEVEL_OUTOF10: 7

## 2023-06-09 ASSESSMENT — PAIN DESCRIPTION - LOCATION: LOCATION: ABDOMEN

## 2023-06-10 PROBLEM — R10.9 ABDOMINAL PAIN: Status: ACTIVE | Noted: 2023-06-10

## 2023-06-10 PROBLEM — K52.9 COLITIS: Status: ACTIVE | Noted: 2023-06-10

## 2023-06-10 LAB
ALBUMIN SERPL-MCNC: 2.6 G/DL (ref 3.5–5)
ANION GAP SERPL CALC-SCNC: 7 MMOL/L (ref 5–15)
ANION GAP SERPL CALC-SCNC: 8 MMOL/L (ref 5–15)
BASOPHILS # BLD: 0.1 K/UL (ref 0–0.1)
BASOPHILS NFR BLD: 1 % (ref 0–1)
BUN SERPL-MCNC: 48 MG/DL (ref 6–20)
BUN SERPL-MCNC: 49 MG/DL (ref 6–20)
BUN/CREAT SERPL: 12 (ref 12–20)
BUN/CREAT SERPL: 12 (ref 12–20)
CA-I BLD-MCNC: 8.3 MG/DL (ref 8.5–10.1)
CA-I BLD-MCNC: 8.4 MG/DL (ref 8.5–10.1)
CHLORIDE SERPL-SCNC: 100 MMOL/L (ref 97–108)
CHLORIDE SERPL-SCNC: 100 MMOL/L (ref 97–108)
CO2 SERPL-SCNC: 26 MMOL/L (ref 21–32)
CO2 SERPL-SCNC: 27 MMOL/L (ref 21–32)
CREAT SERPL-MCNC: 4.11 MG/DL (ref 0.55–1.02)
CREAT SERPL-MCNC: 4.12 MG/DL (ref 0.55–1.02)
DIFFERENTIAL METHOD BLD: ABNORMAL
EKG ATRIAL RATE: 91 BPM
EKG DIAGNOSIS: NORMAL
EKG P AXIS: 52 DEGREES
EKG P-R INTERVAL: 160 MS
EKG Q-T INTERVAL: 354 MS
EKG QRS DURATION: 74 MS
EKG QTC CALCULATION (BAZETT): 435 MS
EKG R AXIS: -24 DEGREES
EKG T AXIS: 99 DEGREES
EKG VENTRICULAR RATE: 91 BPM
EOSINOPHIL # BLD: 0.2 K/UL (ref 0–0.4)
EOSINOPHIL NFR BLD: 2 % (ref 0–7)
ERYTHROCYTE [DISTWIDTH] IN BLOOD BY AUTOMATED COUNT: 13 % (ref 11.5–14.5)
EST. AVERAGE GLUCOSE BLD GHB EST-MCNC: 255 MG/DL
GLUCOSE BLD STRIP.AUTO-MCNC: 229 MG/DL (ref 65–100)
GLUCOSE BLD STRIP.AUTO-MCNC: 329 MG/DL (ref 65–100)
GLUCOSE BLD STRIP.AUTO-MCNC: 351 MG/DL (ref 65–100)
GLUCOSE BLD STRIP.AUTO-MCNC: 395 MG/DL (ref 65–100)
GLUCOSE SERPL-MCNC: 350 MG/DL (ref 65–100)
GLUCOSE SERPL-MCNC: 350 MG/DL (ref 65–100)
HBA1C MFR BLD: 10.5 % (ref 4–5.6)
HBV SURFACE AG SER QL: <0.1 INDEX
HBV SURFACE AG SER QL: NEGATIVE
HCT VFR BLD AUTO: 26.6 % (ref 35–47)
HGB BLD-MCNC: 8.6 G/DL (ref 11.5–16)
IMM GRANULOCYTES # BLD AUTO: 0.1 K/UL (ref 0–0.04)
IMM GRANULOCYTES NFR BLD AUTO: 1 % (ref 0–0.5)
LYMPHOCYTES # BLD: 1.4 K/UL (ref 0.8–3.5)
LYMPHOCYTES NFR BLD: 11 % (ref 12–49)
MCH RBC QN AUTO: 29.5 PG (ref 26–34)
MCHC RBC AUTO-ENTMCNC: 32.3 G/DL (ref 30–36.5)
MCV RBC AUTO: 91.1 FL (ref 80–99)
MONOCYTES # BLD: 0.5 K/UL (ref 0–1)
MONOCYTES NFR BLD: 5 % (ref 5–13)
NEUTS SEG # BLD: 9.7 K/UL (ref 1.8–8)
NEUTS SEG NFR BLD: 80 % (ref 32–75)
NRBC # BLD: 0 K/UL (ref 0–0.01)
NRBC BLD-RTO: 0 PER 100 WBC
PERFORMED BY:: ABNORMAL
PHOSPHATE SERPL-MCNC: 3.7 MG/DL (ref 2.6–4.7)
PLATELET # BLD AUTO: 324 K/UL (ref 150–400)
PMV BLD AUTO: 10.9 FL (ref 8.9–12.9)
POTASSIUM SERPL-SCNC: 4.3 MMOL/L (ref 3.5–5.1)
POTASSIUM SERPL-SCNC: 4.3 MMOL/L (ref 3.5–5.1)
RBC # BLD AUTO: 2.92 M/UL (ref 3.8–5.2)
SODIUM SERPL-SCNC: 134 MMOL/L (ref 136–145)
SODIUM SERPL-SCNC: 134 MMOL/L (ref 136–145)
WBC # BLD AUTO: 11.9 K/UL (ref 3.6–11)

## 2023-06-10 PROCEDURE — 80048 BASIC METABOLIC PNL TOTAL CA: CPT

## 2023-06-10 PROCEDURE — 6360000002 HC RX W HCPCS: Performed by: INTERNAL MEDICINE

## 2023-06-10 PROCEDURE — 1100000000 HC RM PRIVATE

## 2023-06-10 PROCEDURE — 87040 BLOOD CULTURE FOR BACTERIA: CPT

## 2023-06-10 PROCEDURE — 90935 HEMODIALYSIS ONE EVALUATION: CPT

## 2023-06-10 PROCEDURE — 2700000000 HC OXYGEN THERAPY PER DAY

## 2023-06-10 PROCEDURE — 36415 COLL VENOUS BLD VENIPUNCTURE: CPT

## 2023-06-10 PROCEDURE — 86677 HELICOBACTER PYLORI ANTIBODY: CPT

## 2023-06-10 PROCEDURE — 6370000000 HC RX 637 (ALT 250 FOR IP): Performed by: NURSE PRACTITIONER

## 2023-06-10 PROCEDURE — 80069 RENAL FUNCTION PANEL: CPT

## 2023-06-10 PROCEDURE — 94761 N-INVAS EAR/PLS OXIMETRY MLT: CPT

## 2023-06-10 PROCEDURE — 85025 COMPLETE CBC W/AUTO DIFF WBC: CPT

## 2023-06-10 PROCEDURE — 6370000000 HC RX 637 (ALT 250 FOR IP): Performed by: INTERNAL MEDICINE

## 2023-06-10 PROCEDURE — 6360000002 HC RX W HCPCS: Performed by: NURSE PRACTITIONER

## 2023-06-10 PROCEDURE — 2709999900 HC NON-CHARGEABLE SUPPLY

## 2023-06-10 PROCEDURE — 2580000003 HC RX 258: Performed by: NURSE PRACTITIONER

## 2023-06-10 PROCEDURE — 2500000003 HC RX 250 WO HCPCS: Performed by: NURSE PRACTITIONER

## 2023-06-10 PROCEDURE — 82962 GLUCOSE BLOOD TEST: CPT

## 2023-06-10 RX ORDER — ONDANSETRON 4 MG/1
8 TABLET, ORALLY DISINTEGRATING ORAL EVERY 4 HOURS PRN
Status: DISCONTINUED | OUTPATIENT
Start: 2023-06-10 | End: 2023-06-15 | Stop reason: HOSPADM

## 2023-06-10 RX ORDER — ONDANSETRON 2 MG/ML
4 INJECTION INTRAMUSCULAR; INTRAVENOUS EVERY 4 HOURS PRN
Status: DISCONTINUED | OUTPATIENT
Start: 2023-06-10 | End: 2023-06-15 | Stop reason: HOSPADM

## 2023-06-10 RX ORDER — INSULIN LISPRO 100 [IU]/ML
10 INJECTION, SOLUTION INTRAVENOUS; SUBCUTANEOUS ONCE
Status: COMPLETED | OUTPATIENT
Start: 2023-06-10 | End: 2023-06-10

## 2023-06-10 RX ORDER — HYDROCODONE BITARTRATE AND ACETAMINOPHEN 5; 325 MG/1; MG/1
1 TABLET ORAL EVERY 4 HOURS PRN
Status: DISCONTINUED | OUTPATIENT
Start: 2023-06-10 | End: 2023-06-15 | Stop reason: HOSPADM

## 2023-06-10 RX ORDER — HYDROCODONE BITARTRATE AND ACETAMINOPHEN 10; 325 MG/1; MG/1
1 TABLET ORAL EVERY 4 HOURS PRN
Status: DISCONTINUED | OUTPATIENT
Start: 2023-06-10 | End: 2023-06-15 | Stop reason: HOSPADM

## 2023-06-10 RX ORDER — INSULIN LISPRO 100 [IU]/ML
10 INJECTION, SOLUTION INTRAVENOUS; SUBCUTANEOUS
Status: DISCONTINUED | OUTPATIENT
Start: 2023-06-10 | End: 2023-06-15 | Stop reason: HOSPADM

## 2023-06-10 RX ORDER — DOXYCYCLINE HYCLATE 100 MG/1
100 CAPSULE ORAL EVERY 12 HOURS SCHEDULED
Status: DISCONTINUED | OUTPATIENT
Start: 2023-06-10 | End: 2023-06-15 | Stop reason: HOSPADM

## 2023-06-10 RX ADMIN — DOXYCYCLINE HYCLATE 100 MG: 100 CAPSULE ORAL at 21:12

## 2023-06-10 RX ADMIN — GABAPENTIN 300 MG: 300 CAPSULE ORAL at 21:12

## 2023-06-10 RX ADMIN — HYDROCODONE BITARTRATE AND ACETAMINOPHEN 1 TABLET: 10; 325 TABLET ORAL at 23:14

## 2023-06-10 RX ADMIN — METRONIDAZOLE 500 MG: 500 INJECTION, SOLUTION INTRAVENOUS at 14:43

## 2023-06-10 RX ADMIN — FUROSEMIDE 80 MG: 40 TABLET ORAL at 10:38

## 2023-06-10 RX ADMIN — METRONIDAZOLE 500 MG: 500 INJECTION, SOLUTION INTRAVENOUS at 06:22

## 2023-06-10 RX ADMIN — INSULIN GLARGINE 40 UNITS: 100 INJECTION, SOLUTION SUBCUTANEOUS at 10:52

## 2023-06-10 RX ADMIN — PANTOPRAZOLE SODIUM 40 MG: 40 TABLET, DELAYED RELEASE ORAL at 06:19

## 2023-06-10 RX ADMIN — ONDANSETRON 8 MG: 4 TABLET, ORALLY DISINTEGRATING ORAL at 23:14

## 2023-06-10 RX ADMIN — ATORVASTATIN CALCIUM 20 MG: 20 TABLET, FILM COATED ORAL at 10:40

## 2023-06-10 RX ADMIN — NIFEDIPINE 60 MG: 60 TABLET, FILM COATED, EXTENDED RELEASE ORAL at 21:12

## 2023-06-10 RX ADMIN — SEVELAMER CARBONATE 800 MG: 800 TABLET, FILM COATED ORAL at 16:51

## 2023-06-10 RX ADMIN — INSULIN GLARGINE 40 UNITS: 100 INJECTION, SOLUTION SUBCUTANEOUS at 21:13

## 2023-06-10 RX ADMIN — CARVEDILOL 12.5 MG: 12.5 TABLET, FILM COATED ORAL at 21:12

## 2023-06-10 RX ADMIN — HYDROMORPHONE HYDROCHLORIDE 1 MG: 1 INJECTION, SOLUTION INTRAMUSCULAR; INTRAVENOUS; SUBCUTANEOUS at 11:17

## 2023-06-10 RX ADMIN — INSULIN LISPRO 6 UNITS: 100 INJECTION, SOLUTION INTRAVENOUS; SUBCUTANEOUS at 11:17

## 2023-06-10 RX ADMIN — CEFTRIAXONE SODIUM 1000 MG: 1 INJECTION, POWDER, FOR SOLUTION INTRAMUSCULAR; INTRAVENOUS at 14:43

## 2023-06-10 RX ADMIN — INSULIN LISPRO 8 UNITS: 100 INJECTION, SOLUTION INTRAVENOUS; SUBCUTANEOUS at 16:43

## 2023-06-10 RX ADMIN — NIFEDIPINE 60 MG: 60 TABLET, FILM COATED, EXTENDED RELEASE ORAL at 10:41

## 2023-06-10 RX ADMIN — METRONIDAZOLE 500 MG: 500 INJECTION, SOLUTION INTRAVENOUS at 23:00

## 2023-06-10 RX ADMIN — RIVAROXABAN 2.5 MG: 2.5 TABLET, FILM COATED ORAL at 10:38

## 2023-06-10 RX ADMIN — RIVAROXABAN 2.5 MG: 2.5 TABLET, FILM COATED ORAL at 21:12

## 2023-06-10 RX ADMIN — CARVEDILOL 12.5 MG: 12.5 TABLET, FILM COATED ORAL at 10:40

## 2023-06-10 RX ADMIN — INSULIN LISPRO 10 UNITS: 100 INJECTION, SOLUTION INTRAVENOUS; SUBCUTANEOUS at 21:13

## 2023-06-10 RX ADMIN — SEVELAMER CARBONATE 800 MG: 800 TABLET, FILM COATED ORAL at 10:38

## 2023-06-10 RX ADMIN — INSULIN LISPRO 10 UNITS: 100 INJECTION, SOLUTION INTRAVENOUS; SUBCUTANEOUS at 16:45

## 2023-06-10 RX ADMIN — ONDANSETRON 4 MG: 4 TABLET, ORALLY DISINTEGRATING ORAL at 06:19

## 2023-06-10 ASSESSMENT — PAIN DESCRIPTION - ORIENTATION: ORIENTATION: LEFT;MID

## 2023-06-10 ASSESSMENT — PAIN SCALES - GENERAL
PAINLEVEL_OUTOF10: 4
PAINLEVEL_OUTOF10: 7

## 2023-06-10 ASSESSMENT — ENCOUNTER SYMPTOMS
SHORTNESS OF BREATH: 1
ABDOMINAL PAIN: 1
ABDOMINAL DISTENTION: 1
DIARRHEA: 1

## 2023-06-10 ASSESSMENT — PAIN DESCRIPTION - DESCRIPTORS: DESCRIPTORS: TIGHTNESS

## 2023-06-10 ASSESSMENT — PAIN DESCRIPTION - LOCATION
LOCATION: ABDOMEN
LOCATION: ABDOMEN

## 2023-06-11 ENCOUNTER — APPOINTMENT (OUTPATIENT)
Facility: HOSPITAL | Age: 48
DRG: 291 | End: 2023-06-11
Payer: MEDICARE

## 2023-06-11 LAB
ALBUMIN SERPL-MCNC: 2.6 G/DL (ref 3.5–5)
ANION GAP SERPL CALC-SCNC: 7 MMOL/L (ref 5–15)
BASOPHILS # BLD: 0.1 K/UL (ref 0–0.1)
BASOPHILS NFR BLD: 1 % (ref 0–1)
BUN SERPL-MCNC: 65 MG/DL (ref 6–20)
BUN/CREAT SERPL: 11 (ref 12–20)
CA-I BLD-MCNC: 8.4 MG/DL (ref 8.5–10.1)
CHLORIDE SERPL-SCNC: 101 MMOL/L (ref 97–108)
CO2 SERPL-SCNC: 27 MMOL/L (ref 21–32)
CREAT SERPL-MCNC: 5.9 MG/DL (ref 0.55–1.02)
CRP SERPL-MCNC: 14.5 MG/DL (ref 0–0.6)
DIFFERENTIAL METHOD BLD: ABNORMAL
ECHO AO ROOT DIAM: 3.2 CM
ECHO AO ROOT INDEX: 1.37 CM/M2
ECHO AV PEAK GRADIENT: 6 MMHG
ECHO AV PEAK VELOCITY: 1.2 M/S
ECHO AV VELOCITY RATIO: 0.67
ECHO BSA: 2.44 M2
ECHO EST RA PRESSURE: 3 MMHG
ECHO LA DIAMETER INDEX: 1.92 CM/M2
ECHO LA DIAMETER: 4.5 CM
ECHO LA TO AORTIC ROOT RATIO: 1.41
ECHO LV E' SEPTAL VELOCITY: 6 CM/S
ECHO LV FRACTIONAL SHORTENING: 30 % (ref 28–44)
ECHO LV INTERNAL DIMENSION DIASTOLE INDEX: 1.97 CM/M2
ECHO LV INTERNAL DIMENSION DIASTOLIC: 4.6 CM (ref 3.9–5.3)
ECHO LV INTERNAL DIMENSION SYSTOLIC INDEX: 1.37 CM/M2
ECHO LV INTERNAL DIMENSION SYSTOLIC: 3.2 CM
ECHO LV IVSD: 1.4 CM (ref 0.6–0.9)
ECHO LV MASS 2D: 284.8 G (ref 67–162)
ECHO LV MASS INDEX 2D: 121.7 G/M2 (ref 43–95)
ECHO LV POSTERIOR WALL DIASTOLIC: 1.6 CM (ref 0.6–0.9)
ECHO LV RELATIVE WALL THICKNESS RATIO: 0.7
ECHO LVOT PEAK GRADIENT: 2 MMHG
ECHO LVOT PEAK VELOCITY: 0.8 M/S
ECHO MV A VELOCITY: 0.62 M/S
ECHO MV E DECELERATION TIME (DT): 246 MS
ECHO MV E VELOCITY: 0.83 M/S
ECHO MV E/A RATIO: 1.34
ECHO MV E/E' SEPTAL: 13.83
ECHO PV MAX VELOCITY: 1.4 M/S
ECHO PV PEAK GRADIENT: 7 MMHG
ECHO RA AREA 4C: 13.9 CM2
ECHO RA END SYSTOLIC VOLUME APICAL 4 CHAMBER INDEX BSA: 16 ML/M2
ECHO RA VOLUME: 38 ML
ECHO RIGHT VENTRICULAR SYSTOLIC PRESSURE (RVSP): 20 MMHG
ECHO RV BASAL DIMENSION: 3.6 CM
ECHO TV REGURGITANT MAX VELOCITY: 2.05 M/S
ECHO TV REGURGITANT PEAK GRADIENT: 17 MMHG
EOSINOPHIL # BLD: 0.4 K/UL (ref 0–0.4)
EOSINOPHIL NFR BLD: 3 % (ref 0–7)
ERYTHROCYTE [DISTWIDTH] IN BLOOD BY AUTOMATED COUNT: 13.1 % (ref 11.5–14.5)
GLUCOSE BLD STRIP.AUTO-MCNC: 191 MG/DL (ref 65–100)
GLUCOSE BLD STRIP.AUTO-MCNC: 223 MG/DL (ref 65–100)
GLUCOSE BLD STRIP.AUTO-MCNC: 248 MG/DL (ref 65–100)
GLUCOSE BLD STRIP.AUTO-MCNC: 262 MG/DL (ref 65–100)
GLUCOSE SERPL-MCNC: 269 MG/DL (ref 65–100)
HCT VFR BLD AUTO: 27.3 % (ref 35–47)
HGB BLD-MCNC: 8.5 G/DL (ref 11.5–16)
IMM GRANULOCYTES # BLD AUTO: 0.1 K/UL (ref 0–0.04)
IMM GRANULOCYTES NFR BLD AUTO: 1 % (ref 0–0.5)
LYMPHOCYTES # BLD: 1.6 K/UL (ref 0.8–3.5)
LYMPHOCYTES NFR BLD: 13 % (ref 12–49)
MCH RBC QN AUTO: 29.3 PG (ref 26–34)
MCHC RBC AUTO-ENTMCNC: 31.1 G/DL (ref 30–36.5)
MCV RBC AUTO: 94.1 FL (ref 80–99)
MONOCYTES # BLD: 0.7 K/UL (ref 0–1)
MONOCYTES NFR BLD: 6 % (ref 5–13)
NEUTS SEG # BLD: 10.1 K/UL (ref 1.8–8)
NEUTS SEG NFR BLD: 76 % (ref 32–75)
NRBC # BLD: 0 K/UL (ref 0–0.01)
NRBC BLD-RTO: 0 PER 100 WBC
PERFORMED BY:: ABNORMAL
PHOSPHATE SERPL-MCNC: 6.2 MG/DL (ref 2.6–4.7)
PLATELET # BLD AUTO: 376 K/UL (ref 150–400)
PMV BLD AUTO: 10.8 FL (ref 8.9–12.9)
POTASSIUM SERPL-SCNC: 4.9 MMOL/L (ref 3.5–5.1)
PROCALCITONIN SERPL-MCNC: 1.59 NG/ML
RBC # BLD AUTO: 2.9 M/UL (ref 3.8–5.2)
SODIUM SERPL-SCNC: 135 MMOL/L (ref 136–145)
WBC # BLD AUTO: 13.1 K/UL (ref 3.6–11)

## 2023-06-11 PROCEDURE — 86140 C-REACTIVE PROTEIN: CPT

## 2023-06-11 PROCEDURE — 85025 COMPLETE CBC W/AUTO DIFF WBC: CPT

## 2023-06-11 PROCEDURE — 36415 COLL VENOUS BLD VENIPUNCTURE: CPT

## 2023-06-11 PROCEDURE — 2500000003 HC RX 250 WO HCPCS: Performed by: NURSE PRACTITIONER

## 2023-06-11 PROCEDURE — 2580000003 HC RX 258: Performed by: NURSE PRACTITIONER

## 2023-06-11 PROCEDURE — 6360000002 HC RX W HCPCS: Performed by: NURSE PRACTITIONER

## 2023-06-11 PROCEDURE — 82962 GLUCOSE BLOOD TEST: CPT

## 2023-06-11 PROCEDURE — 80069 RENAL FUNCTION PANEL: CPT

## 2023-06-11 PROCEDURE — 93325 DOPPLER ECHO COLOR FLOW MAPG: CPT

## 2023-06-11 PROCEDURE — 1100000000 HC RM PRIVATE

## 2023-06-11 PROCEDURE — 84145 PROCALCITONIN (PCT): CPT

## 2023-06-11 PROCEDURE — 6370000000 HC RX 637 (ALT 250 FOR IP): Performed by: INTERNAL MEDICINE

## 2023-06-11 PROCEDURE — 6370000000 HC RX 637 (ALT 250 FOR IP): Performed by: NURSE PRACTITIONER

## 2023-06-11 PROCEDURE — 6370000000 HC RX 637 (ALT 250 FOR IP): Performed by: HOSPITALIST

## 2023-06-11 RX ORDER — SORBITOL SOLUTION 70 %
60 SOLUTION, ORAL MISCELLANEOUS DAILY PRN
Status: DISCONTINUED | OUTPATIENT
Start: 2023-06-11 | End: 2023-06-12

## 2023-06-11 RX ORDER — SEVELAMER CARBONATE 800 MG/1
1600 TABLET, FILM COATED ORAL
Status: DISCONTINUED | OUTPATIENT
Start: 2023-06-12 | End: 2023-06-15 | Stop reason: HOSPADM

## 2023-06-11 RX ADMIN — PANTOPRAZOLE SODIUM 40 MG: 40 TABLET, DELAYED RELEASE ORAL at 09:31

## 2023-06-11 RX ADMIN — METRONIDAZOLE 500 MG: 500 INJECTION, SOLUTION INTRAVENOUS at 09:17

## 2023-06-11 RX ADMIN — CEFTRIAXONE SODIUM 1000 MG: 1 INJECTION, POWDER, FOR SOLUTION INTRAMUSCULAR; INTRAVENOUS at 14:13

## 2023-06-11 RX ADMIN — NIFEDIPINE 60 MG: 60 TABLET, FILM COATED, EXTENDED RELEASE ORAL at 22:50

## 2023-06-11 RX ADMIN — SEVELAMER CARBONATE 800 MG: 800 TABLET, FILM COATED ORAL at 17:21

## 2023-06-11 RX ADMIN — HYDROXYZINE PAMOATE 25 MG: 25 CAPSULE ORAL at 03:06

## 2023-06-11 RX ADMIN — DOXYCYCLINE HYCLATE 100 MG: 100 CAPSULE ORAL at 22:50

## 2023-06-11 RX ADMIN — CARVEDILOL 12.5 MG: 12.5 TABLET, FILM COATED ORAL at 22:50

## 2023-06-11 RX ADMIN — HYDROCODONE BITARTRATE AND ACETAMINOPHEN 1 TABLET: 10; 325 TABLET ORAL at 09:31

## 2023-06-11 RX ADMIN — RIVAROXABAN 2.5 MG: 2.5 TABLET, FILM COATED ORAL at 09:13

## 2023-06-11 RX ADMIN — SEVELAMER CARBONATE 800 MG: 800 TABLET, FILM COATED ORAL at 09:31

## 2023-06-11 RX ADMIN — SORBITOL SOLUTION (BULK) 60 ML: 70 SOLUTION at 19:36

## 2023-06-11 RX ADMIN — METRONIDAZOLE 500 MG: 500 INJECTION, SOLUTION INTRAVENOUS at 23:04

## 2023-06-11 RX ADMIN — METRONIDAZOLE 500 MG: 500 INJECTION, SOLUTION INTRAVENOUS at 15:42

## 2023-06-11 RX ADMIN — ATORVASTATIN CALCIUM 20 MG: 20 TABLET, FILM COATED ORAL at 09:13

## 2023-06-11 RX ADMIN — DOXYCYCLINE HYCLATE 100 MG: 100 CAPSULE ORAL at 09:13

## 2023-06-11 RX ADMIN — FUROSEMIDE 80 MG: 40 TABLET ORAL at 09:13

## 2023-06-11 RX ADMIN — CARVEDILOL 12.5 MG: 12.5 TABLET, FILM COATED ORAL at 09:13

## 2023-06-11 RX ADMIN — INSULIN GLARGINE 40 UNITS: 100 INJECTION, SOLUTION SUBCUTANEOUS at 09:15

## 2023-06-11 RX ADMIN — GABAPENTIN 300 MG: 300 CAPSULE ORAL at 22:50

## 2023-06-11 RX ADMIN — INSULIN LISPRO 6 UNITS: 100 INJECTION, SOLUTION INTRAVENOUS; SUBCUTANEOUS at 09:14

## 2023-06-11 RX ADMIN — RIVAROXABAN 2.5 MG: 2.5 TABLET, FILM COATED ORAL at 22:50

## 2023-06-11 RX ADMIN — INSULIN GLARGINE 40 UNITS: 100 INJECTION, SOLUTION SUBCUTANEOUS at 22:55

## 2023-06-11 RX ADMIN — HYDROXYZINE PAMOATE 25 MG: 25 CAPSULE ORAL at 22:59

## 2023-06-11 RX ADMIN — SEVELAMER CARBONATE 800 MG: 800 TABLET, FILM COATED ORAL at 14:14

## 2023-06-11 ASSESSMENT — ENCOUNTER SYMPTOMS
ABDOMINAL PAIN: 1
ABDOMINAL DISTENTION: 1
SHORTNESS OF BREATH: 1
DIARRHEA: 1

## 2023-06-11 ASSESSMENT — PAIN SCALES - GENERAL
PAINLEVEL_OUTOF10: 6
PAINLEVEL_OUTOF10: 5

## 2023-06-11 ASSESSMENT — PAIN DESCRIPTION - LOCATION: LOCATION: ABDOMEN

## 2023-06-12 LAB
ALBUMIN SERPL-MCNC: 2.9 G/DL (ref 3.5–5)
ANION GAP SERPL CALC-SCNC: 7 MMOL/L (ref 5–15)
BASOPHILS # BLD: 0.1 K/UL (ref 0–0.1)
BASOPHILS NFR BLD: 1 % (ref 0–1)
BUN SERPL-MCNC: 78 MG/DL (ref 6–20)
BUN/CREAT SERPL: 11 (ref 12–20)
CA-I BLD-MCNC: 8.5 MG/DL (ref 8.5–10.1)
CHLORIDE SERPL-SCNC: 102 MMOL/L (ref 97–108)
CO2 SERPL-SCNC: 25 MMOL/L (ref 21–32)
CREAT SERPL-MCNC: 7.24 MG/DL (ref 0.55–1.02)
DIFFERENTIAL METHOD BLD: ABNORMAL
EOSINOPHIL # BLD: 0.2 K/UL (ref 0–0.4)
EOSINOPHIL NFR BLD: 1 % (ref 0–7)
ERYTHROCYTE [DISTWIDTH] IN BLOOD BY AUTOMATED COUNT: 13.1 % (ref 11.5–14.5)
GLUCOSE BLD STRIP.AUTO-MCNC: 119 MG/DL (ref 65–100)
GLUCOSE BLD STRIP.AUTO-MCNC: 159 MG/DL (ref 65–100)
GLUCOSE BLD STRIP.AUTO-MCNC: 198 MG/DL (ref 65–100)
GLUCOSE BLD STRIP.AUTO-MCNC: 219 MG/DL (ref 65–100)
GLUCOSE SERPL-MCNC: 178 MG/DL (ref 65–100)
H PYLORI IGA SER-ACNC: <9 UNITS (ref 0–8.9)
HCT VFR BLD AUTO: 27.8 % (ref 35–47)
HGB BLD-MCNC: 8.5 G/DL (ref 11.5–16)
IMM GRANULOCYTES # BLD AUTO: 0.2 K/UL (ref 0–0.04)
IMM GRANULOCYTES NFR BLD AUTO: 1 % (ref 0–0.5)
LYMPHOCYTES # BLD: 1.4 K/UL (ref 0.8–3.5)
LYMPHOCYTES NFR BLD: 9 % (ref 12–49)
MCH RBC QN AUTO: 29 PG (ref 26–34)
MCHC RBC AUTO-ENTMCNC: 30.6 G/DL (ref 30–36.5)
MCV RBC AUTO: 94.9 FL (ref 80–99)
MONOCYTES # BLD: 0.7 K/UL (ref 0–1)
MONOCYTES NFR BLD: 4 % (ref 5–13)
NEUTS SEG # BLD: 13 K/UL (ref 1.8–8)
NEUTS SEG NFR BLD: 84 % (ref 32–75)
NRBC # BLD: 0 K/UL (ref 0–0.01)
NRBC BLD-RTO: 0 PER 100 WBC
PERFORMED BY:: ABNORMAL
PHOSPHATE SERPL-MCNC: 8 MG/DL (ref 2.6–4.7)
PLATELET # BLD AUTO: 411 K/UL (ref 150–400)
PMV BLD AUTO: 10.4 FL (ref 8.9–12.9)
POTASSIUM SERPL-SCNC: 5.6 MMOL/L (ref 3.5–5.1)
RBC # BLD AUTO: 2.93 M/UL (ref 3.8–5.2)
SODIUM SERPL-SCNC: 134 MMOL/L (ref 136–145)
WBC # BLD AUTO: 15.5 K/UL (ref 3.6–11)

## 2023-06-12 PROCEDURE — 85025 COMPLETE CBC W/AUTO DIFF WBC: CPT

## 2023-06-12 PROCEDURE — 97530 THERAPEUTIC ACTIVITIES: CPT

## 2023-06-12 PROCEDURE — 2709999900 HC NON-CHARGEABLE SUPPLY

## 2023-06-12 PROCEDURE — 6370000000 HC RX 637 (ALT 250 FOR IP): Performed by: PHYSICIAN ASSISTANT

## 2023-06-12 PROCEDURE — 90935 HEMODIALYSIS ONE EVALUATION: CPT

## 2023-06-12 PROCEDURE — 2500000003 HC RX 250 WO HCPCS: Performed by: NURSE PRACTITIONER

## 2023-06-12 PROCEDURE — 97161 PT EVAL LOW COMPLEX 20 MIN: CPT

## 2023-06-12 PROCEDURE — 6370000000 HC RX 637 (ALT 250 FOR IP): Performed by: INTERNAL MEDICINE

## 2023-06-12 PROCEDURE — 1100000000 HC RM PRIVATE

## 2023-06-12 PROCEDURE — 82962 GLUCOSE BLOOD TEST: CPT

## 2023-06-12 PROCEDURE — 6370000000 HC RX 637 (ALT 250 FOR IP): Performed by: HOSPITALIST

## 2023-06-12 PROCEDURE — 2580000003 HC RX 258: Performed by: NURSE PRACTITIONER

## 2023-06-12 PROCEDURE — 6360000002 HC RX W HCPCS: Performed by: NURSE PRACTITIONER

## 2023-06-12 PROCEDURE — 6370000000 HC RX 637 (ALT 250 FOR IP): Performed by: NURSE PRACTITIONER

## 2023-06-12 PROCEDURE — 36415 COLL VENOUS BLD VENIPUNCTURE: CPT

## 2023-06-12 PROCEDURE — 80069 RENAL FUNCTION PANEL: CPT

## 2023-06-12 RX ORDER — MIDODRINE HYDROCHLORIDE 5 MG/1
10 TABLET ORAL ONCE
Status: DISCONTINUED | OUTPATIENT
Start: 2023-06-12 | End: 2023-06-13

## 2023-06-12 RX ORDER — LACTULOSE 10 G/15ML
20 SOLUTION ORAL 3 TIMES DAILY
Status: DISCONTINUED | OUTPATIENT
Start: 2023-06-12 | End: 2023-06-12

## 2023-06-12 RX ORDER — SORBITOL SOLUTION 70 %
60 SOLUTION, ORAL MISCELLANEOUS EVERY 6 HOURS
Status: DISCONTINUED | OUTPATIENT
Start: 2023-06-12 | End: 2023-06-15 | Stop reason: HOSPADM

## 2023-06-12 RX ADMIN — ATORVASTATIN CALCIUM 20 MG: 20 TABLET, FILM COATED ORAL at 09:36

## 2023-06-12 RX ADMIN — DOXYCYCLINE HYCLATE 100 MG: 100 CAPSULE ORAL at 09:36

## 2023-06-12 RX ADMIN — CARVEDILOL 12.5 MG: 12.5 TABLET, FILM COATED ORAL at 21:02

## 2023-06-12 RX ADMIN — CEFTRIAXONE SODIUM 1000 MG: 1 INJECTION, POWDER, FOR SOLUTION INTRAMUSCULAR; INTRAVENOUS at 18:18

## 2023-06-12 RX ADMIN — DOXYCYCLINE HYCLATE 100 MG: 100 CAPSULE ORAL at 21:02

## 2023-06-12 RX ADMIN — SORBITOL SOLUTION (BULK) 60 ML: 70 SOLUTION at 09:36

## 2023-06-12 RX ADMIN — PANTOPRAZOLE SODIUM 40 MG: 40 TABLET, DELAYED RELEASE ORAL at 09:35

## 2023-06-12 RX ADMIN — INSULIN GLARGINE 8 UNITS: 100 INJECTION, SOLUTION SUBCUTANEOUS at 21:01

## 2023-06-12 RX ADMIN — HYDROXYZINE PAMOATE 25 MG: 25 CAPSULE ORAL at 21:05

## 2023-06-12 RX ADMIN — NIFEDIPINE 60 MG: 60 TABLET, FILM COATED, EXTENDED RELEASE ORAL at 21:02

## 2023-06-12 RX ADMIN — GABAPENTIN 300 MG: 300 CAPSULE ORAL at 21:02

## 2023-06-12 RX ADMIN — SEVELAMER CARBONATE 1600 MG: 800 TABLET, FILM COATED ORAL at 18:18

## 2023-06-12 RX ADMIN — RIVAROXABAN 2.5 MG: 2.5 TABLET, FILM COATED ORAL at 10:13

## 2023-06-12 RX ADMIN — RIVAROXABAN 2.5 MG: 2.5 TABLET, FILM COATED ORAL at 21:05

## 2023-06-12 RX ADMIN — FUROSEMIDE 80 MG: 40 TABLET ORAL at 18:18

## 2023-06-12 RX ADMIN — METRONIDAZOLE 500 MG: 500 INJECTION, SOLUTION INTRAVENOUS at 09:41

## 2023-06-12 ASSESSMENT — PAIN SCALES - GENERAL
PAINLEVEL_OUTOF10: 0
PAINLEVEL_OUTOF10: 0

## 2023-06-12 NOTE — PLAN OF CARE
Problem: Physical Therapy - Adult  Goal: By Discharge: Performs mobility at highest level of function for planned discharge setting. See evaluation for individualized goals. Description: FUNCTIONAL STATUS PRIOR TO ADMISSION: Patient was modified independent using a rolling walker for functional mobility. HOME SUPPORT PRIOR TO ADMISSION: The patient lived with spouse and required  assistance for ADL's. Physical Therapy Goals  Initiated 6/12/2023  Pt stated goal: To go home  Pt will be I with [facility] in 7 days. Pt will perform bed mobility with [place] in 7 days. Pt will perform transfers with Modified [place] in 7 days. Pt will amb >200 feet with LRAD safely with Modified [place], SpO2>90% in 7 days. Outcome: Progressing    PHYSICAL THERAPY EVALUATION  Patient: Dusty León (58 y.o. female)  Date: 6/12/2023  Primary Diagnosis: ESRD (end stage renal disease) ([facility] [address] [N18.6]  Fluid overload [E87.70]  Hypervolemia, unspecified hypervolemia type [E87.70]  Right lower lobe pulmonary infiltrate [R91.8]       Precautions: Fall Risk                In place during session: Nasal Cannula 2L  ASSESSMENT  Pt is a 50 y.o. female with PMH of PMH significant for ESRD on HD, remote CVA, DM, HTN, HLD, IBS, PAD, recent left toe thrombus status post thrombectomy on AC came to [facility] ED with c/o weakness, diarrhea and increased shortness of breath,   admitted on 6/9/2023 for fluid overload, R lower lobe pulmonary infiltrate. Pt received  semi-supine in bed upon PT arrival, agreeable to evaluation. Pt A&O x 4. Based on the objective data described below, the patient currently presents with impaired functional mobility, decreased ROM, impaired strength, decreased activity tolerance, and impaired balance. (See below for objective details and assist levels).      Overall pt tolerated session fair today, requires SBA for bed mobility, CGA for all functional transfers, able to ambulate- 39' with Rw, CGA , demos slow

## 2023-06-12 NOTE — ADT AUTH CERT
(RENVELA) tablet 800 mg      800 mg     Oral     TID WC            pantoprazole (PROTONIX) tablet 40 mg      40 mg     Oral     QAM AC            polyethylene glycol (GLYCOLAX) packet 17 g      17 g     Oral     Daily PRN            aluminum & magnesium hydroxide-simethicone (MAALOX) 200-200-20 MG/5ML suspension 30 mL      30 mL     Oral     Q6H PRN            acetaminophen (TYLENOL) tablet 650 mg      650 mg     Oral     Q6H PRN             Or            acetaminophen (TYLENOL) suppository 650 mg      650 mg     Rectal     Q6H PRN            insulin lispro (HUMALOG) injection vial 0-8 Units      0-8 Units     SubCUTAneous     TID WC            insulin lispro (HUMALOG) injection vial 0-4 Units      0-4 Units     SubCUTAneous     Nightly            glucose chewable tablet 16 g      4 tablet     Oral     PRN            dextrose bolus 10% 125 mL      125 mL     IntraVENous     PRN             Or            dextrose bolus 10% 250 mL      250 mL     IntraVENous     PRN            glucagon injection 1 mg      1 mg     SubCUTAneous     PRN            dextrose 10 % infusion           IntraVENous     Continuous PRN            metronidazole (FLAGYL) 500 mg in 0.9% NaCl 100 mL IVPB premix      500 mg     IntraVENous     Q8H            hydrOXYzine pamoate (VISTARIL) capsule 25 mg      25 mg     Oral     Nightly PRN                 Review of Systems:    A detailed 10 organ review of systems is obtained with pertinent positives as listed in the History of Present Illness and Past Medical History. All others are negative. Objective:            Physical Exam:    /60   Pulse 69   Temp 98.2 °F (36.8 °C) (Oral)   Resp 16   Ht 1.753 m (5' 9\")   Wt 122 kg (268 lb 15.4 oz)   SpO2 94%   BMI 39.72 kg/m²           Skin:  Extremities and face reveal no rashes. No sherman erythema. No telangiectasias on the chest wall. HEENT: Sclerae anicteric. Extra-occular muscles are intact. No oral ulcers.   No abnormal

## 2023-06-12 NOTE — DIALYSIS
Pt only received 1.5 hour of 3.5 hour hemodialysis. Pt states she needed to have a BM after receiving Sorbital today. Bedpan offered, pt refused. States she wants to go back to her room to go to the bathroom. Dialysis stopped, blood rinsed back. 1.6 liters net fluid removed. [phone] [address]ie Giles notified.   No new orders received

## 2023-06-13 ENCOUNTER — APPOINTMENT (OUTPATIENT)
Facility: HOSPITAL | Age: 48
DRG: 291 | End: 2023-06-13
Payer: MEDICARE

## 2023-06-13 LAB
ALBUMIN SERPL-MCNC: 2.6 G/DL (ref 3.5–5)
ANION GAP SERPL CALC-SCNC: 9 MMOL/L (ref 5–15)
BASOPHILS # BLD: 0.1 K/UL (ref 0–0.1)
BASOPHILS NFR BLD: 1 % (ref 0–1)
BUN SERPL-MCNC: 70 MG/DL (ref 6–20)
BUN/CREAT SERPL: 10 (ref 12–20)
CA-I BLD-MCNC: 7.1 MG/DL (ref 8.5–10.1)
CHLORIDE SERPL-SCNC: 104 MMOL/L (ref 97–108)
CO2 SERPL-SCNC: 23 MMOL/L (ref 21–32)
CREAT SERPL-MCNC: 7.32 MG/DL (ref 0.55–1.02)
CRP SERPL-MCNC: 7.7 MG/DL (ref 0–0.6)
DIFFERENTIAL METHOD BLD: ABNORMAL
EOSINOPHIL # BLD: 0.4 K/UL (ref 0–0.4)
EOSINOPHIL NFR BLD: 3 % (ref 0–7)
ERYTHROCYTE [DISTWIDTH] IN BLOOD BY AUTOMATED COUNT: 13.6 % (ref 11.5–14.5)
GLUCOSE BLD STRIP.AUTO-MCNC: 157 MG/DL (ref 65–100)
GLUCOSE BLD STRIP.AUTO-MCNC: 164 MG/DL (ref 65–100)
GLUCOSE BLD STRIP.AUTO-MCNC: 252 MG/DL (ref 65–100)
GLUCOSE BLD STRIP.AUTO-MCNC: 58 MG/DL (ref 65–100)
GLUCOSE BLD STRIP.AUTO-MCNC: 71 MG/DL (ref 65–100)
GLUCOSE SERPL-MCNC: 157 MG/DL (ref 65–100)
HCT VFR BLD AUTO: 26.9 % (ref 35–47)
HGB BLD-MCNC: 8.2 G/DL (ref 11.5–16)
IMM GRANULOCYTES # BLD AUTO: 0.1 K/UL (ref 0–0.04)
IMM GRANULOCYTES NFR BLD AUTO: 1 % (ref 0–0.5)
LYMPHOCYTES # BLD: 1.9 K/UL (ref 0.8–3.5)
LYMPHOCYTES NFR BLD: 14 % (ref 12–49)
MCH RBC QN AUTO: 29.4 PG (ref 26–34)
MCHC RBC AUTO-ENTMCNC: 30.5 G/DL (ref 30–36.5)
MCV RBC AUTO: 96.4 FL (ref 80–99)
MONOCYTES # BLD: 0.8 K/UL (ref 0–1)
MONOCYTES NFR BLD: 7 % (ref 5–13)
NEUTS SEG # BLD: 9.6 K/UL (ref 1.8–8)
NEUTS SEG NFR BLD: 74 % (ref 32–75)
NRBC # BLD: 0.03 K/UL (ref 0–0.01)
NRBC BLD-RTO: 0.2 PER 100 WBC
PERFORMED BY:: ABNORMAL
PERFORMED BY:: NORMAL
PHOSPHATE SERPL-MCNC: 6.2 MG/DL (ref 2.6–4.7)
PLATELET # BLD AUTO: 350 K/UL (ref 150–400)
PMV BLD AUTO: 10.3 FL (ref 8.9–12.9)
POTASSIUM SERPL-SCNC: 4.6 MMOL/L (ref 3.5–5.1)
PROCALCITONIN SERPL-MCNC: 1.15 NG/ML
RBC # BLD AUTO: 2.79 M/UL (ref 3.8–5.2)
SODIUM SERPL-SCNC: 136 MMOL/L (ref 136–145)
WBC # BLD AUTO: 12.9 K/UL (ref 3.6–11)

## 2023-06-13 PROCEDURE — 85025 COMPLETE CBC W/AUTO DIFF WBC: CPT

## 2023-06-13 PROCEDURE — 6370000000 HC RX 637 (ALT 250 FOR IP): Performed by: HOSPITALIST

## 2023-06-13 PROCEDURE — 80069 RENAL FUNCTION PANEL: CPT

## 2023-06-13 PROCEDURE — 6370000000 HC RX 637 (ALT 250 FOR IP): Performed by: INTERNAL MEDICINE

## 2023-06-13 PROCEDURE — 2700000000 HC OXYGEN THERAPY PER DAY

## 2023-06-13 PROCEDURE — 6360000002 HC RX W HCPCS: Performed by: NURSE PRACTITIONER

## 2023-06-13 PROCEDURE — 2500000003 HC RX 250 WO HCPCS: Performed by: NURSE PRACTITIONER

## 2023-06-13 PROCEDURE — 36415 COLL VENOUS BLD VENIPUNCTURE: CPT

## 2023-06-13 PROCEDURE — 97116 GAIT TRAINING THERAPY: CPT

## 2023-06-13 PROCEDURE — 1100000000 HC RM PRIVATE

## 2023-06-13 PROCEDURE — 2580000003 HC RX 258: Performed by: NURSE PRACTITIONER

## 2023-06-13 PROCEDURE — 6370000000 HC RX 637 (ALT 250 FOR IP): Performed by: NURSE PRACTITIONER

## 2023-06-13 PROCEDURE — 82962 GLUCOSE BLOOD TEST: CPT

## 2023-06-13 PROCEDURE — 71046 X-RAY EXAM CHEST 2 VIEWS: CPT

## 2023-06-13 PROCEDURE — 94761 N-INVAS EAR/PLS OXIMETRY MLT: CPT

## 2023-06-13 PROCEDURE — 86140 C-REACTIVE PROTEIN: CPT

## 2023-06-13 PROCEDURE — 84145 PROCALCITONIN (PCT): CPT

## 2023-06-13 RX ADMIN — SEVELAMER CARBONATE 1600 MG: 800 TABLET, FILM COATED ORAL at 11:55

## 2023-06-13 RX ADMIN — FUROSEMIDE 80 MG: 40 TABLET ORAL at 17:16

## 2023-06-13 RX ADMIN — CEFTRIAXONE SODIUM 1000 MG: 1 INJECTION, POWDER, FOR SOLUTION INTRAMUSCULAR; INTRAVENOUS at 17:16

## 2023-06-13 RX ADMIN — INSULIN LISPRO 10 UNITS: 100 INJECTION, SOLUTION INTRAVENOUS; SUBCUTANEOUS at 09:57

## 2023-06-13 RX ADMIN — RIVAROXABAN 2.5 MG: 2.5 TABLET, FILM COATED ORAL at 09:55

## 2023-06-13 RX ADMIN — INSULIN GLARGINE 20 UNITS: 100 INJECTION, SOLUTION SUBCUTANEOUS at 09:56

## 2023-06-13 RX ADMIN — INSULIN GLARGINE 40 UNITS: 100 INJECTION, SOLUTION SUBCUTANEOUS at 21:25

## 2023-06-13 RX ADMIN — NIFEDIPINE 60 MG: 60 TABLET, FILM COATED, EXTENDED RELEASE ORAL at 21:25

## 2023-06-13 RX ADMIN — METRONIDAZOLE 500 MG: 500 INJECTION, SOLUTION INTRAVENOUS at 23:30

## 2023-06-13 RX ADMIN — DOXYCYCLINE HYCLATE 100 MG: 100 CAPSULE ORAL at 21:25

## 2023-06-13 RX ADMIN — HYDROXYZINE PAMOATE 25 MG: 25 CAPSULE ORAL at 21:28

## 2023-06-13 RX ADMIN — METRONIDAZOLE 500 MG: 500 INJECTION, SOLUTION INTRAVENOUS at 17:17

## 2023-06-13 RX ADMIN — RIVAROXABAN 2.5 MG: 2.5 TABLET, FILM COATED ORAL at 21:25

## 2023-06-13 RX ADMIN — METRONIDAZOLE 500 MG: 500 INJECTION, SOLUTION INTRAVENOUS at 00:01

## 2023-06-13 RX ADMIN — SEVELAMER CARBONATE 1600 MG: 800 TABLET, FILM COATED ORAL at 09:44

## 2023-06-13 RX ADMIN — PANTOPRAZOLE SODIUM 40 MG: 40 TABLET, DELAYED RELEASE ORAL at 05:25

## 2023-06-13 RX ADMIN — DOXYCYCLINE HYCLATE 100 MG: 100 CAPSULE ORAL at 09:44

## 2023-06-13 RX ADMIN — METRONIDAZOLE 500 MG: 500 INJECTION, SOLUTION INTRAVENOUS at 09:46

## 2023-06-13 RX ADMIN — ATORVASTATIN CALCIUM 20 MG: 20 TABLET, FILM COATED ORAL at 09:44

## 2023-06-13 RX ADMIN — HYDROCODONE BITARTRATE AND ACETAMINOPHEN 1 TABLET: 5; 325 TABLET ORAL at 02:21

## 2023-06-13 RX ADMIN — CARVEDILOL 12.5 MG: 12.5 TABLET, FILM COATED ORAL at 09:44

## 2023-06-13 RX ADMIN — SEVELAMER CARBONATE 1600 MG: 800 TABLET, FILM COATED ORAL at 17:15

## 2023-06-13 RX ADMIN — INSULIN LISPRO 4 UNITS: 100 INJECTION, SOLUTION INTRAVENOUS; SUBCUTANEOUS at 11:55

## 2023-06-13 RX ADMIN — INSULIN LISPRO 10 UNITS: 100 INJECTION, SOLUTION INTRAVENOUS; SUBCUTANEOUS at 11:54

## 2023-06-13 RX ADMIN — CARVEDILOL 12.5 MG: 12.5 TABLET, FILM COATED ORAL at 21:25

## 2023-06-13 RX ADMIN — GABAPENTIN 300 MG: 300 CAPSULE ORAL at 21:25

## 2023-06-13 RX ADMIN — INSULIN LISPRO 10 UNITS: 100 INJECTION, SOLUTION INTRAVENOUS; SUBCUTANEOUS at 21:25

## 2023-06-13 ASSESSMENT — PAIN SCALES - GENERAL
PAINLEVEL_OUTOF10: 0
PAINLEVEL_OUTOF10: 0
PAINLEVEL_OUTOF10: 3
PAINLEVEL_OUTOF10: 0

## 2023-06-13 ASSESSMENT — PAIN DESCRIPTION - ORIENTATION: ORIENTATION: LOWER

## 2023-06-13 ASSESSMENT — PAIN DESCRIPTION - LOCATION: LOCATION: ABDOMEN

## 2023-06-13 ASSESSMENT — PAIN DESCRIPTION - DESCRIPTORS: DESCRIPTORS: ACHING;CRAMPING

## 2023-06-13 NOTE — CARE COORDINATION
DC Plan: Home with family    PT is recommending home health. CM met with pt at the bedside to f/up on her dc plan. Pt's nurse was present. CM discussed home health. Pt declined home health services. Cm will need an RT eval to determine if pt will need home O2.

## 2023-06-13 NOTE — PLAN OF CARE
PHYSICAL THERAPY TREATMENT     Patient: Rachel Pastor (53 y.o. female)  Date: 6/13/2023  Diagnosis: ESRD (end stage renal disease) ([facility] [address] [N18.6]  Fluid overload [E87.70]  Hypervolemia, unspecified hypervolemia type [E87.70]  Right lower lobe pulmonary infiltrate [R91.8] Fluid overload      Precautions:  Fall Risk  In place during session: None  Chart, physical therapy assessment, plan of care and goals were reviewed. ASSESSMENT  Patient continues with skilled PT services and is progressing towards goals. Pt seated in the bed upon PT arrival, agreeable to session. Patient progressing well overall towards goals and was able to ambulate steadily approx 60 ft however slow gait speed noted and ambulated with decreased SpO2 on RA. Notified nursing of dip on RA to 85-86% rising to 90-91% with rest break. (See below for objective details and assist levels). Overall pt tolerated session well today. Will continue to benefit from skilled PT services, and will continue to progress as tolerated. Current Level of Function Impacting Discharge (mobility/balance): Other factors to consider for discharge: no additional factors       PLAN :  Patient continues to benefit from skilled intervention to address impaired functional mobility and impaired strength. Continue treatment per established plan of care to address goals.     Recommend with staff: Out of bed to chair for meals and Amb to bathroom for toileting with gt belt and AD    Recommendation for discharge: (in order for the patient to meet his/her long term goals)  Home with [address] [place] [address] [place] and family assist    IF patient discharges home will need the following DME:patient owns DME required for discharge       SUBJECTIVE:   Patient stated I feel drunk    OBJECTIVE DATA SUMMARY:   Critical Behavior:  Orientation  Overall Orientation Status: Within Normal Limits  Orientation Level: Oriented X4  Cognition  Overall Cognitive Status: WNL    Functional

## 2023-06-14 LAB
ALBUMIN SERPL-MCNC: 2.8 G/DL (ref 3.5–5)
ANION GAP SERPL CALC-SCNC: 9 MMOL/L (ref 5–15)
BASOPHILS # BLD: 0.1 K/UL (ref 0–0.1)
BASOPHILS NFR BLD: 1 % (ref 0–1)
BUN SERPL-MCNC: 85 MG/DL (ref 6–20)
BUN/CREAT SERPL: 10 (ref 12–20)
CA-I BLD-MCNC: 7.5 MG/DL (ref 8.5–10.1)
CHLORIDE SERPL-SCNC: 104 MMOL/L (ref 97–108)
CO2 SERPL-SCNC: 25 MMOL/L (ref 21–32)
CREAT SERPL-MCNC: 8.12 MG/DL (ref 0.55–1.02)
DIFFERENTIAL METHOD BLD: ABNORMAL
EOSINOPHIL # BLD: 0.4 K/UL (ref 0–0.4)
EOSINOPHIL NFR BLD: 3 % (ref 0–7)
ERYTHROCYTE [DISTWIDTH] IN BLOOD BY AUTOMATED COUNT: 13.8 % (ref 11.5–14.5)
GLUCOSE BLD STRIP.AUTO-MCNC: 156 MG/DL (ref 65–100)
GLUCOSE BLD STRIP.AUTO-MCNC: 174 MG/DL (ref 65–100)
GLUCOSE BLD STRIP.AUTO-MCNC: 206 MG/DL (ref 65–100)
GLUCOSE BLD STRIP.AUTO-MCNC: 216 MG/DL (ref 65–100)
GLUCOSE BLD STRIP.AUTO-MCNC: 70 MG/DL (ref 65–100)
GLUCOSE SERPL-MCNC: 144 MG/DL (ref 65–100)
HCT VFR BLD AUTO: 28 % (ref 35–47)
HGB BLD-MCNC: 8.6 G/DL (ref 11.5–16)
IMM GRANULOCYTES # BLD AUTO: 0.1 K/UL (ref 0–0.04)
IMM GRANULOCYTES NFR BLD AUTO: 1 % (ref 0–0.5)
LYMPHOCYTES # BLD: 2.2 K/UL (ref 0.8–3.5)
LYMPHOCYTES NFR BLD: 15 % (ref 12–49)
MCH RBC QN AUTO: 29.3 PG (ref 26–34)
MCHC RBC AUTO-ENTMCNC: 30.7 G/DL (ref 30–36.5)
MCV RBC AUTO: 95.2 FL (ref 80–99)
MONOCYTES # BLD: 1 K/UL (ref 0–1)
MONOCYTES NFR BLD: 7 % (ref 5–13)
NEUTS SEG # BLD: 10.6 K/UL (ref 1.8–8)
NEUTS SEG NFR BLD: 73 % (ref 32–75)
NRBC # BLD: 0.02 K/UL (ref 0–0.01)
NRBC BLD-RTO: 0.1 PER 100 WBC
PERFORMED BY:: ABNORMAL
PERFORMED BY:: NORMAL
PHOSPHATE SERPL-MCNC: 7.3 MG/DL (ref 2.6–4.7)
PLATELET # BLD AUTO: 425 K/UL (ref 150–400)
PMV BLD AUTO: 10.2 FL (ref 8.9–12.9)
POTASSIUM SERPL-SCNC: 5.1 MMOL/L (ref 3.5–5.1)
RBC # BLD AUTO: 2.94 M/UL (ref 3.8–5.2)
SODIUM SERPL-SCNC: 138 MMOL/L (ref 136–145)
WBC # BLD AUTO: 14.4 K/UL (ref 3.6–11)

## 2023-06-14 PROCEDURE — 6360000002 HC RX W HCPCS: Performed by: NURSE PRACTITIONER

## 2023-06-14 PROCEDURE — 82962 GLUCOSE BLOOD TEST: CPT

## 2023-06-14 PROCEDURE — 6370000000 HC RX 637 (ALT 250 FOR IP): Performed by: NURSE PRACTITIONER

## 2023-06-14 PROCEDURE — 2709999900 HC NON-CHARGEABLE SUPPLY

## 2023-06-14 PROCEDURE — 2580000003 HC RX 258: Performed by: NURSE PRACTITIONER

## 2023-06-14 PROCEDURE — 6370000000 HC RX 637 (ALT 250 FOR IP): Performed by: INTERNAL MEDICINE

## 2023-06-14 PROCEDURE — 2500000003 HC RX 250 WO HCPCS: Performed by: NURSE PRACTITIONER

## 2023-06-14 PROCEDURE — 94761 N-INVAS EAR/PLS OXIMETRY MLT: CPT

## 2023-06-14 PROCEDURE — 94762 N-INVAS EAR/PLS OXIMTRY CONT: CPT

## 2023-06-14 PROCEDURE — 90935 HEMODIALYSIS ONE EVALUATION: CPT

## 2023-06-14 PROCEDURE — 94618 PULMONARY STRESS TESTING: CPT

## 2023-06-14 PROCEDURE — 85025 COMPLETE CBC W/AUTO DIFF WBC: CPT

## 2023-06-14 PROCEDURE — 2700000000 HC OXYGEN THERAPY PER DAY

## 2023-06-14 PROCEDURE — 80069 RENAL FUNCTION PANEL: CPT

## 2023-06-14 PROCEDURE — 6370000000 HC RX 637 (ALT 250 FOR IP): Performed by: HOSPITALIST

## 2023-06-14 PROCEDURE — 1100000000 HC RM PRIVATE

## 2023-06-14 PROCEDURE — 36415 COLL VENOUS BLD VENIPUNCTURE: CPT

## 2023-06-14 RX ORDER — DOXYCYCLINE HYCLATE 100 MG/1
100 CAPSULE ORAL EVERY 12 HOURS SCHEDULED
Qty: 4 CAPSULE | Refills: 0 | Status: SHIPPED | OUTPATIENT
Start: 2023-06-14 | End: 2023-06-16

## 2023-06-14 RX ORDER — CARVEDILOL 12.5 MG/1
12.5 TABLET ORAL 2 TIMES DAILY
Qty: 60 TABLET | Refills: 3 | Status: SHIPPED | OUTPATIENT
Start: 2023-06-14

## 2023-06-14 RX ORDER — AMOXICILLIN AND CLAVULANATE POTASSIUM 500; 125 MG/1; MG/1
1 TABLET, FILM COATED ORAL 3 TIMES DAILY
Qty: 15 TABLET | Refills: 0 | Status: SHIPPED | OUTPATIENT
Start: 2023-06-14 | End: 2023-06-19

## 2023-06-14 RX ORDER — SEVELAMER CARBONATE 800 MG/1
1600 TABLET, FILM COATED ORAL
Qty: 90 TABLET | Refills: 3 | Status: SHIPPED | OUTPATIENT
Start: 2023-06-14

## 2023-06-14 RX ORDER — NIFEDIPINE 60 MG/1
60 TABLET, EXTENDED RELEASE ORAL 2 TIMES DAILY
Qty: 30 TABLET | Refills: 3 | Status: SHIPPED | OUTPATIENT
Start: 2023-06-14

## 2023-06-14 RX ADMIN — DOXYCYCLINE HYCLATE 100 MG: 100 CAPSULE ORAL at 22:38

## 2023-06-14 RX ADMIN — CARVEDILOL 12.5 MG: 12.5 TABLET, FILM COATED ORAL at 22:39

## 2023-06-14 RX ADMIN — NIFEDIPINE 60 MG: 60 TABLET, FILM COATED, EXTENDED RELEASE ORAL at 13:07

## 2023-06-14 RX ADMIN — NIFEDIPINE 60 MG: 60 TABLET, FILM COATED, EXTENDED RELEASE ORAL at 22:39

## 2023-06-14 RX ADMIN — INSULIN LISPRO 10 UNITS: 100 INJECTION, SOLUTION INTRAVENOUS; SUBCUTANEOUS at 17:37

## 2023-06-14 RX ADMIN — ATORVASTATIN CALCIUM 20 MG: 20 TABLET, FILM COATED ORAL at 13:06

## 2023-06-14 RX ADMIN — GABAPENTIN 300 MG: 300 CAPSULE ORAL at 22:38

## 2023-06-14 RX ADMIN — PANTOPRAZOLE SODIUM 40 MG: 40 TABLET, DELAYED RELEASE ORAL at 05:49

## 2023-06-14 RX ADMIN — HYDROXYZINE PAMOATE 25 MG: 25 CAPSULE ORAL at 22:38

## 2023-06-14 RX ADMIN — DOXYCYCLINE HYCLATE 100 MG: 100 CAPSULE ORAL at 13:06

## 2023-06-14 RX ADMIN — CARVEDILOL 12.5 MG: 12.5 TABLET, FILM COATED ORAL at 13:07

## 2023-06-14 RX ADMIN — METRONIDAZOLE 500 MG: 500 INJECTION, SOLUTION INTRAVENOUS at 07:34

## 2023-06-14 RX ADMIN — RIVAROXABAN 2.5 MG: 2.5 TABLET, FILM COATED ORAL at 13:07

## 2023-06-14 RX ADMIN — CEFTRIAXONE SODIUM 1000 MG: 1 INJECTION, POWDER, FOR SOLUTION INTRAMUSCULAR; INTRAVENOUS at 15:45

## 2023-06-14 RX ADMIN — RIVAROXABAN 2.5 MG: 2.5 TABLET, FILM COATED ORAL at 22:38

## 2023-06-14 RX ADMIN — SEVELAMER CARBONATE 1600 MG: 800 TABLET, FILM COATED ORAL at 13:08

## 2023-06-14 RX ADMIN — SEVELAMER CARBONATE 1600 MG: 800 TABLET, FILM COATED ORAL at 17:36

## 2023-06-14 ASSESSMENT — 6 MINUTE WALK TEST (6MWT)
O2 SATURATION: 86
O2 SATURATION: 96
O2 FLOW RATE (L/MIN): 2
COMMENTS: ON 2 L NC
O2 FLOW RATE 3 (L/MIN): 2
DID PATIENT STOP OR PAUSE BEFORE 6 MINUTES?: NO
O2 SATURATION 3: 92
O2 SATURATION: 95
ADDTIONAL O2 FLOW RATE (L/MIN): YES
O2 SATURATION: 92
O2 SATURATION 2: 88
HEART RATE: 72
O2 FLOW RATE (L/MIN): 0
BORG DYSPNEA SCALE SCORE: 0
OXYGEN DEVICE: ROOM AIR
HEART RATE: 73
BORG DYSPNEA SCALE SCORE: 1
BORG DYSPNEA SCALE SCORE: 0
HEART RATE: 74
O2 FLOW RATE 2 (L/MIN): 1
HEART RATE: 79

## 2023-06-14 ASSESSMENT — PAIN SCALES - GENERAL
PAINLEVEL_OUTOF10: 0

## 2023-06-14 NOTE — DISCHARGE INSTR - COC
Continuity of Care Form    Patient Name: Parish Garzon   :  1975  MRN:  [id number]    Admit date:  2023  Discharge date:  2023    Code Status Order: Full Code   Advance Directives:     Admitting Physician:  Saul Montaño MD  PCP: Meaghan Berry, APRN - NP    Discharging Nurse: Justino Lundberg [facility] Unit/Room#: 575/01  Discharging Unit Phone Number: [phone]    Emergency Contact:   Extended Emergency Contact Information  Primary Emergency Contact: [address] Phone: [phone]  Relation: Spouse    Past Surgical History:  Past Surgical History:   Procedure Laterality Date     SECTION      CHOLECYSTECTOMY      CHOLECYSTECTOMY, LAPAROSCOPIC      TONSILLECTOMY      VASCULAR SURGERY         Immunization History: There is no immunization history on file for this patient.     Active Problems:  Patient Active Problem List   Diagnosis Code    Intractable nausea and vomiting R11.2    Hypoxia R09.02    Fluid overload E87.70    COVID-19 U07.1    Respiratory failure with hypoxia (HCC) J96.91    Diabetes ([facility] [address] E11.9    Dyslipidemia E78.5    Hemodialysis patient ([facility] [address] Z99.2    Hypertension I10    IBS (irritable bowel syndrome) K58.9    Hx of blood clots Z86.718    Diarrhea R19.7    Hyponatremia E87.1    Weakness R53.1    Shortness of breath R06.02    Leukocytosis D72.829    Colitis K52.9    Abdominal pain R10.9       Isolation/Infection:   Isolation            No Isolation          Patient Infection Status       Infection Onset Added Last Indicated Last Indicated By Review Planned Expiration Resolved Resolved By    None active    Resolved    C-diff Rule Out 23 Gastrointestinal Panel, Molecular (Ordered)   23 Rule-Out Test Canceled    COVID-19 (Rule Out) 23 COVID-19, Rapid (Ordered)   23 Rule-Out Test Resulted    COVID-19 23 Epic Conversion   23 Epic Conversion

## 2023-06-14 NOTE — CARE COORDINATION
DC Plan: Home with family      [facility] for oxygen    Pt qualifies for 2L continuous oxygen. Cm met with pt at the bedside. Cm discussed ordering home O2. Choice letter signed. Cm presented and discussed IMM. IMM obtained. Referral made via [facility] to [facility].     Once pt receives her portable oxygen concentrator at the bedside, she is cleared to discharge. Transition of Care Plan:    RUR: 19%  Prior Level of Functioning: independent  Disposition: home  If SNF or IPR: Date FOC offered: N/A  Date FOC received: N/A  Accepting facility: N/A  Date authorization started with reference number: N/A  Date authorization received and expires: N/A  Follow up appointments: Yes  DME needed: Oxygen  Transportation at discharge: Family  IM/IMM Medicare/ letter given: Yes  Is patient a  and connected with [place]? If yes, was [employer] transfer form completed and [facility] notified? N/A  Caregiver Contact: N/A  Discharge Caregiver contacted prior to discharge? N/A  Care Conference needed?  No  Barriers to discharge: oxygen delivery

## 2023-06-14 NOTE — DIALYSIS
Pt tolerated treatment well. No complaints voiced during or post treatment. Removed 3500ml and 72.1 liters of blood process.

## 2023-06-14 NOTE — DISCHARGE INSTR - DIET

## 2023-06-14 NOTE — DISCHARGE SUMMARY
mmol/L (L; Ref range: 136 - 145 mmol/L)  6/10/2023: BUN 49 mg/dL (H; Ref range: 6 - 20 mg/dL); BUN 48 mg/dL (H; Ref range: 6 - 20 mg/dL); Calcium 8.3 mg/dL (L; Ref range: 8.5 - 10.1 mg/dL); Calcium 8.4 mg/dL (L; Ref range: 8.5 - 10.1 mg/dL); Chloride 100 mmol/L (Ref range: 97 - 108 mmol/L); Chloride 100 mmol/L (Ref range: 97 - 108 mmol/L); CO2 26 mmol/L (Ref range: 21 - 32 mmol/L); CO2 27 mmol/L (Ref range: 21 - 32 mmol/L); Creatinine 4.12 mg/dL (H; Ref range: 0.55 - 1.02 mg/dL); Creatinine 4.11 mg/dL (H; Ref range: 0.55 - 1.02 mg/dL); Glucose 350 mg/dL (H; Ref range: 65 - 100 mg/dL); Glucose 350 mg/dL (H; Ref range: 65 - 100 mg/dL); Hematocrit 26.6 % (L; Ref range: 35.0 - 47.0 %); Hemoglobin 8.6 g/dL (L; Ref range: 11.5 - 16.0 g/dL); Potassium 4.3 mmol/L (Ref range: 3.5 - 5.1 mmol/L); Potassium 4.3 mmol/L (Ref range: 3.5 - 5.1 mmol/L); Sodium 134 mmol/L (L; Ref range: 136 - 145 mmol/L); Sodium 134 mmol/L (L; Ref range: 136 - 145 mmol/L)  Recent Labs     06/13/23  0713 06/14/23  0800   WBC 12.9* 14.4*   HGB 8.2* 8.6*   HCT 26.9* 28.0*    425*     Recent Labs     06/12/23  0708 06/13/23  0715 06/14/23  0800   * 136 138   K 5.6* 4.6 5.1    104 104   CO2 25 23 25   BUN 78* 70* 85*   CREATININE 7.24* 7.32* 8.12*   GLUCOSE 178* 157* 144*   CALCIUM 8.5 7.1* 7.5*   PHOS 8.0* 6.2* 7.3*     Recent Labs     06/12/23  0708 06/13/23  0715 06/14/23  0800   LABALBU 2.9* 2.6* 2.8*        No results for input(s): PH, PCO2, PO2 in the last 72 hours. No results for input(s): CKTOTAL, CKMB, TROPONINI in the last 72 hours.   Lab Results   Component Value Date/Time    POCGLU 70 06/14/2023 12:56 PM    POCGLU 156 06/14/2023 07:40 AM    POCGLU 164 06/13/2023 08:12 PM    POCGLU 71 06/13/2023 04:23 PM    POCGLU 58 06/13/2023 03:53 PM       Discharge time spent 35 minutes    Signed:  Tereza Bedoya MD  6/14/2023  4:02 PM

## 2023-06-15 VITALS
OXYGEN SATURATION: 98 % | BODY MASS INDEX: 39.58 KG/M2 | HEIGHT: 69 IN | DIASTOLIC BLOOD PRESSURE: 59 MMHG | RESPIRATION RATE: 20 BRPM | TEMPERATURE: 97.8 F | WEIGHT: 267.2 LBS | SYSTOLIC BLOOD PRESSURE: 123 MMHG | HEART RATE: 67 BPM

## 2023-06-15 LAB
ANION GAP SERPL CALC-SCNC: 7 MMOL/L (ref 5–15)
BASOPHILS # BLD: 0.1 K/UL (ref 0–0.1)
BASOPHILS NFR BLD: 1 % (ref 0–1)
BUN SERPL-MCNC: 64 MG/DL (ref 6–20)
BUN/CREAT SERPL: 10 (ref 12–20)
CA-I BLD-MCNC: 8.7 MG/DL (ref 8.5–10.1)
CHLORIDE SERPL-SCNC: 101 MMOL/L (ref 97–108)
CO2 SERPL-SCNC: 27 MMOL/L (ref 21–32)
CREAT SERPL-MCNC: 6.45 MG/DL (ref 0.55–1.02)
DIFFERENTIAL METHOD BLD: ABNORMAL
EOSINOPHIL # BLD: 0.4 K/UL (ref 0–0.4)
EOSINOPHIL NFR BLD: 3 % (ref 0–7)
ERYTHROCYTE [DISTWIDTH] IN BLOOD BY AUTOMATED COUNT: 13.9 % (ref 11.5–14.5)
GLUCOSE BLD STRIP.AUTO-MCNC: 233 MG/DL (ref 65–100)
GLUCOSE BLD STRIP.AUTO-MCNC: 266 MG/DL (ref 65–100)
GLUCOSE BLD STRIP.AUTO-MCNC: 79 MG/DL (ref 65–100)
GLUCOSE SERPL-MCNC: 293 MG/DL (ref 65–100)
HCT VFR BLD AUTO: 31.7 % (ref 35–47)
HGB BLD-MCNC: 9.7 G/DL (ref 11.5–16)
IMM GRANULOCYTES # BLD AUTO: 0.1 K/UL (ref 0–0.04)
IMM GRANULOCYTES NFR BLD AUTO: 1 % (ref 0–0.5)
LYMPHOCYTES # BLD: 2.1 K/UL (ref 0.8–3.5)
LYMPHOCYTES NFR BLD: 15 % (ref 12–49)
MCH RBC QN AUTO: 29.2 PG (ref 26–34)
MCHC RBC AUTO-ENTMCNC: 30.6 G/DL (ref 30–36.5)
MCV RBC AUTO: 95.5 FL (ref 80–99)
MONOCYTES # BLD: 0.7 K/UL (ref 0–1)
MONOCYTES NFR BLD: 5 % (ref 5–13)
NEUTS SEG # BLD: 11.1 K/UL (ref 1.8–8)
NEUTS SEG NFR BLD: 75 % (ref 32–75)
NRBC # BLD: 0 K/UL (ref 0–0.01)
NRBC BLD-RTO: 0 PER 100 WBC
PERFORMED BY:: ABNORMAL
PERFORMED BY:: ABNORMAL
PERFORMED BY:: NORMAL
PLATELET # BLD AUTO: 385 K/UL (ref 150–400)
PMV BLD AUTO: 10.3 FL (ref 8.9–12.9)
POTASSIUM SERPL-SCNC: 4.6 MMOL/L (ref 3.5–5.1)
RBC # BLD AUTO: 3.32 M/UL (ref 3.8–5.2)
SODIUM SERPL-SCNC: 135 MMOL/L (ref 136–145)
WBC # BLD AUTO: 14.5 K/UL (ref 3.6–11)

## 2023-06-15 PROCEDURE — 80048 BASIC METABOLIC PNL TOTAL CA: CPT

## 2023-06-15 PROCEDURE — 82962 GLUCOSE BLOOD TEST: CPT

## 2023-06-15 PROCEDURE — 6370000000 HC RX 637 (ALT 250 FOR IP): Performed by: NURSE PRACTITIONER

## 2023-06-15 PROCEDURE — 6370000000 HC RX 637 (ALT 250 FOR IP): Performed by: HOSPITALIST

## 2023-06-15 PROCEDURE — 6370000000 HC RX 637 (ALT 250 FOR IP): Performed by: INTERNAL MEDICINE

## 2023-06-15 PROCEDURE — 36415 COLL VENOUS BLD VENIPUNCTURE: CPT

## 2023-06-15 PROCEDURE — 85025 COMPLETE CBC W/AUTO DIFF WBC: CPT

## 2023-06-15 RX ORDER — METRONIDAZOLE 500 MG/1
500 TABLET ORAL EVERY 8 HOURS SCHEDULED
Status: DISCONTINUED | OUTPATIENT
Start: 2023-06-15 | End: 2023-06-15 | Stop reason: HOSPADM

## 2023-06-15 RX ADMIN — PANTOPRAZOLE SODIUM 40 MG: 40 TABLET, DELAYED RELEASE ORAL at 05:16

## 2023-06-15 RX ADMIN — RIVAROXABAN 2.5 MG: 2.5 TABLET, FILM COATED ORAL at 08:57

## 2023-06-15 RX ADMIN — INSULIN LISPRO 10 UNITS: 100 INJECTION, SOLUTION INTRAVENOUS; SUBCUTANEOUS at 08:57

## 2023-06-15 RX ADMIN — INSULIN LISPRO 4 UNITS: 100 INJECTION, SOLUTION INTRAVENOUS; SUBCUTANEOUS at 08:58

## 2023-06-15 RX ADMIN — CARVEDILOL 12.5 MG: 12.5 TABLET, FILM COATED ORAL at 08:47

## 2023-06-15 RX ADMIN — SEVELAMER CARBONATE 1600 MG: 800 TABLET, FILM COATED ORAL at 17:40

## 2023-06-15 RX ADMIN — SEVELAMER CARBONATE 1600 MG: 800 TABLET, FILM COATED ORAL at 08:47

## 2023-06-15 RX ADMIN — INSULIN LISPRO 10 UNITS: 100 INJECTION, SOLUTION INTRAVENOUS; SUBCUTANEOUS at 12:06

## 2023-06-15 RX ADMIN — METRONIDAZOLE 500 MG: 500 TABLET ORAL at 14:04

## 2023-06-15 RX ADMIN — ATORVASTATIN CALCIUM 20 MG: 20 TABLET, FILM COATED ORAL at 08:47

## 2023-06-15 RX ADMIN — INSULIN LISPRO 2 UNITS: 100 INJECTION, SOLUTION INTRAVENOUS; SUBCUTANEOUS at 12:06

## 2023-06-15 RX ADMIN — METRONIDAZOLE 500 MG: 500 TABLET ORAL at 08:48

## 2023-06-15 RX ADMIN — NIFEDIPINE 60 MG: 60 TABLET, FILM COATED, EXTENDED RELEASE ORAL at 08:47

## 2023-06-15 RX ADMIN — FUROSEMIDE 80 MG: 40 TABLET ORAL at 08:48

## 2023-06-15 RX ADMIN — DOXYCYCLINE HYCLATE 100 MG: 100 CAPSULE ORAL at 08:48

## 2023-06-15 RX ADMIN — SEVELAMER CARBONATE 1600 MG: 800 TABLET, FILM COATED ORAL at 12:05

## 2023-06-15 RX ADMIN — INSULIN GLARGINE 40 UNITS: 100 INJECTION, SOLUTION SUBCUTANEOUS at 08:48

## 2023-06-15 ASSESSMENT — PAIN SCALES - GENERAL: PAINLEVEL_OUTOF10: 0

## 2023-06-15 NOTE — CARE COORDINATION
DC Plan: Home with family    [facility] is waiting on auth from [place] to approve home oxygen. Cm reached out to [employer] to see if they may be able to get a quicker response from insurance. Priyanka Maria is not in network with pt's insurance. KELLY has not heard back from [address] [facility] with pt at the bedside and gave her an update.     ______________________________________    Cm escalated the oxygen situation to another member at [address] [place], [place] [address]way was informed they will deliver a portable tank to the bedside today. Cm updated pt's nurse. Once pt receives her portable tank today, she can discharge.

## 2023-06-16 NOTE — PROGRESS NOTES
Ambulatory oximetry completed. Patient required 2 L NC to maintain SpO2 > 88%. Details below.      06/14/23 1400   Data Measured Before Walk   HR 73   O2 Saturation 92   O2 Device Room air   Eugene Dyspnea Scale 0   Data Measured During the Walk   Heart Rate 74   O2 Flow Rate (l/min) 0 l/min   O2 Saturation 86   Need Additional O2 Flow Rate Rows Yes   O2 Flow Rate (l/min) 1 l/min   O2 Saturation 88   O2 Flow Rate (l/min) 2 l/min   O2 Saturation 92   Eugene Dyspnea Scale 1   Data Measured Immediately After Walk   Did Patient Stop or Pause Before 6 Minutes No   Heart Rate 79   O2 Flow Rate (l/min) 2 l/min   O2 Saturation 95   Eugene Dyspnea Scale 0   Data Measured 5 Minutes After Walk   Heart Rate 72   O2 Saturation 96   Comments On 2 L NC
Attempted PT tx twice today and patient not feeling well each attempt, will follow up at another time.
Comprehensive Nutrition Assessment    Type and Reason for Visit:  Initial, RD Nutrition Re-Screen/LOS (x6 days)    Nutrition Recommendations/Plan:   Continue diet as 4 Carb, Cardiac, Low Phos. Monitor and document PO intakes in I/Os. Malnutrition Assessment:  Malnutrition Status:  Mild malnutrition (06/15/23 1231)    Context:  Acute Illness     Findings of the 6 clinical characteristics of malnutrition:  Energy Intake:  No significant decrease in energy intake  Weight Loss:  No significant weight loss     Body Fat Loss:  No significant body fat loss     Muscle Mass Loss:  No significant muscle mass loss    Fluid Accumulation:  Mild (?nutrition related) Extremities   Strength:  Not Performed    Nutrition Assessment:    Admitted for fluid overload, diarrhea, hyponatremia, SOB, colitis. Screened for LOS. No h/o weight loss nor poor intakes noted. Good appetite w/ 100% of B tray observed consumed- no GI intolerance reported. Modify diet for therapeutic management. Labs: BUN 85, Cr 8.12, GFR 6, Phos 7.3, Ca 7.5, Alb 2.8, H/H 8.6/28.0, POC BG  mg/dL. Meds: PPI, humalog, lasix, vibramycin, renvela, nifedipine, lantus, coreg, lipitor, flagyl, xarelto. Nutrition Related Findings:    NFPE deferred, observed as well nourished. No N/V/D/C nor chewing/swallowing difficulties reported. Denied dysphagia hx. Last BM 6/13. +1 pitting BLE edema noted. Wound Type: None       Current Nutrition Intake & Therapies:    Average Meal Intake: %  Average Supplements Intake: None Ordered  ADULT DIET; Regular; 3 carb choices (45 gm/meal); Low Fat/Low Chol/High Fiber/GLORIA    Anthropometric Measures:  Height: 175.3 cm (5' 9\")  Ideal Body Weight (IBW): 145 lbs (66 kg)    Current Body Weight: 267 lb 3.2 oz (121.2 kg), 184.3 % IBW.  Weight Source: Stated  Current BMI (kg/m2): 39.4  Usual Body Weight:  (UTO)  BMI Categories: Obese Class 2 (BMI 35.0 -39.9)    Estimated Daily Nutrient Needs:  Energy Requirements Based On:
Consult  Pulmonary, Critical Care    Name: Kaia Pyle MRN: [id number]   : 1975 Hospital: [facility]   Date: 2023  Admission date: 2023 Hospital Day: 6       Subjective/Interval History:   Seen on the medical floor sitting up on the side of the bed currently on nasal oxygen 2 L with saturation 94% and on room air 86% history is stomach problems over the last week with nausea 1 or 2 episodes of vomiting and daily diarrhea multiple episodes per day. She was so sick she missed dialysis Wednesday had worsening shortness of breath although the diet diarrhea cleared on Thursday came to the hospital chest x-ray showed pulmonary edema oxygen was low she had emergent dialysis Friday with 4.1 L removed and today 2.6 removed CT of the abdomen showed pulmonary edema bilateral effusions small pericardial effusion mild anasarca and a 2 cm pulmonary nodule right lower lobe not present on a previous CT of 2022 feels better currently on room air oxygen saturation 89 to 91%. Have placed back on 2 L. In talking with her today he has been does state that she has a history of sleep apnea her machine stopped working 5 years ago and she was being seen by [facility] at that time and did not want to see them so never had follow-up with her machine not functioning.  states that she has snoring at night breath-holding spells with loud snoring snores. She will fall asleep during the daytime if she rides in the car with him or if she is sitting still   remains on nasal oxygen 2 L room air oxygen saturation earlier today 88%. She is for dialysis today   remains on nasal oxygen. She only completed 1-1/2 hours of dialysis yesterday and did not wear the overnight oximetry last night.    overnight pulse oximetry  shows desaturation     Patient Active Problem List   Diagnosis    Intractable nausea and vomiting    Hypoxia    Fluid overload    COVID-19    Respiratory failure
For discharge today, home 02 delivered at bedside. Discharge documents handed and instructed to patient. No issues prior to home.
Hospitalist Progress Note               Daily Progress Note: 6/13/2023 1:35 PM      Subjective:   Continues to feel short of breath although better than on presentation  Occasional cough and congestion. No fevers or chills. No chest pain. Still with mild abdominal discomfort. eating okay  No nausea or significant diarrhea    Assessment/Plan:   Alejandra Josue is a 50 y.o.  woman with ESRD on HD, remote CVA, DM, HTN, HLD, IBS, PAD, recent left toe thrombus status post thrombectomy on AC. Patient has felt weak for couple of weeks associated symptoms of diarrhea and increased shortness of breath. She missed her dialysis session this past Wednesday. Was hypoxic on room air 85% placed on nasal cannula oxygen also tachypneic and tachycardic. Significant labs on admission WBC 16.9, sodium 129, potassium 5.3, BNP 22,405. Chest x-ray revealing pulmonary edema and cardiomegaly. CT of abdomen obtained revealing 2 cm nodular opacity in right lower lobe new finding, pulmonary interstitial edema in lung bases, small right effusion small, moderate volume pericardial effusion, nonspecific free fluid in left paracolic gutter which may be related to infectious or inflammatory colitis, mild anasarca and mild splenomegaly. Acute on chronic heart failure preserved EF -pulmonary edema due to missed hemodialysis  Has been followed by nephrology and restarted on scheduled hemodialysis with improvement in respiratory status. Continue volume removal and reassess post dialysis tomorrow  Continue diuretics    Acute respiratory failure with hypoxia -improved with volume removal.  Completing course of antibiotics for possible aspiration Day 4 antibiotics    Pulmonary nodule-incidental finding on CT.   Will need repeat CT scan in 3 months and she follows up with pulmonologist.      Possible Colitis with abdominal pain -CT scan consistent just of of colitis and she does have left-sided abdominal pain  - Blood culture and urine
Oxygen  sats on room air range from 92% to 95% at rest
PT worked with patient and stated that oxygen sats dropped to 86% with activity.  Once patient was sitting and resting , oxygen sats came back up to 99%
Renal Progress Note    Patient: Alejandra Josue MRN: [id number]  SSN: [id number]    YOB: 1975  Age: 50 y.o. Sex: female      Admit Date: 6/9/2023    LOS: 3 days     Subjective:   Patient seen at bedside this morning. Alert and awake, no acute distress. No complaints of any swelling in lower extremities. No complaints of shortness of breath.    Complaining of abd discomfort,         Current Facility-Administered Medications   Medication Dose Route Frequency    midodrine (PROAMATINE) tablet 10 mg  10 mg Oral Once    sorbitol 70 % liquid 60 mL  60 mL Oral Q6H    sevelamer (RENVELA) tablet 1,600 mg  1,600 mg Oral TID WC    HYDROmorphone (DILAUDID) injection 1 mg  1 mg IntraVENous Q3H PRN    ondansetron (ZOFRAN-ODT) disintegrating tablet 8 mg  8 mg Oral Q4H PRN    Or    ondansetron (ZOFRAN) injection 4 mg  4 mg IntraVENous Q4H PRN    HYDROcodone-acetaminophen (NORCO)  MG per tablet 1 tablet  1 tablet Oral Q4H PRN    HYDROcodone-acetaminophen (NORCO) 5-325 MG per tablet 1 tablet  1 tablet Oral Q4H PRN    doxycycline hyclate (VIBRAMYCIN) capsule 100 mg  100 mg Oral 2 times per day    cefTRIAXone (ROCEPHIN) 1,000 mg in sterile water 10 mL IV syringe  1,000 mg IntraVENous Q24H    insulin lispro (HUMALOG) injection vial 10 Units  10 Units SubCUTAneous 4x Daily AC & HS    atorvastatin (LIPITOR) tablet 20 mg  20 mg Oral Daily    carvedilol (COREG) tablet 12.5 mg  12.5 mg Oral BID    furosemide (LASIX) tablet 80 mg  80 mg Oral Daily    gabapentin (NEURONTIN) capsule 300 mg  300 mg Oral Nightly    insulin glargine (LANTUS) injection vial 40 Units  40 Units SubCUTAneous BID    NIFEdipine (PROCARDIA XL) extended release tablet 60 mg  60 mg Oral BID    rivaroxaban (XARELTO) tablet 2.5 mg  2.5 mg Oral BID    pantoprazole (PROTONIX) tablet 40 mg  40 mg Oral QAM AC    polyethylene glycol (GLYCOLAX) packet 17 g  17 g Oral Daily PRN    aluminum & magnesium hydroxide-simethicone (MAALOX) 200-200-20 MG/5ML suspension
Renal Progress Note    Patient: Barrett Vega MRN: [id number]  SSN: [id number]    YOB: 1975  Age: 50 y.o. Sex: female      Admit Date: 6/9/2023    LOS: 6 days     Subjective:   Patient seen at bedside this morning. Alert and awake, no acute distress. No complaints of any swelling in lower extremities. eating fast food from outside        No current facility-administered medications for this encounter. Current Outpatient Medications   Medication Sig    carvedilol (COREG) 12.5 MG tablet Take 1 tablet by mouth 2 times daily    NIFEdipine (PROCARDIA XL) 60 MG extended release tablet Take 1 tablet by mouth in the morning and at bedtime    sevelamer (RENVELA) 800 MG tablet Take 2 tablets by mouth 3 times daily (with meals)    doxycycline hyclate (VIBRAMYCIN) 100 MG capsule Take 1 capsule by mouth every 12 hours for 2 days    amoxicillin-clavulanate (AUGMENTIN) 500-125 MG per tablet Take 1 tablet by mouth 3 times daily for 5 days    insulin detemir (LEVEMIR) 100 UNIT/ML injection vial Inject 40 Units into the skin 2 times daily    hydrOXYzine HCl (ATARAX) 25 MG tablet Take 1-2 tablets by mouth nightly    atorvastatin (LIPITOR) 20 MG tablet Take 1 tablet by mouth daily    furosemide (LASIX) 80 MG tablet Take 1 tablet by mouth daily    gabapentin (NEURONTIN) 300 MG capsule Take 1 capsule by mouth nightly.     omeprazole (PRILOSEC) 20 MG delayed release capsule Take 1 capsule by mouth daily    ondansetron (ZOFRAN-ODT) 4 MG disintegrating tablet Take 1 tablet by mouth every 8 hours as needed    rivaroxaban (XARELTO) 2.5 MG TABS tablet Take 1 tablet by mouth 2 times daily        Vitals:    06/15/23 0833 06/15/23 1232 06/15/23 1557 06/15/23 1615   BP: (!) 133/58  (!) 110/40 (!) 123/59   Pulse: 69  67    Resp: 20  20    Temp: 98.1 °F (36.7 °C)  97.8 °F (36.6 °C)    TempSrc: Oral  Oral    SpO2: 99%  98%    Weight:       Height:  1.753 m (5' 9\")       Objective:   General: alert awake well-oriented, no acute
Renal Progress Note    Patient: Katherine Issa MRN: [id number]  SSN: [id number]    YOB: 1975  Age: 50 y.o. Sex: female      Admit Date: 6/9/2023    LOS: 5 days     Subjective:   Patient seen on HD. Alert and awake, no acute distress. No complaints of any swelling in lower extremities. No complaints of shortness of breath.    Tolerateing HD well        Current Facility-Administered Medications   Medication Dose Route Frequency    sorbitol 70 % liquid 60 mL  60 mL Oral Q6H    sevelamer (RENVELA) tablet 1,600 mg  1,600 mg Oral TID WC    ondansetron (ZOFRAN-ODT) disintegrating tablet 8 mg  8 mg Oral Q4H PRN    Or    ondansetron (ZOFRAN) injection 4 mg  4 mg IntraVENous Q4H PRN    HYDROcodone-acetaminophen (NORCO)  MG per tablet 1 tablet  1 tablet Oral Q4H PRN    HYDROcodone-acetaminophen (NORCO) 5-325 MG per tablet 1 tablet  1 tablet Oral Q4H PRN    doxycycline hyclate (VIBRAMYCIN) capsule 100 mg  100 mg Oral 2 times per day    insulin lispro (HUMALOG) injection vial 10 Units  10 Units SubCUTAneous 4x Daily AC & HS    atorvastatin (LIPITOR) tablet 20 mg  20 mg Oral Daily    carvedilol (COREG) tablet 12.5 mg  12.5 mg Oral BID    furosemide (LASIX) tablet 80 mg  80 mg Oral Daily    gabapentin (NEURONTIN) capsule 300 mg  300 mg Oral Nightly    insulin glargine (LANTUS) injection vial 40 Units  40 Units SubCUTAneous BID    NIFEdipine (PROCARDIA XL) extended release tablet 60 mg  60 mg Oral BID    rivaroxaban (XARELTO) tablet 2.5 mg  2.5 mg Oral BID    pantoprazole (PROTONIX) tablet 40 mg  40 mg Oral QAM AC    polyethylene glycol (GLYCOLAX) packet 17 g  17 g Oral Daily PRN    aluminum & magnesium hydroxide-simethicone (MAALOX) 200-200-20 MG/5ML suspension 30 mL  30 mL Oral Q6H PRN    acetaminophen (TYLENOL) tablet 650 mg  650 mg Oral Q6H PRN    Or    acetaminophen (TYLENOL) suppository 650 mg  650 mg Rectal Q6H PRN    insulin lispro (HUMALOG) injection vial 0-8 Units  0-8 Units SubCUTAneous TID WC
Renal Progress Note    Patient: Rusty Samayoa MRN: [id number]  SSN: [id number]    YOB: 1975  Age: 50 y.o. Sex: female      Admit Date: 6/9/2023    LOS: 4 days     Subjective:   Patient seen at bedside this morning. Alert and awake, no acute distress. No complaints of any swelling in lower extremities. No complaints of shortness of breath.    Complaining of abd discomfort, but eating fast food from outside  Cut her HD treatmetn short yesterday        Current Facility-Administered Medications   Medication Dose Route Frequency    sorbitol 70 % liquid 60 mL  60 mL Oral Q6H    sevelamer (RENVELA) tablet 1,600 mg  1,600 mg Oral TID WC    ondansetron (ZOFRAN-ODT) disintegrating tablet 8 mg  8 mg Oral Q4H PRN    Or    ondansetron (ZOFRAN) injection 4 mg  4 mg IntraVENous Q4H PRN    HYDROcodone-acetaminophen (NORCO)  MG per tablet 1 tablet  1 tablet Oral Q4H PRN    HYDROcodone-acetaminophen (NORCO) 5-325 MG per tablet 1 tablet  1 tablet Oral Q4H PRN    doxycycline hyclate (VIBRAMYCIN) capsule 100 mg  100 mg Oral 2 times per day    cefTRIAXone (ROCEPHIN) 1,000 mg in sterile water 10 mL IV syringe  1,000 mg IntraVENous Q24H    insulin lispro (HUMALOG) injection vial 10 Units  10 Units SubCUTAneous 4x Daily AC & HS    atorvastatin (LIPITOR) tablet 20 mg  20 mg Oral Daily    carvedilol (COREG) tablet 12.5 mg  12.5 mg Oral BID    furosemide (LASIX) tablet 80 mg  80 mg Oral Daily    gabapentin (NEURONTIN) capsule 300 mg  300 mg Oral Nightly    insulin glargine (LANTUS) injection vial 40 Units  40 Units SubCUTAneous BID    NIFEdipine (PROCARDIA XL) extended release tablet 60 mg  60 mg Oral BID    rivaroxaban (XARELTO) tablet 2.5 mg  2.5 mg Oral BID    pantoprazole (PROTONIX) tablet 40 mg  40 mg Oral QAM AC    polyethylene glycol (GLYCOLAX) packet 17 g  17 g Oral Daily PRN    aluminum & magnesium hydroxide-simethicone (MAALOX) 200-200-20 MG/5ML suspension 30 mL  30 mL Oral Q6H PRN    acetaminophen (TYLENOL)
Respiratory Note:  Patient was placed on overnight oximetry study and placed on rm air at 2 am. At 5am , pt was found disconnected from the pulse oximeter. She stated she took a shower at 2:30 and did not reconnect the probe to the pulse oximeter. Upon speaking with the nurse, I discovered the pt disconnected the probe and threw it in the floor stating I know I desat off the oxygen.   Nona Means
Sodium 135 (L) 136 - 145 mmol/L    Potassium 4.9 3.5 - 5.1 mmol/L    Chloride 101 97 - 108 mmol/L    CO2 27 21 - 32 mmol/L    Anion Gap 7 5 - 15 mmol/L    Glucose 269 (H) 65 - 100 mg/dL    BUN 65 (H) 6 - 20 mg/dL    Creatinine 5.90 (H) 0.55 - 1.02 mg/dL    Bun/Cre Ratio 11 (L) 12 - 20      Est, Glom Filt Rate 8 (L) >60 ml/min/1.73m2    Calcium 8.4 (L) 8.5 - 10.1 mg/dL    Phosphorus 6.2 (H) 2.6 - 4.7 mg/dL    Albumin 2.6 (L) 3.5 - 5.0 g/dL   Transthoracic echocardiogram (TTE) limited with contrast, bubble, strain, and 3D PRN    Collection Time: 06/11/23 10:49 AM   Result Value Ref Range    Body Surface Area 2.44 m2    RA Area 4C 13.9 cm2    RA Volume 38 ml    Est. RA Pressure 3 mmHg    AV Peak Velocity 1.2 m/s    AV Peak Gradient 6 mmHg    Aortic Root 3.2 cm    IVSd 1.4 (A) 0.6 - 0.9 cm    LVIDd 4.6 3.9 - 5.3 cm    LVIDs 3.2 cm    LVOT Peak Velocity 0.8 m/s    LVOT Peak Gradient 2 mmHg    LVPWd 1.6 (A) 0.6 - 0.9 cm    LV E' Septal Velocity 6 cm/s    LA Diameter 4.5 cm    MV E Wave Deceleration Time 246.0 ms    MV A Velocity 0.62 m/s    MV E Velocity 0.83 m/s    PV Max Velocity 1.4 m/s    PV Peak Gradient 7 mmHg    RV Basal Dimension 3.6 cm    TR Max Velocity 2.05 m/s    TR Peak Gradient 17 mmHg    Fractional Shortening 2D 30 28 - 44 %    LVIDd Index 1.97 cm/m2    LVIDs Index 1.37 cm/m2    LV RWT Ratio 0.70     LV Mass 2D 284.8 (A) 67 - 162 g    LV Mass 2D Index 121.7 (A) 43 - 95 g/m2    MV E/A 1.34     E/E' Septal 13.83     LA Size Index 1.92 cm/m2    LA/AO Root Ratio 1.41     RA Volume Index A4C 16 mL/m2    Ao Root Index 1.37 cm/m2    AV Velocity Ratio 0.67     RVSP 20 mmHg   POCT Glucose    Collection Time: 06/11/23 12:09 PM   Result Value Ref Range    POC Glucose 248 (H) 65 - 100 mg/dL    Performed by: Joselin Stockton    POCT Glucose    Collection Time: 06/11/23  4:00 PM   Result Value Ref Range    POC Glucose 191 (H) 65 - 100 mg/dL    Performed by: Nicolasa Baker (Float [place])    POCT Glucose    Collection Time:
father and mother; Hypertension in her father and mother. SHX:  reports that she has never smoked. She has never used smokeless tobacco. She reports that she does not drink alcohol and does not use drugs. Systemic review  General has been having gastroenteritis over the last week with diarrhea and nausea 1 or 2 episodes of vomiting. No chronic abdominal problems no chronic fever chills or sweats  Eyes no double vision momentary blindness  ENT no facial pain over the sinuses or drainage  Endocrinologic no polyuria polydipsia  Musculoskeletal no swollen tender joints  Neurologic no seizures or syncope  Gastrointestinal recent diarrhea liquid multiple times per day stopped 2 days ago has had nausea and occasional episode of vomiting at home has not had any since in the hospital but has not eaten. Does not normally have intestinal problems  Genitourinary no pain or discomfort on urination  Cardiovascular no history of heart disease chest pain diaphoresis or edema  Respiratory no past history of respiratory problems lifelong non-smoker    Objective:     Vital Signs: Telemetry:    normal sinus rhythm Intake/Output:   BP (!) 133/58   Pulse 69   Temp 98.1 °F (36.7 °C) (Oral)   Resp 20   Ht 1.753 m (5' 9\")   Wt 121.2 kg (267 lb 3.2 oz)   SpO2 99%   BMI 39.46 kg/m²     Temp (24hrs), Av.2 °F (36.8 °C), Min:97.9 °F (36.6 °C), Max:98.6 °F (37 °C)        O2 Flow Rate (L/min): 2 L/min O2 Device: None (Room air)       Wt Readings from Last 4 Encounters:   23 121.2 kg (267 lb 3.2 oz)        No intake or output data in the 24 hours ending 06/15/23 1127      Last shift:      No intake/output data recorded.   Last 3 shifts:  1901 - 06/15 0700  In: 440 [P.O.:440]  Out: -        Physical Exam:   General:  female; sitting up on the side of the bed no distress no accessory muscle use  HEENT: NCAT, normal oral mucosa  Eyes: anicteric; conjunctiva clear extraocular movements intact  Neck: no nodes, no
- 14.5 %    Platelets 544 697 - 463 K/uL    MPV 10.3 8.9 - 12.9 FL    Nucleated RBCs 0.0 0.0  WBC    nRBC 0.00 0.00 - 0.01 K/uL    Neutrophils % 75 32 - 75 %    Lymphocytes % 15 12 - 49 %    Monocytes % 5 5 - 13 %    Eosinophils % 3 0 - 7 %    Basophils % 1 0 - 1 %    Immature Granulocytes 1 (H) 0 - 0.5 %    Neutrophils Absolute 11.1 (H) 1.8 - 8.0 K/UL    Lymphocytes Absolute 2.1 0.8 - 3.5 K/UL    Monocytes Absolute 0.7 0.0 - 1.0 K/UL    Eosinophils Absolute 0.4 0.0 - 0.4 K/UL    Basophils Absolute 0.1 0.0 - 0.1 K/UL    Absolute Immature Granulocyte 0.1 (H) 0.00 - 0.04 K/UL    Differential Type AUTOMATED     POCT Glucose    Collection Time: 06/15/23 11:35 AM   Result Value Ref Range    POC Glucose 233 (H) 65 - 100 mg/dL    Performed by: Ye Jones              ______________________________________________________________________________    David Robertson MD    This is dictation was done by dragon, computer voice recognition software. Quite often unanticipated grammatical, syntax, homophones and other interpretive errors or inadvertently transcribed by the computer software. Please excuse errors that have escaped final proofreading. Thank you.
C Difficile Toxin Gene WILL [[id number]] Collected: 06/10/23 1224    Order Status: Canceled Specimen: Stool     Gastrointestinal Panel, Molecular [[id number]] Collected: 06/09/23 1615    Order Status: Canceled Specimen: Stool     Culture, Urine [[id number]] Collected: 06/09/23 1600    Order Status: Canceled Specimen: Urine     COVID-19, Rapid [[id number]] Collected: 06/09/23 1129    Order Status: Completed Specimen: Nasopharyngeal Updated: 06/09/23 1202     SARS-CoV-2, Rapid Not Detected        Comment: Rapid Abbott ID Now   The specimen is NEGATIVE for SARS-CoV2, the novel coronavirus associated with COVID-19. A negative result does not rule out COVID-19. This test has been authorized by the FDA under an Emergency Use Authorization (EUA) for use by authorized laboratories. Fact sheet for Healthcare Providers:  http://www.rimma.shweta/ Fact sheet for Patients: http://www.rimma.shweta/   Methodology: Isothermal Nucleic Acid Amplification       C Difficile Toxin Gene WILL [[id number]] Collected: 06/09/23 1129    Order Status: Canceled Specimen: Stool                ARTERIAL BLOOD GAS    Pulse OX:  SpO2 Readings from Last 6 Encounters:   06/12/23 (!) 88%       Imaging:  CT ABDOMEN PELVIS WO CONTRAST Additional Contrast? None    Result Date: 6/9/2023  1. There is a rounded 2 cm nodular opacity of the RIGHT lower lobe which is new compared to the previous exam. Finding may represent neoplasm versus infiltrate. Follow-up with nonemergent CT chest is recommended. 2. There are findings of moderate pulmonary interstitial edema at the lung bases. There is a small RIGHT effusion and there is a small to moderate volume pericardial effusion. 3. Trace, nonspecific free fluid is seen in the LEFT paracolic gutter which may be related to an infectious or inflammatory colitis. 4. Mild anasarca. 5. Mild splenomegaly. No abdominopelvic lymphadenopathy.  23X     XR CHEST PORTABLE    Result Date:
lymphadenopathy. 23X     XR CHEST PORTABLE    Result Date: 6/9/2023  Pulmonary edema and cardiomegaly. Discussion missed dialysis chest x-ray with pulmonary edema pattern blood test with elevated BNP. She had dialysis 6/nine 4.1 L removed and 6/10 2.6 L removed. She is still hypoxic with oxygen saturation 86% when I take her off supplemental oxygen. She is not normally on oxygen at home. I think she still has some degree of pulmonary edema would suggest another 4 L of fluid removal at next dialysis session. She had elevated WBC count and hoarseness suggesting that she may have had aspiration at the time of one of her vomiting episodes. Will place on doxycycline. Question if the nodule in the right lung represents an inflammatory event from aspiration. She will need a repeat CT scan in 3 months  6/11 maintain oxygen at 2 L. She very likely will need home oxygen at night. She has a history of sleep apnea continues to have symptoms on the has a nonfunctioning machine. Does not want to see the original sleep doctor she used to see. Will need follow-up in the office within home sleep study to document continued need for CPAP. She has right lower lobe nodule that needs repeat CAT scan 3 months  6/12 maintain oxygen at 2 L we will check overnight oximetry split study tonight and repeat chest x-ray in a.m. after dialysis today  6/13 chest x-ray today is improved despite only receiving 1-1/2 hours of dialysis yesterday.   We will check room air oxygen saturation and if normal check overnight oximetry    Erin Andrade MD

## 2023-06-17 LAB
BACTERIA SPEC CULT: NORMAL
BACTERIA SPEC CULT: NORMAL
Lab: NORMAL
Lab: NORMAL

## 2023-10-02 ENCOUNTER — HOSPITAL ENCOUNTER (INPATIENT)
Facility: HOSPITAL | Age: 48
LOS: 2 days | Discharge: HOME | DRG: 313 | End: 2023-10-04
Attending: STUDENT IN AN ORGANIZED HEALTH CARE EDUCATION/TRAINING PROGRAM | Admitting: FAMILY MEDICINE
Payer: MEDICARE

## 2023-10-02 ENCOUNTER — APPOINTMENT (OUTPATIENT)
Facility: HOSPITAL | Age: 48
DRG: 313 | End: 2023-10-02
Payer: MEDICARE

## 2023-10-02 DIAGNOSIS — R07.9 CHEST PAIN, UNSPECIFIED TYPE: Primary | ICD-10-CM

## 2023-10-02 DIAGNOSIS — N18.9 CHRONIC KIDNEY DISEASE, UNSPECIFIED CKD STAGE: ICD-10-CM

## 2023-10-02 LAB
ALBUMIN SERPL-MCNC: 3.2 G/DL (ref 3.5–5)
ALBUMIN/GLOB SERPL: 0.8 (ref 1.1–2.2)
ALP SERPL-CCNC: 104 U/L (ref 45–117)
ALT SERPL-CCNC: 12 U/L (ref 12–78)
ANION GAP SERPL CALC-SCNC: 9 MMOL/L (ref 5–15)
AST SERPL W P-5'-P-CCNC: ABNORMAL U/L (ref 15–37)
BASOPHILS # BLD: 0.1 K/UL (ref 0–0.1)
BASOPHILS NFR BLD: 1 % (ref 0–1)
BILIRUB SERPL-MCNC: 0.4 MG/DL (ref 0.2–1)
BUN SERPL-MCNC: 40 MG/DL (ref 6–20)
BUN/CREAT SERPL: 11 (ref 12–20)
CA-I BLD-MCNC: 8.9 MG/DL (ref 8.5–10.1)
CHLORIDE SERPL-SCNC: 99 MMOL/L (ref 97–108)
CO2 SERPL-SCNC: 27 MMOL/L (ref 21–32)
CREAT SERPL-MCNC: 3.69 MG/DL (ref 0.55–1.02)
DIFFERENTIAL METHOD BLD: ABNORMAL
EKG ATRIAL RATE: 79 BPM
EKG DIAGNOSIS: NORMAL
EKG P AXIS: 31 DEGREES
EKG P-R INTERVAL: 162 MS
EKG Q-T INTERVAL: 390 MS
EKG QRS DURATION: 72 MS
EKG QTC CALCULATION (BAZETT): 447 MS
EKG R AXIS: -31 DEGREES
EKG T AXIS: 85 DEGREES
EKG VENTRICULAR RATE: 79 BPM
EOSINOPHIL # BLD: 0.4 K/UL (ref 0–0.4)
EOSINOPHIL NFR BLD: 3 % (ref 0–7)
ERYTHROCYTE [DISTWIDTH] IN BLOOD BY AUTOMATED COUNT: 12.9 % (ref 11.5–14.5)
EST. AVERAGE GLUCOSE BLD GHB EST-MCNC: 200 MG/DL
GLOBULIN SER CALC-MCNC: 3.9 G/DL (ref 2–4)
GLUCOSE BLD STRIP.AUTO-MCNC: 216 MG/DL (ref 65–100)
GLUCOSE BLD STRIP.AUTO-MCNC: 277 MG/DL (ref 65–100)
GLUCOSE SERPL-MCNC: 433 MG/DL (ref 65–100)
HBA1C MFR BLD: 8.6 % (ref 4–5.6)
HCT VFR BLD AUTO: 32.7 % (ref 35–47)
HGB BLD-MCNC: 10.9 G/DL (ref 11.5–16)
IMM GRANULOCYTES # BLD AUTO: 0.1 K/UL (ref 0–0.04)
IMM GRANULOCYTES NFR BLD AUTO: 0 % (ref 0–0.5)
LYMPHOCYTES # BLD: 2 K/UL (ref 0.8–3.5)
LYMPHOCYTES NFR BLD: 15 % (ref 12–49)
MCH RBC QN AUTO: 29.2 PG (ref 26–34)
MCHC RBC AUTO-ENTMCNC: 33.3 G/DL (ref 30–36.5)
MCV RBC AUTO: 87.7 FL (ref 80–99)
MONOCYTES # BLD: 0.8 K/UL (ref 0–1)
MONOCYTES NFR BLD: 6 % (ref 5–13)
NEUTS SEG # BLD: 10.2 K/UL (ref 1.8–8)
NEUTS SEG NFR BLD: 75 % (ref 32–75)
NRBC # BLD: 0 K/UL (ref 0–0.01)
NRBC BLD-RTO: 0 PER 100 WBC
PERFORMED BY:: ABNORMAL
PERFORMED BY:: ABNORMAL
PLATELET # BLD AUTO: 453 K/UL (ref 150–400)
PMV BLD AUTO: 10.4 FL (ref 8.9–12.9)
POTASSIUM SERPL-SCNC: 4.2 MMOL/L (ref 3.5–5.1)
POTASSIUM SERPL-SCNC: 4.5 MMOL/L (ref 3.5–5.1)
POTASSIUM SERPL-SCNC: ABNORMAL MMOL/L (ref 3.5–5.1)
POTASSIUM SERPL-SCNC: NORMAL MMOL/L (ref 3.5–5.1)
PROT SERPL-MCNC: 7.1 G/DL (ref 6.4–8.2)
RBC # BLD AUTO: 3.73 M/UL (ref 3.8–5.2)
SODIUM SERPL-SCNC: 135 MMOL/L (ref 136–145)
TROPONIN I SERPL HS-MCNC: 25 NG/L (ref 0–51)
TROPONIN I SERPL HS-MCNC: 46 NG/L (ref 0–51)
WBC # BLD AUTO: 13.6 K/UL (ref 3.6–11)

## 2023-10-02 PROCEDURE — 2060000000 HC ICU INTERMEDIATE R&B

## 2023-10-02 PROCEDURE — 2580000003 HC RX 258: Performed by: FAMILY MEDICINE

## 2023-10-02 PROCEDURE — 84484 ASSAY OF TROPONIN QUANT: CPT

## 2023-10-02 PROCEDURE — 96375 TX/PRO/DX INJ NEW DRUG ADDON: CPT

## 2023-10-02 PROCEDURE — 6370000000 HC RX 637 (ALT 250 FOR IP): Performed by: FAMILY MEDICINE

## 2023-10-02 PROCEDURE — G0257 UNSCHED DIALYSIS ESRD PT HOS: HCPCS

## 2023-10-02 PROCEDURE — 90935 HEMODIALYSIS ONE EVALUATION: CPT

## 2023-10-02 PROCEDURE — 80048 BASIC METABOLIC PNL TOTAL CA: CPT

## 2023-10-02 PROCEDURE — 99285 EMERGENCY DEPT VISIT HI MDM: CPT

## 2023-10-02 PROCEDURE — 2709999900 HC NON-CHARGEABLE SUPPLY

## 2023-10-02 PROCEDURE — 96374 THER/PROPH/DIAG INJ IV PUSH: CPT

## 2023-10-02 PROCEDURE — 6360000002 HC RX W HCPCS: Performed by: STUDENT IN AN ORGANIZED HEALTH CARE EDUCATION/TRAINING PROGRAM

## 2023-10-02 PROCEDURE — 85025 COMPLETE CBC W/AUTO DIFF WBC: CPT

## 2023-10-02 PROCEDURE — 82962 GLUCOSE BLOOD TEST: CPT

## 2023-10-02 PROCEDURE — 71045 X-RAY EXAM CHEST 1 VIEW: CPT

## 2023-10-02 PROCEDURE — 5A1D70Z PERFORMANCE OF URINARY FILTRATION, INTERMITTENT, LESS THAN 6 HOURS PER DAY: ICD-10-PCS | Performed by: FAMILY MEDICINE

## 2023-10-02 PROCEDURE — 80053 COMPREHEN METABOLIC PANEL: CPT

## 2023-10-02 PROCEDURE — 6370000000 HC RX 637 (ALT 250 FOR IP): Performed by: STUDENT IN AN ORGANIZED HEALTH CARE EDUCATION/TRAINING PROGRAM

## 2023-10-02 PROCEDURE — 83036 HEMOGLOBIN GLYCOSYLATED A1C: CPT

## 2023-10-02 PROCEDURE — 36415 COLL VENOUS BLD VENIPUNCTURE: CPT

## 2023-10-02 PROCEDURE — 93005 ELECTROCARDIOGRAM TRACING: CPT | Performed by: STUDENT IN AN ORGANIZED HEALTH CARE EDUCATION/TRAINING PROGRAM

## 2023-10-02 PROCEDURE — 80047 BASIC METABLC PNL IONIZED CA: CPT

## 2023-10-02 PROCEDURE — 84132 ASSAY OF SERUM POTASSIUM: CPT

## 2023-10-02 RX ORDER — SODIUM CHLORIDE 0.9 % (FLUSH) 0.9 %
5-40 SYRINGE (ML) INJECTION EVERY 12 HOURS SCHEDULED
Status: DISCONTINUED | OUTPATIENT
Start: 2023-10-02 | End: 2023-10-04 | Stop reason: HOSPADM

## 2023-10-02 RX ORDER — ATORVASTATIN CALCIUM 20 MG/1
20 TABLET, FILM COATED ORAL DAILY
Status: DISCONTINUED | OUTPATIENT
Start: 2023-10-02 | End: 2023-10-04 | Stop reason: HOSPADM

## 2023-10-02 RX ORDER — INSULIN LISPRO 100 [IU]/ML
0-16 INJECTION, SOLUTION INTRAVENOUS; SUBCUTANEOUS
Status: DISCONTINUED | OUTPATIENT
Start: 2023-10-02 | End: 2023-10-04 | Stop reason: HOSPADM

## 2023-10-02 RX ORDER — GABAPENTIN 300 MG/1
300 CAPSULE ORAL NIGHTLY
Status: DISCONTINUED | OUTPATIENT
Start: 2023-10-02 | End: 2023-10-04 | Stop reason: HOSPADM

## 2023-10-02 RX ORDER — CARVEDILOL 12.5 MG/1
12.5 TABLET ORAL 2 TIMES DAILY
Status: DISCONTINUED | OUTPATIENT
Start: 2023-10-02 | End: 2023-10-04 | Stop reason: HOSPADM

## 2023-10-02 RX ORDER — NIFEDIPINE 60 MG/1
60 TABLET, EXTENDED RELEASE ORAL 2 TIMES DAILY
Status: DISCONTINUED | OUTPATIENT
Start: 2023-10-02 | End: 2023-10-04 | Stop reason: HOSPADM

## 2023-10-02 RX ORDER — HYDROXYZINE HYDROCHLORIDE 25 MG/1
25 TABLET, FILM COATED ORAL
Status: DISCONTINUED | OUTPATIENT
Start: 2023-10-02 | End: 2023-10-04 | Stop reason: HOSPADM

## 2023-10-02 RX ORDER — FUROSEMIDE 40 MG/1
80 TABLET ORAL DAILY
Status: DISCONTINUED | OUTPATIENT
Start: 2023-10-02 | End: 2023-10-04 | Stop reason: HOSPADM

## 2023-10-02 RX ORDER — ONDANSETRON 2 MG/ML
4 INJECTION INTRAMUSCULAR; INTRAVENOUS ONCE
Status: COMPLETED | OUTPATIENT
Start: 2023-10-02 | End: 2023-10-02

## 2023-10-02 RX ORDER — OXYCODONE HYDROCHLORIDE 5 MG/1
5 TABLET ORAL
Status: COMPLETED | OUTPATIENT
Start: 2023-10-02 | End: 2023-10-02

## 2023-10-02 RX ORDER — FENTANYL CITRATE 50 UG/ML
50 INJECTION, SOLUTION INTRAMUSCULAR; INTRAVENOUS
Status: DISPENSED | OUTPATIENT
Start: 2023-10-02 | End: 2023-10-02

## 2023-10-02 RX ORDER — INSULIN LISPRO 100 [IU]/ML
0-4 INJECTION, SOLUTION INTRAVENOUS; SUBCUTANEOUS NIGHTLY
Status: DISCONTINUED | OUTPATIENT
Start: 2023-10-02 | End: 2023-10-04 | Stop reason: HOSPADM

## 2023-10-02 RX ORDER — ACETAMINOPHEN 325 MG/1
650 TABLET ORAL EVERY 6 HOURS PRN
Status: DISCONTINUED | OUTPATIENT
Start: 2023-10-02 | End: 2023-10-04 | Stop reason: HOSPADM

## 2023-10-02 RX ORDER — ACETAMINOPHEN 650 MG/1
650 SUPPOSITORY RECTAL EVERY 6 HOURS PRN
Status: DISCONTINUED | OUTPATIENT
Start: 2023-10-02 | End: 2023-10-04 | Stop reason: HOSPADM

## 2023-10-02 RX ORDER — ONDANSETRON 2 MG/ML
4 INJECTION INTRAMUSCULAR; INTRAVENOUS EVERY 6 HOURS PRN
Status: DISCONTINUED | OUTPATIENT
Start: 2023-10-02 | End: 2023-10-04 | Stop reason: HOSPADM

## 2023-10-02 RX ORDER — ONDANSETRON 4 MG/1
4 TABLET, ORALLY DISINTEGRATING ORAL EVERY 8 HOURS PRN
Status: DISCONTINUED | OUTPATIENT
Start: 2023-10-02 | End: 2023-10-04 | Stop reason: HOSPADM

## 2023-10-02 RX ORDER — ASPIRIN 81 MG/1
324 TABLET, CHEWABLE ORAL
Status: COMPLETED | OUTPATIENT
Start: 2023-10-02 | End: 2023-10-02

## 2023-10-02 RX ORDER — SEVELAMER CARBONATE 800 MG/1
1600 TABLET, FILM COATED ORAL
Status: DISCONTINUED | OUTPATIENT
Start: 2023-10-02 | End: 2023-10-04 | Stop reason: HOSPADM

## 2023-10-02 RX ORDER — INSULIN GLARGINE 100 [IU]/ML
40 INJECTION, SOLUTION SUBCUTANEOUS 2 TIMES DAILY
Status: DISCONTINUED | OUTPATIENT
Start: 2023-10-02 | End: 2023-10-04 | Stop reason: HOSPADM

## 2023-10-02 RX ORDER — SODIUM CHLORIDE 0.9 % (FLUSH) 0.9 %
5-40 SYRINGE (ML) INJECTION PRN
Status: DISCONTINUED | OUTPATIENT
Start: 2023-10-02 | End: 2023-10-04 | Stop reason: HOSPADM

## 2023-10-02 RX ORDER — PANTOPRAZOLE SODIUM 40 MG/1
40 TABLET, DELAYED RELEASE ORAL
Status: DISCONTINUED | OUTPATIENT
Start: 2023-10-03 | End: 2023-10-04 | Stop reason: HOSPADM

## 2023-10-02 RX ORDER — SODIUM CHLORIDE 9 MG/ML
INJECTION, SOLUTION INTRAVENOUS PRN
Status: DISCONTINUED | OUTPATIENT
Start: 2023-10-02 | End: 2023-10-04 | Stop reason: HOSPADM

## 2023-10-02 RX ORDER — DEXTROSE MONOHYDRATE 100 MG/ML
INJECTION, SOLUTION INTRAVENOUS CONTINUOUS PRN
Status: DISCONTINUED | OUTPATIENT
Start: 2023-10-02 | End: 2023-10-04 | Stop reason: HOSPADM

## 2023-10-02 RX ORDER — ACETAMINOPHEN 500 MG
1000 TABLET ORAL
Status: COMPLETED | OUTPATIENT
Start: 2023-10-02 | End: 2023-10-02

## 2023-10-02 RX ORDER — POLYETHYLENE GLYCOL 3350 17 G/17G
17 POWDER, FOR SOLUTION ORAL DAILY PRN
Status: DISCONTINUED | OUTPATIENT
Start: 2023-10-02 | End: 2023-10-04 | Stop reason: HOSPADM

## 2023-10-02 RX ORDER — TRAMADOL HYDROCHLORIDE 50 MG/1
50 TABLET ORAL EVERY 6 HOURS PRN
Status: DISCONTINUED | OUTPATIENT
Start: 2023-10-02 | End: 2023-10-04 | Stop reason: HOSPADM

## 2023-10-02 RX ADMIN — CARVEDILOL 12.5 MG: 12.5 TABLET, FILM COATED ORAL at 15:58

## 2023-10-02 RX ADMIN — NIFEDIPINE 60 MG: 60 TABLET, FILM COATED, EXTENDED RELEASE ORAL at 15:58

## 2023-10-02 RX ADMIN — GABAPENTIN 300 MG: 300 CAPSULE ORAL at 21:21

## 2023-10-02 RX ADMIN — HYDROXYZINE HYDROCHLORIDE 25 MG: 25 TABLET, FILM COATED ORAL at 23:24

## 2023-10-02 RX ADMIN — INSULIN LISPRO 8 UNITS: 100 INJECTION, SOLUTION INTRAVENOUS; SUBCUTANEOUS at 16:44

## 2023-10-02 RX ADMIN — RIVAROXABAN 2.5 MG: 2.5 TABLET, FILM COATED ORAL at 15:58

## 2023-10-02 RX ADMIN — OXYCODONE HYDROCHLORIDE 5 MG: 5 TABLET ORAL at 11:05

## 2023-10-02 RX ADMIN — ATORVASTATIN CALCIUM 20 MG: 20 TABLET, FILM COATED ORAL at 16:11

## 2023-10-02 RX ADMIN — ASPIRIN 81 MG CHEWABLE TABLET 324 MG: 81 TABLET CHEWABLE at 11:04

## 2023-10-02 RX ADMIN — SEVELAMER CARBONATE 1600 MG: 800 TABLET, FILM COATED ORAL at 16:10

## 2023-10-02 RX ADMIN — ACETAMINOPHEN 1000 MG: 500 TABLET ORAL at 09:04

## 2023-10-02 RX ADMIN — SODIUM CHLORIDE, PRESERVATIVE FREE 10 ML: 5 INJECTION INTRAVENOUS at 21:22

## 2023-10-02 RX ADMIN — ONDANSETRON 4 MG: 2 INJECTION INTRAMUSCULAR; INTRAVENOUS at 11:04

## 2023-10-02 RX ADMIN — TRAMADOL HYDROCHLORIDE 50 MG: 50 TABLET ORAL at 23:25

## 2023-10-02 ASSESSMENT — PAIN SCALES - GENERAL
PAINLEVEL_OUTOF10: 7
PAINLEVEL_OUTOF10: 5
PAINLEVEL_OUTOF10: 0
PAINLEVEL_OUTOF10: 10
PAINLEVEL_OUTOF10: 0
PAINLEVEL_OUTOF10: 8

## 2023-10-02 ASSESSMENT — PAIN DESCRIPTION - DESCRIPTORS: DESCRIPTORS: TIGHTNESS

## 2023-10-02 ASSESSMENT — PAIN - FUNCTIONAL ASSESSMENT: PAIN_FUNCTIONAL_ASSESSMENT: 0-10

## 2023-10-02 ASSESSMENT — LIFESTYLE VARIABLES
HOW OFTEN DO YOU HAVE A DRINK CONTAINING ALCOHOL: NEVER
HOW MANY STANDARD DRINKS CONTAINING ALCOHOL DO YOU HAVE ON A TYPICAL DAY: PATIENT DOES NOT DRINK

## 2023-10-02 ASSESSMENT — HEART SCORE: ECG: 1

## 2023-10-02 ASSESSMENT — PAIN DESCRIPTION - LOCATION: LOCATION: CHEST;JAW;ARM

## 2023-10-02 NOTE — ED NOTES
Lab called for recollection, Ryna Small spoke with Chaz Watkins in the lab abut multiple hemolysis. Will reattempt blood draw.      Sean Oconnor RN  10/02/23 2625

## 2023-10-02 NOTE — ED NOTES
Report from Shaylee, [address]. Introduced self to pt and addressed any needs or concerns.        Terri Schilder, RN  10/02/23 1720

## 2023-10-02 NOTE — H&P
History and Physical    NAME:   Keaton Mayer   :  1975   MRN:  [id number]     Date/Time: 10/2/2023 1:09 PM    Patient PCP: SUZETTE Britton - NP  ______________________________________________________________________       Subjective:     CHIEF COMPLAINT:     Chest pain     HISTORY OF PRESENT ILLNESS:       Patient is a 50y.o. year old female with significant history of HTN, DM, dyslipidemia, hx of diastolic heart failure, hx of  mitral regurg, ESRD with fistula port presents today with chest pain that woke her from sleep associated with nausea, jaw pain and R arm pain. It has since resolved. She denies vomiting, diarrhea. Negative EKG  Chest xray mild bibasilar atelectasis    Sodium 135  BUN 40  Cr 3.69  WBC 13.6    Past Medical History:   Diagnosis Date    Allergic rhinitis     Blood clot in vein     Chronic kidney disease     CVA (cerebral vascular accident) ([address]) 2014    Diabetes ([address])     Dyslipidemia     Hemodialysis patient ([address])     Hx of blood clots     Hypertension     IBS (irritable bowel syndrome)     Ill-defined condition     Renal failure         Past Surgical History:   Procedure Laterality Date     SECTION      CHOLECYSTECTOMY      CHOLECYSTECTOMY, LAPAROSCOPIC      TONSILLECTOMY  1983    VASCULAR SURGERY         Social History     Tobacco Use    Smoking status: Never    Smokeless tobacco: Never   Substance Use Topics    Alcohol use: Never        Family History   Problem Relation Age of Onset    Heart Disease Father     Diabetes Father     Hypertension Father     Hypertension Mother     Diabetes Mother     Heart Disease Mother        Allergies   Allergen Reactions    Fentanyl Anxiety    Sulfa Antibiotics Nausea And Vomiting    Azithromycin      Other reaction(s): Unknown (comments)    Morphine Itching        Prior to Admission medications    Medication Sig Start Date End Date Taking?  Authorizing Provider   carvedilol (COREG) 12.5 MG tablet Take 1 tablet by

## 2023-10-02 NOTE — CONSULTS
Troponin    Collection Time: 10/02/23  9:47 AM   Result Value Ref Range    Troponin, High Sensitivity 46 0 - 51 ng/L   Potassium    Collection Time: 10/02/23  9:47 AM   Result Value Ref Range    Potassium Hemolyzed, Recollection Recommended 3.5 - 5.1 mmol/L        VIRI Grigsby MD

## 2023-10-02 NOTE — ED TRIAGE NOTES
Patient complains of sudden onset chest pain while sleeping. Patient states it woke her up, she began having jaw pain and right arm pain. no rub/regular rate and rhythm

## 2023-10-02 NOTE — DIALYSIS
Pt artem 3.5 hour hemodialysis well.  3 liters net fluid removed. Report called to ED, Danielle .    Pt to return to ED in front of room 4

## 2023-10-02 NOTE — ED NOTES
Provider notified of anxiety when given fentanyl, provider stated \"this is not an allergy, the pt can have fentanyl\".      Dominic العلي RN  10/02/23 1016

## 2023-10-02 NOTE — H&P
History and Physical    NAME:   Day Mcnamara   :  1975   MRN:  [id number]     Date/Time: 10/2/2023 11:56 AM    Patient PCP: SUZETTE Sears NP  ______________________________________________________________________       Subjective:     CHIEF COMPLAINT:     Chest pain     HISTORY OF PRESENT ILLNESS:       Patient is a 50y.o. year old female with significant history of HTN, DM, dyslipidemia, hx of enlarged heart, hx of  mitral regurg, ESRD with fistula port presents today with chest pain that woke her from sleep associated with nausea, jaw pain and R arm pain. It has since resolved. She denies vomiting, diarrhea. Negative EKG  Chest xray mild bibasilar atelectasis    Sodium 135  BUN 40  Cr 3.69  WBC 13.6    Past Medical History:   Diagnosis Date    Allergic rhinitis     Blood clot in vein     Chronic kidney disease     CVA (cerebral vascular accident) ([address]) 2014    Diabetes ([address])     Dyslipidemia     Hemodialysis patient ([address])     Hx of blood clots     Hypertension     IBS (irritable bowel syndrome)     Ill-defined condition     Renal failure         Past Surgical History:   Procedure Laterality Date     SECTION  2001    CHOLECYSTECTOMY      CHOLECYSTECTOMY, LAPAROSCOPIC      TONSILLECTOMY  1983    VASCULAR SURGERY         Social History     Tobacco Use    Smoking status: Never    Smokeless tobacco: Never   Substance Use Topics    Alcohol use: Never        Family History   Problem Relation Age of Onset    Heart Disease Father     Diabetes Father     Hypertension Father     Hypertension Mother     Diabetes Mother     Heart Disease Mother        Allergies   Allergen Reactions    Fentanyl Anxiety    Sulfa Antibiotics Nausea And Vomiting    Azithromycin      Other reaction(s): Unknown (comments)    Morphine Itching        Prior to Admission medications    Medication Sig Start Date End Date Taking?  Authorizing Provider   carvedilol (COREG) 12.5 MG tablet Take 1 tablet by mouth 2

## 2023-10-02 NOTE — CONSULTS
Hemoglobin 10.9 (L) 11.5 - 16.0 g/dL    Hematocrit 32.7 (L) 35.0 - 47.0 %    MCV 87.7 80.0 - 99.0 FL    MCH 29.2 26.0 - 34.0 PG    MCHC 33.3 30.0 - 36.5 g/dL    RDW 12.9 11.5 - 14.5 %    Platelets 904 (H) 334 - 400 K/uL    MPV 10.4 8.9 - 12.9 FL    Nucleated RBCs 0.0 0.0  WBC    nRBC 0.00 0.00 - 0.01 K/uL    Neutrophils % 75 32 - 75 %    Lymphocytes % 15 12 - 49 %    Monocytes % 6 5 - 13 %    Eosinophils % 3 0 - 7 %    Basophils % 1 0 - 1 %    Immature Granulocytes 0 0 - 0.5 %    Neutrophils Absolute 10.2 (H) 1.8 - 8.0 K/UL    Lymphocytes Absolute 2.0 0.8 - 3.5 K/UL    Monocytes Absolute 0.8 0.0 - 1.0 K/UL    Eosinophils Absolute 0.4 0.0 - 0.4 K/UL    Basophils Absolute 0.1 0.0 - 0.1 K/UL    Absolute Immature Granulocyte 0.1 (H) 0.00 - 0.04 K/UL    Differential Type AUTOMATED     Comprehensive Metabolic Panel    Collection Time: 10/02/23  8:21 AM   Result Value Ref Range    Sodium 135 (L) 136 - 145 mmol/L    Potassium Hemolyzed, Recollection Recommended 3.5 - 5.1 mmol/L    Chloride 99 97 - 108 mmol/L    CO2 27 21 - 32 mmol/L    Anion Gap 9 5 - 15 mmol/L    Glucose 433 (H) 65 - 100 mg/dL    BUN 40 (H) 6 - 20 mg/dL    Creatinine 3.69 (H) 0.55 - 1.02 mg/dL    Bun/Cre Ratio 11 (L) 12 - 20      Est, Glom Filt Rate 15 (L) >60 ml/min/1.73m2    Calcium 8.9 8.5 - 10.1 mg/dL    Total Bilirubin 0.4 0.2 - 1.0 mg/dL    AST Hemolyzed, Recollection Recommended 15 - 37 U/L    ALT 12 12 - 78 U/L    Alk Phosphatase 104 45 - 117 U/L    Total Protein 7.1 6.4 - 8.2 g/dL    Albumin 3.2 (L) 3.5 - 5.0 g/dL    Globulin 3.9 2.0 - 4.0 g/dL    Albumin/Globulin Ratio 0.8 (L) 1.1 - 2.2     Troponin    Collection Time: 10/02/23  8:21 AM   Result Value Ref Range    Troponin, High Sensitivity 25 0 - 51 ng/L   Troponin    Collection Time: 10/02/23  9:47 AM   Result Value Ref Range    Troponin, High Sensitivity 46 0 - 51 ng/L   Potassium    Collection Time: 10/02/23  9:47 AM   Result Value Ref Range    Potassium Hemolyzed, Recollection

## 2023-10-02 NOTE — ED NOTES
Pt at dialysis at this time. Pt is admitted and awaiting bed assignment upon return to ER from dialysis. Cristina Valdez RN  10/02/23 9858

## 2023-10-02 NOTE — ED NOTES
Pt asked if she will take fentanyl, pt refused medication. Provider notified, new orders placed.      Sean Oconnor RN  10/02/23 2120

## 2023-10-02 NOTE — ED NOTES
Pt returning from dialysis. 3 liters/3.5 hrs per Yoni Puri RN. Session was unremarkable and pt voices no complaints.        Terri Schilder, RN  10/02/23 1645

## 2023-10-02 NOTE — CARE COORDINATION
10/02/23 1139   Service Assessment   Patient Orientation Alert and Oriented   Cognition Alert   History Provided By Patient   Primary Caregiver Self   Support Systems Spouse/Significant Other   Patient's Healthcare Decision Maker is: Legal Next of Kin   PCP Verified by CM Yes  Brendacande Sarah - seen a month ago.)   Last Visit to PCP Within last 3 months   Prior Functional Level Independent in ADLs/IADLs;Assistance with the following:;Mobility  (Walker/w/c.)   Current Functional Level Independent in ADLs/IADLs;Assistance with the following:;Mobility  (Walker/w/c.)   Can patient return to prior living arrangement Yes   Ability to make needs known: Good   Family able to assist with home care needs: Yes   Would you like for me to discuss the discharge plan with any other family members/significant others, and if so, who? Yes  ( Bonifacio Patricio.)   Financial Resources Medicare   Community Resources None   Social/Functional History   Lives With Spouse   Type of Home Trailer   Home Layout One level   Home Access Stairs to enter without rails   Entrance Stairs - Number of Steps 3 outside steps. Bathroom Shower/Tub Tub/Shower unit   Bathroom Toilet Standard   Bathroom Accessibility Accessible   Home Equipment Oxygen  (O2 @ 2 lpm via [employer].)   [address] Responsibilities Yes   Ambulation Assistance Needs assistance  (Walker/w/c.)   Active  Yes   Occupation On disability   Discharge Planning   Type of Residence Trailer/Mobile Home   Living Arrangements Spouse/Significant Other   Current Services Prior To Admission Oxygen Therapy; Other (Comment)  ([facility] MWF)   Potential Assistance Needed N/A   DME Ordered?  No   Potential Assistance Purchasing Medications No   Type of Home Care Services None   Patient expects to be discharged to: Trailer/mobile home   One/Two Story Residence One story   History of falls? 0

## 2023-10-02 NOTE — ED PROVIDER NOTES
[facility] EMERGENCY DEPT  EMERGENCY DEPARTMENT HISTORY AND PHYSICAL EXAM      Date: 10/2/2023  Patient Name: Lv Franco  MRN: [id number]  [address] [facility] 1975  Date of evaluation: 10/2/2023  Provider: Latrice Hand MD   Note Started: 8:39 AM EDT 10/2/23    HISTORY OF PRESENT ILLNESS     Chief Complaint   Patient presents with    Chest Pain       History Provided By: Patient    HPI: Lv Franco is a 50 y.o. female PMH of DM, HTN, incisional disease on dialysis, CVA, and obesity comes to the pain that she describes as soreness in that of her chest and radiating to the jaw on the right arm. There is nothing specific that makes it better or worse. Started approximately 2 hours ago. There is no similar prior symptoms. Reproducible by palpation. There is associated mild shortness of breath. She is denying any recent illnesses. No recent change in her bowel or dietary habits. She denies any coughing, pain anywhere else. She is supposed to be getting dialyzed this morning. Due to the pain she called in and canceled dialysis came to the ED.     PAST MEDICAL HISTORY   Past Medical History:  Past Medical History:   Diagnosis Date    Allergic rhinitis     Blood clot in vein     Chronic kidney disease     CVA (cerebral vascular accident) ([address]) 2014    Diabetes ([address])     Dyslipidemia     Hemodialysis patient ([address])     Hx of blood clots     Hypertension     IBS (irritable bowel syndrome)     Ill-defined condition     Renal failure        Past Surgical History:  Past Surgical History:   Procedure Laterality Date     SECTION      CHOLECYSTECTOMY      CHOLECYSTECTOMY, LAPAROSCOPIC      TONSILLECTOMY  1983    VASCULAR SURGERY         Family History:  Family History   Problem Relation Age of Onset    Heart Disease Father     Diabetes Father     Hypertension Father     Hypertension Mother     Diabetes Mother     Heart Disease Mother        Social History:  Social History     Tobacco Use    Smoking status:

## 2023-10-03 ENCOUNTER — APPOINTMENT (OUTPATIENT)
Facility: HOSPITAL | Age: 48
DRG: 313 | End: 2023-10-03
Payer: MEDICARE

## 2023-10-03 ENCOUNTER — APPOINTMENT (OUTPATIENT)
Facility: HOSPITAL | Age: 48
DRG: 313 | End: 2023-10-03
Attending: FAMILY MEDICINE
Payer: MEDICARE

## 2023-10-03 LAB
ALBUMIN SERPL-MCNC: 2.7 G/DL (ref 3.5–5)
ALBUMIN/GLOB SERPL: 0.7 (ref 1.1–2.2)
ALP SERPL-CCNC: 86 U/L (ref 45–117)
ALT SERPL-CCNC: 13 U/L (ref 12–78)
ANION GAP SERPL CALC-SCNC: 8 MMOL/L (ref 5–15)
AST SERPL W P-5'-P-CCNC: 11 U/L (ref 15–37)
BASOPHILS # BLD: 0.1 K/UL (ref 0–0.1)
BASOPHILS NFR BLD: 1 % (ref 0–1)
BILIRUB SERPL-MCNC: 0.3 MG/DL (ref 0.2–1)
BUN SERPL-MCNC: 28 MG/DL (ref 6–20)
BUN/CREAT SERPL: 8 (ref 12–20)
CA-I BLD-MCNC: 1.09 MMOL/L (ref 1.12–1.32)
CA-I BLD-MCNC: 8.3 MG/DL (ref 8.5–10.1)
CHLORIDE BLD-SCNC: 97 MMOL/L
CHLORIDE SERPL-SCNC: 99 MMOL/L (ref 97–108)
CO2 SERPL-SCNC: 26 MMOL/L (ref 21–32)
CREAT SERPL-MCNC: 3.7 MG/DL (ref 0.55–1.02)
DIFFERENTIAL METHOD BLD: ABNORMAL
ECHO AO ASC DIAM: 2.9 CM
ECHO AO ASCENDING AORTA INDEX: 1.22 CM/M2
ECHO AO ROOT DIAM: 2.5 CM
ECHO AO ROOT INDEX: 1.05 CM/M2
ECHO AV AREA PEAK VELOCITY: 2.6 CM2
ECHO AV AREA VTI: 2.7 CM2
ECHO AV AREA/BSA PEAK VELOCITY: 1.1 CM2/M2
ECHO AV AREA/BSA VTI: 1.1 CM2/M2
ECHO AV CUSP MM: 1.7 CM
ECHO AV MEAN GRADIENT: 8 MMHG
ECHO AV MEAN VELOCITY: 1.3 M/S
ECHO AV PEAK GRADIENT: 14 MMHG
ECHO AV PEAK VELOCITY: 1.9 M/S
ECHO AV VELOCITY RATIO: 0.79
ECHO AV VTI: 39 CM
ECHO BSA: 2.46 M2
ECHO BSA: 2.46 M2
ECHO EST RA PRESSURE: 5 MMHG
ECHO LA AREA 2C: 24.2 CM2
ECHO LA AREA 4C: 20 CM2
ECHO LA DIAMETER INDEX: 1.86 CM/M2
ECHO LA DIAMETER: 4.4 CM
ECHO LA MAJOR AXIS: 5.3 CM
ECHO LA MINOR AXIS: 5.1 CM
ECHO LA TO AORTIC ROOT RATIO: 1.76
ECHO LA VOL 2C: 93 ML (ref 22–52)
ECHO LA VOL 4C: 64 ML (ref 22–52)
ECHO LA VOL BP: 78 ML (ref 22–52)
ECHO LA VOL/BSA BIPLANE: 33 ML/M2 (ref 16–34)
ECHO LA VOLUME INDEX A2C: 39 ML/M2 (ref 16–34)
ECHO LA VOLUME INDEX A4C: 27 ML/M2 (ref 16–34)
ECHO LV E' LATERAL VELOCITY: 9 CM/S
ECHO LV E' SEPTAL VELOCITY: 6 CM/S
ECHO LV EDV A2C: 107 ML
ECHO LV EDV A4C: 109 ML
ECHO LV EDV INDEX A4C: 46 ML/M2
ECHO LV EDV NDEX A2C: 45 ML/M2
ECHO LV EJECTION FRACTION A2C: 69 %
ECHO LV EJECTION FRACTION A4C: 69 %
ECHO LV EJECTION FRACTION BIPLANE: 67 % (ref 55–100)
ECHO LV ESV A2C: 33 ML
ECHO LV ESV A4C: 34 ML
ECHO LV ESV INDEX A2C: 14 ML/M2
ECHO LV ESV INDEX A4C: 14 ML/M2
ECHO LV FRACTIONAL SHORTENING: 26 % (ref 28–44)
ECHO LV INTERNAL DIMENSION DIASTOLE INDEX: 2.11 CM/M2
ECHO LV INTERNAL DIMENSION DIASTOLIC MMODE: 5.3 CM (ref 3.9–5.3)
ECHO LV INTERNAL DIMENSION DIASTOLIC: 5 CM (ref 3.9–5.3)
ECHO LV INTERNAL DIMENSION SYSTOLIC INDEX: 1.56 CM/M2
ECHO LV INTERNAL DIMENSION SYSTOLIC MMODE: 2.9 CM
ECHO LV INTERNAL DIMENSION SYSTOLIC: 3.7 CM
ECHO LV IVSD MMODE: 1.1 CM (ref 0.6–0.9)
ECHO LV IVSD: 1 CM (ref 0.6–0.9)
ECHO LV MASS 2D: 194.4 G (ref 67–162)
ECHO LV MASS INDEX 2D: 82 G/M2 (ref 43–95)
ECHO LV POSTERIOR WALL DIASTOLIC MMODE: 1.1 CM (ref 0.6–0.9)
ECHO LV POSTERIOR WALL DIASTOLIC: 1.1 CM (ref 0.6–0.9)
ECHO LV RELATIVE WALL THICKNESS RATIO: 0.44
ECHO LVOT AREA: 3.1 CM2
ECHO LVOT AV VTI INDEX: 0.86
ECHO LVOT DIAM: 2 CM
ECHO LVOT MEAN GRADIENT: 5 MMHG
ECHO LVOT PEAK GRADIENT: 9 MMHG
ECHO LVOT PEAK VELOCITY: 1.5 M/S
ECHO LVOT STROKE VOLUME INDEX: 44.6 ML/M2
ECHO LVOT SV: 105.8 ML
ECHO LVOT VTI: 33.7 CM
ECHO MV A VELOCITY: 1.01 M/S
ECHO MV E DECELERATION TIME (DT): 217 MS
ECHO MV E VELOCITY: 1.17 M/S
ECHO MV E/A RATIO: 1.16
ECHO MV E/E' LATERAL: 13
ECHO MV E/E' RATIO (AVERAGED): 16.25
ECHO MV E/E' SEPTAL: 19.5
ECHO PULMONARY ARTERY END DIASTOLIC PRESSURE: 5 MMHG
ECHO PV AREA CONTINUITY EQ VELOCITY: 3.7 CM2
ECHO PV MAX VELOCITY: 1.4 M/S
ECHO PV MEAN GRADIENT: 5 MMHG
ECHO PV MEAN VELOCITY: 1 M/S
ECHO PV PEAK GRADIENT: 8 MMHG
ECHO PV REGURGITANT MAX VELOCITY: 1.1 M/S
ECHO PV VTI: 31.6 CM
ECHO QP:QS RATIO: 1.1 NO UNITS
ECHO RA AREA 4C: 14.3 CM2
ECHO RA END SYSTOLIC VOLUME APICAL 4 CHAMBER INDEX BSA: 16 ML/M2
ECHO RA VOLUME: 38 ML
ECHO RIGHT VENTRICULAR SYSTOLIC PRESSURE (RVSP): 23 MMHG
ECHO RV BASAL DIMENSION: 4.8 CM
ECHO RV LONGITUDINAL DIMENSION: 8.2 CM
ECHO RV MID DIMENSION: 4.1 CM
ECHO RV TAPSE: 2.3 CM (ref 1.7–?)
ECHO RVOT AREA: 4.2 CM2
ECHO RVOT DIAMETER: 2.3 CM
ECHO RVOT MEAN GRADIENT: 3 MMHG
ECHO RVOT PEAK GRADIENT: 6 MMHG
ECHO RVOT PEAK VELOCITY: 1.2 M/S
ECHO RVOT STROKE VOLUME: 116.7 ML
ECHO RVOT VTI: 28.1 CM
ECHO TV REGURGITANT MAX VELOCITY: 2.13 M/S
ECHO TV REGURGITANT PEAK GRADIENT: 18 MMHG
EOSINOPHIL # BLD: 0.3 K/UL (ref 0–0.4)
EOSINOPHIL NFR BLD: 3 % (ref 0–7)
ERYTHROCYTE [DISTWIDTH] IN BLOOD BY AUTOMATED COUNT: 12.8 % (ref 11.5–14.5)
GLOBULIN SER CALC-MCNC: 3.9 G/DL (ref 2–4)
GLUCOSE BLD STRIP.AUTO-MCNC: 228 MG/DL (ref 65–100)
GLUCOSE BLD STRIP.AUTO-MCNC: 330 MG/DL (ref 65–100)
GLUCOSE BLD STRIP.AUTO-MCNC: 338 MG/DL (ref 65–100)
GLUCOSE BLD STRIP.AUTO-MCNC: 347 MG/DL (ref 65–100)
GLUCOSE SERPL-MCNC: 333 MG/DL (ref 65–100)
HCT VFR BLD AUTO: 30.3 % (ref 35–47)
HGB BLD-MCNC: 9.6 G/DL (ref 11.5–16)
IMM GRANULOCYTES # BLD AUTO: 0.1 K/UL (ref 0–0.04)
IMM GRANULOCYTES NFR BLD AUTO: 0 % (ref 0–0.5)
LYMPHOCYTES # BLD: 2.2 K/UL (ref 0.8–3.5)
LYMPHOCYTES NFR BLD: 17 % (ref 12–49)
MCH RBC QN AUTO: 28.7 PG (ref 26–34)
MCHC RBC AUTO-ENTMCNC: 31.7 G/DL (ref 30–36.5)
MCV RBC AUTO: 90.7 FL (ref 80–99)
MONOCYTES # BLD: 0.8 K/UL (ref 0–1)
MONOCYTES NFR BLD: 6 % (ref 5–13)
NEUTS SEG # BLD: 9.6 K/UL (ref 1.8–8)
NEUTS SEG NFR BLD: 73 % (ref 32–75)
NRBC # BLD: 0 K/UL (ref 0–0.01)
NRBC BLD-RTO: 0 PER 100 WBC
NUC STRESS EJECTION FRACTION: 70 %
PERFORMED BY:: ABNORMAL
PHOSPHATE SERPL-MCNC: 5.1 MG/DL (ref 2.6–4.7)
PLATELET # BLD AUTO: 376 K/UL (ref 150–400)
PMV BLD AUTO: 10.3 FL (ref 8.9–12.9)
POTASSIUM BLD-SCNC: 4.4 MMOL/L (ref 3.5–5.5)
POTASSIUM SERPL-SCNC: 4.1 MMOL/L (ref 3.5–5.1)
PROT SERPL-MCNC: 6.6 G/DL (ref 6.4–8.2)
RBC # BLD AUTO: 3.34 M/UL (ref 3.8–5.2)
SODIUM BLD-SCNC: 134 MMOL/L (ref 136–145)
SODIUM SERPL-SCNC: 133 MMOL/L (ref 136–145)
STRESS BASELINE DIAS BP: 80 MMHG
STRESS BASELINE HR: 72 BPM
STRESS BASELINE ST DEPRESSION: 0 MM
STRESS BASELINE SYS BP: 163 MMHG
STRESS ST DEPRESSION: 0 MM
STRESS STAGE 1 BP: NORMAL MMHG
STRESS STAGE 1 DURATION: 1 MIN:SEC
STRESS STAGE 1 HR: 75 BPM
STRESS STAGE 2 BP: NORMAL MMHG
STRESS STAGE 2 DURATION: 2 MIN:SEC
STRESS STAGE 2 HR: 76 BPM
STRESS STAGE 3 DURATION: 3 MIN:SEC
STRESS STAGE 3 HR: 75 BPM
STRESS STAGE 4 BP: NORMAL MMHG
STRESS STAGE 4 DURATION: 4 MIN:SEC
STRESS STAGE 4 HR: 76 BPM
STRESS TARGET HR: 172 BPM
WBC # BLD AUTO: 13 K/UL (ref 3.6–11)

## 2023-10-03 PROCEDURE — 93017 CV STRESS TEST TRACING ONLY: CPT

## 2023-10-03 PROCEDURE — 2060000000 HC ICU INTERMEDIATE R&B

## 2023-10-03 PROCEDURE — 85025 COMPLETE CBC W/AUTO DIFF WBC: CPT

## 2023-10-03 PROCEDURE — A9500 TC99M SESTAMIBI: HCPCS | Performed by: INTERNAL MEDICINE

## 2023-10-03 PROCEDURE — 82962 GLUCOSE BLOOD TEST: CPT

## 2023-10-03 PROCEDURE — 6370000000 HC RX 637 (ALT 250 FOR IP): Performed by: FAMILY MEDICINE

## 2023-10-03 PROCEDURE — 84100 ASSAY OF PHOSPHORUS: CPT

## 2023-10-03 PROCEDURE — 93306 TTE W/DOPPLER COMPLETE: CPT

## 2023-10-03 PROCEDURE — 36415 COLL VENOUS BLD VENIPUNCTURE: CPT

## 2023-10-03 PROCEDURE — 6360000002 HC RX W HCPCS

## 2023-10-03 PROCEDURE — 3430000000 HC RX DIAGNOSTIC RADIOPHARMACEUTICAL: Performed by: INTERNAL MEDICINE

## 2023-10-03 PROCEDURE — 80053 COMPREHEN METABOLIC PANEL: CPT

## 2023-10-03 PROCEDURE — 2580000003 HC RX 258: Performed by: FAMILY MEDICINE

## 2023-10-03 RX ORDER — TETRAKIS(2-METHOXYISOBUTYLISOCYANIDE)COPPER(I) TETRAFLUOROBORATE 1 MG/ML
10.52 INJECTION, POWDER, LYOPHILIZED, FOR SOLUTION INTRAVENOUS
Status: COMPLETED | OUTPATIENT
Start: 2023-10-03 | End: 2023-10-03

## 2023-10-03 RX ORDER — REGADENOSON 0.08 MG/ML
INJECTION, SOLUTION INTRAVENOUS
Status: COMPLETED
Start: 2023-10-03 | End: 2023-10-03

## 2023-10-03 RX ORDER — OXYCODONE HYDROCHLORIDE 5 MG/1
5 TABLET ORAL EVERY 6 HOURS PRN
Status: DISCONTINUED | OUTPATIENT
Start: 2023-10-03 | End: 2023-10-04 | Stop reason: HOSPADM

## 2023-10-03 RX ORDER — TETRAKIS(2-METHOXYISOBUTYLISOCYANIDE)COPPER(I) TETRAFLUOROBORATE 1 MG/ML
32.1 INJECTION, POWDER, LYOPHILIZED, FOR SOLUTION INTRAVENOUS
Status: COMPLETED | OUTPATIENT
Start: 2023-10-03 | End: 2023-10-03

## 2023-10-03 RX ADMIN — PANTOPRAZOLE SODIUM 40 MG: 40 TABLET, DELAYED RELEASE ORAL at 06:36

## 2023-10-03 RX ADMIN — FUROSEMIDE 80 MG: 40 TABLET ORAL at 08:43

## 2023-10-03 RX ADMIN — INSULIN LISPRO 12 UNITS: 100 INJECTION, SOLUTION INTRAVENOUS; SUBCUTANEOUS at 12:44

## 2023-10-03 RX ADMIN — SEVELAMER CARBONATE 1600 MG: 800 TABLET, FILM COATED ORAL at 08:43

## 2023-10-03 RX ADMIN — SODIUM CHLORIDE, PRESERVATIVE FREE 10 ML: 5 INJECTION INTRAVENOUS at 08:44

## 2023-10-03 RX ADMIN — CARVEDILOL 12.5 MG: 12.5 TABLET, FILM COATED ORAL at 20:37

## 2023-10-03 RX ADMIN — NIFEDIPINE 60 MG: 60 TABLET, FILM COATED, EXTENDED RELEASE ORAL at 08:42

## 2023-10-03 RX ADMIN — TETRAKIS(2-METHOXYISOBUTYLISOCYANIDE)COPPER(I) TETRAFLUOROBORATE 10.52 MILLICURIE: 1 INJECTION, POWDER, LYOPHILIZED, FOR SOLUTION INTRAVENOUS at 09:00

## 2023-10-03 RX ADMIN — ATORVASTATIN CALCIUM 20 MG: 20 TABLET, FILM COATED ORAL at 08:43

## 2023-10-03 RX ADMIN — RIVAROXABAN 2.5 MG: 2.5 TABLET, FILM COATED ORAL at 08:43

## 2023-10-03 RX ADMIN — TETRAKIS(2-METHOXYISOBUTYLISOCYANIDE)COPPER(I) TETRAFLUOROBORATE 32.1 MILLICURIE: 1 INJECTION, POWDER, LYOPHILIZED, FOR SOLUTION INTRAVENOUS at 10:10

## 2023-10-03 RX ADMIN — HYDROXYZINE HYDROCHLORIDE 25 MG: 25 TABLET, FILM COATED ORAL at 23:16

## 2023-10-03 RX ADMIN — OXYCODONE HYDROCHLORIDE 5 MG: 5 TABLET ORAL at 16:33

## 2023-10-03 RX ADMIN — ACETAMINOPHEN 650 MG: 325 TABLET ORAL at 12:37

## 2023-10-03 RX ADMIN — SEVELAMER CARBONATE 1600 MG: 800 TABLET, FILM COATED ORAL at 16:33

## 2023-10-03 RX ADMIN — SEVELAMER CARBONATE 1600 MG: 800 TABLET, FILM COATED ORAL at 12:37

## 2023-10-03 RX ADMIN — NIFEDIPINE 60 MG: 60 TABLET, FILM COATED, EXTENDED RELEASE ORAL at 20:37

## 2023-10-03 RX ADMIN — RIVAROXABAN 2.5 MG: 2.5 TABLET, FILM COATED ORAL at 20:37

## 2023-10-03 RX ADMIN — REGADENOSON 0.4 MG: 0.08 INJECTION, SOLUTION INTRAVENOUS at 10:08

## 2023-10-03 RX ADMIN — INSULIN LISPRO 12 UNITS: 100 INJECTION, SOLUTION INTRAVENOUS; SUBCUTANEOUS at 16:33

## 2023-10-03 RX ADMIN — SODIUM CHLORIDE, PRESERVATIVE FREE 10 ML: 5 INJECTION INTRAVENOUS at 20:38

## 2023-10-03 RX ADMIN — GABAPENTIN 300 MG: 300 CAPSULE ORAL at 23:16

## 2023-10-03 ASSESSMENT — PAIN DESCRIPTION - LOCATION
LOCATION: HEAD
LOCATION: HEAD
LOCATION: HEAD;CHEST

## 2023-10-03 ASSESSMENT — PAIN SCALES - GENERAL
PAINLEVEL_OUTOF10: 8
PAINLEVEL_OUTOF10: 6
PAINLEVEL_OUTOF10: 7
PAINLEVEL_OUTOF10: 7

## 2023-10-03 ASSESSMENT — PAIN DESCRIPTION - DESCRIPTORS
DESCRIPTORS: THROBBING
DESCRIPTORS: DULL

## 2023-10-04 VITALS
DIASTOLIC BLOOD PRESSURE: 67 MMHG | HEIGHT: 69 IN | BODY MASS INDEX: 41.6 KG/M2 | HEART RATE: 72 BPM | SYSTOLIC BLOOD PRESSURE: 103 MMHG | OXYGEN SATURATION: 96 % | RESPIRATION RATE: 18 BRPM | WEIGHT: 280.87 LBS | TEMPERATURE: 98.1 F

## 2023-10-04 PROBLEM — R07.9 CHEST PAIN: Status: RESOLVED | Noted: 2023-10-02 | Resolved: 2023-10-04

## 2023-10-04 LAB
ALBUMIN SERPL-MCNC: 2.7 G/DL (ref 3.5–5)
ALBUMIN/GLOB SERPL: 0.7 (ref 1.1–2.2)
ALP SERPL-CCNC: 86 U/L (ref 45–117)
ALT SERPL-CCNC: 9 U/L (ref 12–78)
ANION GAP SERPL CALC-SCNC: 9 MMOL/L (ref 5–15)
AST SERPL W P-5'-P-CCNC: 5 U/L (ref 15–37)
BILIRUB SERPL-MCNC: 0.3 MG/DL (ref 0.2–1)
BUN SERPL-MCNC: 42 MG/DL (ref 6–20)
BUN/CREAT SERPL: 8 (ref 12–20)
CA-I BLD-MCNC: 8.3 MG/DL (ref 8.5–10.1)
CHLORIDE SERPL-SCNC: 99 MMOL/L (ref 97–108)
CO2 SERPL-SCNC: 28 MMOL/L (ref 21–32)
CREAT SERPL-MCNC: 5.12 MG/DL (ref 0.55–1.02)
ERYTHROCYTE [DISTWIDTH] IN BLOOD BY AUTOMATED COUNT: 12.9 % (ref 11.5–14.5)
GLOBULIN SER CALC-MCNC: 3.7 G/DL (ref 2–4)
GLUCOSE BLD STRIP.AUTO-MCNC: 109 MG/DL (ref 65–100)
GLUCOSE BLD STRIP.AUTO-MCNC: 377 MG/DL (ref 65–100)
GLUCOSE SERPL-MCNC: 403 MG/DL (ref 65–100)
HCT VFR BLD AUTO: 31.4 % (ref 35–47)
HGB BLD-MCNC: 9.8 G/DL (ref 11.5–16)
MCH RBC QN AUTO: 28.5 PG (ref 26–34)
MCHC RBC AUTO-ENTMCNC: 31.2 G/DL (ref 30–36.5)
MCV RBC AUTO: 91.3 FL (ref 80–99)
NRBC # BLD: 0 K/UL (ref 0–0.01)
NRBC BLD-RTO: 0 PER 100 WBC
PERFORMED BY:: ABNORMAL
PERFORMED BY:: ABNORMAL
PLATELET # BLD AUTO: 344 K/UL (ref 150–400)
PMV BLD AUTO: 10 FL (ref 8.9–12.9)
POTASSIUM SERPL-SCNC: 4.2 MMOL/L (ref 3.5–5.1)
PROT SERPL-MCNC: 6.4 G/DL (ref 6.4–8.2)
RBC # BLD AUTO: 3.44 M/UL (ref 3.8–5.2)
SODIUM SERPL-SCNC: 136 MMOL/L (ref 136–145)
WBC # BLD AUTO: 13.1 K/UL (ref 3.6–11)

## 2023-10-04 PROCEDURE — 6370000000 HC RX 637 (ALT 250 FOR IP): Performed by: FAMILY MEDICINE

## 2023-10-04 PROCEDURE — 82962 GLUCOSE BLOOD TEST: CPT

## 2023-10-04 PROCEDURE — 2709999900 HC NON-CHARGEABLE SUPPLY

## 2023-10-04 PROCEDURE — 36415 COLL VENOUS BLD VENIPUNCTURE: CPT

## 2023-10-04 PROCEDURE — 5A1D70Z PERFORMANCE OF URINARY FILTRATION, INTERMITTENT, LESS THAN 6 HOURS PER DAY: ICD-10-PCS | Performed by: FAMILY MEDICINE

## 2023-10-04 PROCEDURE — 90935 HEMODIALYSIS ONE EVALUATION: CPT

## 2023-10-04 PROCEDURE — 85027 COMPLETE CBC AUTOMATED: CPT

## 2023-10-04 PROCEDURE — 2580000003 HC RX 258: Performed by: FAMILY MEDICINE

## 2023-10-04 PROCEDURE — 80053 COMPREHEN METABOLIC PANEL: CPT

## 2023-10-04 RX ORDER — SEVELAMER CARBONATE 800 MG/1
1600 TABLET, FILM COATED ORAL
Qty: 90 TABLET | Refills: 3 | Status: SHIPPED | OUTPATIENT
Start: 2023-10-04

## 2023-10-04 RX ADMIN — RIVAROXABAN 2.5 MG: 2.5 TABLET, FILM COATED ORAL at 14:04

## 2023-10-04 RX ADMIN — SEVELAMER CARBONATE 1600 MG: 800 TABLET, FILM COATED ORAL at 13:26

## 2023-10-04 RX ADMIN — INSULIN GLARGINE 40 UNITS: 100 INJECTION, SOLUTION SUBCUTANEOUS at 08:28

## 2023-10-04 RX ADMIN — PANTOPRAZOLE SODIUM 40 MG: 40 TABLET, DELAYED RELEASE ORAL at 06:35

## 2023-10-04 RX ADMIN — SODIUM CHLORIDE, PRESERVATIVE FREE 10 ML: 5 INJECTION INTRAVENOUS at 13:26

## 2023-10-04 RX ADMIN — ATORVASTATIN CALCIUM 20 MG: 20 TABLET, FILM COATED ORAL at 13:26

## 2023-10-04 RX ADMIN — INSULIN LISPRO 16 UNITS: 100 INJECTION, SOLUTION INTRAVENOUS; SUBCUTANEOUS at 08:01

## 2023-10-04 ASSESSMENT — PAIN SCALES - GENERAL
PAINLEVEL_OUTOF10: 0
PAINLEVEL_OUTOF10: 5
PAINLEVEL_OUTOF10: 5

## 2023-10-04 ASSESSMENT — PAIN DESCRIPTION - LOCATION
LOCATION: HEAD
LOCATION: HEAD

## 2023-10-04 NOTE — DISCHARGE SUMMARY
General Daily Progress Note      Patient Name:   [facility]       YOB: 1975       Age:  50 y.o. Admit Date: 10/2/2023      Subjective:   CHIEF COMPLAINT:      Chest pain      HISTORY OF PRESENT ILLNESS:        Patient is a 50y.o. year old female with significant history of HTN, DM, dyslipidemia, hx of diastolic heart failure, hx of  mitral regurg, ESRD with fistula port presents today with chest pain that woke her from sleep associated with nausea, jaw pain and R arm pain. It has since resolved. She denies vomiting, diarrhea. Negative EKG  Chest xray mild bibasilar atelectasis     Sodium 135  BUN 40  Cr 3.69  WBC 13.6    10/3  Patient reports significant improvement in chest pain. She does note there is still some tightness she feels in her chest. She denies SOB, abdominal pain, nausea, and vomiting. Stress Test Results pending. ECHO pending. Troponin pending. 10/4  Patient is alert and oriented, examined in the dialysis center. She does note some chest pain today. She reports pain radiating from her right arm to her jaw. She denies SOB, abdominal pain, nausea. Vomiting, and diarrhea. Stress test Interpretation Summary:    Stress Combined Conclusion: Normal pharmacological myocardial perfusion study. Findings suggest a moderate risk of cardiac events. Stress Function: Left ventricular function post-stress is normal. Post-stress ejection fraction is 70%. Perfusion Comments: LV perfusion is normal.    ECG: The ECG was negative for ischemia. ECHO interpretation Summary:     Left Ventricle: Normal left ventricular systolic function with a visually estimated EF of 65 - 70%. EF by 2D Simpsons Biplane is 67%. Left ventricle size is normal. LVIDd index is 2.11 cm/m2. Mildly increased wall thickness. Mild septal thickening. Mild posterior thickening. Mass index 2D is 82.0 g/m2. Normal wall motion. Normal diastolic function.     Right Ventricle: Right ventricle is mildly

## 2023-10-04 NOTE — CARE COORDINATION
Transition of Care Plan:    RUR: 18%  Prior Level of Functioning: independent  Disposition: home self   If SNF or IPR: Date FOC offered:   Date FOC received:   Accepting facility:   Date authorization started with reference number:   Date authorization received and expires: Follow up appointments: yes  DME needed:   Transportation at discharge: family   IM/IMM Medicare/ letter given: No (1st IMM given on 10/02/23)  Is patient a  and connected with [place]? If yes, was [employer] transfer form completed and [facility] notified? Caregiver Contact: N/A  Discharge Caregiver contacted prior to discharge? N/a  Care Conference needed?  N/A  Barriers to discharge: N/A

## 2023-10-04 NOTE — DIALYSIS
Completed and well tolerated 3.5 hour treatment with 3L total fluids removed.      Telemetry infromed patient is en route to [address]    [facility] received post dialysis report

## 2023-10-04 NOTE — DISCHARGE INSTRUCTIONS
Discharge Instructions       PATIENT ID: Stephanie Solomon  MRN: [id number]   YOB: 1975    DATE OF ADMISSION: 10/2/2023  DATE OF DISCHARGE: 10/4/2023    PRIMARY CARE PROVIDER: [unfilled]     ATTENDING PHYSICIAN: Zainab Jamison MD   DISCHARGING PROVIDER: Zainab Jamison MD    To contact this individual call [phone] and ask the  to page. If unavailable, ask to be transferred the Adult Hospitalist Department. DISCHARGE DIAGNOSES atypical chest pain end-stage renal disease on hemodialysis    CONSULTATIONS: [unfilled]      PROCEDURES/SURGERIES: * No surgery found *    PENDING TEST RESULTS:   At the time of discharge the following test results are still pending: None    FOLLOW UP APPOINTMENTS:   SUZETTE Dale NP  [address] [place] [address]  [phone]    Schedule an appointment as soon as possible for a visit in 1 week(s)         ADDITIONAL CARE RECOMMENDATIONS: Follow with the PCP and nephrology and cardiology    DIET: Renal diet      ACTIVITY: As tolerated    Wound care: {Discharge Wound Care Instructions:68376}    EQUIPMENT needed: ***      DISCHARGE MEDICATIONS:   See Medication Reconciliation Form    It is important that you take the medication exactly as they are prescribed. Keep your medication in the bottles provided by the pharmacist and keep a list of the medication names, dosages, and times to be taken in your wallet. Do not take other medications without consulting your doctor. NOTIFY YOUR PHYSICIAN FOR ANY OF THE FOLLOWING:   Fever over 101 degrees for 24 hours. Chest pain, shortness of breath, fever, chills, nausea, vomiting, diarrhea, change in mentation, falling, weakness, bleeding. Severe pain or pain not relieved by medications. Or, any other signs or symptoms that you may have questions about.       DISPOSITION:    Home With:   OT  PT  HH  RN       SNF/Inpatient Rehab/LTAC    Independent/assisted living    Hospice    Other:

## 2023-10-12 ENCOUNTER — APPOINTMENT (OUTPATIENT)
Facility: HOSPITAL | Age: 48
DRG: 321 | End: 2023-10-12
Payer: MEDICARE

## 2023-10-12 ENCOUNTER — HOSPITAL ENCOUNTER (INPATIENT)
Facility: HOSPITAL | Age: 48
LOS: 4 days | Discharge: HOME | DRG: 321 | End: 2023-10-16
Attending: EMERGENCY MEDICINE | Admitting: FAMILY MEDICINE
Payer: MEDICARE

## 2023-10-12 DIAGNOSIS — R07.9 CHEST PAIN: ICD-10-CM

## 2023-10-12 DIAGNOSIS — R07.9 CHEST PAIN, UNSPECIFIED TYPE: Primary | ICD-10-CM

## 2023-10-12 DIAGNOSIS — R79.89 ELEVATED TROPONIN: ICD-10-CM

## 2023-10-12 LAB
ALBUMIN SERPL-MCNC: 3.1 G/DL (ref 3.5–5)
ALBUMIN/GLOB SERPL: 0.8 (ref 1.1–2.2)
ALP SERPL-CCNC: 106 U/L (ref 45–117)
ALT SERPL-CCNC: 11 U/L (ref 12–78)
ANION GAP SERPL CALC-SCNC: 9 MMOL/L (ref 5–15)
APTT PPP: 29.7 SEC (ref 21.2–34.1)
AST SERPL W P-5'-P-CCNC: <3 U/L (ref 15–37)
BASOPHILS # BLD: 0.1 K/UL (ref 0–0.1)
BASOPHILS NFR BLD: 1 % (ref 0–1)
BILIRUB SERPL-MCNC: 0.3 MG/DL (ref 0.2–1)
BUN SERPL-MCNC: 33 MG/DL (ref 6–20)
BUN/CREAT SERPL: 9 (ref 12–20)
CA-I BLD-MCNC: 9.1 MG/DL (ref 8.5–10.1)
CHLORIDE SERPL-SCNC: 98 MMOL/L (ref 97–108)
CO2 SERPL-SCNC: 29 MMOL/L (ref 21–32)
CREAT SERPL-MCNC: 3.54 MG/DL (ref 0.55–1.02)
DIFFERENTIAL METHOD BLD: ABNORMAL
ECHO BSA: 2.42 M2
EKG ATRIAL RATE: 82 BPM
EKG DIAGNOSIS: NORMAL
EKG P AXIS: 0 DEGREES
EKG P-R INTERVAL: 154 MS
EKG Q-T INTERVAL: 390 MS
EKG QRS DURATION: 74 MS
EKG QTC CALCULATION (BAZETT): 455 MS
EKG R AXIS: -36 DEGREES
EKG T AXIS: 66 DEGREES
EKG VENTRICULAR RATE: 82 BPM
EOSINOPHIL # BLD: 0.4 K/UL (ref 0–0.4)
EOSINOPHIL NFR BLD: 2 % (ref 0–7)
ERYTHROCYTE [DISTWIDTH] IN BLOOD BY AUTOMATED COUNT: 12.9 % (ref 11.5–14.5)
GLOBULIN SER CALC-MCNC: 3.9 G/DL (ref 2–4)
GLUCOSE BLD STRIP.AUTO-MCNC: 237 MG/DL (ref 65–100)
GLUCOSE BLD STRIP.AUTO-MCNC: 272 MG/DL (ref 65–100)
GLUCOSE BLD STRIP.AUTO-MCNC: 280 MG/DL (ref 65–100)
GLUCOSE SERPL-MCNC: 235 MG/DL (ref 65–100)
HCT VFR BLD AUTO: 31.3 % (ref 35–47)
HGB BLD-MCNC: 10.3 G/DL (ref 11.5–16)
IMM GRANULOCYTES # BLD AUTO: 0.1 K/UL (ref 0–0.04)
IMM GRANULOCYTES NFR BLD AUTO: 1 % (ref 0–0.5)
LYMPHOCYTES # BLD: 2.4 K/UL (ref 0.8–3.5)
LYMPHOCYTES NFR BLD: 16 % (ref 12–49)
MCH RBC QN AUTO: 29.2 PG (ref 26–34)
MCHC RBC AUTO-ENTMCNC: 32.9 G/DL (ref 30–36.5)
MCV RBC AUTO: 88.7 FL (ref 80–99)
MONOCYTES # BLD: 1.1 K/UL (ref 0–1)
MONOCYTES NFR BLD: 7 % (ref 5–13)
NEUTS SEG # BLD: 11.1 K/UL (ref 1.8–8)
NEUTS SEG NFR BLD: 73 % (ref 32–75)
NRBC # BLD: 0 K/UL (ref 0–0.01)
NRBC BLD-RTO: 0 PER 100 WBC
PERFORMED BY:: ABNORMAL
PLATELET # BLD AUTO: 370 K/UL (ref 150–400)
PMV BLD AUTO: 10.5 FL (ref 8.9–12.9)
POTASSIUM SERPL-SCNC: 4.2 MMOL/L (ref 3.5–5.1)
PROT SERPL-MCNC: 7 G/DL (ref 6.4–8.2)
RBC # BLD AUTO: 3.53 M/UL (ref 3.8–5.2)
SODIUM SERPL-SCNC: 136 MMOL/L (ref 136–145)
THERAPEUTIC RANGE: NORMAL SEC (ref 82–109)
TROPONIN I SERPL HS-MCNC: 283 NG/L (ref 0–51)
TROPONIN I SERPL HS-MCNC: 82 NG/L (ref 0–51)
TROPONIN I SERPL HS-MCNC: ABNORMAL NG/L (ref 0–51)
WBC # BLD AUTO: 15.2 K/UL (ref 3.6–11)

## 2023-10-12 PROCEDURE — 6360000004 HC RX CONTRAST MEDICATION: Performed by: FAMILY MEDICINE

## 2023-10-12 PROCEDURE — 6360000002 HC RX W HCPCS: Performed by: INTERNAL MEDICINE

## 2023-10-12 PROCEDURE — 02703ZZ DILATION OF CORONARY ARTERY, ONE ARTERY, PERCUTANEOUS APPROACH: ICD-10-PCS | Performed by: INTERNAL MEDICINE

## 2023-10-12 PROCEDURE — 84484 ASSAY OF TROPONIN QUANT: CPT

## 2023-10-12 PROCEDURE — 6370000000 HC RX 637 (ALT 250 FOR IP): Performed by: INTERNAL MEDICINE

## 2023-10-12 PROCEDURE — 99152 MOD SED SAME PHYS/QHP 5/>YRS: CPT | Performed by: INTERNAL MEDICINE

## 2023-10-12 PROCEDURE — C1887 CATHETER, GUIDING: HCPCS | Performed by: INTERNAL MEDICINE

## 2023-10-12 PROCEDURE — C9600 PERC DRUG-EL COR STENT SING: HCPCS | Performed by: INTERNAL MEDICINE

## 2023-10-12 PROCEDURE — 93005 ELECTROCARDIOGRAM TRACING: CPT

## 2023-10-12 PROCEDURE — 36415 COLL VENOUS BLD VENIPUNCTURE: CPT

## 2023-10-12 PROCEDURE — 6360000002 HC RX W HCPCS: Performed by: FAMILY MEDICINE

## 2023-10-12 PROCEDURE — 82962 GLUCOSE BLOOD TEST: CPT

## 2023-10-12 PROCEDURE — 83036 HEMOGLOBIN GLYCOSYLATED A1C: CPT

## 2023-10-12 PROCEDURE — 96375 TX/PRO/DX INJ NEW DRUG ADDON: CPT

## 2023-10-12 PROCEDURE — 2580000003 HC RX 258: Performed by: INTERNAL MEDICINE

## 2023-10-12 PROCEDURE — 7100000000 HC PACU RECOVERY - FIRST 15 MIN: Performed by: INTERNAL MEDICINE

## 2023-10-12 PROCEDURE — 027034Z DILATION OF CORONARY ARTERY, ONE ARTERY WITH DRUG-ELUTING INTRALUMINAL DEVICE, PERCUTANEOUS APPROACH: ICD-10-PCS | Performed by: INTERNAL MEDICINE

## 2023-10-12 PROCEDURE — 6360000002 HC RX W HCPCS: Performed by: EMERGENCY MEDICINE

## 2023-10-12 PROCEDURE — 85730 THROMBOPLASTIN TIME PARTIAL: CPT

## 2023-10-12 PROCEDURE — C1769 GUIDE WIRE: HCPCS | Performed by: INTERNAL MEDICINE

## 2023-10-12 PROCEDURE — 2060000000 HC ICU INTERMEDIATE R&B

## 2023-10-12 PROCEDURE — 93458 L HRT ARTERY/VENTRICLE ANGIO: CPT | Performed by: INTERNAL MEDICINE

## 2023-10-12 PROCEDURE — 6370000000 HC RX 637 (ALT 250 FOR IP): Performed by: EMERGENCY MEDICINE

## 2023-10-12 PROCEDURE — C1894 INTRO/SHEATH, NON-LASER: HCPCS | Performed by: INTERNAL MEDICINE

## 2023-10-12 PROCEDURE — 71045 X-RAY EXAM CHEST 1 VIEW: CPT

## 2023-10-12 PROCEDURE — B2111ZZ FLUOROSCOPY OF MULTIPLE CORONARY ARTERIES USING LOW OSMOLAR CONTRAST: ICD-10-PCS | Performed by: INTERNAL MEDICINE

## 2023-10-12 PROCEDURE — 71275 CT ANGIOGRAPHY CHEST: CPT

## 2023-10-12 PROCEDURE — 6360000004 HC RX CONTRAST MEDICATION: Performed by: INTERNAL MEDICINE

## 2023-10-12 PROCEDURE — 94761 N-INVAS EAR/PLS OXIMETRY MLT: CPT

## 2023-10-12 PROCEDURE — 93005 ELECTROCARDIOGRAM TRACING: CPT | Performed by: EMERGENCY MEDICINE

## 2023-10-12 PROCEDURE — 7100000001 HC PACU RECOVERY - ADDTL 15 MIN: Performed by: INTERNAL MEDICINE

## 2023-10-12 PROCEDURE — C1874 STENT, COATED/COV W/DEL SYS: HCPCS | Performed by: INTERNAL MEDICINE

## 2023-10-12 PROCEDURE — 99153 MOD SED SAME PHYS/QHP EA: CPT | Performed by: INTERNAL MEDICINE

## 2023-10-12 PROCEDURE — 2500000003 HC RX 250 WO HCPCS: Performed by: INTERNAL MEDICINE

## 2023-10-12 PROCEDURE — 80053 COMPREHEN METABOLIC PANEL: CPT

## 2023-10-12 PROCEDURE — 99285 EMERGENCY DEPT VISIT HI MDM: CPT

## 2023-10-12 PROCEDURE — 96374 THER/PROPH/DIAG INJ IV PUSH: CPT

## 2023-10-12 PROCEDURE — 2709999900 HC NON-CHARGEABLE SUPPLY: Performed by: INTERNAL MEDICINE

## 2023-10-12 PROCEDURE — 2500000003 HC RX 250 WO HCPCS: Performed by: EMERGENCY MEDICINE

## 2023-10-12 PROCEDURE — 85025 COMPLETE CBC W/AUTO DIFF WBC: CPT

## 2023-10-12 PROCEDURE — B2151ZZ FLUOROSCOPY OF LEFT HEART USING LOW OSMOLAR CONTRAST: ICD-10-PCS | Performed by: INTERNAL MEDICINE

## 2023-10-12 PROCEDURE — C1725 CATH, TRANSLUMIN NON-LASER: HCPCS | Performed by: INTERNAL MEDICINE

## 2023-10-12 PROCEDURE — 6370000000 HC RX 637 (ALT 250 FOR IP): Performed by: FAMILY MEDICINE

## 2023-10-12 PROCEDURE — 4A023N7 MEASUREMENT OF CARDIAC SAMPLING AND PRESSURE, LEFT HEART, PERCUTANEOUS APPROACH: ICD-10-PCS | Performed by: INTERNAL MEDICINE

## 2023-10-12 PROCEDURE — 76937 US GUIDE VASCULAR ACCESS: CPT | Performed by: INTERNAL MEDICINE

## 2023-10-12 DEVICE — STENT ONYXNG25015UX ONYX 2.50X15RX
Type: IMPLANTABLE DEVICE | Status: FUNCTIONAL
Brand: ONYX FRONTIER™

## 2023-10-12 RX ORDER — ACETAMINOPHEN 650 MG/1
650 SUPPOSITORY RECTAL EVERY 6 HOURS PRN
Status: DISCONTINUED | OUTPATIENT
Start: 2023-10-12 | End: 2023-10-16 | Stop reason: HOSPADM

## 2023-10-12 RX ORDER — NITROGLYCERIN 0.4 MG/1
0.4 TABLET SUBLINGUAL EVERY 5 MIN PRN
Status: DISCONTINUED | OUTPATIENT
Start: 2023-10-12 | End: 2023-10-16 | Stop reason: HOSPADM

## 2023-10-12 RX ORDER — HEPARIN SODIUM 1000 [USP'U]/ML
INJECTION, SOLUTION INTRAVENOUS; SUBCUTANEOUS PRN
Status: DISCONTINUED | OUTPATIENT
Start: 2023-10-12 | End: 2023-10-12 | Stop reason: HOSPADM

## 2023-10-12 RX ORDER — DEXTROSE MONOHYDRATE 100 MG/ML
INJECTION, SOLUTION INTRAVENOUS CONTINUOUS PRN
Status: DISCONTINUED | OUTPATIENT
Start: 2023-10-12 | End: 2023-10-16 | Stop reason: HOSPADM

## 2023-10-12 RX ORDER — INSULIN LISPRO 100 [IU]/ML
0-16 INJECTION, SOLUTION INTRAVENOUS; SUBCUTANEOUS
Status: DISCONTINUED | OUTPATIENT
Start: 2023-10-12 | End: 2023-10-16 | Stop reason: HOSPADM

## 2023-10-12 RX ORDER — MIDAZOLAM HYDROCHLORIDE 1 MG/ML
INJECTION INTRAMUSCULAR; INTRAVENOUS PRN
Status: DISCONTINUED | OUTPATIENT
Start: 2023-10-12 | End: 2023-10-12 | Stop reason: HOSPADM

## 2023-10-12 RX ORDER — ATORVASTATIN CALCIUM 40 MG/1
20 TABLET, FILM COATED ORAL DAILY
Status: DISCONTINUED | OUTPATIENT
Start: 2023-10-12 | End: 2023-10-12

## 2023-10-12 RX ORDER — HEPARIN SODIUM 1000 [USP'U]/ML
4000 INJECTION, SOLUTION INTRAVENOUS; SUBCUTANEOUS ONCE
Status: COMPLETED | OUTPATIENT
Start: 2023-10-12 | End: 2023-10-12

## 2023-10-12 RX ORDER — GABAPENTIN 300 MG/1
300 CAPSULE ORAL NIGHTLY
Status: DISCONTINUED | OUTPATIENT
Start: 2023-10-12 | End: 2023-10-16 | Stop reason: HOSPADM

## 2023-10-12 RX ORDER — SODIUM CHLORIDE 0.9 % (FLUSH) 0.9 %
5-40 SYRINGE (ML) INJECTION PRN
Status: DISCONTINUED | OUTPATIENT
Start: 2023-10-12 | End: 2023-10-16 | Stop reason: HOSPADM

## 2023-10-12 RX ORDER — PANTOPRAZOLE SODIUM 40 MG/1
40 TABLET, DELAYED RELEASE ORAL
Status: DISCONTINUED | OUTPATIENT
Start: 2023-10-13 | End: 2023-10-16 | Stop reason: HOSPADM

## 2023-10-12 RX ORDER — POLYETHYLENE GLYCOL 3350 17 G/17G
17 POWDER, FOR SOLUTION ORAL DAILY PRN
Status: DISCONTINUED | OUTPATIENT
Start: 2023-10-12 | End: 2023-10-13

## 2023-10-12 RX ORDER — SODIUM CHLORIDE 9 MG/ML
INJECTION, SOLUTION INTRAVENOUS CONTINUOUS PRN
Status: COMPLETED | OUTPATIENT
Start: 2023-10-12 | End: 2023-10-12

## 2023-10-12 RX ORDER — ATORVASTATIN CALCIUM 40 MG/1
40 TABLET, FILM COATED ORAL ONCE
Status: DISCONTINUED | OUTPATIENT
Start: 2023-10-12 | End: 2023-10-12

## 2023-10-12 RX ORDER — HEPARIN SODIUM 10000 [USP'U]/100ML
5-30 INJECTION, SOLUTION INTRAVENOUS CONTINUOUS
Status: DISCONTINUED | OUTPATIENT
Start: 2023-10-12 | End: 2023-10-12

## 2023-10-12 RX ORDER — SEVELAMER CARBONATE 800 MG/1
1600 TABLET, FILM COATED ORAL
Status: DISCONTINUED | OUTPATIENT
Start: 2023-10-12 | End: 2023-10-16 | Stop reason: HOSPADM

## 2023-10-12 RX ORDER — ATORVASTATIN CALCIUM 40 MG/1
40 TABLET, FILM COATED ORAL
Status: COMPLETED | OUTPATIENT
Start: 2023-10-12 | End: 2023-10-12

## 2023-10-12 RX ORDER — HYDROMORPHONE HYDROCHLORIDE 1 MG/ML
1 INJECTION, SOLUTION INTRAMUSCULAR; INTRAVENOUS; SUBCUTANEOUS
Status: COMPLETED | OUTPATIENT
Start: 2023-10-12 | End: 2023-10-12

## 2023-10-12 RX ORDER — SODIUM CHLORIDE 9 MG/ML
INJECTION, SOLUTION INTRAVENOUS PRN
Status: DISCONTINUED | OUTPATIENT
Start: 2023-10-12 | End: 2023-10-12 | Stop reason: SDUPTHER

## 2023-10-12 RX ORDER — ACETAMINOPHEN 325 MG/1
650 TABLET ORAL EVERY 4 HOURS PRN
Status: DISCONTINUED | OUTPATIENT
Start: 2023-10-12 | End: 2023-10-12 | Stop reason: SDUPTHER

## 2023-10-12 RX ORDER — ONDANSETRON 4 MG/1
4 TABLET, ORALLY DISINTEGRATING ORAL EVERY 8 HOURS PRN
Status: DISCONTINUED | OUTPATIENT
Start: 2023-10-12 | End: 2023-10-16 | Stop reason: HOSPADM

## 2023-10-12 RX ORDER — ONDANSETRON 2 MG/ML
4 INJECTION INTRAMUSCULAR; INTRAVENOUS ONCE
Status: COMPLETED | OUTPATIENT
Start: 2023-10-12 | End: 2023-10-12

## 2023-10-12 RX ORDER — HEPARIN SODIUM 1000 [USP'U]/ML
4000 INJECTION, SOLUTION INTRAVENOUS; SUBCUTANEOUS PRN
Status: DISCONTINUED | OUTPATIENT
Start: 2023-10-12 | End: 2023-10-16

## 2023-10-12 RX ORDER — HEPARIN SODIUM 10000 [USP'U]/100ML
5-30 INJECTION, SOLUTION INTRAVENOUS CONTINUOUS
Status: DISCONTINUED | OUTPATIENT
Start: 2023-10-12 | End: 2023-10-13

## 2023-10-12 RX ORDER — HEPARIN SODIUM 200 [USP'U]/100ML
INJECTION, SOLUTION INTRAVENOUS CONTINUOUS PRN
Status: COMPLETED | OUTPATIENT
Start: 2023-10-12 | End: 2023-10-12

## 2023-10-12 RX ORDER — SODIUM CHLORIDE 9 MG/ML
INJECTION, SOLUTION INTRAVENOUS PRN
Status: DISCONTINUED | OUTPATIENT
Start: 2023-10-12 | End: 2023-10-16 | Stop reason: HOSPADM

## 2023-10-12 RX ORDER — ASPIRIN 81 MG/1
81 TABLET, CHEWABLE ORAL ONCE
Status: COMPLETED | OUTPATIENT
Start: 2023-10-12 | End: 2023-10-12

## 2023-10-12 RX ORDER — ONDANSETRON 2 MG/ML
4 INJECTION INTRAMUSCULAR; INTRAVENOUS EVERY 6 HOURS PRN
Status: DISCONTINUED | OUTPATIENT
Start: 2023-10-12 | End: 2023-10-16 | Stop reason: HOSPADM

## 2023-10-12 RX ORDER — NITROGLYCERIN 20 MG/100ML
INJECTION INTRAVENOUS PRN
Status: DISCONTINUED | OUTPATIENT
Start: 2023-10-12 | End: 2023-10-12 | Stop reason: HOSPADM

## 2023-10-12 RX ORDER — SODIUM CHLORIDE 0.9 % (FLUSH) 0.9 %
5-40 SYRINGE (ML) INJECTION EVERY 12 HOURS SCHEDULED
Status: DISCONTINUED | OUTPATIENT
Start: 2023-10-12 | End: 2023-10-14

## 2023-10-12 RX ORDER — HYDROMORPHONE HYDROCHLORIDE 1 MG/ML
0.5 INJECTION, SOLUTION INTRAMUSCULAR; INTRAVENOUS; SUBCUTANEOUS ONCE
Status: COMPLETED | OUTPATIENT
Start: 2023-10-12 | End: 2023-10-12

## 2023-10-12 RX ORDER — ASPIRIN 325 MG
325 TABLET ORAL
Status: COMPLETED | OUTPATIENT
Start: 2023-10-12 | End: 2023-10-12

## 2023-10-12 RX ORDER — BIVALIRUDIN 250 MG/5ML
INJECTION, POWDER, LYOPHILIZED, FOR SOLUTION INTRAVENOUS PRN
Status: DISCONTINUED | OUTPATIENT
Start: 2023-10-12 | End: 2023-10-12 | Stop reason: HOSPADM

## 2023-10-12 RX ORDER — NIFEDIPINE 60 MG/1
60 TABLET, EXTENDED RELEASE ORAL DAILY
Status: DISCONTINUED | OUTPATIENT
Start: 2023-10-12 | End: 2023-10-16 | Stop reason: HOSPADM

## 2023-10-12 RX ORDER — ACETAMINOPHEN 325 MG/1
650 TABLET ORAL EVERY 6 HOURS PRN
Status: DISCONTINUED | OUTPATIENT
Start: 2023-10-12 | End: 2023-10-16 | Stop reason: HOSPADM

## 2023-10-12 RX ORDER — INSULIN LISPRO 100 [IU]/ML
0-4 INJECTION, SOLUTION INTRAVENOUS; SUBCUTANEOUS NIGHTLY
Status: DISCONTINUED | OUTPATIENT
Start: 2023-10-12 | End: 2023-10-16 | Stop reason: HOSPADM

## 2023-10-12 RX ORDER — NIFEDIPINE 60 MG/1
60 TABLET, EXTENDED RELEASE ORAL 2 TIMES DAILY
Status: DISCONTINUED | OUTPATIENT
Start: 2023-10-12 | End: 2023-10-12

## 2023-10-12 RX ORDER — SODIUM CHLORIDE 0.9 % (FLUSH) 0.9 %
5-40 SYRINGE (ML) INJECTION EVERY 12 HOURS SCHEDULED
Status: DISCONTINUED | OUTPATIENT
Start: 2023-10-12 | End: 2023-10-16 | Stop reason: HOSPADM

## 2023-10-12 RX ORDER — ASPIRIN 81 MG/1
81 TABLET, CHEWABLE ORAL DAILY
Status: DISCONTINUED | OUTPATIENT
Start: 2023-10-12 | End: 2023-10-16 | Stop reason: HOSPADM

## 2023-10-12 RX ORDER — HEPARIN SODIUM 1000 [USP'U]/ML
2000 INJECTION, SOLUTION INTRAVENOUS; SUBCUTANEOUS PRN
Status: DISCONTINUED | OUTPATIENT
Start: 2023-10-12 | End: 2023-10-16

## 2023-10-12 RX ORDER — ATORVASTATIN CALCIUM 40 MG/1
80 TABLET, FILM COATED ORAL NIGHTLY
Status: DISCONTINUED | OUTPATIENT
Start: 2023-10-12 | End: 2023-10-12 | Stop reason: HOSPADM

## 2023-10-12 RX ORDER — 0.9 % SODIUM CHLORIDE 0.9 %
INTRAVENOUS SOLUTION INTRAVENOUS CONTINUOUS PRN
Status: COMPLETED | OUTPATIENT
Start: 2023-10-12 | End: 2023-10-12

## 2023-10-12 RX ORDER — LIDOCAINE HYDROCHLORIDE 10 MG/ML
INJECTION, SOLUTION INFILTRATION; PERINEURAL PRN
Status: DISCONTINUED | OUTPATIENT
Start: 2023-10-12 | End: 2023-10-12 | Stop reason: HOSPADM

## 2023-10-12 RX ORDER — FUROSEMIDE 40 MG/1
80 TABLET ORAL DAILY
Status: DISCONTINUED | OUTPATIENT
Start: 2023-10-12 | End: 2023-10-16 | Stop reason: HOSPADM

## 2023-10-12 RX ORDER — CARVEDILOL 3.12 MG/1
12.5 TABLET ORAL 2 TIMES DAILY WITH MEALS
Status: DISCONTINUED | OUTPATIENT
Start: 2023-10-12 | End: 2023-10-12

## 2023-10-12 RX ORDER — CARVEDILOL 3.12 MG/1
6.25 TABLET ORAL 2 TIMES DAILY WITH MEALS
Status: DISCONTINUED | OUTPATIENT
Start: 2023-10-12 | End: 2023-10-12

## 2023-10-12 RX ORDER — HEPARIN SODIUM 1000 [USP'U]/ML
60 INJECTION, SOLUTION INTRAVENOUS; SUBCUTANEOUS ONCE
Status: DISCONTINUED | OUTPATIENT
Start: 2023-10-12 | End: 2023-10-12

## 2023-10-12 RX ORDER — CARVEDILOL 3.12 MG/1
12.5 TABLET ORAL 2 TIMES DAILY WITH MEALS
Status: DISCONTINUED | OUTPATIENT
Start: 2023-10-12 | End: 2023-10-16

## 2023-10-12 RX ADMIN — NIFEDIPINE 60 MG: 60 TABLET, EXTENDED RELEASE ORAL at 16:53

## 2023-10-12 RX ADMIN — INSULIN LISPRO 8 UNITS: 100 INJECTION, SOLUTION INTRAVENOUS; SUBCUTANEOUS at 18:12

## 2023-10-12 RX ADMIN — IOPAMIDOL 100 ML: 755 INJECTION, SOLUTION INTRAVENOUS at 07:55

## 2023-10-12 RX ADMIN — FUROSEMIDE 80 MG: 40 TABLET ORAL at 16:50

## 2023-10-12 RX ADMIN — ACETAMINOPHEN 650 MG: 325 TABLET ORAL at 12:36

## 2023-10-12 RX ADMIN — HEPARIN SODIUM AND DEXTROSE 8 UNITS/KG/HR: 10000; 5 INJECTION INTRAVENOUS at 08:58

## 2023-10-12 RX ADMIN — GABAPENTIN 300 MG: 300 CAPSULE ORAL at 21:04

## 2023-10-12 RX ADMIN — HEPARIN SODIUM AND DEXTROSE 8 UNITS/KG/HR: 10000; 5 INJECTION INTRAVENOUS at 22:44

## 2023-10-12 RX ADMIN — SEVELAMER CARBONATE 1600 MG: 800 TABLET, FILM COATED ORAL at 16:49

## 2023-10-12 RX ADMIN — HYDROMORPHONE HYDROCHLORIDE 1 MG: 1 INJECTION, SOLUTION INTRAMUSCULAR; INTRAVENOUS; SUBCUTANEOUS at 06:15

## 2023-10-12 RX ADMIN — CARVEDILOL 12.5 MG: 3.12 TABLET, FILM COATED ORAL at 16:51

## 2023-10-12 RX ADMIN — ATORVASTATIN CALCIUM 40 MG: 40 TABLET, FILM COATED ORAL at 08:58

## 2023-10-12 RX ADMIN — BIVALIRUDIN 0.25 MG/KG/HR: 250 INJECTION, POWDER, LYOPHILIZED, FOR SOLUTION INTRAVENOUS at 12:38

## 2023-10-12 RX ADMIN — HYDROMORPHONE HYDROCHLORIDE 0.5 MG: 1 INJECTION, SOLUTION INTRAMUSCULAR; INTRAVENOUS; SUBCUTANEOUS at 21:04

## 2023-10-12 RX ADMIN — ASPIRIN 81 MG CHEWABLE TABLET 81 MG: 81 TABLET CHEWABLE at 08:58

## 2023-10-12 RX ADMIN — ASPIRIN 325 MG: 325 TABLET ORAL at 06:15

## 2023-10-12 RX ADMIN — HEPARIN SODIUM 4000 UNITS: 1000 INJECTION INTRAVENOUS; SUBCUTANEOUS at 08:58

## 2023-10-12 RX ADMIN — ONDANSETRON 4 MG: 2 INJECTION INTRAMUSCULAR; INTRAVENOUS at 06:15

## 2023-10-12 RX ADMIN — NITROGLYCERIN 0.4 MG: 0.4 TABLET, ORALLY DISINTEGRATING SUBLINGUAL at 20:05

## 2023-10-12 ASSESSMENT — PAIN - FUNCTIONAL ASSESSMENT
PAIN_FUNCTIONAL_ASSESSMENT: 0-10
PAIN_FUNCTIONAL_ASSESSMENT: 0-10

## 2023-10-12 ASSESSMENT — PAIN SCALES - GENERAL
PAINLEVEL_OUTOF10: 10
PAINLEVEL_OUTOF10: 7
PAINLEVEL_OUTOF10: 5
PAINLEVEL_OUTOF10: 3
PAINLEVEL_OUTOF10: 7
PAINLEVEL_OUTOF10: 9
PAINLEVEL_OUTOF10: 3

## 2023-10-12 ASSESSMENT — PAIN DESCRIPTION - LOCATION
LOCATION: CHEST

## 2023-10-12 ASSESSMENT — LIFESTYLE VARIABLES
HOW MANY STANDARD DRINKS CONTAINING ALCOHOL DO YOU HAVE ON A TYPICAL DAY: PATIENT DOES NOT DRINK
HOW OFTEN DO YOU HAVE A DRINK CONTAINING ALCOHOL: NEVER

## 2023-10-12 ASSESSMENT — PAIN DESCRIPTION - ORIENTATION: ORIENTATION: ANTERIOR

## 2023-10-12 ASSESSMENT — PAIN DESCRIPTION - DESCRIPTORS
DESCRIPTORS: DULL;PRESSURE
DESCRIPTORS: DULL

## 2023-10-12 NOTE — H&P
reaction(s): Unknown (comments)    Benadryl [Diphenhydramine] Anxiety    Morphine Itching        Prior to Admission medications    Medication Sig Start Date End Date Taking? Authorizing Provider   sevelamer (RENVELA) 800 MG tablet Take 2 tablets by mouth 3 times daily (with meals) 10/4/23   Ashley Hdez MD   carvedilol (COREG) 12.5 MG tablet Take 1 tablet by mouth 2 times daily 6/14/23   Terry Ace MD   NIFEdipine (PROCARDIA XL) 60 MG extended release tablet Take 1 tablet by mouth in the morning and at bedtime 6/14/23   Terry Ace MD   insulin detemir (LEVEMIR) 100 UNIT/ML injection vial Inject 40 Units into the skin 2 times daily    ProviderRoshni MD   hydrOXYzine HCl (ATARAX) 25 MG tablet Take 1-2 tablets by mouth nightly    Provider, MD Roshni   atorvastatin (LIPITOR) 20 MG tablet Take 1 tablet by mouth daily    Automatic Reconciliation, Ar   furosemide (LASIX) 80 MG tablet Take 1 tablet by mouth daily 6/10/22   Automatic Reconciliation, Ar   gabapentin (NEURONTIN) 300 MG capsule Take 1 capsule by mouth nightly.     Automatic Reconciliation, Ar   omeprazole (PRILOSEC) 20 MG delayed release capsule Take 1 capsule by mouth daily    Automatic Reconciliation, Ar   rivaroxaban (XARELTO) 2.5 MG TABS tablet Take 1 tablet by mouth 2 times daily    Automatic Reconciliation, Ar         Current Facility-Administered Medications:     heparin (porcine) injection 4,000 Units, 4,000 Units, IntraVENous, PRN, Concetta Hdez MD    heparin (porcine) injection 2,000 Units, 2,000 Units, IntraVENous, PRN, Concetta Hdez MD    Heparin : use aPTT to monitor heparin until 10-15-23, , Other, RX Placeholder, Concetta Hdez MD    aspirin chewable tablet 81 mg, 81 mg, Oral, Daily, Martir Garcia MD    atorvastatin (LIPITOR) tablet 80 mg, 80 mg, Oral, Nightly, Martir Garcia MD    ticagrelor (BRILINTA) tablet 90 mg, 90 mg, Oral, BID, Martir Garcia MD    NIFEdipine (PROCARDIA XL)

## 2023-10-12 NOTE — ED TRIAGE NOTES
States substernal chest pain that started yesterday morning. It eased up through the day but has gotten worse again.

## 2023-10-12 NOTE — ED NOTES
Called provider for possible sepsis alert, no new orders at this time.       Mimi Maciel RN  10/12/23 8483

## 2023-10-12 NOTE — ED NOTES
Change of shift report given to [facility]. All questions and concerns answered at this time.      Charisma Rod RN  [phone]

## 2023-10-12 NOTE — ED NOTES
Verbal report received from Clayton Perez, [facility], [address] [place] [address]  10/12/23 0247

## 2023-10-12 NOTE — CONSULTS
Cardiology Consult    NAME: Keaton Mayer   :  1975   MRN:  [id number]     Date/Time:  10/12/2023 10:07 AM    Patient PCP: SUZETTE Britton NP  ________________________________________________________________________     Assessment:     Patient is a 51-year-old white female with:  1. Non-STEMI  2. Hypertension  3. Diabetes mellitus  4. End-stage renal disease  4. Hyperlipidemia  5. Obesity  6. History of CVA  7. History of DVT  8. Small to moderate pericardial effusion      Plan:     Sodium is 136, potassium 412. Glucose 235, troponin increased to 83. She continued to have chest pain intermittently overnight. White count was 15.2, hemoglobin 10.3. Platelet 633. CT angiogram was negative for PE anyhow showed cardiomegaly and small to moderate pericardial effusion. Recent echo from 2 weeks ago showed pericardial effusion, circumferential without signs of tamponade. Anyhow there was left pleural effusion at that time. Hyperdynamic LV systolic function. Signs of pulmonary hypertension. At that time she underwent stress test that was negative for ischemia ejection fraction was 70%. She is currently on heparin gtt. She received aspirin, atorvastatin. She is on low-dose Xarelto 2.5 mg twice a day. We will hold off for now. I have discussed with the patient indication, attentive, risk benefits and complications of left heart catheterization, coronary angiogram and possible PCI she verbalized understanding and agreed to proceed with the procedure. Fluid management per nephrology      []        High complexity decision making was performed        Subjective:   CHIEF COMPLAINT:   Chest pain    REASON FOR CONSULT:  Non-STEMI    HISTORY OF PRESENT ILLNESS:     Keaton Mayer is a 50 y.o. White (non-) female who has a history of end-stage renal disease, obesity, hypertension, DVT and pericardial effusion. She was recently admitted with chest pain on the fourth.   At
Eosinophils Absolute 0.4 0.0 - 0.4 K/UL    Basophils Absolute 0.1 0.0 - 0.1 K/UL    Absolute Immature Granulocyte 0.1 (H) 0.00 - 0.04 K/UL    Differential Type AUTOMATED     Comprehensive Metabolic Panel    Collection Time: 10/12/23  5:56 AM   Result Value Ref Range    Sodium 136 136 - 145 mmol/L    Potassium 4.2 3.5 - 5.1 mmol/L    Chloride 98 97 - 108 mmol/L    CO2 29 21 - 32 mmol/L    Anion Gap 9 5 - 15 mmol/L    Glucose 235 (H) 65 - 100 mg/dL    BUN 33 (H) 6 - 20 mg/dL    Creatinine 3.54 (H) 0.55 - 1.02 mg/dL    Bun/Cre Ratio 9 (L) 12 - 20      Est, Glom Filt Rate 15 (L) >60 ml/min/1.73m2    Calcium 9.1 8.5 - 10.1 mg/dL    Total Bilirubin 0.3 0.2 - 1.0 mg/dL    AST <3 (L) 15 - 37 U/L    ALT 11 (L) 12 - 78 U/L    Alk Phosphatase 106 45 - 117 U/L    Total Protein 7.0 6.4 - 8.2 g/dL    Albumin 3.1 (L) 3.5 - 5.0 g/dL    Globulin 3.9 2.0 - 4.0 g/dL    Albumin/Globulin Ratio 0.8 (L) 1.1 - 2.2     Troponin    Collection Time: 10/12/23  5:56 AM   Result Value Ref Range    Troponin, High Sensitivity 82 (H) 0 - 51 ng/L   Troponin    Collection Time: 10/12/23  6:55 AM   Result Value Ref Range    Troponin, High Sensitivity 283 (HH) 0 - 51 ng/L   APTT    Collection Time: 10/12/23  8:01 AM   Result Value Ref Range    PTT 29.7 21.2 - 34.1 sec    Therapeutic Range   82 - 109 sec   Cardiac procedure    Collection Time: 10/12/23 11:52 AM   Result Value Ref Range    Body Surface Area 2.42 m2   POCT Glucose    Collection Time: 10/12/23 12:14 PM   Result Value Ref Range    POC Glucose 280 (H) 65 - 100 mg/dL    Performed by: Ronda Reyes        Assessment and plan:     End-stage renal disease on hemodialysis: on MWF at [facility]  Last hemodialysis as outpatient was on Wednesday  No complaints of any fluid overload, no complaints of uremia like nausea or vomiting  -Stable electrolytes  -Inpatient hemodialysis arranged for tomorrow  -Hemodynamically stable  -We will continue maintenance hemodialysis on MWF    2.  Hypertension:

## 2023-10-12 NOTE — ED NOTES
Patient's oxygen saturation dropped into 70s when she fell asleep. Reports she wears 2L O2 nasal cannula when she sleeps sometimes.   2L nasal cannula applied at this time and oxygen saturation increased to 75%     Devon Velasquez RN  63/94/95 [phone]

## 2023-10-13 ENCOUNTER — APPOINTMENT (OUTPATIENT)
Facility: HOSPITAL | Age: 48
DRG: 321 | End: 2023-10-13
Attending: INTERNAL MEDICINE
Payer: MEDICARE

## 2023-10-13 LAB
ALBUMIN SERPL-MCNC: 2.6 G/DL (ref 3.5–5)
ALBUMIN/GLOB SERPL: 0.6 (ref 1.1–2.2)
ALP SERPL-CCNC: 86 U/L (ref 45–117)
ALT SERPL-CCNC: 22 U/L (ref 12–78)
ANION GAP SERPL CALC-SCNC: 11 MMOL/L (ref 5–15)
APTT PPP: 30.7 SEC (ref 21.2–34.1)
AST SERPL W P-5'-P-CCNC: 59 U/L (ref 15–37)
BASOPHILS # BLD: 0.1 K/UL (ref 0–0.1)
BASOPHILS NFR BLD: 1 % (ref 0–1)
BILIRUB SERPL-MCNC: 0.3 MG/DL (ref 0.2–1)
BUN SERPL-MCNC: 49 MG/DL (ref 6–20)
BUN/CREAT SERPL: 9 (ref 12–20)
CA-I BLD-MCNC: 8.4 MG/DL (ref 8.5–10.1)
CHLORIDE SERPL-SCNC: 98 MMOL/L (ref 97–108)
CO2 SERPL-SCNC: 25 MMOL/L (ref 21–32)
CREAT SERPL-MCNC: 5.32 MG/DL (ref 0.55–1.02)
DIFFERENTIAL METHOD BLD: ABNORMAL
ECHO AO ASC DIAM: 2.9 CM
ECHO AO ASCENDING AORTA INDEX: 1.23 CM/M2
ECHO AO ROOT DIAM: 2.6 CM
ECHO AO ROOT INDEX: 1.1 CM/M2
ECHO AV AREA PEAK VELOCITY: 2.3 CM2
ECHO AV AREA VTI: 2.3 CM2
ECHO AV AREA/BSA PEAK VELOCITY: 1 CM2/M2
ECHO AV AREA/BSA VTI: 1 CM2/M2
ECHO AV MEAN GRADIENT: 9 MMHG
ECHO AV MEAN VELOCITY: 1.4 M/S
ECHO AV PEAK GRADIENT: 17 MMHG
ECHO AV PEAK VELOCITY: 2 M/S
ECHO AV VELOCITY RATIO: 0.75
ECHO AV VTI: 46 CM
ECHO BSA: 2.42 M2
ECHO IVC EXP: 2.8 CM
ECHO IVC INSP: 1.2 CM
ECHO LA AREA 2C: 20.7 CM2
ECHO LA AREA 4C: 25.9 CM2
ECHO LA DIAMETER INDEX: 2.08 CM/M2
ECHO LA DIAMETER: 4.9 CM
ECHO LA MAJOR AXIS: 6.6 CM
ECHO LA MINOR AXIS: 5.1 CM
ECHO LA TO AORTIC ROOT RATIO: 1.88
ECHO LA VOL 2C: 68 ML (ref 22–52)
ECHO LA VOL 4C: 83 ML (ref 22–52)
ECHO LA VOLUME INDEX A2C: 29 ML/M2 (ref 16–34)
ECHO LA VOLUME INDEX A4C: 35 ML/M2 (ref 16–34)
ECHO LV E' LATERAL VELOCITY: 5 CM/S
ECHO LV E' SEPTAL VELOCITY: 6 CM/S
ECHO LV EDV A2C: 110 ML
ECHO LV EDV A4C: 138 ML
ECHO LV EDV INDEX A4C: 58 ML/M2
ECHO LV EDV NDEX A2C: 47 ML/M2
ECHO LV EJECTION FRACTION A2C: 62 %
ECHO LV EJECTION FRACTION A4C: 65 %
ECHO LV EJECTION FRACTION BIPLANE: 63 % (ref 55–100)
ECHO LV ESV A2C: 41 ML
ECHO LV ESV A4C: 49 ML
ECHO LV ESV INDEX A2C: 17 ML/M2
ECHO LV ESV INDEX A4C: 21 ML/M2
ECHO LV FRACTIONAL SHORTENING: 38 % (ref 28–44)
ECHO LV INTERNAL DIMENSION DIASTOLE INDEX: 1.99 CM/M2
ECHO LV INTERNAL DIMENSION DIASTOLIC: 4.7 CM (ref 3.9–5.3)
ECHO LV INTERNAL DIMENSION SYSTOLIC INDEX: 1.23 CM/M2
ECHO LV INTERNAL DIMENSION SYSTOLIC: 2.9 CM
ECHO LV IVSD: 1.2 CM (ref 0.6–0.9)
ECHO LV MASS 2D: 212 G (ref 67–162)
ECHO LV MASS INDEX 2D: 89.8 G/M2 (ref 43–95)
ECHO LV POSTERIOR WALL DIASTOLIC: 1.2 CM (ref 0.6–0.9)
ECHO LV RELATIVE WALL THICKNESS RATIO: 0.51
ECHO LVOT AREA: 3.1 CM2
ECHO LVOT AV VTI INDEX: 0.74
ECHO LVOT DIAM: 2 CM
ECHO LVOT MEAN GRADIENT: 5 MMHG
ECHO LVOT PEAK GRADIENT: 9 MMHG
ECHO LVOT PEAK VELOCITY: 1.5 M/S
ECHO LVOT STROKE VOLUME INDEX: 45.5 ML/M2
ECHO LVOT SV: 107.4 ML
ECHO LVOT VTI: 34.2 CM
ECHO MV A VELOCITY: 1.09 M/S
ECHO MV AREA VTI: 2.9 CM2
ECHO MV E DECELERATION TIME (DT): 167 MS
ECHO MV E VELOCITY: 1.4 M/S
ECHO MV E/A RATIO: 1.28
ECHO MV E/E' LATERAL: 28
ECHO MV E/E' RATIO (AVERAGED): 25.67
ECHO MV E/E' SEPTAL: 23.33
ECHO MV LVOT VTI INDEX: 1.08
ECHO MV MAX VELOCITY: 1.5 M/S
ECHO MV MEAN GRADIENT: 4 MMHG
ECHO MV MEAN VELOCITY: 0.9 M/S
ECHO MV PEAK GRADIENT: 9 MMHG
ECHO MV VTI: 37 CM
ECHO PV MAX VELOCITY: 1.2 M/S
ECHO PV PEAK GRADIENT: 5 MMHG
ECHO RA AREA 4C: 17 CM2
ECHO RA END SYSTOLIC VOLUME APICAL 4 CHAMBER INDEX BSA: 19 ML/M2
ECHO RA VOLUME: 46 ML
ECHO RV BASAL DIMENSION: 3.7 CM
ECHO RV FREE WALL PEAK S': 13 CM/S
ECHO RV TAPSE: 2.1 CM (ref 1.7–?)
ECHO TV REGURGITANT MAX VELOCITY: 2.44 M/S
ECHO TV REGURGITANT PEAK GRADIENT: 24 MMHG
EOSINOPHIL # BLD: 0.4 K/UL (ref 0–0.4)
EOSINOPHIL NFR BLD: 2 % (ref 0–7)
ERYTHROCYTE [DISTWIDTH] IN BLOOD BY AUTOMATED COUNT: 12.9 % (ref 11.5–14.5)
EST. AVERAGE GLUCOSE BLD GHB EST-MCNC: 206 MG/DL
GLOBULIN SER CALC-MCNC: 4.1 G/DL (ref 2–4)
GLUCOSE BLD STRIP.AUTO-MCNC: 242 MG/DL (ref 65–100)
GLUCOSE BLD STRIP.AUTO-MCNC: 288 MG/DL (ref 65–100)
GLUCOSE BLD STRIP.AUTO-MCNC: 407 MG/DL (ref 65–100)
GLUCOSE SERPL-MCNC: 317 MG/DL (ref 65–100)
HBA1C MFR BLD: 8.8 % (ref 4–5.6)
HBV SURFACE AG SER QL: <0.1 INDEX
HBV SURFACE AG SER QL: NEGATIVE
HCT VFR BLD AUTO: 28.1 % (ref 35–47)
HGB BLD-MCNC: 9 G/DL (ref 11.5–16)
IMM GRANULOCYTES # BLD AUTO: 0.1 K/UL (ref 0–0.04)
IMM GRANULOCYTES NFR BLD AUTO: 1 % (ref 0–0.5)
IRON SATN MFR SERPL: 12 % (ref 20–50)
IRON SERPL-MCNC: 18 UG/DL (ref 35–150)
LYMPHOCYTES # BLD: 1.5 K/UL (ref 0.8–3.5)
LYMPHOCYTES NFR BLD: 9 % (ref 12–49)
MCH RBC QN AUTO: 28.5 PG (ref 26–34)
MCHC RBC AUTO-ENTMCNC: 32 G/DL (ref 30–36.5)
MCV RBC AUTO: 88.9 FL (ref 80–99)
MONOCYTES # BLD: 1 K/UL (ref 0–1)
MONOCYTES NFR BLD: 6 % (ref 5–13)
NEUTS SEG # BLD: 14.1 K/UL (ref 1.8–8)
NEUTS SEG NFR BLD: 81 % (ref 32–75)
NRBC # BLD: 0 K/UL (ref 0–0.01)
NRBC BLD-RTO: 0 PER 100 WBC
PERFORMED BY:: ABNORMAL
PHOSPHATE SERPL-MCNC: 5.7 MG/DL (ref 2.6–4.7)
PLATELET # BLD AUTO: 338 K/UL (ref 150–400)
PMV BLD AUTO: 10.4 FL (ref 8.9–12.9)
POTASSIUM SERPL-SCNC: 4.5 MMOL/L (ref 3.5–5.1)
PROT SERPL-MCNC: 6.7 G/DL (ref 6.4–8.2)
RBC # BLD AUTO: 3.16 M/UL (ref 3.8–5.2)
SODIUM SERPL-SCNC: 134 MMOL/L (ref 136–145)
THERAPEUTIC RANGE: NORMAL SEC (ref 82–109)
TIBC SERPL-MCNC: 152 UG/DL (ref 250–450)
TROPONIN I SERPL HS-MCNC: ABNORMAL NG/L (ref 0–51)
WBC # BLD AUTO: 17.2 K/UL (ref 3.6–11)

## 2023-10-13 PROCEDURE — 87340 HEPATITIS B SURFACE AG IA: CPT

## 2023-10-13 PROCEDURE — 2060000000 HC ICU INTERMEDIATE R&B

## 2023-10-13 PROCEDURE — 36415 COLL VENOUS BLD VENIPUNCTURE: CPT

## 2023-10-13 PROCEDURE — 2580000003 HC RX 258: Performed by: FAMILY MEDICINE

## 2023-10-13 PROCEDURE — 83540 ASSAY OF IRON: CPT

## 2023-10-13 PROCEDURE — 2700000000 HC OXYGEN THERAPY PER DAY

## 2023-10-13 PROCEDURE — 94761 N-INVAS EAR/PLS OXIMETRY MLT: CPT

## 2023-10-13 PROCEDURE — 6370000000 HC RX 637 (ALT 250 FOR IP): Performed by: FAMILY MEDICINE

## 2023-10-13 PROCEDURE — 84484 ASSAY OF TROPONIN QUANT: CPT

## 2023-10-13 PROCEDURE — 6370000000 HC RX 637 (ALT 250 FOR IP): Performed by: INTERNAL MEDICINE

## 2023-10-13 PROCEDURE — 80053 COMPREHEN METABOLIC PANEL: CPT

## 2023-10-13 PROCEDURE — 84100 ASSAY OF PHOSPHORUS: CPT

## 2023-10-13 PROCEDURE — 93306 TTE W/DOPPLER COMPLETE: CPT

## 2023-10-13 PROCEDURE — 82962 GLUCOSE BLOOD TEST: CPT

## 2023-10-13 PROCEDURE — 85730 THROMBOPLASTIN TIME PARTIAL: CPT

## 2023-10-13 PROCEDURE — 85025 COMPLETE CBC W/AUTO DIFF WBC: CPT

## 2023-10-13 PROCEDURE — 2580000003 HC RX 258: Performed by: INTERNAL MEDICINE

## 2023-10-13 RX ORDER — SORBITOL SOLUTION 70 %
60 SOLUTION, ORAL MISCELLANEOUS DAILY PRN
Status: ON HOLD | COMMUNITY
End: 2023-10-16 | Stop reason: HOSPADM

## 2023-10-13 RX ORDER — HYDROXYZINE PAMOATE 25 MG/1
25 CAPSULE ORAL ONCE
Status: COMPLETED | OUTPATIENT
Start: 2023-10-13 | End: 2023-10-13

## 2023-10-13 RX ORDER — INSULIN GLARGINE 100 [IU]/ML
30 INJECTION, SOLUTION SUBCUTANEOUS NIGHTLY
Status: DISCONTINUED | OUTPATIENT
Start: 2023-10-13 | End: 2023-10-16 | Stop reason: HOSPADM

## 2023-10-13 RX ORDER — SORBITOL SOLUTION 70 %
60 SOLUTION, ORAL MISCELLANEOUS DAILY PRN
Status: DISCONTINUED | OUTPATIENT
Start: 2023-10-13 | End: 2023-10-16 | Stop reason: HOSPADM

## 2023-10-13 RX ADMIN — INSULIN GLARGINE 30 UNITS: 100 INJECTION, SOLUTION SUBCUTANEOUS at 20:53

## 2023-10-13 RX ADMIN — SORBITOL SOLUTION (BULK) 30 ML: 70 SOLUTION at 05:57

## 2023-10-13 RX ADMIN — SODIUM CHLORIDE, PRESERVATIVE FREE 10 ML: 5 INJECTION INTRAVENOUS at 10:13

## 2023-10-13 RX ADMIN — TICAGRELOR 90 MG: 90 TABLET ORAL at 00:34

## 2023-10-13 RX ADMIN — TICAGRELOR 90 MG: 90 TABLET ORAL at 14:35

## 2023-10-13 RX ADMIN — PANTOPRAZOLE SODIUM 40 MG: 40 TABLET, DELAYED RELEASE ORAL at 05:36

## 2023-10-13 RX ADMIN — TICAGRELOR 90 MG: 90 TABLET ORAL at 23:58

## 2023-10-13 RX ADMIN — INSULIN LISPRO 16 UNITS: 100 INJECTION, SOLUTION INTRAVENOUS; SUBCUTANEOUS at 09:00

## 2023-10-13 RX ADMIN — SEVELAMER CARBONATE 1600 MG: 800 TABLET, FILM COATED ORAL at 09:00

## 2023-10-13 RX ADMIN — INSULIN LISPRO 12 UNITS: 100 INJECTION, SOLUTION INTRAVENOUS; SUBCUTANEOUS at 17:44

## 2023-10-13 RX ADMIN — SEVELAMER CARBONATE 1600 MG: 800 TABLET, FILM COATED ORAL at 14:36

## 2023-10-13 RX ADMIN — GABAPENTIN 300 MG: 300 CAPSULE ORAL at 20:53

## 2023-10-13 RX ADMIN — SORBITOL SOLUTION (BULK) 30 ML: 70 SOLUTION at 05:58

## 2023-10-13 RX ADMIN — HYDROXYZINE PAMOATE 25 MG: 25 CAPSULE ORAL at 21:30

## 2023-10-13 RX ADMIN — SEVELAMER CARBONATE 1600 MG: 800 TABLET, FILM COATED ORAL at 17:44

## 2023-10-13 RX ADMIN — ASPIRIN 81 MG CHEWABLE TABLET 81 MG: 81 TABLET CHEWABLE at 10:12

## 2023-10-13 RX ADMIN — SODIUM CHLORIDE, PRESERVATIVE FREE 10 ML: 5 INJECTION INTRAVENOUS at 21:31

## 2023-10-13 ASSESSMENT — PAIN SCALES - GENERAL
PAINLEVEL_OUTOF10: 0
PAINLEVEL_OUTOF10: 0

## 2023-10-13 NOTE — DISCHARGE INSTRUCTIONS
IMPORTANT    People who undergo angioplasty and/or stent placement procedures are prescribed aspirin along with certain medications called anticlotting or antiplatelet medications. These medications include: Plavix (clopidogrel), Effient (prasugrel), and Brilinta (ticagrelor). It is important to take the aspirin and the anticlotting medication that you were prescribed every day in order to reduce the probability that the stent will become filled with blood clot. Angioplasty and/or stent placement procedures are done so that a blocked artery can be opened. If the stent becomes filled with blood clot, the artery becomes blocked off again. This can result in a heart attack, or even death. It is extremely important to take all the medicines prescribed by your cardiologist exactly as they were prescribed. Failure to take certain medications - particularly aspirin along with Plavix (clopidogrel),  Effient (prasugrel), or Brilinta(ticagrelor) - can result in a heart attack, or even death. Do not miss any doses. It is vital that aspirin along with Plavix, Effient, or Brilinta be taken every single day. If you have any questions or concerns regarding your medications, please consult your cardiologist. He/she is the only person who can adjust or stop these medications. You must fill the prescription for these medications immediately upon discharge so that you do not miss any doses. If you cannot afford the medication prescribed to you, please let your cardiologist know immediately. IF PRESCRIBED ASPIRIN AND BRILINTA: TAKE ONE 81 MG ASPIRIN PER DAY. DO NOT TAKE MORE WHILE TAKING BRILINTA WITHOUT SPEAKING WITH YOUR CARDIOLOGIST  __________________________________________________________________________________    Your cardiologist has ordered cardiac rehabilitation for you as part of your recovery process.  Cardiac rehab is a very important way to help you to feel better and stronger more quickly as well as reduce the

## 2023-10-13 NOTE — CARE COORDINATION
10/13/23 1609   Service Assessment   Patient Orientation Alert and Oriented   Cognition Alert   History Provided By Patient   Primary Caregiver Self   Accompanied By/Relationship alone in room   Support Systems Spouse/Significant Other;Children   Patient's Healthcare Decision Maker is: Legal Next of Kin   PCP Verified by CM Yes  (1 month ago)   Last Visit to PCP Within last 3 months   Prior Functional Level Independent in ADLs/IADLs   Current Functional Level Independent in ADLs/IADLs   Can patient return to prior living arrangement Yes   Ability to make needs known: Good   Family able to assist with home care needs: Yes   Would you like for me to discuss the discharge plan with any other family members/significant others, and if so, who? Yes   Financial Resources [employer] None   Social/Functional History   Lives With Spouse; Son   Type of [address] One level   Home Access Stairs to enter with rails   Entrance Stairs - Number of Steps 4 steps   Entrance Stairs - Rails Both   Receives Help From Family   ADL Assistance Independent   Homemaking Assistance Independent   Homemaking Responsibilities Yes   Ambulation Assistance Independent   Transfer Assistance Independent   Active  Yes   Mode of Transportation Car   Occupation Retired   Discharge Planning   Type of Residence Trailer/Mobile [facility] Prior To Admission None   Potential Assistance Needed N/A   DME Ordered? No   Potential Assistance Purchasing Medications No   Type of Home Care Services None   Patient expects to be discharged to: Trailer/mobile home   One/Two Story Residence One story   History of falls? 0   Services At/After Discharge   Transition of Care Consult (CM Consult) N/A   Services At/After Discharge None   [employer] Information Provided?  No   Confirm Follow Up Transport Family     Jian lives in a trailer with her  and son

## 2023-10-14 LAB
ALBUMIN SERPL-MCNC: 2.6 G/DL (ref 3.5–5)
ANION GAP SERPL CALC-SCNC: 12 MMOL/L (ref 5–15)
BASOPHILS # BLD: 0.1 K/UL (ref 0–0.1)
BASOPHILS NFR BLD: 0 % (ref 0–1)
BUN SERPL-MCNC: 64 MG/DL (ref 6–20)
BUN/CREAT SERPL: 9 (ref 12–20)
CA-I BLD-MCNC: 8 MG/DL (ref 8.5–10.1)
CHLORIDE SERPL-SCNC: 98 MMOL/L (ref 97–108)
CO2 SERPL-SCNC: 25 MMOL/L (ref 21–32)
CREAT SERPL-MCNC: 7.42 MG/DL (ref 0.55–1.02)
DIFFERENTIAL METHOD BLD: ABNORMAL
EOSINOPHIL # BLD: 0.5 K/UL (ref 0–0.4)
EOSINOPHIL NFR BLD: 3 % (ref 0–7)
ERYTHROCYTE [DISTWIDTH] IN BLOOD BY AUTOMATED COUNT: 13.1 % (ref 11.5–14.5)
GLUCOSE BLD STRIP.AUTO-MCNC: 278 MG/DL (ref 65–100)
GLUCOSE BLD STRIP.AUTO-MCNC: 292 MG/DL (ref 65–100)
GLUCOSE BLD STRIP.AUTO-MCNC: 299 MG/DL (ref 65–100)
GLUCOSE SERPL-MCNC: 202 MG/DL (ref 65–100)
HCT VFR BLD AUTO: 26.6 % (ref 35–47)
HGB BLD-MCNC: 8.4 G/DL (ref 11.5–16)
IMM GRANULOCYTES # BLD AUTO: 0.1 K/UL (ref 0–0.04)
IMM GRANULOCYTES NFR BLD AUTO: 0 % (ref 0–0.5)
LYMPHOCYTES # BLD: 1.9 K/UL (ref 0.8–3.5)
LYMPHOCYTES NFR BLD: 12 % (ref 12–49)
MCH RBC QN AUTO: 28.4 PG (ref 26–34)
MCHC RBC AUTO-ENTMCNC: 31.6 G/DL (ref 30–36.5)
MCV RBC AUTO: 89.9 FL (ref 80–99)
MONOCYTES # BLD: 0.7 K/UL (ref 0–1)
MONOCYTES NFR BLD: 5 % (ref 5–13)
NEUTS SEG # BLD: 13.1 K/UL (ref 1.8–8)
NEUTS SEG NFR BLD: 80 % (ref 32–75)
NRBC # BLD: 0 K/UL (ref 0–0.01)
NRBC BLD-RTO: 0 PER 100 WBC
PERFORMED BY:: ABNORMAL
PHOSPHATE SERPL-MCNC: 5.3 MG/DL (ref 2.6–4.7)
PLATELET # BLD AUTO: 339 K/UL (ref 150–400)
PMV BLD AUTO: 10.9 FL (ref 8.9–12.9)
POTASSIUM SERPL-SCNC: 4.4 MMOL/L (ref 3.5–5.1)
RBC # BLD AUTO: 2.96 M/UL (ref 3.8–5.2)
SODIUM SERPL-SCNC: 135 MMOL/L (ref 136–145)
TROPONIN I SERPL HS-MCNC: ABNORMAL NG/L (ref 0–51)
WBC # BLD AUTO: 16.3 K/UL (ref 3.6–11)

## 2023-10-14 PROCEDURE — 85025 COMPLETE CBC W/AUTO DIFF WBC: CPT

## 2023-10-14 PROCEDURE — 5A1D70Z PERFORMANCE OF URINARY FILTRATION, INTERMITTENT, LESS THAN 6 HOURS PER DAY: ICD-10-PCS | Performed by: INTERNAL MEDICINE

## 2023-10-14 PROCEDURE — 6370000000 HC RX 637 (ALT 250 FOR IP): Performed by: INTERNAL MEDICINE

## 2023-10-14 PROCEDURE — 2580000003 HC RX 258: Performed by: FAMILY MEDICINE

## 2023-10-14 PROCEDURE — 2709999900 HC NON-CHARGEABLE SUPPLY

## 2023-10-14 PROCEDURE — 82962 GLUCOSE BLOOD TEST: CPT

## 2023-10-14 PROCEDURE — 84484 ASSAY OF TROPONIN QUANT: CPT

## 2023-10-14 PROCEDURE — 6370000000 HC RX 637 (ALT 250 FOR IP): Performed by: FAMILY MEDICINE

## 2023-10-14 PROCEDURE — 90935 HEMODIALYSIS ONE EVALUATION: CPT

## 2023-10-14 PROCEDURE — 2580000003 HC RX 258: Performed by: INTERNAL MEDICINE

## 2023-10-14 PROCEDURE — 80069 RENAL FUNCTION PANEL: CPT

## 2023-10-14 PROCEDURE — 2060000000 HC ICU INTERMEDIATE R&B

## 2023-10-14 PROCEDURE — 36415 COLL VENOUS BLD VENIPUNCTURE: CPT

## 2023-10-14 PROCEDURE — 6360000002 HC RX W HCPCS: Performed by: INTERNAL MEDICINE

## 2023-10-14 RX ORDER — HYDROXYZINE PAMOATE 25 MG/1
25 CAPSULE ORAL DAILY PRN
Status: DISCONTINUED | OUTPATIENT
Start: 2023-10-14 | End: 2023-10-16 | Stop reason: HOSPADM

## 2023-10-14 RX ADMIN — HYDROXYZINE PAMOATE 25 MG: 25 CAPSULE ORAL at 21:57

## 2023-10-14 RX ADMIN — SODIUM CHLORIDE, PRESERVATIVE FREE 10 ML: 5 INJECTION INTRAVENOUS at 12:07

## 2023-10-14 RX ADMIN — ACETAMINOPHEN 650 MG: 325 TABLET ORAL at 15:03

## 2023-10-14 RX ADMIN — TICAGRELOR 90 MG: 90 TABLET ORAL at 23:28

## 2023-10-14 RX ADMIN — INSULIN GLARGINE 30 UNITS: 100 INJECTION, SOLUTION SUBCUTANEOUS at 20:53

## 2023-10-14 RX ADMIN — SEVELAMER CARBONATE 1600 MG: 800 TABLET, FILM COATED ORAL at 16:06

## 2023-10-14 RX ADMIN — SODIUM CHLORIDE, PRESERVATIVE FREE 10 ML: 5 INJECTION INTRAVENOUS at 17:09

## 2023-10-14 RX ADMIN — SODIUM CHLORIDE, PRESERVATIVE FREE 10 ML: 5 INJECTION INTRAVENOUS at 20:53

## 2023-10-14 RX ADMIN — ASPIRIN 81 MG CHEWABLE TABLET 81 MG: 81 TABLET CHEWABLE at 12:06

## 2023-10-14 RX ADMIN — TICAGRELOR 90 MG: 90 TABLET ORAL at 12:06

## 2023-10-14 RX ADMIN — GABAPENTIN 300 MG: 300 CAPSULE ORAL at 20:53

## 2023-10-14 RX ADMIN — INSULIN LISPRO 8 UNITS: 100 INJECTION, SOLUTION INTRAVENOUS; SUBCUTANEOUS at 17:04

## 2023-10-14 RX ADMIN — ACETAMINOPHEN 650 MG: 325 TABLET ORAL at 01:36

## 2023-10-14 RX ADMIN — EPOETIN ALFA-EPBX 10000 UNITS: 10000 INJECTION, SOLUTION INTRAVENOUS; SUBCUTANEOUS at 10:17

## 2023-10-14 RX ADMIN — SEVELAMER CARBONATE 1600 MG: 800 TABLET, FILM COATED ORAL at 12:06

## 2023-10-14 RX ADMIN — PANTOPRAZOLE SODIUM 40 MG: 40 TABLET, DELAYED RELEASE ORAL at 06:06

## 2023-10-14 ASSESSMENT — PAIN SCALES - WONG BAKER: WONGBAKER_NUMERICALRESPONSE: 2

## 2023-10-14 ASSESSMENT — PAIN DESCRIPTION - LOCATION
LOCATION: HEAD
LOCATION: HEAD

## 2023-10-14 ASSESSMENT — PAIN SCALES - GENERAL
PAINLEVEL_OUTOF10: 9
PAINLEVEL_OUTOF10: 9
PAINLEVEL_OUTOF10: 8
PAINLEVEL_OUTOF10: 0
PAINLEVEL_OUTOF10: 3

## 2023-10-14 ASSESSMENT — PAIN DESCRIPTION - DESCRIPTORS: DESCRIPTORS: ACHING

## 2023-10-14 NOTE — DIALYSIS
Pt tolerated treatment no complains voiced during or post treatment. Removed 3000ml and 74.3 liters of blood process. No active re-bleeding noticed to [facility] AV[facility] upon discharged to room via hospital transportation. Report called and given to primary Nurse Alan Zuleta

## 2023-10-15 LAB
ALBUMIN SERPL-MCNC: 2.4 G/DL (ref 3.5–5)
ANION GAP SERPL CALC-SCNC: 9 MMOL/L (ref 5–15)
BASOPHILS # BLD: 0.1 K/UL (ref 0–0.1)
BASOPHILS NFR BLD: 0 % (ref 0–1)
BUN SERPL-MCNC: 43 MG/DL (ref 6–20)
BUN/CREAT SERPL: 8 (ref 12–20)
CA-I BLD-MCNC: 8.1 MG/DL (ref 8.5–10.1)
CHLORIDE SERPL-SCNC: 101 MMOL/L (ref 97–108)
CO2 SERPL-SCNC: 23 MMOL/L (ref 21–32)
CREAT SERPL-MCNC: 5.63 MG/DL (ref 0.55–1.02)
DIFFERENTIAL METHOD BLD: ABNORMAL
EOSINOPHIL # BLD: 0.4 K/UL (ref 0–0.4)
EOSINOPHIL NFR BLD: 3 % (ref 0–7)
ERYTHROCYTE [DISTWIDTH] IN BLOOD BY AUTOMATED COUNT: 13.1 % (ref 11.5–14.5)
GLUCOSE BLD STRIP.AUTO-MCNC: 213 MG/DL (ref 65–100)
GLUCOSE BLD STRIP.AUTO-MCNC: 311 MG/DL (ref 65–100)
GLUCOSE BLD STRIP.AUTO-MCNC: 328 MG/DL (ref 65–100)
GLUCOSE BLD STRIP.AUTO-MCNC: 355 MG/DL (ref 65–100)
GLUCOSE SERPL-MCNC: 367 MG/DL (ref 65–100)
HCT VFR BLD AUTO: 28.2 % (ref 35–47)
HGB BLD-MCNC: 8.6 G/DL (ref 11.5–16)
IMM GRANULOCYTES # BLD AUTO: 0.1 K/UL (ref 0–0.04)
IMM GRANULOCYTES NFR BLD AUTO: 1 % (ref 0–0.5)
LYMPHOCYTES # BLD: 1.8 K/UL (ref 0.8–3.5)
LYMPHOCYTES NFR BLD: 12 % (ref 12–49)
MCH RBC QN AUTO: 28.7 PG (ref 26–34)
MCHC RBC AUTO-ENTMCNC: 30.5 G/DL (ref 30–36.5)
MCV RBC AUTO: 94 FL (ref 80–99)
MONOCYTES # BLD: 0.6 K/UL (ref 0–1)
MONOCYTES NFR BLD: 4 % (ref 5–13)
NEUTS SEG # BLD: 11.4 K/UL (ref 1.8–8)
NEUTS SEG NFR BLD: 80 % (ref 32–75)
NRBC # BLD: 0 K/UL (ref 0–0.01)
NRBC BLD-RTO: 0 PER 100 WBC
PERFORMED BY:: ABNORMAL
PHOSPHATE SERPL-MCNC: 4.4 MG/DL (ref 2.6–4.7)
PLATELET # BLD AUTO: 314 K/UL (ref 150–400)
PMV BLD AUTO: 10.7 FL (ref 8.9–12.9)
POTASSIUM SERPL-SCNC: 4.8 MMOL/L (ref 3.5–5.1)
RBC # BLD AUTO: 3 M/UL (ref 3.8–5.2)
SODIUM SERPL-SCNC: 133 MMOL/L (ref 136–145)
WBC # BLD AUTO: 14.2 K/UL (ref 3.6–11)

## 2023-10-15 PROCEDURE — 82962 GLUCOSE BLOOD TEST: CPT

## 2023-10-15 PROCEDURE — 80069 RENAL FUNCTION PANEL: CPT

## 2023-10-15 PROCEDURE — 85025 COMPLETE CBC W/AUTO DIFF WBC: CPT

## 2023-10-15 PROCEDURE — 6370000000 HC RX 637 (ALT 250 FOR IP): Performed by: FAMILY MEDICINE

## 2023-10-15 PROCEDURE — 36415 COLL VENOUS BLD VENIPUNCTURE: CPT

## 2023-10-15 PROCEDURE — 2060000000 HC ICU INTERMEDIATE R&B

## 2023-10-15 PROCEDURE — 6370000000 HC RX 637 (ALT 250 FOR IP): Performed by: INTERNAL MEDICINE

## 2023-10-15 PROCEDURE — 2580000003 HC RX 258: Performed by: INTERNAL MEDICINE

## 2023-10-15 RX ADMIN — FUROSEMIDE 80 MG: 40 TABLET ORAL at 09:47

## 2023-10-15 RX ADMIN — TICAGRELOR 90 MG: 90 TABLET ORAL at 23:48

## 2023-10-15 RX ADMIN — TICAGRELOR 90 MG: 90 TABLET ORAL at 12:59

## 2023-10-15 RX ADMIN — SODIUM CHLORIDE, PRESERVATIVE FREE 10 ML: 5 INJECTION INTRAVENOUS at 20:52

## 2023-10-15 RX ADMIN — HYDROXYZINE PAMOATE 25 MG: 25 CAPSULE ORAL at 21:57

## 2023-10-15 RX ADMIN — ASPIRIN 81 MG CHEWABLE TABLET 81 MG: 81 TABLET CHEWABLE at 08:34

## 2023-10-15 RX ADMIN — SEVELAMER CARBONATE 1600 MG: 800 TABLET, FILM COATED ORAL at 12:59

## 2023-10-15 RX ADMIN — INSULIN LISPRO 12 UNITS: 100 INJECTION, SOLUTION INTRAVENOUS; SUBCUTANEOUS at 16:16

## 2023-10-15 RX ADMIN — INSULIN LISPRO 12 UNITS: 100 INJECTION, SOLUTION INTRAVENOUS; SUBCUTANEOUS at 08:34

## 2023-10-15 RX ADMIN — PANTOPRAZOLE SODIUM 40 MG: 40 TABLET, DELAYED RELEASE ORAL at 05:25

## 2023-10-15 RX ADMIN — GABAPENTIN 300 MG: 300 CAPSULE ORAL at 20:51

## 2023-10-15 RX ADMIN — ACETAMINOPHEN 650 MG: 325 TABLET ORAL at 21:57

## 2023-10-15 RX ADMIN — SODIUM CHLORIDE, PRESERVATIVE FREE 10 ML: 5 INJECTION INTRAVENOUS at 08:37

## 2023-10-15 RX ADMIN — INSULIN LISPRO 16 UNITS: 100 INJECTION, SOLUTION INTRAVENOUS; SUBCUTANEOUS at 13:06

## 2023-10-15 RX ADMIN — ACETAMINOPHEN 650 MG: 325 TABLET ORAL at 02:27

## 2023-10-15 RX ADMIN — SEVELAMER CARBONATE 1600 MG: 800 TABLET, FILM COATED ORAL at 08:34

## 2023-10-15 RX ADMIN — INSULIN GLARGINE 30 UNITS: 100 INJECTION, SOLUTION SUBCUTANEOUS at 20:51

## 2023-10-15 RX ADMIN — SEVELAMER CARBONATE 1600 MG: 800 TABLET, FILM COATED ORAL at 16:16

## 2023-10-15 RX ADMIN — ACETAMINOPHEN 650 MG: 325 TABLET ORAL at 15:19

## 2023-10-15 ASSESSMENT — PAIN DESCRIPTION - LOCATION
LOCATION: HEAD

## 2023-10-15 ASSESSMENT — PAIN - FUNCTIONAL ASSESSMENT: PAIN_FUNCTIONAL_ASSESSMENT: ACTIVITIES ARE NOT PREVENTED

## 2023-10-15 ASSESSMENT — PAIN SCALES - GENERAL
PAINLEVEL_OUTOF10: 6
PAINLEVEL_OUTOF10: 7
PAINLEVEL_OUTOF10: 4
PAINLEVEL_OUTOF10: 0
PAINLEVEL_OUTOF10: 8
PAINLEVEL_OUTOF10: 7

## 2023-10-15 ASSESSMENT — PAIN DESCRIPTION - ORIENTATION: ORIENTATION: ANTERIOR

## 2023-10-15 ASSESSMENT — PAIN DESCRIPTION - DESCRIPTORS
DESCRIPTORS: ACHING

## 2023-10-15 ASSESSMENT — PAIN SCALES - WONG BAKER: WONGBAKER_NUMERICALRESPONSE: 2

## 2023-10-15 NOTE — PLAN OF CARE
Problem: Discharge Planning  Goal: Discharge to home or other facility with appropriate resources  Outcome: Progressing  Flowsheets (Taken 10/14/2023 2000 by Saran Sanford, RN)  Discharge to home or other facility with appropriate resources:   Identify barriers to discharge with patient and caregiver   Identify discharge learning needs (meds, wound care, etc)   Refer to discharge planning if patient needs post-hospital services based on physician order or complex needs related to functional status, cognitive ability or social support system     Problem: Pain  Goal: Verbalizes/displays adequate comfort level or baseline comfort level  Outcome: Progressing     Problem: Chronic Conditions and Co-morbidities  Goal: Patient's chronic conditions and co-morbidity symptoms are monitored and maintained or improved  Outcome: Progressing  Flowsheets (Taken 10/14/2023 2000 by Saran Sanford RN)  Care Plan - Patient's Chronic Conditions and Co-Morbidity Symptoms are Monitored and Maintained or Improved: Monitor and assess patient's chronic conditions and comorbid symptoms for stability, deterioration, or improvement     Problem: Safety - Adult  Goal: Free from fall injury  Outcome: Progressing

## 2023-10-16 VITALS
HEIGHT: 69 IN | WEIGHT: 270.5 LBS | RESPIRATION RATE: 18 BRPM | OXYGEN SATURATION: 99 % | SYSTOLIC BLOOD PRESSURE: 156 MMHG | DIASTOLIC BLOOD PRESSURE: 85 MMHG | HEART RATE: 74 BPM | TEMPERATURE: 98.4 F | BODY MASS INDEX: 40.07 KG/M2

## 2023-10-16 LAB
ALBUMIN SERPL-MCNC: 2.4 G/DL (ref 3.5–5)
ANION GAP SERPL CALC-SCNC: 8 MMOL/L (ref 5–15)
BASOPHILS # BLD: 0.1 K/UL (ref 0–0.1)
BASOPHILS NFR BLD: 1 % (ref 0–1)
BUN SERPL-MCNC: 54 MG/DL (ref 6–20)
BUN/CREAT SERPL: 9 (ref 12–20)
CA-I BLD-MCNC: 8.5 MG/DL (ref 8.5–10.1)
CHLORIDE SERPL-SCNC: 101 MMOL/L (ref 97–108)
CO2 SERPL-SCNC: 26 MMOL/L (ref 21–32)
CREAT SERPL-MCNC: 6.22 MG/DL (ref 0.55–1.02)
DIFFERENTIAL METHOD BLD: ABNORMAL
EKG ATRIAL RATE: 75 BPM
EKG DIAGNOSIS: NORMAL
EKG P AXIS: 45 DEGREES
EKG P-R INTERVAL: 172 MS
EKG Q-T INTERVAL: 412 MS
EKG QRS DURATION: 76 MS
EKG QTC CALCULATION (BAZETT): 460 MS
EKG R AXIS: -14 DEGREES
EKG T AXIS: 92 DEGREES
EKG VENTRICULAR RATE: 75 BPM
EOSINOPHIL # BLD: 0.5 K/UL (ref 0–0.4)
EOSINOPHIL NFR BLD: 4 % (ref 0–7)
ERYTHROCYTE [DISTWIDTH] IN BLOOD BY AUTOMATED COUNT: 13.1 % (ref 11.5–14.5)
GLUCOSE BLD STRIP.AUTO-MCNC: 120 MG/DL (ref 65–100)
GLUCOSE SERPL-MCNC: 215 MG/DL (ref 65–100)
HCT VFR BLD AUTO: 27 % (ref 35–47)
HGB BLD-MCNC: 8.5 G/DL (ref 11.5–16)
IMM GRANULOCYTES # BLD AUTO: 0.1 K/UL (ref 0–0.04)
IMM GRANULOCYTES NFR BLD AUTO: 1 % (ref 0–0.5)
LYMPHOCYTES # BLD: 2 K/UL (ref 0.8–3.5)
LYMPHOCYTES NFR BLD: 15 % (ref 12–49)
MCH RBC QN AUTO: 28.5 PG (ref 26–34)
MCHC RBC AUTO-ENTMCNC: 31.5 G/DL (ref 30–36.5)
MCV RBC AUTO: 90.6 FL (ref 80–99)
MONOCYTES # BLD: 0.7 K/UL (ref 0–1)
MONOCYTES NFR BLD: 5 % (ref 5–13)
NEUTS SEG # BLD: 10.1 K/UL (ref 1.8–8)
NEUTS SEG NFR BLD: 74 % (ref 32–75)
NRBC # BLD: 0 K/UL (ref 0–0.01)
NRBC BLD-RTO: 0 PER 100 WBC
PERFORMED BY:: ABNORMAL
PHOSPHATE SERPL-MCNC: 4.6 MG/DL (ref 2.6–4.7)
PLATELET # BLD AUTO: 344 K/UL (ref 150–400)
PMV BLD AUTO: 10.9 FL (ref 8.9–12.9)
POTASSIUM SERPL-SCNC: 4.2 MMOL/L (ref 3.5–5.1)
RBC # BLD AUTO: 2.98 M/UL (ref 3.8–5.2)
SODIUM SERPL-SCNC: 135 MMOL/L (ref 136–145)
WBC # BLD AUTO: 13.4 K/UL (ref 3.6–11)

## 2023-10-16 PROCEDURE — 2580000003 HC RX 258: Performed by: INTERNAL MEDICINE

## 2023-10-16 PROCEDURE — 82962 GLUCOSE BLOOD TEST: CPT

## 2023-10-16 PROCEDURE — 90935 HEMODIALYSIS ONE EVALUATION: CPT

## 2023-10-16 PROCEDURE — 80069 RENAL FUNCTION PANEL: CPT

## 2023-10-16 PROCEDURE — 6360000002 HC RX W HCPCS: Performed by: INTERNAL MEDICINE

## 2023-10-16 PROCEDURE — 6370000000 HC RX 637 (ALT 250 FOR IP): Performed by: FAMILY MEDICINE

## 2023-10-16 PROCEDURE — 36415 COLL VENOUS BLD VENIPUNCTURE: CPT

## 2023-10-16 PROCEDURE — 85025 COMPLETE CBC W/AUTO DIFF WBC: CPT

## 2023-10-16 PROCEDURE — 2709999900 HC NON-CHARGEABLE SUPPLY

## 2023-10-16 PROCEDURE — 6370000000 HC RX 637 (ALT 250 FOR IP): Performed by: INTERNAL MEDICINE

## 2023-10-16 RX ORDER — ASPIRIN 81 MG/1
81 TABLET, CHEWABLE ORAL DAILY
Qty: 30 TABLET | Refills: 3 | Status: SHIPPED | OUTPATIENT
Start: 2023-10-16

## 2023-10-16 RX ORDER — CARVEDILOL 3.12 MG/1
3.12 TABLET ORAL 2 TIMES DAILY WITH MEALS
Status: DISCONTINUED | OUTPATIENT
Start: 2023-10-16 | End: 2023-10-16 | Stop reason: HOSPADM

## 2023-10-16 RX ORDER — NITROGLYCERIN 0.4 MG/1
0.4 TABLET SUBLINGUAL EVERY 5 MIN PRN
Qty: 25 TABLET | Refills: 3 | Status: SHIPPED | OUTPATIENT
Start: 2023-10-16

## 2023-10-16 RX ORDER — SEVELAMER CARBONATE 800 MG/1
1600 TABLET, FILM COATED ORAL
Qty: 90 TABLET | Refills: 3 | Status: SHIPPED | OUTPATIENT
Start: 2023-10-16

## 2023-10-16 RX ADMIN — TICAGRELOR 90 MG: 90 TABLET ORAL at 12:35

## 2023-10-16 RX ADMIN — SEVELAMER CARBONATE 1600 MG: 800 TABLET, FILM COATED ORAL at 12:36

## 2023-10-16 RX ADMIN — EPOETIN ALFA-EPBX 10000 UNITS: 10000 INJECTION, SOLUTION INTRAVENOUS; SUBCUTANEOUS at 11:44

## 2023-10-16 RX ADMIN — SODIUM CHLORIDE 125 MG: 9 INJECTION, SOLUTION INTRAVENOUS at 10:30

## 2023-10-16 RX ADMIN — FUROSEMIDE 80 MG: 40 TABLET ORAL at 12:36

## 2023-10-16 RX ADMIN — ASPIRIN 81 MG CHEWABLE TABLET 81 MG: 81 TABLET CHEWABLE at 12:35

## 2023-10-16 RX ADMIN — ACETAMINOPHEN 650 MG: 325 TABLET ORAL at 08:03

## 2023-10-16 ASSESSMENT — PAIN SCALES - GENERAL
PAINLEVEL_OUTOF10: 0
PAINLEVEL_OUTOF10: 4
PAINLEVEL_OUTOF10: 0
PAINLEVEL_OUTOF10: 9
PAINLEVEL_OUTOF10: 0
PAINLEVEL_OUTOF10: 7

## 2023-10-16 ASSESSMENT — PAIN DESCRIPTION - DESCRIPTORS
DESCRIPTORS: THROBBING

## 2023-10-16 ASSESSMENT — PAIN DESCRIPTION - LOCATION
LOCATION: HEAD

## 2023-10-16 NOTE — DISCHARGE SUMMARY
Discharge Summary       PATIENT ID: Shobha Medel  MRN: [id number]   YOB: 1975    DATE OF ADMISSION: 10/12/2023   DATE OF DISCHARGE:   PRIMARY CARE PROVIDER: [unfilled]      ATTENDING PHYSICIAN: Concetta Hdez  DISCHARGING PROVIDER: Riky Hdez      CONSULTATIONS: IP CONSULT TO CARDIOLOGY  IP CONSULT TO CARDIOLOGY  IP CONSULT TO CARDIAC REHAB  IP CONSULT TO NEPHROLOGY    PROCEDURES/SURGERIES: Procedure(s):  Left heart cath / coronary angiography  Ultrasound guided vascular access  Percutaneous coronary intervention  Angioplasty coronary  Insert stent ryland coronary    ADMITTING DIAGNOSES:    Patient Active Problem List    Diagnosis Date Noted    Chest pain 10/12/2023    Colitis 06/10/2023    Abdominal pain 06/10/2023    Diabetes ([address]) 06/09/2023    Dyslipidemia 06/09/2023    Hemodialysis patient ([address]) 06/09/2023    Hypertension 06/09/2023    IBS (irritable bowel syndrome) 06/09/2023    Hx of blood clots 06/09/2023    Diarrhea 06/09/2023    Hyponatremia 06/09/2023    Weakness 06/09/2023    Shortness of breath 06/09/2023    Leukocytosis 06/09/2023    Hypoxia 01/12/2023    Fluid overload 01/12/2023    COVID-19 01/12/2023    Respiratory failure with hypoxia ([address]) 01/12/2023    Intractable nausea and vomiting 06/29/2022       DISCHARGE DIAGNOSES / PLAN:      NSTEMI Left heart catheterization completed yesterday with placement of a 2.5 x 1.5 mm drug-eluting stent in the obtuse marginal  Hypertension  End-stage renal disease on hemodialysis  Type 2 diabetes  Hyperlipidemia  Hyponatremia   History of DVT      DISCHARGE MEDICATIONS:     Medication List        START taking these medications      aspirin 81 MG chewable tablet  Take 1 tablet by mouth daily     nitroGLYCERIN 0.4 MG SL tablet  Commonly known as: NITROSTAT  Place 1 tablet under the tongue every 5 minutes as needed for Chest pain up to max of 3 total doses. If no relief after 1 dose, call 911.      ticagrelor 90 MG Tabs tablet  Commonly known as:

## 2023-10-16 NOTE — DIALYSIS
Pt artem 3.5 hour hemodialysis well.  3 liters net fluid removed.   Report called to Bhupinder Marx in telemetry notified pt rustam to her room, box #16

## 2023-10-17 ENCOUNTER — FOLLOWUP TELEPHONE ENCOUNTER (OUTPATIENT)
Facility: HOSPITAL | Age: 48
End: 2023-10-17

## 2023-10-17 NOTE — ADT AUTH CERT
10/14/23-10/15/23 for review    [address]  [address]  @NPI [id number]   @TID [id number]   [facility] [address] number: [id number]      RETURN CONTACT:  Alexandra Kapoor   . [phone]  Fax: [phone]   Last edited by Alexandra Kapoor on 10/17/23 at [facility]     Patient Demographics    Name Patient ID SSN Gender Identity Birth Date   Sona Gaviria [id number]  Female 75 (50 yrs)     Address Phone Email Employer    [id number] [facility] [address] [address] [phone] (G)   [phone] (M) [email or web address]  [email or web address] Race Occupation Emp Status    [employer] White (non-) -- Disabled      Reg Status PCP Date Last Verified Next Review Date    Verified Micaela Celaya John BURGER  [phone] 10/02/23 11/01/23      Admission Date Discharge Date Admitting Provider     10/12/23 10/16/23 Harini Hdez MD       Marital Status Church       Anabaptist        Emergency Contact 1   Fred Butch (2) [phone] Yohana Prieto [de-identified]  [address] [facility] Name/Sex/Relation Subscriber  Subscriber Address/Phone Subscriber Emp/Emp Phone   1. Shad Joseph   [id number] [facility] - Female   (Self) 1975 [address] [place] [address]  [address] [phone](T) DISABLED    2. [address]   [id number] [facility] - Female   (Self) 1975 [address] [facility]  [address] [facility], [address]   [phone](S) DISABLED      Utilization Reviews       10/15  by Erlinda Silvestre RN  Last Updated by Erlinda Silvestre RN on 10/16/2023 1338     Review Status Review Type Created By   In Primary -- Erlinda Silvestre RN      Criteria Review   DATE: 10/15/223     PERTINENT UPDATES:  on the schedule for dialysis in a.m.      VITALS:  T: 98.6, B/P: 169/72, HR 81, RR 20, SPO2 99 2L NC, weight 269lb 6.4oz     ABNL/PERTINENT LABS/RADIOLOGY/DIAGNOSTIC STUDIES:

## 2023-11-06 ENCOUNTER — TRANSCRIBE ORDERS (OUTPATIENT)
Facility: HOSPITAL | Age: 48
End: 2023-11-06

## 2023-11-06 DIAGNOSIS — Z12.31 VISIT FOR SCREENING MAMMOGRAM: Primary | ICD-10-CM

## 2023-11-16 ENCOUNTER — APPOINTMENT (OUTPATIENT)
Facility: HOSPITAL | Age: 48
DRG: 250 | End: 2023-11-16
Attending: INTERNAL MEDICINE
Payer: MEDICARE

## 2023-11-16 ENCOUNTER — APPOINTMENT (OUTPATIENT)
Facility: HOSPITAL | Age: 48
DRG: 250 | End: 2023-11-16
Payer: MEDICARE

## 2023-11-16 ENCOUNTER — HOSPITAL ENCOUNTER (INPATIENT)
Facility: HOSPITAL | Age: 48
LOS: 5 days | Discharge: HOME | DRG: 250 | End: 2023-11-21
Attending: STUDENT IN AN ORGANIZED HEALTH CARE EDUCATION/TRAINING PROGRAM | Admitting: INTERNAL MEDICINE
Payer: MEDICARE

## 2023-11-16 DIAGNOSIS — R07.9 CHEST PAIN, UNSPECIFIED TYPE: ICD-10-CM

## 2023-11-16 DIAGNOSIS — I31.39 PERICARDIAL EFFUSION: ICD-10-CM

## 2023-11-16 DIAGNOSIS — I20.81 ANGINA PECTORIS WITH CORONARY MICROVASCULAR DYSFUNCTION: ICD-10-CM

## 2023-11-16 DIAGNOSIS — R07.89 OTHER CHEST PAIN: Primary | ICD-10-CM

## 2023-11-16 PROBLEM — I21.4 NSTEMI (NON-ST ELEVATED MYOCARDIAL INFARCTION) (HCC): Status: ACTIVE | Noted: 2023-11-16

## 2023-11-16 LAB
ALBUMIN SERPL-MCNC: 2.8 G/DL (ref 3.5–5)
ALBUMIN/GLOB SERPL: 0.7 (ref 1.1–2.2)
ALP SERPL-CCNC: 98 U/L (ref 45–117)
ALT SERPL-CCNC: 10 U/L (ref 12–78)
ANION GAP SERPL CALC-SCNC: 6 MMOL/L (ref 5–15)
AST SERPL W P-5'-P-CCNC: 22 U/L (ref 15–37)
BASOPHILS # BLD: 0.1 K/UL (ref 0–0.1)
BASOPHILS NFR BLD: 1 % (ref 0–1)
BILIRUB SERPL-MCNC: 0.3 MG/DL (ref 0.2–1)
BUN SERPL-MCNC: 18 MG/DL (ref 6–20)
BUN/CREAT SERPL: 6 (ref 12–20)
CA-I BLD-MCNC: 8.3 MG/DL (ref 8.5–10.1)
CHLORIDE SERPL-SCNC: 103 MMOL/L (ref 97–108)
CO2 SERPL-SCNC: 26 MMOL/L (ref 21–32)
CREAT SERPL-MCNC: 2.9 MG/DL (ref 0.55–1.02)
DIFFERENTIAL METHOD BLD: ABNORMAL
EKG ATRIAL RATE: 78 BPM
EKG DIAGNOSIS: NORMAL
EKG P AXIS: 17 DEGREES
EKG P-R INTERVAL: 162 MS
EKG Q-T INTERVAL: 418 MS
EKG QRS DURATION: 80 MS
EKG QTC CALCULATION (BAZETT): 476 MS
EKG R AXIS: -38 DEGREES
EKG T AXIS: 100 DEGREES
EKG VENTRICULAR RATE: 78 BPM
EOSINOPHIL # BLD: 0.4 K/UL (ref 0–0.4)
EOSINOPHIL NFR BLD: 4 % (ref 0–7)
ERYTHROCYTE [DISTWIDTH] IN BLOOD BY AUTOMATED COUNT: 13.7 % (ref 11.5–14.5)
GLOBULIN SER CALC-MCNC: 4 G/DL (ref 2–4)
GLUCOSE BLD STRIP.AUTO-MCNC: 232 MG/DL (ref 65–100)
GLUCOSE SERPL-MCNC: 349 MG/DL (ref 65–100)
HCT VFR BLD AUTO: 30.1 % (ref 35–47)
HGB BLD-MCNC: 9.7 G/DL (ref 11.5–16)
IMM GRANULOCYTES # BLD AUTO: 0.1 K/UL (ref 0–0.04)
IMM GRANULOCYTES NFR BLD AUTO: 1 % (ref 0–0.5)
LYMPHOCYTES # BLD: 1.6 K/UL (ref 0.8–3.5)
LYMPHOCYTES NFR BLD: 14 % (ref 12–49)
MCH RBC QN AUTO: 28.9 PG (ref 26–34)
MCHC RBC AUTO-ENTMCNC: 32.2 G/DL (ref 30–36.5)
MCV RBC AUTO: 89.6 FL (ref 80–99)
MONOCYTES # BLD: 0.4 K/UL (ref 0–1)
MONOCYTES NFR BLD: 4 % (ref 5–13)
NEUTS SEG # BLD: 8.6 K/UL (ref 1.8–8)
NEUTS SEG NFR BLD: 76 % (ref 32–75)
NRBC # BLD: 0 K/UL (ref 0–0.01)
NRBC BLD-RTO: 0 PER 100 WBC
PERFORMED BY:: ABNORMAL
PLATELET # BLD AUTO: 410 K/UL (ref 150–400)
PMV BLD AUTO: 10.6 FL (ref 8.9–12.9)
POTASSIUM SERPL-SCNC: 4.5 MMOL/L (ref 3.5–5.1)
PROT SERPL-MCNC: 6.8 G/DL (ref 6.4–8.2)
RBC # BLD AUTO: 3.36 M/UL (ref 3.8–5.2)
SODIUM SERPL-SCNC: 135 MMOL/L (ref 136–145)
TROPONIN I SERPL HS-MCNC: 19 NG/L (ref 0–51)
TROPONIN I SERPL HS-MCNC: 24 NG/L (ref 0–51)
WBC # BLD AUTO: 11.1 K/UL (ref 3.6–11)

## 2023-11-16 PROCEDURE — 99153 MOD SED SAME PHYS/QHP EA: CPT | Performed by: INTERNAL MEDICINE

## 2023-11-16 PROCEDURE — 36415 COLL VENOUS BLD VENIPUNCTURE: CPT

## 2023-11-16 PROCEDURE — 2000000000 HC ICU R&B

## 2023-11-16 PROCEDURE — C1887 CATHETER, GUIDING: HCPCS | Performed by: INTERNAL MEDICINE

## 2023-11-16 PROCEDURE — 93005 ELECTROCARDIOGRAM TRACING: CPT | Performed by: STUDENT IN AN ORGANIZED HEALTH CARE EDUCATION/TRAINING PROGRAM

## 2023-11-16 PROCEDURE — 92920 PRQ TRLUML C ANGIOP 1ART&/BR: CPT | Performed by: INTERNAL MEDICINE

## 2023-11-16 PROCEDURE — 94761 N-INVAS EAR/PLS OXIMETRY MLT: CPT

## 2023-11-16 PROCEDURE — 85025 COMPLETE CBC W/AUTO DIFF WBC: CPT

## 2023-11-16 PROCEDURE — 6370000000 HC RX 637 (ALT 250 FOR IP): Performed by: INTERNAL MEDICINE

## 2023-11-16 PROCEDURE — 2580000003 HC RX 258: Performed by: INTERNAL MEDICINE

## 2023-11-16 PROCEDURE — 02703ZZ DILATION OF CORONARY ARTERY, ONE ARTERY, PERCUTANEOUS APPROACH: ICD-10-PCS | Performed by: STUDENT IN AN ORGANIZED HEALTH CARE EDUCATION/TRAINING PROGRAM

## 2023-11-16 PROCEDURE — 6360000004 HC RX CONTRAST MEDICATION: Performed by: INTERNAL MEDICINE

## 2023-11-16 PROCEDURE — 5A1D70Z PERFORMANCE OF URINARY FILTRATION, INTERMITTENT, LESS THAN 6 HOURS PER DAY: ICD-10-PCS | Performed by: STUDENT IN AN ORGANIZED HEALTH CARE EDUCATION/TRAINING PROGRAM

## 2023-11-16 PROCEDURE — 71045 X-RAY EXAM CHEST 1 VIEW: CPT

## 2023-11-16 PROCEDURE — 82962 GLUCOSE BLOOD TEST: CPT

## 2023-11-16 PROCEDURE — 93458 L HRT ARTERY/VENTRICLE ANGIO: CPT | Performed by: INTERNAL MEDICINE

## 2023-11-16 PROCEDURE — 7100000001 HC PACU RECOVERY - ADDTL 15 MIN: Performed by: INTERNAL MEDICINE

## 2023-11-16 PROCEDURE — 4A023N7 MEASUREMENT OF CARDIAC SAMPLING AND PRESSURE, LEFT HEART, PERCUTANEOUS APPROACH: ICD-10-PCS | Performed by: STUDENT IN AN ORGANIZED HEALTH CARE EDUCATION/TRAINING PROGRAM

## 2023-11-16 PROCEDURE — C1894 INTRO/SHEATH, NON-LASER: HCPCS | Performed by: INTERNAL MEDICINE

## 2023-11-16 PROCEDURE — 84484 ASSAY OF TROPONIN QUANT: CPT

## 2023-11-16 PROCEDURE — 6360000002 HC RX W HCPCS: Performed by: INTERNAL MEDICINE

## 2023-11-16 PROCEDURE — 76937 US GUIDE VASCULAR ACCESS: CPT | Performed by: INTERNAL MEDICINE

## 2023-11-16 PROCEDURE — C1725 CATH, TRANSLUMIN NON-LASER: HCPCS | Performed by: INTERNAL MEDICINE

## 2023-11-16 PROCEDURE — B2111ZZ FLUOROSCOPY OF MULTIPLE CORONARY ARTERIES USING LOW OSMOLAR CONTRAST: ICD-10-PCS | Performed by: STUDENT IN AN ORGANIZED HEALTH CARE EDUCATION/TRAINING PROGRAM

## 2023-11-16 PROCEDURE — 6370000000 HC RX 637 (ALT 250 FOR IP): Performed by: STUDENT IN AN ORGANIZED HEALTH CARE EDUCATION/TRAINING PROGRAM

## 2023-11-16 PROCEDURE — 7100000000 HC PACU RECOVERY - FIRST 15 MIN: Performed by: INTERNAL MEDICINE

## 2023-11-16 PROCEDURE — C1753 CATH, INTRAVAS ULTRASOUND: HCPCS | Performed by: INTERNAL MEDICINE

## 2023-11-16 PROCEDURE — 2709999900 HC NON-CHARGEABLE SUPPLY: Performed by: INTERNAL MEDICINE

## 2023-11-16 PROCEDURE — 99285 EMERGENCY DEPT VISIT HI MDM: CPT

## 2023-11-16 PROCEDURE — 93005 ELECTROCARDIOGRAM TRACING: CPT | Performed by: INTERNAL MEDICINE

## 2023-11-16 PROCEDURE — 99152 MOD SED SAME PHYS/QHP 5/>YRS: CPT | Performed by: INTERNAL MEDICINE

## 2023-11-16 PROCEDURE — C1769 GUIDE WIRE: HCPCS | Performed by: INTERNAL MEDICINE

## 2023-11-16 PROCEDURE — 80053 COMPREHEN METABOLIC PANEL: CPT

## 2023-11-16 PROCEDURE — 2500000003 HC RX 250 WO HCPCS: Performed by: INTERNAL MEDICINE

## 2023-11-16 RX ORDER — ONDANSETRON 4 MG/1
4 TABLET, ORALLY DISINTEGRATING ORAL EVERY 8 HOURS PRN
Status: DISCONTINUED | OUTPATIENT
Start: 2023-11-16 | End: 2023-11-21 | Stop reason: HOSPADM

## 2023-11-16 RX ORDER — INSULIN GLARGINE 100 [IU]/ML
32 INJECTION, SOLUTION SUBCUTANEOUS NIGHTLY
Status: DISCONTINUED | OUTPATIENT
Start: 2023-11-16 | End: 2023-11-21 | Stop reason: HOSPADM

## 2023-11-16 RX ORDER — POTASSIUM CHLORIDE 20 MEQ/1
40 TABLET, EXTENDED RELEASE ORAL PRN
Status: DISCONTINUED | OUTPATIENT
Start: 2023-11-16 | End: 2023-11-21 | Stop reason: HOSPADM

## 2023-11-16 RX ORDER — ASPIRIN 81 MG/1
81 TABLET, CHEWABLE ORAL DAILY
Status: DISCONTINUED | OUTPATIENT
Start: 2023-11-17 | End: 2023-11-21 | Stop reason: HOSPADM

## 2023-11-16 RX ORDER — BIVALIRUDIN 250 MG/5ML
INJECTION, POWDER, LYOPHILIZED, FOR SOLUTION INTRAVENOUS PRN
Status: DISCONTINUED | OUTPATIENT
Start: 2023-11-16 | End: 2023-11-16 | Stop reason: HOSPADM

## 2023-11-16 RX ORDER — NITROGLYCERIN 0.4 MG/1
TABLET SUBLINGUAL PRN
Status: DISCONTINUED | OUTPATIENT
Start: 2023-11-16 | End: 2023-11-16 | Stop reason: HOSPADM

## 2023-11-16 RX ORDER — MIDAZOLAM HYDROCHLORIDE 1 MG/ML
INJECTION INTRAMUSCULAR; INTRAVENOUS PRN
Status: DISCONTINUED | OUTPATIENT
Start: 2023-11-16 | End: 2023-11-16 | Stop reason: HOSPADM

## 2023-11-16 RX ORDER — ASPIRIN 81 MG/1
81 TABLET, CHEWABLE ORAL DAILY
Status: DISCONTINUED | OUTPATIENT
Start: 2023-11-16 | End: 2023-11-16

## 2023-11-16 RX ORDER — SODIUM CHLORIDE 0.9 % (FLUSH) 0.9 %
5-40 SYRINGE (ML) INJECTION EVERY 12 HOURS SCHEDULED
Status: DISCONTINUED | OUTPATIENT
Start: 2023-11-16 | End: 2023-11-21 | Stop reason: HOSPADM

## 2023-11-16 RX ORDER — ATORVASTATIN CALCIUM 20 MG/1
20 TABLET, FILM COATED ORAL DAILY
Status: DISCONTINUED | OUTPATIENT
Start: 2023-11-16 | End: 2023-11-21 | Stop reason: HOSPADM

## 2023-11-16 RX ORDER — SODIUM CHLORIDE 0.9 % (FLUSH) 0.9 %
5-40 SYRINGE (ML) INJECTION PRN
Status: DISCONTINUED | OUTPATIENT
Start: 2023-11-16 | End: 2023-11-21 | Stop reason: HOSPADM

## 2023-11-16 RX ORDER — SORBITOL SOLUTION 70 %
60 SOLUTION, ORAL MISCELLANEOUS DAILY PRN
Status: DISCONTINUED | OUTPATIENT
Start: 2023-11-16 | End: 2023-11-21 | Stop reason: HOSPADM

## 2023-11-16 RX ORDER — INSULIN GLARGINE 100 [IU]/ML
30 INJECTION, SOLUTION SUBCUTANEOUS NIGHTLY
Status: DISCONTINUED | OUTPATIENT
Start: 2023-11-16 | End: 2023-11-16

## 2023-11-16 RX ORDER — OXYCODONE HYDROCHLORIDE AND ACETAMINOPHEN 5; 325 MG/1; MG/1
1 TABLET ORAL
Status: COMPLETED | OUTPATIENT
Start: 2023-11-16 | End: 2023-11-16

## 2023-11-16 RX ORDER — POLYETHYLENE GLYCOL 3350 17 G/17G
17 POWDER, FOR SOLUTION ORAL DAILY PRN
Status: DISCONTINUED | OUTPATIENT
Start: 2023-11-16 | End: 2023-11-21 | Stop reason: HOSPADM

## 2023-11-16 RX ORDER — MAGNESIUM SULFATE IN WATER 40 MG/ML
2000 INJECTION, SOLUTION INTRAVENOUS PRN
Status: DISCONTINUED | OUTPATIENT
Start: 2023-11-16 | End: 2023-11-21 | Stop reason: HOSPADM

## 2023-11-16 RX ORDER — ONDANSETRON 4 MG/1
4 TABLET, ORALLY DISINTEGRATING ORAL ONCE
Status: COMPLETED | OUTPATIENT
Start: 2023-11-16 | End: 2023-11-16

## 2023-11-16 RX ORDER — POTASSIUM CHLORIDE 7.45 MG/ML
10 INJECTION INTRAVENOUS PRN
Status: DISCONTINUED | OUTPATIENT
Start: 2023-11-16 | End: 2023-11-21 | Stop reason: HOSPADM

## 2023-11-16 RX ORDER — GABAPENTIN 300 MG/1
300 CAPSULE ORAL NIGHTLY
Status: DISCONTINUED | OUTPATIENT
Start: 2023-11-16 | End: 2023-11-21 | Stop reason: HOSPADM

## 2023-11-16 RX ORDER — NICARDIPINE HYDROCHLORIDE 2.5 MG/ML
INJECTION INTRAVENOUS PRN
Status: DISCONTINUED | OUTPATIENT
Start: 2023-11-16 | End: 2023-11-16 | Stop reason: HOSPADM

## 2023-11-16 RX ORDER — ONDANSETRON 2 MG/ML
4 INJECTION INTRAMUSCULAR; INTRAVENOUS EVERY 6 HOURS PRN
Status: DISCONTINUED | OUTPATIENT
Start: 2023-11-16 | End: 2023-11-21 | Stop reason: HOSPADM

## 2023-11-16 RX ORDER — HYDROXYZINE HYDROCHLORIDE 10 MG/1
25 TABLET, FILM COATED ORAL
Status: DISCONTINUED | OUTPATIENT
Start: 2023-11-16 | End: 2023-11-21 | Stop reason: HOSPADM

## 2023-11-16 RX ORDER — SODIUM CHLORIDE 9 MG/ML
INJECTION, SOLUTION INTRAVENOUS PRN
Status: DISCONTINUED | OUTPATIENT
Start: 2023-11-16 | End: 2023-11-21 | Stop reason: HOSPADM

## 2023-11-16 RX ORDER — SORBITOL SOLUTION 70 %
30 SOLUTION, ORAL MISCELLANEOUS 2 TIMES DAILY PRN
Status: DISCONTINUED | OUTPATIENT
Start: 2023-11-16 | End: 2023-11-16

## 2023-11-16 RX ORDER — ASPIRIN 325 MG
325 TABLET ORAL
Status: COMPLETED | OUTPATIENT
Start: 2023-11-16 | End: 2023-11-16

## 2023-11-16 RX ORDER — ACETAMINOPHEN 325 MG/1
650 TABLET ORAL EVERY 6 HOURS PRN
Status: DISCONTINUED | OUTPATIENT
Start: 2023-11-16 | End: 2023-11-16 | Stop reason: SDUPTHER

## 2023-11-16 RX ORDER — LIDOCAINE HYDROCHLORIDE 10 MG/ML
INJECTION, SOLUTION INFILTRATION; PERINEURAL PRN
Status: DISCONTINUED | OUTPATIENT
Start: 2023-11-16 | End: 2023-11-16 | Stop reason: HOSPADM

## 2023-11-16 RX ORDER — SEVELAMER CARBONATE 800 MG/1
1600 TABLET, FILM COATED ORAL
Status: DISCONTINUED | OUTPATIENT
Start: 2023-11-16 | End: 2023-11-21 | Stop reason: HOSPADM

## 2023-11-16 RX ORDER — HEPARIN SODIUM 1000 [USP'U]/ML
INJECTION, SOLUTION INTRAVENOUS; SUBCUTANEOUS PRN
Status: DISCONTINUED | OUTPATIENT
Start: 2023-11-16 | End: 2023-11-16 | Stop reason: HOSPADM

## 2023-11-16 RX ORDER — PRASUGREL 5 MG/1
TABLET, FILM COATED ORAL PRN
Status: DISCONTINUED | OUTPATIENT
Start: 2023-11-16 | End: 2023-11-16 | Stop reason: HOSPADM

## 2023-11-16 RX ORDER — SORBITOL SOLUTION 70 %
30 SOLUTION, ORAL MISCELLANEOUS 2 TIMES DAILY PRN
Status: ON HOLD | COMMUNITY
Start: 2022-09-05

## 2023-11-16 RX ORDER — ACETAMINOPHEN 650 MG/1
650 SUPPOSITORY RECTAL EVERY 6 HOURS PRN
Status: DISCONTINUED | OUTPATIENT
Start: 2023-11-16 | End: 2023-11-21 | Stop reason: HOSPADM

## 2023-11-16 RX ORDER — FUROSEMIDE 40 MG/1
80 TABLET ORAL DAILY
Status: DISCONTINUED | OUTPATIENT
Start: 2023-11-16 | End: 2023-11-21 | Stop reason: HOSPADM

## 2023-11-16 RX ORDER — PRASUGREL 10 MG/1
10 TABLET, FILM COATED ORAL DAILY
Status: DISCONTINUED | OUTPATIENT
Start: 2023-11-17 | End: 2023-11-17

## 2023-11-16 RX ORDER — PANTOPRAZOLE SODIUM 40 MG/1
40 TABLET, DELAYED RELEASE ORAL
Status: DISCONTINUED | OUTPATIENT
Start: 2023-11-16 | End: 2023-11-21 | Stop reason: HOSPADM

## 2023-11-16 RX ORDER — ACETAMINOPHEN 325 MG/1
650 TABLET ORAL EVERY 4 HOURS PRN
Status: DISCONTINUED | OUTPATIENT
Start: 2023-11-16 | End: 2023-11-21 | Stop reason: HOSPADM

## 2023-11-16 RX ADMIN — GABAPENTIN 300 MG: 300 CAPSULE ORAL at 21:09

## 2023-11-16 RX ADMIN — PANTOPRAZOLE SODIUM 40 MG: 40 TABLET, DELAYED RELEASE ORAL at 08:16

## 2023-11-16 RX ADMIN — SEVELAMER CARBONATE 1600 MG: 800 TABLET, FILM COATED ORAL at 08:16

## 2023-11-16 RX ADMIN — SODIUM CHLORIDE, PRESERVATIVE FREE 10 ML: 5 INJECTION INTRAVENOUS at 21:09

## 2023-11-16 RX ADMIN — ATORVASTATIN CALCIUM 20 MG: 20 TABLET, FILM COATED ORAL at 08:16

## 2023-11-16 RX ADMIN — OXYCODONE HYDROCHLORIDE AND ACETAMINOPHEN 1 TABLET: 5; 325 TABLET ORAL at 02:23

## 2023-11-16 RX ADMIN — RIVAROXABAN 2.5 MG: 2.5 TABLET, FILM COATED ORAL at 22:30

## 2023-11-16 RX ADMIN — TICAGRELOR 90 MG: 90 TABLET ORAL at 08:16

## 2023-11-16 RX ADMIN — RIVAROXABAN 2.5 MG: 2.5 TABLET, FILM COATED ORAL at 08:16

## 2023-11-16 RX ADMIN — SODIUM CHLORIDE, PRESERVATIVE FREE 10 ML: 5 INJECTION INTRAVENOUS at 11:26

## 2023-11-16 RX ADMIN — SORBITOL SOLUTION (BULK) 60 ML: 70 SOLUTION at 19:34

## 2023-11-16 RX ADMIN — ASPIRIN 325 MG: 325 TABLET ORAL at 01:41

## 2023-11-16 RX ADMIN — ONDANSETRON 4 MG: 4 TABLET, ORALLY DISINTEGRATING ORAL at 01:41

## 2023-11-16 RX ADMIN — FUROSEMIDE 80 MG: 40 TABLET ORAL at 08:16

## 2023-11-16 ASSESSMENT — PAIN DESCRIPTION - LOCATION
LOCATION: CHEST

## 2023-11-16 ASSESSMENT — PAIN SCALES - GENERAL
PAINLEVEL_OUTOF10: 0
PAINLEVEL_OUTOF10: 9
PAINLEVEL_OUTOF10: 4
PAINLEVEL_OUTOF10: 2
PAINLEVEL_OUTOF10: 1
PAINLEVEL_OUTOF10: 9

## 2023-11-16 ASSESSMENT — PAIN - FUNCTIONAL ASSESSMENT: PAIN_FUNCTIONAL_ASSESSMENT: 0-10

## 2023-11-16 ASSESSMENT — HEART SCORE: ECG: 1

## 2023-11-16 NOTE — ED NOTES
Assumed care of patient at this time from [facility]. Pt is resting on cardiac monitor.      Fadi Garcia RN  11/16/23 5633

## 2023-11-16 NOTE — CARE COORDINATION
11/16/23 0830   Service Assessment   Patient Orientation Alert and Oriented   Cognition Alert   History Provided By Patient   Primary Caregiver Self   Support Systems Spouse/Significant Other;Children;Family Members;Friends/Neighbors   Patient's Healthcare Decision Maker is: Legal Next of Kin   PCP Verified by CM Yes   Last Visit to PCP Within last 3 months   Prior Functional Level Independent in ADLs/IADLs   Current Functional Level Independent in ADLs/IADLs   Can patient return to prior living arrangement Yes   Ability to make needs known: Good   Family able to assist with home care needs: Yes   Would you like for me to discuss the discharge plan with any other family members/significant others, and if so, who? No   Financial Resources None   [employer] None   Social/Functional History   Lives With Spouse; Son   Home Equipment Cane;Oxygen; Tuan Yohana  ([id number], suplied by [employer])   ADL Assistance [place] Independent   Ambulation Assistance Independent   Transfer Assistance Independent   Active  Yes   Occupation On disability   Services At/After Discharge   Mode of Transport at Discharge Other (see comment)   Confirm Follow Up Transport Family     CM met f/f with Pt, she confirmed that the information on the face sheet is correct. Pt stated that she lives with her  and son. No HH, has a walker and wheelchair and is on [employer] supplied by [employer]. Pt stated that she is independent with ADL    [facility] [place]    Family will transport Pt home when D/C. CM dispo: Home with no needs at this time.     Advance Care Planning     General Advance Care Planning (ACP) Conversation    Date of Conversation: 11/16/2023  Conducted with: Patient with Decision Making Capacity    Healthcare Decision Maker:    Primary Decision Maker: Cresencio Orr - Spouse - [phone]  Click here to complete Healthcare Decision Makers including selection of the

## 2023-11-16 NOTE — ED NOTES
Pt has PACU overflow bed, Pt ntfd of this, pt does not want to go to this room due to not having a private bathroom, reports her medicine makes her sick and she needs a room. Explained that bathroom is within 10 feet of bed. Spoke with bed coordinator who will give bed to another patient and continue to look for a room for this patient. Cath with ED Admin ntfd and will come speak with patient.       Maria Elena Haynes RN  11/16/23 8676

## 2023-11-16 NOTE — CONSULTS
nitroGLYCERIN (NITROSTAT) 0.4 MG SL tablet Place 1 tablet under the tongue every 5 minutes as needed for Chest pain up to max of 3 total doses. If no relief after 1 dose, call 911. 10/16/23   Deepak Jenkins MD   sevelamer (RENVELA) 800 MG tablet Take 2 tablets by mouth 3 times daily (with meals) 10/16/23   Deepak Jenkins MD   ticagrelor (BRILINTA) 90 MG TABS tablet Take 1 tablet by mouth every 12 hours 10/16/23   Deepak Jenkins MD   insulin detemir (LEVEMIR) 100 UNIT/ML injection vial Inject 30 Units into the skin nightly    Provider, MD Roshni   hydrOXYzine HCl (ATARAX) 25 MG tablet Take 1-2 tablets by mouth nightly    ProviderRoshni MD   atorvastatin (LIPITOR) 20 MG tablet Take 1 tablet by mouth daily    Automatic Reconciliation, Ar   furosemide (LASIX) 80 MG tablet Take 1 tablet by mouth daily 6/10/22   Automatic Reconciliation, Ar   gabapentin (NEURONTIN) 300 MG capsule Take 1 capsule by mouth nightly.     Automatic Reconciliation, Ar   omeprazole (PRILOSEC) 20 MG delayed release capsule Take 1 capsule by mouth daily    Automatic Reconciliation, Ar   rivaroxaban (XARELTO) 2.5 MG TABS tablet Take 1 tablet by mouth 2 times daily    Automatic Reconciliation, Ar       Recent Results (from the past 24 hour(s))   EKG 12 Lead    Collection Time: 11/16/23  1:05 AM   Result Value Ref Range    Ventricular Rate 78 BPM    Atrial Rate 78 BPM    P-R Interval 162 ms    QRS Duration 80 ms    Q-T Interval 418 ms    QTc Calculation (Bazett) 476 ms    P Axis 17 degrees    R Axis -38 degrees    T Axis 100 degrees    Diagnosis       Normal sinus rhythm  Possible Left atrial enlargement  Left axis deviation  Anterior infarct , age undetermined  Abnormal ECG  When compared with ECG of 12-OCT-2023 19:48,  No significant change was found  Confirmed by VIRI Meza ([id number]) on 11/16/2023 12:17:39 PM     CBC with Auto Differential    Collection Time: 11/16/23  1:26 AM   Result Value Ref Range    WBC 11.1 (H) 3.6 -

## 2023-11-16 NOTE — ED TRIAGE NOTES
Pt c/o chest pain onset 1 hour ago. Pt reports she took 2 nitro without relief. Pt states she had an MI 1 month ago and had stents placed.

## 2023-11-16 NOTE — PROCEDURES
Patient underwent cardiac catheterization via right radial artery. She was found to have severe in-stent restenosis. Attempted IVUS was unsuccessful to pass through the proximal segment of the left circumflex artery due to tortuosity and calcification. Stent that was previously placed was 2.5 mm that was postdilated with a 3.0 noncompliant balloon up to 16 anastacio. DENAE-3 flow noticed at the end of the study. No dissections noticed hence I decided not to proceed with stent placement. She was having chest pain upon arrival to the Cath Lab and she continued to have chest pain at the end of the procedure. She will be transferred to the ICU for close monitor.     We will switch patient to prasugrel

## 2023-11-16 NOTE — ED NOTES
Cath lab nurse here to transport pt to cath lab at this time      Carisa Acevedo, [address]  11/16/23 [address] [facility]

## 2023-11-16 NOTE — ED PROVIDER NOTES
Note: Pt is being admitted by hospitalist. The results of their tests and reason(s) for their admission have been discussed with pt and/or available family. They convey agreement and understanding for the need to be admitted and for the admission diagnosis. Additional Disposition Considerations:      Smoking Cessation:Not Applicable    DISCHARGE PLAN:  1. Current Discharge Medication List        CONTINUE these medications which have NOT CHANGED    Details   aspirin 81 MG chewable tablet Take 1 tablet by mouth daily  Qty: 30 tablet, Refills: 3      nitroGLYCERIN (NITROSTAT) 0.4 MG SL tablet Place 1 tablet under the tongue every 5 minutes as needed for Chest pain up to max of 3 total doses. If no relief after 1 dose, call 911. Qty: 25 tablet, Refills: 3      sevelamer (RENVELA) 800 MG tablet Take 2 tablets by mouth 3 times daily (with meals)  Qty: 90 tablet, Refills: 3      ticagrelor (BRILINTA) 90 MG TABS tablet Take 1 tablet by mouth every 12 hours  Qty: 60 tablet, Refills: 2      insulin detemir (LEVEMIR) 100 UNIT/ML injection vial Inject 30 Units into the skin nightly      hydrOXYzine HCl (ATARAX) 25 MG tablet Take 1-2 tablets by mouth nightly      atorvastatin (LIPITOR) 20 MG tablet Take 1 tablet by mouth daily      furosemide (LASIX) 80 MG tablet Take 1 tablet by mouth daily      gabapentin (NEURONTIN) 300 MG capsule Take 1 capsule by mouth nightly. omeprazole (PRILOSEC) 20 MG delayed release capsule Take 1 capsule by mouth daily      rivaroxaban (XARELTO) 2.5 MG TABS tablet Take 1 tablet by mouth 2 times daily            2. No follow-up provider specified. 3.  Return to ED if worse    4.       Medication List        ASK your doctor about these medications      aspirin 81 MG chewable tablet  Take 1 tablet by mouth daily     atorvastatin 20 MG tablet  Commonly known as: LIPITOR     furosemide 80 MG tablet  Commonly known as: LASIX     gabapentin 300 MG capsule  Commonly known as: NEURONTIN

## 2023-11-17 ENCOUNTER — APPOINTMENT (OUTPATIENT)
Facility: HOSPITAL | Age: 48
DRG: 250 | End: 2023-11-17
Attending: INTERNAL MEDICINE
Payer: MEDICARE

## 2023-11-17 LAB
25(OH)D3 SERPL-MCNC: 28.5 NG/ML (ref 30–100)
ALBUMIN SERPL-MCNC: 2.3 G/DL (ref 3.5–5)
ANION GAP SERPL CALC-SCNC: 6 MMOL/L (ref 5–15)
BASOPHILS # BLD: 0.1 K/UL (ref 0–0.1)
BASOPHILS # BLD: 0.1 K/UL (ref 0–0.1)
BASOPHILS NFR BLD: 0 % (ref 0–1)
BASOPHILS NFR BLD: 1 % (ref 0–1)
BUN SERPL-MCNC: 31 MG/DL (ref 6–20)
BUN/CREAT SERPL: 7 (ref 12–20)
CA-I BLD-MCNC: 7.8 MG/DL (ref 8.5–10.1)
CA-I BLD-MCNC: 8 MG/DL (ref 8.5–10.1)
CHLORIDE SERPL-SCNC: 101 MMOL/L (ref 97–108)
CHOLEST SERPL-MCNC: 173 MG/DL
CO2 SERPL-SCNC: 26 MMOL/L (ref 21–32)
CREAT SERPL-MCNC: 4.37 MG/DL (ref 0.55–1.02)
DIFFERENTIAL METHOD BLD: ABNORMAL
DIFFERENTIAL METHOD BLD: ABNORMAL
ECHO AO ASC DIAM: 3.2 CM
ECHO AO ASCENDING AORTA INDEX: 1.37 CM/M2
ECHO AO ROOT DIAM: 1.9 CM
ECHO AO ROOT INDEX: 0.81 CM/M2
ECHO AV AREA PEAK VELOCITY: 2.5 CM2
ECHO AV AREA VTI: 2.7 CM2
ECHO AV AREA/BSA PEAK VELOCITY: 1.1 CM2/M2
ECHO AV AREA/BSA VTI: 1.2 CM2/M2
ECHO AV MEAN GRADIENT: 9 MMHG
ECHO AV MEAN VELOCITY: 1.3 M/S
ECHO AV PEAK GRADIENT: 17 MMHG
ECHO AV PEAK VELOCITY: 2.1 M/S
ECHO AV VELOCITY RATIO: 0.52
ECHO AV VTI: 39.3 CM
ECHO BSA: 2.43 M2
ECHO BSA: 2.43 M2
ECHO LA AREA 2C: 6.1 CM2
ECHO LA AREA 4C: 15.8 CM2
ECHO LA DIAMETER INDEX: 1.79 CM/M2
ECHO LA DIAMETER: 4.2 CM
ECHO LA MAJOR AXIS: 5.1 CM
ECHO LA MINOR AXIS: 2.6 CM
ECHO LA TO AORTIC ROOT RATIO: 2.21
ECHO LA VOL MOD A2C: 12 ML (ref 22–52)
ECHO LA VOL MOD A4C: 39 ML (ref 22–52)
ECHO LA VOLUME INDEX MOD A2C: 5 ML/M2 (ref 16–34)
ECHO LA VOLUME INDEX MOD A4C: 17 ML/M2 (ref 16–34)
ECHO LV E' LATERAL VELOCITY: 6 CM/S
ECHO LV E' SEPTAL VELOCITY: 9 CM/S
ECHO LV EDV A2C: 55 ML
ECHO LV EDV A4C: 65 ML
ECHO LV EDV INDEX A4C: 28 ML/M2
ECHO LV EDV NDEX A2C: 24 ML/M2
ECHO LV EJECTION FRACTION A2C: 81 %
ECHO LV EJECTION FRACTION A4C: 86 %
ECHO LV EJECTION FRACTION BIPLANE: 84 % (ref 55–100)
ECHO LV ESV A2C: 11 ML
ECHO LV ESV A4C: 9 ML
ECHO LV ESV INDEX A2C: 5 ML/M2
ECHO LV ESV INDEX A4C: 4 ML/M2
ECHO LV FRACTIONAL SHORTENING: 51 % (ref 28–44)
ECHO LV INTERNAL DIMENSION DIASTOLE INDEX: 1.92 CM/M2
ECHO LV INTERNAL DIMENSION DIASTOLIC: 4.5 CM (ref 3.9–5.3)
ECHO LV INTERNAL DIMENSION SYSTOLIC INDEX: 0.94 CM/M2
ECHO LV INTERNAL DIMENSION SYSTOLIC: 2.2 CM
ECHO LV IVSD: 1.3 CM (ref 0.6–0.9)
ECHO LV MASS 2D: 235.3 G (ref 67–162)
ECHO LV MASS INDEX 2D: 100.6 G/M2 (ref 43–95)
ECHO LV POSTERIOR WALL DIASTOLIC: 1.4 CM (ref 0.6–0.9)
ECHO LV RELATIVE WALL THICKNESS RATIO: 0.62
ECHO LVOT AREA: 4.5 CM2
ECHO LVOT AV VTI INDEX: 0.61
ECHO LVOT DIAM: 2.4 CM
ECHO LVOT MEAN GRADIENT: 2 MMHG
ECHO LVOT PEAK GRADIENT: 5 MMHG
ECHO LVOT PEAK VELOCITY: 1.1 M/S
ECHO LVOT STROKE VOLUME INDEX: 46 ML/M2
ECHO LVOT SV: 107.6 ML
ECHO LVOT VTI: 23.8 CM
ECHO MV REGURGITANT PEAK GRADIENT: 8 MMHG
ECHO MV REGURGITANT PEAK VELOCITY: 1.4 M/S
ECHO PV MAX VELOCITY: 1.5 M/S
ECHO PV PEAK GRADIENT: 9 MMHG
ECHO RA AREA 4C: 19.1 CM2
ECHO RA END SYSTOLIC VOLUME APICAL 4 CHAMBER INDEX BSA: 26 ML/M2
ECHO RA VOLUME: 60 ML
ECHO RV BASAL DIMENSION: 3.7 CM
ECHO RV MID DIMENSION: 3.2 CM
ECHO TV REGURGITANT MAX VELOCITY: 1.57 M/S
ECHO TV REGURGITANT PEAK GRADIENT: 10 MMHG
EKG ATRIAL RATE: 67 BPM
EKG DIAGNOSIS: NORMAL
EKG P AXIS: 44 DEGREES
EKG P-R INTERVAL: 176 MS
EKG Q-T INTERVAL: 434 MS
EKG QRS DURATION: 78 MS
EKG QTC CALCULATION (BAZETT): 458 MS
EKG R AXIS: -16 DEGREES
EKG T AXIS: 68 DEGREES
EKG VENTRICULAR RATE: 67 BPM
EOSINOPHIL # BLD: 0.3 K/UL (ref 0–0.4)
EOSINOPHIL # BLD: 0.4 K/UL (ref 0–0.4)
EOSINOPHIL NFR BLD: 3 % (ref 0–7)
EOSINOPHIL NFR BLD: 3 % (ref 0–7)
ERYTHROCYTE [DISTWIDTH] IN BLOOD BY AUTOMATED COUNT: 13.5 % (ref 11.5–14.5)
ERYTHROCYTE [DISTWIDTH] IN BLOOD BY AUTOMATED COUNT: 13.5 % (ref 11.5–14.5)
FERRITIN SERPL-MCNC: 447 NG/ML (ref 26–388)
GLUCOSE BLD STRIP.AUTO-MCNC: 201 MG/DL (ref 65–100)
GLUCOSE BLD STRIP.AUTO-MCNC: 245 MG/DL (ref 65–100)
GLUCOSE BLD STRIP.AUTO-MCNC: 383 MG/DL (ref 65–100)
GLUCOSE BLD STRIP.AUTO-MCNC: 395 MG/DL (ref 65–100)
GLUCOSE SERPL-MCNC: 377 MG/DL (ref 65–100)
HBV SURFACE AG SER QL: <0.1 INDEX
HBV SURFACE AG SER QL: NEGATIVE
HCT VFR BLD AUTO: 24 % (ref 35–47)
HCT VFR BLD AUTO: 24.7 % (ref 35–47)
HDLC SERPL-MCNC: 26 MG/DL
HDLC SERPL: 6.7 (ref 0–5)
HGB BLD-MCNC: 7.6 G/DL (ref 11.5–16)
HGB BLD-MCNC: 7.7 G/DL (ref 11.5–16)
IMM GRANULOCYTES # BLD AUTO: 0.1 K/UL (ref 0–0.04)
IMM GRANULOCYTES # BLD AUTO: 0.1 K/UL (ref 0–0.04)
IMM GRANULOCYTES NFR BLD AUTO: 0 % (ref 0–0.5)
IMM GRANULOCYTES NFR BLD AUTO: 1 % (ref 0–0.5)
IRON SATN MFR SERPL: 33 % (ref 20–50)
IRON SERPL-MCNC: 45 UG/DL (ref 35–150)
LDLC SERPL CALC-MCNC: 85.2 MG/DL (ref 0–100)
LIPID PANEL: ABNORMAL
LYMPHOCYTES # BLD: 1.5 K/UL (ref 0.8–3.5)
LYMPHOCYTES # BLD: 1.6 K/UL (ref 0.8–3.5)
LYMPHOCYTES NFR BLD: 13 % (ref 12–49)
LYMPHOCYTES NFR BLD: 13 % (ref 12–49)
MCH RBC QN AUTO: 28.9 PG (ref 26–34)
MCH RBC QN AUTO: 29.2 PG (ref 26–34)
MCHC RBC AUTO-ENTMCNC: 31.2 G/DL (ref 30–36.5)
MCHC RBC AUTO-ENTMCNC: 31.7 G/DL (ref 30–36.5)
MCV RBC AUTO: 91.3 FL (ref 80–99)
MCV RBC AUTO: 93.6 FL (ref 80–99)
MONOCYTES # BLD: 0.7 K/UL (ref 0–1)
MONOCYTES # BLD: 0.7 K/UL (ref 0–1)
MONOCYTES NFR BLD: 6 % (ref 5–13)
MONOCYTES NFR BLD: 6 % (ref 5–13)
NEUTS SEG # BLD: 9 K/UL (ref 1.8–8)
NEUTS SEG # BLD: 9.1 K/UL (ref 1.8–8)
NEUTS SEG NFR BLD: 77 % (ref 32–75)
NEUTS SEG NFR BLD: 77 % (ref 32–75)
NRBC # BLD: 0 K/UL (ref 0–0.01)
NRBC # BLD: 0 K/UL (ref 0–0.01)
NRBC BLD-RTO: 0 PER 100 WBC
NRBC BLD-RTO: 0 PER 100 WBC
PERFORMED BY:: ABNORMAL
PHOSPHATE SERPL-MCNC: 5.3 MG/DL (ref 2.6–4.7)
PLATELET # BLD AUTO: 321 K/UL (ref 150–400)
PLATELET # BLD AUTO: 335 K/UL (ref 150–400)
PMV BLD AUTO: 10.5 FL (ref 8.9–12.9)
PMV BLD AUTO: 10.7 FL (ref 8.9–12.9)
POTASSIUM SERPL-SCNC: 4.5 MMOL/L (ref 3.5–5.1)
PTH-INTACT SERPL-MCNC: 274.5 PG/ML (ref 18.4–88)
RBC # BLD AUTO: 2.63 M/UL (ref 3.8–5.2)
RBC # BLD AUTO: 2.64 M/UL (ref 3.8–5.2)
SODIUM SERPL-SCNC: 133 MMOL/L (ref 136–145)
TIBC SERPL-MCNC: 137 UG/DL (ref 250–450)
TRIGL SERPL-MCNC: 309 MG/DL
TROPONIN I SERPL HS-MCNC: 264 NG/L (ref 0–51)
VLDLC SERPL CALC-MCNC: 61.8 MG/DL
WBC # BLD AUTO: 11.6 K/UL (ref 3.6–11)
WBC # BLD AUTO: 11.7 K/UL (ref 3.6–11)

## 2023-11-17 PROCEDURE — 86850 RBC ANTIBODY SCREEN: CPT

## 2023-11-17 PROCEDURE — 36415 COLL VENOUS BLD VENIPUNCTURE: CPT

## 2023-11-17 PROCEDURE — 6360000002 HC RX W HCPCS

## 2023-11-17 PROCEDURE — 93306 TTE W/DOPPLER COMPLETE: CPT

## 2023-11-17 PROCEDURE — 82306 VITAMIN D 25 HYDROXY: CPT

## 2023-11-17 PROCEDURE — 80061 LIPID PANEL: CPT

## 2023-11-17 PROCEDURE — 2580000003 HC RX 258: Performed by: INTERNAL MEDICINE

## 2023-11-17 PROCEDURE — 6370000000 HC RX 637 (ALT 250 FOR IP): Performed by: INTERNAL MEDICINE

## 2023-11-17 PROCEDURE — 80069 RENAL FUNCTION PANEL: CPT

## 2023-11-17 PROCEDURE — 86923 COMPATIBILITY TEST ELECTRIC: CPT

## 2023-11-17 PROCEDURE — 87340 HEPATITIS B SURFACE AG IA: CPT

## 2023-11-17 PROCEDURE — 83970 ASSAY OF PARATHORMONE: CPT

## 2023-11-17 PROCEDURE — 2709999900 HC NON-CHARGEABLE SUPPLY

## 2023-11-17 PROCEDURE — 84484 ASSAY OF TROPONIN QUANT: CPT

## 2023-11-17 PROCEDURE — 90935 HEMODIALYSIS ONE EVALUATION: CPT

## 2023-11-17 PROCEDURE — 82728 ASSAY OF FERRITIN: CPT

## 2023-11-17 PROCEDURE — 85025 COMPLETE CBC W/AUTO DIFF WBC: CPT

## 2023-11-17 PROCEDURE — 82962 GLUCOSE BLOOD TEST: CPT

## 2023-11-17 PROCEDURE — 83540 ASSAY OF IRON: CPT

## 2023-11-17 PROCEDURE — 86901 BLOOD TYPING SEROLOGIC RH(D): CPT

## 2023-11-17 PROCEDURE — 86900 BLOOD TYPING SEROLOGIC ABO: CPT

## 2023-11-17 PROCEDURE — 2060000000 HC ICU INTERMEDIATE R&B

## 2023-11-17 RX ORDER — DEXTROSE MONOHYDRATE 100 MG/ML
INJECTION, SOLUTION INTRAVENOUS CONTINUOUS PRN
Status: DISCONTINUED | OUTPATIENT
Start: 2023-11-17 | End: 2023-11-21 | Stop reason: HOSPADM

## 2023-11-17 RX ORDER — NOREPINEPHRINE BITARTRATE 0.06 MG/ML
INJECTION, SOLUTION INTRAVENOUS
Status: DISPENSED
Start: 2023-11-17 | End: 2023-11-17

## 2023-11-17 RX ORDER — GLUCAGON 1 MG/ML
1 KIT INJECTION PRN
Status: DISCONTINUED | OUTPATIENT
Start: 2023-11-17 | End: 2023-11-21 | Stop reason: HOSPADM

## 2023-11-17 RX ORDER — PRASUGREL 10 MG/1
10 TABLET, FILM COATED ORAL DAILY
Status: DISCONTINUED | OUTPATIENT
Start: 2023-11-17 | End: 2023-11-21 | Stop reason: HOSPADM

## 2023-11-17 RX ORDER — INSULIN LISPRO 100 [IU]/ML
0-4 INJECTION, SOLUTION INTRAVENOUS; SUBCUTANEOUS NIGHTLY
Status: DISCONTINUED | OUTPATIENT
Start: 2023-11-17 | End: 2023-11-21 | Stop reason: HOSPADM

## 2023-11-17 RX ORDER — INSULIN LISPRO 100 [IU]/ML
0-8 INJECTION, SOLUTION INTRAVENOUS; SUBCUTANEOUS
Status: DISCONTINUED | OUTPATIENT
Start: 2023-11-17 | End: 2023-11-21 | Stop reason: HOSPADM

## 2023-11-17 RX ADMIN — HYDROXYZINE HYDROCHLORIDE 25 MG: 10 TABLET, FILM COATED ORAL at 01:23

## 2023-11-17 RX ADMIN — ASPIRIN 81 MG: 81 TABLET, CHEWABLE ORAL at 12:16

## 2023-11-17 RX ADMIN — ACETAMINOPHEN 650 MG: 325 TABLET, FILM COATED ORAL at 19:45

## 2023-11-17 RX ADMIN — INSULIN GLARGINE 32 UNITS: 100 INJECTION, SOLUTION SUBCUTANEOUS at 22:43

## 2023-11-17 RX ADMIN — SODIUM CHLORIDE, PRESERVATIVE FREE 10 ML: 5 INJECTION INTRAVENOUS at 10:33

## 2023-11-17 RX ADMIN — SODIUM CHLORIDE, PRESERVATIVE FREE 10 ML: 5 INJECTION INTRAVENOUS at 22:47

## 2023-11-17 RX ADMIN — INSULIN LISPRO 2 UNITS: 100 INJECTION, SOLUTION INTRAVENOUS; SUBCUTANEOUS at 12:15

## 2023-11-17 RX ADMIN — INSULIN LISPRO 2 UNITS: 100 INJECTION, SOLUTION INTRAVENOUS; SUBCUTANEOUS at 17:45

## 2023-11-17 RX ADMIN — ACETAMINOPHEN 650 MG: 325 TABLET, FILM COATED ORAL at 08:06

## 2023-11-17 RX ADMIN — EPOETIN ALFA-EPBX 10000 UNITS: 10000 INJECTION, SOLUTION INTRAVENOUS; SUBCUTANEOUS at 09:05

## 2023-11-17 RX ADMIN — SORBITOL SOLUTION (BULK) 60 ML: 70 SOLUTION at 22:42

## 2023-11-17 RX ADMIN — SEVELAMER CARBONATE 1600 MG: 800 TABLET, FILM COATED ORAL at 12:15

## 2023-11-17 RX ADMIN — ACETAMINOPHEN 650 MG: 325 TABLET, FILM COATED ORAL at 13:51

## 2023-11-17 RX ADMIN — PRASUGREL 10 MG: 10 TABLET, FILM COATED ORAL at 12:46

## 2023-11-17 RX ADMIN — INSULIN LISPRO 4 UNITS: 100 INJECTION, SOLUTION INTRAVENOUS; SUBCUTANEOUS at 22:43

## 2023-11-17 RX ADMIN — SODIUM CHLORIDE, PRESERVATIVE FREE 10 ML: 5 INJECTION INTRAVENOUS at 10:34

## 2023-11-17 ASSESSMENT — PAIN DESCRIPTION - LOCATION
LOCATION: CHEST
LOCATION: ARM

## 2023-11-17 ASSESSMENT — PAIN SCALES - GENERAL
PAINLEVEL_OUTOF10: 2
PAINLEVEL_OUTOF10: 0
PAINLEVEL_OUTOF10: 5
PAINLEVEL_OUTOF10: 0
PAINLEVEL_OUTOF10: 5
PAINLEVEL_OUTOF10: 5

## 2023-11-17 NOTE — DISCHARGE INSTRUCTIONS
[facility], [address] [facility]  [phone]    An appointment has been scheduled  for your initial assessment to begin cardiac rehabilitation. This appointment is scheduled for:    Tuesday, December 12 at 9:30    Your information has been forwarded to the staff at this facility. Please bring your insurance information and a list of your current medications with you. If you need to change this appointment, please call [phone] as soon as possible to reschedule. Coronary Angioplasty: What to Expect at [address] [facility]  Coronary angioplasty is a procedure that is used to open a narrowed or blocker coronary artery. It may also be called a percutaneous coronary intervention (PCI). The doctor opened your narrowed or blocked artery by putting a thin tube, called a catheter, into your heart through a blood vessel. The catheter was inserted into the blood vessel in your groin or wrist. The doctor may have placed a small tube, called a stent, in the artery. You should receive an informational card about your stent. Please always carry a copy of this on your person. It is a good idea to keep the original with other important medical documents. Your groin or wrist may have a bruise and feel sore for a few days after the procedure. You can do light activities around the house. But don't do anything strenuous until your doctor says it is okay. This may be for several days. This care sheet gives you a general idea about how long it will take for you to recover. But each person recovers at a different pace. Follow the steps below to get better as quickly as possible. How can you care for yourself at home? Activity  If the doctor gave you a sedative: For 24 hours, don't do anything that requires attention to detail, such as going to work, making important decisions, or signing any legal documents. It takes time for the medicine's effects to completely wear off.   For your safety, do not

## 2023-11-18 LAB
GLUCOSE BLD STRIP.AUTO-MCNC: 267 MG/DL (ref 65–100)
GLUCOSE BLD STRIP.AUTO-MCNC: 314 MG/DL (ref 65–100)
GLUCOSE BLD STRIP.AUTO-MCNC: 318 MG/DL (ref 65–100)
GLUCOSE BLD STRIP.AUTO-MCNC: 320 MG/DL (ref 65–100)
GLUCOSE BLD STRIP.AUTO-MCNC: 329 MG/DL (ref 65–100)
HBV SURFACE AG SER QL: 0.29 INDEX
HBV SURFACE AG SER QL: NEGATIVE
PERFORMED BY:: ABNORMAL
TROPONIN I SERPL HS-MCNC: 501 NG/L (ref 0–51)

## 2023-11-18 PROCEDURE — 84484 ASSAY OF TROPONIN QUANT: CPT

## 2023-11-18 PROCEDURE — 2580000003 HC RX 258: Performed by: INTERNAL MEDICINE

## 2023-11-18 PROCEDURE — 6370000000 HC RX 637 (ALT 250 FOR IP): Performed by: INTERNAL MEDICINE

## 2023-11-18 PROCEDURE — 82962 GLUCOSE BLOOD TEST: CPT

## 2023-11-18 PROCEDURE — 2060000000 HC ICU INTERMEDIATE R&B

## 2023-11-18 PROCEDURE — 6360000002 HC RX W HCPCS: Performed by: INTERNAL MEDICINE

## 2023-11-18 RX ADMIN — SODIUM CHLORIDE, PRESERVATIVE FREE 10 ML: 5 INJECTION INTRAVENOUS at 08:49

## 2023-11-18 RX ADMIN — INSULIN LISPRO 6 UNITS: 100 INJECTION, SOLUTION INTRAVENOUS; SUBCUTANEOUS at 11:43

## 2023-11-18 RX ADMIN — INSULIN LISPRO 4 UNITS: 100 INJECTION, SOLUTION INTRAVENOUS; SUBCUTANEOUS at 16:50

## 2023-11-18 RX ADMIN — GABAPENTIN 300 MG: 300 CAPSULE ORAL at 23:57

## 2023-11-18 RX ADMIN — INSULIN GLARGINE 32 UNITS: 100 INJECTION, SOLUTION SUBCUTANEOUS at 20:52

## 2023-11-18 RX ADMIN — SEVELAMER CARBONATE 1600 MG: 800 TABLET, FILM COATED ORAL at 11:43

## 2023-11-18 RX ADMIN — HYDROXYZINE HYDROCHLORIDE 25 MG: 10 TABLET, FILM COATED ORAL at 23:57

## 2023-11-18 RX ADMIN — GABAPENTIN 300 MG: 300 CAPSULE ORAL at 02:27

## 2023-11-18 RX ADMIN — SEVELAMER CARBONATE 1600 MG: 800 TABLET, FILM COATED ORAL at 08:48

## 2023-11-18 RX ADMIN — SODIUM CHLORIDE, PRESERVATIVE FREE 10 ML: 5 INJECTION INTRAVENOUS at 20:55

## 2023-11-18 RX ADMIN — SODIUM CHLORIDE, PRESERVATIVE FREE 10 ML: 5 INJECTION INTRAVENOUS at 20:54

## 2023-11-18 RX ADMIN — ASPIRIN 81 MG: 81 TABLET, CHEWABLE ORAL at 08:48

## 2023-11-18 RX ADMIN — INSULIN LISPRO 4 UNITS: 100 INJECTION, SOLUTION INTRAVENOUS; SUBCUTANEOUS at 20:53

## 2023-11-18 RX ADMIN — ONDANSETRON 4 MG: 2 INJECTION INTRAMUSCULAR; INTRAVENOUS at 23:10

## 2023-11-18 RX ADMIN — PANTOPRAZOLE SODIUM 40 MG: 40 TABLET, DELAYED RELEASE ORAL at 08:48

## 2023-11-18 RX ADMIN — FUROSEMIDE 80 MG: 40 TABLET ORAL at 08:48

## 2023-11-18 RX ADMIN — INSULIN LISPRO 6 UNITS: 100 INJECTION, SOLUTION INTRAVENOUS; SUBCUTANEOUS at 08:49

## 2023-11-18 RX ADMIN — PRASUGREL 10 MG: 10 TABLET, FILM COATED ORAL at 09:41

## 2023-11-18 RX ADMIN — SEVELAMER CARBONATE 1600 MG: 800 TABLET, FILM COATED ORAL at 16:51

## 2023-11-18 RX ADMIN — SODIUM CHLORIDE, PRESERVATIVE FREE 10 ML: 5 INJECTION INTRAVENOUS at 08:51

## 2023-11-18 RX ADMIN — HYDROXYZINE HYDROCHLORIDE 25 MG: 10 TABLET, FILM COATED ORAL at 02:27

## 2023-11-18 RX ADMIN — ATORVASTATIN CALCIUM 20 MG: 20 TABLET, FILM COATED ORAL at 08:48

## 2023-11-19 LAB
ERYTHROCYTE [DISTWIDTH] IN BLOOD BY AUTOMATED COUNT: 13.6 % (ref 11.5–14.5)
ERYTHROCYTE [DISTWIDTH] IN BLOOD BY AUTOMATED COUNT: 14 % (ref 11.5–14.5)
GLUCOSE BLD STRIP.AUTO-MCNC: 231 MG/DL (ref 65–100)
GLUCOSE BLD STRIP.AUTO-MCNC: 306 MG/DL (ref 65–100)
GLUCOSE BLD STRIP.AUTO-MCNC: 313 MG/DL (ref 65–100)
GLUCOSE BLD STRIP.AUTO-MCNC: 400 MG/DL (ref 65–100)
HCT VFR BLD AUTO: 22.5 % (ref 35–47)
HCT VFR BLD AUTO: 23.6 % (ref 35–47)
HGB BLD-MCNC: 6.9 G/DL (ref 11.5–16)
HGB BLD-MCNC: 7.4 G/DL (ref 11.5–16)
MCH RBC QN AUTO: 28.5 PG (ref 26–34)
MCH RBC QN AUTO: 29.4 PG (ref 26–34)
MCHC RBC AUTO-ENTMCNC: 30.7 G/DL (ref 30–36.5)
MCHC RBC AUTO-ENTMCNC: 31.4 G/DL (ref 30–36.5)
MCV RBC AUTO: 93 FL (ref 80–99)
MCV RBC AUTO: 93.7 FL (ref 80–99)
NRBC # BLD: 0 K/UL (ref 0–0.01)
NRBC # BLD: 0 K/UL (ref 0–0.01)
NRBC BLD-RTO: 0 PER 100 WBC
NRBC BLD-RTO: 0 PER 100 WBC
PERFORMED BY:: ABNORMAL
PLATELET # BLD AUTO: 314 K/UL (ref 150–400)
PLATELET # BLD AUTO: 329 K/UL (ref 150–400)
PMV BLD AUTO: 10.7 FL (ref 8.9–12.9)
PMV BLD AUTO: 10.8 FL (ref 8.9–12.9)
RBC # BLD AUTO: 2.42 M/UL (ref 3.8–5.2)
RBC # BLD AUTO: 2.52 M/UL (ref 3.8–5.2)
TROPONIN I SERPL HS-MCNC: 368 NG/L (ref 0–51)
WBC # BLD AUTO: 10.9 K/UL (ref 3.6–11)
WBC # BLD AUTO: 12.2 K/UL (ref 3.6–11)

## 2023-11-19 PROCEDURE — 6370000000 HC RX 637 (ALT 250 FOR IP): Performed by: INTERNAL MEDICINE

## 2023-11-19 PROCEDURE — 36415 COLL VENOUS BLD VENIPUNCTURE: CPT

## 2023-11-19 PROCEDURE — 36430 TRANSFUSION BLD/BLD COMPNT: CPT

## 2023-11-19 PROCEDURE — 84484 ASSAY OF TROPONIN QUANT: CPT

## 2023-11-19 PROCEDURE — 82962 GLUCOSE BLOOD TEST: CPT

## 2023-11-19 PROCEDURE — 2580000003 HC RX 258: Performed by: INTERNAL MEDICINE

## 2023-11-19 PROCEDURE — P9016 RBC LEUKOCYTES REDUCED: HCPCS

## 2023-11-19 PROCEDURE — 85027 COMPLETE CBC AUTOMATED: CPT

## 2023-11-19 PROCEDURE — 2060000000 HC ICU INTERMEDIATE R&B

## 2023-11-19 RX ORDER — SODIUM CHLORIDE 9 MG/ML
INJECTION, SOLUTION INTRAVENOUS PRN
Status: DISCONTINUED | OUTPATIENT
Start: 2023-11-19 | End: 2023-11-21 | Stop reason: HOSPADM

## 2023-11-19 RX ORDER — CARVEDILOL 3.12 MG/1
3.12 TABLET ORAL 2 TIMES DAILY WITH MEALS
Status: DISCONTINUED | OUTPATIENT
Start: 2023-11-19 | End: 2023-11-21 | Stop reason: HOSPADM

## 2023-11-19 RX ORDER — HYDROXYZINE PAMOATE 50 MG/1
50 CAPSULE ORAL 3 TIMES DAILY PRN
Status: DISCONTINUED | OUTPATIENT
Start: 2023-11-19 | End: 2023-11-21 | Stop reason: HOSPADM

## 2023-11-19 RX ORDER — ALPRAZOLAM 0.5 MG/1
0.5 TABLET ORAL 2 TIMES DAILY PRN
Status: DISCONTINUED | OUTPATIENT
Start: 2023-11-19 | End: 2023-11-21 | Stop reason: HOSPADM

## 2023-11-19 RX ORDER — INSULIN LISPRO 100 [IU]/ML
7 INJECTION, SOLUTION INTRAVENOUS; SUBCUTANEOUS ONCE
Status: COMPLETED | OUTPATIENT
Start: 2023-11-19 | End: 2023-11-19

## 2023-11-19 RX ADMIN — INSULIN LISPRO 4 UNITS: 100 INJECTION, SOLUTION INTRAVENOUS; SUBCUTANEOUS at 08:46

## 2023-11-19 RX ADMIN — INSULIN LISPRO 7 UNITS: 100 INJECTION, SOLUTION INTRAVENOUS; SUBCUTANEOUS at 12:00

## 2023-11-19 RX ADMIN — ATORVASTATIN CALCIUM 20 MG: 20 TABLET, FILM COATED ORAL at 08:46

## 2023-11-19 RX ADMIN — PRASUGREL 10 MG: 10 TABLET, FILM COATED ORAL at 12:10

## 2023-11-19 RX ADMIN — INSULIN LISPRO 4 UNITS: 100 INJECTION, SOLUTION INTRAVENOUS; SUBCUTANEOUS at 21:29

## 2023-11-19 RX ADMIN — SEVELAMER CARBONATE 1600 MG: 800 TABLET, FILM COATED ORAL at 12:11

## 2023-11-19 RX ADMIN — INSULIN LISPRO 2 UNITS: 100 INJECTION, SOLUTION INTRAVENOUS; SUBCUTANEOUS at 17:19

## 2023-11-19 RX ADMIN — INSULIN GLARGINE 32 UNITS: 100 INJECTION, SOLUTION SUBCUTANEOUS at 21:29

## 2023-11-19 RX ADMIN — SODIUM CHLORIDE, PRESERVATIVE FREE 10 ML: 5 INJECTION INTRAVENOUS at 21:30

## 2023-11-19 RX ADMIN — SEVELAMER CARBONATE 1600 MG: 800 TABLET, FILM COATED ORAL at 17:20

## 2023-11-19 RX ADMIN — INSULIN LISPRO 8 UNITS: 100 INJECTION, SOLUTION INTRAVENOUS; SUBCUTANEOUS at 11:59

## 2023-11-19 RX ADMIN — HYDROXYZINE HYDROCHLORIDE 25 MG: 10 TABLET, FILM COATED ORAL at 22:25

## 2023-11-19 RX ADMIN — FUROSEMIDE 80 MG: 40 TABLET ORAL at 08:47

## 2023-11-19 RX ADMIN — SEVELAMER CARBONATE 1600 MG: 800 TABLET, FILM COATED ORAL at 08:46

## 2023-11-19 RX ADMIN — PANTOPRAZOLE SODIUM 40 MG: 40 TABLET, DELAYED RELEASE ORAL at 08:46

## 2023-11-19 RX ADMIN — SODIUM CHLORIDE, PRESERVATIVE FREE 10 ML: 5 INJECTION INTRAVENOUS at 12:43

## 2023-11-19 RX ADMIN — CARVEDILOL 3.12 MG: 3.12 TABLET, FILM COATED ORAL at 17:19

## 2023-11-19 RX ADMIN — ALPRAZOLAM 0.5 MG: 0.5 TABLET ORAL at 16:25

## 2023-11-19 RX ADMIN — SODIUM CHLORIDE, PRESERVATIVE FREE 10 ML: 5 INJECTION INTRAVENOUS at 12:42

## 2023-11-19 RX ADMIN — ASPIRIN 81 MG: 81 TABLET, CHEWABLE ORAL at 08:46

## 2023-11-19 RX ADMIN — GABAPENTIN 300 MG: 300 CAPSULE ORAL at 22:25

## 2023-11-20 LAB
ABO + RH BLD: NORMAL
ANION GAP SERPL CALC-SCNC: 9 MMOL/L (ref 5–15)
BLD PROD TYP BPU: NORMAL
BLOOD BANK DISPENSE STATUS: NORMAL
BLOOD GROUP ANTIBODIES SERPL: NEGATIVE
BPU ID: NORMAL
BUN SERPL-MCNC: 47 MG/DL (ref 6–20)
BUN/CREAT SERPL: 8 (ref 12–20)
CA-I BLD-MCNC: 7.9 MG/DL (ref 8.5–10.1)
CHLORIDE SERPL-SCNC: 101 MMOL/L (ref 97–108)
CO2 SERPL-SCNC: 25 MMOL/L (ref 21–32)
CREAT SERPL-MCNC: 6.07 MG/DL (ref 0.55–1.02)
CROSSMATCH RESULT: NORMAL
EKG ATRIAL RATE: 74 BPM
EKG DIAGNOSIS: NORMAL
EKG P AXIS: 53 DEGREES
EKG P-R INTERVAL: 174 MS
EKG Q-T INTERVAL: 412 MS
EKG QRS DURATION: 80 MS
EKG QTC CALCULATION (BAZETT): 457 MS
EKG R AXIS: 107 DEGREES
EKG T AXIS: 10 DEGREES
EKG VENTRICULAR RATE: 74 BPM
ERYTHROCYTE [DISTWIDTH] IN BLOOD BY AUTOMATED COUNT: 14.2 % (ref 11.5–14.5)
GLUCOSE BLD STRIP.AUTO-MCNC: 211 MG/DL (ref 65–100)
GLUCOSE BLD STRIP.AUTO-MCNC: 301 MG/DL (ref 65–100)
GLUCOSE BLD STRIP.AUTO-MCNC: 305 MG/DL (ref 65–100)
GLUCOSE BLD STRIP.AUTO-MCNC: 349 MG/DL (ref 65–100)
GLUCOSE SERPL-MCNC: 293 MG/DL (ref 65–100)
HCT VFR BLD AUTO: 24.1 % (ref 35–47)
HGB BLD-MCNC: 7.5 G/DL (ref 11.5–16)
MAGNESIUM SERPL-MCNC: 2.2 MG/DL (ref 1.6–2.4)
MCH RBC QN AUTO: 29.1 PG (ref 26–34)
MCHC RBC AUTO-ENTMCNC: 31.1 G/DL (ref 30–36.5)
MCV RBC AUTO: 93.4 FL (ref 80–99)
NRBC # BLD: 0 K/UL (ref 0–0.01)
NRBC BLD-RTO: 0 PER 100 WBC
PERFORMED BY:: ABNORMAL
PLATELET # BLD AUTO: 327 K/UL (ref 150–400)
PMV BLD AUTO: 10.5 FL (ref 8.9–12.9)
POTASSIUM SERPL-SCNC: 4.3 MMOL/L (ref 3.5–5.1)
RBC # BLD AUTO: 2.58 M/UL (ref 3.8–5.2)
SODIUM SERPL-SCNC: 135 MMOL/L (ref 136–145)
SPECIMEN EXP DATE BLD: NORMAL
TRANSFUSION STATUS PATIENT QL: NORMAL
TROPONIN I SERPL HS-MCNC: 347 NG/L (ref 0–51)
UNIT DIVISION: 0
WBC # BLD AUTO: 12.6 K/UL (ref 3.6–11)

## 2023-11-20 PROCEDURE — 36415 COLL VENOUS BLD VENIPUNCTURE: CPT

## 2023-11-20 PROCEDURE — 2060000000 HC ICU INTERMEDIATE R&B

## 2023-11-20 PROCEDURE — 2580000003 HC RX 258: Performed by: INTERNAL MEDICINE

## 2023-11-20 PROCEDURE — 6370000000 HC RX 637 (ALT 250 FOR IP): Performed by: INTERNAL MEDICINE

## 2023-11-20 PROCEDURE — 6370000000 HC RX 637 (ALT 250 FOR IP)

## 2023-11-20 PROCEDURE — 80048 BASIC METABOLIC PNL TOTAL CA: CPT

## 2023-11-20 PROCEDURE — 2500000003 HC RX 250 WO HCPCS: Performed by: HOSPITALIST

## 2023-11-20 PROCEDURE — 84484 ASSAY OF TROPONIN QUANT: CPT

## 2023-11-20 PROCEDURE — 90935 HEMODIALYSIS ONE EVALUATION: CPT

## 2023-11-20 PROCEDURE — 2709999900 HC NON-CHARGEABLE SUPPLY

## 2023-11-20 PROCEDURE — 83735 ASSAY OF MAGNESIUM: CPT

## 2023-11-20 PROCEDURE — 6360000002 HC RX W HCPCS

## 2023-11-20 PROCEDURE — 93005 ELECTROCARDIOGRAM TRACING: CPT | Performed by: INTERNAL MEDICINE

## 2023-11-20 PROCEDURE — 85027 COMPLETE CBC AUTOMATED: CPT

## 2023-11-20 PROCEDURE — 82962 GLUCOSE BLOOD TEST: CPT

## 2023-11-20 RX ORDER — HYDROMORPHONE HYDROCHLORIDE 1 MG/ML
0.5 INJECTION, SOLUTION INTRAMUSCULAR; INTRAVENOUS; SUBCUTANEOUS EVERY 4 HOURS PRN
Status: DISCONTINUED | OUTPATIENT
Start: 2023-11-20 | End: 2023-11-21 | Stop reason: HOSPADM

## 2023-11-20 RX ORDER — VITAMIN B COMPLEX
2000 TABLET ORAL DAILY
Status: DISCONTINUED | OUTPATIENT
Start: 2023-11-20 | End: 2023-11-21 | Stop reason: HOSPADM

## 2023-11-20 RX ORDER — HYDRALAZINE HYDROCHLORIDE 20 MG/ML
10 INJECTION INTRAMUSCULAR; INTRAVENOUS EVERY 6 HOURS PRN
Status: DISCONTINUED | OUTPATIENT
Start: 2023-11-20 | End: 2023-11-21 | Stop reason: HOSPADM

## 2023-11-20 RX ORDER — NITROGLYCERIN 0.4 MG/1
0.4 TABLET SUBLINGUAL EVERY 5 MIN PRN
Status: DISCONTINUED | OUTPATIENT
Start: 2023-11-20 | End: 2023-11-21 | Stop reason: HOSPADM

## 2023-11-20 RX ADMIN — NITROGLYCERIN 0.4 MG: 0.4 TABLET, ORALLY DISINTEGRATING SUBLINGUAL at 03:51

## 2023-11-20 RX ADMIN — ATORVASTATIN CALCIUM 20 MG: 20 TABLET, FILM COATED ORAL at 13:08

## 2023-11-20 RX ADMIN — INSULIN LISPRO 4 UNITS: 100 INJECTION, SOLUTION INTRAVENOUS; SUBCUTANEOUS at 20:22

## 2023-11-20 RX ADMIN — PRASUGREL 10 MG: 10 TABLET, FILM COATED ORAL at 13:35

## 2023-11-20 RX ADMIN — HYDROMORPHONE HYDROCHLORIDE 0.5 MG: 1 INJECTION, SOLUTION INTRAMUSCULAR; INTRAVENOUS; SUBCUTANEOUS at 05:43

## 2023-11-20 RX ADMIN — SEVELAMER CARBONATE 1600 MG: 800 TABLET, FILM COATED ORAL at 13:07

## 2023-11-20 RX ADMIN — ACETAMINOPHEN 650 MG: 325 TABLET, FILM COATED ORAL at 13:22

## 2023-11-20 RX ADMIN — INSULIN LISPRO 6 UNITS: 100 INJECTION, SOLUTION INTRAVENOUS; SUBCUTANEOUS at 17:04

## 2023-11-20 RX ADMIN — SODIUM CHLORIDE, PRESERVATIVE FREE 10 ML: 5 INJECTION INTRAVENOUS at 13:09

## 2023-11-20 RX ADMIN — EPOETIN ALFA-EPBX 10000 UNITS: 10000 INJECTION, SOLUTION INTRAVENOUS; SUBCUTANEOUS at 09:55

## 2023-11-20 RX ADMIN — GABAPENTIN 300 MG: 300 CAPSULE ORAL at 20:25

## 2023-11-20 RX ADMIN — INSULIN LISPRO 2 UNITS: 100 INJECTION, SOLUTION INTRAVENOUS; SUBCUTANEOUS at 13:35

## 2023-11-20 RX ADMIN — SODIUM CHLORIDE, PRESERVATIVE FREE 10 ML: 5 INJECTION INTRAVENOUS at 13:10

## 2023-11-20 RX ADMIN — ALPRAZOLAM 0.5 MG: 0.5 TABLET ORAL at 00:48

## 2023-11-20 RX ADMIN — INSULIN GLARGINE 32 UNITS: 100 INJECTION, SOLUTION SUBCUTANEOUS at 20:23

## 2023-11-20 RX ADMIN — NITROGLYCERIN 0.4 MG: 0.4 TABLET, ORALLY DISINTEGRATING SUBLINGUAL at 03:45

## 2023-11-20 RX ADMIN — Medication 2000 UNITS: at 13:08

## 2023-11-20 RX ADMIN — ASPIRIN 81 MG: 81 TABLET, CHEWABLE ORAL at 13:08

## 2023-11-20 RX ADMIN — NITROGLYCERIN 0.4 MG: 0.4 TABLET, ORALLY DISINTEGRATING SUBLINGUAL at 03:40

## 2023-11-20 RX ADMIN — PANTOPRAZOLE SODIUM 40 MG: 40 TABLET, DELAYED RELEASE ORAL at 05:47

## 2023-11-20 RX ADMIN — SODIUM CHLORIDE, PRESERVATIVE FREE 10 ML: 5 INJECTION INTRAVENOUS at 20:28

## 2023-11-20 RX ADMIN — CARVEDILOL 3.12 MG: 3.12 TABLET, FILM COATED ORAL at 17:04

## 2023-11-20 RX ADMIN — CARVEDILOL 3.12 MG: 3.12 TABLET, FILM COATED ORAL at 13:06

## 2023-11-20 RX ADMIN — HYDROXYZINE HYDROCHLORIDE 25 MG: 10 TABLET, FILM COATED ORAL at 20:25

## 2023-11-20 RX ADMIN — SODIUM CHLORIDE, PRESERVATIVE FREE 10 ML: 5 INJECTION INTRAVENOUS at 20:27

## 2023-11-20 RX ADMIN — SEVELAMER CARBONATE 1600 MG: 800 TABLET, FILM COATED ORAL at 17:04

## 2023-11-20 ASSESSMENT — PAIN SCALES - GENERAL
PAINLEVEL_OUTOF10: 8
PAINLEVEL_OUTOF10: 2

## 2023-11-20 ASSESSMENT — PAIN DESCRIPTION - LOCATION: LOCATION: HEAD

## 2023-11-20 ASSESSMENT — PAIN DESCRIPTION - ORIENTATION: ORIENTATION: ANTERIOR

## 2023-11-20 ASSESSMENT — PAIN SCALES - WONG BAKER: WONGBAKER_NUMERICALRESPONSE: 0

## 2023-11-20 ASSESSMENT — PAIN DESCRIPTION - DESCRIPTORS: DESCRIPTORS: ACHING

## 2023-11-20 ASSESSMENT — PAIN - FUNCTIONAL ASSESSMENT: PAIN_FUNCTIONAL_ASSESSMENT: ACTIVITIES ARE NOT PREVENTED

## 2023-11-20 NOTE — CARE COORDINATION
CM reviewed clinical chart. Patient plans to return home self care at time of discharge. CM will continue to follow for any needs.

## 2023-11-20 NOTE — DIALYSIS
Patient completed 3.5 hour dialysis with 3L fluid removed. Epo 10, 000 units given intra-dialysis    Telemetry informed patient is en route to [facility] with [address]. Leanne Tracy RN received dialysis report.

## 2023-11-21 VITALS
SYSTOLIC BLOOD PRESSURE: 136 MMHG | DIASTOLIC BLOOD PRESSURE: 54 MMHG | TEMPERATURE: 97.3 F | RESPIRATION RATE: 20 BRPM | HEIGHT: 69 IN | HEART RATE: 77 BPM | WEIGHT: 275.57 LBS | BODY MASS INDEX: 40.82 KG/M2 | OXYGEN SATURATION: 94 %

## 2023-11-21 LAB
ANION GAP SERPL CALC-SCNC: 7 MMOL/L (ref 5–15)
BUN SERPL-MCNC: 38 MG/DL (ref 6–20)
BUN/CREAT SERPL: 8 (ref 12–20)
CA-I BLD-MCNC: 8.3 MG/DL (ref 8.5–10.1)
CHLORIDE SERPL-SCNC: 100 MMOL/L (ref 97–108)
CO2 SERPL-SCNC: 28 MMOL/L (ref 21–32)
CREAT SERPL-MCNC: 4.74 MG/DL (ref 0.55–1.02)
ERYTHROCYTE [DISTWIDTH] IN BLOOD BY AUTOMATED COUNT: 14.2 % (ref 11.5–14.5)
GLUCOSE BLD STRIP.AUTO-MCNC: 321 MG/DL (ref 65–100)
GLUCOSE BLD STRIP.AUTO-MCNC: 374 MG/DL (ref 65–100)
GLUCOSE SERPL-MCNC: 321 MG/DL (ref 65–100)
HCT VFR BLD AUTO: 24.6 % (ref 35–47)
HGB BLD-MCNC: 7.5 G/DL (ref 11.5–16)
MCH RBC QN AUTO: 28.5 PG (ref 26–34)
MCHC RBC AUTO-ENTMCNC: 30.5 G/DL (ref 30–36.5)
MCV RBC AUTO: 93.5 FL (ref 80–99)
NRBC # BLD: 0.03 K/UL (ref 0–0.01)
NRBC BLD-RTO: 0.2 PER 100 WBC
PERFORMED BY:: ABNORMAL
PERFORMED BY:: ABNORMAL
PHOSPHATE SERPL-MCNC: 4.1 MG/DL (ref 2.6–4.7)
PLATELET # BLD AUTO: 330 K/UL (ref 150–400)
PMV BLD AUTO: 10.9 FL (ref 8.9–12.9)
POTASSIUM SERPL-SCNC: 4.2 MMOL/L (ref 3.5–5.1)
RBC # BLD AUTO: 2.63 M/UL (ref 3.8–5.2)
SODIUM SERPL-SCNC: 135 MMOL/L (ref 136–145)
TROPONIN I SERPL HS-MCNC: 227 NG/L (ref 0–51)
WBC # BLD AUTO: 12.7 K/UL (ref 3.6–11)

## 2023-11-21 PROCEDURE — 84484 ASSAY OF TROPONIN QUANT: CPT

## 2023-11-21 PROCEDURE — 6370000000 HC RX 637 (ALT 250 FOR IP)

## 2023-11-21 PROCEDURE — 6370000000 HC RX 637 (ALT 250 FOR IP): Performed by: INTERNAL MEDICINE

## 2023-11-21 PROCEDURE — 84100 ASSAY OF PHOSPHORUS: CPT

## 2023-11-21 PROCEDURE — 82962 GLUCOSE BLOOD TEST: CPT

## 2023-11-21 PROCEDURE — 2580000003 HC RX 258: Performed by: INTERNAL MEDICINE

## 2023-11-21 PROCEDURE — 85027 COMPLETE CBC AUTOMATED: CPT

## 2023-11-21 PROCEDURE — 2500000003 HC RX 250 WO HCPCS: Performed by: HOSPITALIST

## 2023-11-21 PROCEDURE — 36415 COLL VENOUS BLD VENIPUNCTURE: CPT

## 2023-11-21 PROCEDURE — 80048 BASIC METABOLIC PNL TOTAL CA: CPT

## 2023-11-21 RX ORDER — CARVEDILOL 3.12 MG/1
3.12 TABLET ORAL 2 TIMES DAILY WITH MEALS
Qty: 60 TABLET | Refills: 3 | Status: ON HOLD | OUTPATIENT
Start: 2023-11-21

## 2023-11-21 RX ORDER — PRASUGREL 10 MG/1
10 TABLET, FILM COATED ORAL DAILY
Qty: 30 TABLET | Refills: 0 | Status: ON HOLD | OUTPATIENT
Start: 2023-11-22 | End: 2023-12-22

## 2023-11-21 RX ORDER — INSULIN DETEMIR 100 [IU]/ML
42 INJECTION, SOLUTION SUBCUTANEOUS NIGHTLY
Qty: 12.6 ML | Refills: 1 | Status: ON HOLD | OUTPATIENT
Start: 2023-11-21 | End: 2023-12-21

## 2023-11-21 RX ORDER — CALCITRIOL 0.25 UG/1
0.25 CAPSULE, LIQUID FILLED ORAL DAILY
Status: DISCONTINUED | OUTPATIENT
Start: 2023-11-21 | End: 2023-11-21 | Stop reason: HOSPADM

## 2023-11-21 RX ADMIN — FUROSEMIDE 80 MG: 40 TABLET ORAL at 08:19

## 2023-11-21 RX ADMIN — INSULIN LISPRO 8 UNITS: 100 INJECTION, SOLUTION INTRAVENOUS; SUBCUTANEOUS at 11:10

## 2023-11-21 RX ADMIN — Medication 2000 UNITS: at 08:19

## 2023-11-21 RX ADMIN — SODIUM CHLORIDE, PRESERVATIVE FREE 10 ML: 5 INJECTION INTRAVENOUS at 08:20

## 2023-11-21 RX ADMIN — PANTOPRAZOLE SODIUM 40 MG: 40 TABLET, DELAYED RELEASE ORAL at 06:42

## 2023-11-21 RX ADMIN — ASPIRIN 81 MG: 81 TABLET, CHEWABLE ORAL at 08:19

## 2023-11-21 RX ADMIN — NITROGLYCERIN 0.4 MG: 0.4 TABLET, ORALLY DISINTEGRATING SUBLINGUAL at 00:16

## 2023-11-21 RX ADMIN — PRASUGREL 10 MG: 10 TABLET, FILM COATED ORAL at 09:18

## 2023-11-21 RX ADMIN — CARVEDILOL 3.12 MG: 3.12 TABLET, FILM COATED ORAL at 08:19

## 2023-11-21 RX ADMIN — ATORVASTATIN CALCIUM 20 MG: 20 TABLET, FILM COATED ORAL at 08:19

## 2023-11-21 RX ADMIN — HYDROMORPHONE HYDROCHLORIDE 0.5 MG: 1 INJECTION, SOLUTION INTRAMUSCULAR; INTRAVENOUS; SUBCUTANEOUS at 00:21

## 2023-11-21 RX ADMIN — SEVELAMER CARBONATE 1600 MG: 800 TABLET, FILM COATED ORAL at 08:19

## 2023-11-21 RX ADMIN — SEVELAMER CARBONATE 1600 MG: 800 TABLET, FILM COATED ORAL at 11:10

## 2023-11-21 RX ADMIN — INSULIN LISPRO 6 UNITS: 100 INJECTION, SOLUTION INTRAVENOUS; SUBCUTANEOUS at 08:19

## 2023-11-21 ASSESSMENT — PAIN DESCRIPTION - LOCATION: LOCATION: CHEST

## 2023-11-21 ASSESSMENT — PAIN SCALES - GENERAL
PAINLEVEL_OUTOF10: 0
PAINLEVEL_OUTOF10: 8

## 2023-11-21 ASSESSMENT — PAIN - FUNCTIONAL ASSESSMENT: PAIN_FUNCTIONAL_ASSESSMENT: ACTIVITIES ARE NOT PREVENTED

## 2023-11-21 ASSESSMENT — PAIN DESCRIPTION - ORIENTATION: ORIENTATION: LEFT

## 2023-11-21 ASSESSMENT — PAIN DESCRIPTION - DESCRIPTORS: DESCRIPTORS: SHARP;SHOOTING

## 2023-11-21 NOTE — DISCHARGE SUMMARY
pg/mL   Vitamin D 25 Hydroxy    Collection Time: 11/17/23  5:23 AM   Result Value Ref Range    Vit D, 25-Hydroxy 28.5 (L) 30 - 100 ng/mL   TYPE AND SCREEN    Collection Time: 11/17/23  5:23 AM   Result Value Ref Range    Crossmatch expiration date 11/20/2023,8859     ABO/Rh O Positive     Antibody Screen Negative     Unit Number [id number]     Product Code Blood Bank RC LR,1     Unit Divison 00     Dispense Status Blood Bank Issued,final     Transfusion Status Ok to transfuse     Crossmatch Result Compatible    CBC with Auto Differential    Collection Time: 11/17/23  5:32 AM   Result Value Ref Range    WBC 11.7 (H) 3.6 - 11.0 K/uL    RBC 2.64 (L) 3.80 - 5.20 M/uL    Hemoglobin 7.7 (L) 11.5 - 16.0 g/dL    Hematocrit 24.7 (L) 35.0 - 47.0 %    MCV 93.6 80.0 - 99.0 FL    MCH 29.2 26.0 - 34.0 PG    MCHC 31.2 30.0 - 36.5 g/dL    RDW 13.5 11.5 - 14.5 %    Platelets 073 892 - 416 K/uL    MPV 10.7 8.9 - 12.9 FL    Nucleated RBCs 0.0 0.0  WBC    nRBC 0.00 0.00 - 0.01 K/uL    Neutrophils % 77 (H) 32 - 75 %    Lymphocytes % 13 12 - 49 %    Monocytes % 6 5 - 13 %    Eosinophils % 3 0 - 7 %    Basophils % 1 0 - 1 %    Immature Granulocytes 0 0 - 0.5 %    Neutrophils Absolute 9.1 (H) 1.8 - 8.0 K/UL    Lymphocytes Absolute 1.6 0.8 - 3.5 K/UL    Monocytes Absolute 0.7 0.0 - 1.0 K/UL    Eosinophils Absolute 0.4 0.0 - 0.4 K/UL    Basophils Absolute 0.1 0.0 - 0.1 K/UL    Absolute Immature Granulocyte 0.1 (H) 0.00 - 0.04 K/UL    Differential Type AUTOMATED     Renal Function Panel    Collection Time: 11/17/23  5:32 AM   Result Value Ref Range    Sodium 133 (L) 136 - 145 mmol/L    Potassium 4.5 3.5 - 5.1 mmol/L    Chloride 101 97 - 108 mmol/L    CO2 26 21 - 32 mmol/L    Anion Gap 6 5 - 15 mmol/L    Glucose 377 (H) 65 - 100 mg/dL    BUN 31 (H) 6 - 20 mg/dL    Creatinine 4.37 (H) 0.55 - 1.02 mg/dL    Bun/Cre Ratio 7 (L) 12 - 20      Est, Glom Filt Rate 12 (L) >60 ml/min/1.73m2    Calcium 8.0 (L) 8.5 - 10.1 mg/dL    Phosphorus 5.3

## 2023-11-21 NOTE — CARE COORDINATION
Transition of Care Plan:    RUR: 26%  Prior Level of Functioning: independent  Disposition: home self care  If SNF or IPR: Date FOC offered: N/A  Date FOC received: N/A  Accepting facility: N/A  Date authorization started with reference number:   Date authorization received and expires: Follow up appointments: per discharge summary   DME needed: N/A  Transportation at discharge: family   IM/IMM Medicare/ letter given: yes  Is patient a [place] and connected with [facility]? If yes, was [facility] transfer form completed and [facility] notified? Caregiver Contact: N/A  Discharge Caregiver contacted prior to discharge? N/A  Care Conference needed?  N/A  Barriers to discharge: N/A

## 2023-11-21 NOTE — PLAN OF CARE
Problem: Discharge Planning  Goal: Discharge to home or other facility with appropriate resources  11/21/2023 1453 by Anel Flores RN  Outcome: Adequate for Discharge  11/21/2023 0932 by Anel Flores RN  Outcome: Progressing  Flowsheets (Taken 11/21/2023 1486)  Discharge to home or other facility with appropriate resources: Identify barriers to discharge with patient and caregiver     Problem: Pain  Goal: Verbalizes/displays adequate comfort level or baseline comfort level  11/21/2023 1453 by Anel Flores RN  Outcome: Adequate for Discharge  11/21/2023 0932 by Anel Flores RN  Outcome: Progressing     Problem: Safety - Adult  Goal: Free from fall injury  11/21/2023 1453 by Anel Flores RN  Outcome: Adequate for Discharge  11/21/2023 0932 by Anel Flores RN  Outcome: Progressing     Problem: ABCDS Injury Assessment  Goal: Absence of physical injury  11/21/2023 1453 by Anel Flores RN  Outcome: Adequate for Discharge  11/21/2023 0932 by Anel Flores RN  Outcome: Progressing     Problem: Chronic Conditions and Co-morbidities  Goal: Patient's chronic conditions and co-morbidity symptoms are monitored and maintained or improved  11/21/2023 1453 by Anel Flores RN  Outcome: Adequate for Discharge  11/21/2023 0932 by Anel Flores RN  Outcome: Progressing  Flowsheets (Taken 11/21/2023 0929)  Care Plan - Patient's Chronic Conditions and Co-Morbidity Symptoms are Monitored and Maintained or Improved: Monitor and assess patient's chronic conditions and comorbid symptoms for stability, deterioration, or improvement     Problem: Respiratory - Adult  Goal: Achieves optimal ventilation and oxygenation  11/21/2023 1453 by Anel Flores RN  Outcome: Adequate for Discharge  11/21/2023 0932 by Anel Flores RN  Outcome: Progressing  Flowsheets (Taken 11/21/2023 0929)  Achieves optimal
Problem: Discharge Planning  Goal: Discharge to home or other facility with appropriate resources  Outcome: Progressing  Flowsheets (Taken 11/16/2023 2000)  Discharge to home or other facility with appropriate resources: Identify barriers to discharge with patient and caregiver     Problem: Pain  Goal: Verbalizes/displays adequate comfort level or baseline comfort level  Outcome: Progressing
Adult  Goal: Skin integrity remains intact  Outcome: Progressing  Flowsheets (Taken 11/21/2023 0929)  Skin Integrity Remains Intact: Monitor for areas of redness and/or skin breakdown     Problem: Gastrointestinal - Adult  Goal: Minimal or absence of nausea and vomiting  Outcome: Progressing  Goal: Maintains or returns to baseline bowel function  Outcome: Progressing  Flowsheets (Taken 11/21/2023 0929)  Maintains or returns to baseline bowel function: Assess bowel function  Goal: Maintains adequate nutritional intake  Outcome: Progressing  Flowsheets (Taken 11/21/2023 0929)  Maintains adequate nutritional intake: Monitor percentage of each meal consumed
Monitor labs for bleeding or clotting disorders   Administer blood products/factors as ordered     Problem: Skin/Tissue Integrity - Adult  Goal: Skin integrity remains intact  Outcome: Progressing  Flowsheets (Taken 11/19/2023 0753)  Skin Integrity Remains Intact:   Monitor for areas of redness and/or skin breakdown   Assess vascular access sites hourly     Problem: Gastrointestinal - Adult  Goal: Minimal or absence of nausea and vomiting  Outcome: Progressing  Flowsheets (Taken 11/19/2023 0753)  Minimal or absence of nausea and vomiting: Administer ordered antiemetic medications as needed  Goal: Maintains or returns to baseline bowel function  Outcome: Progressing  Flowsheets (Taken 11/19/2023 0753)  Maintains or returns to baseline bowel function:   Assess bowel function   Encourage oral fluids to ensure adequate hydration   Encourage mobilization and activity  Goal: Maintains adequate nutritional intake  Outcome: Progressing  Flowsheets (Taken 11/19/2023 0753)  Maintains adequate nutritional intake:   Monitor percentage of each meal consumed   Monitor intake and output, weight and lab values   Assist with meals as needed

## 2023-11-22 ENCOUNTER — FOLLOWUP TELEPHONE ENCOUNTER (OUTPATIENT)
Facility: HOSPITAL | Age: 48
End: 2023-11-22

## 2023-12-12 PROBLEM — T81.718A PSEUDOANEURYSM FOLLOWING PROCEDURE (HCC): Status: ACTIVE | Noted: 2023-12-12

## 2023-12-12 PROBLEM — J81.0 ACUTE PULMONARY EDEMA (HCC): Status: ACTIVE | Noted: 2023-12-12

## 2023-12-12 PROBLEM — I72.9 PSEUDOANEURYSM FOLLOWING PROCEDURE (HCC): Status: ACTIVE | Noted: 2023-12-12

## 2023-12-13 PROBLEM — R59.0 INGUINAL ADENOPATHY: Status: ACTIVE | Noted: 2023-12-13

## 2023-12-13 PROBLEM — I80.8 SUPERFICIAL THROMBOPHLEBITIS OF RIGHT UPPER EXTREMITY: Status: ACTIVE | Noted: 2023-12-13

## 2023-12-13 PROBLEM — E11.649 UNCONTROLLED DIABETES MELLITUS WITH HYPOGLYCEMIA (HCC): Status: ACTIVE | Noted: 2023-12-13

## 2023-12-18 PROBLEM — R60.9 SWELLING: Status: ACTIVE | Noted: 2023-12-18

## 2023-12-25 ENCOUNTER — HOSPITAL ENCOUNTER (EMERGENCY)
Facility: HOSPITAL | Age: 48
Discharge: HOME | End: 2023-12-25
Attending: STUDENT IN AN ORGANIZED HEALTH CARE EDUCATION/TRAINING PROGRAM
Payer: MEDICARE

## 2023-12-25 VITALS
DIASTOLIC BLOOD PRESSURE: 84 MMHG | RESPIRATION RATE: 15 BRPM | WEIGHT: 260.14 LBS | OXYGEN SATURATION: 98 % | SYSTOLIC BLOOD PRESSURE: 156 MMHG | BODY MASS INDEX: 38.53 KG/M2 | TEMPERATURE: 98.2 F | HEIGHT: 69 IN | HEART RATE: 86 BPM

## 2023-12-25 DIAGNOSIS — R21 RASH AND OTHER NONSPECIFIC SKIN ERUPTION: Primary | ICD-10-CM

## 2023-12-25 LAB
BASOPHILS # BLD: 0.1 K/UL (ref 0–0.1)
BASOPHILS NFR BLD: 1 % (ref 0–1)
DIFFERENTIAL METHOD BLD: ABNORMAL
EOSINOPHIL # BLD: 0.5 K/UL (ref 0–0.4)
EOSINOPHIL NFR BLD: 5 % (ref 0–7)
ERYTHROCYTE [DISTWIDTH] IN BLOOD BY AUTOMATED COUNT: 16.2 % (ref 11.5–14.5)
HCT VFR BLD AUTO: 30.7 % (ref 35–47)
HGB BLD-MCNC: 9.3 G/DL (ref 11.5–16)
IMM GRANULOCYTES # BLD AUTO: 0 K/UL (ref 0–0.04)
IMM GRANULOCYTES NFR BLD AUTO: 0 % (ref 0–0.5)
LYMPHOCYTES # BLD: 1.8 K/UL (ref 0.8–3.5)
LYMPHOCYTES NFR BLD: 19 % (ref 12–49)
MCH RBC QN AUTO: 27.9 PG (ref 26–34)
MCHC RBC AUTO-ENTMCNC: 30.3 G/DL (ref 30–36.5)
MCV RBC AUTO: 92.2 FL (ref 80–99)
MONOCYTES # BLD: 0.7 K/UL (ref 0–1)
MONOCYTES NFR BLD: 8 % (ref 5–13)
NEUTS SEG # BLD: 6.6 K/UL (ref 1.8–8)
NEUTS SEG NFR BLD: 67 % (ref 32–75)
NRBC # BLD: 0 K/UL (ref 0–0.01)
NRBC BLD-RTO: 0 PER 100 WBC
PLATELET # BLD AUTO: 474 K/UL (ref 150–400)
PMV BLD AUTO: 9.6 FL (ref 8.9–12.9)
RBC # BLD AUTO: 3.33 M/UL (ref 3.8–5.2)
WBC # BLD AUTO: 9.7 K/UL (ref 3.6–11)

## 2023-12-25 PROCEDURE — 85025 COMPLETE CBC W/AUTO DIFF WBC: CPT

## 2023-12-25 PROCEDURE — 99283 EMERGENCY DEPT VISIT LOW MDM: CPT

## 2023-12-25 PROCEDURE — 36415 COLL VENOUS BLD VENIPUNCTURE: CPT

## 2023-12-25 NOTE — ED PROVIDER NOTES
[facility] EMERGENCY DEPT  EMERGENCY DEPARTMENT HISTORY AND PHYSICAL EXAM      Date: 12/25/2023  Patient Name: Saige Rao  MRN: [id number]  [address] [facility] 1975  Date of evaluation: 12/25/2023  Provider: Michael Sanford MD   Note Started: 1:19 AM EST 12/25/23    HISTORY OF PRESENT ILLNESS     Chief Complaint   Patient presents with    Skin Problem       History Provided By: Patient    HPI: Saige Rao is a 50 y.o. female with PMH of DM, HLD, HTN, CVA, and CKD on HD who comes to the ED with rashes noted on both of her feet. She was discharged from the hospital last Sunday. She noted that she has some redness on her feet. She has a few spots that has been decreasing in size. Today she googled the rash on her lower extremities and reading the differential diagnosis get her concern came to the ED for evaluation. She does have a few areas of redness. Has been decreasing in size. There is no associated pruritus. No fever, chills or sweats. Patient report that she has been receiving outpatient antibiotics for an infection.     PAST MEDICAL HISTORY   Past Medical History:  Past Medical History:   Diagnosis Date    Allergic rhinitis     Blood clot in vein     Chronic kidney disease     CVA (cerebral vascular accident) ([address]) 05/20/2014    Diabetes ([address])     Dyslipidemia     Hemodialysis patient ([address])     Hx of blood clots     Hypertension     IBS (irritable bowel syndrome)     Ill-defined condition     Renal failure        Past Surgical History:  Past Surgical History:   Procedure Laterality Date    CARDIAC PROCEDURE N/A 10/12/2023    Left heart cath / coronary angiography performed by Wai Bernabe MD at [address] [place] [address] N/A 10/12/2023    Percutaneous coronary intervention performed by Wai Bernabe MD at [address] [place] [address],  N/A 10/12/2023    Angioplasty coronary performed by Wai Bernabe MD at [address] [place] [address],  N/A 10/12/2023

## 2023-12-25 NOTE — ED TRIAGE NOTES
Patient presents to ED complaining of a rash / redness on both feet. Patient stated she was recently hospitalized and placed IV abx and is still receiving vancomycin at her dialysis MWF outpatient. Denies itching or pain at site.

## 2024-01-02 ENCOUNTER — HOSPITAL ENCOUNTER (EMERGENCY)
Facility: HOSPITAL | Age: 49
Discharge: HOME | End: 2024-01-02
Payer: MEDICAID

## 2024-01-02 VITALS
HEART RATE: 82 BPM | BODY MASS INDEX: 38.06 KG/M2 | SYSTOLIC BLOOD PRESSURE: 167 MMHG | TEMPERATURE: 98.2 F | WEIGHT: 257 LBS | OXYGEN SATURATION: 99 % | RESPIRATION RATE: 18 BRPM | HEIGHT: 69 IN | DIASTOLIC BLOOD PRESSURE: 83 MMHG

## 2024-01-02 PROCEDURE — 93005 ELECTROCARDIOGRAM TRACING: CPT | Performed by: EMERGENCY MEDICINE

## 2024-01-02 ASSESSMENT — PAIN DESCRIPTION - LOCATION: LOCATION: CHEST

## 2024-01-02 ASSESSMENT — PAIN SCALES - GENERAL: PAINLEVEL_OUTOF10: 8

## 2024-01-03 ENCOUNTER — HOSPITAL ENCOUNTER (EMERGENCY)
Facility: HOSPITAL | Age: 49
Discharge: LEFT BEFORE DISCHARGE | End: 2024-01-03
Attending: EMERGENCY MEDICINE
Payer: MEDICAID

## 2024-01-03 ENCOUNTER — APPOINTMENT (OUTPATIENT)
Facility: HOSPITAL | Age: 49
End: 2024-01-03
Payer: MEDICAID

## 2024-01-03 VITALS
BODY MASS INDEX: 38.06 KG/M2 | WEIGHT: 257 LBS | OXYGEN SATURATION: 98 % | HEIGHT: 69 IN | SYSTOLIC BLOOD PRESSURE: 150 MMHG | HEART RATE: 78 BPM | DIASTOLIC BLOOD PRESSURE: 69 MMHG | RESPIRATION RATE: 19 BRPM | TEMPERATURE: 98.2 F

## 2024-01-03 DIAGNOSIS — Z53.21 ELOPED FROM EMERGENCY DEPARTMENT: Primary | ICD-10-CM

## 2024-01-03 LAB
DEPRECATED S PYO AG THROAT QL EIA: NEGATIVE
EKG ATRIAL RATE: 82 BPM
EKG DIAGNOSIS: NORMAL
EKG P AXIS: 54 DEGREES
EKG P-R INTERVAL: 150 MS
EKG Q-T INTERVAL: 380 MS
EKG QRS DURATION: 72 MS
EKG QTC CALCULATION (BAZETT): 443 MS
EKG R AXIS: -41 DEGREES
EKG T AXIS: 107 DEGREES
EKG VENTRICULAR RATE: 82 BPM
FLUAV AG NPH QL IA: NEGATIVE
FLUBV AG NOSE QL IA: NEGATIVE

## 2024-01-03 PROCEDURE — 87804 INFLUENZA ASSAY W/OPTIC: CPT

## 2024-01-03 PROCEDURE — 4500000002 HC ER NO CHARGE

## 2024-01-03 PROCEDURE — 87880 STREP A ASSAY W/OPTIC: CPT

## 2024-01-03 PROCEDURE — 71046 X-RAY EXAM CHEST 2 VIEWS: CPT

## 2024-01-03 PROCEDURE — 93005 ELECTROCARDIOGRAM TRACING: CPT | Performed by: STUDENT IN AN ORGANIZED HEALTH CARE EDUCATION/TRAINING PROGRAM

## 2024-01-03 ASSESSMENT — PAIN DESCRIPTION - LOCATION: LOCATION: GENERALIZED

## 2024-01-03 ASSESSMENT — PAIN SCALES - GENERAL: PAINLEVEL_OUTOF10: 7

## 2024-01-03 NOTE — ED TRIAGE NOTES
Patient presents with cough/congestion, sore throat, cp and generalized flu like symptoms.  is flu positive at home.

## 2024-01-04 LAB
EKG ATRIAL RATE: 78 BPM
EKG DIAGNOSIS: NORMAL
EKG P AXIS: 56 DEGREES
EKG P-R INTERVAL: 168 MS
EKG Q-T INTERVAL: 398 MS
EKG QRS DURATION: 72 MS
EKG QTC CALCULATION (BAZETT): 453 MS
EKG R AXIS: -70 DEGREES
EKG T AXIS: 102 DEGREES
EKG VENTRICULAR RATE: 78 BPM

## 2024-01-25 ENCOUNTER — OFFICE VISIT (OUTPATIENT)
Age: 49
End: 2024-01-25
Payer: MEDICARE

## 2024-01-25 VITALS
RESPIRATION RATE: 18 BRPM | OXYGEN SATURATION: 94 % | SYSTOLIC BLOOD PRESSURE: 131 MMHG | DIASTOLIC BLOOD PRESSURE: 79 MMHG | HEART RATE: 69 BPM | TEMPERATURE: 98.1 F | WEIGHT: 263 LBS | HEIGHT: 69 IN | BODY MASS INDEX: 38.95 KG/M2

## 2024-01-25 DIAGNOSIS — D72.829 LEUKOCYTOSIS, UNSPECIFIED TYPE: Primary | ICD-10-CM

## 2024-01-25 PROCEDURE — 99213 OFFICE O/P EST LOW 20 MIN: CPT | Performed by: INTERNAL MEDICINE

## 2024-01-25 PROCEDURE — 3075F SYST BP GE 130 - 139MM HG: CPT | Performed by: INTERNAL MEDICINE

## 2024-01-25 PROCEDURE — 3078F DIAST BP <80 MM HG: CPT | Performed by: INTERNAL MEDICINE

## 2024-01-25 RX ORDER — FLUTICASONE PROPIONATE 50 MCG
SPRAY, SUSPENSION (ML) NASAL
COMMUNITY
Start: 2021-11-17

## 2024-01-25 RX ORDER — BLOOD SUGAR DIAGNOSTIC
STRIP MISCELLANEOUS
COMMUNITY
Start: 2024-01-13

## 2024-01-25 RX ORDER — ONDANSETRON HYDROCHLORIDE 8 MG/1
TABLET, FILM COATED ORAL
COMMUNITY
Start: 2024-01-03

## 2024-01-25 RX ORDER — FLUCONAZOLE 150 MG/1
TABLET ORAL
COMMUNITY

## 2024-01-25 RX ORDER — DEXTROAMPHETAMINE SACCHARATE, AMPHETAMINE ASPARTATE, DEXTROAMPHETAMINE SULFATE AND AMPHETAMINE SULFATE 5; 5; 5; 5 MG/1; MG/1; MG/1; MG/1
1 TABLET ORAL 3 TIMES DAILY
COMMUNITY

## 2024-01-25 RX ORDER — IRON POLYSACCHARIDE COMPLEX 150 MG
2 CAPSULE ORAL
COMMUNITY

## 2024-01-25 RX ORDER — PEN NEEDLE, DIABETIC 29 G X1/2"
NEEDLE, DISPOSABLE MISCELLANEOUS
COMMUNITY
Start: 2024-01-08

## 2024-01-25 RX ORDER — INSULIN LISPRO 100 [IU]/ML
INJECTION, SOLUTION INTRAVENOUS; SUBCUTANEOUS
COMMUNITY
Start: 2024-01-17

## 2024-01-25 NOTE — PROGRESS NOTES
Chief Complaint   Patient presents with    Follow-Up from Hospital    Wound Infection     /79 (Site: Right Upper Arm, Position: Sitting, Cuff Size: Large Adult)   Pulse 69   Temp 98.1 °F (36.7 °C) (Oral)   Resp 18   Ht 1.753 m (5' 9\")   Wt 119.3 kg (263 lb)   SpO2 94%   BMI 38.84 kg/m²

## 2024-01-27 ENCOUNTER — APPOINTMENT (OUTPATIENT)
Facility: HOSPITAL | Age: 49
End: 2024-01-27
Payer: MEDICARE

## 2024-01-27 ENCOUNTER — HOSPITAL ENCOUNTER (EMERGENCY)
Facility: HOSPITAL | Age: 49
Discharge: HOME | End: 2024-01-27
Attending: EMERGENCY MEDICINE
Payer: MEDICARE

## 2024-01-27 VITALS
TEMPERATURE: 98 F | DIASTOLIC BLOOD PRESSURE: 78 MMHG | HEART RATE: 66 BPM | WEIGHT: 257 LBS | BODY MASS INDEX: 38.06 KG/M2 | SYSTOLIC BLOOD PRESSURE: 133 MMHG | RESPIRATION RATE: 17 BRPM | OXYGEN SATURATION: 97 % | HEIGHT: 69 IN

## 2024-01-27 DIAGNOSIS — R07.89 CHEST WALL PAIN: Primary | ICD-10-CM

## 2024-01-27 LAB
ALBUMIN SERPL-MCNC: 3.1 G/DL (ref 3.5–5)
ALBUMIN/GLOB SERPL: 0.9 (ref 1.1–2.2)
ALP SERPL-CCNC: 87 U/L (ref 45–117)
ALT SERPL-CCNC: 10 U/L (ref 12–78)
ANION GAP SERPL CALC-SCNC: 1 MMOL/L (ref 5–15)
AST SERPL W P-5'-P-CCNC: 5 U/L (ref 15–37)
BASOPHILS # BLD: 0.1 K/UL (ref 0–0.1)
BASOPHILS NFR BLD: 1 % (ref 0–1)
BILIRUB SERPL-MCNC: 0.6 MG/DL (ref 0.2–1)
BUN SERPL-MCNC: 31 MG/DL (ref 6–20)
BUN/CREAT SERPL: 8 (ref 12–20)
CA-I BLD-MCNC: 8.6 MG/DL (ref 8.5–10.1)
CHLORIDE SERPL-SCNC: 103 MMOL/L (ref 97–108)
CO2 SERPL-SCNC: 32 MMOL/L (ref 21–32)
CREAT SERPL-MCNC: 3.97 MG/DL (ref 0.55–1.02)
DIFFERENTIAL METHOD BLD: ABNORMAL
EOSINOPHIL # BLD: 0.2 K/UL (ref 0–0.4)
EOSINOPHIL NFR BLD: 3 % (ref 0–7)
ERYTHROCYTE [DISTWIDTH] IN BLOOD BY AUTOMATED COUNT: 15 % (ref 11.5–14.5)
GLOBULIN SER CALC-MCNC: 3.3 G/DL (ref 2–4)
GLUCOSE SERPL-MCNC: 194 MG/DL (ref 65–100)
HCT VFR BLD AUTO: 30.4 % (ref 35–47)
HGB BLD-MCNC: 9.2 G/DL (ref 11.5–16)
IMM GRANULOCYTES # BLD AUTO: 0 K/UL (ref 0–0.04)
IMM GRANULOCYTES NFR BLD AUTO: 1 % (ref 0–0.5)
LYMPHOCYTES # BLD: 1.2 K/UL (ref 0.8–3.5)
LYMPHOCYTES NFR BLD: 18 % (ref 12–49)
MCH RBC QN AUTO: 28 PG (ref 26–34)
MCHC RBC AUTO-ENTMCNC: 30.3 G/DL (ref 30–36.5)
MCV RBC AUTO: 92.7 FL (ref 80–99)
MONOCYTES # BLD: 0.4 K/UL (ref 0–1)
MONOCYTES NFR BLD: 7 % (ref 5–13)
NEUTS SEG # BLD: 4.5 K/UL (ref 1.8–8)
NEUTS SEG NFR BLD: 70 % (ref 32–75)
NRBC # BLD: 0 K/UL (ref 0–0.01)
NRBC BLD-RTO: 0 PER 100 WBC
PLATELET # BLD AUTO: 292 K/UL (ref 150–400)
PMV BLD AUTO: 10.5 FL (ref 8.9–12.9)
POTASSIUM SERPL-SCNC: 4.6 MMOL/L (ref 3.5–5.1)
PROT SERPL-MCNC: 6.4 G/DL (ref 6.4–8.2)
RBC # BLD AUTO: 3.28 M/UL (ref 3.8–5.2)
SODIUM SERPL-SCNC: 136 MMOL/L (ref 136–145)
TROPONIN I SERPL HS-MCNC: 23 NG/L (ref 0–51)
TROPONIN I SERPL HS-MCNC: 24 NG/L (ref 0–51)
WBC # BLD AUTO: 6.4 K/UL (ref 3.6–11)

## 2024-01-27 PROCEDURE — 71045 X-RAY EXAM CHEST 1 VIEW: CPT

## 2024-01-27 PROCEDURE — 94761 N-INVAS EAR/PLS OXIMETRY MLT: CPT

## 2024-01-27 PROCEDURE — 99285 EMERGENCY DEPT VISIT HI MDM: CPT

## 2024-01-27 PROCEDURE — 85025 COMPLETE CBC W/AUTO DIFF WBC: CPT

## 2024-01-27 PROCEDURE — 36415 COLL VENOUS BLD VENIPUNCTURE: CPT

## 2024-01-27 PROCEDURE — 84484 ASSAY OF TROPONIN QUANT: CPT

## 2024-01-27 PROCEDURE — 80053 COMPREHEN METABOLIC PANEL: CPT

## 2024-01-27 RX ORDER — TRAMADOL HYDROCHLORIDE 50 MG/1
50 TABLET ORAL EVERY 4 HOURS PRN
Qty: 10 TABLET | Refills: 0 | Status: SHIPPED | OUTPATIENT
Start: 2024-01-27 | End: 2024-01-30

## 2024-01-27 ASSESSMENT — PAIN DESCRIPTION - LOCATION: LOCATION: CHEST

## 2024-01-27 ASSESSMENT — PAIN - FUNCTIONAL ASSESSMENT: PAIN_FUNCTIONAL_ASSESSMENT: 0-10

## 2024-01-27 ASSESSMENT — PAIN SCALES - GENERAL: PAINLEVEL_OUTOF10: 5

## 2024-01-27 NOTE — ED TRIAGE NOTES
Lt sided chest pain starting earlier this morning, pain got worse. Hx of MI last December. Took 4-5 ntg throughout today with no relief. NSR with EMS. Rcvd ASA 324mg with EMS.

## 2024-01-28 LAB
EKG ATRIAL RATE: 68 BPM
EKG DIAGNOSIS: NORMAL
EKG P AXIS: 25 DEGREES
EKG P-R INTERVAL: 176 MS
EKG Q-T INTERVAL: 422 MS
EKG QRS DURATION: 76 MS
EKG QTC CALCULATION (BAZETT): 448 MS
EKG R AXIS: -29 DEGREES
EKG T AXIS: 103 DEGREES
EKG VENTRICULAR RATE: 68 BPM

## 2024-03-12 ENCOUNTER — TRANSCRIBE ORDERS (OUTPATIENT)
Facility: HOSPITAL | Age: 49
End: 2024-03-12

## 2024-03-12 DIAGNOSIS — Z12.31 VISIT FOR SCREENING MAMMOGRAM: Primary | ICD-10-CM

## 2024-04-11 ENCOUNTER — HOSPITAL ENCOUNTER (OUTPATIENT)
Facility: HOSPITAL | Age: 49
Discharge: HOME | End: 2024-04-11
Attending: INTERNAL MEDICINE
Payer: MEDICARE

## 2024-04-11 DIAGNOSIS — R91.1 LUNG NODULE: ICD-10-CM

## 2024-04-11 PROCEDURE — 71250 CT THORAX DX C-: CPT

## 2024-04-14 ENCOUNTER — APPOINTMENT (OUTPATIENT)
Facility: HOSPITAL | Age: 49
End: 2024-04-14
Payer: MEDICARE

## 2024-04-14 ENCOUNTER — HOSPITAL ENCOUNTER (EMERGENCY)
Facility: HOSPITAL | Age: 49
Discharge: HOME | End: 2024-04-14
Attending: EMERGENCY MEDICINE
Payer: MEDICARE

## 2024-04-14 VITALS
OXYGEN SATURATION: 98 % | BODY MASS INDEX: 37.77 KG/M2 | HEART RATE: 76 BPM | HEIGHT: 69 IN | RESPIRATION RATE: 15 BRPM | DIASTOLIC BLOOD PRESSURE: 78 MMHG | TEMPERATURE: 98 F | SYSTOLIC BLOOD PRESSURE: 132 MMHG | WEIGHT: 255 LBS

## 2024-04-14 DIAGNOSIS — R10.84 GENERALIZED ABDOMINAL PAIN: ICD-10-CM

## 2024-04-14 DIAGNOSIS — K59.00 CONSTIPATION, UNSPECIFIED CONSTIPATION TYPE: Primary | ICD-10-CM

## 2024-04-14 LAB
ANION GAP SERPL CALC-SCNC: 9 MMOL/L (ref 5–15)
BASOPHILS # BLD: 0.1 K/UL (ref 0–0.1)
BASOPHILS NFR BLD: 1 % (ref 0–1)
BUN SERPL-MCNC: 48 MG/DL (ref 6–20)
BUN/CREAT SERPL: 8 (ref 12–20)
CA-I BLD-MCNC: 9.2 MG/DL (ref 8.5–10.1)
CHLORIDE SERPL-SCNC: 100 MMOL/L (ref 97–108)
CO2 SERPL-SCNC: 26 MMOL/L (ref 21–32)
CREAT SERPL-MCNC: 6.07 MG/DL (ref 0.55–1.02)
DIFFERENTIAL METHOD BLD: ABNORMAL
EOSINOPHIL # BLD: 0.6 K/UL (ref 0–0.4)
EOSINOPHIL NFR BLD: 6 % (ref 0–7)
ERYTHROCYTE [DISTWIDTH] IN BLOOD BY AUTOMATED COUNT: 14.9 % (ref 11.5–14.5)
GLUCOSE SERPL-MCNC: 218 MG/DL (ref 65–100)
HCT VFR BLD AUTO: 34 % (ref 35–47)
HGB BLD-MCNC: 10.4 G/DL (ref 11.5–16)
IMM GRANULOCYTES # BLD AUTO: 0.1 K/UL (ref 0–0.04)
IMM GRANULOCYTES NFR BLD AUTO: 1 % (ref 0–0.5)
LIPASE SERPL-CCNC: 76 U/L (ref 13–75)
LYMPHOCYTES # BLD: 1.7 K/UL (ref 0.8–3.5)
LYMPHOCYTES NFR BLD: 16 % (ref 12–49)
MCH RBC QN AUTO: 27.4 PG (ref 26–34)
MCHC RBC AUTO-ENTMCNC: 30.6 G/DL (ref 30–36.5)
MCV RBC AUTO: 89.5 FL (ref 80–99)
MONOCYTES # BLD: 0.7 K/UL (ref 0–1)
MONOCYTES NFR BLD: 6 % (ref 5–13)
NEUTS SEG # BLD: 7.6 K/UL (ref 1.8–8)
NEUTS SEG NFR BLD: 70 % (ref 32–75)
NRBC # BLD: 0 K/UL (ref 0–0.01)
NRBC BLD-RTO: 0 PER 100 WBC
PLATELET # BLD AUTO: 444 K/UL (ref 150–400)
PMV BLD AUTO: 9.4 FL (ref 8.9–12.9)
POTASSIUM SERPL-SCNC: 4.3 MMOL/L (ref 3.5–5.1)
RBC # BLD AUTO: 3.8 M/UL (ref 3.8–5.2)
SODIUM SERPL-SCNC: 135 MMOL/L (ref 136–145)
WBC # BLD AUTO: 10.8 K/UL (ref 3.6–11)

## 2024-04-14 PROCEDURE — 96374 THER/PROPH/DIAG INJ IV PUSH: CPT

## 2024-04-14 PROCEDURE — 80048 BASIC METABOLIC PNL TOTAL CA: CPT

## 2024-04-14 PROCEDURE — 85025 COMPLETE CBC W/AUTO DIFF WBC: CPT

## 2024-04-14 PROCEDURE — 36415 COLL VENOUS BLD VENIPUNCTURE: CPT

## 2024-04-14 PROCEDURE — 83690 ASSAY OF LIPASE: CPT

## 2024-04-14 PROCEDURE — 99284 EMERGENCY DEPT VISIT MOD MDM: CPT

## 2024-04-14 PROCEDURE — 6360000002 HC RX W HCPCS: Performed by: EMERGENCY MEDICINE

## 2024-04-14 PROCEDURE — 74176 CT ABD & PELVIS W/O CONTRAST: CPT

## 2024-04-14 RX ORDER — LACTULOSE 10 G/10G
10 SOLUTION ORAL DAILY
Qty: 20 PACKET | Refills: 0 | Status: SHIPPED | OUTPATIENT
Start: 2024-04-14

## 2024-04-14 RX ORDER — KETOROLAC TROMETHAMINE 15 MG/ML
15 INJECTION, SOLUTION INTRAMUSCULAR; INTRAVENOUS
Status: COMPLETED | OUTPATIENT
Start: 2024-04-14 | End: 2024-04-14

## 2024-04-14 RX ADMIN — KETOROLAC TROMETHAMINE 15 MG: 15 INJECTION, SOLUTION INTRAMUSCULAR; INTRAVENOUS at 17:34

## 2024-04-14 ASSESSMENT — PAIN SCALES - GENERAL
PAINLEVEL_OUTOF10: 7
PAINLEVEL_OUTOF10: 2
PAINLEVEL_OUTOF10: 7

## 2024-04-14 ASSESSMENT — PAIN DESCRIPTION - LOCATION
LOCATION: ABDOMEN

## 2024-04-14 ASSESSMENT — LIFESTYLE VARIABLES
HOW OFTEN DO YOU HAVE A DRINK CONTAINING ALCOHOL: PATIENT DECLINED
HOW MANY STANDARD DRINKS CONTAINING ALCOHOL DO YOU HAVE ON A TYPICAL DAY: PATIENT DECLINED

## 2024-04-14 ASSESSMENT — PAIN - FUNCTIONAL ASSESSMENT
PAIN_FUNCTIONAL_ASSESSMENT: ACTIVITIES ARE NOT PREVENTED
PAIN_FUNCTIONAL_ASSESSMENT: 0-10

## 2024-04-14 ASSESSMENT — PAIN DESCRIPTION - DESCRIPTORS: DESCRIPTORS: ACHING;SORE

## 2024-04-14 ASSESSMENT — PAIN DESCRIPTION - ORIENTATION: ORIENTATION: LEFT

## 2024-04-14 NOTE — ED TRIAGE NOTES
Reports have issues with constipation, states has been taking stool softeners but does not feel like is able to adequately empty. Recently had CT scan done. Last BM yesterday after taking softener, states was liquid form. Hx dialysis, MWF

## 2024-04-14 NOTE — DISCHARGE INSTRUCTIONS
Thank you!  Thank you for allowing me to care for you in the emergency department. It is my goal to provide you with excellent care.  Please fill out the survey that will come to you by mail or email since we listen to your feedback!     Below you will find a list of your tests from today's visit.  Should you have any questions, please do not hesitate to call the emergency department.    Labs  Recent Results (from the past 12 hour(s))   BMP    Collection Time: 04/14/24  4:56 PM   Result Value Ref Range    Sodium 135 (L) 136 - 145 mmol/L    Potassium 4.3 3.5 - 5.1 mmol/L    Chloride 100 97 - 108 mmol/L    CO2 26 21 - 32 mmol/L    Anion Gap 9 5 - 15 mmol/L    Glucose 218 (H) 65 - 100 mg/dL    BUN 48 (H) 6 - 20 mg/dL    Creatinine 6.07 (H) 0.55 - 1.02 mg/dL    Bun/Cre Ratio 8 (L) 12 - 20      Est, Glom Filt Rate 8 (L) >60 ml/min/1.73m2    Calcium 9.2 8.5 - 10.1 mg/dL   Lipase    Collection Time: 04/14/24  4:56 PM   Result Value Ref Range    Lipase 76 (H) 13 - 75 U/L   CBC with Auto Differential    Collection Time: 04/14/24  5:31 PM   Result Value Ref Range    WBC 10.8 3.6 - 11.0 K/uL    RBC 3.80 3.80 - 5.20 M/uL    Hemoglobin 10.4 (L) 11.5 - 16.0 g/dL    Hematocrit 34.0 (L) 35.0 - 47.0 %    MCV 89.5 80.0 - 99.0 FL    MCH 27.4 26.0 - 34.0 PG    MCHC 30.6 30.0 - 36.5 g/dL    RDW 14.9 (H) 11.5 - 14.5 %    Platelets 444 (H) 150 - 400 K/uL    MPV 9.4 8.9 - 12.9 FL    Nucleated RBCs 0.0 0.0  WBC    nRBC 0.00 0.00 - 0.01 K/uL    Neutrophils % 70 32 - 75 %    Lymphocytes % 16 12 - 49 %    Monocytes % 6 5 - 13 %    Eosinophils % 6 0 - 7 %    Basophils % 1 0 - 1 %    Immature Granulocytes % 1 (H) 0 - 0.5 %    Neutrophils Absolute 7.6 1.8 - 8.0 K/UL    Lymphocytes Absolute 1.7 0.8 - 3.5 K/UL    Monocytes Absolute 0.7 0.0 - 1.0 K/UL    Eosinophils Absolute 0.6 (H) 0.0 - 0.4 K/UL    Basophils Absolute 0.1 0.0 - 0.1 K/UL    Immature Granulocytes Absolute 0.1 (H) 0.00 - 0.04 K/UL    Differential Type AUTOMATED

## 2024-04-14 NOTE — ED PROVIDER NOTES
START taking these medications      lactulose 10 g packet  Commonly known as: CEPHULAC  Take 1 packet by mouth daily            CONTINUE taking these medications      BD Insulin Syringe U/F 31G X 5/16\" 1 ML Misc  Generic drug: Insulin Syringe-Needle U-100            ASK your doctor about these medications      amphetamine-dextroamphetamine 20 MG tablet  Commonly known as: ADDERALL     aspirin 81 MG chewable tablet  Take 1 tablet by mouth daily     atorvastatin 20 MG tablet  Commonly known as: LIPITOR     calcitRIOL 0.25 MCG capsule  Commonly known as: ROCALTROL  Take 1 capsule by mouth daily     carvedilol 6.25 MG tablet  Commonly known as: COREG  Take 1 tablet by mouth 2 times daily (with meals)     clopidogrel 75 MG tablet  Commonly known as: PLAVIX  Take 1 tablet by mouth daily     docusate 100 MG Caps  Commonly known as: COLACE, DULCOLAX  Take 200 mg by mouth 2 times daily     fluconazole 150 MG tablet  Commonly known as: DIFLUCAN     fluticasone 50 MCG/ACT nasal spray  Commonly known as: FLONASE     furosemide 80 MG tablet  Commonly known as: LASIX  Take 1 tablet by mouth See Admin Instructions Please take 1 tablet on non-dialysis days     gabapentin 300 MG capsule  Commonly known as: NEURONTIN     HumaLOG 100 UNIT/ML Soln injection vial  Generic drug: insulin lispro     hydrOXYzine HCl 25 MG tablet  Commonly known as: ATARAX     iron polysaccharides 150 MG capsule  Commonly known as: NIFEREX     lactobacillus capsule  Take 1 capsule by mouth daily (with breakfast)     Levemir 100 UNIT/ML injection vial  Generic drug: insulin detemir  Inject 42 Units into the skin nightly     losartan 50 MG tablet  Commonly known as: COZAAR  Take 1 tablet by mouth daily     nitroGLYCERIN 0.4 MG SL tablet  Commonly known as: NITROSTAT  Place 1 tablet under the tongue every 5 minutes as needed for Chest pain up to max of 3 total doses. If no relief after 1 dose, call 911.     omeprazole 20 MG delayed release capsule  Commonly

## 2024-04-23 ENCOUNTER — HOSPITAL ENCOUNTER (INPATIENT)
Facility: HOSPITAL | Age: 49
LOS: 2 days | Discharge: HOME | End: 2024-04-25
Attending: STUDENT IN AN ORGANIZED HEALTH CARE EDUCATION/TRAINING PROGRAM | Admitting: INTERNAL MEDICINE
Payer: MEDICARE

## 2024-04-23 ENCOUNTER — APPOINTMENT (OUTPATIENT)
Facility: HOSPITAL | Age: 49
End: 2024-04-23
Payer: MEDICARE

## 2024-04-23 DIAGNOSIS — I31.39 PERICARDIAL EFFUSION: Primary | ICD-10-CM

## 2024-04-23 DIAGNOSIS — R06.02 SHORTNESS OF BREATH: ICD-10-CM

## 2024-04-23 DIAGNOSIS — I42.9 CARDIOMYOPATHY (HCC): ICD-10-CM

## 2024-04-23 LAB
ALBUMIN SERPL-MCNC: 3.2 G/DL (ref 3.5–5)
ALBUMIN/GLOB SERPL: 0.8 (ref 1.1–2.2)
ALP SERPL-CCNC: 159 U/L (ref 45–117)
ALT SERPL-CCNC: 18 U/L (ref 12–78)
ANION GAP SERPL CALC-SCNC: 11 MMOL/L (ref 5–15)
AST SERPL W P-5'-P-CCNC: 6 U/L (ref 15–37)
BASOPHILS # BLD: 0.1 K/UL (ref 0–0.1)
BASOPHILS NFR BLD: 1 % (ref 0–1)
BILIRUB SERPL-MCNC: 0.7 MG/DL (ref 0.2–1)
BUN SERPL-MCNC: 31 MG/DL (ref 6–20)
BUN/CREAT SERPL: 6 (ref 12–20)
CA-I BLD-MCNC: 8.3 MG/DL (ref 8.5–10.1)
CHLORIDE SERPL-SCNC: 98 MMOL/L (ref 97–108)
CO2 SERPL-SCNC: 32 MMOL/L (ref 21–32)
CREAT SERPL-MCNC: 5.1 MG/DL (ref 0.55–1.02)
DIFFERENTIAL METHOD BLD: ABNORMAL
EOSINOPHIL # BLD: 0.3 K/UL (ref 0–0.4)
EOSINOPHIL NFR BLD: 4 % (ref 0–7)
ERYTHROCYTE [DISTWIDTH] IN BLOOD BY AUTOMATED COUNT: 14.9 % (ref 11.5–14.5)
GLOBULIN SER CALC-MCNC: 4.2 G/DL (ref 2–4)
GLUCOSE BLD STRIP.AUTO-MCNC: 238 MG/DL (ref 65–100)
GLUCOSE SERPL-MCNC: 162 MG/DL (ref 65–100)
HCT VFR BLD AUTO: 33.5 % (ref 35–47)
HGB BLD-MCNC: 10.2 G/DL (ref 11.5–16)
IMM GRANULOCYTES # BLD AUTO: 0 K/UL (ref 0–0.04)
IMM GRANULOCYTES NFR BLD AUTO: 0 % (ref 0–0.5)
LYMPHOCYTES # BLD: 1.6 K/UL (ref 0.8–3.5)
LYMPHOCYTES NFR BLD: 20 % (ref 12–49)
MCH RBC QN AUTO: 27.9 PG (ref 26–34)
MCHC RBC AUTO-ENTMCNC: 30.4 G/DL (ref 30–36.5)
MCV RBC AUTO: 91.5 FL (ref 80–99)
MONOCYTES # BLD: 0.7 K/UL (ref 0–1)
MONOCYTES NFR BLD: 8 % (ref 5–13)
NEUTS SEG # BLD: 5.4 K/UL (ref 1.8–8)
NEUTS SEG NFR BLD: 67 % (ref 32–75)
PERFORMED BY:: ABNORMAL
PLATELET # BLD AUTO: 404 K/UL (ref 150–400)
PMV BLD AUTO: 9.7 FL (ref 8.9–12.9)
POTASSIUM SERPL-SCNC: 4.3 MMOL/L (ref 3.5–5.1)
PROT SERPL-MCNC: 7.4 G/DL (ref 6.4–8.2)
RBC # BLD AUTO: 3.66 M/UL (ref 3.8–5.2)
SODIUM SERPL-SCNC: 141 MMOL/L (ref 136–145)
TROPONIN I SERPL HS-MCNC: 16 NG/L (ref 0–51)
WBC # BLD AUTO: 8.2 K/UL (ref 3.6–11)

## 2024-04-23 PROCEDURE — 85025 COMPLETE CBC W/AUTO DIFF WBC: CPT

## 2024-04-23 PROCEDURE — 80053 COMPREHEN METABOLIC PANEL: CPT

## 2024-04-23 PROCEDURE — 93005 ELECTROCARDIOGRAM TRACING: CPT | Performed by: STUDENT IN AN ORGANIZED HEALTH CARE EDUCATION/TRAINING PROGRAM

## 2024-04-23 PROCEDURE — 74176 CT ABD & PELVIS W/O CONTRAST: CPT

## 2024-04-23 PROCEDURE — 6370000000 HC RX 637 (ALT 250 FOR IP): Performed by: STUDENT IN AN ORGANIZED HEALTH CARE EDUCATION/TRAINING PROGRAM

## 2024-04-23 PROCEDURE — 84484 ASSAY OF TROPONIN QUANT: CPT

## 2024-04-23 PROCEDURE — 2060000000 HC ICU INTERMEDIATE R&B

## 2024-04-23 PROCEDURE — 82962 GLUCOSE BLOOD TEST: CPT

## 2024-04-23 PROCEDURE — 99285 EMERGENCY DEPT VISIT HI MDM: CPT

## 2024-04-23 RX ORDER — INSULIN GLARGINE 100 [IU]/ML
42 INJECTION, SOLUTION SUBCUTANEOUS NIGHTLY
Status: DISCONTINUED | OUTPATIENT
Start: 2024-04-23 | End: 2024-04-25 | Stop reason: HOSPADM

## 2024-04-23 RX ORDER — ASPIRIN 81 MG/1
81 TABLET, CHEWABLE ORAL DAILY
Status: DISCONTINUED | OUTPATIENT
Start: 2024-04-24 | End: 2024-04-25 | Stop reason: HOSPADM

## 2024-04-23 RX ORDER — SEVELAMER CARBONATE 800 MG/1
1600 TABLET, FILM COATED ORAL
Status: DISCONTINUED | OUTPATIENT
Start: 2024-04-24 | End: 2024-04-25 | Stop reason: HOSPADM

## 2024-04-23 RX ORDER — INSULIN LISPRO 100 [IU]/ML
0-4 INJECTION, SOLUTION INTRAVENOUS; SUBCUTANEOUS
Status: DISCONTINUED | OUTPATIENT
Start: 2024-04-24 | End: 2024-04-25 | Stop reason: HOSPADM

## 2024-04-23 RX ORDER — FERROUS SULFATE 325(65) MG
650 TABLET ORAL DAILY
Status: DISCONTINUED | OUTPATIENT
Start: 2024-04-24 | End: 2024-04-25 | Stop reason: HOSPADM

## 2024-04-23 RX ORDER — RANOLAZINE 500 MG/1
500 TABLET, EXTENDED RELEASE ORAL 2 TIMES DAILY
Status: DISCONTINUED | OUTPATIENT
Start: 2024-04-23 | End: 2024-04-25 | Stop reason: HOSPADM

## 2024-04-23 RX ORDER — LIDOCAINE 4 G/G
1 PATCH TOPICAL
Status: COMPLETED | OUTPATIENT
Start: 2024-04-23 | End: 2024-04-24

## 2024-04-23 RX ORDER — ATORVASTATIN CALCIUM 40 MG/1
20 TABLET, FILM COATED ORAL DAILY
Status: DISCONTINUED | OUTPATIENT
Start: 2024-04-24 | End: 2024-04-25 | Stop reason: HOSPADM

## 2024-04-23 RX ORDER — FUROSEMIDE 40 MG/1
80 TABLET ORAL SEE ADMIN INSTRUCTIONS
Status: DISCONTINUED | OUTPATIENT
Start: 2024-04-23 | End: 2024-04-25 | Stop reason: HOSPADM

## 2024-04-23 RX ORDER — CARVEDILOL 3.12 MG/1
12.5 TABLET ORAL 2 TIMES DAILY WITH MEALS
Status: DISCONTINUED | OUTPATIENT
Start: 2024-04-24 | End: 2024-04-25 | Stop reason: HOSPADM

## 2024-04-23 RX ORDER — PANTOPRAZOLE SODIUM 40 MG/1
40 TABLET, DELAYED RELEASE ORAL
Status: DISCONTINUED | OUTPATIENT
Start: 2024-04-24 | End: 2024-04-25 | Stop reason: HOSPADM

## 2024-04-23 RX ORDER — VITAMIN B COMPLEX
2000 TABLET ORAL DAILY
Status: DISCONTINUED | OUTPATIENT
Start: 2024-04-24 | End: 2024-04-25 | Stop reason: HOSPADM

## 2024-04-23 RX ORDER — INSULIN LISPRO 100 [IU]/ML
0-4 INJECTION, SOLUTION INTRAVENOUS; SUBCUTANEOUS NIGHTLY
Status: DISCONTINUED | OUTPATIENT
Start: 2024-04-23 | End: 2024-04-25 | Stop reason: HOSPADM

## 2024-04-23 RX ORDER — CALCITRIOL 0.25 UG/1
0.25 CAPSULE, LIQUID FILLED ORAL DAILY
Status: DISCONTINUED | OUTPATIENT
Start: 2024-04-24 | End: 2024-04-25 | Stop reason: HOSPADM

## 2024-04-23 RX ORDER — LOSARTAN POTASSIUM 25 MG/1
50 TABLET ORAL DAILY
Status: DISCONTINUED | OUTPATIENT
Start: 2024-04-24 | End: 2024-04-25 | Stop reason: HOSPADM

## 2024-04-23 RX ORDER — DOCUSATE SODIUM 100 MG/1
200 CAPSULE, LIQUID FILLED ORAL 2 TIMES DAILY
Status: DISCONTINUED | OUTPATIENT
Start: 2024-04-23 | End: 2024-04-25 | Stop reason: HOSPADM

## 2024-04-23 RX ORDER — LACTULOSE 10 G/15ML
10 SOLUTION ORAL DAILY
Status: DISCONTINUED | OUTPATIENT
Start: 2024-04-24 | End: 2024-04-25 | Stop reason: HOSPADM

## 2024-04-23 RX ORDER — GABAPENTIN 100 MG/1
300 CAPSULE ORAL NIGHTLY
Status: DISCONTINUED | OUTPATIENT
Start: 2024-04-23 | End: 2024-04-25 | Stop reason: HOSPADM

## 2024-04-23 RX ORDER — CLOPIDOGREL BISULFATE 75 MG/1
75 TABLET ORAL DAILY
Status: DISCONTINUED | OUTPATIENT
Start: 2024-04-24 | End: 2024-04-25 | Stop reason: HOSPADM

## 2024-04-23 RX ORDER — LACTOBACILLUS RHAMNOSUS GG 10B CELL
1 CAPSULE ORAL
Status: DISCONTINUED | OUTPATIENT
Start: 2024-04-24 | End: 2024-04-25 | Stop reason: HOSPADM

## 2024-04-23 RX ORDER — DEXTROAMPHETAMINE SACCHARATE, AMPHETAMINE ASPARTATE, DEXTROAMPHETAMINE SULFATE AND AMPHETAMINE SULFATE 2.5; 2.5; 2.5; 2.5 MG/1; MG/1; MG/1; MG/1
10 TABLET ORAL 3 TIMES DAILY
Status: DISCONTINUED | OUTPATIENT
Start: 2024-04-23 | End: 2024-04-25 | Stop reason: HOSPADM

## 2024-04-23 ASSESSMENT — PAIN - FUNCTIONAL ASSESSMENT
PAIN_FUNCTIONAL_ASSESSMENT: 0-10

## 2024-04-23 ASSESSMENT — PAIN SCALES - GENERAL
PAINLEVEL_OUTOF10: 5
PAINLEVEL_OUTOF10: 9
PAINLEVEL_OUTOF10: 7

## 2024-04-23 ASSESSMENT — PAIN DESCRIPTION - ORIENTATION: ORIENTATION: LEFT

## 2024-04-23 ASSESSMENT — PAIN DESCRIPTION - LOCATION: LOCATION: FLANK;RIB CAGE

## 2024-04-23 NOTE — ED PROVIDER NOTES
EMERGENCY DEPARTMENT HISTORY AND PHYSICAL EXAM      Date: 4/23/2024  Patient Name: Ros Orr  MRN: [id number]  YOB: 1975  Date of evaluation: 4/23/2024  Provider: Des Alfaro MD     History of Present Illness     Chief Complaint   Patient presents with    Rib Injury    Flank Pain       History Provided By: Patient    HPI: Ros Orr, 48 y.o. female with past medical history as listed and reviewed below presenting to the ED for evaluation of left-sided flank pain.  Patient notes about 2 weeks ago she hit the left side of her flank on a door and since that time has been having bruising and pain to the area which is now resolved, she thinks they have gotten worse.  She has had dialysis yesterday and was her normal session.  She denies any nausea or vomiting, no pain with breathing, no chest pain.  She is not on any blood thinners.  She reports normal urination without any blood in it.    Medical History     Past Medical History:  Past Medical History:   Diagnosis Date    Allergic rhinitis     Blood clot in vein     Chronic kidney disease     CVA (cerebral vascular accident) ([facility]) 05/20/2014    Diabetes ([facility])     Dyslipidemia     Hemodialysis patient ([facility])     Hx of blood clots     Hypertension     IBS (irritable bowel syndrome)     Ill-defined condition     Renal failure        Past Surgical History:  Past Surgical History:   Procedure Laterality Date    CARDIAC PROCEDURE N/A 10/12/2023    Left heart cath / coronary angiography performed by Martir Garcia MD at [facility] CARDIAC CATH LAB    CARDIAC PROCEDURE N/A 10/12/2023    Percutaneous coronary intervention performed by Martir Garcia MD at [facility] CARDIAC CATH LAB    CARDIAC PROCEDURE N/A 10/12/2023    Angioplasty coronary performed by Martir Garcia MD at [facility] CARDIAC CATH LAB    CARDIAC PROCEDURE N/A 10/12/2023    Insert stent ryland coronary performed by Martir Garcia MD at [facility] CARDIAC CATH LAB    CARDIAC PROCEDURE N/A 11/16/2023     Last Year: Never true   Transportation Needs: No Transportation Needs (11/24/2023)    PRAPARE - Transportation     Lack of Transportation (Medical): No     Lack of Transportation (Non-Medical): No   Physical Activity: Not on file   Stress: Not on file   Social Connections: Not on file   Intimate Partner Violence: Not on file   Depression: Not on file   Housing Stability: Low Risk  (11/24/2023)    Housing Stability Vital Sign     Unable to Pay for Housing in the Last Year: No     Number of Places Lived in the Last Year: 0     Unstable Housing in the Last Year: No   Interpersonal Safety: Not At Risk (4/14/2024)    Interpersonal Safety Domain Source: IP Abuse Screening     Physical abuse: Denies     Verbal abuse: Denies     Emotional abuse: Denies     Financial abuse: Denies     Sexual abuse: Denies   Utilities: Not At Risk (11/24/2023)    [facility] Utilities     Threatened with loss of utilities: No       Physical Exam     Vitals:  I reviewed the patient's vital signs  Vitals:    04/23/24 1417 04/23/24 1805   BP: (!) 170/76 (!) 143/91   Pulse: 75 73   Resp: 19 19   Temp: 98.2 °F (36.8 °C)    TempSrc: Oral    SpO2: 100% 100%   Weight: 115.7 kg (255 lb)    Height: 1.753 m (5' 9\")        Physical Exam  Vitals and nursing note reviewed.   HENT:      Nose: Nose normal.      Mouth/Throat:      Mouth: Mucous membranes are moist.   Cardiovascular:      Pulses: Normal pulses.   Pulmonary:      Effort: Pulmonary effort is normal.      Breath sounds: Normal breath sounds.   Abdominal:      Palpations: Abdomen is soft.      Tenderness: There is no right CVA tenderness, left CVA tenderness, guarding or rebound. Negative signs include Freed's sign and McBurney's sign.          Comments: Area of bruising as above.  There is point tenderness overlying this area.  There is no tenderness to palpation of the ribs above   Skin:     General: Skin is warm.      Coloration: Skin is not pale.      Comments: Fistula left arm.   Neurological:

## 2024-04-23 NOTE — ED TRIAGE NOTES
Pt reports left rib/flank pain x 2 weeks after brushing up against the corner of a wall. Pt reports large bruising and persistent pain

## 2024-04-24 ENCOUNTER — APPOINTMENT (OUTPATIENT)
Facility: HOSPITAL | Age: 49
End: 2024-04-24
Attending: INTERNAL MEDICINE
Payer: MEDICARE

## 2024-04-24 LAB
ECHO AO ROOT DIAM: 3 CM
ECHO AO ROOT INDEX: 1.31 CM/M2
ECHO AV AREA PEAK VELOCITY: 1.1 CM2
ECHO AV AREA VTI: 1.2 CM2
ECHO AV AREA/BSA PEAK VELOCITY: 0.5 CM2/M2
ECHO AV AREA/BSA VTI: 0.5 CM2/M2
ECHO AV MEAN GRADIENT: 6 MMHG
ECHO AV MEAN VELOCITY: 1.2 M/S
ECHO AV PEAK GRADIENT: 11 MMHG
ECHO AV PEAK VELOCITY: 1.7 M/S
ECHO AV VELOCITY RATIO: 0.47
ECHO AV VTI: 34.8 CM
ECHO BSA: 2.37 M2
ECHO IVC PROX: 2.1 CM
ECHO LA DIAMETER INDEX: 1.92 CM/M2
ECHO LA DIAMETER: 4.4 CM
ECHO LA TO AORTIC ROOT RATIO: 1.47
ECHO LV FRACTIONAL SHORTENING: 20 % (ref 28–44)
ECHO LV GLOBAL LONGITUDINAL STRAIN (GLS): -10.2 %
ECHO LV GLOBAL LONGITUDINAL STRAIN (GLS): -11.2 %
ECHO LV GLOBAL LONGITUDINAL STRAIN (GLS): -15.9 %
ECHO LV GLOBAL LONGITUDINAL STRAIN (GLS): -7.5 %
ECHO LV INTERNAL DIMENSION DIASTOLE INDEX: 2.66 CM/M2
ECHO LV INTERNAL DIMENSION DIASTOLIC: 6.1 CM (ref 3.9–5.3)
ECHO LV INTERNAL DIMENSION SYSTOLIC INDEX: 2.14 CM/M2
ECHO LV INTERNAL DIMENSION SYSTOLIC: 4.9 CM
ECHO LV IVSD: 1 CM (ref 0.6–0.9)
ECHO LV MASS 2D: 237.7 G (ref 67–162)
ECHO LV MASS INDEX 2D: 103.8 G/M2 (ref 43–95)
ECHO LV POSTERIOR WALL DIASTOLIC: 0.9 CM (ref 0.6–0.9)
ECHO LV RELATIVE WALL THICKNESS RATIO: 0.3
ECHO LVOT AREA: 2.3 CM2
ECHO LVOT AV VTI INDEX: 0.52
ECHO LVOT DIAM: 1.7 CM
ECHO LVOT MEAN GRADIENT: 1 MMHG
ECHO LVOT PEAK GRADIENT: 3 MMHG
ECHO LVOT PEAK VELOCITY: 0.8 M/S
ECHO LVOT STROKE VOLUME INDEX: 17.9 ML/M2
ECHO LVOT SV: 41.1 ML
ECHO LVOT VTI: 18.1 CM
ECHO MV A VELOCITY: 1.04 M/S
ECHO MV E DECELERATION TIME (DT): 235 MS
ECHO MV E VELOCITY: 1.28 M/S
ECHO MV E/A RATIO: 1.23
ECHO MV REGURGITANT PEAK GRADIENT: 27 MMHG
ECHO MV REGURGITANT PEAK VELOCITY: 2.6 M/S
ECHO PV MAX VELOCITY: 0.8 M/S
ECHO PV MEAN GRADIENT: 1 MMHG
ECHO PV MEAN VELOCITY: 0.5 M/S
ECHO PV PEAK GRADIENT: 2 MMHG
ECHO PV VTI: 16.1 CM
ECHO RA AREA 4C: 15.9 CM2
ECHO RA END SYSTOLIC VOLUME APICAL 4 CHAMBER INDEX BSA: 17 ML/M2
ECHO RA VOLUME: 38 ML
ECHO RV BASAL DIMENSION: 3.1 CM
ECHO RV LONGITUDINAL DIMENSION: 8.2 CM
ECHO RV MID DIMENSION: 2.9 CM
ECHO RV TAPSE: 1.3 CM (ref 1.7–?)
ECHO RVOT MEAN GRADIENT: 1 MMHG
ECHO RVOT PEAK GRADIENT: 2 MMHG
ECHO RVOT PEAK VELOCITY: 0.7 M/S
ECHO RVOT VTI: 9.7 CM
EKG ATRIAL RATE: 74 BPM
EKG DIAGNOSIS: NORMAL
EKG P AXIS: 43 DEGREES
EKG P-R INTERVAL: 150 MS
EKG Q-T INTERVAL: 416 MS
EKG QRS DURATION: 74 MS
EKG QTC CALCULATION (BAZETT): 461 MS
EKG R AXIS: 2 DEGREES
EKG T AXIS: 124 DEGREES
EKG VENTRICULAR RATE: 74 BPM
GLUCOSE BLD STRIP.AUTO-MCNC: 160 MG/DL (ref 65–100)
GLUCOSE BLD STRIP.AUTO-MCNC: 236 MG/DL (ref 65–100)
GLUCOSE BLD STRIP.AUTO-MCNC: 237 MG/DL (ref 65–100)
GLUCOSE BLD STRIP.AUTO-MCNC: 269 MG/DL (ref 65–100)
HBV SURFACE AG SER QL: 0.19 INDEX
HBV SURFACE AG SER QL: NEGATIVE
PERFORMED BY:: ABNORMAL

## 2024-04-24 PROCEDURE — 6370000000 HC RX 637 (ALT 250 FOR IP)

## 2024-04-24 PROCEDURE — 2709999900 HC NON-CHARGEABLE SUPPLY

## 2024-04-24 PROCEDURE — 6370000000 HC RX 637 (ALT 250 FOR IP): Performed by: HOSPITALIST

## 2024-04-24 PROCEDURE — 90935 HEMODIALYSIS ONE EVALUATION: CPT

## 2024-04-24 PROCEDURE — 93306 TTE W/DOPPLER COMPLETE: CPT

## 2024-04-24 PROCEDURE — 82962 GLUCOSE BLOOD TEST: CPT

## 2024-04-24 PROCEDURE — 2060000000 HC ICU INTERMEDIATE R&B

## 2024-04-24 PROCEDURE — 87340 HEPATITIS B SURFACE AG IA: CPT

## 2024-04-24 PROCEDURE — 5A1D70Z PERFORMANCE OF URINARY FILTRATION, INTERMITTENT, LESS THAN 6 HOURS PER DAY: ICD-10-PCS | Performed by: INTERNAL MEDICINE

## 2024-04-24 PROCEDURE — 6370000000 HC RX 637 (ALT 250 FOR IP): Performed by: INTERNAL MEDICINE

## 2024-04-24 RX ORDER — PREDNISONE 5 MG/1
5 TABLET ORAL DAILY
Status: DISCONTINUED | OUTPATIENT
Start: 2024-05-03 | End: 2024-04-25 | Stop reason: HOSPADM

## 2024-04-24 RX ORDER — SORBITOL SOLUTION 70 %
30 SOLUTION, ORAL MISCELLANEOUS ONCE
Status: COMPLETED | OUTPATIENT
Start: 2024-04-24 | End: 2024-04-24

## 2024-04-24 RX ORDER — ALPRAZOLAM 0.5 MG/1
1 TABLET ORAL 2 TIMES DAILY PRN
Status: DISCONTINUED | OUTPATIENT
Start: 2024-04-24 | End: 2024-04-25 | Stop reason: HOSPADM

## 2024-04-24 RX ORDER — TRAMADOL HYDROCHLORIDE 50 MG/1
50 TABLET ORAL 2 TIMES DAILY PRN
Status: DISCONTINUED | OUTPATIENT
Start: 2024-04-24 | End: 2024-04-25 | Stop reason: HOSPADM

## 2024-04-24 RX ORDER — PREDNISONE 20 MG/1
20 TABLET ORAL DAILY
Status: DISCONTINUED | OUTPATIENT
Start: 2024-04-27 | End: 2024-04-25 | Stop reason: HOSPADM

## 2024-04-24 RX ORDER — PREDNISONE 5 MG/1
10 TABLET ORAL DAILY
Status: DISCONTINUED | OUTPATIENT
Start: 2024-04-30 | End: 2024-04-25 | Stop reason: HOSPADM

## 2024-04-24 RX ORDER — PREDNISONE 20 MG/1
40 TABLET ORAL DAILY
Status: DISCONTINUED | OUTPATIENT
Start: 2024-04-24 | End: 2024-04-25 | Stop reason: HOSPADM

## 2024-04-24 RX ADMIN — SEVELAMER CARBONATE 1600 MG: 800 TABLET, FILM COATED ORAL at 12:23

## 2024-04-24 RX ADMIN — DEXTROAMPHETAMINE SACCHARATE, AMPHETAMINE ASPARTATE, DEXTROAMPHETAMINE SULFATE, AND AMPHETAMINE SULFATE 10 MG: 2.5; 2.5; 2.5; 2.5 TABLET ORAL at 09:18

## 2024-04-24 RX ADMIN — CALCITRIOL CAPSULES 0.25 MCG 0.25 MCG: 0.25 CAPSULE ORAL at 09:18

## 2024-04-24 RX ADMIN — SEVELAMER CARBONATE 1600 MG: 800 TABLET, FILM COATED ORAL at 09:18

## 2024-04-24 RX ADMIN — Medication 1 CAPSULE: at 09:18

## 2024-04-24 RX ADMIN — RANOLAZINE 500 MG: 500 TABLET, EXTENDED RELEASE ORAL at 09:18

## 2024-04-24 RX ADMIN — INSULIN LISPRO 1 UNITS: 100 INJECTION, SOLUTION INTRAVENOUS; SUBCUTANEOUS at 12:23

## 2024-04-24 RX ADMIN — Medication 2000 UNITS: at 09:18

## 2024-04-24 RX ADMIN — RANOLAZINE 500 MG: 500 TABLET, EXTENDED RELEASE ORAL at 21:54

## 2024-04-24 RX ADMIN — ASPIRIN 81 MG 81 MG: 81 TABLET ORAL at 09:18

## 2024-04-24 RX ADMIN — ATORVASTATIN CALCIUM 20 MG: 40 TABLET, FILM COATED ORAL at 09:18

## 2024-04-24 RX ADMIN — ALPRAZOLAM 1 MG: 0.5 TABLET ORAL at 14:08

## 2024-04-24 RX ADMIN — PANTOPRAZOLE SODIUM 40 MG: 40 TABLET, DELAYED RELEASE ORAL at 06:39

## 2024-04-24 RX ADMIN — SEVELAMER CARBONATE 1600 MG: 800 TABLET, FILM COATED ORAL at 17:23

## 2024-04-24 RX ADMIN — CARVEDILOL 12.5 MG: 3.12 TABLET, FILM COATED ORAL at 17:23

## 2024-04-24 RX ADMIN — DEXTROAMPHETAMINE SACCHARATE, AMPHETAMINE ASPARTATE, DEXTROAMPHETAMINE SULFATE, AND AMPHETAMINE SULFATE 10 MG: 2.5; 2.5; 2.5; 2.5 TABLET ORAL at 21:54

## 2024-04-24 RX ADMIN — INSULIN GLARGINE 42 UNITS: 100 INJECTION, SOLUTION SUBCUTANEOUS at 21:54

## 2024-04-24 RX ADMIN — DEXTROAMPHETAMINE SACCHARATE, AMPHETAMINE ASPARTATE, DEXTROAMPHETAMINE SULFATE, AND AMPHETAMINE SULFATE 10 MG: 2.5; 2.5; 2.5; 2.5 TABLET ORAL at 17:23

## 2024-04-24 RX ADMIN — PREDNISONE 40 MG: 20 TABLET ORAL at 17:23

## 2024-04-24 RX ADMIN — GABAPENTIN 300 MG: 100 CAPSULE ORAL at 00:04

## 2024-04-24 RX ADMIN — CLOPIDOGREL BISULFATE 75 MG: 75 TABLET ORAL at 09:18

## 2024-04-24 RX ADMIN — SORBITOL SOLUTION (BULK) 30 ML: 70 SOLUTION at 21:55

## 2024-04-24 ASSESSMENT — PAIN SCALES - GENERAL: PAINLEVEL_OUTOF10: 0

## 2024-04-24 NOTE — CONSULTS
20 mg Oral Daily    calcitRIOL (ROCALTROL) capsule 0.25 mcg  0.25 mcg Oral Daily    carvedilol (COREG) tablet 12.5 mg  12.5 mg Oral BID WC    clopidogrel (PLAVIX) tablet 75 mg  75 mg Oral Daily    docusate sodium (COLACE) capsule 200 mg  200 mg Oral BID    furosemide (LASIX) tablet 80 mg  80 mg Oral See Admin Instructions    gabapentin (NEURONTIN) capsule 300 mg  300 mg Oral Nightly    insulin glargine (LANTUS) injection vial 42 Units  42 Units SubCUTAneous Nightly    iron polysaccharides (NIFEREX) capsule 300 mg  2 capsule Oral Daily    lactobacillus (CULTURELLE) capsule 1 capsule  1 capsule Oral Daily with breakfast    lactulose (CHRONULAC) 10 GM/15ML solution 10 g  10 g Oral Daily    losartan (COZAAR) tablet 50 mg  50 mg Oral Daily    pantoprazole (PROTONIX) tablet 40 mg  40 mg Oral QAM AC    ranolazine (RANEXA) extended release tablet 500 mg  500 mg Oral BID    sevelamer (RENVELA) tablet 1,600 mg  1,600 mg Oral TID WC    Vitamin D (CHOLECALCIFEROL) tablet 2,000 Units  2,000 Units Oral Daily    insulin lispro (HUMALOG) injection vial 0-4 Units  0-4 Units SubCUTAneous TID WC    insulin lispro (HUMALOG) injection vial 0-4 Units  0-4 Units SubCUTAneous Nightly       REVIEW OF SYSTEMS:     Complaints as mentioned in the history of presenting illness.   No other significant complaints on complete review of systems.    Objective:   VITALS:    BP (!) 170/87   Pulse 67   Temp 97.7 °F (36.5 °C) (Oral)   Resp 17   Ht 1.753 m (5' 9\")   Wt 115.7 kg (255 lb)   LMP 2024 (Approximate)   SpO2 99%   BMI 37.66 kg/m²   Temp (24hrs), Av.1 °F (36.7 °C), Min:97.7 °F (36.5 °C), Max:98.2 °F (36.8 °C)      PHYSICAL EXAM:   General: alert, awake, well-oriented, no acute distress.  HEENT: EOMI, no icterus, pallor+, pupils reactive, mucosa moist, normal inspection of ears and nose, throat clear.  Neck: Neck is supple, No JVD.  Lungs: breathsounds normal, no respiratory distress on inspection, no rhonchi, no rales.  CVS:  Niferex  -Will recheck iron panel     4. Renal osteodystrophy:   -Stable calcium level  -Hyperphosphatemia: continue Renvela  -We will check phosphorus level and adjust binders as needed  -Secondary hyperparathyroidism: Continue calcitriol as per outpatient protocol     5. Pericardial effusion  -Echo pending  -Cardiology following  -Negative troponin    Signed: SUZETTE Kumar - CNP    Addendum:    Rounds made along with Viviane Babcock NP  Patient seen and examined ,   Labs and treatments reviewed  Plan discussed with patient and NP  Reviewed NP's  notes, amended and agree with assessment and plan        Zenaida Villalobos M.D

## 2024-04-24 NOTE — PROGRESS NOTES
Hospitalist Progress Note               Daily Progress Note: 4/24/2024      Hospital Day: 2     Chief complaint:   Chief Complaint   Patient presents with    Rib Injury    Flank Pain        Subjective:   Hospital course to date:    oRs Orr, 48 y.o. female ,  presented to the ED for evaluation of left-sided Flank Pain.  Patient notes about 2 weeks ago she hit the left side of her flank on a door and since that time has been having bruising and pain to the area.  She denies any urinary symptoms.  No chest pain but she does have exertional shortness of breath.  No shortness of breath at rest.  No cough or fever.     She has history of end-stage renal disease and on hemodialysis.  History of CAD with 1 stent and history of moderate pericardial effusion.  She underwent CT of abdomen pelvis in ER that did not show acute intra-abdominal pathology but did show a large pericardial effusion and cardiology was consulted.  And recommended to keep patient here in the hospital.   She denies any palpitation, sweats, chest pain, shortness of breath at rest     Patient has had a pericardial effusion dating back to at least October 2023    --------  Patient is seen today for follow-up.  No particular complaints at this time    POC glucose is 269   Alkaline phosphatase is 159      Medications reviewed  Current Facility-Administered Medications   Medication Dose Route Frequency    amphetamine-dextroamphetamine (ADDERALL) tablet 10 mg  10 mg Oral TID    aspirin chewable tablet 81 mg  81 mg Oral Daily    atorvastatin (LIPITOR) tablet 20 mg  20 mg Oral Daily    calcitRIOL (ROCALTROL) capsule 0.25 mcg  0.25 mcg Oral Daily    carvedilol (COREG) tablet 12.5 mg  12.5 mg Oral BID WC    clopidogrel (PLAVIX) tablet 75 mg  75 mg Oral Daily    docusate sodium (COLACE) capsule 200 mg  200 mg Oral BID    furosemide (LASIX) tablet 80 mg  80 mg Oral See Admin Instructions    gabapentin (NEURONTIN) capsule 300 mg  300 mg Oral Nightly    insulin

## 2024-04-24 NOTE — PLAN OF CARE
Problem: Discharge Planning  Goal: Discharge to home or other facility with appropriate resources  4/24/2024 1051 by Laura Schroeder RN  Outcome: Progressing  4/24/2024 0106 by Ranjan Everett RN  Outcome: Progressing     Problem: Pain  Goal: Verbalizes/displays adequate comfort level or baseline comfort level  4/24/2024 1051 by Laura Schroeder RN  Outcome: Progressing  4/24/2024 0106 by Ranjan Everett RN  Outcome: Progressing     Problem: Skin/Tissue Integrity  Goal: Absence of new skin breakdown  Description: 1.  Monitor for areas of redness and/or skin breakdown  2.  Assess vascular access sites hourly  3.  Every 4-6 hours minimum:  Change oxygen saturation probe site  4.  Every 4-6 hours:  If on nasal continuous positive airway pressure, respiratory therapy assess nares and determine need for appliance change or resting period.  4/24/2024 1051 by Laura Schroeder RN  Outcome: Progressing  4/24/2024 0106 by Ranjan Everett RN  Outcome: Progressing

## 2024-04-24 NOTE — H&P
History and Physical  Tele-Hospitalist, Tele-Medicine encounter      Patient:  Ros Orr    CHIEF COMPLAINT:    Left flank pain    HISTORY OF PRESENT ILLNESS:     Ros Orr, 48 y.o. female ,  presented to the ED for evaluation of left-sided Flank Pain.  Patient notes about 2 weeks ago she hit the left side of her flank on a door and since that time has been having bruising and pain to the area.  She denies any urinary symptoms.  No chest pain but she does have exertional shortness of breath.  No shortness of breath at rest.  No cough or fever.   She has history of end-stage liver disease and on hemodialysis.  History of CAD with 1 stent and history of moderate pericardial effusion.  She underwent CT of abdomen pelvis in ER that did not show acute intra-abdominal pathology but did show a large pericardial effusion and cardiology was consulted.  And recommended to keep patient here in the hospital.   He denies any palpitation, sweats, chest pain, shortness of breath at rest   Past Medical History:      Diagnosis Date    Allergic rhinitis     Blood clot in vein     Chronic kidney disease     CVA (cerebral vascular accident) ([facility]) 05/20/2014    Diabetes ([facility])     Dyslipidemia     Hemodialysis patient ([facility])     Hx of blood clots     Hypertension     IBS (irritable bowel syndrome)     Ill-defined condition     Renal failure        Past Surgical History:      Procedure Laterality Date    CARDIAC PROCEDURE N/A 10/12/2023    Left heart cath / coronary angiography performed by Martir Garcia MD at [facility] CARDIAC CATH LAB    CARDIAC PROCEDURE N/A 10/12/2023    Percutaneous coronary intervention performed by Martir Garcia MD at [facility] CARDIAC CATH LAB    CARDIAC PROCEDURE N/A 10/12/2023    Angioplasty coronary performed by Martir Garcia MD at [facility] CARDIAC CATH LAB    CARDIAC PROCEDURE N/A 10/12/2023    Insert stent ryland coronary performed by Martir Garcia MD at [facility] CARDIAC CATH LAB    CARDIAC PROCEDURE  calcific atherosclerosis without aneurysm. No  lymphadenopathy.  REPRODUCTIVE ORGANS: Mild uterine enlargement. Arterial calcific atherosclerosis  of uterine arteries.  URINARY BLADDER: No mass or calculus.  BONES: L1 partial compression fracture is chronic. No acute lower rib fracture.  ABDOMINAL WALL: No measurable hematoma. Small fat-containing umbilical hernia is  unchanged.  ADDITIONAL COMMENTS: N/A    Impression  1. Increased large pericardial effusion could be a source of flank pain.  2. No acute fracture. No measurable soft tissue hematoma.  3. Extensive arterial calcific atherosclerosis for age.  4. Unchanged splenomegaly.        Labs personally reviewed: CBC, CMP,   Xray personally reviewed.  CT of abdomen pelvis personally reviewed  EKG ordered  Case discussed with ED provider regarding clinical presentation, labs/investigations and possible admission.  Previous medical records reviewed.      Medical Decision Making (MDM) and Assessment and Plan     48-year-old female presented to emergency department with left flank pain in the setting of recent trauma to left flank region.  CT abdomen/pelvis showed no acute intra-abdominal pathology but did show Increased large pericardial effusion.  She does have previous history of moderate pericardial effusion.  No clinical evidence of tamponade.  Vital signs are stable.  She does complain of exertional dyspnea.  No chest pain or swelling the legs.  Cardiology is consulted and recommended admission and 2D echo hospital course.  Will get echo and will follow cardiology recommendations.    DM type 2, requiring insulin.  Resume home dose of insulin along with sliding scale    ESRD on dialysis since 2022 via left AV fistula on a Monday, Wednesday, Friday .  Nephrology consulted.    CAD s/p MI, PCI with 1 stent.  Denies chest pain.  She is on antiplatelet therapy beta-blocker and statins    PVD  HTN  obesity BMI of 37    DVT Prophylaxis: SCDs  Code status: Full

## 2024-04-24 NOTE — ED NOTES
ED TO INPATIENT SBAR HANDOFF    Patient Name: Ros Orr   Preferred Name: Ros  : 1975  48 y.o.   Family/Caregiver Present: no   Code Status Order: Prior  PO Status: NPO:No  Telemetry Order:   C-SSRS: Risk of Suicide: No Risk  Sitter no  NO  Restraints:     Sepsis Risk Score Sepsis Risk Score: 1.22    Situation  Chief Complaint   Patient presents with    Rib Injury    Flank Pain     Brief Description of Patient's Condition: Pericardial effusion   Mental Status: oriented, alert, coherent, logical, thought processes intact, and able to concentrate and follow conversation  Arrived from:Home  Imaging:   CT ABDOMEN PELVIS WO CONTRAST Additional Contrast? None   Final Result      1. Increased large pericardial effusion could be a source of flank pain.   2. No acute fracture. No measurable soft tissue hematoma.   3. Extensive arterial calcific atherosclerosis for age.   4. Unchanged splenomegaly.        Abnormal labs:   Abnormal Labs Reviewed   CBC WITH AUTO DIFFERENTIAL - Abnormal; Notable for the following components:       Result Value    RBC 3.66 (*)     Hemoglobin 10.2 (*)     Hematocrit 33.5 (*)     RDW 14.9 (*)     Platelets 404 (*)     All other components within normal limits   COMPREHENSIVE METABOLIC PANEL - Abnormal; Notable for the following components:    Glucose 162 (*)     BUN 31 (*)     Creatinine 5.10 (*)     Bun/Cre Ratio 6 (*)     Est, Glom Filt Rate 10 (*)     Calcium 8.3 (*)     AST 6 (*)     Alk Phosphatase 159 (*)     Albumin 3.2 (*)     Globulin 4.2 (*)     Albumin/Globulin Ratio 0.8 (*)     All other components within normal limits       Background  Allergies:   Allergies   Allergen Reactions    Fentanyl Anxiety    Sulfa Antibiotics Nausea And Vomiting    Azithromycin      Other reaction(s): Unknown (comments)    Benadryl [Diphenhydramine] Anxiety    Morphine Itching    Pseudoephedrine Nausea And Vomiting and Anxiety     History:   Past Medical History:   Diagnosis Date    Allergic

## 2024-04-24 NOTE — ED NOTES
Informed refusal signed by this RN, Dr. Barbosa and patient for declination of medical transportation by patient. Pt verbalized understanding of risks. Pt given documentation and directions for admission upon arrival to [facility].

## 2024-04-24 NOTE — PROGRESS NOTES
4 Eyes Skin Assessment     NAME:  Ros Orr  YOB: 1975  MEDICAL RECORD NUMBER:  [id number]    The patient is being assessed for  Admission    I agree that at least one RN has performed a thorough Head to Toe Skin Assessment on the patient. ALL assessment sites listed below have been assessed.      Areas assessed by both nurses:    Head, Face, Ears, Shoulders, Back, Chest, Arms, Elbows, Hands, Sacrum. Buttock, Coccyx, Ischium, and Legs. Feet and Heels        Does the Patient have a Wound? No noted wound(s)       Abundio Prevention initiated by RN: No  Wound Care Orders initiated by RN: No    Pressure Injury (Stage 3,4, Unstageable, DTI, NWPT, and Complex wounds) if present, place Wound referral order by RN under : No    New Ostomies, if present place, Ostomy referral order under : No     Nurse 1 eSignature: Electronically signed by Ranjan Everett RN on 4/24/24 at 3:36 AM EDT    **SHARE this note so that the co-signing nurse can place an eSignature**    Nurse 2 eSignature: Electronically signed by Rylee Nixon RN on 4/24/24 at 4:48 AM EDT

## 2024-04-24 NOTE — CONSULTS
CARDIOLOGY CONSULTATION    REASON FOR CONSULT: Pericardial effusion     REQUESTING PROVIDER: Dr. Winn    CHIEF COMPLAINT:  Flank pain     HISTORY OF PRESENT ILLNESS:  Ros Orr is a 48 y.o. year-old female with past medical history significant for CAD, HFpEF, hypertension, hyperlipidemia, CVA, diabetes,  ESRD on HD who was evaluated today due to pericardial effusion. Patient initially presented to ED with complaints of left flank pain. Reports being unclear how she injured herself, but she fell into a wall or counter about two weeks ago. Bruising and persistent pain to the area since that time. CT abdomen pelvis done in the ED with increased large pericardial effusion. Patient seen today laying flat on left side. Appears in no acute distress. Breathing stable, not on oxygen. Denies chest pain.     Records from hospital admission course thus far reviewed.      Telemetry reviewed.       INPATIENT MEDICATIONS:  Home medications reviewed.    Current Facility-Administered Medications:     epoetin samantha-epbx (RETACRIT) injection 10,000 Units, 10,000 Units, IntraVENous, Once per day on Mon Wed Fri, Viviane Babcock APRN - CNP    ALPRAZolam (XANAX) tablet 1 mg, 1 mg, Oral, BID PRN, Viviane Babcock APRN - CNP, 1 mg at 04/24/24 1408    predniSONE (DELTASONE) tablet 40 mg, 40 mg, Oral, Daily, Harsha Simmons APRN - TAO    [START ON 4/27/2024] predniSONE (DELTASONE) tablet 20 mg, 20 mg, Oral, Daily, Harsha Simmons APRN - NP    [START ON 4/30/2024] predniSONE (DELTASONE) tablet 10 mg, 10 mg, Oral, Daily, Harsha Simmons APRN - NP    [START ON 5/3/2024] predniSONE (DELTASONE) tablet 5 mg, 5 mg, Oral, Daily, Harsha Simmons APRN - NP    amphetamine-dextroamphetamine (ADDERALL) tablet 10 mg, 10 mg, Oral, TID, Hema Seals MD, 10 mg at 04/24/24 0918    aspirin chewable tablet 81 mg, 81 mg, Oral, Daily, Hema Seals MD, 81 mg at 04/24/24 0918    atorvastatin (LIPITOR) tablet 20 mg, 20 mg, Oral, Daily, Hema Seals MD,  20 mg at 04/24/24 0918    calcitRIOL (ROCALTROL) capsule 0.25 mcg, 0.25 mcg, Oral, Daily, Hema Seals MD, 0.25 mcg at 04/24/24 0918    carvedilol (COREG) tablet 12.5 mg, 12.5 mg, Oral, BID WC, Hema Seals MD    clopidogrel (PLAVIX) tablet 75 mg, 75 mg, Oral, Daily, Hema Seals MD, 75 mg at 04/24/24 0918    docusate sodium (COLACE) capsule 200 mg, 200 mg, Oral, BID, Hema Seals MD    furosemide (LASIX) tablet 80 mg, 80 mg, Oral, See Admin Instructions, Hema Seals MD    gabapentin (NEURONTIN) capsule 300 mg, 300 mg, Oral, Nightly, Hema Seals MD, 300 mg at 04/24/24 0004    insulin glargine (LANTUS) injection vial 42 Units, 42 Units, SubCUTAneous, Nightly, Hema Seals MD    iron polysaccharides (NIFEREX) capsule 300 mg, 2 capsule, Oral, Daily, Hema Seals MD    lactobacillus (CULTURELLE) capsule 1 capsule, 1 capsule, Oral, Daily with breakfast, Hema Seals MD, 1 capsule at 04/24/24 0918    lactulose (CHRONULAC) 10 GM/15ML solution 10 g, 10 g, Oral, Daily, Hema Seals MD    losartan (COZAAR) tablet 50 mg, 50 mg, Oral, Daily, Hema Seals MD    pantoprazole (PROTONIX) tablet 40 mg, 40 mg, Oral, QAM AC, Hema Seals MD, 40 mg at 04/24/24 0639    ranolazine (RANEXA) extended release tablet 500 mg, 500 mg, Oral, BID, Hema Seals MD, 500 mg at 04/24/24 0918    sevelamer (RENVELA) tablet 1,600 mg, 1,600 mg, Oral, TID WC, Hema Seals MD, 1,600 mg at 04/24/24 1223    Vitamin D (CHOLECALCIFEROL) tablet 2,000 Units, 2,000 Units, Oral, Daily, Hema Seals MD, 2,000 Units at 04/24/24 0918    insulin lispro (HUMALOG) injection vial 0-4 Units, 0-4 Units, SubCUTAneous, TID WC, Hema Seals MD, 1 Units at 04/24/24 1223    insulin lispro (HUMALOG) injection vial 0-4 Units, 0-4 Units, SubCUTAneous, Nightly, Hema Seals MD     ALLERGIES:  Allergies reviewed with the patient,  Allergies   Allergen Reactions    Fentanyl Anxiety    Sulfa Antibiotics Nausea And Vomiting    Azithromycin      Other reaction(s): Unknown (comments)

## 2024-04-24 NOTE — PROGRESS NOTES
Pharmacy Note - Renal dose adjustment made per P/T protocol    Original order:  Ultram 50 mg q 4 h PRN    Estimated Creatinine Clearance: 18 mL/min (A) (based on SCr of 5.1 mg/dL (H)).    Recent Labs     04/23/24  1849   BUN 31*   CREATININE 5.10*       Renally adjusted order:  Ultram 50 mg BID PRN    Please call inpatient pharmacy with any questions.  Discussed and OK by Dr Winn    Thank you,  SCOTT KAPADIA [facility]  4/24/2024 6:05 PM

## 2024-04-24 NOTE — DIALYSIS
Report given to TERE Sanchez. Pt dialyzed for 3.5 hours, removed 3 L of fluid, processed 68.9 L of blood. Pt tolerated treatment well. Pt en route to her room.

## 2024-04-24 NOTE — PROGRESS NOTES
Patient is a M-W-F dialysis patient and lives with her . CM will follow for any other discharge recommendations from the medical team. Echo pending at this time.

## 2024-04-24 NOTE — CARE COORDINATION
04/24/24 0750   Service Assessment   Patient Orientation Alert and Oriented   Cognition Alert   History Provided By Patient   Primary Caregiver Self   Support Systems Spouse/Significant Other;Family Members;Friends/Neighbors   Patient's Healthcare Decision Maker is: Legal Next of Kin   PCP Verified by CM Yes   Last Visit to PCP Within last 3 months   Prior Functional Level Independent in ADLs/IADLs   Current Functional Level Independent in ADLs/IADLs   Can patient return to prior living arrangement Yes   Ability to make needs known: Good   Family able to assist with home care needs: Yes   Would you like for me to discuss the discharge plan with any other family members/significant others, and if so, who? No   Financial Resources None   Community Resources None   Social/Functional History   Lives With Spouse     CM met f/f with Pt, she confirmed that the information on the face sheet is correct. Pt stated that she lives with her .    No HH, has a walker, and independent with ADL    Send RX to [facility] in [place]    Family will transport Pt home    CM dispo: home NN    Advance Care Planning     General Advance Care Planning (ACP) Conversation    Date of Conversation: 4/24/2024      Healthcare Decision Maker:    Primary Decision Maker: Cresencio Orr - Spouse - [phone]  Click here to complete Healthcare Decision Makers including selection of the Healthcare Decision Maker Relationship (ie \"Primary\").   Today we         t               {

## 2024-04-24 NOTE — PROGRESS NOTES
Hospitalist Progress Note               Daily Progress Note: 4/24/2024      Hospital Day: 2     Chief complaint:   Chief Complaint   Patient presents with    Rib Injury    Flank Pain        Subjective:   Hospital course to date:    Ros Orr, 48 y.o. female ,  presented to the ED for evaluation of left-sided Flank Pain.  Patient notes about 2 weeks ago she hit the left side of her flank on a door and since that time has been having bruising and pain to the area.  She denies any urinary symptoms.  No chest pain but she does have exertional shortness of breath.  No shortness of breath at rest.  No cough or fever.   She has history of end-stage liver disease and on hemodialysis.  History of CAD with 1 stent and history of moderate pericardial effusion.  She underwent CT of abdomen pelvis in ER that did not show acute intra-abdominal pathology but did show a large pericardial effusion and cardiology was consulted.  And recommended to keep patient here in the hospital.   She denies any palpitation, sweats, chest pain, shortness of breath at rest     --------  Patient is seen today for follow-up presents in a good mood.  However, patient states that her left flank pain is still significant.  She rates the pain as a 6 and states that it has gotten worse since it has begun.  Patient has been given lidocaine patch which she states helps resolve the pain a little bit.  Patient states that she has a history of pericardial effusions.  Typically these are managed with Lasix.  However, patient is in end-stage renal disease and is unable to tolerate Lasix.  She is to receive dialysis today.    Cardiology was consulted and will see patient today and decide whether pericardiocentesis is necessary.    POC glucose is 269   Alkaline phosphatase is 159.  Patient states that this is because she did not take her binders.  Patient does not have a gallbladder    No other complaints or concerns at this time.      Medications

## 2024-04-25 VITALS
BODY MASS INDEX: 37.77 KG/M2 | TEMPERATURE: 98 F | HEIGHT: 69 IN | WEIGHT: 255 LBS | OXYGEN SATURATION: 92 % | RESPIRATION RATE: 18 BRPM | SYSTOLIC BLOOD PRESSURE: 181 MMHG | HEART RATE: 74 BPM | DIASTOLIC BLOOD PRESSURE: 92 MMHG

## 2024-04-25 LAB
ALBUMIN SERPL-MCNC: 3 G/DL (ref 3.5–5)
ANION GAP SERPL CALC-SCNC: 6 MMOL/L (ref 5–15)
BASOPHILS # BLD: 0.1 K/UL (ref 0–0.1)
BASOPHILS NFR BLD: 1 % (ref 0–1)
BUN SERPL-MCNC: 31 MG/DL (ref 6–20)
BUN/CREAT SERPL: 6 (ref 12–20)
CA-I BLD-MCNC: 9.3 MG/DL (ref 8.5–10.1)
CHLORIDE SERPL-SCNC: 96 MMOL/L (ref 97–108)
CO2 SERPL-SCNC: 27 MMOL/L (ref 21–32)
CREAT SERPL-MCNC: 4.94 MG/DL (ref 0.55–1.02)
DIFFERENTIAL METHOD BLD: ABNORMAL
EOSINOPHIL # BLD: 0 K/UL (ref 0–0.4)
EOSINOPHIL NFR BLD: 0 % (ref 0–7)
ERYTHROCYTE [DISTWIDTH] IN BLOOD BY AUTOMATED COUNT: 14.7 % (ref 11.5–14.5)
FERRITIN SERPL-MCNC: 302 NG/ML (ref 8–252)
GLUCOSE BLD STRIP.AUTO-MCNC: 444 MG/DL (ref 65–100)
GLUCOSE SERPL-MCNC: 468 MG/DL (ref 65–100)
HCT VFR BLD AUTO: 32.5 % (ref 35–47)
HGB BLD-MCNC: 10 G/DL (ref 11.5–16)
IMM GRANULOCYTES # BLD AUTO: 0.1 K/UL (ref 0–0.04)
IMM GRANULOCYTES NFR BLD AUTO: 1 % (ref 0–0.5)
IRON SATN MFR SERPL: 23 % (ref 20–50)
IRON SERPL-MCNC: 43 UG/DL (ref 35–150)
LYMPHOCYTES # BLD: 0.5 K/UL (ref 0.8–3.5)
LYMPHOCYTES NFR BLD: 5 % (ref 12–49)
MCH RBC QN AUTO: 27.6 PG (ref 26–34)
MCHC RBC AUTO-ENTMCNC: 30.8 G/DL (ref 30–36.5)
MCV RBC AUTO: 89.8 FL (ref 80–99)
MONOCYTES # BLD: 0.2 K/UL (ref 0–1)
MONOCYTES NFR BLD: 2 % (ref 5–13)
NEUTS SEG # BLD: 10.2 K/UL (ref 1.8–8)
NEUTS SEG NFR BLD: 91 % (ref 32–75)
NRBC # BLD: 0 K/UL (ref 0–0.01)
NRBC BLD-RTO: 0 PER 100 WBC
PERFORMED BY:: ABNORMAL
PHOSPHATE SERPL-MCNC: 4.6 MG/DL (ref 2.6–4.7)
PLATELET # BLD AUTO: 361 K/UL (ref 150–400)
PMV BLD AUTO: 9.8 FL (ref 8.9–12.9)
POTASSIUM SERPL-SCNC: 5.1 MMOL/L (ref 3.5–5.1)
RBC # BLD AUTO: 3.62 M/UL (ref 3.8–5.2)
SODIUM SERPL-SCNC: 129 MMOL/L (ref 136–145)
TIBC SERPL-MCNC: 186 UG/DL (ref 250–450)
WBC # BLD AUTO: 11 K/UL (ref 3.6–11)

## 2024-04-25 PROCEDURE — 85025 COMPLETE CBC W/AUTO DIFF WBC: CPT

## 2024-04-25 PROCEDURE — 82962 GLUCOSE BLOOD TEST: CPT

## 2024-04-25 PROCEDURE — 36415 COLL VENOUS BLD VENIPUNCTURE: CPT

## 2024-04-25 PROCEDURE — 83540 ASSAY OF IRON: CPT

## 2024-04-25 PROCEDURE — 6370000000 HC RX 637 (ALT 250 FOR IP): Performed by: INTERNAL MEDICINE

## 2024-04-25 PROCEDURE — 6370000000 HC RX 637 (ALT 250 FOR IP)

## 2024-04-25 PROCEDURE — 80069 RENAL FUNCTION PANEL: CPT

## 2024-04-25 PROCEDURE — 82728 ASSAY OF FERRITIN: CPT

## 2024-04-25 RX ORDER — PREDNISONE 5 MG/1
5 TABLET ORAL DAILY
Qty: 3 TABLET | Refills: 0 | Status: SHIPPED | OUTPATIENT
Start: 2024-05-03 | End: 2024-05-06

## 2024-04-25 RX ORDER — PREDNISONE 20 MG/1
40 TABLET ORAL DAILY
Qty: 2 TABLET | Refills: 0 | Status: SHIPPED | OUTPATIENT
Start: 2024-04-26 | End: 2024-04-27

## 2024-04-25 RX ORDER — PREDNISONE 10 MG/1
10 TABLET ORAL DAILY
Qty: 3 TABLET | Refills: 0 | Status: SHIPPED | OUTPATIENT
Start: 2024-04-30 | End: 2024-05-03

## 2024-04-25 RX ORDER — CARVEDILOL 12.5 MG/1
12.5 TABLET ORAL 2 TIMES DAILY WITH MEALS
Qty: 60 TABLET | Refills: 3 | Status: SHIPPED | OUTPATIENT
Start: 2024-04-25

## 2024-04-25 RX ORDER — PREDNISONE 20 MG/1
20 TABLET ORAL DAILY
Qty: 3 TABLET | Refills: 0 | Status: SHIPPED | OUTPATIENT
Start: 2024-04-27 | End: 2024-04-30

## 2024-04-25 RX ADMIN — CALCITRIOL CAPSULES 0.25 MCG 0.25 MCG: 0.25 CAPSULE ORAL at 08:46

## 2024-04-25 RX ADMIN — FERROUS SULFATE TAB 325 MG (65 MG ELEMENTAL FE) 650 MG: 325 (65 FE) TAB at 11:43

## 2024-04-25 RX ADMIN — Medication 1 CAPSULE: at 08:47

## 2024-04-25 RX ADMIN — ATORVASTATIN CALCIUM 20 MG: 40 TABLET, FILM COATED ORAL at 08:47

## 2024-04-25 RX ADMIN — ASPIRIN 81 MG 81 MG: 81 TABLET ORAL at 08:46

## 2024-04-25 RX ADMIN — RANOLAZINE 500 MG: 500 TABLET, EXTENDED RELEASE ORAL at 08:46

## 2024-04-25 RX ADMIN — PREDNISONE 40 MG: 20 TABLET ORAL at 08:46

## 2024-04-25 RX ADMIN — LOSARTAN POTASSIUM 50 MG: 25 TABLET, FILM COATED ORAL at 08:45

## 2024-04-25 RX ADMIN — PANTOPRAZOLE SODIUM 40 MG: 40 TABLET, DELAYED RELEASE ORAL at 05:53

## 2024-04-25 RX ADMIN — CLOPIDOGREL BISULFATE 75 MG: 75 TABLET ORAL at 08:47

## 2024-04-25 RX ADMIN — GABAPENTIN 300 MG: 100 CAPSULE ORAL at 01:35

## 2024-04-25 RX ADMIN — SEVELAMER CARBONATE 1600 MG: 800 TABLET, FILM COATED ORAL at 11:43

## 2024-04-25 RX ADMIN — SEVELAMER CARBONATE 1600 MG: 800 TABLET, FILM COATED ORAL at 08:46

## 2024-04-25 RX ADMIN — Medication 2000 UNITS: at 08:46

## 2024-04-25 RX ADMIN — CARVEDILOL 12.5 MG: 3.12 TABLET, FILM COATED ORAL at 08:45

## 2024-04-25 RX ADMIN — INSULIN LISPRO 4 UNITS: 100 INJECTION, SOLUTION INTRAVENOUS; SUBCUTANEOUS at 08:44

## 2024-04-25 RX ADMIN — DEXTROAMPHETAMINE SACCHARATE, AMPHETAMINE ASPARTATE, DEXTROAMPHETAMINE SULFATE, AND AMPHETAMINE SULFATE 10 MG: 2.5; 2.5; 2.5; 2.5 TABLET ORAL at 08:45

## 2024-04-25 NOTE — PLAN OF CARE
Problem: Discharge Planning  Goal: Discharge to home or other facility with appropriate resources  4/24/2024 2215 by My Carlton RN  Outcome: Progressing  4/24/2024 1051 by Laura Schroeder RN  Outcome: Progressing     Problem: Pain  Goal: Verbalizes/displays adequate comfort level or baseline comfort level  4/24/2024 2215 by My Carlton RN  Outcome: Progressing  4/24/2024 1051 by Laura Schroeder RN  Outcome: Progressing     Problem: Skin/Tissue Integrity  Goal: Absence of new skin breakdown  Description: 1.  Monitor for areas of redness and/or skin breakdown  2.  Assess vascular access sites hourly  3.  Every 4-6 hours minimum:  Change oxygen saturation probe site  4.  Every 4-6 hours:  If on nasal continuous positive airway pressure, respiratory therapy assess nares and determine need for appliance change or resting period.  4/24/2024 2215 by My Carlton RN  Outcome: Progressing  4/24/2024 1051 by Laura Schroeder RN  Outcome: Progressing     Problem: Safety - Adult  Goal: Free from fall injury  Outcome: Progressing

## 2024-04-25 NOTE — PROGRESS NOTES
Patient has order for discharge. Verified order to Dr. Winn. Removed IV line and telebox. Discharge instructions provided, questions entertained and patient verbalize understanding.     1200h : Wheeled patient to lobby safely.

## 2024-04-25 NOTE — PROGRESS NOTES
Renal Progress Note    Patient: Ros Orr MRN: [id number]  SSN: [id number]    YOB: 1975  Age: 48 y.o.  Sex: female      Admit Date: 4/23/2024    LOS: 2 days     Subjective:   Patient seen at bedside. She is awake, alert, in no acute distress. Denies any new complaints today. On RA, denies CP or swelling.     Current Facility-Administered Medications   Medication Dose Route Frequency    epoetin samantha-epbx (RETACRIT) injection 10,000 Units  10,000 Units IntraVENous Once per day on Mon Wed Fri    ALPRAZolam (XANAX) tablet 1 mg  1 mg Oral BID PRN    predniSONE (DELTASONE) tablet 40 mg  40 mg Oral Daily    [START ON 4/27/2024] predniSONE (DELTASONE) tablet 20 mg  20 mg Oral Daily    [START ON 4/30/2024] predniSONE (DELTASONE) tablet 10 mg  10 mg Oral Daily    [START ON 5/3/2024] predniSONE (DELTASONE) tablet 5 mg  5 mg Oral Daily    traMADol (ULTRAM) tablet 50 mg  50 mg Oral BID PRN    amphetamine-dextroamphetamine (ADDERALL) tablet 10 mg  10 mg Oral TID    aspirin chewable tablet 81 mg  81 mg Oral Daily    atorvastatin (LIPITOR) tablet 20 mg  20 mg Oral Daily    calcitRIOL (ROCALTROL) capsule 0.25 mcg  0.25 mcg Oral Daily    carvedilol (COREG) tablet 12.5 mg  12.5 mg Oral BID WC    clopidogrel (PLAVIX) tablet 75 mg  75 mg Oral Daily    docusate sodium (COLACE) capsule 200 mg  200 mg Oral BID    furosemide (LASIX) tablet 80 mg  80 mg Oral See Admin Instructions    gabapentin (NEURONTIN) capsule 300 mg  300 mg Oral Nightly    insulin glargine (LANTUS) injection vial 42 Units  42 Units SubCUTAneous Nightly    ferrous sulfate (IRON 325) tablet 650 mg  650 mg Oral Daily    lactobacillus (CULTURELLE) capsule 1 capsule  1 capsule Oral Daily with breakfast    lactulose (CHRONULAC) 10 GM/15ML solution 10 g  10 g Oral Daily    losartan (COZAAR) tablet 50 mg  50 mg Oral Daily    pantoprazole (PROTONIX) tablet 40 mg  40 mg Oral QAM AC    ranolazine (RANEXA) extended release tablet 500 mg  500 mg Oral BID     effusion  -Echo shows EF 35+40%; large pericardial effusion  -Cardiology following  -Negative troponin  -Steroid taper, no need for urgent pericardiocentesis    Signed By: Viviane Babcock, APRN - CNP     April 25, 2024    Addendum:    Rounds made along with Viviane Babcock NP  Patient seen and examined ,   Labs and treatments reviewed  Plan discussed with patient and NP  Reviewed NP's  notes, amended and agree with assessment and plan        Zenaida Villalobos M.D.

## 2024-04-25 NOTE — DISCHARGE SUMMARY
Physician Discharge Summary     Patient ID:    Ros Orr  [id number]  48 y.o.  1975    Admit date: 4/23/2024    Discharge date : 4/25/2024      Final Diagnoses:   Shortness of breath [R06.02]  Pericardial effusion [I31.39] due to ESRD  Chronic systolic heart failure with EF 30%  Coronary artery disease with history of PCI and  of circumflex  Hyperlipidemia  Hypertension  Diabetes mellitus type 2    Reason for Hospitalization:    Ros Orr, 48 y.o. female ,  presented to the ED for evaluation of left-sided Flank Pain.  Patient notes about 2 weeks ago she hit the left side of her flank on a door and since that time has been having bruising and pain to the area.  She denies any urinary symptoms.  No chest pain but she does have exertional shortness of breath.  No shortness of breath at rest.  No cough or fever.      She has history of end-stage renal disease and on hemodialysis.  History of CAD with 1 stent and history of moderate pericardial effusion.  She underwent CT of abdomen pelvis in ER that did not show acute intra-abdominal pathology but did show a large pericardial effusion and cardiology was consulted.  And recommended to keep patient here in the hospital.   She denies any palpitation, sweats, chest pain, shortness of breath at rest      Patient has had a pericardial effusion dating back to at least October 2023     On 4/24 patient was seen and had no particular complaints at this time.  She received dialysis today.  Nephrology saw and recommended to keep patient on a low sodium diet.  Cardiology consult noted a large pericardial effusion on echo today with an ejection fraction of 35 to 40%.      On 4/25 cardiology consult saw and decided to treat pericardial effusion conservatively with a steroid taper due to the low ejection fraction with a 100% occluded left circumflex.  There will be no catheterization or pericardiocentesis done.      Hospital Course:   Patient was admitted

## 2024-04-25 NOTE — PROGRESS NOTES
CARDIOLOGY PROGRESS NOTE      Patient Name: Ros Orr  Age: 48 y.o.  Gender:female  :1975  MRN: [id number]     Ros Orr is a 48 y.o. year-old female with past medical history significant for CAD, HFpEF, hypertension, hyperlipidemia, CVA, diabetes,  ESRD on HD who was evaluated today due to pericardial effusion. Patient initially presented to ED with complaints of left flank pain. Reports being unclear how she injured herself, but she fell into a wall or counter about two weeks ago. Bruising and persistent pain to the area since that time. CT abdomen pelvis done in the ED with increased large pericardial effusion. Patient seen today laying flat on left side. Appears in no acute distress. Breathing stable, not on oxygen. Denies chest pain.     Telemetry reviewed, there were no events noted in the past 24 hours.    Pertinent review of systems items noted above, all other systems are negative. Current medications reviewed.    Physical Examination    Allergies   Allergen Reactions    Fentanyl Anxiety    Sulfa Antibiotics Nausea And Vomiting    Azithromycin      Other reaction(s): Unknown (comments)    Benadryl [Diphenhydramine] Anxiety    Morphine Itching    Pseudoephedrine Nausea And Vomiting and Anxiety     Vitals:    24 0728   BP: (!) 181/92   Pulse: 74   Resp: 18   Temp: 98 °F (36.7 °C)   SpO2: 92%     Vital signs are stable  No apparent distress.  Heart has a RRR.  No murmur  Lungs are diminished  Abdomen is soft, nontender, normal bowel sounds.  Extremities have no edema  Skin is dry and warm.  Normal affect    Labs reviewed:  Recent Results (from the past 12 hour(s))   Ferritin    Collection Time: 24  7:53 AM   Result Value Ref Range    Ferritin 302 (H) 8 - 252 ng/mL   Renal Function Panel    Collection Time: 24  7:53 AM   Result Value Ref Range    Sodium 129 (L) 136 - 145 mmol/L    Potassium 5.1 3.5 - 5.1 mmol/L    Chloride 96 (L) 97 - 108 mmol/L    CO2 27 21 - 32 mmol/L    Anion

## 2024-04-25 NOTE — PROGRESS NOTES
Spiritual Care Assessment/Progress Note  [facility]    Name: Ros Orr MRN: [id number]    Age: 48 y.o.     Sex: female   Language: English     Date: 4/25/2024            Total Time Calculated: 5 min              Spiritual Assessment begun in [facility] [place] CARDIAC TELEMETRY  Service Provided For: Patient not available  Referral/Consult From: Rounding  Encounter Overview/Reason: Attempted Encounter    Spiritual beliefs:      [] Involved in a shanta tradition/spiritual practice:      [] Supported by a shanta community:      [] Claims no spiritual orientation:      [] Seeking spiritual identity:           [] Adheres to an individual form of spirituality:      [x] Not able to assess:                Identified resources for coping and support system:   Support System: Unknown       [] Prayer                  [] Devotional reading               [] Music                  [] Guided Imagery     [] Pet visits                                        [] Other: (COMMENT)     Specific area/focus of visit   Encounter:    Crisis:    Spiritual/Emotional needs:    Ritual, Rites and Sacraments:    Grief, Loss, and Adjustments:    Ethics/Mediation:    Behavioral Health:    Palliative Care:    Advance Care Planning:      Plan/Referrals: No future visits requested    Narrative:  attempted to visit pt on 4W for spiritual assessment. Pt is talking on her cell phone. Conferred with RNKimberlyn, pt is preparing for discharge. Unable to complete spiritual assessment at this time.     No future visit is needed, pt is being discharged.    Rev. Maira Deras MDIV   can be reached by calling the  at [facility]  [phone]

## 2024-04-25 NOTE — DISCHARGE SUMMARY
Physician Discharge Summary     Patient ID:    Ros Orr  [id number]  48 y.o.  1975    Admit date: 4/23/2024    Discharge date : 4/25/2024      Final Diagnoses:   Shortness of breath [R06.02]  Pericardial effusion [I31.39]      Reason for Hospitalization:    Ros Orr, 48 y.o. female ,  presented to the ED for evaluation of left-sided Flank Pain.  Patient notes about 2 weeks ago she hit the left side of her flank on a door and since that time has been having bruising and pain to the area.  She denies any urinary symptoms.  No chest pain but she does have exertional shortness of breath.  No shortness of breath at rest.  No cough or fever.      She has history of end-stage renal disease and on hemodialysis.  History of CAD with 1 stent and history of moderate pericardial effusion.  She underwent CT of abdomen pelvis in ER that did not show acute intra-abdominal pathology but did show a large pericardial effusion and cardiology was consulted.  And recommended to keep patient here in the hospital.   She denies any palpitation, sweats, chest pain, shortness of breath at rest      Patient has had a pericardial effusion dating back to at least October 2023     On 4/24 patient was seen and had no particular complaints at this time.  She received dialysis today.  Nephrology saw and recommended to keep patient on a low sodium diet.  Cardiology consult noted a large pericardial effusion on echo today with an ejection fraction of 35 to 40%.      On 4/25 cardiology consult saw and decided to treat pericardial effusion conservatively with a steroid taper due to the low ejection fraction with a 100% occluded left circumflex.  There will be no catheterization or pericardiocentesis done.      Hospital Course:         Discharge Medications:      Medication List        START taking these medications      * predniSONE 20 MG tablet  Commonly known as: DELTASONE  Take 2 tablets by mouth daily for 1 dose  Start

## 2024-05-22 ENCOUNTER — APPOINTMENT (OUTPATIENT)
Facility: HOSPITAL | Age: 49
DRG: 640 | End: 2024-05-22
Payer: MEDICARE

## 2024-05-22 ENCOUNTER — APPOINTMENT (OUTPATIENT)
Facility: HOSPITAL | Age: 49
DRG: 640 | End: 2024-05-22
Attending: STUDENT IN AN ORGANIZED HEALTH CARE EDUCATION/TRAINING PROGRAM
Payer: MEDICARE

## 2024-05-22 ENCOUNTER — HOSPITAL ENCOUNTER (INPATIENT)
Facility: HOSPITAL | Age: 49
LOS: 7 days | Discharge: ANOTHER HOSPITAL | DRG: 640 | End: 2024-05-29
Attending: STUDENT IN AN ORGANIZED HEALTH CARE EDUCATION/TRAINING PROGRAM | Admitting: INTERNAL MEDICINE
Payer: MEDICARE

## 2024-05-22 DIAGNOSIS — I46.9 CARDIAC ARREST (HCC): ICD-10-CM

## 2024-05-22 DIAGNOSIS — E87.70 HYPERVOLEMIA, UNSPECIFIED HYPERVOLEMIA TYPE: ICD-10-CM

## 2024-05-22 DIAGNOSIS — I95.9 HYPOTENSION, UNSPECIFIED HYPOTENSION TYPE: Primary | ICD-10-CM

## 2024-05-22 LAB
ALBUMIN SERPL-MCNC: 2.6 G/DL (ref 3.5–5)
ALBUMIN SERPL-MCNC: 2.7 G/DL (ref 3.5–5)
ALBUMIN/GLOB SERPL: 0.6 (ref 1.1–2.2)
ALP SERPL-CCNC: 295 U/L (ref 45–117)
ALT SERPL-CCNC: 212 U/L (ref 12–78)
ANION GAP SERPL CALC-SCNC: 13 MMOL/L (ref 5–15)
ANION GAP SERPL CALC-SCNC: 15 MMOL/L (ref 5–15)
APAP SERPL-MCNC: 3 UG/ML (ref 10–30)
APTT PPP: 30.5 SEC (ref 21.2–34.1)
AST SERPL W P-5'-P-CCNC: 400 U/L (ref 15–37)
BASE DEFICIT BLD-SCNC: 12.4 MMOL/L
BASOPHILS # BLD: 0 K/UL (ref 0–0.1)
BASOPHILS NFR BLD: 0 % (ref 0–1)
BILIRUB SERPL-MCNC: 1.1 MG/DL (ref 0.2–1)
BUN SERPL-MCNC: 88 MG/DL (ref 6–20)
BUN SERPL-MCNC: 95 MG/DL (ref 6–20)
BUN/CREAT SERPL: 10 (ref 12–20)
BUN/CREAT SERPL: 9 (ref 12–20)
CA-I BLD-MCNC: 0.9 MMOL/L (ref 1.12–1.32)
CA-I BLD-MCNC: 7.8 MG/DL (ref 8.5–10.1)
CA-I BLD-MCNC: 8.1 MG/DL (ref 8.5–10.1)
CHLORIDE BLD-SCNC: 102 MMOL/L (ref 98–107)
CHLORIDE SERPL-SCNC: 97 MMOL/L (ref 97–108)
CHLORIDE SERPL-SCNC: 98 MMOL/L (ref 97–108)
CK SERPL-CCNC: 143 U/L (ref 26–192)
CO2 BLD-SCNC: 17 MMOL/L
CO2 SERPL-SCNC: 16 MMOL/L (ref 21–32)
CO2 SERPL-SCNC: 19 MMOL/L (ref 21–32)
CREAT SERPL-MCNC: 10.7 MG/DL (ref 0.55–1.02)
CREAT SERPL-MCNC: 8.92 MG/DL (ref 0.55–1.02)
CREAT UR-MCNC: 10.28 MG/DL (ref 0.6–1.3)
DATE LAST DOSE: ABNORMAL
DATE LAST DOSE: ABNORMAL
DIFFERENTIAL METHOD BLD: ABNORMAL
DOSE AMOUNT: ABNORMAL UNITS
DOSE AMOUNT: ABNORMAL UNITS
ECHO AV MEAN GRADIENT: 7 MMHG
ECHO AV MEAN VELOCITY: 1.2 M/S
ECHO AV PEAK GRADIENT: 13 MMHG
ECHO AV PEAK VELOCITY: 1.8 M/S
ECHO AV VELOCITY RATIO: 0.72
ECHO AV VTI: 37.8 CM
ECHO BSA: 2.37 M2
ECHO EST RA PRESSURE: 15 MMHG
ECHO LA AREA 4C: 20.1 CM2
ECHO LA DIAMETER INDEX: 1.75 CM/M2
ECHO LA DIAMETER: 4 CM
ECHO LA MAJOR AXIS: 6.5 CM
ECHO LA VOL MOD A4C: 47 ML (ref 22–52)
ECHO LA VOLUME INDEX MOD A4C: 21 ML/M2 (ref 16–34)
ECHO LV E' LATERAL VELOCITY: 6 CM/S
ECHO LV E' SEPTAL VELOCITY: 5 CM/S
ECHO LV EDV A4C: 111 ML
ECHO LV EDV INDEX A4C: 48 ML/M2
ECHO LV EJECTION FRACTION A4C: 43 %
ECHO LV ESV A4C: 63 ML
ECHO LV ESV INDEX A4C: 28 ML/M2
ECHO LV FRACTIONAL SHORTENING: 24 % (ref 28–44)
ECHO LV INTERNAL DIMENSION DIASTOLE INDEX: 2.14 CM/M2
ECHO LV INTERNAL DIMENSION DIASTOLIC: 4.9 CM (ref 3.9–5.3)
ECHO LV INTERNAL DIMENSION SYSTOLIC INDEX: 1.62 CM/M2
ECHO LV INTERNAL DIMENSION SYSTOLIC: 3.7 CM
ECHO LV IVSD: 0.8 CM (ref 0.6–0.9)
ECHO LV MASS 2D: 164.3 G (ref 67–162)
ECHO LV MASS INDEX 2D: 71.8 G/M2 (ref 43–95)
ECHO LV POSTERIOR WALL DIASTOLIC: 1.1 CM (ref 0.6–0.9)
ECHO LV RELATIVE WALL THICKNESS RATIO: 0.45
ECHO LVOT AV VTI INDEX: 0.72
ECHO LVOT MEAN GRADIENT: 3 MMHG
ECHO LVOT PEAK GRADIENT: 7 MMHG
ECHO LVOT PEAK VELOCITY: 1.3 M/S
ECHO LVOT VTI: 27.2 CM
ECHO MV A VELOCITY: 0.77 M/S
ECHO MV E DECELERATION TIME (DT): 178 MS
ECHO MV E VELOCITY: 0.67 M/S
ECHO MV E/A RATIO: 0.87
ECHO MV E/E' LATERAL: 11.17
ECHO MV E/E' RATIO (AVERAGED): 12.28
ECHO MV E/E' SEPTAL: 13.4
ECHO MV LVOT VTI INDEX: 1.72
ECHO MV MAX VELOCITY: 1.4 M/S
ECHO MV MEAN GRADIENT: 3 MMHG
ECHO MV MEAN VELOCITY: 0.8 M/S
ECHO MV PEAK GRADIENT: 8 MMHG
ECHO MV VTI: 46.8 CM
ECHO PV MAX VELOCITY: 1.1 M/S
ECHO PV PEAK GRADIENT: 5 MMHG
ECHO RA AREA 4C: 17.2 CM2
ECHO RA END SYSTOLIC VOLUME APICAL 4 CHAMBER INDEX BSA: 20 ML/M2
ECHO RA VOLUME: 45 ML
ECHO RIGHT VENTRICULAR SYSTOLIC PRESSURE (RVSP): 51 MMHG
ECHO RV BASAL DIMENSION: 3.2 CM
ECHO RV FREE WALL PEAK S': 12 CM/S
ECHO RV TAPSE: 1.8 CM (ref 1.7–?)
ECHO TV REGURGITANT MAX VELOCITY: 2.98 M/S
ECHO TV REGURGITANT PEAK GRADIENT: 36 MMHG
EKG ATRIAL RATE: 60 BPM
EKG DIAGNOSIS: NORMAL
EKG P AXIS: 50 DEGREES
EKG P-R INTERVAL: 248 MS
EKG Q-T INTERVAL: 448 MS
EKG QRS DURATION: 104 MS
EKG QTC CALCULATION (BAZETT): 448 MS
EKG R AXIS: 91 DEGREES
EKG T AXIS: 141 DEGREES
EKG VENTRICULAR RATE: 60 BPM
EOSINOPHIL # BLD: 0 K/UL (ref 0–0.4)
EOSINOPHIL NFR BLD: 0 % (ref 0–7)
ERYTHROCYTE [DISTWIDTH] IN BLOOD BY AUTOMATED COUNT: 16.4 % (ref 11.5–14.5)
ETHANOL SERPL-MCNC: <10 MG/DL (ref 0–0.08)
GLOBULIN SER CALC-MCNC: 4.1 G/DL (ref 2–4)
GLUCOSE BLD STRIP.AUTO-MCNC: 229 MG/DL (ref 65–100)
GLUCOSE BLD STRIP.AUTO-MCNC: 327 MG/DL (ref 65–100)
GLUCOSE BLD STRIP.AUTO-MCNC: 329 MG/DL (ref 65–100)
GLUCOSE SERPL-MCNC: 228 MG/DL (ref 65–100)
GLUCOSE SERPL-MCNC: 321 MG/DL (ref 65–100)
HCO3 BLD-SCNC: 16.5 MMOL/L (ref 19–28)
HCT VFR BLD AUTO: 28.9 % (ref 35–47)
HGB BLD-MCNC: 8.8 G/DL (ref 11.5–16)
IMM GRANULOCYTES # BLD AUTO: 0.1 K/UL (ref 0–0.04)
IMM GRANULOCYTES NFR BLD AUTO: 1 % (ref 0–0.5)
INR PPP: 1.6 (ref 0.9–1.1)
LACTATE BLD-SCNC: 0.96 MMOL/L (ref 0.4–2)
LACTATE BLD-SCNC: 2.03 MMOL/L (ref 0.4–2)
LACTATE BLD-SCNC: 3.2 MMOL/L (ref 0.4–2)
LYMPHOCYTES # BLD: 0.8 K/UL (ref 0.8–3.5)
LYMPHOCYTES NFR BLD: 6 % (ref 12–49)
MAGNESIUM SERPL-MCNC: 2.3 MG/DL (ref 1.6–2.4)
MCH RBC QN AUTO: 27.9 PG (ref 26–34)
MCHC RBC AUTO-ENTMCNC: 30.4 G/DL (ref 30–36.5)
MCV RBC AUTO: 91.7 FL (ref 80–99)
MONOCYTES # BLD: 0.7 K/UL (ref 0–1)
MONOCYTES NFR BLD: 6 % (ref 5–13)
MRSA DNA SPEC QL NAA+PROBE: NOT DETECTED
NEUTS SEG # BLD: 11.5 K/UL (ref 1.8–8)
NEUTS SEG NFR BLD: 87 % (ref 32–75)
NRBC # BLD: 0 K/UL (ref 0–0.01)
NRBC BLD-RTO: 0 PER 100 WBC
PCO2 BLD: 50.6 MMHG (ref 35–45)
PERFORMED BY:: ABNORMAL
PERFORMED BY:: NORMAL
PH BLD: 7.12 (ref 7.35–7.45)
PHOSPHATE SERPL-MCNC: 11.3 MG/DL (ref 2.6–4.7)
PHOSPHATE SERPL-MCNC: 9 MG/DL (ref 2.6–4.7)
PLATELET # BLD AUTO: 358 K/UL (ref 150–400)
PMV BLD AUTO: 10.3 FL (ref 8.9–12.9)
PO2 BLD: 40 MMHG (ref 75–100)
POTASSIUM BLD-SCNC: 8.2 MMOL/L (ref 3.5–5.5)
POTASSIUM SERPL-SCNC: 6 MMOL/L (ref 3.5–5.1)
POTASSIUM SERPL-SCNC: 6.7 MMOL/L (ref 3.5–5.1)
POTASSIUM SERPL-SCNC: 8.3 MMOL/L (ref 3.5–5.1)
PROCALCITONIN SERPL-MCNC: 0.79 NG/ML
PROT SERPL-MCNC: 6.7 G/DL (ref 6.4–8.2)
PROTHROMBIN TIME: 19.1 SEC (ref 11.9–14.6)
RBC # BLD AUTO: 3.15 M/UL (ref 3.8–5.2)
SALICYLATES SERPL-MCNC: 1.8 MG/DL (ref 2.8–20)
SAO2 % BLD: 57 %
SERVICE CMNT-IMP: ABNORMAL
SODIUM BLD-SCNC: 125 MMOL/L (ref 136–145)
SODIUM SERPL-SCNC: 128 MMOL/L (ref 136–145)
SODIUM SERPL-SCNC: 130 MMOL/L (ref 136–145)
SPECIMEN SITE: ABNORMAL
THERAPEUTIC RANGE: NORMAL SEC (ref 82–109)
TROPONIN I SERPL HS-MCNC: 471 NG/L (ref 0–51)
TSH SERPL DL<=0.05 MIU/L-ACNC: 4.16 UIU/ML (ref 0.36–3.74)
UFH PPP CHRO-ACNC: <0.1 IU/ML
WBC # BLD AUTO: 13.2 K/UL (ref 3.6–11)

## 2024-05-22 PROCEDURE — 93005 ELECTROCARDIOGRAM TRACING: CPT | Performed by: STUDENT IN AN ORGANIZED HEALTH CARE EDUCATION/TRAINING PROGRAM

## 2024-05-22 PROCEDURE — 83605 ASSAY OF LACTIC ACID: CPT

## 2024-05-22 PROCEDURE — 96375 TX/PRO/DX INJ NEW DRUG ADDON: CPT

## 2024-05-22 PROCEDURE — 87641 MR-STAPH DNA AMP PROBE: CPT

## 2024-05-22 PROCEDURE — 71045 X-RAY EXAM CHEST 1 VIEW: CPT

## 2024-05-22 PROCEDURE — 82962 GLUCOSE BLOOD TEST: CPT

## 2024-05-22 PROCEDURE — 80179 DRUG ASSAY SALICYLATE: CPT

## 2024-05-22 PROCEDURE — 90945 DIALYSIS ONE EVALUATION: CPT

## 2024-05-22 PROCEDURE — 74174 CTA ABD&PLVS W/CONTRAST: CPT

## 2024-05-22 PROCEDURE — 76937 US GUIDE VASCULAR ACCESS: CPT

## 2024-05-22 PROCEDURE — 94761 N-INVAS EAR/PLS OXIMETRY MLT: CPT

## 2024-05-22 PROCEDURE — 80053 COMPREHEN METABOLIC PANEL: CPT

## 2024-05-22 PROCEDURE — 96374 THER/PROPH/DIAG INJ IV PUSH: CPT

## 2024-05-22 PROCEDURE — 85730 THROMBOPLASTIN TIME PARTIAL: CPT

## 2024-05-22 PROCEDURE — 2700000000 HC OXYGEN THERAPY PER DAY

## 2024-05-22 PROCEDURE — 82803 BLOOD GASES ANY COMBINATION: CPT

## 2024-05-22 PROCEDURE — 2500000003 HC RX 250 WO HCPCS

## 2024-05-22 PROCEDURE — 36556 INSERT NON-TUNNEL CV CATH: CPT

## 2024-05-22 PROCEDURE — 85610 PROTHROMBIN TIME: CPT

## 2024-05-22 PROCEDURE — 82330 ASSAY OF CALCIUM: CPT

## 2024-05-22 PROCEDURE — 2000000000 HC ICU R&B

## 2024-05-22 PROCEDURE — 80069 RENAL FUNCTION PANEL: CPT

## 2024-05-22 PROCEDURE — 87040 BLOOD CULTURE FOR BACTERIA: CPT

## 2024-05-22 PROCEDURE — 84132 ASSAY OF SERUM POTASSIUM: CPT

## 2024-05-22 PROCEDURE — 6370000000 HC RX 637 (ALT 250 FOR IP): Performed by: INTERNAL MEDICINE

## 2024-05-22 PROCEDURE — 99281 EMR DPT VST MAYX REQ PHY/QHP: CPT

## 2024-05-22 PROCEDURE — 70450 CT HEAD/BRAIN W/O DYE: CPT

## 2024-05-22 PROCEDURE — 85520 HEPARIN ASSAY: CPT

## 2024-05-22 PROCEDURE — 82077 ASSAY SPEC XCP UR&BREATH IA: CPT

## 2024-05-22 PROCEDURE — 2500000003 HC RX 250 WO HCPCS: Performed by: INTERNAL MEDICINE

## 2024-05-22 PROCEDURE — 6360000002 HC RX W HCPCS: Performed by: INTERNAL MEDICINE

## 2024-05-22 PROCEDURE — 83735 ASSAY OF MAGNESIUM: CPT

## 2024-05-22 PROCEDURE — 2580000003 HC RX 258: Performed by: INTERNAL MEDICINE

## 2024-05-22 PROCEDURE — 80048 BASIC METABOLIC PNL TOTAL CA: CPT

## 2024-05-22 PROCEDURE — 84145 PROCALCITONIN (PCT): CPT

## 2024-05-22 PROCEDURE — 6360000004 HC RX CONTRAST MEDICATION: Performed by: STUDENT IN AN ORGANIZED HEALTH CARE EDUCATION/TRAINING PROGRAM

## 2024-05-22 PROCEDURE — 2580000003 HC RX 258: Performed by: STUDENT IN AN ORGANIZED HEALTH CARE EDUCATION/TRAINING PROGRAM

## 2024-05-22 PROCEDURE — 82947 ASSAY GLUCOSE BLOOD QUANT: CPT

## 2024-05-22 PROCEDURE — 6360000002 HC RX W HCPCS

## 2024-05-22 PROCEDURE — 82550 ASSAY OF CK (CPK): CPT

## 2024-05-22 PROCEDURE — 93306 TTE W/DOPPLER COMPLETE: CPT

## 2024-05-22 PROCEDURE — 84100 ASSAY OF PHOSPHORUS: CPT

## 2024-05-22 PROCEDURE — 84295 ASSAY OF SERUM SODIUM: CPT

## 2024-05-22 PROCEDURE — 84484 ASSAY OF TROPONIN QUANT: CPT

## 2024-05-22 PROCEDURE — 99291 CRITICAL CARE FIRST HOUR: CPT

## 2024-05-22 PROCEDURE — 3E1M39Z IRRIGATION OF PERITONEAL CAVITY USING DIALYSATE, PERCUTANEOUS APPROACH: ICD-10-PCS | Performed by: INTERNAL MEDICINE

## 2024-05-22 PROCEDURE — 6360000002 HC RX W HCPCS: Performed by: STUDENT IN AN ORGANIZED HEALTH CARE EDUCATION/TRAINING PROGRAM

## 2024-05-22 PROCEDURE — 51798 US URINE CAPACITY MEASURE: CPT

## 2024-05-22 PROCEDURE — 85025 COMPLETE CBC W/AUTO DIFF WBC: CPT

## 2024-05-22 PROCEDURE — 80143 DRUG ASSAY ACETAMINOPHEN: CPT

## 2024-05-22 PROCEDURE — 84443 ASSAY THYROID STIM HORMONE: CPT

## 2024-05-22 RX ORDER — INSULIN GLARGINE 100 [IU]/ML
42 INJECTION, SOLUTION SUBCUTANEOUS NIGHTLY
Status: DISCONTINUED | OUTPATIENT
Start: 2024-05-22 | End: 2024-05-29 | Stop reason: HOSPADM

## 2024-05-22 RX ORDER — SODIUM CHLORIDE 0.9 % (FLUSH) 0.9 %
1.1-1.9 SYRINGE (ML) INJECTION PRN
Status: DISCONTINUED | OUTPATIENT
Start: 2024-05-22 | End: 2024-05-29 | Stop reason: HOSPADM

## 2024-05-22 RX ORDER — ONDANSETRON 4 MG/1
4 TABLET, ORALLY DISINTEGRATING ORAL EVERY 8 HOURS PRN
Status: DISCONTINUED | OUTPATIENT
Start: 2024-05-22 | End: 2024-05-29 | Stop reason: HOSPADM

## 2024-05-22 RX ORDER — SODIUM CHLORIDE 9 MG/ML
INJECTION, SOLUTION INTRAVENOUS CONTINUOUS
Status: DISCONTINUED | OUTPATIENT
Start: 2024-05-22 | End: 2024-05-23

## 2024-05-22 RX ORDER — CALCIUM CHLORIDE, MAGNESIUM CHLORIDE, DEXTROSE MONOHYDRATE, LACTIC ACID, SODIUM CHLORIDE, SODIUM BICARBONATE AND POTASSIUM CHLORIDE 5.15; 2.03; 22; 5.4; 6.46; 3.09; .157 G/L; G/L; G/L; G/L; G/L; G/L; G/L
INJECTION INTRAVENOUS CONTINUOUS
Status: DISCONTINUED | OUTPATIENT
Start: 2024-05-22 | End: 2024-05-26

## 2024-05-22 RX ORDER — INSULIN LISPRO 100 [IU]/ML
10 INJECTION, SOLUTION INTRAVENOUS; SUBCUTANEOUS
Status: DISCONTINUED | OUTPATIENT
Start: 2024-05-23 | End: 2024-05-29 | Stop reason: HOSPADM

## 2024-05-22 RX ORDER — SODIUM CHLORIDE 0.9 % (FLUSH) 0.9 %
5-40 SYRINGE (ML) INJECTION EVERY 12 HOURS SCHEDULED
Status: DISCONTINUED | OUTPATIENT
Start: 2024-05-22 | End: 2024-05-29 | Stop reason: HOSPADM

## 2024-05-22 RX ORDER — PHENYLEPHRINE HCL IN 0.9% NACL 1 MG/10 ML
100 SYRINGE (ML) INTRAVENOUS ONCE
Status: COMPLETED | OUTPATIENT
Start: 2024-05-22 | End: 2024-05-22

## 2024-05-22 RX ORDER — ACETAMINOPHEN 650 MG/1
650 SUPPOSITORY RECTAL EVERY 6 HOURS PRN
Status: DISCONTINUED | OUTPATIENT
Start: 2024-05-22 | End: 2024-05-29 | Stop reason: HOSPADM

## 2024-05-22 RX ORDER — HEPARIN SODIUM 5000 [USP'U]/ML
5000 INJECTION, SOLUTION INTRAVENOUS; SUBCUTANEOUS EVERY 8 HOURS SCHEDULED
Status: DISCONTINUED | OUTPATIENT
Start: 2024-05-22 | End: 2024-05-29 | Stop reason: HOSPADM

## 2024-05-22 RX ORDER — DEXTROSE MONOHYDRATE 100 MG/ML
INJECTION, SOLUTION INTRAVENOUS CONTINUOUS PRN
Status: DISCONTINUED | OUTPATIENT
Start: 2024-05-22 | End: 2024-05-29 | Stop reason: HOSPADM

## 2024-05-22 RX ORDER — SODIUM CHLORIDE 0.9 % (FLUSH) 0.9 %
5-40 SYRINGE (ML) INJECTION PRN
Status: DISCONTINUED | OUTPATIENT
Start: 2024-05-22 | End: 2024-05-29 | Stop reason: HOSPADM

## 2024-05-22 RX ORDER — HYDROCODONE BITARTRATE AND ACETAMINOPHEN 5; 325 MG/1; MG/1
1 TABLET ORAL EVERY 4 HOURS PRN
Status: DISCONTINUED | OUTPATIENT
Start: 2024-05-22 | End: 2024-05-29 | Stop reason: HOSPADM

## 2024-05-22 RX ORDER — SODIUM CHLORIDE 9 MG/ML
INJECTION, SOLUTION INTRAVENOUS PRN
Status: DISCONTINUED | OUTPATIENT
Start: 2024-05-22 | End: 2024-05-29 | Stop reason: HOSPADM

## 2024-05-22 RX ORDER — SORBITOL SOLUTION 70 %
60 SOLUTION, ORAL MISCELLANEOUS 2 TIMES DAILY PRN
Status: DISCONTINUED | OUTPATIENT
Start: 2024-05-22 | End: 2024-05-29 | Stop reason: HOSPADM

## 2024-05-22 RX ORDER — PHENYLEPHRINE HCL IN 0.9% NACL 1 MG/10 ML
SYRINGE (ML) INTRAVENOUS
Status: COMPLETED
Start: 2024-05-22 | End: 2024-05-22

## 2024-05-22 RX ORDER — GABAPENTIN 300 MG/1
300 CAPSULE ORAL NIGHTLY
Status: DISCONTINUED | OUTPATIENT
Start: 2024-05-22 | End: 2024-05-29 | Stop reason: HOSPADM

## 2024-05-22 RX ORDER — ONDANSETRON 2 MG/ML
4 INJECTION INTRAMUSCULAR; INTRAVENOUS
Status: COMPLETED | OUTPATIENT
Start: 2024-05-22 | End: 2024-05-22

## 2024-05-22 RX ORDER — POLYETHYLENE GLYCOL 3350 17 G/17G
17 POWDER, FOR SOLUTION ORAL DAILY PRN
Status: DISCONTINUED | OUTPATIENT
Start: 2024-05-22 | End: 2024-05-29 | Stop reason: HOSPADM

## 2024-05-22 RX ORDER — NOREPINEPHRINE BITARTRATE 0.06 MG/ML
INJECTION, SOLUTION INTRAVENOUS
Status: COMPLETED
Start: 2024-05-22 | End: 2024-05-22

## 2024-05-22 RX ORDER — 0.9 % SODIUM CHLORIDE 0.9 %
250 INTRAVENOUS SOLUTION INTRAVENOUS ONCE
Status: COMPLETED | OUTPATIENT
Start: 2024-05-22 | End: 2024-05-22

## 2024-05-22 RX ORDER — ATROPINE SULFATE 0.1 MG/ML
INJECTION INTRAVENOUS
Status: DISPENSED
Start: 2024-05-22 | End: 2024-05-23

## 2024-05-22 RX ORDER — CALCIUM GLUCONATE 94 MG/ML
2000 INJECTION, SOLUTION INTRAVENOUS ONCE
Status: COMPLETED | OUTPATIENT
Start: 2024-05-22 | End: 2024-05-22

## 2024-05-22 RX ORDER — NOREPINEPHRINE BITARTRATE 0.06 MG/ML
1-100 INJECTION, SOLUTION INTRAVENOUS CONTINUOUS
Status: DISCONTINUED | OUTPATIENT
Start: 2024-05-22 | End: 2024-05-24

## 2024-05-22 RX ORDER — ONDANSETRON 2 MG/ML
4 INJECTION INTRAMUSCULAR; INTRAVENOUS EVERY 6 HOURS PRN
Status: DISCONTINUED | OUTPATIENT
Start: 2024-05-22 | End: 2024-05-29 | Stop reason: HOSPADM

## 2024-05-22 RX ORDER — ACETAMINOPHEN 325 MG/1
650 TABLET ORAL EVERY 6 HOURS PRN
Status: DISCONTINUED | OUTPATIENT
Start: 2024-05-22 | End: 2024-05-29 | Stop reason: HOSPADM

## 2024-05-22 RX ADMIN — Medication 0.1 MG: at 14:36

## 2024-05-22 RX ADMIN — CALCIUM CHLORIDE, MAGNESIUM CHLORIDE, DEXTROSE MONOHYDRATE, LACTIC ACID, SODIUM CHLORIDE, SODIUM BICARBONATE AND POTASSIUM CHLORIDE: 5.15; 2.03; 22; 5.4; 6.46; 3.09; .157 INJECTION INTRAVENOUS at 22:12

## 2024-05-22 RX ADMIN — IOPAMIDOL 100 ML: 755 INJECTION, SOLUTION INTRAVENOUS at 13:52

## 2024-05-22 RX ADMIN — Medication 5 MCG/MIN: at 14:55

## 2024-05-22 RX ADMIN — VANCOMYCIN HYDROCHLORIDE 2500 MG: 1.25 INJECTION, POWDER, LYOPHILIZED, FOR SOLUTION INTRAVENOUS at 15:25

## 2024-05-22 RX ADMIN — INSULIN GLARGINE 42 UNITS: 100 INJECTION, SOLUTION SUBCUTANEOUS at 21:08

## 2024-05-22 RX ADMIN — CEFEPIME 1000 MG: 1 INJECTION, POWDER, FOR SOLUTION INTRAMUSCULAR; INTRAVENOUS at 13:15

## 2024-05-22 RX ADMIN — HEPARIN SODIUM 5000 UNITS: 5000 INJECTION INTRAVENOUS; SUBCUTANEOUS at 21:08

## 2024-05-22 RX ADMIN — ACETAMINOPHEN 650 MG: 325 TABLET ORAL at 18:00

## 2024-05-22 RX ADMIN — CALCIUM CHLORIDE, MAGNESIUM CHLORIDE, DEXTROSE MONOHYDRATE, LACTIC ACID, SODIUM CHLORIDE, SODIUM BICARBONATE AND POTASSIUM CHLORIDE: 5.15; 2.03; 22; 5.4; 6.46; 3.09; .157 INJECTION INTRAVENOUS at 17:01

## 2024-05-22 RX ADMIN — GABAPENTIN 300 MG: 300 CAPSULE ORAL at 21:08

## 2024-05-22 RX ADMIN — INSULIN HUMAN 10 UNITS: 100 INJECTION, SOLUTION PARENTERAL at 15:37

## 2024-05-22 RX ADMIN — HYDROCODONE BITARTRATE AND ACETAMINOPHEN 1 TABLET: 5; 325 TABLET ORAL at 21:08

## 2024-05-22 RX ADMIN — CALCIUM CHLORIDE, MAGNESIUM CHLORIDE, DEXTROSE MONOHYDRATE, LACTIC ACID, SODIUM CHLORIDE, SODIUM BICARBONATE AND POTASSIUM CHLORIDE: 5.15; 2.03; 22; 5.4; 6.46; 3.09; .157 INJECTION INTRAVENOUS at 17:00

## 2024-05-22 RX ADMIN — SODIUM CHLORIDE 250 ML: 9 INJECTION, SOLUTION INTRAVENOUS at 13:29

## 2024-05-22 RX ADMIN — SODIUM CHLORIDE, PRESERVATIVE FREE 10 ML: 5 INJECTION INTRAVENOUS at 21:22

## 2024-05-22 RX ADMIN — Medication 0.1 MG: at 15:05

## 2024-05-22 RX ADMIN — SODIUM CHLORIDE: 9 INJECTION, SOLUTION INTRAVENOUS at 17:23

## 2024-05-22 RX ADMIN — MICONAZOLE NITRATE: 20 POWDER TOPICAL at 21:23

## 2024-05-22 RX ADMIN — ONDANSETRON 4 MG: 2 INJECTION INTRAMUSCULAR; INTRAVENOUS at 12:53

## 2024-05-22 RX ADMIN — CALCIUM CHLORIDE, MAGNESIUM CHLORIDE, DEXTROSE MONOHYDRATE, LACTIC ACID, SODIUM CHLORIDE, SODIUM BICARBONATE AND POTASSIUM CHLORIDE: 5.15; 2.03; 22; 5.4; 6.46; 3.09; .157 INJECTION INTRAVENOUS at 22:11

## 2024-05-22 RX ADMIN — SODIUM ZIRCONIUM CYCLOSILICATE 10 G: 10 POWDER, FOR SUSPENSION ORAL at 22:18

## 2024-05-22 RX ADMIN — CALCIUM GLUCONATE 2000 MG: 98 INJECTION, SOLUTION INTRAVENOUS at 12:49

## 2024-05-22 RX ADMIN — EPOETIN ALFA-EPBX 10000 UNITS: 10000 INJECTION, SOLUTION INTRAVENOUS; SUBCUTANEOUS at 17:01

## 2024-05-22 ASSESSMENT — PAIN DESCRIPTION - ORIENTATION
ORIENTATION: RIGHT;LOWER
ORIENTATION: RIGHT

## 2024-05-22 ASSESSMENT — PAIN DESCRIPTION - ONSET
ONSET: ON-GOING
ONSET: ON-GOING

## 2024-05-22 ASSESSMENT — PAIN SCALES - GENERAL
PAINLEVEL_OUTOF10: 9
PAINLEVEL_OUTOF10: 0
PAINLEVEL_OUTOF10: 1
PAINLEVEL_OUTOF10: 3
PAINLEVEL_OUTOF10: 9
PAINLEVEL_OUTOF10: 9
PAINLEVEL_OUTOF10: 3
PAINLEVEL_OUTOF10: 9

## 2024-05-22 ASSESSMENT — PAIN DESCRIPTION - LOCATION
LOCATION: LEG
LOCATION: LEG
LOCATION: CHEST

## 2024-05-22 ASSESSMENT — PAIN DESCRIPTION - DESCRIPTORS
DESCRIPTORS: ACHING
DESCRIPTORS: ACHING;THROBBING
DESCRIPTORS: ACHING;THROBBING

## 2024-05-22 ASSESSMENT — PAIN - FUNCTIONAL ASSESSMENT
PAIN_FUNCTIONAL_ASSESSMENT: PREVENTS OR INTERFERES SOME ACTIVE ACTIVITIES AND ADLS
PAIN_FUNCTIONAL_ASSESSMENT: NONE - DENIES PAIN
PAIN_FUNCTIONAL_ASSESSMENT: PREVENTS OR INTERFERES SOME ACTIVE ACTIVITIES AND ADLS

## 2024-05-22 ASSESSMENT — PAIN DESCRIPTION - PAIN TYPE
TYPE: CHRONIC PAIN
TYPE: CHRONIC PAIN

## 2024-05-22 ASSESSMENT — PAIN DESCRIPTION - FREQUENCY
FREQUENCY: CONTINUOUS
FREQUENCY: CONTINUOUS

## 2024-05-22 NOTE — ED TRIAGE NOTES
Patient arrives via ems patient had a welfare check done on her this am for not answering, ems arrived and patient was guppy breathing and very pale in asystole, ROSC obtained in the field,patient arrives with opa/npa in

## 2024-05-22 NOTE — H&P
HISTORY AND PHYSICAL             Date: 2024        Patient Name: Ros Orr     YOB: 1975      Age:  49 y.o.    Chief Complaint     Chief Complaint   Patient presents with    Cardiac Arrest        History Obtained From   Patient, ER physician.    History of Present Illness   This is a 49-year-old morbidly obese  female with history of end-stage renal disease on chronic hemodialysis  with Dr. Joseph Regalado  Who recently lost her son 22-year-old on  has been overwhelmed since then.  She been trying to make arrangements for his  so she has had to cut back on her dialysis time because of that.  She says she is not trying to hurt herself intentionally or depressed but not undergoing hemodialysis in attempt to hurt herself.  She says she has missed about 3-4 dialysis session.  Her sister says she is just been cutting them short.  Today while she was at home talking to  who has not being very much present in her life this causing  arrangements she was lethargic but he left her.  The sister called and noted that she was very weak and lethargic and called the rescue squad.  By the time the rest requirement came she transiently lost consciousness according to the sister and had to have CPR.  She regained consciousness and was transferred to the emergency room.  She was found to have markedly elevated potassium of 8 has been given Lokelma and currently on hemodialysis now.  She has had dialysis catheter placement in the left IJ.  She is conversant but did not remember the chest compression.  She reports some left side pain for which she has come to the emergency room recently.  There is no nausea or vomiting or chest pain currently.  There is no fever or chills.  She said her leg hurts also and has been swollen.  Past Medical History     Past Medical History:   Diagnosis Date    Allergic rhinitis     Blood clot in vein     Chronic kidney disease

## 2024-05-23 LAB
ALBUMIN SERPL-MCNC: 2.4 G/DL (ref 3.5–5)
ALBUMIN SERPL-MCNC: 2.6 G/DL (ref 3.5–5)
ALBUMIN SERPL-MCNC: 2.7 G/DL (ref 3.5–5)
ALBUMIN/GLOB SERPL: 0.5 (ref 1.1–2.2)
ALBUMIN/GLOB SERPL: 0.7 (ref 1.1–2.2)
ALP SERPL-CCNC: 262 U/L (ref 45–117)
ALP SERPL-CCNC: 285 U/L (ref 45–117)
ALT SERPL-CCNC: 400 U/L (ref 12–78)
ALT SERPL-CCNC: 489 U/L (ref 12–78)
ANION GAP SERPL CALC-SCNC: 10 MMOL/L (ref 5–15)
ANION GAP SERPL CALC-SCNC: 11 MMOL/L (ref 5–15)
ANION GAP SERPL CALC-SCNC: 8 MMOL/L (ref 5–15)
ANION GAP SERPL CALC-SCNC: 9 MMOL/L (ref 5–15)
AST SERPL W P-5'-P-CCNC: 292 U/L (ref 15–37)
AST SERPL W P-5'-P-CCNC: 512 U/L (ref 15–37)
BASOPHILS # BLD: 0.1 K/UL (ref 0–0.1)
BASOPHILS NFR BLD: 0 % (ref 0–1)
BILIRUB SERPL-MCNC: 0.5 MG/DL (ref 0.2–1)
BILIRUB SERPL-MCNC: 0.7 MG/DL (ref 0.2–1)
BUN SERPL-MCNC: 57 MG/DL (ref 6–20)
BUN SERPL-MCNC: 59 MG/DL (ref 6–20)
BUN SERPL-MCNC: 65 MG/DL (ref 6–20)
BUN SERPL-MCNC: 72 MG/DL (ref 6–20)
BUN/CREAT SERPL: 10 (ref 12–20)
CA-I BLD-MCNC: 8.4 MG/DL (ref 8.5–10.1)
CA-I BLD-MCNC: 8.4 MG/DL (ref 8.5–10.1)
CA-I BLD-MCNC: 8.5 MG/DL (ref 8.5–10.1)
CA-I BLD-MCNC: 8.6 MG/DL (ref 8.5–10.1)
CHLORIDE SERPL-SCNC: 100 MMOL/L (ref 97–108)
CHLORIDE SERPL-SCNC: 102 MMOL/L (ref 97–108)
CHLORIDE SERPL-SCNC: 104 MMOL/L (ref 97–108)
CHLORIDE SERPL-SCNC: 105 MMOL/L (ref 97–108)
CO2 SERPL-SCNC: 20 MMOL/L (ref 21–32)
CO2 SERPL-SCNC: 23 MMOL/L (ref 21–32)
CREAT SERPL-MCNC: 5.77 MG/DL (ref 0.55–1.02)
CREAT SERPL-MCNC: 5.91 MG/DL (ref 0.55–1.02)
CREAT SERPL-MCNC: 6.47 MG/DL (ref 0.55–1.02)
CREAT SERPL-MCNC: 7.29 MG/DL (ref 0.55–1.02)
DATE LAST DOSE: NORMAL
DIFFERENTIAL METHOD BLD: ABNORMAL
DOSE AMOUNT: NORMAL UNITS
EOSINOPHIL # BLD: 0.1 K/UL (ref 0–0.4)
EOSINOPHIL NFR BLD: 1 % (ref 0–7)
ERYTHROCYTE [DISTWIDTH] IN BLOOD BY AUTOMATED COUNT: 16.2 % (ref 11.5–14.5)
GLOBULIN SER CALC-MCNC: 4 G/DL (ref 2–4)
GLOBULIN SER CALC-MCNC: 4.4 G/DL (ref 2–4)
GLUCOSE BLD STRIP.AUTO-MCNC: 107 MG/DL (ref 65–100)
GLUCOSE BLD STRIP.AUTO-MCNC: 161 MG/DL (ref 65–100)
GLUCOSE BLD STRIP.AUTO-MCNC: 193 MG/DL (ref 65–100)
GLUCOSE BLD STRIP.AUTO-MCNC: 91 MG/DL (ref 65–100)
GLUCOSE SERPL-MCNC: 119 MG/DL (ref 65–100)
GLUCOSE SERPL-MCNC: 121 MG/DL (ref 65–100)
GLUCOSE SERPL-MCNC: 177 MG/DL (ref 65–100)
GLUCOSE SERPL-MCNC: 257 MG/DL (ref 65–100)
HCT VFR BLD AUTO: 30.7 % (ref 35–47)
HGB BLD-MCNC: 9.3 G/DL (ref 11.5–16)
IMM GRANULOCYTES # BLD AUTO: 0.1 K/UL (ref 0–0.04)
IMM GRANULOCYTES NFR BLD AUTO: 1 % (ref 0–0.5)
LYMPHOCYTES # BLD: 0.8 K/UL (ref 0.8–3.5)
LYMPHOCYTES NFR BLD: 5 % (ref 12–49)
MAGNESIUM SERPL-MCNC: 1.8 MG/DL (ref 1.6–2.4)
MAGNESIUM SERPL-MCNC: 1.9 MG/DL (ref 1.6–2.4)
MAGNESIUM SERPL-MCNC: 1.9 MG/DL (ref 1.6–2.4)
MAGNESIUM SERPL-MCNC: 2 MG/DL (ref 1.6–2.4)
MCH RBC QN AUTO: 27.5 PG (ref 26–34)
MCHC RBC AUTO-ENTMCNC: 30.3 G/DL (ref 30–36.5)
MCV RBC AUTO: 90.8 FL (ref 80–99)
MONOCYTES # BLD: 0.5 K/UL (ref 0–1)
MONOCYTES NFR BLD: 3 % (ref 5–13)
NEUTS SEG # BLD: 13.8 K/UL (ref 1.8–8)
NEUTS SEG NFR BLD: 90 % (ref 32–75)
NRBC # BLD: 0 K/UL (ref 0–0.01)
NRBC BLD-RTO: 0 PER 100 WBC
PERFORMED BY:: ABNORMAL
PERFORMED BY:: NORMAL
PHOSPHATE SERPL-MCNC: 6.3 MG/DL (ref 2.6–4.7)
PHOSPHATE SERPL-MCNC: 6.8 MG/DL (ref 2.6–4.7)
PHOSPHATE SERPL-MCNC: 7.8 MG/DL (ref 2.6–4.7)
PLATELET # BLD AUTO: 344 K/UL (ref 150–400)
PMV BLD AUTO: 10.2 FL (ref 8.9–12.9)
POTASSIUM SERPL-SCNC: 4.1 MMOL/L (ref 3.5–5.1)
POTASSIUM SERPL-SCNC: 4.4 MMOL/L (ref 3.5–5.1)
POTASSIUM SERPL-SCNC: 4.5 MMOL/L (ref 3.5–5.1)
POTASSIUM SERPL-SCNC: 5.3 MMOL/L (ref 3.5–5.1)
PROT SERPL-MCNC: 6.8 G/DL (ref 6.4–8.2)
RBC # BLD AUTO: 3.38 M/UL (ref 3.8–5.2)
SODIUM SERPL-SCNC: 131 MMOL/L (ref 136–145)
SODIUM SERPL-SCNC: 134 MMOL/L (ref 136–145)
SODIUM SERPL-SCNC: 136 MMOL/L (ref 136–145)
SODIUM SERPL-SCNC: 137 MMOL/L (ref 136–145)
TROPONIN I SERPL HS-MCNC: 475 NG/L (ref 0–51)
VANCOMYCIN SERPL-MCNC: 18.2 UG/ML
WBC # BLD AUTO: 15.4 K/UL (ref 3.6–11)

## 2024-05-23 PROCEDURE — 6370000000 HC RX 637 (ALT 250 FOR IP): Performed by: INTERNAL MEDICINE

## 2024-05-23 PROCEDURE — 85025 COMPLETE CBC W/AUTO DIFF WBC: CPT

## 2024-05-23 PROCEDURE — 80048 BASIC METABOLIC PNL TOTAL CA: CPT

## 2024-05-23 PROCEDURE — 2580000003 HC RX 258: Performed by: INTERNAL MEDICINE

## 2024-05-23 PROCEDURE — 94761 N-INVAS EAR/PLS OXIMETRY MLT: CPT

## 2024-05-23 PROCEDURE — 80069 RENAL FUNCTION PANEL: CPT

## 2024-05-23 PROCEDURE — 6360000002 HC RX W HCPCS: Performed by: INTERNAL MEDICINE

## 2024-05-23 PROCEDURE — 36415 COLL VENOUS BLD VENIPUNCTURE: CPT

## 2024-05-23 PROCEDURE — 80053 COMPREHEN METABOLIC PANEL: CPT

## 2024-05-23 PROCEDURE — 83735 ASSAY OF MAGNESIUM: CPT

## 2024-05-23 PROCEDURE — 84100 ASSAY OF PHOSPHORUS: CPT

## 2024-05-23 PROCEDURE — 90945 DIALYSIS ONE EVALUATION: CPT

## 2024-05-23 PROCEDURE — 2000000000 HC ICU R&B

## 2024-05-23 PROCEDURE — 80202 ASSAY OF VANCOMYCIN: CPT

## 2024-05-23 PROCEDURE — 2700000000 HC OXYGEN THERAPY PER DAY

## 2024-05-23 PROCEDURE — 82962 GLUCOSE BLOOD TEST: CPT

## 2024-05-23 PROCEDURE — 84484 ASSAY OF TROPONIN QUANT: CPT

## 2024-05-23 RX ORDER — HEPARIN SODIUM 1000 [USP'U]/ML
INJECTION, SOLUTION INTRAVENOUS; SUBCUTANEOUS PRN
Status: DISCONTINUED | OUTPATIENT
Start: 2024-05-23 | End: 2024-05-23 | Stop reason: SDUPTHER

## 2024-05-23 RX ORDER — MIDODRINE HYDROCHLORIDE 5 MG/1
10 TABLET ORAL
Status: DISCONTINUED | OUTPATIENT
Start: 2024-05-23 | End: 2024-05-29 | Stop reason: HOSPADM

## 2024-05-23 RX ADMIN — CALCIUM CHLORIDE, MAGNESIUM CHLORIDE, DEXTROSE MONOHYDRATE, LACTIC ACID, SODIUM CHLORIDE, SODIUM BICARBONATE AND POTASSIUM CHLORIDE: 5.15; 2.03; 22; 5.4; 6.46; 3.09; .157 INJECTION INTRAVENOUS at 08:39

## 2024-05-23 RX ADMIN — HEPARIN SODIUM 5000 UNITS: 5000 INJECTION INTRAVENOUS; SUBCUTANEOUS at 05:03

## 2024-05-23 RX ADMIN — SODIUM CHLORIDE: 9 INJECTION, SOLUTION INTRAVENOUS at 01:09

## 2024-05-23 RX ADMIN — SODIUM CHLORIDE, PRESERVATIVE FREE 10 ML: 5 INJECTION INTRAVENOUS at 20:15

## 2024-05-23 RX ADMIN — MICONAZOLE NITRATE: 20 POWDER TOPICAL at 20:15

## 2024-05-23 RX ADMIN — ACETAMINOPHEN 650 MG: 325 TABLET ORAL at 04:21

## 2024-05-23 RX ADMIN — CALCIUM CHLORIDE, MAGNESIUM CHLORIDE, DEXTROSE MONOHYDRATE, LACTIC ACID, SODIUM CHLORIDE, SODIUM BICARBONATE AND POTASSIUM CHLORIDE: 5.15; 2.03; 22; 5.4; 6.46; 3.09; .157 INJECTION INTRAVENOUS at 03:20

## 2024-05-23 RX ADMIN — CEFEPIME 2000 MG: 2 INJECTION, POWDER, FOR SOLUTION INTRAVENOUS at 00:12

## 2024-05-23 RX ADMIN — INSULIN LISPRO 10 UNITS: 100 INJECTION, SOLUTION INTRAVENOUS; SUBCUTANEOUS at 12:00

## 2024-05-23 RX ADMIN — HEPARIN SODIUM 5000 UNITS: 5000 INJECTION INTRAVENOUS; SUBCUTANEOUS at 22:00

## 2024-05-23 RX ADMIN — INSULIN LISPRO 10 UNITS: 100 INJECTION, SOLUTION INTRAVENOUS; SUBCUTANEOUS at 08:10

## 2024-05-23 RX ADMIN — MIDODRINE HYDROCHLORIDE 10 MG: 5 TABLET ORAL at 16:20

## 2024-05-23 RX ADMIN — GABAPENTIN 300 MG: 300 CAPSULE ORAL at 20:30

## 2024-05-23 RX ADMIN — INSULIN LISPRO 10 UNITS: 100 INJECTION, SOLUTION INTRAVENOUS; SUBCUTANEOUS at 18:16

## 2024-05-23 RX ADMIN — HEPARIN SODIUM 5000 UNITS: 5000 INJECTION INTRAVENOUS; SUBCUTANEOUS at 14:10

## 2024-05-23 RX ADMIN — MICONAZOLE NITRATE: 20 POWDER TOPICAL at 08:04

## 2024-05-23 RX ADMIN — HYDROCODONE BITARTRATE AND ACETAMINOPHEN 1 TABLET: 5; 325 TABLET ORAL at 18:16

## 2024-05-23 RX ADMIN — CEFEPIME 2000 MG: 2 INJECTION, POWDER, FOR SOLUTION INTRAVENOUS at 14:10

## 2024-05-23 RX ADMIN — SODIUM CHLORIDE, PRESERVATIVE FREE 10 ML: 5 INJECTION INTRAVENOUS at 08:10

## 2024-05-23 ASSESSMENT — PAIN SCALES - GENERAL
PAINLEVEL_OUTOF10: 6
PAINLEVEL_OUTOF10: 5
PAINLEVEL_OUTOF10: 8
PAINLEVEL_OUTOF10: 0

## 2024-05-23 ASSESSMENT — PAIN DESCRIPTION - DESCRIPTORS
DESCRIPTORS: ACHING
DESCRIPTORS_2: BURNING
DESCRIPTORS: ACHING;THROBBING

## 2024-05-23 ASSESSMENT — PAIN DESCRIPTION - ORIENTATION
ORIENTATION: RIGHT;LEFT;LOWER
ORIENTATION: LOWER
ORIENTATION_2: LEFT

## 2024-05-23 ASSESSMENT — PAIN - FUNCTIONAL ASSESSMENT
PAIN_FUNCTIONAL_ASSESSMENT_SITE2: PREVENTS OR INTERFERES SOME ACTIVE ACTIVITIES AND ADLS
PAIN_FUNCTIONAL_ASSESSMENT: PREVENTS OR INTERFERES SOME ACTIVE ACTIVITIES AND ADLS

## 2024-05-23 ASSESSMENT — PAIN DESCRIPTION - LOCATION
LOCATION_2: ABDOMEN
LOCATION: LEG
LOCATION: LEG

## 2024-05-23 ASSESSMENT — PAIN DESCRIPTION - FREQUENCY
FREQUENCY: CONTINUOUS
FREQUENCY: CONTINUOUS

## 2024-05-23 ASSESSMENT — PAIN DESCRIPTION - PAIN TYPE: TYPE: CHRONIC PAIN

## 2024-05-23 ASSESSMENT — PAIN DESCRIPTION - ONSET: ONSET: ON-GOING

## 2024-05-23 ASSESSMENT — PAIN DESCRIPTION - INTENSITY: RATING_2: 6

## 2024-05-23 NOTE — WOUND CARE
IP WOUND CONSULT    Ros Orr  MEDICAL RECORD NUMBER:  [id number]  AGE: 49 y.o.   GENDER: female  : 1975  TODAY'S DATE:  2024  Places; hospital units: 266    GENERAL     [] Follow-up   [x] New Consult          PAST MEDICAL HISTORY    Past Medical History:   Diagnosis Date    Allergic rhinitis     Blood clot in vein     Chronic kidney disease     CVA (cerebral vascular accident) (HCC) 2014    Diabetes ([facility])     Dyslipidemia     Hemodialysis patient ([facility])     Hx of blood clots     Hypertension     IBS (irritable bowel syndrome)     Ill-defined condition     Renal failure         ALLERGIES    Allergies   Allergen Reactions    Fentanyl Anxiety    Sulfa Antibiotics Nausea And Vomiting    Azithromycin      Other reaction(s): Unknown (comments)    Benadryl [Diphenhydramine] Anxiety    Morphine Itching    Pseudoephedrine Nausea And Vomiting and Anxiety          Orientation: AxO x4    Contributing Factors: obesity and ESRD-HD    Assessment     Patient resting in bed, she states that the wound to her right side has been present for a few months and she is not sure how it occurred. She states that she believes it started out as a bruise and then developed into wound and it is very painful. She states that she has been seen by other providers, including her PCP who have prescribed topical ointments with no improvement.     Patient would benefit from surgical evaluation and possible outpatient wound care follow up, Okay per Dr. Jordan to consult Dr. Greenfield for evaluation.     Wound to left flank, unclear etiology, wound is covered with dry eschar/scab. Slight pink erythema noted to percy-wound, unable to discern if there is any induration/fluctuance. Wound cleansed with NS and doubled over xeroform applied and covered with gentle lite foam dressing. Dressing to be changed daily and PRN for soiling.     Dressing orders/recommendations may change depending on surgery evaluation.     Wound 24 Abdomen

## 2024-05-23 NOTE — ACP (ADVANCE CARE PLANNING)
Advance Care Planning     Advance Care Planning Inpatient Note  Spiritual Care Department    Today's Date: 5/23/2024  Unit: [facility] ICU    Received request from patient.  Upon review of chart and communication with care team, patient's decision making abilities are not in question.. Patient and Friends was/were present in the room during visit.    Goals of ACP Conversation:  Discuss advance care planning documents    Health Care Decision Makers:       Primary Decision Maker: DANII WU - Brother/Sister - [phone]    Secondary Decision Maker: Williealeksandra - Friend - [phone]  Summary:  Completed New Documents    Advance Care Planning Documents (Patient Wishes):  Healthcare Power of /Advance Directive Appointment of Health Care Agent  Living Will/Advance Directive     Assessment:   responded to spiritual consult for pt requesting to complete an Advance Medical Directive (AMD). AMD completed as requested.    Interventions:  Provided education on documents for clarity and greater understanding  Assisted in the completion of documents according to patient's wishes at this time    Care Preferences Communicated:   No    Outcomes/Plan:  ACP Discussion: Completed  New advance directive completed.  Returned original document(s) to patient, as well as copies for distribution to appointed agents  Copy of advance directive given to staff to scan into medical record.    Electronically signed by Chaplain Izabella on 5/23/2024 at 11:54 AM

## 2024-05-23 NOTE — ED PROVIDER NOTES
EMERGENCY DEPARTMENT HISTORY AND PHYSICAL EXAM      Date: 2024  Patient Name: Ros Orr  MRN: [id number]  YOB: 1975  Date of evaluation: 2024  Provider: Des Alfaro MD     History of Present Illness     Chief Complaint   Patient presents with    Cardiac Arrest       History Provided By: Patient, EMS    HPI: Ros Orr, 49 y.o. female with past medical history as listed and reviewed below presenting to the ED for possible cardiac.  Per EMS they were called out for lethargy, on arrival to the patient they believed she was in asystole and started CPR.  EMS reports they obtain ROSC and the patient was then noted to be opening her eyes and alert.  No further history provided by EMS.    On arrival to the ED the patient is lethargic although responsive to questions.  She states she has been dealing with a lot recently because her son .  She states she has been cutting her dialysis sessions short in order to plan a .  She notes today she was feeling lethargic and her family decided to call EMS.  She notes she does remember EMS coming and starting chest compressions on her.  She does report that afterwards her memory is little bit blurry.  She denies any chest pain or shortness of breath, she denies any recent fevers or chills or cough.  She denies any other symptoms aside from some slight leg pain.    Medical History     Past Medical History:  Past Medical History:   Diagnosis Date    Allergic rhinitis     Blood clot in vein     Chronic kidney disease     CVA (cerebral vascular accident) ([facility]) 2014    Diabetes ([facility])     Dyslipidemia     Hemodialysis patient ([facility])     Hx of blood clots     Hypertension     IBS (irritable bowel syndrome)     Ill-defined condition     Renal failure        Past Surgical History:  Past Surgical History:   Procedure Laterality Date    CARDIAC PROCEDURE N/A 10/12/2023    Left heart cath / coronary angiography performed by Martir Garcia MD at

## 2024-05-24 ENCOUNTER — APPOINTMENT (OUTPATIENT)
Facility: HOSPITAL | Age: 49
DRG: 640 | End: 2024-05-24
Payer: MEDICARE

## 2024-05-24 LAB
ALBUMIN SERPL-MCNC: 2.4 G/DL (ref 3.5–5)
ALBUMIN SERPL-MCNC: 2.5 G/DL (ref 3.5–5)
ALBUMIN SERPL-MCNC: 2.6 G/DL (ref 3.5–5)
ALBUMIN/GLOB SERPL: 0.5 (ref 1.1–2.2)
ALBUMIN/GLOB SERPL: 0.7 (ref 1.1–2.2)
ALP SERPL-CCNC: 268 U/L (ref 45–117)
ALP SERPL-CCNC: 272 U/L (ref 45–117)
ALT SERPL-CCNC: 328 U/L (ref 12–78)
ALT SERPL-CCNC: 343 U/L (ref 12–78)
ANION GAP SERPL CALC-SCNC: 10 MMOL/L (ref 5–15)
ANION GAP SERPL CALC-SCNC: 10 MMOL/L (ref 5–15)
ANION GAP SERPL CALC-SCNC: 8 MMOL/L (ref 5–15)
AST SERPL W P-5'-P-CCNC: 149 U/L (ref 15–37)
AST SERPL W P-5'-P-CCNC: 171 U/L (ref 15–37)
BASOPHILS # BLD: 0.1 K/UL (ref 0–0.1)
BASOPHILS NFR BLD: 1 % (ref 0–1)
BILIRUB SERPL-MCNC: 0.6 MG/DL (ref 0.2–1)
BILIRUB SERPL-MCNC: 0.7 MG/DL (ref 0.2–1)
BUN SERPL-MCNC: 66 MG/DL (ref 6–20)
BUN SERPL-MCNC: 69 MG/DL (ref 6–20)
BUN SERPL-MCNC: 70 MG/DL (ref 6–20)
BUN/CREAT SERPL: 10 (ref 12–20)
CA-I BLD-MCNC: 8.4 MG/DL (ref 8.5–10.1)
CA-I BLD-MCNC: 8.4 MG/DL (ref 8.5–10.1)
CA-I BLD-MCNC: 8.5 MG/DL (ref 8.5–10.1)
CHLORIDE SERPL-SCNC: 103 MMOL/L (ref 97–108)
CHLORIDE SERPL-SCNC: 104 MMOL/L (ref 97–108)
CHLORIDE SERPL-SCNC: 105 MMOL/L (ref 97–108)
CO2 SERPL-SCNC: 20 MMOL/L (ref 21–32)
CO2 SERPL-SCNC: 21 MMOL/L (ref 21–32)
CO2 SERPL-SCNC: 22 MMOL/L (ref 21–32)
CORTIS SERPL-MCNC: 26 UG/DL
CREAT SERPL-MCNC: 6.73 MG/DL (ref 0.55–1.02)
CREAT SERPL-MCNC: 7.14 MG/DL (ref 0.55–1.02)
CREAT SERPL-MCNC: 7.16 MG/DL (ref 0.55–1.02)
DIFFERENTIAL METHOD BLD: ABNORMAL
EOSINOPHIL # BLD: 0.6 K/UL (ref 0–0.4)
EOSINOPHIL NFR BLD: 4 % (ref 0–7)
ERYTHROCYTE [DISTWIDTH] IN BLOOD BY AUTOMATED COUNT: 16 % (ref 11.5–14.5)
GLOBULIN SER CALC-MCNC: 3.9 G/DL (ref 2–4)
GLOBULIN SER CALC-MCNC: 4.8 G/DL (ref 2–4)
GLUCOSE BLD STRIP.AUTO-MCNC: 104 MG/DL (ref 65–100)
GLUCOSE BLD STRIP.AUTO-MCNC: 126 MG/DL (ref 65–100)
GLUCOSE BLD STRIP.AUTO-MCNC: 187 MG/DL (ref 65–100)
GLUCOSE BLD STRIP.AUTO-MCNC: 199 MG/DL (ref 65–100)
GLUCOSE BLD STRIP.AUTO-MCNC: 56 MG/DL (ref 65–100)
GLUCOSE BLD STRIP.AUTO-MCNC: 94 MG/DL (ref 65–100)
GLUCOSE SERPL-MCNC: 103 MG/DL (ref 65–100)
GLUCOSE SERPL-MCNC: 106 MG/DL (ref 65–100)
GLUCOSE SERPL-MCNC: 168 MG/DL (ref 65–100)
HCT VFR BLD AUTO: 29.8 % (ref 35–47)
HGB BLD-MCNC: 9 G/DL (ref 11.5–16)
IMM GRANULOCYTES # BLD AUTO: 0.1 K/UL (ref 0–0.04)
IMM GRANULOCYTES NFR BLD AUTO: 1 % (ref 0–0.5)
LYMPHOCYTES # BLD: 1.6 K/UL (ref 0.8–3.5)
LYMPHOCYTES NFR BLD: 10 % (ref 12–49)
MAGNESIUM SERPL-MCNC: 1.9 MG/DL (ref 1.6–2.4)
MCH RBC QN AUTO: 26.9 PG (ref 26–34)
MCHC RBC AUTO-ENTMCNC: 30.2 G/DL (ref 30–36.5)
MCV RBC AUTO: 89 FL (ref 80–99)
MONOCYTES # BLD: 1.1 K/UL (ref 0–1)
MONOCYTES NFR BLD: 7 % (ref 5–13)
NEUTS SEG # BLD: 12.1 K/UL (ref 1.8–8)
NEUTS SEG NFR BLD: 77 % (ref 32–75)
NRBC # BLD: 0.02 K/UL (ref 0–0.01)
NRBC BLD-RTO: 0.1 PER 100 WBC
PERFORMED BY:: ABNORMAL
PERFORMED BY:: NORMAL
PHOSPHATE SERPL-MCNC: 7.8 MG/DL (ref 2.6–4.7)
PHOSPHATE SERPL-MCNC: 7.9 MG/DL (ref 2.6–4.7)
PLATELET # BLD AUTO: 336 K/UL (ref 150–400)
PMV BLD AUTO: 9.1 FL (ref 8.9–12.9)
POTASSIUM SERPL-SCNC: 4.4 MMOL/L (ref 3.5–5.1)
POTASSIUM SERPL-SCNC: 4.8 MMOL/L (ref 3.5–5.1)
POTASSIUM SERPL-SCNC: 5 MMOL/L (ref 3.5–5.1)
PROT SERPL-MCNC: 6.5 G/DL (ref 6.4–8.2)
PROT SERPL-MCNC: 7.3 G/DL (ref 6.4–8.2)
RBC # BLD AUTO: 3.35 M/UL (ref 3.8–5.2)
SODIUM SERPL-SCNC: 133 MMOL/L (ref 136–145)
SODIUM SERPL-SCNC: 135 MMOL/L (ref 136–145)
SODIUM SERPL-SCNC: 135 MMOL/L (ref 136–145)
WBC # BLD AUTO: 15.7 K/UL (ref 3.6–11)

## 2024-05-24 PROCEDURE — 6360000002 HC RX W HCPCS: Performed by: INTERNAL MEDICINE

## 2024-05-24 PROCEDURE — 6370000000 HC RX 637 (ALT 250 FOR IP): Performed by: INTERNAL MEDICINE

## 2024-05-24 PROCEDURE — 82533 TOTAL CORTISOL: CPT

## 2024-05-24 PROCEDURE — 80069 RENAL FUNCTION PANEL: CPT

## 2024-05-24 PROCEDURE — 2580000003 HC RX 258: Performed by: STUDENT IN AN ORGANIZED HEALTH CARE EDUCATION/TRAINING PROGRAM

## 2024-05-24 PROCEDURE — 2000000000 HC ICU R&B

## 2024-05-24 PROCEDURE — 93308 TTE F-UP OR LMTD: CPT

## 2024-05-24 PROCEDURE — 2500000003 HC RX 250 WO HCPCS: Performed by: STUDENT IN AN ORGANIZED HEALTH CARE EDUCATION/TRAINING PROGRAM

## 2024-05-24 PROCEDURE — 83735 ASSAY OF MAGNESIUM: CPT

## 2024-05-24 PROCEDURE — 51798 US URINE CAPACITY MEASURE: CPT

## 2024-05-24 PROCEDURE — 82962 GLUCOSE BLOOD TEST: CPT

## 2024-05-24 PROCEDURE — 80053 COMPREHEN METABOLIC PANEL: CPT

## 2024-05-24 PROCEDURE — 85025 COMPLETE CBC W/AUTO DIFF WBC: CPT

## 2024-05-24 PROCEDURE — 36415 COLL VENOUS BLD VENIPUNCTURE: CPT

## 2024-05-24 PROCEDURE — 2580000003 HC RX 258: Performed by: INTERNAL MEDICINE

## 2024-05-24 PROCEDURE — 84100 ASSAY OF PHOSPHORUS: CPT

## 2024-05-24 RX ORDER — NOREPINEPHRINE BITARTRATE 0.06 MG/ML
1-100 INJECTION, SOLUTION INTRAVENOUS CONTINUOUS
Status: DISCONTINUED | OUTPATIENT
Start: 2024-05-24 | End: 2024-05-26

## 2024-05-24 RX ORDER — 0.9 % SODIUM CHLORIDE 0.9 %
500 INTRAVENOUS SOLUTION INTRAVENOUS ONCE
Status: COMPLETED | OUTPATIENT
Start: 2024-05-24 | End: 2024-05-24

## 2024-05-24 RX ORDER — SEVELAMER CARBONATE 800 MG/1
1600 TABLET, FILM COATED ORAL
Status: DISCONTINUED | OUTPATIENT
Start: 2024-05-24 | End: 2024-05-29 | Stop reason: HOSPADM

## 2024-05-24 RX ADMIN — CEFEPIME 1000 MG: 1 INJECTION, POWDER, FOR SOLUTION INTRAMUSCULAR; INTRAVENOUS at 14:10

## 2024-05-24 RX ADMIN — SODIUM CHLORIDE, PRESERVATIVE FREE 10 ML: 5 INJECTION INTRAVENOUS at 08:25

## 2024-05-24 RX ADMIN — DEXTROSE MONOHYDRATE 125 ML: 100 INJECTION, SOLUTION INTRAVENOUS at 11:47

## 2024-05-24 RX ADMIN — SEVELAMER CARBONATE 1600 MG: 800 TABLET, FILM COATED ORAL at 18:31

## 2024-05-24 RX ADMIN — GABAPENTIN 300 MG: 300 CAPSULE ORAL at 20:35

## 2024-05-24 RX ADMIN — DEXTROSE MONOHYDRATE 250 ML: 100 INJECTION, SOLUTION INTRAVENOUS at 08:23

## 2024-05-24 RX ADMIN — MICONAZOLE NITRATE: 20 POWDER TOPICAL at 20:35

## 2024-05-24 RX ADMIN — MICONAZOLE NITRATE: 20 POWDER TOPICAL at 08:25

## 2024-05-24 RX ADMIN — HEPARIN SODIUM 5000 UNITS: 5000 INJECTION INTRAVENOUS; SUBCUTANEOUS at 05:30

## 2024-05-24 RX ADMIN — Medication 6 MCG/MIN: at 03:36

## 2024-05-24 RX ADMIN — ACETAMINOPHEN 650 MG: 325 TABLET ORAL at 12:45

## 2024-05-24 RX ADMIN — INSULIN LISPRO 10 UNITS: 100 INJECTION, SOLUTION INTRAVENOUS; SUBCUTANEOUS at 18:31

## 2024-05-24 RX ADMIN — INSULIN GLARGINE 42 UNITS: 100 INJECTION, SOLUTION SUBCUTANEOUS at 20:35

## 2024-05-24 RX ADMIN — SODIUM CHLORIDE 500 ML: 9 INJECTION, SOLUTION INTRAVENOUS at 12:17

## 2024-05-24 RX ADMIN — HYDROCORTISONE SODIUM SUCCINATE 100 MG: 100 INJECTION, POWDER, FOR SOLUTION INTRAMUSCULAR; INTRAVENOUS at 12:16

## 2024-05-24 RX ADMIN — MIDODRINE HYDROCHLORIDE 10 MG: 5 TABLET ORAL at 09:24

## 2024-05-24 RX ADMIN — SEVELAMER CARBONATE 1600 MG: 800 TABLET, FILM COATED ORAL at 12:45

## 2024-05-24 RX ADMIN — HYDROCODONE BITARTRATE AND ACETAMINOPHEN 1 TABLET: 5; 325 TABLET ORAL at 14:09

## 2024-05-24 RX ADMIN — MIDODRINE HYDROCHLORIDE 10 MG: 5 TABLET ORAL at 18:31

## 2024-05-24 RX ADMIN — SODIUM CHLORIDE, PRESERVATIVE FREE 10 ML: 5 INJECTION INTRAVENOUS at 20:36

## 2024-05-24 RX ADMIN — MIDODRINE HYDROCHLORIDE 10 MG: 5 TABLET ORAL at 11:37

## 2024-05-24 RX ADMIN — HEPARIN SODIUM 5000 UNITS: 5000 INJECTION INTRAVENOUS; SUBCUTANEOUS at 14:10

## 2024-05-24 RX ADMIN — HEPARIN SODIUM 5000 UNITS: 5000 INJECTION INTRAVENOUS; SUBCUTANEOUS at 21:42

## 2024-05-24 ASSESSMENT — PAIN - FUNCTIONAL ASSESSMENT
PAIN_FUNCTIONAL_ASSESSMENT: PREVENTS OR INTERFERES SOME ACTIVE ACTIVITIES AND ADLS
PAIN_FUNCTIONAL_ASSESSMENT: PREVENTS OR INTERFERES WITH MANY ACTIVE NOT PASSIVE ACTIVITIES

## 2024-05-24 ASSESSMENT — PAIN SCALES - GENERAL
PAINLEVEL_OUTOF10: 8
PAINLEVEL_OUTOF10: 0
PAINLEVEL_OUTOF10: 10
PAINLEVEL_OUTOF10: 0
PAINLEVEL_OUTOF10: 10
PAINLEVEL_OUTOF10: 6

## 2024-05-24 ASSESSMENT — PAIN DESCRIPTION - DESCRIPTORS: DESCRIPTORS: BURNING

## 2024-05-24 ASSESSMENT — PAIN DESCRIPTION - LOCATION: LOCATION: ABDOMEN

## 2024-05-24 ASSESSMENT — PAIN DESCRIPTION - ORIENTATION: ORIENTATION: LEFT;OUTER

## 2024-05-25 LAB
ALBUMIN SERPL-MCNC: 2.4 G/DL (ref 3.5–5)
ALBUMIN/GLOB SERPL: 0.5 (ref 1.1–2.2)
ALP SERPL-CCNC: 394 U/L (ref 45–117)
ALT SERPL-CCNC: 265 U/L (ref 12–78)
ANION GAP SERPL CALC-SCNC: 9 MMOL/L (ref 5–15)
AST SERPL W P-5'-P-CCNC: 101 U/L (ref 15–37)
BILIRUB SERPL-MCNC: 0.6 MG/DL (ref 0.2–1)
BUN SERPL-MCNC: 78 MG/DL (ref 6–20)
BUN/CREAT SERPL: 11 (ref 12–20)
CA-I BLD-MCNC: 8.1 MG/DL (ref 8.5–10.1)
CHLORIDE SERPL-SCNC: 101 MMOL/L (ref 97–108)
CO2 SERPL-SCNC: 21 MMOL/L (ref 21–32)
CREAT SERPL-MCNC: 7.41 MG/DL (ref 0.55–1.02)
ECHO AO ASC DIAM: 2.5 CM
ECHO AO ASCENDING AORTA INDEX: 1.06 CM/M2
ECHO BSA: 2.45 M2
ECHO LV FRACTIONAL SHORTENING: 26 % (ref 28–44)
ECHO LV INTERNAL DIMENSION DIASTOLE INDEX: 2.25 CM/M2
ECHO LV INTERNAL DIMENSION DIASTOLIC: 5.3 CM (ref 3.9–5.3)
ECHO LV INTERNAL DIMENSION SYSTOLIC INDEX: 1.65 CM/M2
ECHO LV INTERNAL DIMENSION SYSTOLIC: 3.9 CM
ECHO LV IVSD: 1.1 CM (ref 0.6–0.9)
ECHO LV MASS 2D: 200.4 G (ref 67–162)
ECHO LV MASS INDEX 2D: 84.9 G/M2 (ref 43–95)
ECHO LV POSTERIOR WALL DIASTOLIC: 0.9 CM (ref 0.6–0.9)
ECHO LV RELATIVE WALL THICKNESS RATIO: 0.34
ECHO TV REGURGITANT MAX VELOCITY: 3.17 M/S
ECHO TV REGURGITANT PEAK GRADIENT: 40 MMHG
GLOBULIN SER CALC-MCNC: 4.7 G/DL (ref 2–4)
GLUCOSE BLD STRIP.AUTO-MCNC: 159 MG/DL (ref 65–100)
GLUCOSE BLD STRIP.AUTO-MCNC: 243 MG/DL (ref 65–100)
GLUCOSE BLD STRIP.AUTO-MCNC: 58 MG/DL (ref 65–100)
GLUCOSE BLD STRIP.AUTO-MCNC: 69 MG/DL (ref 65–100)
GLUCOSE BLD STRIP.AUTO-MCNC: 79 MG/DL (ref 65–100)
GLUCOSE SERPL-MCNC: 162 MG/DL (ref 65–100)
MAGNESIUM SERPL-MCNC: 2 MG/DL (ref 1.6–2.4)
PERFORMED BY:: ABNORMAL
PERFORMED BY:: NORMAL
PERFORMED BY:: NORMAL
PHOSPHATE SERPL-MCNC: 8.8 MG/DL (ref 2.6–4.7)
POTASSIUM SERPL-SCNC: 5.1 MMOL/L (ref 3.5–5.1)
PROT SERPL-MCNC: 7.1 G/DL (ref 6.4–8.2)
SODIUM SERPL-SCNC: 131 MMOL/L (ref 136–145)

## 2024-05-25 PROCEDURE — 6360000002 HC RX W HCPCS: Performed by: INTERNAL MEDICINE

## 2024-05-25 PROCEDURE — 90935 HEMODIALYSIS ONE EVALUATION: CPT

## 2024-05-25 PROCEDURE — 6370000000 HC RX 637 (ALT 250 FOR IP): Performed by: INTERNAL MEDICINE

## 2024-05-25 PROCEDURE — 83735 ASSAY OF MAGNESIUM: CPT

## 2024-05-25 PROCEDURE — 36410 VNPNXR 3YR/> PHY/QHP DX/THER: CPT

## 2024-05-25 PROCEDURE — 51798 US URINE CAPACITY MEASURE: CPT

## 2024-05-25 PROCEDURE — 06H033Z INSERTION OF INFUSION DEVICE INTO INFERIOR VENA CAVA, PERCUTANEOUS APPROACH: ICD-10-PCS | Performed by: INTERNAL MEDICINE

## 2024-05-25 PROCEDURE — 2709999900 HC NON-CHARGEABLE SUPPLY

## 2024-05-25 PROCEDURE — 2000000000 HC ICU R&B

## 2024-05-25 PROCEDURE — 84100 ASSAY OF PHOSPHORUS: CPT

## 2024-05-25 PROCEDURE — 80053 COMPREHEN METABOLIC PANEL: CPT

## 2024-05-25 PROCEDURE — 5A1D70Z PERFORMANCE OF URINARY FILTRATION, INTERMITTENT, LESS THAN 6 HOURS PER DAY: ICD-10-PCS | Performed by: INTERNAL MEDICINE

## 2024-05-25 PROCEDURE — 2700000000 HC OXYGEN THERAPY PER DAY

## 2024-05-25 PROCEDURE — 82962 GLUCOSE BLOOD TEST: CPT

## 2024-05-25 PROCEDURE — 36415 COLL VENOUS BLD VENIPUNCTURE: CPT

## 2024-05-25 PROCEDURE — 94761 N-INVAS EAR/PLS OXIMETRY MLT: CPT

## 2024-05-25 PROCEDURE — 2580000003 HC RX 258: Performed by: INTERNAL MEDICINE

## 2024-05-25 RX ORDER — IBUPROFEN 200 MG
TABLET ORAL 3 TIMES DAILY
Status: DISCONTINUED | OUTPATIENT
Start: 2024-05-25 | End: 2024-05-29 | Stop reason: HOSPADM

## 2024-05-25 RX ADMIN — SODIUM CHLORIDE, PRESERVATIVE FREE 10 ML: 5 INJECTION INTRAVENOUS at 22:04

## 2024-05-25 RX ADMIN — INSULIN LISPRO 10 UNITS: 100 INJECTION, SOLUTION INTRAVENOUS; SUBCUTANEOUS at 17:04

## 2024-05-25 RX ADMIN — ACETAMINOPHEN 650 MG: 325 TABLET ORAL at 11:53

## 2024-05-25 RX ADMIN — MIDODRINE HYDROCHLORIDE 10 MG: 5 TABLET ORAL at 08:41

## 2024-05-25 RX ADMIN — EPOETIN ALFA-EPBX 10000 UNITS: 10000 INJECTION, SOLUTION INTRAVENOUS; SUBCUTANEOUS at 11:20

## 2024-05-25 RX ADMIN — BACITRACIN ZINC, NEOMYCIN, POLYMYXIN B: 400; 3.5; 5 OINTMENT TOPICAL at 18:25

## 2024-05-25 RX ADMIN — MICONAZOLE NITRATE: 20 POWDER TOPICAL at 08:45

## 2024-05-25 RX ADMIN — INSULIN LISPRO 10 UNITS: 100 INJECTION, SOLUTION INTRAVENOUS; SUBCUTANEOUS at 11:46

## 2024-05-25 RX ADMIN — HEPARIN SODIUM 5000 UNITS: 5000 INJECTION INTRAVENOUS; SUBCUTANEOUS at 14:46

## 2024-05-25 RX ADMIN — HEPARIN SODIUM 5000 UNITS: 5000 INJECTION INTRAVENOUS; SUBCUTANEOUS at 22:07

## 2024-05-25 RX ADMIN — BACITRACIN ZINC, NEOMYCIN, POLYMYXIN B: 400; 3.5; 5 OINTMENT TOPICAL at 22:05

## 2024-05-25 RX ADMIN — SEVELAMER CARBONATE 1600 MG: 800 TABLET, FILM COATED ORAL at 17:04

## 2024-05-25 RX ADMIN — HEPARIN SODIUM 5000 UNITS: 5000 INJECTION INTRAVENOUS; SUBCUTANEOUS at 05:30

## 2024-05-25 RX ADMIN — GABAPENTIN 300 MG: 300 CAPSULE ORAL at 22:07

## 2024-05-25 RX ADMIN — SEVELAMER CARBONATE 1600 MG: 800 TABLET, FILM COATED ORAL at 08:41

## 2024-05-25 RX ADMIN — ACETAMINOPHEN 650 MG: 325 TABLET ORAL at 18:49

## 2024-05-25 RX ADMIN — MIDODRINE HYDROCHLORIDE 10 MG: 5 TABLET ORAL at 17:04

## 2024-05-25 RX ADMIN — SEVELAMER CARBONATE 1600 MG: 800 TABLET, FILM COATED ORAL at 11:46

## 2024-05-25 RX ADMIN — MIDODRINE HYDROCHLORIDE 10 MG: 5 TABLET ORAL at 11:45

## 2024-05-25 RX ADMIN — INSULIN LISPRO 10 UNITS: 100 INJECTION, SOLUTION INTRAVENOUS; SUBCUTANEOUS at 08:40

## 2024-05-25 RX ADMIN — VANCOMYCIN HYDROCHLORIDE 1000 MG: 1 INJECTION, POWDER, LYOPHILIZED, FOR SOLUTION INTRAVENOUS at 15:45

## 2024-05-25 ASSESSMENT — PAIN SCALES - GENERAL
PAINLEVEL_OUTOF10: 7
PAINLEVEL_OUTOF10: 2
PAINLEVEL_OUTOF10: 7
PAINLEVEL_OUTOF10: 0
PAINLEVEL_OUTOF10: 5
PAINLEVEL_OUTOF10: 7
PAINLEVEL_OUTOF10: 0

## 2024-05-25 ASSESSMENT — PAIN DESCRIPTION - ORIENTATION: ORIENTATION: LEFT;RIGHT

## 2024-05-25 ASSESSMENT — PAIN DESCRIPTION - LOCATION
LOCATION: LEG
LOCATION: OTHER (COMMENT)

## 2024-05-25 ASSESSMENT — PAIN DESCRIPTION - DESCRIPTORS: DESCRIPTORS: ACHING

## 2024-05-25 NOTE — DIALYSIS
Patient tolerated treatment. 2000 ml of fluid was removed. Patient was alert and oriented during treatment. 73.3 liters of blood was processed. Report was given to primary nurse [facility] D[facility] Dc'd per Md.

## 2024-05-25 NOTE — PROCEDURES
PICC Line Insertion Procedure Note    Procedure: Insertion of #4 FR/18G Midline    Indications:  Poor Access    Procedure Details   Informed consent was obtained for the procedure, including sedation.  Risks of lung perforation, hemorrhage, and adverse drug reaction were discussed.     #4 FR/18G Midline inserted to the R Basilic vein per hospital protocol.   Blood return:  yes    Findings:  Catheter inserted to 12 cm, with 0 cm. Exposed. Mid upper arm circumference is 42 cm.  There were no changes to vital signs. Catheter was flushed with 10 cc NS. Patient did tolerate procedure well.    Recommendations:  Date: 5/25/2024   Name: Ros Orr  YOB: 1975    Procedures    Midline cleared by nephrology per NERY Pozo

## 2024-05-26 LAB
ALBUMIN SERPL-MCNC: 2.5 G/DL (ref 3.5–5)
ALBUMIN/GLOB SERPL: 0.6 (ref 1.1–2.2)
ALP SERPL-CCNC: 214 U/L (ref 45–117)
ANION GAP SERPL CALC-SCNC: 10 MMOL/L (ref 5–15)
AST SERPL W P-5'-P-CCNC: 64 U/L (ref 15–37)
BASOPHILS # BLD: 0.1 K/UL (ref 0–0.1)
BASOPHILS NFR BLD: 1 % (ref 0–1)
BILIRUB SERPL-MCNC: 0.5 MG/DL (ref 0.2–1)
BUN SERPL-MCNC: 43 MG/DL (ref 6–20)
BUN/CREAT SERPL: 9 (ref 12–20)
CA-I BLD-MCNC: 8 MG/DL (ref 8.5–10.1)
CHLORIDE SERPL-SCNC: 100 MMOL/L (ref 97–108)
CO2 SERPL-SCNC: 24 MMOL/L (ref 21–32)
CREAT SERPL-MCNC: 4.99 MG/DL (ref 0.55–1.02)
DIFFERENTIAL METHOD BLD: ABNORMAL
EOSINOPHIL # BLD: 0.4 K/UL (ref 0–0.4)
EOSINOPHIL NFR BLD: 3 % (ref 0–7)
ERYTHROCYTE [DISTWIDTH] IN BLOOD BY AUTOMATED COUNT: 16.3 % (ref 11.5–14.5)
GLOBULIN SER CALC-MCNC: 3.9 G/DL (ref 2–4)
GLUCOSE BLD STRIP.AUTO-MCNC: 139 MG/DL (ref 65–100)
GLUCOSE BLD STRIP.AUTO-MCNC: 150 MG/DL (ref 65–100)
GLUCOSE BLD STRIP.AUTO-MCNC: 157 MG/DL (ref 65–100)
GLUCOSE BLD STRIP.AUTO-MCNC: 196 MG/DL (ref 65–100)
GLUCOSE BLD STRIP.AUTO-MCNC: 83 MG/DL (ref 65–100)
GLUCOSE BLD STRIP.AUTO-MCNC: 85 MG/DL (ref 65–100)
GLUCOSE BLD STRIP.AUTO-MCNC: 97 MG/DL (ref 65–100)
GLUCOSE SERPL-MCNC: 89 MG/DL (ref 65–100)
HCT VFR BLD AUTO: 30 % (ref 35–47)
HGB BLD-MCNC: 9.2 G/DL (ref 11.5–16)
IMM GRANULOCYTES # BLD AUTO: 0.2 K/UL (ref 0–0.04)
IMM GRANULOCYTES NFR BLD AUTO: 1 % (ref 0–0.5)
LYMPHOCYTES # BLD: 2 K/UL (ref 0.8–3.5)
LYMPHOCYTES NFR BLD: 15 % (ref 12–49)
MAGNESIUM SERPL-MCNC: 1.8 MG/DL (ref 1.6–2.4)
MCH RBC QN AUTO: 27.3 PG (ref 26–34)
MCHC RBC AUTO-ENTMCNC: 30.7 G/DL (ref 30–36.5)
MCV RBC AUTO: 89 FL (ref 80–99)
MONOCYTES # BLD: 1.3 K/UL (ref 0–1)
MONOCYTES NFR BLD: 10 % (ref 5–13)
NEUTS SEG # BLD: 9.5 K/UL (ref 1.8–8)
NEUTS SEG NFR BLD: 70 % (ref 32–75)
NRBC # BLD: 0.07 K/UL (ref 0–0.01)
NRBC BLD-RTO: 0.5 PER 100 WBC
PERFORMED BY:: ABNORMAL
PERFORMED BY:: NORMAL
PHOSPHATE SERPL-MCNC: 4.8 MG/DL (ref 2.6–4.7)
PLATELET # BLD AUTO: 362 K/UL (ref 150–400)
PMV BLD AUTO: 9.2 FL (ref 8.9–12.9)
POTASSIUM SERPL-SCNC: 4.4 MMOL/L (ref 3.5–5.1)
PROT SERPL-MCNC: 6.4 G/DL (ref 6.4–8.2)
RBC # BLD AUTO: 3.37 M/UL (ref 3.8–5.2)
SODIUM SERPL-SCNC: 134 MMOL/L (ref 136–145)
WBC # BLD AUTO: 13.5 K/UL (ref 3.6–11)

## 2024-05-26 PROCEDURE — 6370000000 HC RX 637 (ALT 250 FOR IP): Performed by: INTERNAL MEDICINE

## 2024-05-26 PROCEDURE — 82962 GLUCOSE BLOOD TEST: CPT

## 2024-05-26 PROCEDURE — 2580000003 HC RX 258: Performed by: INTERNAL MEDICINE

## 2024-05-26 PROCEDURE — 6360000002 HC RX W HCPCS: Performed by: INTERNAL MEDICINE

## 2024-05-26 PROCEDURE — 85025 COMPLETE CBC W/AUTO DIFF WBC: CPT

## 2024-05-26 PROCEDURE — 36415 COLL VENOUS BLD VENIPUNCTURE: CPT

## 2024-05-26 PROCEDURE — 84100 ASSAY OF PHOSPHORUS: CPT

## 2024-05-26 PROCEDURE — 1100000000 HC RM PRIVATE

## 2024-05-26 PROCEDURE — 2700000000 HC OXYGEN THERAPY PER DAY

## 2024-05-26 PROCEDURE — 83735 ASSAY OF MAGNESIUM: CPT

## 2024-05-26 PROCEDURE — 94761 N-INVAS EAR/PLS OXIMETRY MLT: CPT

## 2024-05-26 PROCEDURE — 80053 COMPREHEN METABOLIC PANEL: CPT

## 2024-05-26 RX ORDER — SORBITOL SOLUTION 70 %
30 SOLUTION, ORAL MISCELLANEOUS ONCE
Status: DISCONTINUED | OUTPATIENT
Start: 2024-05-26 | End: 2024-05-29 | Stop reason: HOSPADM

## 2024-05-26 RX ORDER — HYDROXYZINE PAMOATE 25 MG/1
25 CAPSULE ORAL 2 TIMES DAILY
Status: DISCONTINUED | OUTPATIENT
Start: 2024-05-26 | End: 2024-05-29 | Stop reason: HOSPADM

## 2024-05-26 RX ORDER — LIDOCAINE 4 G/G
2 PATCH TOPICAL DAILY
Status: DISCONTINUED | OUTPATIENT
Start: 2024-05-26 | End: 2024-05-29 | Stop reason: HOSPADM

## 2024-05-26 RX ORDER — HYDROXYZINE HYDROCHLORIDE 25 MG/1
25 TABLET, FILM COATED ORAL ONCE
Status: COMPLETED | OUTPATIENT
Start: 2024-05-26 | End: 2024-05-26

## 2024-05-26 RX ORDER — ALPRAZOLAM 0.25 MG/1
0.25 TABLET ORAL 3 TIMES DAILY PRN
Status: DISCONTINUED | OUTPATIENT
Start: 2024-05-26 | End: 2024-05-29 | Stop reason: HOSPADM

## 2024-05-26 RX ADMIN — SODIUM CHLORIDE, PRESERVATIVE FREE 10 ML: 5 INJECTION INTRAVENOUS at 21:05

## 2024-05-26 RX ADMIN — ACETAMINOPHEN 650 MG: 325 TABLET ORAL at 01:23

## 2024-05-26 RX ADMIN — ACETAMINOPHEN 650 MG: 325 TABLET ORAL at 14:00

## 2024-05-26 RX ADMIN — BACITRACIN ZINC, NEOMYCIN, POLYMYXIN B: 400; 3.5; 5 OINTMENT TOPICAL at 14:03

## 2024-05-26 RX ADMIN — MICONAZOLE NITRATE: 20 POWDER TOPICAL at 08:34

## 2024-05-26 RX ADMIN — INSULIN GLARGINE 42 UNITS: 100 INJECTION, SOLUTION SUBCUTANEOUS at 21:04

## 2024-05-26 RX ADMIN — ACETAMINOPHEN 650 MG: 325 TABLET ORAL at 21:01

## 2024-05-26 RX ADMIN — ALPRAZOLAM 0.25 MG: 0.25 TABLET ORAL at 10:15

## 2024-05-26 RX ADMIN — INSULIN LISPRO 10 UNITS: 100 INJECTION, SOLUTION INTRAVENOUS; SUBCUTANEOUS at 17:25

## 2024-05-26 RX ADMIN — GABAPENTIN 300 MG: 300 CAPSULE ORAL at 21:03

## 2024-05-26 RX ADMIN — ACETAMINOPHEN 650 MG: 325 TABLET ORAL at 08:33

## 2024-05-26 RX ADMIN — ONDANSETRON 4 MG: 2 INJECTION INTRAMUSCULAR; INTRAVENOUS at 15:45

## 2024-05-26 RX ADMIN — SODIUM CHLORIDE, PRESERVATIVE FREE 10 ML: 5 INJECTION INTRAVENOUS at 08:33

## 2024-05-26 RX ADMIN — SEVELAMER CARBONATE 1600 MG: 800 TABLET, FILM COATED ORAL at 08:33

## 2024-05-26 RX ADMIN — HEPARIN SODIUM 5000 UNITS: 5000 INJECTION INTRAVENOUS; SUBCUTANEOUS at 21:03

## 2024-05-26 RX ADMIN — MIDODRINE HYDROCHLORIDE 10 MG: 5 TABLET ORAL at 11:57

## 2024-05-26 RX ADMIN — HYDROXYZINE HYDROCHLORIDE 25 MG: 25 TABLET, FILM COATED ORAL at 06:37

## 2024-05-26 RX ADMIN — ONDANSETRON 4 MG: 4 TABLET, ORALLY DISINTEGRATING ORAL at 08:33

## 2024-05-26 RX ADMIN — SEVELAMER CARBONATE 1600 MG: 800 TABLET, FILM COATED ORAL at 16:45

## 2024-05-26 RX ADMIN — MIDODRINE HYDROCHLORIDE 10 MG: 5 TABLET ORAL at 08:33

## 2024-05-26 RX ADMIN — BACITRACIN ZINC, NEOMYCIN, POLYMYXIN B: 400; 3.5; 5 OINTMENT TOPICAL at 08:33

## 2024-05-26 RX ADMIN — HEPARIN SODIUM 5000 UNITS: 5000 INJECTION INTRAVENOUS; SUBCUTANEOUS at 06:24

## 2024-05-26 RX ADMIN — MIDODRINE HYDROCHLORIDE 10 MG: 5 TABLET ORAL at 16:14

## 2024-05-26 RX ADMIN — HEPARIN SODIUM 5000 UNITS: 5000 INJECTION INTRAVENOUS; SUBCUTANEOUS at 14:01

## 2024-05-26 RX ADMIN — ONDANSETRON 4 MG: 4 TABLET, ORALLY DISINTEGRATING ORAL at 21:01

## 2024-05-26 RX ADMIN — BACITRACIN ZINC, NEOMYCIN, POLYMYXIN B: 400; 3.5; 5 OINTMENT TOPICAL at 21:04

## 2024-05-26 RX ADMIN — HYDROXYZINE PAMOATE 25 MG: 25 CAPSULE ORAL at 21:03

## 2024-05-26 ASSESSMENT — PAIN - FUNCTIONAL ASSESSMENT
PAIN_FUNCTIONAL_ASSESSMENT: ACTIVITIES ARE NOT PREVENTED

## 2024-05-26 ASSESSMENT — PAIN SCALES - GENERAL
PAINLEVEL_OUTOF10: 6
PAINLEVEL_OUTOF10: 1
PAINLEVEL_OUTOF10: 6
PAINLEVEL_OUTOF10: 8
PAINLEVEL_OUTOF10: 10
PAINLEVEL_OUTOF10: 7
PAINLEVEL_OUTOF10: 4
PAINLEVEL_OUTOF10: 9
PAINLEVEL_OUTOF10: 6

## 2024-05-26 ASSESSMENT — PAIN DESCRIPTION - DESCRIPTORS
DESCRIPTORS: DISCOMFORT
DESCRIPTORS: ACHING
DESCRIPTORS: CRAMPING

## 2024-05-26 ASSESSMENT — PAIN DESCRIPTION - LOCATION
LOCATION: ABDOMEN
LOCATION: FLANK
LOCATION: ABDOMEN
LOCATION: BACK

## 2024-05-26 ASSESSMENT — PAIN DESCRIPTION - ORIENTATION
ORIENTATION: ANTERIOR
ORIENTATION: LEFT

## 2024-05-27 LAB
DATE LAST DOSE: NORMAL
DOSE AMOUNT: NORMAL UNITS
GLUCOSE BLD STRIP.AUTO-MCNC: 126 MG/DL (ref 65–100)
GLUCOSE BLD STRIP.AUTO-MCNC: 156 MG/DL (ref 65–100)
GLUCOSE BLD STRIP.AUTO-MCNC: 90 MG/DL (ref 65–100)
PERFORMED BY:: ABNORMAL
PERFORMED BY:: ABNORMAL
PERFORMED BY:: NORMAL
VANCOMYCIN SERPL-MCNC: 19.2 UG/ML

## 2024-05-27 PROCEDURE — 97530 THERAPEUTIC ACTIVITIES: CPT

## 2024-05-27 PROCEDURE — 2709999900 HC NON-CHARGEABLE SUPPLY

## 2024-05-27 PROCEDURE — 36415 COLL VENOUS BLD VENIPUNCTURE: CPT

## 2024-05-27 PROCEDURE — 6360000002 HC RX W HCPCS: Performed by: INTERNAL MEDICINE

## 2024-05-27 PROCEDURE — 80202 ASSAY OF VANCOMYCIN: CPT

## 2024-05-27 PROCEDURE — 6370000000 HC RX 637 (ALT 250 FOR IP): Performed by: INTERNAL MEDICINE

## 2024-05-27 PROCEDURE — 1100000000 HC RM PRIVATE

## 2024-05-27 PROCEDURE — 82962 GLUCOSE BLOOD TEST: CPT

## 2024-05-27 PROCEDURE — 2580000003 HC RX 258: Performed by: INTERNAL MEDICINE

## 2024-05-27 PROCEDURE — 97161 PT EVAL LOW COMPLEX 20 MIN: CPT

## 2024-05-27 PROCEDURE — 90935 HEMODIALYSIS ONE EVALUATION: CPT

## 2024-05-27 RX ORDER — LIDOCAINE 4 G/G
2 PATCH TOPICAL DAILY
Qty: 20 EACH | Refills: 0 | Status: ON HOLD | OUTPATIENT
Start: 2024-05-28 | End: 2024-06-07

## 2024-05-27 RX ORDER — SORBITOL SOLUTION 70 %
60 SOLUTION, ORAL MISCELLANEOUS 2 TIMES DAILY PRN
Qty: 240 ML | Refills: 0 | Status: ON HOLD | OUTPATIENT
Start: 2024-05-27

## 2024-05-27 RX ADMIN — SEVELAMER CARBONATE 1600 MG: 800 TABLET, FILM COATED ORAL at 16:26

## 2024-05-27 RX ADMIN — GABAPENTIN 300 MG: 300 CAPSULE ORAL at 20:48

## 2024-05-27 RX ADMIN — HYDROCODONE BITARTRATE AND ACETAMINOPHEN 1 TABLET: 5; 325 TABLET ORAL at 12:56

## 2024-05-27 RX ADMIN — BACITRACIN ZINC, NEOMYCIN, POLYMYXIN B: 400; 3.5; 5 OINTMENT TOPICAL at 20:47

## 2024-05-27 RX ADMIN — HEPARIN SODIUM 5000 UNITS: 5000 INJECTION INTRAVENOUS; SUBCUTANEOUS at 06:56

## 2024-05-27 RX ADMIN — MIDODRINE HYDROCHLORIDE 10 MG: 5 TABLET ORAL at 16:28

## 2024-05-27 RX ADMIN — VANCOMYCIN HYDROCHLORIDE 1000 MG: 1 INJECTION, POWDER, LYOPHILIZED, FOR SOLUTION INTRAVENOUS at 20:46

## 2024-05-27 RX ADMIN — SODIUM CHLORIDE, PRESERVATIVE FREE 10 ML: 5 INJECTION INTRAVENOUS at 20:47

## 2024-05-27 RX ADMIN — ACETAMINOPHEN 650 MG: 325 TABLET ORAL at 16:26

## 2024-05-27 RX ADMIN — SORBITOL SOLUTION (BULK) 60 ML: 70 SOLUTION at 23:39

## 2024-05-27 RX ADMIN — ACETAMINOPHEN 650 MG: 325 TABLET ORAL at 23:35

## 2024-05-27 RX ADMIN — BACITRACIN ZINC, NEOMYCIN, POLYMYXIN B: 400; 3.5; 5 OINTMENT TOPICAL at 12:56

## 2024-05-27 RX ADMIN — ALPRAZOLAM 0.25 MG: 0.25 TABLET ORAL at 01:15

## 2024-05-27 RX ADMIN — ACETAMINOPHEN 650 MG: 325 TABLET ORAL at 10:41

## 2024-05-27 RX ADMIN — ACETAMINOPHEN 650 MG: 325 TABLET ORAL at 03:38

## 2024-05-27 RX ADMIN — HYDROXYZINE PAMOATE 25 MG: 25 CAPSULE ORAL at 16:25

## 2024-05-27 RX ADMIN — EPOETIN ALFA-EPBX 10000 UNITS: 10000 INJECTION, SOLUTION INTRAVENOUS; SUBCUTANEOUS at 11:24

## 2024-05-27 RX ADMIN — ONDANSETRON 4 MG: 2 INJECTION INTRAMUSCULAR; INTRAVENOUS at 18:25

## 2024-05-27 ASSESSMENT — PAIN SCALES - GENERAL
PAINLEVEL_OUTOF10: 1
PAINLEVEL_OUTOF10: 0
PAINLEVEL_OUTOF10: 5
PAINLEVEL_OUTOF10: 9
PAINLEVEL_OUTOF10: 0
PAINLEVEL_OUTOF10: 9
PAINLEVEL_OUTOF10: 9

## 2024-05-27 ASSESSMENT — PAIN DESCRIPTION - DESCRIPTORS
DESCRIPTORS: ACHING

## 2024-05-27 ASSESSMENT — PAIN DESCRIPTION - LOCATION
LOCATION: FLANK
LOCATION: OTHER (COMMENT)
LOCATION: FLANK
LOCATION: OTHER (COMMENT)

## 2024-05-27 ASSESSMENT — PAIN - FUNCTIONAL ASSESSMENT
PAIN_FUNCTIONAL_ASSESSMENT: ACTIVITIES ARE NOT PREVENTED
PAIN_FUNCTIONAL_ASSESSMENT: ACTIVITIES ARE NOT PREVENTED

## 2024-05-27 ASSESSMENT — PAIN DESCRIPTION - ORIENTATION
ORIENTATION: LEFT

## 2024-05-27 NOTE — DIALYSIS
Per pt request, hemodialysis need 10 minutes early, of 3.5 hour treatment.   3.9 liters net fluid removed.  Epogen [id number] units given IV.  Debbie in telemetry notified pt enroute to her room, box #68. Phone call to 2 east, pts nurse unable to take report, nurse to call back to dialysis, for report.

## 2024-05-28 LAB
BACTERIA SPEC CULT: NORMAL
BACTERIA SPEC CULT: NORMAL
GLUCOSE BLD STRIP.AUTO-MCNC: 123 MG/DL (ref 65–100)
GLUCOSE BLD STRIP.AUTO-MCNC: 137 MG/DL (ref 65–100)
GLUCOSE BLD STRIP.AUTO-MCNC: 223 MG/DL (ref 65–100)
Lab: NORMAL
Lab: NORMAL
PERFORMED BY:: ABNORMAL

## 2024-05-28 PROCEDURE — 90935 HEMODIALYSIS ONE EVALUATION: CPT

## 2024-05-28 PROCEDURE — 2580000003 HC RX 258: Performed by: INTERNAL MEDICINE

## 2024-05-28 PROCEDURE — 6360000002 HC RX W HCPCS

## 2024-05-28 PROCEDURE — P9047 ALBUMIN (HUMAN), 25%, 50ML: HCPCS

## 2024-05-28 PROCEDURE — 6370000000 HC RX 637 (ALT 250 FOR IP): Performed by: INTERNAL MEDICINE

## 2024-05-28 PROCEDURE — 2709999900 HC NON-CHARGEABLE SUPPLY

## 2024-05-28 PROCEDURE — 82962 GLUCOSE BLOOD TEST: CPT

## 2024-05-28 PROCEDURE — 6360000002 HC RX W HCPCS: Performed by: INTERNAL MEDICINE

## 2024-05-28 PROCEDURE — 1100000000 HC RM PRIVATE

## 2024-05-28 RX ORDER — ALBUMIN (HUMAN) 12.5 G/50ML
SOLUTION INTRAVENOUS
Status: COMPLETED
Start: 2024-05-28 | End: 2024-05-28

## 2024-05-28 RX ORDER — DULOXETIN HYDROCHLORIDE 20 MG/1
20 CAPSULE, DELAYED RELEASE ORAL DAILY
Status: DISCONTINUED | OUTPATIENT
Start: 2024-05-28 | End: 2024-05-29 | Stop reason: HOSPADM

## 2024-05-28 RX ORDER — DIAZEPAM 5 MG/1
2.5 TABLET ORAL ONCE
Status: COMPLETED | OUTPATIENT
Start: 2024-05-28 | End: 2024-05-28

## 2024-05-28 RX ORDER — ALBUMIN (HUMAN) 12.5 G/50ML
25 SOLUTION INTRAVENOUS ONCE
Status: COMPLETED | OUTPATIENT
Start: 2024-05-28 | End: 2024-05-28

## 2024-05-28 RX ADMIN — BACITRACIN ZINC, NEOMYCIN, POLYMYXIN B: 400; 3.5; 5 OINTMENT TOPICAL at 10:29

## 2024-05-28 RX ADMIN — HEPARIN SODIUM 5000 UNITS: 5000 INJECTION INTRAVENOUS; SUBCUTANEOUS at 21:15

## 2024-05-28 RX ADMIN — SODIUM CHLORIDE, PRESERVATIVE FREE 10 ML: 5 INJECTION INTRAVENOUS at 20:22

## 2024-05-28 RX ADMIN — GABAPENTIN 300 MG: 300 CAPSULE ORAL at 20:22

## 2024-05-28 RX ADMIN — BACITRACIN ZINC, NEOMYCIN, POLYMYXIN B: 400; 3.5; 5 OINTMENT TOPICAL at 12:21

## 2024-05-28 RX ADMIN — HYDROCODONE BITARTRATE AND ACETAMINOPHEN 1 TABLET: 5; 325 TABLET ORAL at 13:52

## 2024-05-28 RX ADMIN — HYDROCODONE BITARTRATE AND ACETAMINOPHEN 1 TABLET: 5; 325 TABLET ORAL at 08:46

## 2024-05-28 RX ADMIN — MICONAZOLE NITRATE: 20 POWDER TOPICAL at 10:29

## 2024-05-28 RX ADMIN — DIAZEPAM 2.5 MG: 5 TABLET ORAL at 10:25

## 2024-05-28 RX ADMIN — ALBUMIN (HUMAN) 25 G: 12.5 SOLUTION INTRAVENOUS at 09:28

## 2024-05-28 RX ADMIN — HYDROXYZINE PAMOATE 25 MG: 25 CAPSULE ORAL at 10:25

## 2024-05-28 RX ADMIN — ALBUMIN (HUMAN) 25 G: 0.25 INJECTION, SOLUTION INTRAVENOUS at 09:28

## 2024-05-28 ASSESSMENT — PAIN SCALES - GENERAL
PAINLEVEL_OUTOF10: 0
PAINLEVEL_OUTOF10: 10
PAINLEVEL_OUTOF10: 5

## 2024-05-28 NOTE — DIALYSIS
Patient requested for early termination of treatment due to jabbing pain felt on her lower back with pain scale of 8/10.    On UF today for 2 hours and 15 mins, 2L of fluid removed. TAO Pan made aware.    NERY Hussein received dialysis report.

## 2024-05-28 NOTE — WOUND CARE
Wound Care Note:      Wound Care into see patient because of re-evaluation of left flank wound    Patient resting in bed, states that the pain to her wound has been more prominent the last couple days. States that she is also having some pain to her buttocks area. Induration noted to right buttocks that is tender to patient with no erythema or drainage, may be possible cyst/abscess forming.     Patient states that she is adamant about wanting to biopsy the wound for further management.     Left flank wound, unclear etiology at this time. Wound is now more moist eschar then dry and some slough is noted. Wound cleansed and doubled over xeroform applied and covered with foam dressing. Recommend to continue with xeroform dressings as hydrofera blue is not available inpatient at this facility. See dressing orders.     Wound Care Documentation:  Wound 05/23/24 Abdomen Left;Lower Eschar (Active)   Wound Image   05/28/24 1331   Wound Etiology Other 05/28/24 1331   Dressing Status New dressing applied 05/28/24 1331   Wound Cleansed Cleansed with saline 05/28/24 1331   Dressing/Treatment Xeroform;Foam 05/28/24 1331   Wound Assessment Eschar moist;Devitalized tissue 05/28/24 1331   Drainage Amount Scant (moist but unmeasurable) 05/28/24 1331   Drainage Description Serosanguinous 05/28/24 1331   Odor None 05/28/24 1331   Gina-wound Assessment Blanchable erythema;Intact 05/28/24 1331   Margins Attached edges 05/28/24 1331   Number of days: 5

## 2024-05-28 NOTE — DISCHARGE INSTRUCTIONS
Cardiac rehab is a very important way to help you  to feel better and stronger more quickly  and reduce the risks of heart problems in the future.     Cardiac rehabilitation services are provided at:  [facility]  [address] [place] [address], [address]  [place], [place] [address] [phone]    There are also many other locations that provide this service.   You can be referred to any location nearest to  where you live or work to best accommodate your needs    Someone will contact you to schedule an initial assessment to begin cardiac rehabilitation.

## 2024-05-28 NOTE — DIALYSIS
Telemetry aware patient is in dialysis. Replaced batteries as instructed    0815 - patient complaints of burning pain in abdomen where lidocaine patch are applied. Requesting patches to be removed and if she can have oral pain medication instead. HD RN called Primary Nurse at [facility] to verify.     0830 - lidocaine patches are removed. Awaiting for oral medications.

## 2024-05-28 NOTE — CONSULTS
Renal Consultation Note    NAME:  Ros Orr   :   1975   MRN:   [id number]     ATTENDING: No admitting provider for patient encounter.  PCP:  Jose Mclaughlin APRN - NP    Date/Time:  2024 3:11 PM      Subjective:   REQUESTING PHYSICIAN:  REASON FOR CONSULT:    ESRD patient.  Need for dialysis    Patient is a 49-year-old  female with past medical history of ESRD on dialysis every MWF at [facility], diabetes, hypertension, IBS, CVA who apparently came via EMS after she was not responding on the welfare check this morning.  On EMS arrival patient was apparently noted to have guppy breathing and was very pale and asystole.  ROSC was obtained in the field and patient was brought to the ER.  Nephrology consultation placed for dialysis.  Patient seen in the dialysis.  She is awake and responds to questions appropriately.  Her blood pressure is low with systolic blood pressure in the 80s range.  She has had a central line placed and about to start Levophed.  Her potassium was elevated at 8.3 but sample was hemolyzed.  Repeat potassium is 6.7.  Patient does admit to having missed several dialysis treatments or having cut them short in the past few days.  Her son recently  at the young age of 22.  She has AV fistula in the left arm as current access      Past Medical History:   Diagnosis Date    Allergic rhinitis     Blood clot in vein     Chronic kidney disease     CVA (cerebral vascular accident) (HCC) 2014    Diabetes ([facility])     Dyslipidemia     Hemodialysis patient ([facility])     Hx of blood clots     Hypertension     IBS (irritable bowel syndrome)     Ill-defined condition     Renal failure       Past Surgical History:   Procedure Laterality Date    CARDIAC PROCEDURE N/A 10/12/2023    Left heart cath / coronary angiography performed by Martir Garcia MD at [facility] CARDIAC CATH LAB    CARDIAC PROCEDURE N/A 10/12/2023    Percutaneous coronary intervention performed by 
Rfl: 3    hydrOXYzine HCl (ATARAX) 25 MG tablet, Take 1-2 tablets by mouth nightly, Disp: , Rfl:     atorvastatin (LIPITOR) 20 MG tablet, Take 1 tablet by mouth daily, Disp: , Rfl:     gabapentin (NEURONTIN) 300 MG capsule, Take 1 capsule by mouth nightly., Disp: , Rfl:     omeprazole (PRILOSEC) 20 MG delayed release capsule, Take 1 capsule by mouth daily, Disp: , Rfl:      ALLERGIES:  Allergies reviewed with the patient,  Allergies   Allergen Reactions    Fentanyl Anxiety    Sulfa Antibiotics Nausea And Vomiting    Azithromycin      Other reaction(s): Unknown (comments)    Benadryl [Diphenhydramine] Anxiety    Morphine Itching    Pseudoephedrine Nausea And Vomiting and Anxiety    .      FAMILY HISTORY:  Family history reviewed.       SOCIAL HISTORY:  Notable for tobacco use, no heavy alcohol or illicit drug use.      REVIEW OF SYSTEMS:  Complete review of systems performed, pertinents noted above, all other systems are negative.    PHYSICAL EXAMINATION:    General:  Pale, ill appearing  Cardiovascular:  RRR, no murmur  Respiratory:  Lungs are diminished  Abdomen: nontender  Extremities:  lower extremity edema  Skin:  Dry, warm  Psych:  Normal affect      Vitals:    05/22/24 1415   BP: (!) 76/40   Pulse: 69   Resp: 12   Temp:    SpO2:        Recent labs results and imaging reviewed.     Discussed case with Dr. Thomas and our impression and recommendations are as follows:  Pericardial effusion, no evidence of tamponade, no need for emergent pericardial window.  Needs HD.    Elevated troponin, 471  Trend trops to peak  No chest pain  Known CAD, most recent cath Nov. 2023 with 100% occluded left circumflex and 80% diagonal disease   Planned for ischemic evaluation with PET stress test as OP at last visit but missed her appt  Cardiomyopathy, EF 35-40% last visit, reassess EF on echo as above, volume removal with HD, no GDMT given hypotension, renal dysfunction/hyperkalemia  AMS vs asystole arrest, K 8.3 on 
performed by Kai Farmer MD at [facility] CARDIAC CATH LAB    CARDIAC PROCEDURE N/A 2023    Left heart cath / coronary angiography performed by Martir Garcia MD at [facility] CARDIAC CATH LAB    CENTRAL LINE  2023     SECTION      CHOLECYSTECTOMY      CHOLECYSTECTOMY, LAPAROSCOPIC      INVASIVE VASCULAR N/A 10/12/2023    Ultrasound guided vascular access performed by Martir Garcia MD at [facility] CARDIAC CATH LAB    INVASIVE VASCULAR N/A 2023    Ultrasound guided vascular access performed by Martir Garcia MD at [facility] CARDIAC CATH LAB    IR NONTUNNELED VASCULAR CATHETER  2024    IR NONTUNNELED VASCULAR CATHETER 2024 Jaquan Ivan Jr., APRN - NP [facility] RAD ANGIO IR    IR NONTUNNELED VASCULAR CATHETER  2024    IR NONTUNNELED VASCULAR CATHETER 2024 Jaquan Ivan Jr., APRN - NP [facility] RAD ANGIO IR    TONSILLECTOMY  1983    VASCULAR SURGERY       Family History   Problem Relation Age of Onset    Heart Disease Father     Diabetes Father     Hypertension Father     Hypertension Mother     Diabetes Mother     Heart Disease Mother       Social History     Tobacco Use    Smoking status: Never    Smokeless tobacco: Never   Substance Use Topics    Alcohol use: Never       Allergies   Allergen Reactions    Fentanyl Anxiety    Sulfa Antibiotics Nausea And Vomiting    Azithromycin      Other reaction(s): Unknown (comments)    Benadryl [Diphenhydramine] Anxiety    Morphine Itching    Pseudoephedrine Nausea And Vomiting and Anxiety     Current Facility-Administered Medications   Medication Dose Route Frequency Provider Last Rate Last Admin    ALPRAZolam (XANAX) tablet 0.25 mg  0.25 mg Oral TID PRN Senia Jordan MD   0.25 mg at 24 0115    hydrOXYzine pamoate (VISTARIL) capsule 25 mg  25 mg Oral BID Senia Jordan MD   25 mg at 24 1025    sorbitol 70 % liquid 30 mL  30 mL Oral Once Senia Jordan MD        lidocaine 4 % external patch 2 patch 
right atrium-superior   vena cava junction. No definite pneumothorax.   Increase in hazy density throughout the bilateral lungs. This may be due to   increased pulmonary edema. Left-sided pleural effusion is suspected.         IR NONTUNNELED VASCULAR CATHETER > 5 YEARS   Preliminary Result   Technically successful ultrasound guided non-tunneled central venous    catheter   placement.        A post procedure chest x-ray is pending.  The catheter may be used once   placement is confirmed.         IR NONTUNNELED VASCULAR CATHETER > 5 YEARS   Preliminary Result   Technically successful ultrasound guided non-tunneled dialysis catheter   placement.      A post procedure chest x-ray is pending.  The catheter may be used once   placement is confirmed.      CT HEAD WO CONTRAST   Final Result   1. No acute intracranial hemorrhage or infarct.    2. 8 mm rounded sellar hyperdensity, unchanged, likely representing a pituitary   adenoma. Dedicated MRI of the brain can be performed for further evaluation, if   indicated.         CTA CHEST ABDOMEN PELVIS W CONTRAST   Final Result   1.  No aortic aneurysm or dissection.   2.  Large pericardial effusion.   3.  Severe coronary calcium.   4.  Possible mild pulmonary edema.   5.  Hepatomegaly.            XR CHEST PORTABLE   Final Result   Left midlung and left basilar partial opacification, likely   representing airspace consolidation and moderate size left pleural effusion. CT   of the chest can be performed for further evaluation, as indicated.        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

## 2024-05-29 ENCOUNTER — HOSPITAL ENCOUNTER (INPATIENT)
Facility: HOSPITAL | Age: 49
LOS: 19 days | DRG: 314 | End: 2024-06-17
Attending: INTERNAL MEDICINE | Admitting: INTERNAL MEDICINE
Payer: MEDICARE

## 2024-05-29 ENCOUNTER — APPOINTMENT (OUTPATIENT)
Facility: HOSPITAL | Age: 49
DRG: 640 | End: 2024-05-29
Payer: MEDICARE

## 2024-05-29 VITALS
RESPIRATION RATE: 18 BRPM | DIASTOLIC BLOOD PRESSURE: 51 MMHG | SYSTOLIC BLOOD PRESSURE: 101 MMHG | OXYGEN SATURATION: 100 % | HEIGHT: 69 IN | HEART RATE: 73 BPM | WEIGHT: 282.9 LBS | TEMPERATURE: 97.2 F | BODY MASS INDEX: 41.9 KG/M2

## 2024-05-29 DIAGNOSIS — I46.9 CARDIAC ARREST (HCC): ICD-10-CM

## 2024-05-29 DIAGNOSIS — I31.4 CARDIAC TAMPONADE: ICD-10-CM

## 2024-05-29 DIAGNOSIS — I31.39 PERICARDIAL EFFUSION: ICD-10-CM

## 2024-05-29 DIAGNOSIS — I31.4 CARDIAC/PERICARDIAL TAMPONADE: Primary | ICD-10-CM

## 2024-05-29 DIAGNOSIS — I95.9 HYPOTENSION, UNSPECIFIED HYPOTENSION TYPE: ICD-10-CM

## 2024-05-29 DIAGNOSIS — E86.1 HYPOTENSION DUE TO HYPOVOLEMIA: ICD-10-CM

## 2024-05-29 LAB
ALBUMIN SERPL-MCNC: 3.2 G/DL (ref 3.5–5)
ALBUMIN SERPL-MCNC: 3.2 G/DL (ref 3.5–5)
ALBUMIN/GLOB SERPL: 0.7 (ref 1.1–2.2)
ALP SERPL-CCNC: 158 U/L (ref 45–117)
ALT SERPL-CCNC: 70 U/L (ref 12–78)
AMMONIA PLAS-SCNC: 20 UMOL/L
ANION GAP SERPL CALC-SCNC: 10 MMOL/L (ref 5–15)
ANION GAP SERPL CALC-SCNC: 10 MMOL/L (ref 5–15)
AST SERPL W P-5'-P-CCNC: 6 U/L (ref 15–37)
BASOPHILS # BLD: 0 K/UL (ref 0–0.1)
BASOPHILS NFR BLD: 0 % (ref 0–1)
BILIRUB SERPL-MCNC: 0.7 MG/DL (ref 0.2–1)
BUN SERPL-MCNC: 50 MG/DL (ref 6–20)
BUN SERPL-MCNC: 52 MG/DL (ref 6–20)
BUN/CREAT SERPL: 8 (ref 12–20)
BUN/CREAT SERPL: 8 (ref 12–20)
CA-I BLD-MCNC: 9.1 MG/DL (ref 8.5–10.1)
CA-I BLD-MCNC: 9.2 MG/DL (ref 8.5–10.1)
CHLORIDE SERPL-SCNC: 98 MMOL/L (ref 97–108)
CHLORIDE SERPL-SCNC: 98 MMOL/L (ref 97–108)
CK SERPL-CCNC: 38 U/L (ref 26–192)
CO2 SERPL-SCNC: 25 MMOL/L (ref 21–32)
CO2 SERPL-SCNC: 26 MMOL/L (ref 21–32)
CREAT SERPL-MCNC: 6.16 MG/DL (ref 0.55–1.02)
CREAT SERPL-MCNC: 6.2 MG/DL (ref 0.55–1.02)
DIFFERENTIAL METHOD BLD: ABNORMAL
EOSINOPHIL # BLD: 0.5 K/UL (ref 0–0.4)
EOSINOPHIL NFR BLD: 4 % (ref 0–7)
ERYTHROCYTE [DISTWIDTH] IN BLOOD BY AUTOMATED COUNT: 16.4 % (ref 11.5–14.5)
GLOBULIN SER CALC-MCNC: 4.9 G/DL (ref 2–4)
GLUCOSE BLD STRIP.AUTO-MCNC: 160 MG/DL (ref 65–100)
GLUCOSE BLD STRIP.AUTO-MCNC: 186 MG/DL (ref 65–100)
GLUCOSE BLD STRIP.AUTO-MCNC: 283 MG/DL (ref 65–117)
GLUCOSE BLD STRIP.AUTO-MCNC: 52 MG/DL (ref 65–117)
GLUCOSE BLD STRIP.AUTO-MCNC: 58 MG/DL (ref 65–117)
GLUCOSE BLD STRIP.AUTO-MCNC: 58 MG/DL (ref 65–117)
GLUCOSE BLD STRIP.AUTO-MCNC: 79 MG/DL (ref 65–117)
GLUCOSE SERPL-MCNC: 155 MG/DL (ref 65–100)
GLUCOSE SERPL-MCNC: 159 MG/DL (ref 65–100)
HCT VFR BLD AUTO: 31.7 % (ref 35–47)
HGB BLD-MCNC: 9.4 G/DL (ref 11.5–16)
IMM GRANULOCYTES # BLD AUTO: 0 K/UL
IMM GRANULOCYTES NFR BLD AUTO: 0 %
LYMPHOCYTES # BLD: 2.7 K/UL (ref 0.8–3.5)
LYMPHOCYTES NFR BLD: 21 % (ref 12–49)
MCH RBC QN AUTO: 27.1 PG (ref 26–34)
MCHC RBC AUTO-ENTMCNC: 29.7 G/DL (ref 30–36.5)
MCV RBC AUTO: 91.4 FL (ref 80–99)
METAMYELOCYTES NFR BLD MANUAL: 1 %
MONOCYTES # BLD: 0.6 K/UL (ref 0–1)
MONOCYTES NFR BLD: 5 % (ref 5–13)
NEUTS SEG # BLD: 8.8 K/UL (ref 1.8–8)
NEUTS SEG NFR BLD: 69 % (ref 32–75)
NRBC # BLD: 0.02 K/UL (ref 0–0.01)
NRBC BLD-RTO: 0.2 PER 100 WBC
PERFORMED BY:: ABNORMAL
PERFORMED BY:: ABNORMAL
PHOSPHATE SERPL-MCNC: 6.2 MG/DL (ref 2.6–4.7)
PLATELET # BLD AUTO: 410 K/UL (ref 150–400)
PMV BLD AUTO: 9.5 FL (ref 8.9–12.9)
POTASSIUM SERPL-SCNC: 3.9 MMOL/L (ref 3.5–5.1)
POTASSIUM SERPL-SCNC: 3.9 MMOL/L (ref 3.5–5.1)
PROT SERPL-MCNC: 8.1 G/DL (ref 6.4–8.2)
RBC # BLD AUTO: 3.47 M/UL (ref 3.8–5.2)
RBC MORPH BLD: ABNORMAL
SERVICE CMNT-IMP: ABNORMAL
SERVICE CMNT-IMP: NORMAL
SODIUM SERPL-SCNC: 133 MMOL/L (ref 136–145)
SODIUM SERPL-SCNC: 134 MMOL/L (ref 136–145)
TROPONIN I SERPL HS-MCNC: 231 NG/L (ref 0–51)

## 2024-05-29 PROCEDURE — 6370000000 HC RX 637 (ALT 250 FOR IP): Performed by: INTERNAL MEDICINE

## 2024-05-29 PROCEDURE — 82962 GLUCOSE BLOOD TEST: CPT

## 2024-05-29 PROCEDURE — 80069 RENAL FUNCTION PANEL: CPT

## 2024-05-29 PROCEDURE — 2580000003 HC RX 258: Performed by: INTERNAL MEDICINE

## 2024-05-29 PROCEDURE — 6370000000 HC RX 637 (ALT 250 FOR IP)

## 2024-05-29 PROCEDURE — 85025 COMPLETE CBC W/AUTO DIFF WBC: CPT

## 2024-05-29 PROCEDURE — 72192 CT PELVIS W/O DYE: CPT

## 2024-05-29 PROCEDURE — 82550 ASSAY OF CK (CPK): CPT

## 2024-05-29 PROCEDURE — 80053 COMPREHEN METABOLIC PANEL: CPT

## 2024-05-29 PROCEDURE — 2060000000 HC ICU INTERMEDIATE R&B

## 2024-05-29 PROCEDURE — 6360000002 HC RX W HCPCS: Performed by: INTERNAL MEDICINE

## 2024-05-29 PROCEDURE — 36415 COLL VENOUS BLD VENIPUNCTURE: CPT

## 2024-05-29 PROCEDURE — 6370000000 HC RX 637 (ALT 250 FOR IP): Performed by: HOSPITALIST

## 2024-05-29 PROCEDURE — 82140 ASSAY OF AMMONIA: CPT

## 2024-05-29 PROCEDURE — 84484 ASSAY OF TROPONIN QUANT: CPT

## 2024-05-29 RX ORDER — DEXTROSE MONOHYDRATE 100 MG/ML
INJECTION, SOLUTION INTRAVENOUS CONTINUOUS PRN
Status: DISCONTINUED | OUTPATIENT
Start: 2024-05-29 | End: 2024-06-18 | Stop reason: HOSPADM

## 2024-05-29 RX ORDER — ATORVASTATIN CALCIUM 20 MG/1
20 TABLET, FILM COATED ORAL DAILY
Status: DISCONTINUED | OUTPATIENT
Start: 2024-05-29 | End: 2024-06-18 | Stop reason: HOSPADM

## 2024-05-29 RX ORDER — HYDROCODONE BITARTRATE AND ACETAMINOPHEN 5; 325 MG/1; MG/1
1 TABLET ORAL EVERY 6 HOURS PRN
Status: DISCONTINUED | OUTPATIENT
Start: 2024-05-29 | End: 2024-06-07

## 2024-05-29 RX ORDER — INSULIN LISPRO 100 [IU]/ML
0-4 INJECTION, SOLUTION INTRAVENOUS; SUBCUTANEOUS NIGHTLY
Status: DISCONTINUED | OUTPATIENT
Start: 2024-05-29 | End: 2024-05-30

## 2024-05-29 RX ORDER — ASPIRIN 81 MG/1
81 TABLET, CHEWABLE ORAL DAILY
Status: DISCONTINUED | OUTPATIENT
Start: 2024-05-29 | End: 2024-06-18 | Stop reason: HOSPADM

## 2024-05-29 RX ORDER — DOCUSATE SODIUM 100 MG/1
200 CAPSULE, LIQUID FILLED ORAL 2 TIMES DAILY
Status: DISCONTINUED | OUTPATIENT
Start: 2024-05-29 | End: 2024-06-06

## 2024-05-29 RX ORDER — ATORVASTATIN CALCIUM 20 MG/1
20 TABLET, FILM COATED ORAL NIGHTLY
Status: DISCONTINUED | OUTPATIENT
Start: 2024-05-29 | End: 2024-05-29 | Stop reason: HOSPADM

## 2024-05-29 RX ORDER — CLOPIDOGREL BISULFATE 75 MG/1
75 TABLET ORAL DAILY
Status: DISCONTINUED | OUTPATIENT
Start: 2024-05-29 | End: 2024-05-29 | Stop reason: HOSPADM

## 2024-05-29 RX ORDER — CALCITRIOL 0.25 UG/1
0.25 CAPSULE, LIQUID FILLED ORAL DAILY
Status: DISCONTINUED | OUTPATIENT
Start: 2024-05-29 | End: 2024-06-18 | Stop reason: HOSPADM

## 2024-05-29 RX ORDER — INSULIN LISPRO 100 [IU]/ML
0-8 INJECTION, SOLUTION INTRAVENOUS; SUBCUTANEOUS
Status: DISCONTINUED | OUTPATIENT
Start: 2024-05-29 | End: 2024-05-30

## 2024-05-29 RX ORDER — PANTOPRAZOLE SODIUM 40 MG/1
40 TABLET, DELAYED RELEASE ORAL
Status: DISCONTINUED | OUTPATIENT
Start: 2024-05-30 | End: 2024-06-18 | Stop reason: HOSPADM

## 2024-05-29 RX ORDER — INSULIN GLARGINE 100 [IU]/ML
42 INJECTION, SOLUTION SUBCUTANEOUS NIGHTLY
Status: DISCONTINUED | OUTPATIENT
Start: 2024-05-29 | End: 2024-05-29

## 2024-05-29 RX ORDER — INSULIN LISPRO 100 [IU]/ML
25 INJECTION, SOLUTION INTRAVENOUS; SUBCUTANEOUS
Status: DISCONTINUED | OUTPATIENT
Start: 2024-05-29 | End: 2024-05-29

## 2024-05-29 RX ORDER — INSULIN LISPRO 100 [IU]/ML
0-8 INJECTION, SOLUTION INTRAVENOUS; SUBCUTANEOUS
Status: DISCONTINUED | OUTPATIENT
Start: 2024-05-29 | End: 2024-06-17

## 2024-05-29 RX ORDER — HYDROXYZINE HYDROCHLORIDE 10 MG/1
25 TABLET, FILM COATED ORAL
Status: DISCONTINUED | OUTPATIENT
Start: 2024-05-29 | End: 2024-06-18 | Stop reason: HOSPADM

## 2024-05-29 RX ORDER — GLUCAGON 1 MG/ML
1 KIT INJECTION PRN
Status: DISCONTINUED | OUTPATIENT
Start: 2024-05-29 | End: 2024-06-18 | Stop reason: HOSPADM

## 2024-05-29 RX ORDER — GABAPENTIN 300 MG/1
300 CAPSULE ORAL NIGHTLY
Status: DISCONTINUED | OUTPATIENT
Start: 2024-05-29 | End: 2024-06-06

## 2024-05-29 RX ORDER — ASPIRIN 81 MG/1
81 TABLET ORAL DAILY
Status: DISCONTINUED | OUTPATIENT
Start: 2024-05-29 | End: 2024-05-29 | Stop reason: HOSPADM

## 2024-05-29 RX ORDER — CARVEDILOL 12.5 MG/1
12.5 TABLET ORAL 2 TIMES DAILY WITH MEALS
Status: DISCONTINUED | OUTPATIENT
Start: 2024-05-29 | End: 2024-06-15

## 2024-05-29 RX ORDER — CLOPIDOGREL BISULFATE 75 MG/1
75 TABLET ORAL DAILY
Status: DISCONTINUED | OUTPATIENT
Start: 2024-05-29 | End: 2024-06-18 | Stop reason: HOSPADM

## 2024-05-29 RX ORDER — SEVELAMER CARBONATE 800 MG/1
1600 TABLET, FILM COATED ORAL
Status: DISCONTINUED | OUTPATIENT
Start: 2024-05-29 | End: 2024-06-18 | Stop reason: HOSPADM

## 2024-05-29 RX ORDER — RANOLAZINE 500 MG/1
500 TABLET, EXTENDED RELEASE ORAL 2 TIMES DAILY
Status: DISCONTINUED | OUTPATIENT
Start: 2024-05-29 | End: 2024-06-18 | Stop reason: HOSPADM

## 2024-05-29 RX ORDER — INSULIN GLARGINE 100 [IU]/ML
15 INJECTION, SOLUTION SUBCUTANEOUS NIGHTLY
Status: DISCONTINUED | OUTPATIENT
Start: 2024-05-29 | End: 2024-06-11

## 2024-05-29 RX ORDER — HEPARIN SODIUM 5000 [USP'U]/ML
5000 INJECTION, SOLUTION INTRAVENOUS; SUBCUTANEOUS EVERY 8 HOURS SCHEDULED
Status: DISCONTINUED | OUTPATIENT
Start: 2024-05-29 | End: 2024-06-18 | Stop reason: HOSPADM

## 2024-05-29 RX ORDER — INSULIN LISPRO 100 [IU]/ML
0-4 INJECTION, SOLUTION INTRAVENOUS; SUBCUTANEOUS NIGHTLY
Status: DISCONTINUED | OUTPATIENT
Start: 2024-05-29 | End: 2024-06-18 | Stop reason: HOSPADM

## 2024-05-29 RX ADMIN — HYDROCODONE BITARTRATE AND ACETAMINOPHEN 1 TABLET: 5; 325 TABLET ORAL at 11:12

## 2024-05-29 RX ADMIN — CARVEDILOL 12.5 MG: 12.5 TABLET, FILM COATED ORAL at 18:08

## 2024-05-29 RX ADMIN — CLOPIDOGREL BISULFATE 75 MG: 75 TABLET ORAL at 14:46

## 2024-05-29 RX ADMIN — SEVELAMER CARBONATE 1600 MG: 800 TABLET, FILM COATED ORAL at 11:13

## 2024-05-29 RX ADMIN — SODIUM CHLORIDE, PRESERVATIVE FREE 10 ML: 5 INJECTION INTRAVENOUS at 09:09

## 2024-05-29 RX ADMIN — INSULIN GLARGINE 15 UNITS: 100 INJECTION, SOLUTION SUBCUTANEOUS at 21:30

## 2024-05-29 RX ADMIN — HYDROXYZINE PAMOATE 25 MG: 25 CAPSULE ORAL at 09:09

## 2024-05-29 RX ADMIN — HYDROCODONE BITARTRATE AND ACETAMINOPHEN 1 TABLET: 5; 325 TABLET ORAL at 05:43

## 2024-05-29 RX ADMIN — HEPARIN SODIUM 5000 UNITS: 5000 INJECTION INTRAVENOUS; SUBCUTANEOUS at 05:43

## 2024-05-29 RX ADMIN — ATORVASTATIN CALCIUM 20 MG: 20 TABLET, FILM COATED ORAL at 18:08

## 2024-05-29 RX ADMIN — SEVELAMER CARBONATE 1600 MG: 800 TABLET, FILM COATED ORAL at 18:08

## 2024-05-29 RX ADMIN — GABAPENTIN 300 MG: 300 CAPSULE ORAL at 21:25

## 2024-05-29 RX ADMIN — INSULIN LISPRO 10 UNITS: 100 INJECTION, SOLUTION INTRAVENOUS; SUBCUTANEOUS at 11:58

## 2024-05-29 RX ADMIN — SEVELAMER CARBONATE 1600 MG: 800 TABLET, FILM COATED ORAL at 09:09

## 2024-05-29 RX ADMIN — HYDROCODONE BITARTRATE AND ACETAMINOPHEN 1 TABLET: 5; 325 TABLET ORAL at 21:26

## 2024-05-29 RX ADMIN — HEPARIN SODIUM 5000 UNITS: 5000 INJECTION INTRAVENOUS; SUBCUTANEOUS at 14:45

## 2024-05-29 RX ADMIN — ASPIRIN 81 MG CHEWABLE TABLET 81 MG: 81 TABLET CHEWABLE at 18:08

## 2024-05-29 RX ADMIN — ASPIRIN 81 MG: 81 TABLET, COATED ORAL at 14:46

## 2024-05-29 RX ADMIN — CLOPIDOGREL BISULFATE 75 MG: 75 TABLET ORAL at 18:09

## 2024-05-29 RX ADMIN — INSULIN LISPRO 10 UNITS: 100 INJECTION, SOLUTION INTRAVENOUS; SUBCUTANEOUS at 09:08

## 2024-05-29 RX ADMIN — HYDROXYZINE HYDROCHLORIDE 25 MG: 10 TABLET ORAL at 21:28

## 2024-05-29 RX ADMIN — CALCITRIOL CAPSULES 0.25 MCG 0.25 MCG: 0.25 CAPSULE ORAL at 18:08

## 2024-05-29 ASSESSMENT — PAIN DESCRIPTION - LOCATION
LOCATION: HIP
LOCATION: FLANK
LOCATION: HIP
LOCATION: BACK;HIP
LOCATION: BACK

## 2024-05-29 ASSESSMENT — PAIN DESCRIPTION - ONSET: ONSET: ON-GOING

## 2024-05-29 ASSESSMENT — PAIN SCALES - GENERAL
PAINLEVEL_OUTOF10: 10
PAINLEVEL_OUTOF10: 0
PAINLEVEL_OUTOF10: 0
PAINLEVEL_OUTOF10: 6
PAINLEVEL_OUTOF10: 0
PAINLEVEL_OUTOF10: 6
PAINLEVEL_OUTOF10: 0
PAINLEVEL_OUTOF10: 8
PAINLEVEL_OUTOF10: 2
PAINLEVEL_OUTOF10: 10
PAINLEVEL_OUTOF10: 2
PAINLEVEL_OUTOF10: 6

## 2024-05-29 ASSESSMENT — PAIN DESCRIPTION - ORIENTATION
ORIENTATION: LEFT

## 2024-05-29 ASSESSMENT — PAIN DESCRIPTION - PAIN TYPE: TYPE: CHRONIC PAIN

## 2024-05-29 ASSESSMENT — PAIN DESCRIPTION - DESCRIPTORS
DESCRIPTORS: STABBING;ACHING
DESCRIPTORS: ACHING
DESCRIPTORS: ACHING;DISCOMFORT

## 2024-05-29 ASSESSMENT — PAIN DESCRIPTION - FREQUENCY: FREQUENCY: CONTINUOUS

## 2024-05-29 NOTE — PLAN OF CARE
Problem: Discharge Planning  Goal: Discharge to home or other facility with appropriate resources  5/22/2024 2250 by Ashly Payton RN  Outcome: Progressing  Flowsheets (Taken 5/22/2024 2000)  Discharge to home or other facility with appropriate resources:   Identify barriers to discharge with patient and caregiver   Identify discharge learning needs (meds, wound care, etc)   Refer to discharge planning if patient needs post-hospital services based on physician order or complex needs related to functional status, cognitive ability or social support system  5/22/2024 1751 by Kota Tracy, RN  Outcome: Progressing     Problem: Skin/Tissue Integrity  Goal: Absence of new skin breakdown  Description: 1.  Monitor for areas of redness and/or skin breakdown  2.  Assess vascular access sites hourly  3.  Every 4-6 hours minimum:  Change oxygen saturation probe site  4.  Every 4-6 hours:  If on nasal continuous positive airway pressure, respiratory therapy assess nares and determine need for appliance change or resting period.  5/22/2024 2250 by Ashly Payton RN  Outcome: Progressing  5/22/2024 1751 by Kota Tracy RN  Outcome: Progressing     Problem: Safety - Adult  Goal: Free from fall injury  5/22/2024 2250 by Ashly Payton RN  Outcome: Progressing  Flowsheets (Taken 5/22/2024 2000)  Free From Fall Injury:   Instruct family/caregiver on patient safety   Based on caregiver fall risk screen, instruct family/caregiver to ask for assistance with transferring infant if caregiver noted to have fall risk factors  5/22/2024 1751 by Kota Tracy RN  Outcome: Progressing     Problem: ABCDS Injury Assessment  Goal: Absence of physical injury  5/22/2024 2250 by Ashly Payton RN  Outcome: Progressing  Flowsheets (Taken 5/22/2024 2000)  Absence of Physical Injury: Implement safety measures based on patient assessment  5/22/2024 1751 by Kota Tracy RN  Outcome: Progressing     Problem: 
  Problem: Discharge Planning  Goal: Discharge to home or other facility with appropriate resources  5/26/2024 0814 by Yen Calixto RN  Outcome: Progressing  5/25/2024 2232 by Olivia Ivan RN  Outcome: Progressing  Flowsheets (Taken 5/25/2024 2046)  Discharge to home or other facility with appropriate resources: Identify barriers to discharge with patient and caregiver     Problem: Skin/Tissue Integrity  Goal: Absence of new skin breakdown  Description: 1.  Monitor for areas of redness and/or skin breakdown  2.  Assess vascular access sites hourly  3.  Every 4-6 hours minimum:  Change oxygen saturation probe site  4.  Every 4-6 hours:  If on nasal continuous positive airway pressure, respiratory therapy assess nares and determine need for appliance change or resting period.  5/26/2024 0814 by Yen Calixto RN  Outcome: Progressing  5/25/2024 2232 by Olivia Ivan RN  Outcome: Progressing     Problem: Safety - Adult  Goal: Free from fall injury  5/26/2024 0814 by Yen Calixto RN  Outcome: Progressing  5/25/2024 2232 by Olivia Ivan RN  Outcome: Progressing     Problem: ABCDS Injury Assessment  Goal: Absence of physical injury  5/26/2024 0814 by Yen Calixto RN  Outcome: Progressing  5/25/2024 2232 by Olivia Ivan RN  Outcome: Progressing     Problem: Neurosensory - Adult  Goal: Achieves stable or improved neurological status  5/26/2024 0814 by Yen Calixto RN  Outcome: Progressing  5/25/2024 2232 by Olivia Ivan RN  Outcome: Progressing  Flowsheets (Taken 5/25/2024 2046)  Achieves stable or improved neurological status: Assess for and report changes in neurological status  Goal: Absence of seizures  5/26/2024 0814 by Yen Calixto RN  Outcome: Progressing  5/25/2024 2232 by Olivia Ivan RN  Outcome: Progressing  Flowsheets (Taken 5/25/2024 2046)  Absence of seizures: Monitor for seizure activity.  If seizure 
  Problem: Discharge Planning  Goal: Discharge to home or other facility with appropriate resources  Outcome: Progressing     Problem: Skin/Tissue Integrity  Goal: Absence of new skin breakdown  Description: 1.  Monitor for areas of redness and/or skin breakdown  2.  Assess vascular access sites hourly  3.  Every 4-6 hours minimum:  Change oxygen saturation probe site  4.  Every 4-6 hours:  If on nasal continuous positive airway pressure, respiratory therapy assess nares and determine need for appliance change or resting period.  Outcome: Progressing     Problem: Safety - Adult  Goal: Free from fall injury  Outcome: Progressing     Problem: ABCDS Injury Assessment  Goal: Absence of physical injury  Outcome: Progressing     Problem: Neurosensory - Adult  Goal: Achieves stable or improved neurological status  Outcome: Progressing     Problem: Respiratory - Adult  Goal: Achieves optimal ventilation and oxygenation  Outcome: Progressing     Problem: Cardiovascular - Adult  Goal: Maintains optimal cardiac output and hemodynamic stability  Outcome: Progressing  Goal: Absence of cardiac dysrhythmias or at baseline  Outcome: Progressing     Problem: Skin/Tissue Integrity - Adult  Goal: Skin integrity remains intact  Outcome: Progressing     
  Problem: Discharge Planning  Goal: Discharge to home or other facility with appropriate resources  Outcome: Progressing     Problem: Skin/Tissue Integrity  Goal: Absence of new skin breakdown  Description: 1.  Monitor for areas of redness and/or skin breakdown  2.  Assess vascular access sites hourly  3.  Every 4-6 hours minimum:  Change oxygen saturation probe site  4.  Every 4-6 hours:  If on nasal continuous positive airway pressure, respiratory therapy assess nares and determine need for appliance change or resting period.  Outcome: Progressing     Problem: Safety - Adult  Goal: Free from fall injury  Outcome: Progressing     Problem: ABCDS Injury Assessment  Goal: Absence of physical injury  Outcome: Progressing     Problem: Neurosensory - Adult  Goal: Achieves stable or improved neurological status  Outcome: Progressing  Goal: Absence of seizures  Outcome: Progressing  Goal: Remains free of injury related to seizures activity  Outcome: Progressing  Goal: Achieves maximal functionality and self care  Outcome: Progressing     Problem: Respiratory - Adult  Goal: Achieves optimal ventilation and oxygenation  Outcome: Progressing     Problem: Cardiovascular - Adult  Goal: Maintains optimal cardiac output and hemodynamic stability  Outcome: Progressing  Goal: Absence of cardiac dysrhythmias or at baseline  Outcome: Progressing     Problem: Skin/Tissue Integrity - Adult  Goal: Skin integrity remains intact  Outcome: Progressing  Goal: Incisions, wounds, or drain sites healing without S/S of infection  Outcome: Progressing  Goal: Oral mucous membranes remain intact  Outcome: Progressing     Problem: Musculoskeletal - Adult  Goal: Return mobility to safest level of function  Outcome: Progressing  Goal: Maintain proper alignment of affected body part  Outcome: Progressing  Goal: Return ADL status to a safe level of function  Outcome: Progressing     Problem: Gastrointestinal - Adult  Goal: Minimal or absence of nausea 
  Problem: Discharge Planning  Goal: Discharge to home or other facility with appropriate resources  Outcome: Progressing  Flowsheets (Taken 5/27/2024 2020)  Discharge to home or other facility with appropriate resources:   Identify barriers to discharge with patient and caregiver   Arrange for needed discharge resources and transportation as appropriate   Identify discharge learning needs (meds, wound care, etc)     Problem: Skin/Tissue Integrity  Goal: Absence of new skin breakdown  Description: 1.  Monitor for areas of redness and/or skin breakdown  2.  Assess vascular access sites hourly  3.  Every 4-6 hours minimum:  Change oxygen saturation probe site  4.  Every 4-6 hours:  If on nasal continuous positive airway pressure, respiratory therapy assess nares and determine need for appliance change or resting period.  Outcome: Progressing     Problem: Safety - Adult  Goal: Free from fall injury  Outcome: Progressing     Problem: ABCDS Injury Assessment  Goal: Absence of physical injury  Outcome: Progressing     Problem: Neurosensory - Adult  Goal: Achieves stable or improved neurological status  Outcome: Progressing  Flowsheets (Taken 5/27/2024 2020)  Achieves stable or improved neurological status: Assess for and report changes in neurological status  Goal: Absence of seizures  Outcome: Progressing  Goal: Remains free of injury related to seizures activity  Outcome: Progressing  Goal: Achieves maximal functionality and self care  Outcome: Progressing  Flowsheets (Taken 5/27/2024 2020)  Achieves maximal functionality and self care: Monitor swallowing and airway patency with patient fatigue and changes in neurological status     Problem: Respiratory - Adult  Goal: Achieves optimal ventilation and oxygenation  Outcome: Progressing  Flowsheets (Taken 5/27/2024 2020)  Achieves optimal ventilation and oxygenation: Assess for changes in respiratory status     Problem: Cardiovascular - Adult  Goal: Maintains optimal 
PHYSICAL THERAPY EVALUATION  Patient: Ros Orr (49 y.o. female)  Date: 5/27/2024  Primary Diagnosis: Hypotension [I95.9]  Cardiac arrest (HCC) [I46.9]       Precautions: Fall Risk, General Precautions                      Recommendations for nursing mobility: Out of bed to chair for meals, Encourage HEP in prep for ADLs/mobility; see handout for details, LE elevation for management of edema, Amb to bathroom with AD and gait belt, and Assist x1    In place during session: EKG/telemetry     ASSESSMENT  Pt is a 49 y.o. female admitted on 5/22/2024 for cardiac arrest; pt currently being treated for ESRD on chronic hemodialysis, hyperkalemia, pericardial effusion, pleural effusion, DM, adjustment disorder, hepatomagaly, pituitary adenoma, morbid obesity, and left-sided ulcerated cellulitis. Pt sitting EOB upon PT arrival, agreeable to evaluation. Pt A&O x 4.     Based on the objective data described below, the patient currently presents with impaired functional mobility, decreased independence in ADLs, decreased ROM, impaired strength, poor body mechanics, decreased activity tolerance, impaired balance, impaired posture, and increased pain levels. (See below for objective details and assist levels).     Overall pt tolerated session fairly today with 10/10 pain levels that impacted participation in PT. Mod A for bed mobility d/t poor LE management. STS CGA. Pt amb 6 feet with RW, gt belt and CGA: 3 ft forward stating she couldn't go any further then 3 ft retro back to bed; demonstrates heavy ANTOINE Trendelenberg with circumduction, poor ANTOINE knee swing, and shuffling. After gait, pt agreeable to seated LE HEP. Pt will benefit from continued skilled PT to address above deficits and return to PLOF. Potential barriers for safe discharge: pts current support system is unable to meet their requirements for physical assistance, pt is not safe to be alone, and concern for pt safely navigating or managing the home environment. 
respiratory status   Assess for changes in mentation and behavior   Position to facilitate oxygenation and minimize respiratory effort   Oxygen supplementation based on oxygen saturation or arterial blood gases   Encourage broncho-pulmonary hygiene including cough, deep breathe, incentive spirometry   Assess and instruct to report shortness of breath or any respiratory difficulty   Respiratory therapy support as indicated     Problem: Cardiovascular - Adult  Goal: Maintains optimal cardiac output and hemodynamic stability  Recent Flowsheet Documentation  Taken 5/23/2024 2000 by Patrcie Ordaz RN  Maintains optimal cardiac output and hemodynamic stability:   Monitor blood pressure and heart rate   Monitor urine output and notify Licensed Independent Practitioner for values outside of normal range   Assess for signs of decreased cardiac output  Taken 5/23/2024 0800 by Thiago Patrick RN  Maintains optimal cardiac output and hemodynamic stability:   Monitor blood pressure and heart rate   Monitor urine output and notify Licensed Independent Practitioner for values outside of normal range   Assess for signs of decreased cardiac output   Administer vasoactive medications as ordered  Goal: Absence of cardiac dysrhythmias or at baseline  Recent Flowsheet Documentation  Taken 5/23/2024 2000 by Patrice Ordaz RN  Absence of cardiac dysrhythmias or at baseline:   Monitor cardiac rate and rhythm   Assess for signs of decreased cardiac output   Administer antiarrhythmia medication and electrolyte replacement as ordered  Taken 5/23/2024 0800 by Thiago Patrick RN  Absence of cardiac dysrhythmias or at baseline:   Monitor cardiac rate and rhythm   Assess for signs of decreased cardiac output     Problem: Skin/Tissue Integrity - Adult  Goal: Skin integrity remains intact  Recent Flowsheet Documentation  Taken 5/23/2024 2000 by Patrice Ordaz RN  Skin Integrity Remains Intact:   Monitor for areas of redness and/or skin 
limits: Monitor labs and assess patient for signs and symptoms of electrolyte imbalances  Goal: Hemodynamic stability and optimal renal function maintained  Outcome: Progressing  Flowsheets (Taken 5/25/2024 2046)  Hemodynamic stability and optimal renal function maintained: Monitor labs and assess for signs and symptoms of volume excess or deficit  Goal: Glucose maintained within prescribed range  Outcome: Progressing  Flowsheets (Taken 5/25/2024 2046)  Glucose maintained within prescribed range: Monitor blood glucose as ordered     Problem: Hematologic - Adult  Goal: Maintains hematologic stability  Outcome: Progressing  Flowsheets (Taken 5/25/2024 2046)  Maintains hematologic stability: Assess for signs and symptoms of bleeding or hemorrhage     Problem: Chronic Conditions and Co-morbidities  Goal: Patient's chronic conditions and co-morbidity symptoms are monitored and maintained or improved  Outcome: Progressing  Flowsheets (Taken 5/25/2024 2046)  Care Plan - Patient's Chronic Conditions and Co-Morbidity Symptoms are Monitored and Maintained or Improved: Monitor and assess patient's chronic conditions and comorbid symptoms for stability, deterioration, or improvement     Problem: Pain  Goal: Verbalizes/displays adequate comfort level or baseline comfort level  Outcome: Progressing  Flowsheets (Taken 5/25/2024 2000)  Verbalizes/displays adequate comfort level or baseline comfort level: Encourage patient to monitor pain and request assistance     
contributing to decreased intake, treat as appropriate   Assist with meals as needed  Goal: Establish and maintain optimal ostomy function  Recent Flowsheet Documentation  Taken 5/24/2024 2000 by Patrice Ordaz RN  Establish and maintain optimal ostomy function:   Monitor output from ostomies   Administer IV fluids and TPN as ordered   Introduce and advance enteral feedings as ordered     Problem: Genitourinary - Adult  Goal: Absence of urinary retention  Recent Flowsheet Documentation  Taken 5/24/2024 2000 by Patrice Ordaz RN  Absence of urinary retention:   Monitor intake/output and perform bladder scan as needed   Place urinary catheter per Licensed Independent Practitioner order if needed  Goal: Urinary catheter remains patent  Recent Flowsheet Documentation  Taken 5/24/2024 2000 by Patrice Ordaz RN  Urinary catheter remains patent:   Assess patency of urinary catheter   Irrigate catheter per Licensed Independent Practitioner order if indicated and notify Licensed Independent Practitioner if unable to irrigate   Assess need for a larger catheter size or a 3-way catheter for continuous bladder irrigation     Problem: Infection - Adult  Goal: Absence of infection at discharge  Recent Flowsheet Documentation  Taken 5/24/2024 2000 by Patrice Ordaz RN  Absence of infection at discharge:   Assess and monitor for signs and symptoms of infection   Monitor lab/diagnostic results   Monitor all insertion sites i.e., indwelling lines, tubes and drains  Goal: Absence of infection during hospitalization  Recent Flowsheet Documentation  Taken 5/24/2024 2000 by Patrice Ordaz RN  Absence of infection during hospitalization:   Assess and monitor for signs and symptoms of infection   Monitor lab/diagnostic results   Monitor all insertion sites i.e., indwelling lines, tubes and drains  Goal: Absence of fever/infection during anticipated neutropenic period  Recent Flowsheet Documentation  Taken 5/24/2024 2000 by

## 2024-05-29 NOTE — H&P
Hospitalist Admission Note    NAME:   Ros Orr   : 1975   MRN: [id number]     Date/Time: 2024 5:13 PM    Patient PCP: Jose Mclaughlin APRN - NP primary nephrologist Dr Joseph Guallpa    ______________________________________________________________________  Given the patient's current clinical presentation, I have a high level of concern for decompensation if discharged from the emergency department.  Complex decision making was performed, which includes reviewing the patient's available past medical records, laboratory results, and x-ray films.       My assessment of this patient's clinical condition and my plan of care is as follows.    Assessment / Plan:    Worsening pericardial effusion POA  Impending cardiac tamponade POA  Cardiac Arrest POA  Due to acute hyperkalemia-resolved  Due to missed dialysis in ESRD on  schedule  Repeat echo  shows EF of 50 to 55% with greater than 2 cm pericardial effusion with no signs of overt tamponade, increased in size of pericardial effusion     Patient transferred from [facility] to [facility] for worsening pericardial effusion needing pericardial window by cardiac surgery.  Patient spoken to with Dr. Rambo Herrmann-irma patient transferred to [facility] for evaluation and further treatment.  Inpatient cardiac vascular surgery consulted-for evaluation for pericardial window.  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 after midnight for further surgical plans by cardiovascular surgery.  Fingersticks before every meal and at bedtime  Sliding scale lispro as patient hypoglycemic on arrival, we will hold off on scheduled lispro with meals  Continue lower dose of Lantus tonight, titrate up as

## 2024-05-29 NOTE — PROGRESS NOTES
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 of Systems  Objective         Vitals Last 24 Hours:  TEMPERATURE:  Temp  Av.9 °F (36.6 °C)  Min: 97.7 °F (36.5 °C)  Max: 98.4 °F (36.9 °C)  RESPIRATIONS RANGE: Resp  Av.3  Min: 16  Max: 19  PULSE OXIMETRY RANGE: SpO2  Av.5 %  Min: 93 %  Max: 98 %  PULSE RANGE: Pulse  Av.1  Min: 66  Max: 122  BLOOD PRESSURE RANGE: Systolic (24hrs), Av , Min:71 , Max:161   ; Diastolic (24hrs), Av, Min:31, Max:79    I/O (24Hr):    Intake/Output Summary (Last 24 hours) at 2024 0939  Last data filed at 2024 1115  Gross per 24 hour   Intake 600 ml   Output 4500 ml   Net -3900 ml     Objective  Adult obese  female lying in bed comfortably no distress  HEENT normocephalic atraumatic anicteric sclera  Neck without any distended neck vein  Lungs are clear bilaterally anteriorly no wheeze or crackles  Heart S1-S2 regular  Abdomen soft positive bowel sounds no tenderness.  Difficult to assess for organomegaly  Lower extremities with edema.  Some scattered skin ecchymosis  CNS alert and oriented x 3 moves extremities    Labs/Imaging/Diagnostics    Labs:  CBC:  Recent Labs     24  0255   WBC 13.5*   RBC 3.37*   HGB 9.2*   HCT 30.0*   MCV 89.0   RDW 16.3*        CHEMISTRIES:  Recent Labs     24  0255   *   K 4.4      CO2 24   BUN 43*   CREATININE 4.99*   GLUCOSE 89   PHOS 4.8*   MG 1.8     PT/INR:No results for 
Progress Note  Date:2024       Room:[facility]  Patient Name:Ros Orr     YOB: 1975     Age:49 y.o.        Subjective    Subjective patient is sitting in the bed.  She denies any shortness of breath or chest pain.  Left side ulcerated area is  to touch.  No fever or chills.  Off CRRT.  Appetite not good.  She does not like renal diet.  No nausea or vomiting.  Still has not had a bowel movement.  Review of Systems  Objective         Vitals Last 24 Hours:  TEMPERATURE:  Temp  Av.7 °F (37.1 °C)  Min: 97.1 °F (36.2 °C)  Max: 100 °F (37.8 °C)  RESPIRATIONS RANGE: Resp  Avg: 15.4  Min: 9  Max: 27  PULSE OXIMETRY RANGE: SpO2  Av.7 %  Min: 77 %  Max: 100 %  PULSE RANGE: Pulse  Av.1  Min: 74  Max: 106  BLOOD PRESSURE RANGE: Systolic (24hrs), Av , Min:87 , Max:136   ; Diastolic (24hrs), Av, Min:30, Max:109    I/O (24Hr):    Intake/Output Summary (Last 24 hours) at 2024 1116  Last data filed at 2024 0825  Gross per 24 hour   Intake 170.98 ml   Output 61 ml   Net 109.98 ml     Objective  Morbidly obese  female sitting in bed comfortably no distress  HEENT normocephalic atraumatic anicteric sclera  Neck obese no distended  Lungs are clear bilaterally no wheeze or crackles  Heart S1-S2 regular no murmur gallop abdomen obese positive bowel sounds, nontender nondistended.  Lower extremities no edema  CNS alert and oriented to person place follows commands moves extremities.  Psych cooperative    Diagnostics    Labs:  CBC:  Recent Labs     24  1246 24  0310 24  0238   WBC 13.2* 15.4* 15.7*   RBC 3.15* 3.38* 3.35*   HGB 8.8* 9.3* 9.0*   HCT 28.9* 30.7* 29.8*   MCV 91.7 90.8 89.0   RDW 16.4* 16.2* 16.0*    344 336     CHEMISTRIES:  Recent Labs     24  0855 24  1230 243 24  0238   * 137 136 135*   K 4.4 4.1 4.5 4.4    104 105 105   CO2    BUN 59* 57* 65* 66*   CREATININE 5.91* 5.77* 6.47* 
Progress Note  Date:2024       Room:[facility]/  Patient Name:Ros Orr     YOB: 1975     Age:49 y.o.        Subjective    Subjective rooms changed.  Uneventful night.  Patient is still on pressor support.  Currently undergoing hemodialysis target of 2 kg fluid removal.  I did speak with nurse to wean off the pressor while on dialysis tomorrow be able to adjust the fluid removal based on blood pressure.  Patient denies any chest pain or shortness of breath.  She is alert.  She was on the phone communicating situations of her estranged  with another person.  Review of Systems  Objective         Vitals Last 24 Hours:  TEMPERATURE:  Temp  Av.8 °F (36.6 °C)  Min: 97.2 °F (36.2 °C)  Max: 98.4 °F (36.9 °C)  RESPIRATIONS RANGE: Resp  Av  Min: 7  Max: 26  PULSE OXIMETRY RANGE: SpO2  Av.8 %  Min: 92 %  Max: 100 %  PULSE RANGE: Pulse  Av.4  Min: 78  Max: 89  BLOOD PRESSURE RANGE: Systolic (24hrs), Av , Min:68 , Max:139   ; Diastolic (24hrs), Av, Min:39, Max:91    I/O (24Hr):    Intake/Output Summary (Last 24 hours) at 2024 0914  Last data filed at 2024 0600  Gross per 24 hour   Intake 653.28 ml   Output 0 ml   Net 653.28 ml     Objective:  Vital signs: (most recent): Blood pressure (!) 127/57, pulse 87, temperature 98.4 °F (36.9 °C), temperature source Rectal, resp. rate 19, height 1.753 m (5' 9\"), weight 124.7 kg (274 lb 14.6 oz), SpO2 100 %.      Adult obese  female undergoing hemodialysis comfortably no distress  HEENT normocephalic atraumatic anicteric sclera  Neck is fat  Lungs are clear bilaterally no wheeze or crackles  Heart S1-S2 regular  Abdomen soft obese nontender nondistended positive bowel sounds.  Left side of the abdomen with large ecchymosis and the ulcerated area with a patch over it.  Lower extremities with edema  CNS alert and oriented x 3 follows command  Psych cooperative  Labs/Imaging/Diagnostics    Labs:  CBC:  Recent Labs     
Pt was moved from room 266 this morning to room 271 for dialysis treatment.  Pt received dialysis treatment this morning.  Also; trialysis line was removed by Fatoum[facility] CREWS after dialysis  treatment.  Pt tolerated well.  Levo drip was discontinued.     1900-Pt's bp dropped while up in chair.  Pt assisted back to bed using her walker; denies dizzziness.  Bp 98/75 after reclining in bed.  
Transfer order noted to [facility] for pericardial window.     1515: Report called to Amisha Crespo RN at [facility]. All questions answered.     1540: [facility] on unit and transported to [facility]. EMTALA form signed by patient, physician, nurse, and CCL and provided to transport personnel. Patient sent with right upper arm midline.   
[facility] SURGERY PROGRESS NOTE        Chief Complaint: non healing wound x 3 months         Subjective:  Ms. Ros Orr is a 49 y.o. year old  female who was admitted for sepsis to possible PNA s/p cardiac arrest. She also has a non-healing wound on left flank that has been present for 3 months, thus general surgery was consulted. She has gone to various ERs in the area for this issue and has tried lidocaine and neosporin to treat this wound with no improvement. She says that laying on the wound makes it more painful.       Today, her pain is unchanged on her left flank. She describes it as \"stingy and burning.\" She is sitting up on the edge of the bed upon exam and seems to overall feeling much better as long as she stays off her left side. Wound care has not been by the change dressing today yet.       Review of Systems:   Constitutional:  no fever, no chills,  no sweats, No weakness, No fatigue, No decreased activity.  Respiratory: No shortness of breath, No cough, No sputum production, No hemoptysis, No wheezing, No cyanosis.  Cardiovascular: No chest pain, No palpitations, No bradycardia, No tachycardia, No peripheral edema, No syncope.  Gastrointestinal: No nausea, No vomiting, No diarrhea, No constipation, No heartburn, No abdominal pain.  Genitourinary: No dysuria, No hematuria, No change in urine stream, No urethral discharge, No lesions.  Hematology/Lymphatics: No bruising tendency, No bleeding tendency, No petechiae, No swollen lymph glands.  Endocrine: No excessive thirst, No polyuria, No cold intolerance, No heat intolerance, No excessive hunger.  Musculoskeletal: No back pain, No neck pain, No joint pain, No muscle pain, No claudication, No decreased range of motion, No trauma.  Integumentary: No rash, No pruritus, No abrasions.  Neurologic: Alert and oriented X4, No abnormal balance, No headache, No confusion, No numbness, No tingling.  Psychiatric: No anxiety, No depression, No rivera.      Physical 
Vancomycin Dosing Consult  Ros Orr is a 49 y.o. female with sepsis secondary to possible PNA s/p cardiac arrest. Pharmacy was consulted by Dr. Jordan to dose and monitor vancomycin. Today is day 0.    Antibiotic regimen: Vancomycin + Cefepime    Temp (24hrs), Av.6 °F (36.4 °C), Min:97.4 °F (36.3 °C), Max:97.7 °F (36.5 °C)    Recent Labs     24  1243 24  1246   WBC  --  13.2*   CREATININE 10.28* 10.70*   BUN  --  95*     Est CrCl: ESRD on HD MWF (outpatient); Initiating CRRT (CVVHDF) on   Concomitant nephrotoxic drugs: None    Cultures:    Blood: Pending    MRSA Swab: Pending    Target range: Trough 15-20 mcg/mL (Continuous Renal Replacement Therapy)    Recent level history:  Date/Time Dose & Interval Measured Level (mcg/mL) Associated AUC/KRISTA              Assessment/Plan:   Pt presented d/t cardiac arrest PTA. Has missed her last 3-4 dialysis sessions reportedly and met sepsis criteria upon ED arrival. Vancomycin and cefepime started for empiric coverage for possible PNA with aspiration concerns.   ESRD on HD MWF (outpatient). Starting on CRRT (CVVHDF) given blood pressure concerns tonight (). Dialysate flow rate of 1000mL/hr  Loading dose of vancomycin 2500mg given earlier today  Level scheduled for  @ 1200 to evaluate clearance upon initiation of CRRT  Antimicrobial stop date: TBD       
Vancomycin Dosing Consult  Ros Orr is a 49 y.o. female with sepsis secondary to possible PNA s/p cardiac arrest. Pharmacy was consulted by Dr. Jordan to dose and monitor vancomycin. Today is day 1.    Antibiotic regimen: Vancomycin + Cefepime    Temp (24hrs), Av.1 °F (35.6 °C), Min:93.8 °F (34.3 °C), Max:98.9 °F (37.2 °C)    Recent Labs     24  1246 24  1950 24  0310 24  0855 24  1230   WBC 13.2*  --  15.4*  --   --    CREATININE 10.70*   < > 7.29* 5.91* 5.77*   BUN 95*   < > 72* 59* 57*    < > = values in this interval not displayed.     Est CrCl: ESRD on HD MWF (outpatient);   Concomitant nephrotoxic drugs: None    Cultures:    Blood: NGTD, prelim    MRSA Swab: Not detected     Target range:  Pre-HD level 15-20 mcg/ml    Recent level history:  Date/Time Dose & Interval Measured Level (mcg/mL) Associated AUC/KRISTA    1230 2500 mg x 1 18.2 N/a        Assessment/Plan:   CRRT discontinued.   Level is ready for re-dosing after next HD session.  Repeat level is scheduled for  pre-HD  Antimicrobial stop date: TBD     Katharina García, PharmD, BCPS, BCCCP  Clinical Pharmacist   [phone]      
Vancomycin Dosing Consult  Ros Orr is a 49 y.o. female with sepsis secondary to possible PNA s/p cardiac arrest. Pharmacy was consulted by Dr. Jordan to dose and monitor vancomycin. Today is day 1.    Antibiotic regimen: Vancomycin monotherapy    Temp (24hrs), Av.7 °F (36.5 °C), Min:97.2 °F (36.2 °C), Max:98.4 °F (36.9 °C)    Recent Labs     24  1246 24  1950 24  0310 24  0855 24  0238 24  1130 24  1457 24  0300   WBC 13.2*  --  15.4*  --  15.7*  --   --   --    CREATININE 10.70*   < > 7.29*   < > 6.73* 7.16* 7.14* 7.41*   BUN 95*   < > 72*   < > 66* 70* 69* 78*    < > = values in this interval not displayed.     Est CrCl: ESRD on HD MWF (outpatient); missed HD on , off schedule HD on , then resuming MWF  Concomitant nephrotoxic drugs: None    Cultures:    Blood: NGTD x 3 days    MRSA Swab: Not detected     Target range:  Pre-HD level 15-20 mcg/ml    Recent level history:  Date/Time Dose & Interval Measured Level (mcg/mL) Associated AUC/KRISTA    1230 2500 mg x 1 18.2         Assessment/Plan:   Remains on empiric vancomycin, but with cultures NGTD, may be able to consider stopping vancomycin. Uncertain of any source of infection currently. Cefepime stopped earlier this AM.   Off CRRT, back on HD and dialyzing today x 3.5 hrs  Most recent level obtained back on  resulting at 18.2  Redose vancomycin after HD today with 1000mg x 1 dose  Next level scheduled for  @ 0600 (Pre-HD)  Antimicrobial stop date: TBD           
Vancomycin Dosing Consult  Ros Orr is a 49 y.o. female with sepsis secondary to possible PNA s/p cardiac arrest. Pharmacy was consulted by Dr. Jordan to dose and monitor vancomycin. Today is day 2.    Antibiotic regimen: Vancomycin monotherapy    Temp (24hrs), Av.2 °F (36.8 °C), Min:97.7 °F (36.5 °C), Max:98.5 °F (36.9 °C)    Recent Labs     24  0300 24  0255   WBC  --  13.5*   CREATININE 7.41* 4.99*   BUN 78* 43*     Est CrCl: ESRD on HD MWF (outpatient); missed HD on , off schedule HD on , then resuming MWF  Concomitant nephrotoxic drugs: None    Cultures:    Blood: NGTD x 3 days    MRSA Swab: Not detected     Target range:  Pre-HD level 15-20 mcg/ml    Recent level history:  Date/Time Dose & Interval Measured Level (mcg/mL) Associated AUC/KRISTA    1230 2500 mg x 1 18.2     0542 1000 mg x 1  19.2         Assessment/Plan:   Remains on empiric vancomycin, but with cultures NGTD, may be able to consider stopping vancomycin. Uncertain of any source of infection currently. Cefepime stopped earlier this AM.   Off CRRT, back on HD and dialyzing today x 3.5 hrs  Level ready for re-dosing, will give Vancomycin 1000 mg x 1 dose  Next level scheduled for  @ 0600 (Pre-HD)  Antimicrobial stop date: TBD             
Time: 05/24/24  2:38 AM   Result Value Ref Range    WBC 15.7 (H) 3.6 - 11.0 K/uL    RBC 3.35 (L) 3.80 - 5.20 M/uL    Hemoglobin 9.0 (L) 11.5 - 16.0 g/dL    Hematocrit 29.8 (L) 35.0 - 47.0 %    MCV 89.0 80.0 - 99.0 FL    MCH 26.9 26.0 - 34.0 PG    MCHC 30.2 30.0 - 36.5 g/dL    RDW 16.0 (H) 11.5 - 14.5 %    Platelets 336 150 - 400 K/uL    MPV 9.1 8.9 - 12.9 FL    Nucleated RBCs 0.1 (H) 0.0  WBC    nRBC 0.02 (H) 0.00 - 0.01 K/uL    Neutrophils % 77 (H) 32 - 75 %    Lymphocytes % 10 (L) 12 - 49 %    Monocytes % 7 5 - 13 %    Eosinophils % 4 0 - 7 %    Basophils % 1 0 - 1 %    Immature Granulocytes % 1 (H) 0 - 0.5 %    Neutrophils Absolute 12.1 (H) 1.8 - 8.0 K/UL    Lymphocytes Absolute 1.6 0.8 - 3.5 K/UL    Monocytes Absolute 1.1 (H) 0.0 - 1.0 K/UL    Eosinophils Absolute 0.6 (H) 0.0 - 0.4 K/UL    Basophils Absolute 0.1 0.0 - 0.1 K/UL    Immature Granulocytes Absolute 0.1 (H) 0.00 - 0.04 K/UL    Differential Type AUTOMATED     POCT Glucose    Collection Time: 05/24/24  6:02 AM   Result Value Ref Range    POC Glucose 94 65 - 100 mg/dL    Performed by: Sushma Ibrahim    POCT Glucose    Collection Time: 05/24/24  8:11 AM   Result Value Ref Range    POC Glucose 56 (L) 65 - 100 mg/dL    Performed by: Virgie Rodriguez (ARVIND)    POCT Glucose    Collection Time: 05/24/24  9:17 AM   Result Value Ref Range    POC Glucose 126 (H) 65 - 100 mg/dL    Performed by: Virgie Rodriguez (ARVIND)    POCT Glucose    Collection Time: 05/24/24 11:10 AM   Result Value Ref Range    POC Glucose 104 (H) 65 - 100 mg/dL    Performed by: Virgie Rodriguez (CON)    Comprehensive Metabolic Panel    Collection Time: 05/24/24 11:30 AM   Result Value Ref Range    Sodium 135 (L) 136 - 145 mmol/L    Potassium 5.0 3.5 - 5.1 mmol/L    Chloride 104 97 - 108 mmol/L    CO2 21 21 - 32 mmol/L    Anion Gap 10 5 - 15 mmol/L    Glucose 103 (H) 65 - 100 mg/dL    BUN 70 (H) 6 - 20 mg/dL    Creatinine 7.16 (H) 0.55 - 1.02 mg/dL    BUN/Creatinine Ratio 10 (L) 12 
peaceful presence, staff support, and informed pt/friends of  availability.    Please contact [facility] for any emotional/spiritual needs and/or further referrals. Thank you.    Rev. Maira Deras MDIV   can be reached by calling the  at [facility]  [phone]         
  CO2 24   GLUCOSE 89   BUN 43*   CREATININE 4.99*   CALCIUM 8.0*   MG 1.8   PHOS 4.8*   BILITOT 0.5   AST 64*   *     No results for input(s): \"PHART\", \"YAF7RZT\", \"PO2ART\", \"HME0SZX\", \"BEART\", \"FJV7BHGF\", \"HGBART\", \"NE9XRPVSI\", \"FIO2A\", \"S5YGBJCD\", \"OXYHEM\", \"CARBOXHGBART\", \"METHGBART\", \"S3DYHANLX\", \"PHCORART\", \"TEMP\" in the last 72 hours.    Invalid input(s): \"WEO5AWRAW\"  Recent Results (from the past 24 hour(s))   POCT Glucose    Collection Time: 05/27/24 12:06 PM   Result Value Ref Range    POC Glucose 90 65 - 100 mg/dL    Performed by: Marc Perales    POCT Glucose    Collection Time: 05/27/24  3:46 PM   Result Value Ref Range    POC Glucose 156 (H) 65 - 100 mg/dL    Performed by: Everett Jorgensen    POCT Glucose    Collection Time: 05/27/24  8:14 PM   Result Value Ref Range    POC Glucose 126 (H) 65 - 100 mg/dL    Performed by: Veda Angela         Assessment and Plan:     End-stage renal disease on hemodialysis:   -on MWF at [facility]  -Pt missed multiple dialysis treatments over past 2 weeks.  -Presented with severe hyperkalemia.  K 6.7, Cr 10.7, BUN 95, CO2 16  -Started on CRRT which was discontinued 5/23 with improved hemodynamic status.  Temporary dialysis catheter was removed on 5/25  -Tolerating IHD, however patient requested treatment to be terminated early so that she could receive valium this morning.  UF goal was 3 L, only 2 L was able to be removed  -Edema is improving with UF/dialysis.  Will give albumin to facilitate UF  -Clinically much better today.  Labs have improved.    -She has AV fistula in the left arm as access      2.  Hypertension, labile  -Patient was hypotensive on admission. BP 80s/40s range  Currently  BP meds were held and continue on midodrine 10 mg bid     3.  Severe hyperkalemia, resolved  -Potassium was severely elevated on admission, improved with CRRT.  Repeat lab work shows stable potassium levels.   -Will continueIHD with 2K bath       4.  Metabolic acidosis, 
0.4 MG SL tablet Place 1 tablet under the tongue every 5 minutes as needed for Chest pain up to max of 3 total doses. If no relief after 1 dose, call 911. 10/16/23   Concetta Hdez MD   sevelamer (RENVELA) 800 MG tablet Take 2 tablets by mouth 3 times daily (with meals) 10/16/23   Concetta Hdez MD   hydrOXYzine HCl (ATARAX) 25 MG tablet Take 1-2 tablets by mouth nightly    Provider, MD Roshni   atorvastatin (LIPITOR) 20 MG tablet Take 1 tablet by mouth daily    Automatic Reconciliation, Ar   gabapentin (NEURONTIN) 300 MG capsule Take 1 capsule by mouth nightly.    Automatic Reconciliation, Ar   omeprazole (PRILOSEC) 20 MG delayed release capsule Take 1 capsule by mouth daily    Automatic Reconciliation, Ar       REVIEW OF SYSTEMS:       Constitutional: Negative for chills and fever.   HENT: Negative.    Eyes: Negative.    Respiratory: Denies any shortness of breath today  Cardiovascular: Negative.    Gastrointestinal: Negative for abdominal pain and nausea.   Skin: Negative.    Neurological: Negative.      Objective:   VITALS:    BP (!) 91/45 Comment: midodrine given  Pulse 84   Temp 98.1 °F (36.7 °C) (Oral)   Resp 18   Ht 1.753 m (5' 9\")   Wt 128.4 kg (283 lb)   SpO2 91%   BMI 41.79 kg/m²   Temp (24hrs), Av.9 °F (36.6 °C), Min:97.3 °F (36.3 °C), Max:98.3 °F (36.8 °C)      PHYSICAL EXAM:     General: NAD, alert, cooperative  Eyes: sclera anicteric  Oral Cavity: No thrush or ulcers  Neck: no JVD  Chest: Fair bilateral air entry.No Wheezing or Rhonchi. No rales.  Heart: normal sounds  Abdomen: soft and non tender   : no pierre  Lower Extremities: Trace edema both legs.  Left arm AV fistula with good bruit and thrill  Skin: no rash  Neuro: intact, no focals  Psychiatric: Seems depressed        LAB DATA REVIEWED:      Recent Labs     24  0255 24  0238 24  0310   WBC 13.5* 15.7* 15.4*   HGB 9.2* 9.0* 9.3*   HCT 30.0* 29.8* 30.7*    336 344     Recent Labs     
needed for Chest pain up to max of 3 total doses. If no relief after 1 dose, call 911. 10/16/23   Concetta Hdez MD   sevelamer (RENVELA) 800 MG tablet Take 2 tablets by mouth 3 times daily (with meals) 10/16/23   Concetta Hdez MD   hydrOXYzine HCl (ATARAX) 25 MG tablet Take 1-2 tablets by mouth nightly    Provider, MD Roshni   atorvastatin (LIPITOR) 20 MG tablet Take 1 tablet by mouth daily    Automatic Reconciliation, Ar   gabapentin (NEURONTIN) 300 MG capsule Take 1 capsule by mouth nightly.    Automatic Reconciliation, Ar   omeprazole (PRILOSEC) 20 MG delayed release capsule Take 1 capsule by mouth daily    Automatic Reconciliation, Ar       REVIEW OF SYSTEMS:       Constitutional: Negative for chills and fever.   HENT: Negative.    Eyes: Negative.    Respiratory: Denies any shortness of breath today  Cardiovascular: Negative.    Gastrointestinal: Negative for abdominal pain and nausea.   Skin: Negative.    Neurological: Negative.      Objective:   VITALS:    BP 98/70   Pulse 89   Temp 98.3 °F (36.8 °C) (Rectal)   Resp 26   Ht 1.753 m (5' 9\")   Wt 124.7 kg (274 lb 14.6 oz)   SpO2 100%   BMI 40.60 kg/m²   Temp (24hrs), Av.7 °F (37.1 °C), Min:97.1 °F (36.2 °C), Max:100 °F (37.8 °C)      PHYSICAL EXAM:     General: NAD, alert, cooperative  Eyes: sclera anicteric  Oral Cavity: No thrush or ulcers  Neck: no JVD  Chest: Fair bilateral air entry.No Wheezing or Rhonchi. No rales.  Heart: normal sounds  Abdomen: soft and non tender   : no pierre  Lower Extremities: Trace edema both legs.  Left arm AV fistula with good bruit and thrill  Skin: no rash  Neuro: intact, no focals  Psychiatric: Seems depressed        LAB DATA REVIEWED:      Recent Labs     24  0238 24  0310 24  1246   WBC 15.7* 15.4* 13.2*   HGB 9.0* 9.3* 8.8*   HCT 29.8* 30.7* 28.9*    344 358     Recent Labs     24  0238 24  2043 24  1230 24  0855 24  1950 24  1404 
acidosis  -CO2 is 16.  Likely because of missed dialysis and lactic acidosis  -CO2 has improved to 21 today    5.  Hyponatremia  -Sodium was 128 on admission.  Sodium 131  -likely because of missed dialysis/dilution from hypervolemia.    -Improved with fluid removal.    -Will plan dialysis with high sodium bath    5.  Anemia:   -Hemoglobin 9.0  -Continue Epogen with hemodialysis   -Continue to monitor H&H  -On Niferex  -TSAT was 23% on 4/25/2024.  Continue maintenance iron IV     6. Renal osteodystrophy:   -Stable calcium level  -Hyperphosphatemia: Phosphorus 8.8 today.  Patient claims she was not taking her binders as prescribed.  Restarted Renvela 1600 TIDWM yesterday.  I will continue same dose .  Low phosphorus diet  -Secondary hyperparathyroidism: Continue calcitriol as per outpatient protocol     7. Pericardial effusion  -Echo shows EF 35-40%; large pericardial effusion  -Cardiology has been consulted  -Aggressive fluid removal with dialysis    8.  Asystole  -Apparently patient had asystole out patient.  [facility]  -Could be secondary to severe hyperkalemia  -Respiratory status is much better today.  Continue aggressive UF with dialysis        ________________________________________________________________________  Signed: Zenaida Villalobos MD  
be secondary to severe hyperkalemia  -Respiratory status is much better today.  -Continue aggressive UF with dialysis    9. Right sided abd wound: skin infection with poorly controlled diabetes or    suspicious for calciphylaxis  Pt has poor compliance with meds/ HD treatments( frequently misses her treatments and frequent hospitalizations  Admitted with a phos of 11+  Will repeat a PTH level and adjust meds.  Explained the clinical status and the importance of calcium/Phos/PTH control and compliance with HD treatments. She agreed to improve her compliance.   If the wound does not heal with wound care and antibiotics, will add sodium thiosulfate with HD as outpatient.     Signed By: SUZETTE Kumar - CNP     May 29, 2024        Addendum:  Rounds made along with Viviane Babcock NP  Patient seen and examined at bedside,    Labs and treatments reviewed   Plan discussed with patient and NP   Reviewed NP's  notes, amended and agree with assessment and plan    Dr. Joseph Regalado      
2024. Cardiomediastinal silhouette is partially obscured by left midlung and left basilar partial opacification, likely representing airspace consolidation and a moderate size left pleural effusion. Prior CT dated April 2024 demonstrated a large sized pericardial effusion. There is otherwise pulmonary vascular prominence. No right pleural fluid is visualized. There is no pneumothorax.     Left midlung and left basilar partial opacification, likely representing airspace consolidation and moderate size left pleural effusion. CT of the chest can be performed for further evaluation, as indicated.    Assessment//Plan           Hospital Problems             Last Modified POA    * (Principal) Hypotension 5/22/2024 Yes    Cardiac arrest (HCC) 5/22/2024 Yes   Hyperkalemia  CKD on hemodialysis  Noncompliance with hemodialysis  Grief with adjustment disorder  Morbid obesity  Diabetes  Pericardial effusion  Left side bruise infected  Pituitary adenoma    Assessment & Plan  Hemodialysis as per nephrologist.  Wean off pressors.  Midodrine.  Once off pressors can be transferred to 4 W.  Appreciate consultant input.  Apparently no tamponade physiology on echo    Electronically signed by Senia Jordan MD on 5/23/24 at 8:14 AM EDT
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
## 2024-05-30 LAB
ALBUMIN SERPL-MCNC: 2.6 G/DL (ref 3.5–5)
ALBUMIN/GLOB SERPL: 0.6 (ref 1.1–2.2)
ALP SERPL-CCNC: 132 U/L (ref 45–117)
ALT SERPL-CCNC: 50 U/L (ref 12–78)
ANION GAP SERPL CALC-SCNC: 9 MMOL/L (ref 5–15)
AST SERPL-CCNC: 6 U/L (ref 15–37)
BASOPHILS # BLD: 0 K/UL (ref 0–0.1)
BASOPHILS NFR BLD: 0 % (ref 0–1)
BILIRUB SERPL-MCNC: 0.5 MG/DL (ref 0.2–1)
BUN SERPL-MCNC: 60 MG/DL (ref 6–20)
BUN/CREAT SERPL: 8 (ref 12–20)
CALCIUM SERPL-MCNC: 8.5 MG/DL (ref 8.5–10.1)
CHLORIDE SERPL-SCNC: 97 MMOL/L (ref 97–108)
CO2 SERPL-SCNC: 26 MMOL/L (ref 21–32)
CREAT SERPL-MCNC: 7.12 MG/DL (ref 0.55–1.02)
DIFFERENTIAL METHOD BLD: ABNORMAL
EOSINOPHIL # BLD: 0.3 K/UL (ref 0–0.4)
EOSINOPHIL NFR BLD: 2 % (ref 0–7)
ERYTHROCYTE [DISTWIDTH] IN BLOOD BY AUTOMATED COUNT: 16.9 % (ref 11.5–14.5)
GLOBULIN SER CALC-MCNC: 4.2 G/DL (ref 2–4)
GLUCOSE BLD STRIP.AUTO-MCNC: 185 MG/DL (ref 65–117)
GLUCOSE BLD STRIP.AUTO-MCNC: 192 MG/DL (ref 65–117)
GLUCOSE BLD STRIP.AUTO-MCNC: 225 MG/DL (ref 65–117)
GLUCOSE SERPL-MCNC: 196 MG/DL (ref 65–100)
HBV SURFACE AG SER QL: <0.1 INDEX
HBV SURFACE AG SER QL: NEGATIVE
HCT VFR BLD AUTO: 29.2 % (ref 35–47)
HGB BLD-MCNC: 8.4 G/DL (ref 11.5–16)
IMM GRANULOCYTES # BLD AUTO: 0 K/UL (ref 0–0.04)
IMM GRANULOCYTES NFR BLD AUTO: 0 % (ref 0–0.5)
LYMPHOCYTES # BLD: 1.5 K/UL (ref 0.8–3.5)
LYMPHOCYTES NFR BLD: 11 % (ref 12–49)
MCH RBC QN AUTO: 26.7 PG (ref 26–34)
MCHC RBC AUTO-ENTMCNC: 28.8 G/DL (ref 30–36.5)
MCV RBC AUTO: 92.7 FL (ref 80–99)
METAMYELOCYTES NFR BLD MANUAL: 1 %
MONOCYTES # BLD: 0.8 K/UL (ref 0–1)
MONOCYTES NFR BLD: 6 % (ref 5–13)
NEUTS SEG # BLD: 10.6 K/UL (ref 1.8–8)
NEUTS SEG NFR BLD: 80 % (ref 32–75)
NRBC # BLD: 0.02 K/UL (ref 0–0.01)
NRBC BLD-RTO: 0.2 PER 100 WBC
PLATELET # BLD AUTO: 343 K/UL (ref 150–400)
PMV BLD AUTO: 9.5 FL (ref 8.9–12.9)
POTASSIUM SERPL-SCNC: 4.4 MMOL/L (ref 3.5–5.1)
PROT SERPL-MCNC: 6.8 G/DL (ref 6.4–8.2)
RBC # BLD AUTO: 3.15 M/UL (ref 3.8–5.2)
RBC MORPH BLD: ABNORMAL
SERVICE CMNT-IMP: ABNORMAL
SODIUM SERPL-SCNC: 132 MMOL/L (ref 136–145)
WBC # BLD AUTO: 13.2 K/UL (ref 3.6–11)

## 2024-05-30 PROCEDURE — 6360000002 HC RX W HCPCS: Performed by: INTERNAL MEDICINE

## 2024-05-30 PROCEDURE — 87340 HEPATITIS B SURFACE AG IA: CPT

## 2024-05-30 PROCEDURE — 2000000000 HC ICU R&B

## 2024-05-30 PROCEDURE — 6360000002 HC RX W HCPCS: Performed by: NURSE PRACTITIONER

## 2024-05-30 PROCEDURE — 97530 THERAPEUTIC ACTIVITIES: CPT

## 2024-05-30 PROCEDURE — 5A1D70Z PERFORMANCE OF URINARY FILTRATION, INTERMITTENT, LESS THAN 6 HOURS PER DAY: ICD-10-PCS | Performed by: INTERNAL MEDICINE

## 2024-05-30 PROCEDURE — 6370000000 HC RX 637 (ALT 250 FOR IP): Performed by: INTERNAL MEDICINE

## 2024-05-30 PROCEDURE — 97162 PT EVAL MOD COMPLEX 30 MIN: CPT

## 2024-05-30 PROCEDURE — 80053 COMPREHEN METABOLIC PANEL: CPT

## 2024-05-30 PROCEDURE — 99223 1ST HOSP IP/OBS HIGH 75: CPT | Performed by: THORACIC SURGERY (CARDIOTHORACIC VASCULAR SURGERY)

## 2024-05-30 PROCEDURE — 82962 GLUCOSE BLOOD TEST: CPT

## 2024-05-30 PROCEDURE — 6360000002 HC RX W HCPCS: Performed by: HOSPITALIST

## 2024-05-30 PROCEDURE — 6370000000 HC RX 637 (ALT 250 FOR IP): Performed by: HOSPITALIST

## 2024-05-30 PROCEDURE — 97116 GAIT TRAINING THERAPY: CPT

## 2024-05-30 PROCEDURE — 36415 COLL VENOUS BLD VENIPUNCTURE: CPT

## 2024-05-30 PROCEDURE — 2060000000 HC ICU INTERMEDIATE R&B

## 2024-05-30 PROCEDURE — 85025 COMPLETE CBC W/AUTO DIFF WBC: CPT

## 2024-05-30 PROCEDURE — P9047 ALBUMIN (HUMAN), 25%, 50ML: HCPCS | Performed by: NURSE PRACTITIONER

## 2024-05-30 PROCEDURE — 97166 OT EVAL MOD COMPLEX 45 MIN: CPT

## 2024-05-30 PROCEDURE — 90935 HEMODIALYSIS ONE EVALUATION: CPT

## 2024-05-30 RX ORDER — ONDANSETRON 2 MG/ML
4 INJECTION INTRAMUSCULAR; INTRAVENOUS ONCE
Status: COMPLETED | OUTPATIENT
Start: 2024-05-30 | End: 2024-05-30

## 2024-05-30 RX ORDER — MIDODRINE HYDROCHLORIDE 2.5 MG/1
2.5 TABLET ORAL 2 TIMES DAILY WITH MEALS
Status: DISCONTINUED | OUTPATIENT
Start: 2024-05-30 | End: 2024-05-30

## 2024-05-30 RX ORDER — KETOROLAC TROMETHAMINE 30 MG/ML
15 INJECTION, SOLUTION INTRAMUSCULAR; INTRAVENOUS ONCE
Status: COMPLETED | OUTPATIENT
Start: 2024-05-30 | End: 2024-05-30

## 2024-05-30 RX ORDER — MIDODRINE HYDROCHLORIDE 5 MG/1
5 TABLET ORAL 2 TIMES DAILY WITH MEALS
Status: DISCONTINUED | OUTPATIENT
Start: 2024-05-31 | End: 2024-06-03

## 2024-05-30 RX ORDER — ALBUMIN (HUMAN) 12.5 G/50ML
25 SOLUTION INTRAVENOUS AS NEEDED
Status: DISCONTINUED | OUTPATIENT
Start: 2024-05-30 | End: 2024-06-18 | Stop reason: HOSPADM

## 2024-05-30 RX ORDER — ACETAMINOPHEN 325 MG/1
650 TABLET ORAL ONCE
Status: COMPLETED | OUTPATIENT
Start: 2024-05-30 | End: 2024-05-30

## 2024-05-30 RX ADMIN — KETOROLAC TROMETHAMINE 15 MG: 30 INJECTION, SOLUTION INTRAMUSCULAR at 04:54

## 2024-05-30 RX ADMIN — SEVELAMER CARBONATE 1600 MG: 800 TABLET, FILM COATED ORAL at 09:23

## 2024-05-30 RX ADMIN — ONDANSETRON 4 MG: 2 INJECTION INTRAMUSCULAR; INTRAVENOUS at 01:24

## 2024-05-30 RX ADMIN — RANOLAZINE 500 MG: 500 TABLET, FILM COATED, EXTENDED RELEASE ORAL at 09:27

## 2024-05-30 RX ADMIN — HEPARIN SODIUM 5000 UNITS: 5000 INJECTION INTRAVENOUS; SUBCUTANEOUS at 22:21

## 2024-05-30 RX ADMIN — GABAPENTIN 300 MG: 300 CAPSULE ORAL at 22:13

## 2024-05-30 RX ADMIN — HYDROXYZINE HYDROCHLORIDE 25 MG: 10 TABLET ORAL at 22:14

## 2024-05-30 RX ADMIN — RANOLAZINE 500 MG: 500 TABLET, FILM COATED, EXTENDED RELEASE ORAL at 22:13

## 2024-05-30 RX ADMIN — ALBUMIN (HUMAN) 25 G: 0.25 INJECTION, SOLUTION INTRAVENOUS at 01:57

## 2024-05-30 RX ADMIN — DOCUSATE SODIUM 200 MG: 100 CAPSULE, LIQUID FILLED ORAL at 09:23

## 2024-05-30 RX ADMIN — ACETAMINOPHEN 650 MG: 325 TABLET ORAL at 22:49

## 2024-05-30 RX ADMIN — CALCITRIOL CAPSULES 0.25 MCG 0.25 MCG: 0.25 CAPSULE ORAL at 09:23

## 2024-05-30 RX ADMIN — CLOPIDOGREL BISULFATE 75 MG: 75 TABLET ORAL at 11:00

## 2024-05-30 RX ADMIN — ATORVASTATIN CALCIUM 20 MG: 20 TABLET, FILM COATED ORAL at 09:23

## 2024-05-30 RX ADMIN — INSULIN LISPRO 2 UNITS: 100 INJECTION, SOLUTION INTRAVENOUS; SUBCUTANEOUS at 12:11

## 2024-05-30 RX ADMIN — SEVELAMER CARBONATE 1600 MG: 800 TABLET, FILM COATED ORAL at 17:23

## 2024-05-30 RX ADMIN — ASPIRIN 81 MG CHEWABLE TABLET 81 MG: 81 TABLET CHEWABLE at 09:23

## 2024-05-30 RX ADMIN — HYDROCODONE BITARTRATE AND ACETAMINOPHEN 1 TABLET: 5; 325 TABLET ORAL at 12:29

## 2024-05-30 RX ADMIN — INSULIN GLARGINE 15 UNITS: 100 INJECTION, SOLUTION SUBCUTANEOUS at 22:21

## 2024-05-30 RX ADMIN — HYDROCODONE BITARTRATE AND ACETAMINOPHEN 1 TABLET: 5; 325 TABLET ORAL at 03:10

## 2024-05-30 RX ADMIN — CARVEDILOL 12.5 MG: 12.5 TABLET, FILM COATED ORAL at 09:23

## 2024-05-30 RX ADMIN — PANTOPRAZOLE SODIUM 40 MG: 40 TABLET, DELAYED RELEASE ORAL at 05:41

## 2024-05-30 RX ADMIN — MIDODRINE HYDROCHLORIDE 2.5 MG: 2.5 TABLET ORAL at 15:38

## 2024-05-30 RX ADMIN — HEPARIN SODIUM 5000 UNITS: 5000 INJECTION INTRAVENOUS; SUBCUTANEOUS at 04:55

## 2024-05-30 RX ADMIN — EPOETIN ALFA-EPBX 10000 UNITS: 10000 INJECTION, SOLUTION INTRAVENOUS; SUBCUTANEOUS at 22:21

## 2024-05-30 RX ADMIN — SEVELAMER CARBONATE 1600 MG: 800 TABLET, FILM COATED ORAL at 10:59

## 2024-05-30 RX ADMIN — HEPARIN SODIUM 5000 UNITS: 5000 INJECTION INTRAVENOUS; SUBCUTANEOUS at 12:11

## 2024-05-30 ASSESSMENT — PAIN SCALES - GENERAL
PAINLEVEL_OUTOF10: 10
PAINLEVEL_OUTOF10: 0
PAINLEVEL_OUTOF10: 2
PAINLEVEL_OUTOF10: 7
PAINLEVEL_OUTOF10: 10
PAINLEVEL_OUTOF10: 5
PAINLEVEL_OUTOF10: 10
PAINLEVEL_OUTOF10: 6
PAINLEVEL_OUTOF10: 3
PAINLEVEL_OUTOF10: 8
PAINLEVEL_OUTOF10: 6
PAINLEVEL_OUTOF10: 0
PAINLEVEL_OUTOF10: 10
PAINLEVEL_OUTOF10: 0

## 2024-05-30 ASSESSMENT — PAIN DESCRIPTION - DESCRIPTORS
DESCRIPTORS: BURNING
DESCRIPTORS: BURNING

## 2024-05-30 ASSESSMENT — PAIN DESCRIPTION - ORIENTATION
ORIENTATION: LOWER

## 2024-05-30 ASSESSMENT — PAIN DESCRIPTION - ONSET: ONSET: AWAKENED FROM SLEEP

## 2024-05-30 ASSESSMENT — PAIN DESCRIPTION - LOCATION
LOCATION: ABDOMEN

## 2024-05-30 ASSESSMENT — PAIN - FUNCTIONAL ASSESSMENT: PAIN_FUNCTIONAL_ASSESSMENT: PREVENTS OR INTERFERES SOME ACTIVE ACTIVITIES AND ADLS

## 2024-05-30 ASSESSMENT — PAIN DESCRIPTION - PAIN TYPE: TYPE: CHRONIC PAIN

## 2024-05-30 NOTE — CARE COORDINATION
Care Management Initial Assessment       RUR: 36% (high RUR, readmission and transfer from [facility])  Readmission? Yes - patient was last at [facility] from  1st IM letter given? Yes - done 5/29 1st  letter given: No     Patient receives HD on MWF around 9:15 AM at [facility] (contact information left on AVS).  [place] DSS, APS and [facility] contact information left on AVS.  Patient states her son passed away recently and she had a heart attack in September and November.  Her preferred pharmacy is:   [address] [phone] - F [phone].     05/30/24 1213   Service Assessment   Patient Orientation Alert and Oriented   Cognition Alert   History Provided By Patient   Primary Caregiver Self   Accompanied By/Relationship N/A   Support Systems Friends/Neighbors;Other (Comment)  (Caregiver comes in daily through the [facility]; no set hours)   Patient's Healthcare Decision Maker is: Named in Scanned ACP Document   PCP Verified by CM Yes   Last Visit to PCP Within last 3 months  (Telehealth appointment two weeks ago)   Prior Functional Level Independent in ADLs/IADLs;Bathing;Dressing  (Assistance needed for about a year)   Current Functional Level Independent in ADLs/IADLs;Bathing;Dressing   Can patient return to prior living arrangement Yes   Ability to make needs known: Good   Family able to assist with home care needs: Yes   Would you like for me to discuss the discharge plan with any other family members/significant others, and if so, who? No   Financial Resources Medicare;Medicaid   Community Resources None   Social/Functional History   Lives With Alone   Type of Home Trailer   Home Layout One level   Entrance Stairs - Number of Steps 4-5   Home Equipment Walker - Rolling;Wheelchair - Manual  (\"Bath chair;\" patient once needed O2 but is no longer open with agency; but she did not have sleep study done for CPAP)   Receives Help From Family   ADL Assistance Needs assistance

## 2024-05-30 NOTE — WOUND CARE
Wound Care consult for \"Abscess OM left hip, scabs on Sacrum\" present on admission:  Chart reviewed and patient assessed. Pt. Is 49 years old and she was admitted for worsening pericardial effusion. S/P Cardiac arrest prior to her cardiac surgery.  She is ESRD on HD Mon, Wed, Fri.   Other Medical -Surgical hx listed below:     Past Medical History:   Diagnosis Date    Allergic rhinitis     Blood clot in vein     Chronic kidney disease     CVA (cerebral vascular accident) ([facility]) 05/20/2014    Diabetes ([facility])     Dyslipidemia     Hemodialysis patient ([facility])     Hx of blood clots     Hypertension     IBS (irritable bowel syndrome)     Ill-defined condition     Renal failure      Past Surgical History:   Procedure Laterality Date    CARDIAC PROCEDURE N/A 10/12/2023    Left heart cath / coronary angiography performed by Martir Garcia MD at [facility] CARDIAC CATH LAB    CARDIAC PROCEDURE N/A 10/12/2023    Percutaneous coronary intervention performed by Martir Garcia MD at [facility] CARDIAC CATH LAB    CARDIAC PROCEDURE N/A 10/12/2023    Angioplasty coronary performed by Martir Garcia MD at [facility] CARDIAC CATH LAB    CARDIAC PROCEDURE N/A 10/12/2023    Insert stent ryland coronary performed by Martir Garcia MD at [facility] CARDIAC CATH LAB    CARDIAC PROCEDURE N/A 11/16/2023    Left heart cath / coronary angiography performed by Martir Garcia MD at [facility] CARDIAC CATH LAB    CARDIAC PROCEDURE N/A 11/16/2023    Intravascular ultrasound performed by Martir Garcia MD at [facility] CARDIAC CATH LAB    CARDIAC PROCEDURE N/A 11/16/2023    Percutaneous coronary intervention performed by Martir Garcia MD at [facility] CARDIAC CATH LAB    CARDIAC PROCEDURE N/A 11/16/2023    Angioplasty coronary performed by Martir Garcia MD at [facility] CARDIAC CATH LAB    CARDIAC PROCEDURE N/A 11/24/2023    Left heart cath / coronary angiography performed by Kai Farmer MD at [facility] CARDIAC CATH LAB    CARDIAC PROCEDURE N/A 11/24/2023

## 2024-05-31 LAB
ALBUMIN SERPL-MCNC: 2.8 G/DL (ref 3.5–5)
ALBUMIN/GLOB SERPL: 0.6 (ref 1.1–2.2)
ALP SERPL-CCNC: 125 U/L (ref 45–117)
ALT SERPL-CCNC: 39 U/L (ref 12–78)
ANION GAP SERPL CALC-SCNC: 8 MMOL/L (ref 5–15)
AST SERPL-CCNC: 5 U/L (ref 15–37)
BASOPHILS # BLD: 0 K/UL (ref 0–0.1)
BASOPHILS NFR BLD: 0 % (ref 0–1)
BILIRUB SERPL-MCNC: 0.8 MG/DL (ref 0.2–1)
BUN SERPL-MCNC: 48 MG/DL (ref 6–20)
BUN/CREAT SERPL: 8 (ref 12–20)
CALCIUM SERPL-MCNC: 8.5 MG/DL (ref 8.5–10.1)
CHLORIDE SERPL-SCNC: 96 MMOL/L (ref 97–108)
CO2 SERPL-SCNC: 29 MMOL/L (ref 21–32)
CREAT SERPL-MCNC: 6.32 MG/DL (ref 0.55–1.02)
DIFFERENTIAL METHOD BLD: ABNORMAL
EOSINOPHIL # BLD: 0.3 K/UL (ref 0–0.4)
EOSINOPHIL NFR BLD: 2 % (ref 0–7)
ERYTHROCYTE [DISTWIDTH] IN BLOOD BY AUTOMATED COUNT: 16.8 % (ref 11.5–14.5)
GLOBULIN SER CALC-MCNC: 4.4 G/DL (ref 2–4)
GLUCOSE BLD STRIP.AUTO-MCNC: 143 MG/DL (ref 65–117)
GLUCOSE BLD STRIP.AUTO-MCNC: 217 MG/DL (ref 65–117)
GLUCOSE BLD STRIP.AUTO-MCNC: 238 MG/DL (ref 65–117)
GLUCOSE BLD STRIP.AUTO-MCNC: 239 MG/DL (ref 65–117)
GLUCOSE SERPL-MCNC: 204 MG/DL (ref 65–100)
HCT VFR BLD AUTO: 30.3 % (ref 35–47)
HGB BLD-MCNC: 8.6 G/DL (ref 11.5–16)
IMM GRANULOCYTES # BLD AUTO: 0 K/UL (ref 0–0.04)
IMM GRANULOCYTES NFR BLD AUTO: 0 % (ref 0–0.5)
LYMPHOCYTES # BLD: 1.5 K/UL (ref 0.8–3.5)
LYMPHOCYTES NFR BLD: 12 % (ref 12–49)
MCH RBC QN AUTO: 26.1 PG (ref 26–34)
MCHC RBC AUTO-ENTMCNC: 28.4 G/DL (ref 30–36.5)
MCV RBC AUTO: 91.8 FL (ref 80–99)
MONOCYTES # BLD: 1.9 K/UL (ref 0–1)
MONOCYTES NFR BLD: 15 % (ref 5–13)
NEUTS BAND NFR BLD MANUAL: 3 %
NEUTS SEG # BLD: 9.2 K/UL (ref 1.8–8)
NEUTS SEG NFR BLD: 68 % (ref 32–75)
NRBC # BLD: 0.02 K/UL (ref 0–0.01)
NRBC BLD-RTO: 0.2 PER 100 WBC
PLATELET # BLD AUTO: 323 K/UL (ref 150–400)
PMV BLD AUTO: 9.5 FL (ref 8.9–12.9)
POTASSIUM SERPL-SCNC: 4.2 MMOL/L (ref 3.5–5.1)
PROT SERPL-MCNC: 7.2 G/DL (ref 6.4–8.2)
RBC # BLD AUTO: 3.3 M/UL (ref 3.8–5.2)
RBC MORPH BLD: ABNORMAL
SERVICE CMNT-IMP: ABNORMAL
SODIUM SERPL-SCNC: 133 MMOL/L (ref 136–145)
WBC # BLD AUTO: 12.9 K/UL (ref 3.6–11)

## 2024-05-31 PROCEDURE — 80053 COMPREHEN METABOLIC PANEL: CPT

## 2024-05-31 PROCEDURE — 85025 COMPLETE CBC W/AUTO DIFF WBC: CPT

## 2024-05-31 PROCEDURE — 6360000002 HC RX W HCPCS: Performed by: INTERNAL MEDICINE

## 2024-05-31 PROCEDURE — 6360000002 HC RX W HCPCS: Performed by: NURSE PRACTITIONER

## 2024-05-31 PROCEDURE — 90935 HEMODIALYSIS ONE EVALUATION: CPT

## 2024-05-31 PROCEDURE — 6370000000 HC RX 637 (ALT 250 FOR IP): Performed by: INTERNAL MEDICINE

## 2024-05-31 PROCEDURE — 36415 COLL VENOUS BLD VENIPUNCTURE: CPT

## 2024-05-31 PROCEDURE — 6370000000 HC RX 637 (ALT 250 FOR IP): Performed by: GENERAL ACUTE CARE HOSPITAL

## 2024-05-31 PROCEDURE — 82962 GLUCOSE BLOOD TEST: CPT

## 2024-05-31 PROCEDURE — 2060000000 HC ICU INTERMEDIATE R&B

## 2024-05-31 PROCEDURE — P9047 ALBUMIN (HUMAN), 25%, 50ML: HCPCS | Performed by: NURSE PRACTITIONER

## 2024-05-31 RX ORDER — ACETAMINOPHEN 325 MG/1
650 TABLET ORAL EVERY 4 HOURS PRN
Status: DISCONTINUED | OUTPATIENT
Start: 2024-05-31 | End: 2024-06-18 | Stop reason: HOSPADM

## 2024-05-31 RX ADMIN — MIDODRINE HYDROCHLORIDE 5 MG: 5 TABLET ORAL at 18:16

## 2024-05-31 RX ADMIN — PANTOPRAZOLE SODIUM 40 MG: 40 TABLET, DELAYED RELEASE ORAL at 05:35

## 2024-05-31 RX ADMIN — ALBUMIN (HUMAN) 25 G: 0.25 INJECTION, SOLUTION INTRAVENOUS at 09:46

## 2024-05-31 RX ADMIN — ATORVASTATIN CALCIUM 20 MG: 20 TABLET, FILM COATED ORAL at 12:10

## 2024-05-31 RX ADMIN — INSULIN LISPRO 2 UNITS: 100 INJECTION, SOLUTION INTRAVENOUS; SUBCUTANEOUS at 18:15

## 2024-05-31 RX ADMIN — GABAPENTIN 300 MG: 300 CAPSULE ORAL at 21:29

## 2024-05-31 RX ADMIN — INSULIN GLARGINE 15 UNITS: 100 INJECTION, SOLUTION SUBCUTANEOUS at 21:29

## 2024-05-31 RX ADMIN — HEPARIN SODIUM 5000 UNITS: 5000 INJECTION INTRAVENOUS; SUBCUTANEOUS at 05:35

## 2024-05-31 RX ADMIN — Medication 1 AMPULE: at 08:44

## 2024-05-31 RX ADMIN — HEPARIN SODIUM 5000 UNITS: 5000 INJECTION INTRAVENOUS; SUBCUTANEOUS at 21:29

## 2024-05-31 RX ADMIN — ALBUMIN (HUMAN) 25 G: 0.25 INJECTION, SOLUTION INTRAVENOUS at 08:18

## 2024-05-31 RX ADMIN — INSULIN LISPRO 2 UNITS: 100 INJECTION, SOLUTION INTRAVENOUS; SUBCUTANEOUS at 08:44

## 2024-05-31 RX ADMIN — HYDROXYZINE HYDROCHLORIDE 25 MG: 10 TABLET ORAL at 21:29

## 2024-05-31 RX ADMIN — RANOLAZINE 500 MG: 500 TABLET, FILM COATED, EXTENDED RELEASE ORAL at 21:29

## 2024-05-31 RX ADMIN — ACETAMINOPHEN 650 MG: 325 TABLET ORAL at 15:49

## 2024-05-31 RX ADMIN — CALCITRIOL CAPSULES 0.25 MCG 0.25 MCG: 0.25 CAPSULE ORAL at 12:10

## 2024-05-31 RX ADMIN — SEVELAMER CARBONATE 1600 MG: 800 TABLET, FILM COATED ORAL at 18:16

## 2024-05-31 RX ADMIN — RANOLAZINE 500 MG: 500 TABLET, FILM COATED, EXTENDED RELEASE ORAL at 12:10

## 2024-05-31 RX ADMIN — COLLAGENASE SANTYL: 250 OINTMENT TOPICAL at 12:11

## 2024-05-31 RX ADMIN — SEVELAMER CARBONATE 1600 MG: 800 TABLET, FILM COATED ORAL at 12:38

## 2024-05-31 RX ADMIN — Medication 1 AMPULE: at 21:45

## 2024-05-31 RX ADMIN — MIDODRINE HYDROCHLORIDE 5 MG: 5 TABLET ORAL at 07:03

## 2024-05-31 ASSESSMENT — PAIN SCALES - GENERAL
PAINLEVEL_OUTOF10: 2
PAINLEVEL_OUTOF10: 2
PAINLEVEL_OUTOF10: 0
PAINLEVEL_OUTOF10: 0

## 2024-05-31 ASSESSMENT — PAIN DESCRIPTION - PAIN TYPE
TYPE: CHRONIC PAIN
TYPE: CHRONIC PAIN

## 2024-06-01 ENCOUNTER — APPOINTMENT (OUTPATIENT)
Facility: HOSPITAL | Age: 49
DRG: 314 | End: 2024-06-01
Attending: INTERNAL MEDICINE
Payer: MEDICARE

## 2024-06-01 LAB
ALBUMIN SERPL-MCNC: 3 G/DL (ref 3.5–5)
ALBUMIN/GLOB SERPL: 0.7 (ref 1.1–2.2)
ALP SERPL-CCNC: 112 U/L (ref 45–117)
ALT SERPL-CCNC: 30 U/L (ref 12–78)
ANION GAP SERPL CALC-SCNC: 6 MMOL/L (ref 5–15)
AST SERPL-CCNC: 4 U/L (ref 15–37)
BASOPHILS # BLD: 0 K/UL (ref 0–0.1)
BASOPHILS NFR BLD: 0 % (ref 0–1)
BILIRUB SERPL-MCNC: 0.6 MG/DL (ref 0.2–1)
BUN SERPL-MCNC: 38 MG/DL (ref 6–20)
BUN/CREAT SERPL: 8 (ref 12–20)
CALCIUM SERPL-MCNC: 8.8 MG/DL (ref 8.5–10.1)
CHLORIDE SERPL-SCNC: 97 MMOL/L (ref 97–108)
CO2 SERPL-SCNC: 30 MMOL/L (ref 21–32)
CREAT SERPL-MCNC: 4.97 MG/DL (ref 0.55–1.02)
DIFFERENTIAL METHOD BLD: ABNORMAL
ECHO BSA: 2.48 M2
ECHO LV EDV A4C: 82 ML
ECHO LV EDV INDEX A4C: 34 ML/M2
ECHO LV EJECTION FRACTION A4C: 53 %
ECHO LV ESV A4C: 38 ML
ECHO LV ESV INDEX A4C: 16 ML/M2
ECHO LV FRACTIONAL SHORTENING: 21 % (ref 28–44)
ECHO LV INTERNAL DIMENSION DIASTOLE INDEX: 1.97 CM/M2
ECHO LV INTERNAL DIMENSION DIASTOLIC: 4.7 CM (ref 3.9–5.3)
ECHO LV INTERNAL DIMENSION SYSTOLIC INDEX: 1.55 CM/M2
ECHO LV INTERNAL DIMENSION SYSTOLIC: 3.7 CM
ECHO LV IVSD: 1 CM (ref 0.6–0.9)
ECHO LV MASS 2D: 153.4 G (ref 67–162)
ECHO LV MASS INDEX 2D: 64.5 G/M2 (ref 43–95)
ECHO LV POSTERIOR WALL DIASTOLIC: 0.9 CM (ref 0.6–0.9)
ECHO LV RELATIVE WALL THICKNESS RATIO: 0.38
ECHO RV TAPSE: 1.8 CM (ref 1.7–?)
EOSINOPHIL # BLD: 0.3 K/UL (ref 0–0.4)
EOSINOPHIL NFR BLD: 2 % (ref 0–7)
ERYTHROCYTE [DISTWIDTH] IN BLOOD BY AUTOMATED COUNT: 16.8 % (ref 11.5–14.5)
FERRITIN SERPL-MCNC: 401 NG/ML (ref 26–388)
GLOBULIN SER CALC-MCNC: 4.1 G/DL (ref 2–4)
GLUCOSE BLD STRIP.AUTO-MCNC: 187 MG/DL (ref 65–117)
GLUCOSE BLD STRIP.AUTO-MCNC: 187 MG/DL (ref 65–117)
GLUCOSE BLD STRIP.AUTO-MCNC: 190 MG/DL (ref 65–117)
GLUCOSE BLD STRIP.AUTO-MCNC: 256 MG/DL (ref 65–117)
GLUCOSE SERPL-MCNC: 219 MG/DL (ref 65–100)
HCT VFR BLD AUTO: 27.3 % (ref 35–47)
HGB BLD-MCNC: 7.9 G/DL (ref 11.5–16)
IMM GRANULOCYTES # BLD AUTO: 0.1 K/UL (ref 0–0.04)
IMM GRANULOCYTES NFR BLD AUTO: 1 % (ref 0–0.5)
LYMPHOCYTES # BLD: 1.2 K/UL (ref 0.8–3.5)
LYMPHOCYTES NFR BLD: 9 % (ref 12–49)
MCH RBC QN AUTO: 26.6 PG (ref 26–34)
MCHC RBC AUTO-ENTMCNC: 28.9 G/DL (ref 30–36.5)
MCV RBC AUTO: 91.9 FL (ref 80–99)
MONOCYTES # BLD: 1.4 K/UL (ref 0–1)
MONOCYTES NFR BLD: 10 % (ref 5–13)
NEUTS SEG # BLD: 10.6 K/UL (ref 1.8–8)
NEUTS SEG NFR BLD: 78 % (ref 32–75)
NRBC # BLD: 0 K/UL (ref 0–0.01)
NRBC BLD-RTO: 0 PER 100 WBC
PLATELET # BLD AUTO: 316 K/UL (ref 150–400)
PMV BLD AUTO: 9.6 FL (ref 8.9–12.9)
POTASSIUM SERPL-SCNC: 4 MMOL/L (ref 3.5–5.1)
PROT SERPL-MCNC: 7.1 G/DL (ref 6.4–8.2)
RBC # BLD AUTO: 2.97 M/UL (ref 3.8–5.2)
RBC MORPH BLD: ABNORMAL
SERVICE CMNT-IMP: ABNORMAL
SODIUM SERPL-SCNC: 133 MMOL/L (ref 136–145)
TIBC SERPL-MCNC: 149 UG/DL (ref 250–450)
WBC # BLD AUTO: 13.6 K/UL (ref 3.6–11)

## 2024-06-01 PROCEDURE — 36415 COLL VENOUS BLD VENIPUNCTURE: CPT

## 2024-06-01 PROCEDURE — 6370000000 HC RX 637 (ALT 250 FOR IP): Performed by: HOSPITALIST

## 2024-06-01 PROCEDURE — 82962 GLUCOSE BLOOD TEST: CPT

## 2024-06-01 PROCEDURE — 80053 COMPREHEN METABOLIC PANEL: CPT

## 2024-06-01 PROCEDURE — 2060000000 HC ICU INTERMEDIATE R&B

## 2024-06-01 PROCEDURE — 85025 COMPLETE CBC W/AUTO DIFF WBC: CPT

## 2024-06-01 PROCEDURE — 6370000000 HC RX 637 (ALT 250 FOR IP): Performed by: INTERNAL MEDICINE

## 2024-06-01 PROCEDURE — 82728 ASSAY OF FERRITIN: CPT

## 2024-06-01 PROCEDURE — 90935 HEMODIALYSIS ONE EVALUATION: CPT

## 2024-06-01 PROCEDURE — 6360000002 HC RX W HCPCS: Performed by: NURSE PRACTITIONER

## 2024-06-01 PROCEDURE — 6370000000 HC RX 637 (ALT 250 FOR IP): Performed by: GENERAL ACUTE CARE HOSPITAL

## 2024-06-01 PROCEDURE — 93308 TTE F-UP OR LMTD: CPT

## 2024-06-01 PROCEDURE — P9047 ALBUMIN (HUMAN), 25%, 50ML: HCPCS | Performed by: NURSE PRACTITIONER

## 2024-06-01 PROCEDURE — 6360000002 HC RX W HCPCS: Performed by: INTERNAL MEDICINE

## 2024-06-01 PROCEDURE — 83550 IRON BINDING TEST: CPT

## 2024-06-01 RX ADMIN — ACETAMINOPHEN 650 MG: 325 TABLET ORAL at 02:01

## 2024-06-01 RX ADMIN — HYDROCODONE BITARTRATE AND ACETAMINOPHEN 1 TABLET: 5; 325 TABLET ORAL at 05:03

## 2024-06-01 RX ADMIN — INSULIN LISPRO 4 UNITS: 100 INJECTION, SOLUTION INTRAVENOUS; SUBCUTANEOUS at 18:30

## 2024-06-01 RX ADMIN — RANOLAZINE 500 MG: 500 TABLET, FILM COATED, EXTENDED RELEASE ORAL at 21:27

## 2024-06-01 RX ADMIN — ATORVASTATIN CALCIUM 20 MG: 20 TABLET, FILM COATED ORAL at 09:50

## 2024-06-01 RX ADMIN — HYDROXYZINE HYDROCHLORIDE 25 MG: 10 TABLET ORAL at 21:49

## 2024-06-01 RX ADMIN — CALCITRIOL CAPSULES 0.25 MCG 0.25 MCG: 0.25 CAPSULE ORAL at 09:50

## 2024-06-01 RX ADMIN — ALBUMIN (HUMAN) 25 G: 0.25 INJECTION, SOLUTION INTRAVENOUS at 07:28

## 2024-06-01 RX ADMIN — PANTOPRAZOLE SODIUM 40 MG: 40 TABLET, DELAYED RELEASE ORAL at 05:31

## 2024-06-01 RX ADMIN — GABAPENTIN 300 MG: 300 CAPSULE ORAL at 21:28

## 2024-06-01 RX ADMIN — Medication 1 AMPULE: at 21:27

## 2024-06-01 RX ADMIN — HEPARIN SODIUM 5000 UNITS: 5000 INJECTION INTRAVENOUS; SUBCUTANEOUS at 05:30

## 2024-06-01 RX ADMIN — MIDODRINE HYDROCHLORIDE 5 MG: 5 TABLET ORAL at 06:42

## 2024-06-01 RX ADMIN — MIDODRINE HYDROCHLORIDE 5 MG: 5 TABLET ORAL at 18:29

## 2024-06-01 RX ADMIN — Medication 1 AMPULE: at 09:46

## 2024-06-01 RX ADMIN — SEVELAMER CARBONATE 1600 MG: 800 TABLET, FILM COATED ORAL at 11:25

## 2024-06-01 RX ADMIN — RANOLAZINE 500 MG: 500 TABLET, FILM COATED, EXTENDED RELEASE ORAL at 09:50

## 2024-06-01 RX ADMIN — INSULIN GLARGINE 15 UNITS: 100 INJECTION, SOLUTION SUBCUTANEOUS at 21:30

## 2024-06-01 RX ADMIN — COLLAGENASE SANTYL: 250 OINTMENT TOPICAL at 09:47

## 2024-06-01 RX ADMIN — ACETAMINOPHEN 650 MG: 325 TABLET ORAL at 19:47

## 2024-06-01 RX ADMIN — HEPARIN SODIUM 5000 UNITS: 5000 INJECTION INTRAVENOUS; SUBCUTANEOUS at 21:28

## 2024-06-01 RX ADMIN — ACETAMINOPHEN 650 MG: 325 TABLET ORAL at 23:41

## 2024-06-01 RX ADMIN — SEVELAMER CARBONATE 1600 MG: 800 TABLET, FILM COATED ORAL at 18:29

## 2024-06-01 RX ADMIN — SEVELAMER CARBONATE 1600 MG: 800 TABLET, FILM COATED ORAL at 09:50

## 2024-06-01 ASSESSMENT — PAIN DESCRIPTION - ONSET: ONSET: SUDDEN

## 2024-06-01 ASSESSMENT — PAIN DESCRIPTION - LOCATION
LOCATION: LEG
LOCATION: ABDOMEN
LOCATION: ABDOMEN
LOCATION: FLANK
LOCATION: ABDOMEN

## 2024-06-01 ASSESSMENT — PAIN SCALES - GENERAL
PAINLEVEL_OUTOF10: 6
PAINLEVEL_OUTOF10: 3
PAINLEVEL_OUTOF10: 2
PAINLEVEL_OUTOF10: 0
PAINLEVEL_OUTOF10: 7
PAINLEVEL_OUTOF10: 10
PAINLEVEL_OUTOF10: 10
PAINLEVEL_OUTOF10: 8
PAINLEVEL_OUTOF10: 2
PAINLEVEL_OUTOF10: 0
PAINLEVEL_OUTOF10: 9

## 2024-06-01 ASSESSMENT — PAIN DESCRIPTION - ORIENTATION
ORIENTATION: LEFT
ORIENTATION: LEFT
ORIENTATION: LOWER
ORIENTATION: RIGHT;LEFT
ORIENTATION: LEFT

## 2024-06-01 ASSESSMENT — PAIN DESCRIPTION - DESCRIPTORS
DESCRIPTORS: ACHING
DESCRIPTORS: BURNING;SHOOTING
DESCRIPTORS: ACHING
DESCRIPTORS: BURNING;STABBING
DESCRIPTORS: BURNING

## 2024-06-01 ASSESSMENT — PAIN DESCRIPTION - FREQUENCY: FREQUENCY: CONTINUOUS

## 2024-06-01 ASSESSMENT — PAIN DESCRIPTION - PAIN TYPE: TYPE: ACUTE PAIN

## 2024-06-01 ASSESSMENT — PAIN - FUNCTIONAL ASSESSMENT: PAIN_FUNCTIONAL_ASSESSMENT: ACTIVITIES ARE NOT PREVENTED

## 2024-06-02 LAB
ALBUMIN SERPL-MCNC: 3.3 G/DL (ref 3.5–5)
ALBUMIN/GLOB SERPL: 0.7 (ref 1.1–2.2)
ALP SERPL-CCNC: 131 U/L (ref 45–117)
ALT SERPL-CCNC: 26 U/L (ref 12–78)
ANION GAP SERPL CALC-SCNC: 5 MMOL/L (ref 5–15)
AST SERPL-CCNC: 12 U/L (ref 15–37)
BASOPHILS # BLD: 0 K/UL (ref 0–0.1)
BASOPHILS NFR BLD: 0 % (ref 0–1)
BILIRUB SERPL-MCNC: 0.7 MG/DL (ref 0.2–1)
BUN SERPL-MCNC: 55 MG/DL (ref 6–20)
BUN/CREAT SERPL: 9 (ref 12–20)
CALCIUM SERPL-MCNC: 9.8 MG/DL (ref 8.5–10.1)
CHLORIDE SERPL-SCNC: 95 MMOL/L (ref 97–108)
CO2 SERPL-SCNC: 29 MMOL/L (ref 21–32)
CREAT SERPL-MCNC: 6.3 MG/DL (ref 0.55–1.02)
DIFFERENTIAL METHOD BLD: ABNORMAL
EOSINOPHIL # BLD: 0.5 K/UL (ref 0–0.4)
EOSINOPHIL NFR BLD: 3 % (ref 0–7)
ERYTHROCYTE [DISTWIDTH] IN BLOOD BY AUTOMATED COUNT: 16.3 % (ref 11.5–14.5)
GLOBULIN SER CALC-MCNC: 4.6 G/DL (ref 2–4)
GLUCOSE BLD STRIP.AUTO-MCNC: 122 MG/DL (ref 65–117)
GLUCOSE BLD STRIP.AUTO-MCNC: 130 MG/DL (ref 65–117)
GLUCOSE BLD STRIP.AUTO-MCNC: 168 MG/DL (ref 65–117)
GLUCOSE BLD STRIP.AUTO-MCNC: 186 MG/DL (ref 65–117)
GLUCOSE SERPL-MCNC: 119 MG/DL (ref 65–100)
HCT VFR BLD AUTO: 29.3 % (ref 35–47)
HGB BLD-MCNC: 8.4 G/DL (ref 11.5–16)
IMM GRANULOCYTES # BLD AUTO: 0.2 K/UL (ref 0–0.04)
IMM GRANULOCYTES NFR BLD AUTO: 1 % (ref 0–0.5)
LYMPHOCYTES # BLD: 1.7 K/UL (ref 0.8–3.5)
LYMPHOCYTES NFR BLD: 11 % (ref 12–49)
MCH RBC QN AUTO: 26.6 PG (ref 26–34)
MCHC RBC AUTO-ENTMCNC: 28.7 G/DL (ref 30–36.5)
MCV RBC AUTO: 92.7 FL (ref 80–99)
MONOCYTES # BLD: 1.6 K/UL (ref 0–1)
MONOCYTES NFR BLD: 10 % (ref 5–13)
NEUTS SEG # BLD: 11.8 K/UL (ref 1.8–8)
NEUTS SEG NFR BLD: 75 % (ref 32–75)
NRBC # BLD: 0 K/UL (ref 0–0.01)
NRBC BLD-RTO: 0 PER 100 WBC
PLATELET # BLD AUTO: 376 K/UL (ref 150–400)
PMV BLD AUTO: 9.6 FL (ref 8.9–12.9)
POTASSIUM SERPL-SCNC: 4.7 MMOL/L (ref 3.5–5.1)
PROT SERPL-MCNC: 7.9 G/DL (ref 6.4–8.2)
RBC # BLD AUTO: 3.16 M/UL (ref 3.8–5.2)
RBC MORPH BLD: ABNORMAL
SERVICE CMNT-IMP: ABNORMAL
SODIUM SERPL-SCNC: 129 MMOL/L (ref 136–145)
WBC # BLD AUTO: 15.8 K/UL (ref 3.6–11)

## 2024-06-02 PROCEDURE — 80053 COMPREHEN METABOLIC PANEL: CPT

## 2024-06-02 PROCEDURE — 82962 GLUCOSE BLOOD TEST: CPT

## 2024-06-02 PROCEDURE — 6370000000 HC RX 637 (ALT 250 FOR IP): Performed by: INTERNAL MEDICINE

## 2024-06-02 PROCEDURE — 36415 COLL VENOUS BLD VENIPUNCTURE: CPT

## 2024-06-02 PROCEDURE — 6370000000 HC RX 637 (ALT 250 FOR IP): Performed by: HOSPITALIST

## 2024-06-02 PROCEDURE — 6360000002 HC RX W HCPCS: Performed by: NURSE PRACTITIONER

## 2024-06-02 PROCEDURE — 6360000002 HC RX W HCPCS: Performed by: INTERNAL MEDICINE

## 2024-06-02 PROCEDURE — 85025 COMPLETE CBC W/AUTO DIFF WBC: CPT

## 2024-06-02 PROCEDURE — 2060000000 HC ICU INTERMEDIATE R&B

## 2024-06-02 PROCEDURE — 6370000000 HC RX 637 (ALT 250 FOR IP): Performed by: GENERAL ACUTE CARE HOSPITAL

## 2024-06-02 PROCEDURE — 6370000000 HC RX 637 (ALT 250 FOR IP): Performed by: STUDENT IN AN ORGANIZED HEALTH CARE EDUCATION/TRAINING PROGRAM

## 2024-06-02 RX ORDER — ONDANSETRON 2 MG/ML
4 INJECTION INTRAMUSCULAR; INTRAVENOUS EVERY 6 HOURS PRN
Status: DISCONTINUED | OUTPATIENT
Start: 2024-06-02 | End: 2024-06-18 | Stop reason: HOSPADM

## 2024-06-02 RX ORDER — LACTULOSE 10 G/15ML
30 SOLUTION ORAL 3 TIMES DAILY
Status: DISPENSED | OUTPATIENT
Start: 2024-06-02 | End: 2024-06-03

## 2024-06-02 RX ORDER — SORBITOL SOLUTION 70 %
60 SOLUTION, ORAL MISCELLANEOUS ONCE
Status: COMPLETED | OUTPATIENT
Start: 2024-06-02 | End: 2024-06-02

## 2024-06-02 RX ADMIN — SEVELAMER CARBONATE 1600 MG: 800 TABLET, FILM COATED ORAL at 10:51

## 2024-06-02 RX ADMIN — MIDODRINE HYDROCHLORIDE 5 MG: 5 TABLET ORAL at 16:39

## 2024-06-02 RX ADMIN — HYDROCODONE BITARTRATE AND ACETAMINOPHEN 1 TABLET: 5; 325 TABLET ORAL at 01:11

## 2024-06-02 RX ADMIN — RANOLAZINE 500 MG: 500 TABLET, FILM COATED, EXTENDED RELEASE ORAL at 21:09

## 2024-06-02 RX ADMIN — ATORVASTATIN CALCIUM 20 MG: 20 TABLET, FILM COATED ORAL at 08:47

## 2024-06-02 RX ADMIN — HEPARIN SODIUM 5000 UNITS: 5000 INJECTION INTRAVENOUS; SUBCUTANEOUS at 06:49

## 2024-06-02 RX ADMIN — RANOLAZINE 500 MG: 500 TABLET, FILM COATED, EXTENDED RELEASE ORAL at 08:48

## 2024-06-02 RX ADMIN — HYDROXYZINE HYDROCHLORIDE 25 MG: 10 TABLET ORAL at 21:09

## 2024-06-02 RX ADMIN — Medication 1 AMPULE: at 08:47

## 2024-06-02 RX ADMIN — ASPIRIN 81 MG CHEWABLE TABLET 81 MG: 81 TABLET CHEWABLE at 08:48

## 2024-06-02 RX ADMIN — COLLAGENASE SANTYL: 250 OINTMENT TOPICAL at 08:49

## 2024-06-02 RX ADMIN — SEVELAMER CARBONATE 1600 MG: 800 TABLET, FILM COATED ORAL at 16:38

## 2024-06-02 RX ADMIN — HEPARIN SODIUM 5000 UNITS: 5000 INJECTION INTRAVENOUS; SUBCUTANEOUS at 21:09

## 2024-06-02 RX ADMIN — ONDANSETRON 4 MG: 2 INJECTION INTRAMUSCULAR; INTRAVENOUS at 23:48

## 2024-06-02 RX ADMIN — LACTULOSE 30 G: 10 SOLUTION ORAL at 10:50

## 2024-06-02 RX ADMIN — SORBITOL SOLUTION (BULK) 60 ML: 70 SOLUTION at 13:35

## 2024-06-02 RX ADMIN — GABAPENTIN 300 MG: 300 CAPSULE ORAL at 21:09

## 2024-06-02 RX ADMIN — HEPARIN SODIUM 5000 UNITS: 5000 INJECTION INTRAVENOUS; SUBCUTANEOUS at 16:34

## 2024-06-02 RX ADMIN — CLOPIDOGREL BISULFATE 75 MG: 75 TABLET ORAL at 08:48

## 2024-06-02 RX ADMIN — MIDODRINE HYDROCHLORIDE 5 MG: 5 TABLET ORAL at 08:48

## 2024-06-02 RX ADMIN — CALCITRIOL CAPSULES 0.25 MCG 0.25 MCG: 0.25 CAPSULE ORAL at 08:48

## 2024-06-02 RX ADMIN — PANTOPRAZOLE SODIUM 40 MG: 40 TABLET, DELAYED RELEASE ORAL at 06:49

## 2024-06-02 RX ADMIN — SEVELAMER CARBONATE 1600 MG: 800 TABLET, FILM COATED ORAL at 08:47

## 2024-06-02 ASSESSMENT — PAIN DESCRIPTION - DESCRIPTORS: DESCRIPTORS: BURNING

## 2024-06-02 ASSESSMENT — PAIN DESCRIPTION - LOCATION: LOCATION: FLANK

## 2024-06-02 ASSESSMENT — PAIN SCALES - GENERAL
PAINLEVEL_OUTOF10: 0
PAINLEVEL_OUTOF10: 9

## 2024-06-02 ASSESSMENT — PAIN - FUNCTIONAL ASSESSMENT: PAIN_FUNCTIONAL_ASSESSMENT: ACTIVITIES ARE NOT PREVENTED

## 2024-06-02 ASSESSMENT — PAIN DESCRIPTION - ORIENTATION: ORIENTATION: LEFT

## 2024-06-03 ENCOUNTER — APPOINTMENT (OUTPATIENT)
Facility: HOSPITAL | Age: 49
DRG: 314 | End: 2024-06-03
Attending: INTERNAL MEDICINE
Payer: MEDICARE

## 2024-06-03 LAB
ALBUMIN SERPL-MCNC: 3 G/DL (ref 3.5–5)
ALBUMIN/GLOB SERPL: 0.7 (ref 1.1–2.2)
ALP SERPL-CCNC: 128 U/L (ref 45–117)
ALT SERPL-CCNC: 21 U/L (ref 12–78)
AMYLASE FLD-CCNC: <2 U/L
ANION GAP SERPL CALC-SCNC: 10 MMOL/L (ref 5–15)
ANION GAP SERPL CALC-SCNC: 10 MMOL/L (ref 5–15)
APPEARANCE FLD: ABNORMAL
AST SERPL-CCNC: 4 U/L (ref 15–37)
BASE DEFICIT BLD-SCNC: 0.2 MMOL/L
BASOPHILS # BLD: 0.2 K/UL (ref 0–0.1)
BASOPHILS NFR BLD: 1 % (ref 0–1)
BILIRUB SERPL-MCNC: 0.8 MG/DL (ref 0.2–1)
BUN SERPL-MCNC: 35 MG/DL (ref 6–20)
BUN SERPL-MCNC: 71 MG/DL (ref 6–20)
BUN/CREAT SERPL: 10 (ref 12–20)
BUN/CREAT SERPL: 8 (ref 12–20)
CALCIUM SERPL-MCNC: 9 MG/DL (ref 8.5–10.1)
CALCIUM SERPL-MCNC: 9.5 MG/DL (ref 8.5–10.1)
CHLORIDE SERPL-SCNC: 92 MMOL/L (ref 97–108)
CHLORIDE SERPL-SCNC: 92 MMOL/L (ref 97–108)
CO2 SERPL-SCNC: 25 MMOL/L (ref 21–32)
CO2 SERPL-SCNC: 28 MMOL/L (ref 21–32)
COLOR FLD: ABNORMAL
COMMENT:: NORMAL
COMMENT:: NORMAL
CREAT SERPL-MCNC: 4.56 MG/DL (ref 0.55–1.02)
CREAT SERPL-MCNC: 7.47 MG/DL (ref 0.55–1.02)
DIFFERENTIAL METHOD BLD: ABNORMAL
EOSINOPHIL # BLD: 0.5 K/UL (ref 0–0.4)
EOSINOPHIL NFR BLD: 3 % (ref 0–7)
ERYTHROCYTE [DISTWIDTH] IN BLOOD BY AUTOMATED COUNT: 16.2 % (ref 11.5–14.5)
GLOBULIN SER CALC-MCNC: 4.3 G/DL (ref 2–4)
GLUCOSE BLD STRIP.AUTO-MCNC: 127 MG/DL (ref 65–117)
GLUCOSE BLD STRIP.AUTO-MCNC: 187 MG/DL (ref 65–117)
GLUCOSE BLD STRIP.AUTO-MCNC: 246 MG/DL (ref 65–117)
GLUCOSE FLD-MCNC: 119 MG/DL
GLUCOSE SERPL-MCNC: 156 MG/DL (ref 65–100)
GLUCOSE SERPL-MCNC: 205 MG/DL (ref 65–100)
HCO3 BLD-SCNC: 24.3 MMOL/L (ref 22–26)
HCT VFR BLD AUTO: 26.8 % (ref 35–47)
HGB BLD-MCNC: 7.8 G/DL (ref 11.5–16)
IMM GRANULOCYTES # BLD AUTO: 0.2 K/UL (ref 0–0.04)
IMM GRANULOCYTES NFR BLD AUTO: 1 % (ref 0–0.5)
LACTATE SERPL-SCNC: 1.7 MMOL/L (ref 0.4–2)
LDH FLD L TO P-CCNC: 2230 U/L
LYMPHOCYTES # BLD: 1.5 K/UL (ref 0.8–3.5)
LYMPHOCYTES NFR BLD: 10 % (ref 12–49)
LYMPHOCYTES NFR FLD: 7 %
MCH RBC QN AUTO: 26.1 PG (ref 26–34)
MCHC RBC AUTO-ENTMCNC: 29.1 G/DL (ref 30–36.5)
MCV RBC AUTO: 89.6 FL (ref 80–99)
MONOCYTES # BLD: 1.2 K/UL (ref 0–1)
MONOCYTES NFR BLD: 8 % (ref 5–13)
MONOS+MACROS NFR FLD: 16 %
NEUTROPHILS NFR FLD: 77 %
NEUTS SEG # BLD: 11.5 K/UL (ref 1.8–8)
NEUTS SEG NFR BLD: 77 % (ref 32–75)
NRBC # BLD: 0 K/UL (ref 0–0.01)
NRBC BLD-RTO: 0 PER 100 WBC
NUC CELL # FLD: 1521 /CU MM
PCO2 BLD: 38.1 MMHG (ref 35–45)
PH BLD: 7.41 (ref 7.35–7.45)
PLATELET # BLD AUTO: 468 K/UL (ref 150–400)
PLATELET COMMENT: ABNORMAL
PMV BLD AUTO: 9.4 FL (ref 8.9–12.9)
PO2 BLD: 61 MMHG (ref 80–100)
POTASSIUM SERPL-SCNC: 3.9 MMOL/L (ref 3.5–5.1)
POTASSIUM SERPL-SCNC: 5.1 MMOL/L (ref 3.5–5.1)
PROT FLD-MCNC: 5.1 G/DL
PROT SERPL-MCNC: 7.3 G/DL (ref 6.4–8.2)
RBC # BLD AUTO: 2.99 M/UL (ref 3.8–5.2)
RBC # FLD: >100 /CU MM
RBC MORPH BLD: ABNORMAL
RBC MORPH BLD: ABNORMAL
RHEUMATOID FACT SERPL-ACNC: <10 IU/ML
SAO2 % BLD: 91.3 % (ref 92–97)
SERVICE CMNT-IMP: ABNORMAL
SODIUM SERPL-SCNC: 127 MMOL/L (ref 136–145)
SODIUM SERPL-SCNC: 130 MMOL/L (ref 136–145)
SPECIMEN HOLD: NORMAL
SPECIMEN HOLD: NORMAL
SPECIMEN SOURCE FLD: ABNORMAL
SPECIMEN SOURCE FLD: NORMAL
SPECIMEN TYPE: ABNORMAL
WBC # BLD AUTO: 15.1 K/UL (ref 3.6–11)

## 2024-06-03 PROCEDURE — 85025 COMPLETE CBC W/AUTO DIFF WBC: CPT

## 2024-06-03 PROCEDURE — 85013 SPUN MICROHEMATOCRIT: CPT

## 2024-06-03 PROCEDURE — C1894 INTRO/SHEATH, NON-LASER: HCPCS | Performed by: INTERNAL MEDICINE

## 2024-06-03 PROCEDURE — 6370000000 HC RX 637 (ALT 250 FOR IP): Performed by: INTERNAL MEDICINE

## 2024-06-03 PROCEDURE — P9045 ALBUMIN (HUMAN), 5%, 250 ML: HCPCS | Performed by: NURSE PRACTITIONER

## 2024-06-03 PROCEDURE — 83516 IMMUNOASSAY NONANTIBODY: CPT

## 2024-06-03 PROCEDURE — 6360000002 HC RX W HCPCS: Performed by: INTERNAL MEDICINE

## 2024-06-03 PROCEDURE — 89050 BODY FLUID CELL COUNT: CPT

## 2024-06-03 PROCEDURE — 5A09357 ASSISTANCE WITH RESPIRATORY VENTILATION, LESS THAN 24 CONSECUTIVE HOURS, CONTINUOUS POSITIVE AIRWAY PRESSURE: ICD-10-PCS | Performed by: STUDENT IN AN ORGANIZED HEALTH CARE EDUCATION/TRAINING PROGRAM

## 2024-06-03 PROCEDURE — 88112 CYTOPATH CELL ENHANCE TECH: CPT

## 2024-06-03 PROCEDURE — 6370000000 HC RX 637 (ALT 250 FOR IP): Performed by: HOSPITALIST

## 2024-06-03 PROCEDURE — 88305 TISSUE EXAM BY PATHOLOGIST: CPT

## 2024-06-03 PROCEDURE — 84157 ASSAY OF PROTEIN OTHER: CPT

## 2024-06-03 PROCEDURE — 82150 ASSAY OF AMYLASE: CPT

## 2024-06-03 PROCEDURE — 80053 COMPREHEN METABOLIC PANEL: CPT

## 2024-06-03 PROCEDURE — 33016 PERICARDIOCENTESIS W/IMAGING: CPT | Performed by: INTERNAL MEDICINE

## 2024-06-03 PROCEDURE — 0W9D3ZZ DRAINAGE OF PERICARDIAL CAVITY, PERCUTANEOUS APPROACH: ICD-10-PCS | Performed by: INTERNAL MEDICINE

## 2024-06-03 PROCEDURE — 2709999900 HC NON-CHARGEABLE SUPPLY: Performed by: INTERNAL MEDICINE

## 2024-06-03 PROCEDURE — 82803 BLOOD GASES ANY COMBINATION: CPT

## 2024-06-03 PROCEDURE — 5A12012 PERFORMANCE OF CARDIAC OUTPUT, SINGLE, MANUAL: ICD-10-PCS | Performed by: INTERNAL MEDICINE

## 2024-06-03 PROCEDURE — 90935 HEMODIALYSIS ONE EVALUATION: CPT

## 2024-06-03 PROCEDURE — 2000000000 HC ICU R&B

## 2024-06-03 PROCEDURE — 6370000000 HC RX 637 (ALT 250 FOR IP): Performed by: GENERAL ACUTE CARE HOSPITAL

## 2024-06-03 PROCEDURE — 99152 MOD SED SAME PHYS/QHP 5/>YRS: CPT | Performed by: INTERNAL MEDICINE

## 2024-06-03 PROCEDURE — 82945 GLUCOSE OTHER FLUID: CPT

## 2024-06-03 PROCEDURE — 86431 RHEUMATOID FACTOR QUANT: CPT

## 2024-06-03 PROCEDURE — 2700000000 HC OXYGEN THERAPY PER DAY

## 2024-06-03 PROCEDURE — 92950 HEART/LUNG RESUSCITATION CPR: CPT

## 2024-06-03 PROCEDURE — 2500000003 HC RX 250 WO HCPCS: Performed by: STUDENT IN AN ORGANIZED HEALTH CARE EDUCATION/TRAINING PROGRAM

## 2024-06-03 PROCEDURE — 99153 MOD SED SAME PHYS/QHP EA: CPT | Performed by: INTERNAL MEDICINE

## 2024-06-03 PROCEDURE — 86235 NUCLEAR ANTIGEN ANTIBODY: CPT

## 2024-06-03 PROCEDURE — 86225 DNA ANTIBODY NATIVE: CPT

## 2024-06-03 PROCEDURE — 82962 GLUCOSE BLOOD TEST: CPT

## 2024-06-03 PROCEDURE — 83605 ASSAY OF LACTIC ACID: CPT

## 2024-06-03 PROCEDURE — 6360000002 HC RX W HCPCS: Performed by: NURSE PRACTITIONER

## 2024-06-03 PROCEDURE — 87205 SMEAR GRAM STAIN: CPT

## 2024-06-03 PROCEDURE — 93308 TTE F-UP OR LMTD: CPT

## 2024-06-03 PROCEDURE — 36415 COLL VENOUS BLD VENIPUNCTURE: CPT

## 2024-06-03 PROCEDURE — 71045 X-RAY EXAM CHEST 1 VIEW: CPT

## 2024-06-03 PROCEDURE — 2500000003 HC RX 250 WO HCPCS: Performed by: INTERNAL MEDICINE

## 2024-06-03 PROCEDURE — 93306 TTE W/DOPPLER COMPLETE: CPT

## 2024-06-03 PROCEDURE — 83615 LACTATE (LD) (LDH) ENZYME: CPT

## 2024-06-03 PROCEDURE — 87070 CULTURE OTHR SPECIMN AEROBIC: CPT

## 2024-06-03 PROCEDURE — 74018 RADEX ABDOMEN 1 VIEW: CPT

## 2024-06-03 RX ORDER — HYDROMORPHONE HYDROCHLORIDE 1 MG/ML
1 INJECTION, SOLUTION INTRAMUSCULAR; INTRAVENOUS; SUBCUTANEOUS EVERY 6 HOURS PRN
Status: DISCONTINUED | OUTPATIENT
Start: 2024-06-03 | End: 2024-06-03

## 2024-06-03 RX ORDER — EPINEPHRINE IN SOD CHLOR,ISO 1 MG/10 ML
SYRINGE (ML) INTRAVENOUS
Status: COMPLETED | OUTPATIENT
Start: 2024-06-03 | End: 2024-06-03

## 2024-06-03 RX ORDER — MIDODRINE HYDROCHLORIDE 5 MG/1
10 TABLET ORAL 3 TIMES DAILY
Status: DISCONTINUED | OUTPATIENT
Start: 2024-06-03 | End: 2024-06-05

## 2024-06-03 RX ORDER — ALBUMIN, HUMAN INJ 5% 5 %
25 SOLUTION INTRAVENOUS ONCE
Status: COMPLETED | OUTPATIENT
Start: 2024-06-03 | End: 2024-06-04

## 2024-06-03 RX ORDER — NALOXONE HYDROCHLORIDE 1 MG/ML
INJECTION INTRAMUSCULAR; INTRAVENOUS; SUBCUTANEOUS
Status: DISPENSED
Start: 2024-06-03 | End: 2024-06-04

## 2024-06-03 RX ORDER — FUROSEMIDE 40 MG/1
80 TABLET ORAL 2 TIMES DAILY
Status: DISCONTINUED | OUTPATIENT
Start: 2024-06-03 | End: 2024-06-07

## 2024-06-03 RX ORDER — NALOXONE HYDROCHLORIDE 0.4 MG/ML
INJECTION, SOLUTION INTRAMUSCULAR; INTRAVENOUS; SUBCUTANEOUS
Status: COMPLETED | OUTPATIENT
Start: 2024-06-03 | End: 2024-06-03

## 2024-06-03 RX ORDER — NOREPINEPHRINE BITARTRATE 0.06 MG/ML
1-50 INJECTION, SOLUTION INTRAVENOUS CONTINUOUS
Status: DISCONTINUED | OUTPATIENT
Start: 2024-06-04 | End: 2024-06-06

## 2024-06-03 RX ORDER — HEPARIN SODIUM 10000 [USP'U]/ML
INJECTION, SOLUTION INTRAVENOUS; SUBCUTANEOUS PRN
Status: DISCONTINUED | OUTPATIENT
Start: 2024-06-03 | End: 2024-06-03 | Stop reason: HOSPADM

## 2024-06-03 RX ADMIN — EPINEPHRINE 1 MG: 0.1 INJECTION, SOLUTION ENDOTRACHEAL; INTRACARDIAC; INTRAVENOUS at 15:25

## 2024-06-03 RX ADMIN — RANOLAZINE 500 MG: 500 TABLET, FILM COATED, EXTENDED RELEASE ORAL at 14:44

## 2024-06-03 RX ADMIN — ATORVASTATIN CALCIUM 20 MG: 20 TABLET, FILM COATED ORAL at 08:22

## 2024-06-03 RX ADMIN — PANTOPRAZOLE SODIUM 40 MG: 40 TABLET, DELAYED RELEASE ORAL at 05:40

## 2024-06-03 RX ADMIN — CLOPIDOGREL BISULFATE 75 MG: 75 TABLET ORAL at 14:46

## 2024-06-03 RX ADMIN — GABAPENTIN 300 MG: 300 CAPSULE ORAL at 21:13

## 2024-06-03 RX ADMIN — INSULIN GLARGINE 15 UNITS: 100 INJECTION, SOLUTION SUBCUTANEOUS at 21:53

## 2024-06-03 RX ADMIN — MIDODRINE HYDROCHLORIDE 5 MG: 5 TABLET ORAL at 08:21

## 2024-06-03 RX ADMIN — MIDODRINE HYDROCHLORIDE 10 MG: 5 TABLET ORAL at 14:44

## 2024-06-03 RX ADMIN — COLLAGENASE SANTYL: 250 OINTMENT TOPICAL at 08:24

## 2024-06-03 RX ADMIN — Medication 1 AMPULE: at 08:25

## 2024-06-03 RX ADMIN — ACETAMINOPHEN 650 MG: 325 TABLET ORAL at 05:39

## 2024-06-03 RX ADMIN — HYDROXYZINE HYDROCHLORIDE 25 MG: 10 TABLET ORAL at 21:12

## 2024-06-03 RX ADMIN — HYDROMORPHONE HYDROCHLORIDE 1 MG: 1 INJECTION, SOLUTION INTRAMUSCULAR; INTRAVENOUS; SUBCUTANEOUS at 15:12

## 2024-06-03 RX ADMIN — ALBUMIN (HUMAN) 25 G: 12.5 INJECTION, SOLUTION INTRAVENOUS at 23:40

## 2024-06-03 RX ADMIN — MIDODRINE HYDROCHLORIDE 10 MG: 5 TABLET ORAL at 21:13

## 2024-06-03 RX ADMIN — RANOLAZINE 500 MG: 500 TABLET, FILM COATED, EXTENDED RELEASE ORAL at 21:12

## 2024-06-03 RX ADMIN — HEPARIN SODIUM 5000 UNITS: 5000 INJECTION INTRAVENOUS; SUBCUTANEOUS at 05:40

## 2024-06-03 RX ADMIN — HYDROCODONE BITARTRATE AND ACETAMINOPHEN 1 TABLET: 5; 325 TABLET ORAL at 12:08

## 2024-06-03 RX ADMIN — HEPARIN SODIUM 5000 UNITS: 5000 INJECTION INTRAVENOUS; SUBCUTANEOUS at 14:44

## 2024-06-03 RX ADMIN — EPOETIN ALFA-EPBX 20000 UNITS: 20000 INJECTION, SOLUTION INTRAVENOUS; SUBCUTANEOUS at 21:53

## 2024-06-03 RX ADMIN — ACETAMINOPHEN 650 MG: 325 TABLET ORAL at 21:14

## 2024-06-03 RX ADMIN — FUROSEMIDE 80 MG: 40 TABLET ORAL at 14:44

## 2024-06-03 RX ADMIN — NALXONE HYDROCHLORIDE 0.4 MG: 0.4 INJECTION INTRAMUSCULAR; INTRAVENOUS; SUBCUTANEOUS at 15:35

## 2024-06-03 RX ADMIN — HEPARIN SODIUM 5000 UNITS: 5000 INJECTION INTRAVENOUS; SUBCUTANEOUS at 21:53

## 2024-06-03 RX ADMIN — CALCITRIOL CAPSULES 0.25 MCG 0.25 MCG: 0.25 CAPSULE ORAL at 08:23

## 2024-06-03 RX ADMIN — ASPIRIN 81 MG CHEWABLE TABLET 81 MG: 81 TABLET CHEWABLE at 14:47

## 2024-06-03 RX ADMIN — SEVELAMER CARBONATE 1600 MG: 800 TABLET, FILM COATED ORAL at 14:45

## 2024-06-03 ASSESSMENT — PAIN SCALES - GENERAL
PAINLEVEL_OUTOF10: 3
PAINLEVEL_OUTOF10: 9
PAINLEVEL_OUTOF10: 9
PAINLEVEL_OUTOF10: 10
PAINLEVEL_OUTOF10: 9
PAINLEVEL_OUTOF10: 0
PAINLEVEL_OUTOF10: 2
PAINLEVEL_OUTOF10: 5

## 2024-06-03 ASSESSMENT — PAIN DESCRIPTION - LOCATION
LOCATION: FLANK;ABDOMEN
LOCATION: FLANK
LOCATION: FLANK
LOCATION: ABDOMEN
LOCATION: FLANK
LOCATION: ABDOMEN;FLANK

## 2024-06-03 ASSESSMENT — PAIN DESCRIPTION - DESCRIPTORS
DESCRIPTORS: ACHING;BURNING;STABBING
DESCRIPTORS: BURNING
DESCRIPTORS: ACHING;BURNING;SHARP
DESCRIPTORS: BURNING
DESCRIPTORS: BURNING

## 2024-06-03 ASSESSMENT — PAIN DESCRIPTION - ORIENTATION
ORIENTATION: LEFT
ORIENTATION: LEFT
ORIENTATION: RIGHT;LEFT
ORIENTATION: RIGHT;LEFT
ORIENTATION: LEFT
ORIENTATION: LEFT

## 2024-06-03 ASSESSMENT — PAIN DESCRIPTION - PAIN TYPE: TYPE: ACUTE PAIN

## 2024-06-03 ASSESSMENT — PAIN DESCRIPTION - FREQUENCY
FREQUENCY: CONTINUOUS
FREQUENCY: CONTINUOUS

## 2024-06-03 ASSESSMENT — PAIN DESCRIPTION - ONSET
ONSET: ON-GOING
ONSET: ON-GOING

## 2024-06-03 NOTE — CARE COORDINATION
Transition of Care Plan:    RUR: 35%  Prior Level of Functioning: some assistance  Disposition: TBD, will review OT PT from today. Last notes recommended HH  If SNF or IPR: Date FOC offered:   Date FOC received:   Accepting facility:   Date authorization started with reference number:   Date authorization received and expires:   Follow up appointments: PCP and Specialist  DME needed: hasrolling walker and wheelchair  Transportation at discharge: TBD  IM/IMM Medicare/ letter given:   Is patient a  and connected with VA?    If yes, was [place] transfer form completed and VA notified?   Caregiver Contact:     GEETHA WULE (Brother/Sister)  [phone]     Discharge Caregiver contacted prior to discharge?   Care Conference needed?   Barriers to discharge: clinical improvement, disposition    English Sonia GABRIEL QO [phone]

## 2024-06-04 LAB
ANION GAP SERPL CALC-SCNC: 8 MMOL/L (ref 5–15)
BODY FLD TYPE: NORMAL
BUN SERPL-MCNC: 45 MG/DL (ref 6–20)
BUN/CREAT SERPL: 8 (ref 12–20)
CALCIUM SERPL-MCNC: 9.2 MG/DL (ref 8.5–10.1)
CHLORIDE SERPL-SCNC: 93 MMOL/L (ref 97–108)
CO2 SERPL-SCNC: 28 MMOL/L (ref 21–32)
CREAT SERPL-MCNC: 5.34 MG/DL (ref 0.55–1.02)
CYTOLOGY-NON GYN: NORMAL
ECHO AO ROOT DIAM: 3 CM
ECHO AO ROOT INDEX: 1.29 CM/M2
ECHO AV AREA PEAK VELOCITY: 3.1 CM2
ECHO AV AREA VTI: 2.8 CM2
ECHO AV AREA/BSA VTI: 1.2 CM2/M2
ECHO AV MEAN GRADIENT: 4 MMHG
ECHO AV MEAN VELOCITY: 1 M/S
ECHO AV PEAK GRADIENT: 9 MMHG
ECHO AV PEAK GRADIENT: 9 MMHG
ECHO AV PEAK VELOCITY: 1.5 M/S
ECHO AV PEAK VELOCITY: 1.5 M/S
ECHO AV VTI: 22.3 CM
ECHO BSA: 2.42 M2
ECHO BSA: 2.42 M2
ECHO EST RA PRESSURE: 8 MMHG
ECHO LA DIAMETER INDEX: 1.9 CM/M2
ECHO LA DIAMETER: 4.4 CM
ECHO LA TO AORTIC ROOT RATIO: 1.47
ECHO LA VOL A-L A2C: 87 ML (ref 22–52)
ECHO LA VOL A-L A4C: 98 ML (ref 22–52)
ECHO LA VOL BP: 95 ML (ref 22–52)
ECHO LA VOL MOD A2C: 79 ML (ref 22–52)
ECHO LA VOL MOD A4C: 91 ML (ref 22–52)
ECHO LA VOL/BSA BIPLANE: 41 ML/M2 (ref 16–34)
ECHO LA VOLUME AREA LENGTH: 105 ML
ECHO LA VOLUME INDEX A-L A2C: 38 ML/M2 (ref 16–34)
ECHO LA VOLUME INDEX A-L A4C: 42 ML/M2 (ref 16–34)
ECHO LA VOLUME INDEX AREA LENGTH: 45 ML/M2 (ref 16–34)
ECHO LA VOLUME INDEX MOD A2C: 34 ML/M2 (ref 16–34)
ECHO LA VOLUME INDEX MOD A4C: 39 ML/M2 (ref 16–34)
ECHO LV FRACTIONAL SHORTENING: 16 % (ref 28–44)
ECHO LV INTERNAL DIMENSION DIASTOLE INDEX: 2.11 CM/M2
ECHO LV INTERNAL DIMENSION DIASTOLIC: 4.9 CM (ref 3.9–5.3)
ECHO LV INTERNAL DIMENSION SYSTOLIC INDEX: 1.77 CM/M2
ECHO LV INTERNAL DIMENSION SYSTOLIC: 4.1 CM
ECHO LV IVSD: 1.2 CM (ref 0.6–0.9)
ECHO LV MASS 2D: 213.3 G (ref 67–162)
ECHO LV MASS INDEX 2D: 91.9 G/M2 (ref 43–95)
ECHO LV POSTERIOR WALL DIASTOLIC: 1.1 CM (ref 0.6–0.9)
ECHO LV RELATIVE WALL THICKNESS RATIO: 0.45
ECHO LVOT AREA: 3.5 CM2
ECHO LVOT AV VTI INDEX: 0.86
ECHO LVOT DIAM: 2.1 CM
ECHO LVOT MEAN GRADIENT: 3 MMHG
ECHO LVOT PEAK GRADIENT: 8 MMHG
ECHO LVOT PEAK GRADIENT: 8 MMHG
ECHO LVOT PEAK VELOCITY: 1.4 M/S
ECHO LVOT PEAK VELOCITY: 1.4 M/S
ECHO LVOT STROKE VOLUME INDEX: 28.5 ML/M2
ECHO LVOT SV: 66.1 ML
ECHO LVOT VTI: 19.1 CM
ECHO MV A VELOCITY: 0.78 M/S
ECHO MV AREA VTI: 2.5 CM2
ECHO MV E VELOCITY: 0.64 M/S
ECHO MV E/A RATIO: 0.82
ECHO MV LVOT VTI INDEX: 1.41
ECHO MV MAX VELOCITY: 1.2 M/S
ECHO MV MEAN GRADIENT: 3 MMHG
ECHO MV MEAN VELOCITY: 0.9 M/S
ECHO MV PEAK GRADIENT: 6 MMHG
ECHO MV VTI: 26.9 CM
ECHO PV MAX VELOCITY: 1 M/S
ECHO PV PEAK GRADIENT: 4 MMHG
ECHO RIGHT VENTRICULAR SYSTOLIC PRESSURE (RVSP): 47 MMHG
ECHO RV FREE WALL PEAK S': 11 CM/S
ECHO RV INTERNAL DIMENSION: 3.6 CM
ECHO TV REGURGITANT MAX VELOCITY: 3.12 M/S
ECHO TV REGURGITANT PEAK GRADIENT: 40 MMHG
ERYTHROCYTE [DISTWIDTH] IN BLOOD BY AUTOMATED COUNT: 16.2 % (ref 11.5–14.5)
GLUCOSE BLD STRIP.AUTO-MCNC: 150 MG/DL (ref 65–117)
GLUCOSE BLD STRIP.AUTO-MCNC: 158 MG/DL (ref 65–117)
GLUCOSE BLD STRIP.AUTO-MCNC: 227 MG/DL (ref 65–117)
GLUCOSE BLD STRIP.AUTO-MCNC: 271 MG/DL (ref 65–117)
GLUCOSE SERPL-MCNC: 252 MG/DL (ref 65–100)
HCT VFR BLD AUTO: 30.9 % (ref 35–47)
HCT VFR FLD CALC: 1 %
HGB BLD-MCNC: 9 G/DL (ref 11.5–16)
MAGNESIUM SERPL-MCNC: 2.2 MG/DL (ref 1.6–2.4)
MCH RBC QN AUTO: 26.2 PG (ref 26–34)
MCHC RBC AUTO-ENTMCNC: 29.1 G/DL (ref 30–36.5)
MCV RBC AUTO: 90.1 FL (ref 80–99)
NRBC # BLD: 0 K/UL (ref 0–0.01)
NRBC BLD-RTO: 0 PER 100 WBC
PHOSPHATE SERPL-MCNC: 5.2 MG/DL (ref 2.6–4.7)
PLATELET # BLD AUTO: 446 K/UL (ref 150–400)
PMV BLD AUTO: 9.3 FL (ref 8.9–12.9)
POTASSIUM SERPL-SCNC: 4.7 MMOL/L (ref 3.5–5.1)
RBC # BLD AUTO: 3.43 M/UL (ref 3.8–5.2)
SERVICE CMNT-IMP: ABNORMAL
SODIUM SERPL-SCNC: 129 MMOL/L (ref 136–145)
WBC # BLD AUTO: 14.2 K/UL (ref 3.6–11)

## 2024-06-04 PROCEDURE — 90935 HEMODIALYSIS ONE EVALUATION: CPT

## 2024-06-04 PROCEDURE — 2700000000 HC OXYGEN THERAPY PER DAY

## 2024-06-04 PROCEDURE — 6370000000 HC RX 637 (ALT 250 FOR IP): Performed by: GENERAL ACUTE CARE HOSPITAL

## 2024-06-04 PROCEDURE — 6370000000 HC RX 637 (ALT 250 FOR IP): Performed by: INTERNAL MEDICINE

## 2024-06-04 PROCEDURE — 2500000003 HC RX 250 WO HCPCS: Performed by: NURSE PRACTITIONER

## 2024-06-04 PROCEDURE — 80048 BASIC METABOLIC PNL TOTAL CA: CPT

## 2024-06-04 PROCEDURE — 2580000003 HC RX 258: Performed by: NURSE PRACTITIONER

## 2024-06-04 PROCEDURE — 83735 ASSAY OF MAGNESIUM: CPT

## 2024-06-04 PROCEDURE — 6360000002 HC RX W HCPCS: Performed by: INTERNAL MEDICINE

## 2024-06-04 PROCEDURE — 82962 GLUCOSE BLOOD TEST: CPT

## 2024-06-04 PROCEDURE — 2000000000 HC ICU R&B

## 2024-06-04 PROCEDURE — 36415 COLL VENOUS BLD VENIPUNCTURE: CPT

## 2024-06-04 PROCEDURE — 84100 ASSAY OF PHOSPHORUS: CPT

## 2024-06-04 PROCEDURE — 85027 COMPLETE CBC AUTOMATED: CPT

## 2024-06-04 PROCEDURE — 6360000002 HC RX W HCPCS: Performed by: NURSE PRACTITIONER

## 2024-06-04 RX ORDER — SORBITOL SOLUTION 70 %
60 SOLUTION, ORAL MISCELLANEOUS ONCE
Status: COMPLETED | OUTPATIENT
Start: 2024-06-04 | End: 2024-06-05

## 2024-06-04 RX ADMIN — CLOPIDOGREL BISULFATE 75 MG: 75 TABLET ORAL at 10:51

## 2024-06-04 RX ADMIN — ACETAMINOPHEN 650 MG: 325 TABLET ORAL at 06:15

## 2024-06-04 RX ADMIN — PANTOPRAZOLE SODIUM 40 MG: 40 TABLET, DELAYED RELEASE ORAL at 06:15

## 2024-06-04 RX ADMIN — Medication 1 AMPULE: at 11:03

## 2024-06-04 RX ADMIN — COLLAGENASE SANTYL: 250 OINTMENT TOPICAL at 15:30

## 2024-06-04 RX ADMIN — ASPIRIN 81 MG CHEWABLE TABLET 81 MG: 81 TABLET CHEWABLE at 10:53

## 2024-06-04 RX ADMIN — ONDANSETRON 4 MG: 2 INJECTION INTRAMUSCULAR; INTRAVENOUS at 12:51

## 2024-06-04 RX ADMIN — HEPARIN SODIUM 5000 UNITS: 5000 INJECTION INTRAVENOUS; SUBCUTANEOUS at 15:00

## 2024-06-04 RX ADMIN — SODIUM CHLORIDE 5 MCG/MIN: 9 INJECTION, SOLUTION INTRAVENOUS at 01:25

## 2024-06-04 RX ADMIN — MIDODRINE HYDROCHLORIDE 10 MG: 5 TABLET ORAL at 20:54

## 2024-06-04 RX ADMIN — CALCITRIOL CAPSULES 0.25 MCG 0.25 MCG: 0.25 CAPSULE ORAL at 10:51

## 2024-06-04 RX ADMIN — FUROSEMIDE 80 MG: 40 TABLET ORAL at 17:42

## 2024-06-04 RX ADMIN — ACETAMINOPHEN 650 MG: 325 TABLET ORAL at 23:52

## 2024-06-04 RX ADMIN — MIDODRINE HYDROCHLORIDE 10 MG: 5 TABLET ORAL at 07:54

## 2024-06-04 RX ADMIN — INSULIN LISPRO 2 UNITS: 100 INJECTION, SOLUTION INTRAVENOUS; SUBCUTANEOUS at 12:59

## 2024-06-04 RX ADMIN — INSULIN LISPRO 4 UNITS: 100 INJECTION, SOLUTION INTRAVENOUS; SUBCUTANEOUS at 09:32

## 2024-06-04 RX ADMIN — RANOLAZINE 500 MG: 500 TABLET, FILM COATED, EXTENDED RELEASE ORAL at 20:55

## 2024-06-04 RX ADMIN — SEVELAMER CARBONATE 1600 MG: 800 TABLET, FILM COATED ORAL at 17:42

## 2024-06-04 RX ADMIN — GABAPENTIN 300 MG: 300 CAPSULE ORAL at 20:52

## 2024-06-04 RX ADMIN — HYDROXYZINE HYDROCHLORIDE 25 MG: 10 TABLET ORAL at 20:52

## 2024-06-04 RX ADMIN — HEPARIN SODIUM 5000 UNITS: 5000 INJECTION INTRAVENOUS; SUBCUTANEOUS at 06:15

## 2024-06-04 RX ADMIN — SEVELAMER CARBONATE 1600 MG: 800 TABLET, FILM COATED ORAL at 10:51

## 2024-06-04 RX ADMIN — ATORVASTATIN CALCIUM 20 MG: 20 TABLET, FILM COATED ORAL at 10:52

## 2024-06-04 RX ADMIN — FUROSEMIDE 80 MG: 40 TABLET ORAL at 10:51

## 2024-06-04 RX ADMIN — RANOLAZINE 500 MG: 500 TABLET, FILM COATED, EXTENDED RELEASE ORAL at 10:51

## 2024-06-04 RX ADMIN — MIDODRINE HYDROCHLORIDE 10 MG: 5 TABLET ORAL at 17:44

## 2024-06-04 RX ADMIN — Medication 1 AMPULE: at 20:52

## 2024-06-04 RX ADMIN — HEPARIN SODIUM 5000 UNITS: 5000 INJECTION INTRAVENOUS; SUBCUTANEOUS at 22:30

## 2024-06-04 ASSESSMENT — PAIN DESCRIPTION - DESCRIPTORS
DESCRIPTORS: ACHING;DISCOMFORT
DESCRIPTORS: BURNING;ACHING
DESCRIPTORS: ACHING;DISCOMFORT;HEAVINESS
DESCRIPTORS: BURNING;ACHING

## 2024-06-04 ASSESSMENT — PAIN DESCRIPTION - LOCATION
LOCATION: ABDOMEN;BACK
LOCATION: BACK
LOCATION: ABDOMEN
LOCATION: CHEST
LOCATION: CHEST

## 2024-06-04 ASSESSMENT — PAIN DESCRIPTION - ORIENTATION
ORIENTATION: LEFT
ORIENTATION: RIGHT;LEFT;ANTERIOR

## 2024-06-04 ASSESSMENT — PAIN DESCRIPTION - PAIN TYPE
TYPE: ACUTE PAIN
TYPE: ACUTE PAIN

## 2024-06-04 ASSESSMENT — PAIN SCALES - GENERAL
PAINLEVEL_OUTOF10: 6
PAINLEVEL_OUTOF10: 5
PAINLEVEL_OUTOF10: 4
PAINLEVEL_OUTOF10: 0
PAINLEVEL_OUTOF10: 3
PAINLEVEL_OUTOF10: 3

## 2024-06-04 NOTE — WOUND CARE
Wound Care Re-consult to reassess the left flank wound. On 5-30-24 Santyl Collagenase was ordered with the moist wound care to start the debridement of the surface slough / eschar. Pt. Had a bug bite in the skin and it has gotten better and then the wound developed what the patient thought was a thick scab but it was eschar.     Left flank soft tissue full thickness wound:   Some of the redness has decreased but   the eschar is still thick.        The wound a week ago:       So far one whole tube has been used and the wound has not had much success in debriding it. If the staff is using it as ordered it should have had some debridement by now.   Recommendation:  This may need to be surgically debrided. The edges are still indurated and \"very sore\" per the patient.   Spoke with nursing about this today. The pannus skin needs to be treated with zinc oxide cream.   Mae Vasquez RN, BSN, CWON

## 2024-06-05 ENCOUNTER — ANESTHESIA EVENT (OUTPATIENT)
Facility: HOSPITAL | Age: 49
End: 2024-06-05
Payer: MEDICARE

## 2024-06-05 ENCOUNTER — ANESTHESIA (OUTPATIENT)
Facility: HOSPITAL | Age: 49
End: 2024-06-05
Payer: MEDICARE

## 2024-06-05 ENCOUNTER — APPOINTMENT (OUTPATIENT)
Facility: HOSPITAL | Age: 49
DRG: 314 | End: 2024-06-05
Attending: HOSPITALIST
Payer: MEDICARE

## 2024-06-05 LAB
ANION GAP SERPL CALC-SCNC: 11 MMOL/L (ref 5–15)
BUN SERPL-MCNC: 62 MG/DL (ref 6–20)
BUN/CREAT SERPL: 10 (ref 12–20)
CALCIUM SERPL-MCNC: 10.6 MG/DL (ref 8.5–10.1)
CENTROMERE B AB SER-ACNC: NORMAL AI
CHLORIDE SERPL-SCNC: 91 MMOL/L (ref 97–108)
CHROMATIN AB SERPL-ACNC: NORMAL AI
CO2 SERPL-SCNC: 26 MMOL/L (ref 21–32)
CREAT SERPL-MCNC: 6.46 MG/DL (ref 0.55–1.02)
DSDNA AB SER-ACNC: NORMAL IU/ML
ECHO BSA: 2.35 M2
ENA JO1 AB SER-ACNC: NORMAL AI
ENA RNP AB SER-ACNC: NORMAL AI
ENA SCL70 AB SER-ACNC: NORMAL AI
ENA SM AB SER-ACNC: NORMAL AI
ENA SM+RNP AB SER-ACNC: NORMAL AI
ENA SS-A AB SER-ACNC: NORMAL AI
ENA SS-B AB SER-ACNC: NORMAL AI
ERYTHROCYTE [DISTWIDTH] IN BLOOD BY AUTOMATED COUNT: 16 % (ref 11.5–14.5)
GLUCOSE BLD STRIP.AUTO-MCNC: 133 MG/DL (ref 65–117)
GLUCOSE BLD STRIP.AUTO-MCNC: 135 MG/DL (ref 65–117)
GLUCOSE BLD STRIP.AUTO-MCNC: 164 MG/DL (ref 65–117)
GLUCOSE BLD STRIP.AUTO-MCNC: 181 MG/DL (ref 65–117)
GLUCOSE BLD STRIP.AUTO-MCNC: 196 MG/DL (ref 65–117)
GLUCOSE SERPL-MCNC: 187 MG/DL (ref 65–100)
HCT VFR BLD AUTO: 36.5 % (ref 35–47)
HGB BLD-MCNC: 10.5 G/DL (ref 11.5–16)
MAGNESIUM SERPL-MCNC: 2.2 MG/DL (ref 1.6–2.4)
MCH RBC QN AUTO: 25.7 PG (ref 26–34)
MCHC RBC AUTO-ENTMCNC: 28.8 G/DL (ref 30–36.5)
MCV RBC AUTO: 89.5 FL (ref 80–99)
NRBC # BLD: 0 K/UL (ref 0–0.01)
NRBC BLD-RTO: 0 PER 100 WBC
PHOSPHATE SERPL-MCNC: 6.1 MG/DL (ref 2.6–4.7)
PLATELET # BLD AUTO: 536 K/UL (ref 150–400)
PMV BLD AUTO: 9.1 FL (ref 8.9–12.9)
POTASSIUM SERPL-SCNC: 4.7 MMOL/L (ref 3.5–5.1)
RBC # BLD AUTO: 4.08 M/UL (ref 3.8–5.2)
RIBOSOMAL P AB SER-ACNC: NORMAL AI
SERVICE CMNT-IMP: ABNORMAL
SODIUM SERPL-SCNC: 128 MMOL/L (ref 136–145)
WBC # BLD AUTO: 14.4 K/UL (ref 3.6–11)

## 2024-06-05 PROCEDURE — 80048 BASIC METABOLIC PNL TOTAL CA: CPT

## 2024-06-05 PROCEDURE — 7100000001 HC PACU RECOVERY - ADDTL 15 MIN: Performed by: SURGERY

## 2024-06-05 PROCEDURE — 82962 GLUCOSE BLOOD TEST: CPT

## 2024-06-05 PROCEDURE — 6370000000 HC RX 637 (ALT 250 FOR IP): Performed by: HOSPITALIST

## 2024-06-05 PROCEDURE — 84100 ASSAY OF PHOSPHORUS: CPT

## 2024-06-05 PROCEDURE — 2580000003 HC RX 258: Performed by: NURSE ANESTHETIST, CERTIFIED REGISTERED

## 2024-06-05 PROCEDURE — 2700000000 HC OXYGEN THERAPY PER DAY

## 2024-06-05 PROCEDURE — 2709999900 HC NON-CHARGEABLE SUPPLY: Performed by: SURGERY

## 2024-06-05 PROCEDURE — 83735 ASSAY OF MAGNESIUM: CPT

## 2024-06-05 PROCEDURE — 6370000000 HC RX 637 (ALT 250 FOR IP): Performed by: INTERNAL MEDICINE

## 2024-06-05 PROCEDURE — 3700000000 HC ANESTHESIA ATTENDED CARE: Performed by: SURGERY

## 2024-06-05 PROCEDURE — 6360000002 HC RX W HCPCS: Performed by: INTERNAL MEDICINE

## 2024-06-05 PROCEDURE — 3600000013 HC SURGERY LEVEL 3 ADDTL 15MIN: Performed by: SURGERY

## 2024-06-05 PROCEDURE — 93308 TTE F-UP OR LMTD: CPT

## 2024-06-05 PROCEDURE — 3600000003 HC SURGERY LEVEL 3 BASE: Performed by: SURGERY

## 2024-06-05 PROCEDURE — 2000000000 HC ICU R&B

## 2024-06-05 PROCEDURE — 6360000002 HC RX W HCPCS: Performed by: NURSE PRACTITIONER

## 2024-06-05 PROCEDURE — 6360000002 HC RX W HCPCS: Performed by: NURSE ANESTHETIST, CERTIFIED REGISTERED

## 2024-06-05 PROCEDURE — 7100000000 HC PACU RECOVERY - FIRST 15 MIN: Performed by: SURGERY

## 2024-06-05 PROCEDURE — 0HB6XZZ EXCISION OF BACK SKIN, EXTERNAL APPROACH: ICD-10-PCS | Performed by: SURGERY

## 2024-06-05 PROCEDURE — P9047 ALBUMIN (HUMAN), 25%, 50ML: HCPCS | Performed by: NURSE PRACTITIONER

## 2024-06-05 PROCEDURE — 36415 COLL VENOUS BLD VENIPUNCTURE: CPT

## 2024-06-05 PROCEDURE — 85027 COMPLETE CBC AUTOMATED: CPT

## 2024-06-05 PROCEDURE — 6370000000 HC RX 637 (ALT 250 FOR IP): Performed by: GENERAL ACUTE CARE HOSPITAL

## 2024-06-05 PROCEDURE — 3700000001 HC ADD 15 MINUTES (ANESTHESIA): Performed by: SURGERY

## 2024-06-05 PROCEDURE — 6360000002 HC RX W HCPCS: Performed by: SURGERY

## 2024-06-05 RX ORDER — SODIUM CHLORIDE 9 MG/ML
INJECTION, SOLUTION INTRAVENOUS CONTINUOUS PRN
Status: DISCONTINUED | OUTPATIENT
Start: 2024-06-05 | End: 2024-06-06 | Stop reason: SDUPTHER

## 2024-06-05 RX ORDER — BUPIVACAINE HYDROCHLORIDE 5 MG/ML
INJECTION, SOLUTION EPIDURAL; INTRACAUDAL PRN
Status: DISCONTINUED | OUTPATIENT
Start: 2024-06-05 | End: 2024-06-05 | Stop reason: ALTCHOICE

## 2024-06-05 RX ORDER — MIDAZOLAM HYDROCHLORIDE 1 MG/ML
INJECTION INTRAMUSCULAR; INTRAVENOUS PRN
Status: DISCONTINUED | OUTPATIENT
Start: 2024-06-05 | End: 2024-06-06

## 2024-06-05 RX ORDER — CEFAZOLIN SODIUM/WATER 2 G/20 ML
SYRINGE (ML) INTRAVENOUS PRN
Status: DISCONTINUED | OUTPATIENT
Start: 2024-06-05 | End: 2024-06-06

## 2024-06-05 RX ORDER — MINERAL OIL 100 G/100G
1 OIL RECTAL PRN
Status: DISCONTINUED | OUTPATIENT
Start: 2024-06-05 | End: 2024-06-18 | Stop reason: HOSPADM

## 2024-06-05 RX ORDER — MIDODRINE HYDROCHLORIDE 5 MG/1
15 TABLET ORAL 3 TIMES DAILY
Status: DISCONTINUED | OUTPATIENT
Start: 2024-06-05 | End: 2024-06-06

## 2024-06-05 RX ADMIN — Medication 1 AMPULE: at 19:50

## 2024-06-05 RX ADMIN — CLOPIDOGREL BISULFATE 75 MG: 75 TABLET ORAL at 08:55

## 2024-06-05 RX ADMIN — ACETAMINOPHEN 650 MG: 325 TABLET ORAL at 19:49

## 2024-06-05 RX ADMIN — HEPARIN SODIUM 5000 UNITS: 5000 INJECTION INTRAVENOUS; SUBCUTANEOUS at 13:16

## 2024-06-05 RX ADMIN — PANTOPRAZOLE SODIUM 40 MG: 40 TABLET, DELAYED RELEASE ORAL at 06:04

## 2024-06-05 RX ADMIN — MIDODRINE HYDROCHLORIDE 10 MG: 5 TABLET ORAL at 08:54

## 2024-06-05 RX ADMIN — ACETAMINOPHEN 650 MG: 325 TABLET ORAL at 06:04

## 2024-06-05 RX ADMIN — MIDAZOLAM HYDROCHLORIDE 2 MG: 1 INJECTION, SOLUTION INTRAMUSCULAR; INTRAVENOUS at 17:26

## 2024-06-05 RX ADMIN — HYDROCODONE BITARTRATE AND ACETAMINOPHEN 1 TABLET: 5; 325 TABLET ORAL at 13:16

## 2024-06-05 RX ADMIN — RANOLAZINE 500 MG: 500 TABLET, FILM COATED, EXTENDED RELEASE ORAL at 08:54

## 2024-06-05 RX ADMIN — ASPIRIN 81 MG CHEWABLE TABLET 81 MG: 81 TABLET CHEWABLE at 08:54

## 2024-06-05 RX ADMIN — Medication 1 AMPULE: at 08:55

## 2024-06-05 RX ADMIN — Medication 1 ENEMA: at 08:58

## 2024-06-05 RX ADMIN — GABAPENTIN 300 MG: 300 CAPSULE ORAL at 21:46

## 2024-06-05 RX ADMIN — FUROSEMIDE 80 MG: 40 TABLET ORAL at 08:54

## 2024-06-05 RX ADMIN — MIDODRINE HYDROCHLORIDE 15 MG: 5 TABLET ORAL at 21:46

## 2024-06-05 RX ADMIN — Medication 2000 MG: at 17:33

## 2024-06-05 RX ADMIN — COLLAGENASE SANTYL: 250 OINTMENT TOPICAL at 08:55

## 2024-06-05 RX ADMIN — HEPARIN SODIUM 5000 UNITS: 5000 INJECTION INTRAVENOUS; SUBCUTANEOUS at 06:00

## 2024-06-05 RX ADMIN — FUROSEMIDE 80 MG: 40 TABLET ORAL at 18:50

## 2024-06-05 RX ADMIN — SODIUM CHLORIDE: 9 INJECTION, SOLUTION INTRAVENOUS at 17:26

## 2024-06-05 RX ADMIN — MIDODRINE HYDROCHLORIDE 15 MG: 5 TABLET ORAL at 18:50

## 2024-06-05 RX ADMIN — RANOLAZINE 500 MG: 500 TABLET, FILM COATED, EXTENDED RELEASE ORAL at 21:46

## 2024-06-05 RX ADMIN — HEPARIN SODIUM 5000 UNITS: 5000 INJECTION INTRAVENOUS; SUBCUTANEOUS at 21:49

## 2024-06-05 RX ADMIN — INSULIN GLARGINE 15 UNITS: 100 INJECTION, SOLUTION SUBCUTANEOUS at 21:48

## 2024-06-05 RX ADMIN — SEVELAMER CARBONATE 1600 MG: 800 TABLET, FILM COATED ORAL at 18:50

## 2024-06-05 RX ADMIN — SORBITOL SOLUTION (BULK) 60 ML: 70 SOLUTION at 01:19

## 2024-06-05 RX ADMIN — ATORVASTATIN CALCIUM 20 MG: 20 TABLET, FILM COATED ORAL at 08:55

## 2024-06-05 RX ADMIN — HYDROXYZINE HYDROCHLORIDE 25 MG: 10 TABLET ORAL at 21:46

## 2024-06-05 RX ADMIN — CALCITRIOL CAPSULES 0.25 MCG 0.25 MCG: 0.25 CAPSULE ORAL at 08:55

## 2024-06-05 RX ADMIN — ALBUMIN (HUMAN) 25 G: 0.25 INJECTION, SOLUTION INTRAVENOUS at 12:55

## 2024-06-05 ASSESSMENT — PAIN DESCRIPTION - LOCATION
LOCATION: CHEST
LOCATION: BACK
LOCATION: FLANK
LOCATION: BACK

## 2024-06-05 ASSESSMENT — PAIN SCALES - GENERAL
PAINLEVEL_OUTOF10: 5
PAINLEVEL_OUTOF10: 0
PAINLEVEL_OUTOF10: 6
PAINLEVEL_OUTOF10: 0
PAINLEVEL_OUTOF10: 5
PAINLEVEL_OUTOF10: 7
PAINLEVEL_OUTOF10: 3

## 2024-06-05 ASSESSMENT — PAIN DESCRIPTION - ORIENTATION
ORIENTATION: LEFT
ORIENTATION: MID

## 2024-06-05 ASSESSMENT — ENCOUNTER SYMPTOMS: SHORTNESS OF BREATH: 1

## 2024-06-05 ASSESSMENT — PAIN DESCRIPTION - DESCRIPTORS: DESCRIPTORS: ACHING

## 2024-06-05 NOTE — CARE COORDINATION
Transition of Care Plan:     RUR: 35%    Prior Level of Functioning: some assistance    Disposition: TBD: Home with Home Health- pending progress.     HD Pt: [place]: M/W/F chair time at 9:15 AM  [place] [address]  Phone: [phone]  Call and fax DC Summary prior to DC.    3:23 PM   CM completed chart review.   Last therapy note: 5/30: rec Home Health PT/OT.. therapy has attempted to see Pt but we will have to see Pt progresses to help with dispo rec prior to DC.     If SNF or IPR: Date FOC offered:   Date FOC received:   Accepting facility:   Date authorization started with reference number:   Date authorization received and expires:     Follow up appointments: PCP and Specialist  DME needed: has rolling walker and wheelchair  Transportation at discharge: TBD  IM/IMM Medicare/ letter given: 1st IM 5/29/24  2nd IM letter to be delivered prior to DC.   Is patient a  and connected with VA?               If yes, was [place] transfer form completed and VA notified?   Caregiver Contact:     DANII WU (Brother/Sister)  [phone]      Discharge Caregiver contacted prior to discharge? yes  Care Conference needed? N/A  Barriers to discharge:   clinical improvement, disposition     Michelle Ruffin CM  [phone]

## 2024-06-05 NOTE — INTERDISCIPLINARY ROUNDS
Critical care interdisciplinary rounds today.  Following members present: Case Management, , Clinical Lead, Diabetes Team, Nursing, Nutrition, Pharmacy, and Physician. Family invited to participate.  Plan of care discussed.  See clinical pathway for plan of care and interventions and desired outcomes.

## 2024-06-05 NOTE — ANESTHESIA PRE PROCEDURE
IntraVENous Q6H PRN Laurel LakeManolo brady APRN - CNP   4 mg at 06/04/24 1251    Epoetin Harinder-epbx (RETACRIT) injection 20,000 Units  20,000 Units SubCUTAneous Once per day on Mon Wed Fri Shannon RobersonSUZETTE - NP   20,000 Units at 06/03/24 2153    alcohol 62% (NOZIN) nasal  1 ampule  1 ampule Nasal Q12H Karl Moya MD   1 ampule at 06/05/24 0855    acetaminophen (TYLENOL) tablet 650 mg  650 mg Oral Q4H PRN Karl Moya MD   650 mg at 06/05/24 0604    albumin human 25% IV solution 25 g  25 g IntraVENous PRN Juan Penny APRN -  mL/hr at 06/05/24 1255 25 g at 06/05/24 1255    collagenase ointment   Topical Daily Silvio Quevedo MD   Given at 06/05/24 0855    glucose chewable tablet 16 g  4 tablet Oral PRN Sean Quevedo MD        dextrose bolus 10% 125 mL  125 mL IntraVENous PRN Sean Quevedo MD        Or    dextrose bolus 10% 250 mL  250 mL IntraVENous PRN Sean Quevedo MD        glucagon injection 1 mg  1 mg SubCUTAneous PRN Sean Quevedo MD        dextrose 10 % infusion   IntraVENous Continuous PRN Sean Quevedo MD        aspirin chewable tablet 81 mg  81 mg Oral Daily Silvio Quevedo MD   81 mg at 06/05/24 0854    atorvastatin (LIPITOR) tablet 20 mg  20 mg Oral Daily Silvio Quevedo MD   20 mg at 06/05/24 0855    calcitRIOL (ROCALTROL) capsule 0.25 mcg  0.25 mcg Oral Daily Silvio Quevedo MD   0.25 mcg at 06/05/24 0855    [Held by provider] carvedilol (COREG) tablet 12.5 mg  12.5 mg Oral BID WC Silvio Quevedo MD   12.5 mg at 05/30/24 0923    clopidogrel (PLAVIX) tablet 75 mg  75 mg Oral Daily Silvio Quevedo MD   75 mg at 06/05/24 0855    docusate sodium (COLACE) capsule 200 mg  200 mg Oral BID Silvio Quevedo MD   200 mg at 05/30/24 0923    gabapentin (NEURONTIN) capsule 300 mg  300 mg Oral Nightly Silvio Quevedo MD   300 mg at 06/04/24 2052    insulin lispro (HUMALOG,ADMELOG) injection vial 0-8 Units  0-8 Units

## 2024-06-05 NOTE — OP NOTE
Operative Note      Patient: Ros Orr  YOB: 1975  MRN: [id number]    Date of Procedure: 6/5/2024    Pre-Op Diagnosis Codes:     Left flank wound    Post-Op Diagnosis: same       Procedure(s):  LEFT FLANK WOUND excisional DEBRIDEMENT, approximately 15 cm² of necrotic skin    Surgeon(s):  Parish Christensen MD    Assistant:  Surgical Assistant: Freddie Magana    Anesthesia: Monitor Anesthesia Care    Estimated Blood Loss (mL): Minimal    Complications: None    Specimens:   * No specimens in log *    Implants:  * No implants in log *      Drains:   Closed/Suction Drain Inferior Pericardial Bag (Active)   Site Description Clean, dry & intact 06/05/24 1717   Dressing Status Clean, dry & intact 06/05/24 0000   Drainage Appearance Clear;Yellow 06/05/24 1717   Drain Status Open to gravity drainage 06/05/24 1717   Output (ml) 0 ml 06/05/24 0000       Findings:  Necrotic skin.  A portion of the dermis was viable.    Description of the procedure:  The patient was taken the operating room and placed supine on the operating table.  An operative timeout was performed and conscious sedation was achieved.  Preoperative antibiotics were given.  The left flank was prepped and draped in the usual sterile fashion.  The eschar was pared off with a 10 blade until bleeding was encountered.  This was in the lower portion of the dermis.  I made a small incision through the dermis to ensure viability.  There was good bleeding.  I closed this incision with interrupted 3-0 Vicryl sutures with good hemostasis.  Saline moistened 4 x 4 was placed over the wound and reinforced with dry gauze and tape.  The patient was recovered from conscious sedation and taken to the recovery area in satisfactory condition.  All instrument sponge and needle counts were reported as correct.    Electronically signed by Parish Christensen MD on 6/5/2024 at 6:1

## 2024-06-05 NOTE — PERIOP NOTE
TRANSFER - IN REPORT:    Verbal report received from Rylee GABRIEL on Ros Orr  being received from CCU, Pod 1 for ordered procedure      Report consisted of patient's Situation, Background, Assessment and   Recommendations(SBAR).     Information from the following report(s) Nurse Handoff Report, Intake/Output, Recent Results, Cardiac Rhythm  , Pre Procedure Checklist, Neuro Assessment, and Event Log was reviewed with the receiving nurse.    Opportunity for questions and clarification was provided.      Assessment completed upon patient's arrival to unit and care assumed.

## 2024-06-06 LAB
ALBUMIN SERPL-MCNC: 3 G/DL (ref 3.5–5)
ALBUMIN/GLOB SERPL: 0.7 (ref 1.1–2.2)
ALP SERPL-CCNC: 119 U/L (ref 45–117)
ALT SERPL-CCNC: 14 U/L (ref 12–78)
ANION GAP SERPL CALC-SCNC: 11 MMOL/L (ref 5–15)
AST SERPL-CCNC: 6 U/L (ref 15–37)
BILIRUB DIRECT SERPL-MCNC: 0.3 MG/DL (ref 0–0.2)
BILIRUB SERPL-MCNC: 0.8 MG/DL (ref 0.2–1)
BUN SERPL-MCNC: 43 MG/DL (ref 6–20)
BUN/CREAT SERPL: 9 (ref 12–20)
CALCIUM SERPL-MCNC: 9.4 MG/DL (ref 8.5–10.1)
CHLORIDE SERPL-SCNC: 92 MMOL/L (ref 97–108)
CO2 SERPL-SCNC: 28 MMOL/L (ref 21–32)
CREAT SERPL-MCNC: 4.92 MG/DL (ref 0.55–1.02)
ERYTHROCYTE [DISTWIDTH] IN BLOOD BY AUTOMATED COUNT: 16.1 % (ref 11.5–14.5)
GLOBULIN SER CALC-MCNC: 4.3 G/DL (ref 2–4)
GLUCOSE BLD STRIP.AUTO-MCNC: 141 MG/DL (ref 65–117)
GLUCOSE BLD STRIP.AUTO-MCNC: 211 MG/DL (ref 65–117)
GLUCOSE BLD STRIP.AUTO-MCNC: 244 MG/DL (ref 65–117)
GLUCOSE BLD STRIP.AUTO-MCNC: 245 MG/DL (ref 65–117)
GLUCOSE SERPL-MCNC: 220 MG/DL (ref 65–100)
HCT VFR BLD AUTO: 32.7 % (ref 35–47)
HGB BLD-MCNC: 9.5 G/DL (ref 11.5–16)
MAGNESIUM SERPL-MCNC: 2 MG/DL (ref 1.6–2.4)
MCH RBC QN AUTO: 26 PG (ref 26–34)
MCHC RBC AUTO-ENTMCNC: 29.1 G/DL (ref 30–36.5)
MCV RBC AUTO: 89.3 FL (ref 80–99)
NRBC # BLD: 0 K/UL (ref 0–0.01)
NRBC BLD-RTO: 0 PER 100 WBC
PHOSPHATE SERPL-MCNC: 5.5 MG/DL (ref 2.6–4.7)
PLATELET # BLD AUTO: 523 K/UL (ref 150–400)
PMV BLD AUTO: 9.3 FL (ref 8.9–12.9)
POTASSIUM SERPL-SCNC: 4 MMOL/L (ref 3.5–5.1)
PROT SERPL-MCNC: 7.3 G/DL (ref 6.4–8.2)
RBC # BLD AUTO: 3.66 M/UL (ref 3.8–5.2)
SERVICE CMNT-IMP: ABNORMAL
SODIUM SERPL-SCNC: 131 MMOL/L (ref 136–145)
WBC # BLD AUTO: 16.5 K/UL (ref 3.6–11)

## 2024-06-06 PROCEDURE — 6370000000 HC RX 637 (ALT 250 FOR IP): Performed by: HOSPITALIST

## 2024-06-06 PROCEDURE — 80076 HEPATIC FUNCTION PANEL: CPT

## 2024-06-06 PROCEDURE — 83735 ASSAY OF MAGNESIUM: CPT

## 2024-06-06 PROCEDURE — 97530 THERAPEUTIC ACTIVITIES: CPT | Performed by: OCCUPATIONAL THERAPIST

## 2024-06-06 PROCEDURE — 97530 THERAPEUTIC ACTIVITIES: CPT

## 2024-06-06 PROCEDURE — 2500000003 HC RX 250 WO HCPCS: Performed by: NURSE PRACTITIONER

## 2024-06-06 PROCEDURE — 85027 COMPLETE CBC AUTOMATED: CPT

## 2024-06-06 PROCEDURE — 6370000000 HC RX 637 (ALT 250 FOR IP): Performed by: INTERNAL MEDICINE

## 2024-06-06 PROCEDURE — 84100 ASSAY OF PHOSPHORUS: CPT

## 2024-06-06 PROCEDURE — 82962 GLUCOSE BLOOD TEST: CPT

## 2024-06-06 PROCEDURE — 2000000000 HC ICU R&B

## 2024-06-06 PROCEDURE — 6360000002 HC RX W HCPCS: Performed by: INTERNAL MEDICINE

## 2024-06-06 PROCEDURE — 6370000000 HC RX 637 (ALT 250 FOR IP): Performed by: GENERAL ACUTE CARE HOSPITAL

## 2024-06-06 PROCEDURE — 80048 BASIC METABOLIC PNL TOTAL CA: CPT

## 2024-06-06 PROCEDURE — 6360000002 HC RX W HCPCS: Performed by: NURSE PRACTITIONER

## 2024-06-06 PROCEDURE — 2580000003 HC RX 258: Performed by: NURSE PRACTITIONER

## 2024-06-06 PROCEDURE — 36415 COLL VENOUS BLD VENIPUNCTURE: CPT

## 2024-06-06 PROCEDURE — 97597 DBRDMT OPN WND 1ST 20 CM/<: CPT | Performed by: SURGERY

## 2024-06-06 RX ORDER — MIDODRINE HYDROCHLORIDE 5 MG/1
20 TABLET ORAL 3 TIMES DAILY
Status: DISCONTINUED | OUTPATIENT
Start: 2024-06-06 | End: 2024-06-12

## 2024-06-06 RX ORDER — SORBITOL SOLUTION 70 %
15 SOLUTION, ORAL MISCELLANEOUS DAILY PRN
Status: DISCONTINUED | OUTPATIENT
Start: 2024-06-06 | End: 2024-06-18 | Stop reason: HOSPADM

## 2024-06-06 RX ORDER — TRAMADOL HYDROCHLORIDE 50 MG/1
50 TABLET ORAL EVERY 6 HOURS PRN
Status: DISCONTINUED | OUTPATIENT
Start: 2024-06-06 | End: 2024-06-07

## 2024-06-06 RX ORDER — TRAMADOL HYDROCHLORIDE 50 MG/1
100 TABLET ORAL EVERY 6 HOURS PRN
Status: DISCONTINUED | OUTPATIENT
Start: 2024-06-06 | End: 2024-06-07

## 2024-06-06 RX ORDER — GABAPENTIN 300 MG/1
300 CAPSULE ORAL
Status: DISCONTINUED | OUTPATIENT
Start: 2024-06-07 | End: 2024-06-18 | Stop reason: HOSPADM

## 2024-06-06 RX ADMIN — HYDROCODONE BITARTRATE AND ACETAMINOPHEN 1 TABLET: 5; 325 TABLET ORAL at 14:17

## 2024-06-06 RX ADMIN — Medication 1 AMPULE: at 08:33

## 2024-06-06 RX ADMIN — CLOPIDOGREL BISULFATE 75 MG: 75 TABLET ORAL at 08:30

## 2024-06-06 RX ADMIN — RANOLAZINE 500 MG: 500 TABLET, FILM COATED, EXTENDED RELEASE ORAL at 22:06

## 2024-06-06 RX ADMIN — INSULIN LISPRO 2 UNITS: 100 INJECTION, SOLUTION INTRAVENOUS; SUBCUTANEOUS at 16:41

## 2024-06-06 RX ADMIN — FUROSEMIDE 80 MG: 40 TABLET ORAL at 17:32

## 2024-06-06 RX ADMIN — MIDODRINE HYDROCHLORIDE 20 MG: 5 TABLET ORAL at 22:06

## 2024-06-06 RX ADMIN — INSULIN GLARGINE 15 UNITS: 100 INJECTION, SOLUTION SUBCUTANEOUS at 22:08

## 2024-06-06 RX ADMIN — CALCITRIOL CAPSULES 0.25 MCG 0.25 MCG: 0.25 CAPSULE ORAL at 08:43

## 2024-06-06 RX ADMIN — SODIUM CHLORIDE 2 MCG/MIN: 9 INJECTION, SOLUTION INTRAVENOUS at 09:42

## 2024-06-06 RX ADMIN — PANTOPRAZOLE SODIUM 40 MG: 40 TABLET, DELAYED RELEASE ORAL at 05:50

## 2024-06-06 RX ADMIN — HEPARIN SODIUM 5000 UNITS: 5000 INJECTION INTRAVENOUS; SUBCUTANEOUS at 05:51

## 2024-06-06 RX ADMIN — ATORVASTATIN CALCIUM 20 MG: 20 TABLET, FILM COATED ORAL at 08:32

## 2024-06-06 RX ADMIN — Medication 1 AMPULE: at 22:05

## 2024-06-06 RX ADMIN — MIDODRINE HYDROCHLORIDE 20 MG: 5 TABLET ORAL at 14:17

## 2024-06-06 RX ADMIN — INSULIN LISPRO 2 UNITS: 100 INJECTION, SOLUTION INTRAVENOUS; SUBCUTANEOUS at 11:44

## 2024-06-06 RX ADMIN — COLLAGENASE SANTYL: 250 OINTMENT TOPICAL at 08:32

## 2024-06-06 RX ADMIN — HEPARIN SODIUM 5000 UNITS: 5000 INJECTION INTRAVENOUS; SUBCUTANEOUS at 14:17

## 2024-06-06 RX ADMIN — ACETAMINOPHEN 650 MG: 325 TABLET ORAL at 05:50

## 2024-06-06 RX ADMIN — MIDODRINE HYDROCHLORIDE 15 MG: 5 TABLET ORAL at 08:30

## 2024-06-06 RX ADMIN — FUROSEMIDE 80 MG: 40 TABLET ORAL at 08:32

## 2024-06-06 RX ADMIN — ASPIRIN 81 MG CHEWABLE TABLET 81 MG: 81 TABLET CHEWABLE at 08:30

## 2024-06-06 RX ADMIN — SEVELAMER CARBONATE 1600 MG: 800 TABLET, FILM COATED ORAL at 17:32

## 2024-06-06 RX ADMIN — ACETAMINOPHEN 650 MG: 325 TABLET ORAL at 11:37

## 2024-06-06 RX ADMIN — HYDROXYZINE HYDROCHLORIDE 25 MG: 10 TABLET ORAL at 22:05

## 2024-06-06 RX ADMIN — HEPARIN SODIUM 5000 UNITS: 5000 INJECTION INTRAVENOUS; SUBCUTANEOUS at 22:06

## 2024-06-06 RX ADMIN — INSULIN LISPRO 2 UNITS: 100 INJECTION, SOLUTION INTRAVENOUS; SUBCUTANEOUS at 08:25

## 2024-06-06 RX ADMIN — TRAMADOL HYDROCHLORIDE 100 MG: 50 TABLET ORAL at 12:29

## 2024-06-06 RX ADMIN — RANOLAZINE 500 MG: 500 TABLET, FILM COATED, EXTENDED RELEASE ORAL at 08:30

## 2024-06-06 RX ADMIN — SEVELAMER CARBONATE 1600 MG: 800 TABLET, FILM COATED ORAL at 13:02

## 2024-06-06 RX ADMIN — ONDANSETRON 4 MG: 2 INJECTION INTRAMUSCULAR; INTRAVENOUS at 07:30

## 2024-06-06 ASSESSMENT — PAIN SCALES - GENERAL
PAINLEVEL_OUTOF10: 9
PAINLEVEL_OUTOF10: 2
PAINLEVEL_OUTOF10: 2
PAINLEVEL_OUTOF10: 0
PAINLEVEL_OUTOF10: 2
PAINLEVEL_OUTOF10: 3
PAINLEVEL_OUTOF10: 9
PAINLEVEL_OUTOF10: 5
PAINLEVEL_OUTOF10: 5
PAINLEVEL_OUTOF10: 10

## 2024-06-06 ASSESSMENT — PAIN DESCRIPTION - LOCATION
LOCATION: BACK
LOCATION: BACK
LOCATION: CHEST
LOCATION: BACK

## 2024-06-06 ASSESSMENT — PAIN DESCRIPTION - DESCRIPTORS: DESCRIPTORS: ACHING

## 2024-06-06 ASSESSMENT — PAIN DESCRIPTION - ORIENTATION: ORIENTATION: MID

## 2024-06-06 NOTE — INTERDISCIPLINARY ROUNDS
Critical care interdisciplinary rounds today.  Following members present: Case Management, , Nursing, Nutrition, Pharmacy, and Physician.Levophed restarted, remains with Midline. Discussed parameters for BP control. Remains with Pericardial drain intact.  Plan of care discussed.  See clinical pathway for plan of care and interventions and desired outcomes.

## 2024-06-07 ENCOUNTER — APPOINTMENT (OUTPATIENT)
Facility: HOSPITAL | Age: 49
DRG: 314 | End: 2024-06-07
Attending: INTERNAL MEDICINE
Payer: MEDICARE

## 2024-06-07 LAB
ANION GAP SERPL CALC-SCNC: 8 MMOL/L (ref 5–15)
BACTERIA SPEC CULT: NORMAL
BUN SERPL-MCNC: 58 MG/DL (ref 6–20)
BUN/CREAT SERPL: 9 (ref 12–20)
CALCIUM SERPL-MCNC: 9.4 MG/DL (ref 8.5–10.1)
CHLORIDE SERPL-SCNC: 91 MMOL/L (ref 97–108)
CO2 SERPL-SCNC: 29 MMOL/L (ref 21–32)
CREAT SERPL-MCNC: 6.2 MG/DL (ref 0.55–1.02)
ECHO AO ASC DIAM: 3 CM
ECHO AO ASCENDING AORTA INDEX: 1.33 CM/M2
ECHO AO ROOT DIAM: 2.9 CM
ECHO AO ROOT INDEX: 1.28 CM/M2
ECHO AV AREA PEAK VELOCITY: 2 CM2
ECHO AV AREA/BSA PEAK VELOCITY: 0.9 CM2/M2
ECHO AV PEAK GRADIENT: 14 MMHG
ECHO AV PEAK VELOCITY: 1.9 M/S
ECHO AV VELOCITY RATIO: 0.79
ECHO BSA: 2.35 M2
ECHO BSA: 2.42 M2
ECHO EST RA PRESSURE: 15 MMHG
ECHO LA DIAMETER INDEX: 2.08 CM/M2
ECHO LA DIAMETER: 4.7 CM
ECHO LA TO AORTIC ROOT RATIO: 1.62
ECHO LV E' LATERAL VELOCITY: 6 CM/S
ECHO LV E' SEPTAL VELOCITY: 6 CM/S
ECHO LV EDV A4C: 143 ML
ECHO LV EDV INDEX A4C: 63 ML/M2
ECHO LV EJECTION FRACTION A4C: 20 %
ECHO LV ESV A4C: 115 ML
ECHO LV ESV INDEX A4C: 51 ML/M2
ECHO LV FRACTIONAL SHORTENING: 12 % (ref 28–44)
ECHO LV INTERNAL DIMENSION DIASTOLE INDEX: 2.21 CM/M2
ECHO LV INTERNAL DIMENSION DIASTOLIC: 5 CM (ref 3.9–5.3)
ECHO LV INTERNAL DIMENSION SYSTOLIC INDEX: 1.95 CM/M2
ECHO LV INTERNAL DIMENSION SYSTOLIC: 4.4 CM
ECHO LV IVSD: 1 CM (ref 0.6–0.9)
ECHO LV MASS 2D: 182 G (ref 67–162)
ECHO LV MASS INDEX 2D: 80.5 G/M2 (ref 43–95)
ECHO LV POSTERIOR WALL DIASTOLIC: 1 CM (ref 0.6–0.9)
ECHO LV RELATIVE WALL THICKNESS RATIO: 0.4
ECHO LVOT AREA: 2.5 CM2
ECHO LVOT DIAM: 1.8 CM
ECHO LVOT MEAN GRADIENT: 4 MMHG
ECHO LVOT PEAK GRADIENT: 8 MMHG
ECHO LVOT PEAK VELOCITY: 1.5 M/S
ECHO LVOT STROKE VOLUME INDEX: 25.2 ML/M2
ECHO LVOT SV: 57 ML
ECHO LVOT VTI: 22.4 CM
ECHO MV A VELOCITY: 1.11 M/S
ECHO MV E DECELERATION TIME (DT): 201.3 MS
ECHO MV E VELOCITY: 1.38 M/S
ECHO MV E/A RATIO: 1.24
ECHO MV E/E' LATERAL: 23
ECHO MV E/E' RATIO (AVERAGED): 23
ECHO MV E/E' SEPTAL: 23
ECHO PV MAX VELOCITY: 1.5 M/S
ECHO PV PEAK GRADIENT: 9 MMHG
ECHO RIGHT VENTRICULAR SYSTOLIC PRESSURE (RVSP): 59 MMHG
ECHO RV FREE WALL PEAK S': 5 CM/S
ECHO RV TAPSE: 0.5 CM (ref 1.7–?)
ECHO TV REGURGITANT MAX VELOCITY: 3.31 M/S
ECHO TV REGURGITANT PEAK GRADIENT: 44 MMHG
EKG ATRIAL RATE: 99 BPM
EKG DIAGNOSIS: NORMAL
EKG P AXIS: 64 DEGREES
EKG P-R INTERVAL: 172 MS
EKG Q-T INTERVAL: 358 MS
EKG QRS DURATION: 96 MS
EKG QTC CALCULATION (BAZETT): 459 MS
EKG R AXIS: -7 DEGREES
EKG T AXIS: 120 DEGREES
EKG VENTRICULAR RATE: 99 BPM
ERYTHROCYTE [DISTWIDTH] IN BLOOD BY AUTOMATED COUNT: 16 % (ref 11.5–14.5)
GLUCOSE BLD STRIP.AUTO-MCNC: 133 MG/DL (ref 65–117)
GLUCOSE BLD STRIP.AUTO-MCNC: 151 MG/DL (ref 65–117)
GLUCOSE BLD STRIP.AUTO-MCNC: 165 MG/DL (ref 65–117)
GLUCOSE BLD STRIP.AUTO-MCNC: 171 MG/DL (ref 65–117)
GLUCOSE SERPL-MCNC: 155 MG/DL (ref 65–100)
GRAM STN SPEC: NORMAL
GRAM STN SPEC: NORMAL
HCT VFR BLD AUTO: 32.2 % (ref 35–47)
HGB BLD-MCNC: 9.2 G/DL (ref 11.5–16)
MCH RBC QN AUTO: 26 PG (ref 26–34)
MCHC RBC AUTO-ENTMCNC: 28.6 G/DL (ref 30–36.5)
MCV RBC AUTO: 91 FL (ref 80–99)
NRBC # BLD: 0 K/UL (ref 0–0.01)
NRBC BLD-RTO: 0 PER 100 WBC
PLATELET # BLD AUTO: 507 K/UL (ref 150–400)
PMV BLD AUTO: 9.1 FL (ref 8.9–12.9)
POTASSIUM SERPL-SCNC: 4.6 MMOL/L (ref 3.5–5.1)
RBC # BLD AUTO: 3.54 M/UL (ref 3.8–5.2)
SERVICE CMNT-IMP: ABNORMAL
SERVICE CMNT-IMP: NORMAL
SODIUM SERPL-SCNC: 128 MMOL/L (ref 136–145)
WBC # BLD AUTO: 16.1 K/UL (ref 3.6–11)

## 2024-06-07 PROCEDURE — 36415 COLL VENOUS BLD VENIPUNCTURE: CPT

## 2024-06-07 PROCEDURE — 6370000000 HC RX 637 (ALT 250 FOR IP): Performed by: INTERNAL MEDICINE

## 2024-06-07 PROCEDURE — 6360000002 HC RX W HCPCS: Performed by: NURSE PRACTITIONER

## 2024-06-07 PROCEDURE — 80048 BASIC METABOLIC PNL TOTAL CA: CPT

## 2024-06-07 PROCEDURE — 6370000000 HC RX 637 (ALT 250 FOR IP): Performed by: GENERAL ACUTE CARE HOSPITAL

## 2024-06-07 PROCEDURE — 6360000002 HC RX W HCPCS: Performed by: INTERNAL MEDICINE

## 2024-06-07 PROCEDURE — 85027 COMPLETE CBC AUTOMATED: CPT

## 2024-06-07 PROCEDURE — 2500000003 HC RX 250 WO HCPCS: Performed by: INTERNAL MEDICINE

## 2024-06-07 PROCEDURE — P9047 ALBUMIN (HUMAN), 25%, 50ML: HCPCS | Performed by: NURSE PRACTITIONER

## 2024-06-07 PROCEDURE — 6360000004 HC RX CONTRAST MEDICATION: Performed by: INTERNAL MEDICINE

## 2024-06-07 PROCEDURE — 90935 HEMODIALYSIS ONE EVALUATION: CPT

## 2024-06-07 PROCEDURE — 2000000000 HC ICU R&B

## 2024-06-07 PROCEDURE — C8929 TTE W OR WO FOL WCON,DOPPLER: HCPCS

## 2024-06-07 PROCEDURE — 72100 X-RAY EXAM L-S SPINE 2/3 VWS: CPT

## 2024-06-07 PROCEDURE — 82962 GLUCOSE BLOOD TEST: CPT

## 2024-06-07 PROCEDURE — 2580000003 HC RX 258: Performed by: INTERNAL MEDICINE

## 2024-06-07 RX ORDER — HYDROCODONE BITARTRATE AND ACETAMINOPHEN 5; 325 MG/1; MG/1
1 TABLET ORAL EVERY 6 HOURS PRN
Status: DISCONTINUED | OUTPATIENT
Start: 2024-06-07 | End: 2024-06-08

## 2024-06-07 RX ORDER — AMIODARONE HYDROCHLORIDE 200 MG/1
200 TABLET ORAL DAILY
Status: DISCONTINUED | OUTPATIENT
Start: 2024-06-07 | End: 2024-06-07

## 2024-06-07 RX ORDER — AMIODARONE HYDROCHLORIDE 200 MG/1
200 TABLET ORAL 2 TIMES DAILY
Status: DISCONTINUED | OUTPATIENT
Start: 2024-06-07 | End: 2024-06-15

## 2024-06-07 RX ORDER — HYDROCODONE BITARTRATE AND ACETAMINOPHEN 10; 325 MG/1; MG/1
1 TABLET ORAL EVERY 6 HOURS PRN
Status: DISCONTINUED | OUTPATIENT
Start: 2024-06-07 | End: 2024-06-08

## 2024-06-07 RX ORDER — NOREPINEPHRINE BITARTRATE 0.06 MG/ML
1-100 INJECTION, SOLUTION INTRAVENOUS CONTINUOUS
Status: DISCONTINUED | OUTPATIENT
Start: 2024-06-07 | End: 2024-06-12

## 2024-06-07 RX ADMIN — RANOLAZINE 500 MG: 500 TABLET, FILM COATED, EXTENDED RELEASE ORAL at 21:12

## 2024-06-07 RX ADMIN — TRAMADOL HYDROCHLORIDE 100 MG: 50 TABLET ORAL at 04:23

## 2024-06-07 RX ADMIN — COLLAGENASE SANTYL: 250 OINTMENT TOPICAL at 08:00

## 2024-06-07 RX ADMIN — HYDROXYZINE HYDROCHLORIDE 25 MG: 10 TABLET ORAL at 21:01

## 2024-06-07 RX ADMIN — MIDODRINE HYDROCHLORIDE 20 MG: 5 TABLET ORAL at 14:31

## 2024-06-07 RX ADMIN — MIDODRINE HYDROCHLORIDE 20 MG: 5 TABLET ORAL at 07:58

## 2024-06-07 RX ADMIN — ONDANSETRON 4 MG: 2 INJECTION INTRAMUSCULAR; INTRAVENOUS at 15:55

## 2024-06-07 RX ADMIN — RANOLAZINE 500 MG: 500 TABLET, FILM COATED, EXTENDED RELEASE ORAL at 07:58

## 2024-06-07 RX ADMIN — AMIODARONE HYDROCHLORIDE 150 MG: 1.5 INJECTION, SOLUTION INTRAVENOUS at 13:18

## 2024-06-07 RX ADMIN — HEPARIN SODIUM 5000 UNITS: 5000 INJECTION INTRAVENOUS; SUBCUTANEOUS at 21:53

## 2024-06-07 RX ADMIN — ALBUMIN (HUMAN) 25 G: 0.25 INJECTION, SOLUTION INTRAVENOUS at 11:33

## 2024-06-07 RX ADMIN — CALCITRIOL CAPSULES 0.25 MCG 0.25 MCG: 0.25 CAPSULE ORAL at 07:59

## 2024-06-07 RX ADMIN — AMIODARONE HYDROCHLORIDE 200 MG: 200 TABLET ORAL at 13:57

## 2024-06-07 RX ADMIN — GABAPENTIN 300 MG: 300 CAPSULE ORAL at 21:53

## 2024-06-07 RX ADMIN — HEPARIN SODIUM 5000 UNITS: 5000 INJECTION INTRAVENOUS; SUBCUTANEOUS at 06:42

## 2024-06-07 RX ADMIN — ACETAMINOPHEN 650 MG: 325 TABLET ORAL at 06:43

## 2024-06-07 RX ADMIN — MIDODRINE HYDROCHLORIDE 20 MG: 5 TABLET ORAL at 21:12

## 2024-06-07 RX ADMIN — Medication 1 AMPULE: at 07:59

## 2024-06-07 RX ADMIN — ONDANSETRON 4 MG: 2 INJECTION INTRAMUSCULAR; INTRAVENOUS at 23:04

## 2024-06-07 RX ADMIN — SEVELAMER CARBONATE 1600 MG: 800 TABLET, FILM COATED ORAL at 07:58

## 2024-06-07 RX ADMIN — ASPIRIN 81 MG CHEWABLE TABLET 81 MG: 81 TABLET CHEWABLE at 07:59

## 2024-06-07 RX ADMIN — INSULIN GLARGINE 15 UNITS: 100 INJECTION, SOLUTION SUBCUTANEOUS at 21:19

## 2024-06-07 RX ADMIN — ATORVASTATIN CALCIUM 20 MG: 20 TABLET, FILM COATED ORAL at 07:59

## 2024-06-07 RX ADMIN — PANTOPRAZOLE SODIUM 40 MG: 40 TABLET, DELAYED RELEASE ORAL at 06:42

## 2024-06-07 RX ADMIN — CLOPIDOGREL BISULFATE 75 MG: 75 TABLET ORAL at 07:58

## 2024-06-07 RX ADMIN — SODIUM CHLORIDE 5 MCG/MIN: 9 INJECTION, SOLUTION INTRAVENOUS at 08:53

## 2024-06-07 RX ADMIN — HEPARIN SODIUM 5000 UNITS: 5000 INJECTION INTRAVENOUS; SUBCUTANEOUS at 14:31

## 2024-06-07 RX ADMIN — ACETAMINOPHEN 650 MG: 325 TABLET ORAL at 14:02

## 2024-06-07 RX ADMIN — ACETAMINOPHEN 650 MG: 325 TABLET ORAL at 20:58

## 2024-06-07 RX ADMIN — AMIODARONE HYDROCHLORIDE 200 MG: 200 TABLET ORAL at 21:01

## 2024-06-07 RX ADMIN — HYDROCODONE BITARTRATE AND ACETAMINOPHEN 1 TABLET: 10; 325 TABLET ORAL at 15:55

## 2024-06-07 RX ADMIN — SEVELAMER CARBONATE 1600 MG: 800 TABLET, FILM COATED ORAL at 13:57

## 2024-06-07 RX ADMIN — ONDANSETRON 4 MG: 2 INJECTION INTRAMUSCULAR; INTRAVENOUS at 04:29

## 2024-06-07 RX ADMIN — Medication 1 AMPULE: at 20:53

## 2024-06-07 RX ADMIN — PERFLUTREN 1.5 ML: 6.52 INJECTION, SUSPENSION INTRAVENOUS at 15:11

## 2024-06-07 RX ADMIN — EPOETIN ALFA-EPBX 20000 UNITS: 20000 INJECTION, SOLUTION INTRAVENOUS; SUBCUTANEOUS at 21:03

## 2024-06-07 ASSESSMENT — PAIN DESCRIPTION - LOCATION
LOCATION: BACK

## 2024-06-07 ASSESSMENT — PAIN SCALES - GENERAL
PAINLEVEL_OUTOF10: 3
PAINLEVEL_OUTOF10: 6
PAINLEVEL_OUTOF10: 2
PAINLEVEL_OUTOF10: 5
PAINLEVEL_OUTOF10: 9
PAINLEVEL_OUTOF10: 2
PAINLEVEL_OUTOF10: 9
PAINLEVEL_OUTOF10: 2
PAINLEVEL_OUTOF10: 4
PAINLEVEL_OUTOF10: 2
PAINLEVEL_OUTOF10: 2

## 2024-06-07 ASSESSMENT — PAIN DESCRIPTION - DESCRIPTORS: DESCRIPTORS: BURNING

## 2024-06-07 NOTE — ANESTHESIA POSTPROCEDURE EVALUATION
Department of Anesthesiology  Postprocedure Note    Patient: Ros Orr  MRN: [id number]  YOB: 1975  Date of evaluation: 6/7/2024    Procedure Summary       Date: 06/05/24 Room / Location: [facility] MAIN OR  / [facility] MAIN OR    Anesthesia Start: 1726 Anesthesia Stop: 1816    Procedure: LEFT FLANK DEBRIDEMENT (Left: Flank) Diagnosis:       Wound infection      (Wound infection [T14.8XXA, L08.9])    Providers: Parish Christensen MD Responsible Provider: Parish Bush MD    Anesthesia Type: MAC ASA Status: 4            Anesthesia Type: MAC    Jax Phase I: Jax Score: 9    Jax Phase II:      Anesthesia Post Evaluation    Patient location during evaluation: PACU  Patient participation: complete - patient participated  Level of consciousness: sleepy but conscious  Airway patency: patent  Nausea & Vomiting: no nausea and no vomiting  Cardiovascular status: hemodynamically stable  Respiratory status: acceptable and nasal cannula  Hydration status: stable  Multimodal analgesia pain management approach        No notable events documented.

## 2024-06-07 NOTE — WOUND CARE
Wound care consult for reassessment of the wound on the left flank wound and on the pannus wound. Chart reviewed and patient assessed.   Assessment: Pt. Is somnolent after receiving the pain meds this afternoon.  She is not eating well \"just not hungry\" per the patient. She needs to be encouraged to take supplements with protein.   Slough in the wound is now thin and loose. There is some bruising along the edges and periwound skin but the induration is greatly decreased. Need to continue the Collagenase santyl for now to continue to debride the wound.   Left flank wound: 6-7-24      The inner thigh lesions are dry and crusty on the edges. No longer surrounded by redness. The pannus wound surface has a thin layer of slough as well. No s/sx of infection.       Plan: continue the moist wound care.  Pressure injury prevention.  Mae Vasquez RN, BSN, CWON

## 2024-06-08 LAB
ANION GAP SERPL CALC-SCNC: 13 MMOL/L (ref 5–15)
BUN SERPL-MCNC: 39 MG/DL (ref 6–20)
BUN/CREAT SERPL: 9 (ref 12–20)
CALCIUM SERPL-MCNC: 9.7 MG/DL (ref 8.5–10.1)
CHLORIDE SERPL-SCNC: 93 MMOL/L (ref 97–108)
CO2 SERPL-SCNC: 26 MMOL/L (ref 21–32)
CREAT SERPL-MCNC: 4.55 MG/DL (ref 0.55–1.02)
ERYTHROCYTE [DISTWIDTH] IN BLOOD BY AUTOMATED COUNT: 16.5 % (ref 11.5–14.5)
GLUCOSE BLD STRIP.AUTO-MCNC: 125 MG/DL (ref 65–117)
GLUCOSE BLD STRIP.AUTO-MCNC: 152 MG/DL (ref 65–117)
GLUCOSE BLD STRIP.AUTO-MCNC: 155 MG/DL (ref 65–117)
GLUCOSE BLD STRIP.AUTO-MCNC: 166 MG/DL (ref 65–117)
GLUCOSE SERPL-MCNC: 160 MG/DL (ref 65–100)
HCT VFR BLD AUTO: 30.4 % (ref 35–47)
HGB BLD-MCNC: 8.7 G/DL (ref 11.5–16)
MCH RBC QN AUTO: 25.9 PG (ref 26–34)
MCHC RBC AUTO-ENTMCNC: 28.6 G/DL (ref 30–36.5)
MCV RBC AUTO: 90.5 FL (ref 80–99)
NRBC # BLD: 0.03 K/UL (ref 0–0.01)
NRBC BLD-RTO: 0.2 PER 100 WBC
PLATELET # BLD AUTO: 477 K/UL (ref 150–400)
PMV BLD AUTO: 9.3 FL (ref 8.9–12.9)
POTASSIUM SERPL-SCNC: 3.9 MMOL/L (ref 3.5–5.1)
RBC # BLD AUTO: 3.36 M/UL (ref 3.8–5.2)
SERVICE CMNT-IMP: ABNORMAL
SODIUM SERPL-SCNC: 132 MMOL/L (ref 136–145)
WBC # BLD AUTO: 15.1 K/UL (ref 3.6–11)

## 2024-06-08 PROCEDURE — 6370000000 HC RX 637 (ALT 250 FOR IP): Performed by: INTERNAL MEDICINE

## 2024-06-08 PROCEDURE — 2000000000 HC ICU R&B

## 2024-06-08 PROCEDURE — 6360000002 HC RX W HCPCS: Performed by: NURSE PRACTITIONER

## 2024-06-08 PROCEDURE — 6370000000 HC RX 637 (ALT 250 FOR IP): Performed by: GENERAL ACUTE CARE HOSPITAL

## 2024-06-08 PROCEDURE — 80048 BASIC METABOLIC PNL TOTAL CA: CPT

## 2024-06-08 PROCEDURE — 85027 COMPLETE CBC AUTOMATED: CPT

## 2024-06-08 PROCEDURE — 76937 US GUIDE VASCULAR ACCESS: CPT

## 2024-06-08 PROCEDURE — 94660 CPAP INITIATION&MGMT: CPT

## 2024-06-08 PROCEDURE — 36415 COLL VENOUS BLD VENIPUNCTURE: CPT

## 2024-06-08 PROCEDURE — 2700000000 HC OXYGEN THERAPY PER DAY

## 2024-06-08 PROCEDURE — 6360000002 HC RX W HCPCS: Performed by: INTERNAL MEDICINE

## 2024-06-08 PROCEDURE — 82962 GLUCOSE BLOOD TEST: CPT

## 2024-06-08 RX ORDER — CYCLOBENZAPRINE HCL 10 MG
5 TABLET ORAL 3 TIMES DAILY PRN
Status: DISCONTINUED | OUTPATIENT
Start: 2024-06-08 | End: 2024-06-10

## 2024-06-08 RX ORDER — TRAMADOL HYDROCHLORIDE 50 MG/1
50 TABLET ORAL EVERY 6 HOURS PRN
Status: DISCONTINUED | OUTPATIENT
Start: 2024-06-08 | End: 2024-06-15

## 2024-06-08 RX ADMIN — MIDODRINE HYDROCHLORIDE 20 MG: 5 TABLET ORAL at 21:44

## 2024-06-08 RX ADMIN — COLLAGENASE SANTYL: 250 OINTMENT TOPICAL at 08:14

## 2024-06-08 RX ADMIN — ASPIRIN 81 MG CHEWABLE TABLET 81 MG: 81 TABLET CHEWABLE at 08:15

## 2024-06-08 RX ADMIN — TRAMADOL HYDROCHLORIDE 50 MG: 50 TABLET ORAL at 17:37

## 2024-06-08 RX ADMIN — INSULIN GLARGINE 7 UNITS: 100 INJECTION, SOLUTION SUBCUTANEOUS at 21:59

## 2024-06-08 RX ADMIN — MIDODRINE HYDROCHLORIDE 20 MG: 5 TABLET ORAL at 08:15

## 2024-06-08 RX ADMIN — AMIODARONE HYDROCHLORIDE 200 MG: 200 TABLET ORAL at 21:44

## 2024-06-08 RX ADMIN — CYCLOBENZAPRINE 5 MG: 10 TABLET, FILM COATED ORAL at 12:05

## 2024-06-08 RX ADMIN — ATORVASTATIN CALCIUM 20 MG: 20 TABLET, FILM COATED ORAL at 08:15

## 2024-06-08 RX ADMIN — CALCITRIOL CAPSULES 0.25 MCG 0.25 MCG: 0.25 CAPSULE ORAL at 08:15

## 2024-06-08 RX ADMIN — MIDODRINE HYDROCHLORIDE 20 MG: 5 TABLET ORAL at 15:02

## 2024-06-08 RX ADMIN — HEPARIN SODIUM 5000 UNITS: 5000 INJECTION INTRAVENOUS; SUBCUTANEOUS at 05:46

## 2024-06-08 RX ADMIN — Medication 1 AMPULE: at 08:14

## 2024-06-08 RX ADMIN — CYCLOBENZAPRINE 5 MG: 10 TABLET, FILM COATED ORAL at 21:44

## 2024-06-08 RX ADMIN — HEPARIN SODIUM 5000 UNITS: 5000 INJECTION INTRAVENOUS; SUBCUTANEOUS at 13:34

## 2024-06-08 RX ADMIN — AMIODARONE HYDROCHLORIDE 200 MG: 200 TABLET ORAL at 08:15

## 2024-06-08 RX ADMIN — HYDROXYZINE HYDROCHLORIDE 25 MG: 10 TABLET ORAL at 21:44

## 2024-06-08 RX ADMIN — HEPARIN SODIUM 5000 UNITS: 5000 INJECTION INTRAVENOUS; SUBCUTANEOUS at 21:44

## 2024-06-08 RX ADMIN — ONDANSETRON 4 MG: 2 INJECTION INTRAMUSCULAR; INTRAVENOUS at 09:54

## 2024-06-08 RX ADMIN — PANTOPRAZOLE SODIUM 40 MG: 40 TABLET, DELAYED RELEASE ORAL at 08:15

## 2024-06-08 RX ADMIN — RANOLAZINE 500 MG: 500 TABLET, FILM COATED, EXTENDED RELEASE ORAL at 21:45

## 2024-06-08 RX ADMIN — Medication 1 AMPULE: at 21:44

## 2024-06-08 RX ADMIN — RANOLAZINE 500 MG: 500 TABLET, FILM COATED, EXTENDED RELEASE ORAL at 08:15

## 2024-06-08 RX ADMIN — SEVELAMER CARBONATE 1600 MG: 800 TABLET, FILM COATED ORAL at 08:15

## 2024-06-08 RX ADMIN — CLOPIDOGREL BISULFATE 75 MG: 75 TABLET ORAL at 08:15

## 2024-06-08 ASSESSMENT — PAIN DESCRIPTION - LOCATION
LOCATION: BACK;FLANK
LOCATION: BACK;FLANK

## 2024-06-08 ASSESSMENT — PAIN DESCRIPTION - PAIN TYPE: TYPE: ACUTE PAIN

## 2024-06-08 ASSESSMENT — PAIN SCALES - GENERAL
PAINLEVEL_OUTOF10: 5
PAINLEVEL_OUTOF10: 0
PAINLEVEL_OUTOF10: 8

## 2024-06-08 ASSESSMENT — PAIN DESCRIPTION - DESCRIPTORS
DESCRIPTORS: ACHING
DESCRIPTORS: ACHING

## 2024-06-08 ASSESSMENT — PAIN DESCRIPTION - FREQUENCY: FREQUENCY: CONTINUOUS

## 2024-06-08 ASSESSMENT — PAIN DESCRIPTION - ONSET: ONSET: ON-GOING

## 2024-06-08 ASSESSMENT — PAIN DESCRIPTION - ORIENTATION: ORIENTATION: MID

## 2024-06-09 LAB
ANION GAP SERPL CALC-SCNC: 14 MMOL/L (ref 5–15)
BUN SERPL-MCNC: 51 MG/DL (ref 6–20)
BUN/CREAT SERPL: 9 (ref 12–20)
CALCIUM SERPL-MCNC: 9.7 MG/DL (ref 8.5–10.1)
CHLORIDE SERPL-SCNC: 90 MMOL/L (ref 97–108)
CO2 SERPL-SCNC: 26 MMOL/L (ref 21–32)
CREAT SERPL-MCNC: 5.44 MG/DL (ref 0.55–1.02)
ERYTHROCYTE [DISTWIDTH] IN BLOOD BY AUTOMATED COUNT: 16.2 % (ref 11.5–14.5)
GLUCOSE BLD STRIP.AUTO-MCNC: 138 MG/DL (ref 65–117)
GLUCOSE BLD STRIP.AUTO-MCNC: 155 MG/DL (ref 65–117)
GLUCOSE BLD STRIP.AUTO-MCNC: 165 MG/DL (ref 65–117)
GLUCOSE BLD STRIP.AUTO-MCNC: 178 MG/DL (ref 65–117)
GLUCOSE SERPL-MCNC: 144 MG/DL (ref 65–100)
HCT VFR BLD AUTO: 34.6 % (ref 35–47)
HGB BLD-MCNC: 9.9 G/DL (ref 11.5–16)
MCH RBC QN AUTO: 25.8 PG (ref 26–34)
MCHC RBC AUTO-ENTMCNC: 28.6 G/DL (ref 30–36.5)
MCV RBC AUTO: 90.3 FL (ref 80–99)
NRBC # BLD: 0.02 K/UL (ref 0–0.01)
NRBC BLD-RTO: 0.1 PER 100 WBC
PLATELET # BLD AUTO: 513 K/UL (ref 150–400)
PMV BLD AUTO: 9.2 FL (ref 8.9–12.9)
POTASSIUM SERPL-SCNC: 4.1 MMOL/L (ref 3.5–5.1)
RBC # BLD AUTO: 3.83 M/UL (ref 3.8–5.2)
SERVICE CMNT-IMP: ABNORMAL
SODIUM SERPL-SCNC: 130 MMOL/L (ref 136–145)
WBC # BLD AUTO: 16.5 K/UL (ref 3.6–11)

## 2024-06-09 PROCEDURE — 2700000000 HC OXYGEN THERAPY PER DAY

## 2024-06-09 PROCEDURE — 2580000003 HC RX 258: Performed by: INTERNAL MEDICINE

## 2024-06-09 PROCEDURE — 82962 GLUCOSE BLOOD TEST: CPT

## 2024-06-09 PROCEDURE — 6360000002 HC RX W HCPCS: Performed by: INTERNAL MEDICINE

## 2024-06-09 PROCEDURE — 80048 BASIC METABOLIC PNL TOTAL CA: CPT

## 2024-06-09 PROCEDURE — 2500000003 HC RX 250 WO HCPCS: Performed by: INTERNAL MEDICINE

## 2024-06-09 PROCEDURE — 36415 COLL VENOUS BLD VENIPUNCTURE: CPT

## 2024-06-09 PROCEDURE — 2000000000 HC ICU R&B

## 2024-06-09 PROCEDURE — 6370000000 HC RX 637 (ALT 250 FOR IP): Performed by: INTERNAL MEDICINE

## 2024-06-09 PROCEDURE — 6370000000 HC RX 637 (ALT 250 FOR IP): Performed by: GENERAL ACUTE CARE HOSPITAL

## 2024-06-09 PROCEDURE — 94660 CPAP INITIATION&MGMT: CPT

## 2024-06-09 PROCEDURE — 85027 COMPLETE CBC AUTOMATED: CPT

## 2024-06-09 RX ADMIN — MIDODRINE HYDROCHLORIDE 20 MG: 5 TABLET ORAL at 08:51

## 2024-06-09 RX ADMIN — ATORVASTATIN CALCIUM 20 MG: 20 TABLET, FILM COATED ORAL at 08:51

## 2024-06-09 RX ADMIN — SEVELAMER CARBONATE 1600 MG: 800 TABLET, FILM COATED ORAL at 17:32

## 2024-06-09 RX ADMIN — CALCITRIOL CAPSULES 0.25 MCG 0.25 MCG: 0.25 CAPSULE ORAL at 08:52

## 2024-06-09 RX ADMIN — MIDODRINE HYDROCHLORIDE 20 MG: 5 TABLET ORAL at 21:04

## 2024-06-09 RX ADMIN — SEVELAMER CARBONATE 1600 MG: 800 TABLET, FILM COATED ORAL at 08:51

## 2024-06-09 RX ADMIN — HEPARIN SODIUM 5000 UNITS: 5000 INJECTION INTRAVENOUS; SUBCUTANEOUS at 14:24

## 2024-06-09 RX ADMIN — ASPIRIN 81 MG CHEWABLE TABLET 81 MG: 81 TABLET CHEWABLE at 08:52

## 2024-06-09 RX ADMIN — CYCLOBENZAPRINE 5 MG: 10 TABLET, FILM COATED ORAL at 02:52

## 2024-06-09 RX ADMIN — CLOPIDOGREL BISULFATE 75 MG: 75 TABLET ORAL at 08:51

## 2024-06-09 RX ADMIN — HEPARIN SODIUM 5000 UNITS: 5000 INJECTION INTRAVENOUS; SUBCUTANEOUS at 06:16

## 2024-06-09 RX ADMIN — SODIUM CHLORIDE 4 MCG/MIN: 9 INJECTION, SOLUTION INTRAVENOUS at 14:27

## 2024-06-09 RX ADMIN — RANOLAZINE 500 MG: 500 TABLET, FILM COATED, EXTENDED RELEASE ORAL at 21:04

## 2024-06-09 RX ADMIN — HEPARIN SODIUM 5000 UNITS: 5000 INJECTION INTRAVENOUS; SUBCUTANEOUS at 21:04

## 2024-06-09 RX ADMIN — Medication 1 AMPULE: at 08:51

## 2024-06-09 RX ADMIN — RANOLAZINE 500 MG: 500 TABLET, FILM COATED, EXTENDED RELEASE ORAL at 08:52

## 2024-06-09 RX ADMIN — PANTOPRAZOLE SODIUM 40 MG: 40 TABLET, DELAYED RELEASE ORAL at 06:16

## 2024-06-09 RX ADMIN — AMIODARONE HYDROCHLORIDE 200 MG: 200 TABLET ORAL at 08:52

## 2024-06-09 RX ADMIN — INSULIN GLARGINE 15 UNITS: 100 INJECTION, SOLUTION SUBCUTANEOUS at 21:06

## 2024-06-09 RX ADMIN — HYDROXYZINE HYDROCHLORIDE 25 MG: 10 TABLET ORAL at 21:04

## 2024-06-09 RX ADMIN — AMIODARONE HYDROCHLORIDE 200 MG: 200 TABLET ORAL at 21:04

## 2024-06-09 RX ADMIN — MIDODRINE HYDROCHLORIDE 20 MG: 5 TABLET ORAL at 14:24

## 2024-06-09 RX ADMIN — COLLAGENASE SANTYL: 250 OINTMENT TOPICAL at 08:51

## 2024-06-09 RX ADMIN — CYCLOBENZAPRINE 5 MG: 10 TABLET, FILM COATED ORAL at 11:20

## 2024-06-09 RX ADMIN — Medication 1 AMPULE: at 19:59

## 2024-06-09 ASSESSMENT — PAIN SCALES - GENERAL
PAINLEVEL_OUTOF10: 8
PAINLEVEL_OUTOF10: 0
PAINLEVEL_OUTOF10: 4
PAINLEVEL_OUTOF10: 0
PAINLEVEL_OUTOF10: 0
PAINLEVEL_OUTOF10: 7

## 2024-06-09 ASSESSMENT — PAIN DESCRIPTION - DESCRIPTORS
DESCRIPTORS: ACHING
DESCRIPTORS: ACHING

## 2024-06-09 ASSESSMENT — PAIN DESCRIPTION - ORIENTATION: ORIENTATION: LEFT

## 2024-06-09 ASSESSMENT — PAIN DESCRIPTION - LOCATION
LOCATION: BACK
LOCATION: FLANK

## 2024-06-10 LAB
ANION GAP SERPL CALC-SCNC: 12 MMOL/L (ref 5–15)
BUN SERPL-MCNC: 61 MG/DL (ref 6–20)
BUN/CREAT SERPL: 9 (ref 12–20)
CALCIUM SERPL-MCNC: 9.4 MG/DL (ref 8.5–10.1)
CHLORIDE SERPL-SCNC: 87 MMOL/L (ref 97–108)
CO2 SERPL-SCNC: 27 MMOL/L (ref 21–32)
CREAT SERPL-MCNC: 6.6 MG/DL (ref 0.55–1.02)
ERYTHROCYTE [DISTWIDTH] IN BLOOD BY AUTOMATED COUNT: 16.2 % (ref 11.5–14.5)
GLUCOSE BLD STRIP.AUTO-MCNC: 104 MG/DL (ref 65–117)
GLUCOSE BLD STRIP.AUTO-MCNC: 131 MG/DL (ref 65–117)
GLUCOSE BLD STRIP.AUTO-MCNC: 175 MG/DL (ref 65–117)
GLUCOSE BLD STRIP.AUTO-MCNC: 97 MG/DL (ref 65–117)
GLUCOSE SERPL-MCNC: 167 MG/DL (ref 65–100)
HCT VFR BLD AUTO: 32.9 % (ref 35–47)
HGB BLD-MCNC: 9.4 G/DL (ref 11.5–16)
MCH RBC QN AUTO: 25.5 PG (ref 26–34)
MCHC RBC AUTO-ENTMCNC: 28.6 G/DL (ref 30–36.5)
MCV RBC AUTO: 89.4 FL (ref 80–99)
NRBC # BLD: 0.02 K/UL (ref 0–0.01)
NRBC BLD-RTO: 0.1 PER 100 WBC
PLATELET # BLD AUTO: 439 K/UL (ref 150–400)
PMV BLD AUTO: 8.9 FL (ref 8.9–12.9)
POTASSIUM SERPL-SCNC: 4.3 MMOL/L (ref 3.5–5.1)
RBC # BLD AUTO: 3.68 M/UL (ref 3.8–5.2)
SERVICE CMNT-IMP: ABNORMAL
SERVICE CMNT-IMP: ABNORMAL
SERVICE CMNT-IMP: NORMAL
SERVICE CMNT-IMP: NORMAL
SODIUM SERPL-SCNC: 126 MMOL/L (ref 136–145)
WBC # BLD AUTO: 15.6 K/UL (ref 3.6–11)

## 2024-06-10 PROCEDURE — 6370000000 HC RX 637 (ALT 250 FOR IP): Performed by: INTERNAL MEDICINE

## 2024-06-10 PROCEDURE — 2700000000 HC OXYGEN THERAPY PER DAY

## 2024-06-10 PROCEDURE — 97530 THERAPEUTIC ACTIVITIES: CPT

## 2024-06-10 PROCEDURE — P9047 ALBUMIN (HUMAN), 25%, 50ML: HCPCS | Performed by: NURSE PRACTITIONER

## 2024-06-10 PROCEDURE — 6360000002 HC RX W HCPCS: Performed by: NURSE PRACTITIONER

## 2024-06-10 PROCEDURE — 6360000002 HC RX W HCPCS: Performed by: INTERNAL MEDICINE

## 2024-06-10 PROCEDURE — 97535 SELF CARE MNGMENT TRAINING: CPT

## 2024-06-10 PROCEDURE — 82962 GLUCOSE BLOOD TEST: CPT

## 2024-06-10 PROCEDURE — 2000000000 HC ICU R&B

## 2024-06-10 PROCEDURE — 80048 BASIC METABOLIC PNL TOTAL CA: CPT

## 2024-06-10 PROCEDURE — 85027 COMPLETE CBC AUTOMATED: CPT

## 2024-06-10 PROCEDURE — 90935 HEMODIALYSIS ONE EVALUATION: CPT

## 2024-06-10 PROCEDURE — 6370000000 HC RX 637 (ALT 250 FOR IP): Performed by: GENERAL ACUTE CARE HOSPITAL

## 2024-06-10 PROCEDURE — 36415 COLL VENOUS BLD VENIPUNCTURE: CPT

## 2024-06-10 RX ORDER — ALPRAZOLAM 0.25 MG/1
0.5 TABLET ORAL
Status: COMPLETED | OUTPATIENT
Start: 2024-06-10 | End: 2024-06-10

## 2024-06-10 RX ORDER — HYDROCODONE BITARTRATE AND ACETAMINOPHEN 7.5; 325 MG/1; MG/1
1 TABLET ORAL
Status: COMPLETED | OUTPATIENT
Start: 2024-06-10 | End: 2024-06-10

## 2024-06-10 RX ORDER — FLUDROCORTISONE ACETATE 0.1 MG/1
0.1 TABLET ORAL 2 TIMES DAILY
Status: DISCONTINUED | OUTPATIENT
Start: 2024-06-10 | End: 2024-06-15

## 2024-06-10 RX ADMIN — HEPARIN SODIUM 5000 UNITS: 5000 INJECTION INTRAVENOUS; SUBCUTANEOUS at 22:20

## 2024-06-10 RX ADMIN — MIDODRINE HYDROCHLORIDE 20 MG: 5 TABLET ORAL at 14:53

## 2024-06-10 RX ADMIN — FLUDROCORTISONE ACETATE 0.1 MG: 0.1 TABLET ORAL at 13:10

## 2024-06-10 RX ADMIN — ATORVASTATIN CALCIUM 20 MG: 20 TABLET, FILM COATED ORAL at 08:47

## 2024-06-10 RX ADMIN — SEVELAMER CARBONATE 1600 MG: 800 TABLET, FILM COATED ORAL at 08:46

## 2024-06-10 RX ADMIN — Medication 1 AMPULE: at 21:41

## 2024-06-10 RX ADMIN — MIDODRINE HYDROCHLORIDE 20 MG: 5 TABLET ORAL at 08:47

## 2024-06-10 RX ADMIN — CALCITRIOL CAPSULES 0.25 MCG 0.25 MCG: 0.25 CAPSULE ORAL at 08:46

## 2024-06-10 RX ADMIN — PANTOPRAZOLE SODIUM 40 MG: 40 TABLET, DELAYED RELEASE ORAL at 08:47

## 2024-06-10 RX ADMIN — ASPIRIN 81 MG CHEWABLE TABLET 81 MG: 81 TABLET CHEWABLE at 13:11

## 2024-06-10 RX ADMIN — RANOLAZINE 500 MG: 500 TABLET, FILM COATED, EXTENDED RELEASE ORAL at 22:20

## 2024-06-10 RX ADMIN — ACETAMINOPHEN 650 MG: 325 TABLET ORAL at 08:46

## 2024-06-10 RX ADMIN — Medication 1 AMPULE: at 08:46

## 2024-06-10 RX ADMIN — RANOLAZINE 500 MG: 500 TABLET, FILM COATED, EXTENDED RELEASE ORAL at 13:11

## 2024-06-10 RX ADMIN — SEVELAMER CARBONATE 1600 MG: 800 TABLET, FILM COATED ORAL at 13:10

## 2024-06-10 RX ADMIN — AMIODARONE HYDROCHLORIDE 200 MG: 200 TABLET ORAL at 22:17

## 2024-06-10 RX ADMIN — HEPARIN SODIUM 5000 UNITS: 5000 INJECTION INTRAVENOUS; SUBCUTANEOUS at 13:11

## 2024-06-10 RX ADMIN — FLUDROCORTISONE ACETATE 0.1 MG: 0.1 TABLET ORAL at 22:19

## 2024-06-10 RX ADMIN — CLOPIDOGREL BISULFATE 75 MG: 75 TABLET ORAL at 13:10

## 2024-06-10 RX ADMIN — HEPARIN SODIUM 5000 UNITS: 5000 INJECTION INTRAVENOUS; SUBCUTANEOUS at 05:35

## 2024-06-10 RX ADMIN — AMIODARONE HYDROCHLORIDE 200 MG: 200 TABLET ORAL at 08:47

## 2024-06-10 RX ADMIN — HYDROXYZINE HYDROCHLORIDE 25 MG: 10 TABLET ORAL at 22:19

## 2024-06-10 RX ADMIN — TRAMADOL HYDROCHLORIDE 50 MG: 50 TABLET ORAL at 10:39

## 2024-06-10 RX ADMIN — COLLAGENASE SANTYL: 250 OINTMENT TOPICAL at 13:11

## 2024-06-10 RX ADMIN — ALPRAZOLAM 0.5 MG: 0.25 TABLET ORAL at 10:40

## 2024-06-10 RX ADMIN — INSULIN GLARGINE 15 UNITS: 100 INJECTION, SOLUTION SUBCUTANEOUS at 22:19

## 2024-06-10 RX ADMIN — HYDROCODONE BITARTRATE AND ACETAMINOPHEN 1 TABLET: 7.5; 325 TABLET ORAL at 11:16

## 2024-06-10 RX ADMIN — MIDODRINE HYDROCHLORIDE 20 MG: 5 TABLET ORAL at 22:20

## 2024-06-10 RX ADMIN — ALBUMIN (HUMAN) 25 G: 0.25 INJECTION, SOLUTION INTRAVENOUS at 10:43

## 2024-06-10 RX ADMIN — EPOETIN ALFA-EPBX 20000 UNITS: 20000 INJECTION, SOLUTION INTRAVENOUS; SUBCUTANEOUS at 22:19

## 2024-06-10 RX ADMIN — GABAPENTIN 300 MG: 300 CAPSULE ORAL at 22:20

## 2024-06-10 ASSESSMENT — PAIN SCALES - GENERAL
PAINLEVEL_OUTOF10: 2
PAINLEVEL_OUTOF10: 4
PAINLEVEL_OUTOF10: 9
PAINLEVEL_OUTOF10: 5
PAINLEVEL_OUTOF10: 9
PAINLEVEL_OUTOF10: 3
PAINLEVEL_OUTOF10: 3
PAINLEVEL_OUTOF10: 0
PAINLEVEL_OUTOF10: 2
PAINLEVEL_OUTOF10: 0
PAINLEVEL_OUTOF10: 7
PAINLEVEL_OUTOF10: 6
PAINLEVEL_OUTOF10: 8
PAINLEVEL_OUTOF10: 2
PAINLEVEL_OUTOF10: 9
PAINLEVEL_OUTOF10: 5

## 2024-06-10 ASSESSMENT — PAIN DESCRIPTION - DESCRIPTORS: DESCRIPTORS: ACHING

## 2024-06-10 ASSESSMENT — PAIN DESCRIPTION - LOCATION
LOCATION: BACK
LOCATION: BUTTOCKS

## 2024-06-10 ASSESSMENT — PAIN SCALES - WONG BAKER: WONGBAKER_NUMERICALRESPONSE: HURTS EVEN MORE

## 2024-06-10 NOTE — CARE COORDINATION
Transition of Care Plan:     RUR: 35%  Prior Level of Functioning: some assistance  Disposition: Possible IPR  If SNF or IPR: Date FOC offered: 6/10/24  Date FOC received: 6/10/24  Accepting facility: [facility]  Date authorization started with reference number: To be determined after facility has accepted.   Date authorization received and expires: To be determined after facility has accepted.   Follow up appointments: To be done by facility  DME needed: hasrolling walker and wheelchair  Transportation at discharge: TBD  IM/IMM Medicare/ letter given: To be given prior to discharge.   Is patient a [place] and connected with [facility]? No              If yes, was [place] transfer form completed and VA notified? N/A  Caregiver Contact: [facility]--No  Barriers---Medical stabiity       Discussed dcp with patient and she is interested in inpatient rehab. Choice given and her first choice is [facility]. She will review other options. Referral sent to [facility] and will await their response.         Kinza Yung RN BSN CRM        [phone]

## 2024-06-11 ENCOUNTER — APPOINTMENT (OUTPATIENT)
Facility: HOSPITAL | Age: 49
DRG: 314 | End: 2024-06-11
Attending: INTERNAL MEDICINE
Payer: MEDICARE

## 2024-06-11 LAB
ANION GAP SERPL CALC-SCNC: 8 MMOL/L (ref 5–15)
BUN SERPL-MCNC: 39 MG/DL (ref 6–20)
BUN/CREAT SERPL: 8 (ref 12–20)
CALCIUM SERPL-MCNC: 9.5 MG/DL (ref 8.5–10.1)
CHLORIDE SERPL-SCNC: 92 MMOL/L (ref 97–108)
CO2 SERPL-SCNC: 28 MMOL/L (ref 21–32)
CREAT SERPL-MCNC: 4.92 MG/DL (ref 0.55–1.02)
ERYTHROCYTE [DISTWIDTH] IN BLOOD BY AUTOMATED COUNT: 16.3 % (ref 11.5–14.5)
GLUCOSE BLD STRIP.AUTO-MCNC: 63 MG/DL (ref 65–117)
GLUCOSE BLD STRIP.AUTO-MCNC: 65 MG/DL (ref 65–117)
GLUCOSE BLD STRIP.AUTO-MCNC: 68 MG/DL (ref 65–117)
GLUCOSE BLD STRIP.AUTO-MCNC: 85 MG/DL (ref 65–117)
GLUCOSE BLD STRIP.AUTO-MCNC: 88 MG/DL (ref 65–117)
GLUCOSE BLD STRIP.AUTO-MCNC: 93 MG/DL (ref 65–117)
GLUCOSE BLD STRIP.AUTO-MCNC: 99 MG/DL (ref 65–117)
GLUCOSE SERPL-MCNC: 79 MG/DL (ref 65–100)
HCT VFR BLD AUTO: 28.7 % (ref 35–47)
HGB BLD-MCNC: 8.2 G/DL (ref 11.5–16)
MCH RBC QN AUTO: 25.2 PG (ref 26–34)
MCHC RBC AUTO-ENTMCNC: 28.6 G/DL (ref 30–36.5)
MCV RBC AUTO: 88 FL (ref 80–99)
NRBC # BLD: 0.03 K/UL (ref 0–0.01)
NRBC BLD-RTO: 0.2 PER 100 WBC
PLATELET # BLD AUTO: 417 K/UL (ref 150–400)
PMV BLD AUTO: 9.1 FL (ref 8.9–12.9)
POTASSIUM SERPL-SCNC: 4.1 MMOL/L (ref 3.5–5.1)
RBC # BLD AUTO: 3.26 M/UL (ref 3.8–5.2)
SERVICE CMNT-IMP: ABNORMAL
SERVICE CMNT-IMP: NORMAL
SODIUM SERPL-SCNC: 128 MMOL/L (ref 136–145)
WBC # BLD AUTO: 13.5 K/UL (ref 3.6–11)

## 2024-06-11 PROCEDURE — 36415 COLL VENOUS BLD VENIPUNCTURE: CPT

## 2024-06-11 PROCEDURE — 2700000000 HC OXYGEN THERAPY PER DAY

## 2024-06-11 PROCEDURE — 6370000000 HC RX 637 (ALT 250 FOR IP): Performed by: INTERNAL MEDICINE

## 2024-06-11 PROCEDURE — 6370000000 HC RX 637 (ALT 250 FOR IP): Performed by: HOSPITALIST

## 2024-06-11 PROCEDURE — 2000000000 HC ICU R&B

## 2024-06-11 PROCEDURE — 6370000000 HC RX 637 (ALT 250 FOR IP): Performed by: GENERAL ACUTE CARE HOSPITAL

## 2024-06-11 PROCEDURE — 72148 MRI LUMBAR SPINE W/O DYE: CPT

## 2024-06-11 PROCEDURE — 80048 BASIC METABOLIC PNL TOTAL CA: CPT

## 2024-06-11 PROCEDURE — 82962 GLUCOSE BLOOD TEST: CPT

## 2024-06-11 PROCEDURE — 6360000002 HC RX W HCPCS: Performed by: INTERNAL MEDICINE

## 2024-06-11 PROCEDURE — 85027 COMPLETE CBC AUTOMATED: CPT

## 2024-06-11 RX ORDER — ALPRAZOLAM 0.25 MG/1
0.5 TABLET ORAL ONCE
Status: COMPLETED | OUTPATIENT
Start: 2024-06-11 | End: 2024-06-11

## 2024-06-11 RX ORDER — INSULIN GLARGINE 100 [IU]/ML
10 INJECTION, SOLUTION SUBCUTANEOUS NIGHTLY
Status: DISCONTINUED | OUTPATIENT
Start: 2024-06-11 | End: 2024-06-18 | Stop reason: HOSPADM

## 2024-06-11 RX ORDER — LIDOCAINE 4 G/G
1 PATCH TOPICAL DAILY
Status: DISCONTINUED | OUTPATIENT
Start: 2024-06-11 | End: 2024-06-12

## 2024-06-11 RX ADMIN — TRAMADOL HYDROCHLORIDE 50 MG: 50 TABLET ORAL at 16:44

## 2024-06-11 RX ADMIN — HEPARIN SODIUM 5000 UNITS: 5000 INJECTION INTRAVENOUS; SUBCUTANEOUS at 20:28

## 2024-06-11 RX ADMIN — SEVELAMER CARBONATE 1600 MG: 800 TABLET, FILM COATED ORAL at 08:33

## 2024-06-11 RX ADMIN — FLUDROCORTISONE ACETATE 0.1 MG: 0.1 TABLET ORAL at 20:26

## 2024-06-11 RX ADMIN — SEVELAMER CARBONATE 1600 MG: 800 TABLET, FILM COATED ORAL at 16:44

## 2024-06-11 RX ADMIN — PANTOPRAZOLE SODIUM 40 MG: 40 TABLET, DELAYED RELEASE ORAL at 05:35

## 2024-06-11 RX ADMIN — MIDODRINE HYDROCHLORIDE 20 MG: 5 TABLET ORAL at 08:32

## 2024-06-11 RX ADMIN — RANOLAZINE 500 MG: 500 TABLET, FILM COATED, EXTENDED RELEASE ORAL at 08:33

## 2024-06-11 RX ADMIN — ACETAMINOPHEN 650 MG: 325 TABLET ORAL at 21:51

## 2024-06-11 RX ADMIN — CALCITRIOL CAPSULES 0.25 MCG 0.25 MCG: 0.25 CAPSULE ORAL at 10:12

## 2024-06-11 RX ADMIN — AMIODARONE HYDROCHLORIDE 200 MG: 200 TABLET ORAL at 08:33

## 2024-06-11 RX ADMIN — CLOPIDOGREL BISULFATE 75 MG: 75 TABLET ORAL at 08:33

## 2024-06-11 RX ADMIN — AMIODARONE HYDROCHLORIDE 200 MG: 200 TABLET ORAL at 20:26

## 2024-06-11 RX ADMIN — HYDROXYZINE HYDROCHLORIDE 25 MG: 10 TABLET ORAL at 20:25

## 2024-06-11 RX ADMIN — MIDODRINE HYDROCHLORIDE 20 MG: 5 TABLET ORAL at 15:22

## 2024-06-11 RX ADMIN — FLUDROCORTISONE ACETATE 0.1 MG: 0.1 TABLET ORAL at 08:33

## 2024-06-11 RX ADMIN — TRAMADOL HYDROCHLORIDE 50 MG: 50 TABLET ORAL at 10:26

## 2024-06-11 RX ADMIN — RANOLAZINE 500 MG: 500 TABLET, FILM COATED, EXTENDED RELEASE ORAL at 20:27

## 2024-06-11 RX ADMIN — Medication 1 AMPULE: at 20:06

## 2024-06-11 RX ADMIN — HEPARIN SODIUM 5000 UNITS: 5000 INJECTION INTRAVENOUS; SUBCUTANEOUS at 05:35

## 2024-06-11 RX ADMIN — ASPIRIN 81 MG CHEWABLE TABLET 81 MG: 81 TABLET CHEWABLE at 08:33

## 2024-06-11 RX ADMIN — Medication 1 AMPULE: at 10:12

## 2024-06-11 RX ADMIN — HEPARIN SODIUM 5000 UNITS: 5000 INJECTION INTRAVENOUS; SUBCUTANEOUS at 13:57

## 2024-06-11 RX ADMIN — MIDODRINE HYDROCHLORIDE 20 MG: 5 TABLET ORAL at 20:27

## 2024-06-11 RX ADMIN — ATORVASTATIN CALCIUM 20 MG: 20 TABLET, FILM COATED ORAL at 08:32

## 2024-06-11 RX ADMIN — ALPRAZOLAM 0.5 MG: 0.25 TABLET ORAL at 20:06

## 2024-06-11 RX ADMIN — COLLAGENASE SANTYL: 250 OINTMENT TOPICAL at 11:19

## 2024-06-11 RX ADMIN — ACETAMINOPHEN 650 MG: 325 TABLET ORAL at 08:33

## 2024-06-11 ASSESSMENT — PAIN DESCRIPTION - LOCATION
LOCATION: LEG
LOCATION: BACK
LOCATION: BACK
LOCATION: BACK;BUTTOCKS

## 2024-06-11 ASSESSMENT — PAIN SCALES - GENERAL
PAINLEVEL_OUTOF10: 10
PAINLEVEL_OUTOF10: 9
PAINLEVEL_OUTOF10: 6
PAINLEVEL_OUTOF10: 3

## 2024-06-11 ASSESSMENT — PAIN SCALES - WONG BAKER: WONGBAKER_NUMERICALRESPONSE: HURTS A LITTLE BIT

## 2024-06-11 ASSESSMENT — PAIN DESCRIPTION - ORIENTATION
ORIENTATION: RIGHT;LEFT;MID
ORIENTATION: RIGHT

## 2024-06-11 ASSESSMENT — PAIN DESCRIPTION - DESCRIPTORS: DESCRIPTORS: ACHING;SORE;TENDER

## 2024-06-12 LAB
ANION GAP SERPL CALC-SCNC: 9 MMOL/L (ref 5–15)
BUN SERPL-MCNC: 47 MG/DL (ref 6–20)
BUN/CREAT SERPL: 8 (ref 12–20)
CALCIUM SERPL-MCNC: 9.5 MG/DL (ref 8.5–10.1)
CHLORIDE SERPL-SCNC: 91 MMOL/L (ref 97–108)
CO2 SERPL-SCNC: 27 MMOL/L (ref 21–32)
CORTIS 1H P CHAL SERPL-MCNC: 21.4 UG/DL
CORTIS 30M P CHAL SERPL-MCNC: 22.6 UG/DL
CORTIS BS SERPL-MCNC: 24.7 UG/DL
CREAT SERPL-MCNC: 5.88 MG/DL (ref 0.55–1.02)
ERYTHROCYTE [DISTWIDTH] IN BLOOD BY AUTOMATED COUNT: 16.1 % (ref 11.5–14.5)
GLUCOSE BLD STRIP.AUTO-MCNC: 80 MG/DL (ref 65–117)
GLUCOSE BLD STRIP.AUTO-MCNC: 83 MG/DL (ref 65–117)
GLUCOSE BLD STRIP.AUTO-MCNC: 85 MG/DL (ref 65–117)
GLUCOSE BLD STRIP.AUTO-MCNC: 95 MG/DL (ref 65–117)
GLUCOSE BLD STRIP.AUTO-MCNC: 96 MG/DL (ref 65–117)
GLUCOSE SERPL-MCNC: 97 MG/DL (ref 65–100)
HCT VFR BLD AUTO: 31.6 % (ref 35–47)
HGB BLD-MCNC: 9.1 G/DL (ref 11.5–16)
MCH RBC QN AUTO: 25.6 PG (ref 26–34)
MCHC RBC AUTO-ENTMCNC: 28.8 G/DL (ref 30–36.5)
MCV RBC AUTO: 88.8 FL (ref 80–99)
NRBC # BLD: 0.05 K/UL (ref 0–0.01)
NRBC BLD-RTO: 0.3 PER 100 WBC
PLATELET # BLD AUTO: 441 K/UL (ref 150–400)
PMV BLD AUTO: 9 FL (ref 8.9–12.9)
POTASSIUM SERPL-SCNC: 4 MMOL/L (ref 3.5–5.1)
RBC # BLD AUTO: 3.56 M/UL (ref 3.8–5.2)
SERVICE CMNT-IMP: NORMAL
SODIUM SERPL-SCNC: 127 MMOL/L (ref 136–145)
WBC # BLD AUTO: 17.2 K/UL (ref 3.6–11)

## 2024-06-12 PROCEDURE — 6370000000 HC RX 637 (ALT 250 FOR IP): Performed by: INTERNAL MEDICINE

## 2024-06-12 PROCEDURE — 2580000003 HC RX 258: Performed by: INTERNAL MEDICINE

## 2024-06-12 PROCEDURE — 6360000002 HC RX W HCPCS: Performed by: NURSE PRACTITIONER

## 2024-06-12 PROCEDURE — 82533 TOTAL CORTISOL: CPT

## 2024-06-12 PROCEDURE — 82962 GLUCOSE BLOOD TEST: CPT

## 2024-06-12 PROCEDURE — 2000000000 HC ICU R&B

## 2024-06-12 PROCEDURE — 97535 SELF CARE MNGMENT TRAINING: CPT

## 2024-06-12 PROCEDURE — 36415 COLL VENOUS BLD VENIPUNCTURE: CPT

## 2024-06-12 PROCEDURE — P9047 ALBUMIN (HUMAN), 25%, 50ML: HCPCS | Performed by: NURSE PRACTITIONER

## 2024-06-12 PROCEDURE — 80048 BASIC METABOLIC PNL TOTAL CA: CPT

## 2024-06-12 PROCEDURE — 87040 BLOOD CULTURE FOR BACTERIA: CPT

## 2024-06-12 PROCEDURE — 97530 THERAPEUTIC ACTIVITIES: CPT

## 2024-06-12 PROCEDURE — 90935 HEMODIALYSIS ONE EVALUATION: CPT

## 2024-06-12 PROCEDURE — 6360000002 HC RX W HCPCS: Performed by: INTERNAL MEDICINE

## 2024-06-12 PROCEDURE — 85027 COMPLETE CBC AUTOMATED: CPT

## 2024-06-12 PROCEDURE — 2500000003 HC RX 250 WO HCPCS: Performed by: INTERNAL MEDICINE

## 2024-06-12 PROCEDURE — 2700000000 HC OXYGEN THERAPY PER DAY

## 2024-06-12 PROCEDURE — 6370000000 HC RX 637 (ALT 250 FOR IP): Performed by: GENERAL ACUTE CARE HOSPITAL

## 2024-06-12 RX ORDER — LIDOCAINE HYDROCHLORIDE 20 MG/ML
JELLY TOPICAL PRN
Status: DISCONTINUED | OUTPATIENT
Start: 2024-06-12 | End: 2024-06-18 | Stop reason: HOSPADM

## 2024-06-12 RX ORDER — MIDODRINE HYDROCHLORIDE 5 MG/1
15 TABLET ORAL 4 TIMES DAILY
Status: DISCONTINUED | OUTPATIENT
Start: 2024-06-12 | End: 2024-06-15

## 2024-06-12 RX ORDER — LIDOCAINE 4 G/G
1 PATCH TOPICAL DAILY
Status: DISCONTINUED | OUTPATIENT
Start: 2024-06-13 | End: 2024-06-18 | Stop reason: HOSPADM

## 2024-06-12 RX ORDER — LIDOCAINE HYDROCHLORIDE 20 MG/ML
JELLY TOPICAL PRN
Status: DISCONTINUED | OUTPATIENT
Start: 2024-06-12 | End: 2024-06-12

## 2024-06-12 RX ADMIN — COLLAGENASE SANTYL: 250 OINTMENT TOPICAL at 08:44

## 2024-06-12 RX ADMIN — HEPARIN SODIUM 5000 UNITS: 5000 INJECTION INTRAVENOUS; SUBCUTANEOUS at 05:49

## 2024-06-12 RX ADMIN — HYDROXYZINE HYDROCHLORIDE 25 MG: 10 TABLET ORAL at 19:59

## 2024-06-12 RX ADMIN — ATORVASTATIN CALCIUM 20 MG: 20 TABLET, FILM COATED ORAL at 08:44

## 2024-06-12 RX ADMIN — ALBUMIN (HUMAN) 25 G: 0.25 INJECTION, SOLUTION INTRAVENOUS at 14:17

## 2024-06-12 RX ADMIN — SEVELAMER CARBONATE 1600 MG: 800 TABLET, FILM COATED ORAL at 13:30

## 2024-06-12 RX ADMIN — TRAMADOL HYDROCHLORIDE 50 MG: 50 TABLET ORAL at 19:03

## 2024-06-12 RX ADMIN — CLOPIDOGREL BISULFATE 75 MG: 75 TABLET ORAL at 08:44

## 2024-06-12 RX ADMIN — GABAPENTIN 300 MG: 300 CAPSULE ORAL at 21:35

## 2024-06-12 RX ADMIN — RANOLAZINE 500 MG: 500 TABLET, FILM COATED, EXTENDED RELEASE ORAL at 19:59

## 2024-06-12 RX ADMIN — LIDOCAINE HYDROCHLORIDE: 20 JELLY TOPICAL at 12:34

## 2024-06-12 RX ADMIN — SEVELAMER CARBONATE 1600 MG: 800 TABLET, FILM COATED ORAL at 08:44

## 2024-06-12 RX ADMIN — EPOETIN ALFA-EPBX 20000 UNITS: 20000 INJECTION, SOLUTION INTRAVENOUS; SUBCUTANEOUS at 20:05

## 2024-06-12 RX ADMIN — MIDODRINE HYDROCHLORIDE 15 MG: 5 TABLET ORAL at 13:30

## 2024-06-12 RX ADMIN — DICLOFENAC SODIUM 4 G: 10 GEL TOPICAL at 20:11

## 2024-06-12 RX ADMIN — HEPARIN SODIUM 5000 UNITS: 5000 INJECTION INTRAVENOUS; SUBCUTANEOUS at 13:30

## 2024-06-12 RX ADMIN — TRAMADOL HYDROCHLORIDE 50 MG: 50 TABLET ORAL at 09:27

## 2024-06-12 RX ADMIN — FLUDROCORTISONE ACETATE 0.1 MG: 0.1 TABLET ORAL at 19:59

## 2024-06-12 RX ADMIN — CALCITRIOL CAPSULES 0.25 MCG 0.25 MCG: 0.25 CAPSULE ORAL at 08:44

## 2024-06-12 RX ADMIN — SEVELAMER CARBONATE 1600 MG: 800 TABLET, FILM COATED ORAL at 18:51

## 2024-06-12 RX ADMIN — MIDODRINE HYDROCHLORIDE 20 MG: 5 TABLET ORAL at 08:44

## 2024-06-12 RX ADMIN — DICLOFENAC SODIUM 4 G: 10 GEL TOPICAL at 13:30

## 2024-06-12 RX ADMIN — SODIUM CHLORIDE 1 MCG/MIN: 9 INJECTION, SOLUTION INTRAVENOUS at 06:12

## 2024-06-12 RX ADMIN — Medication 1 AMPULE: at 20:00

## 2024-06-12 RX ADMIN — INSULIN GLARGINE 10 UNITS: 100 INJECTION, SOLUTION SUBCUTANEOUS at 21:34

## 2024-06-12 RX ADMIN — MIDODRINE HYDROCHLORIDE 15 MG: 5 TABLET ORAL at 18:51

## 2024-06-12 RX ADMIN — AMIODARONE HYDROCHLORIDE 200 MG: 200 TABLET ORAL at 08:44

## 2024-06-12 RX ADMIN — AMIODARONE HYDROCHLORIDE 200 MG: 200 TABLET ORAL at 20:07

## 2024-06-12 RX ADMIN — PANTOPRAZOLE SODIUM 40 MG: 40 TABLET, DELAYED RELEASE ORAL at 06:10

## 2024-06-12 RX ADMIN — FLUDROCORTISONE ACETATE 0.1 MG: 0.1 TABLET ORAL at 08:44

## 2024-06-12 RX ADMIN — HEPARIN SODIUM 5000 UNITS: 5000 INJECTION INTRAVENOUS; SUBCUTANEOUS at 21:34

## 2024-06-12 RX ADMIN — LIDOCAINE HYDROCHLORIDE: 20 JELLY TOPICAL at 21:35

## 2024-06-12 RX ADMIN — Medication 1 AMPULE: at 08:44

## 2024-06-12 RX ADMIN — RANOLAZINE 500 MG: 500 TABLET, FILM COATED, EXTENDED RELEASE ORAL at 08:44

## 2024-06-12 RX ADMIN — ASPIRIN 81 MG CHEWABLE TABLET 81 MG: 81 TABLET CHEWABLE at 08:44

## 2024-06-12 RX ADMIN — MIDODRINE HYDROCHLORIDE 15 MG: 5 TABLET ORAL at 20:09

## 2024-06-12 RX ADMIN — ACETAMINOPHEN 650 MG: 325 TABLET ORAL at 19:57

## 2024-06-12 ASSESSMENT — PAIN DESCRIPTION - DESCRIPTORS
DESCRIPTORS: ACHING;BURNING
DESCRIPTORS: ACHING

## 2024-06-12 ASSESSMENT — PAIN SCALES - GENERAL
PAINLEVEL_OUTOF10: 0
PAINLEVEL_OUTOF10: 0
PAINLEVEL_OUTOF10: 6
PAINLEVEL_OUTOF10: 2
PAINLEVEL_OUTOF10: 7
PAINLEVEL_OUTOF10: 4
PAINLEVEL_OUTOF10: 7
PAINLEVEL_OUTOF10: 6

## 2024-06-12 ASSESSMENT — PAIN DESCRIPTION - FREQUENCY: FREQUENCY: INTERMITTENT

## 2024-06-12 ASSESSMENT — PAIN DESCRIPTION - PAIN TYPE
TYPE: CHRONIC PAIN
TYPE: CHRONIC PAIN

## 2024-06-12 ASSESSMENT — PAIN DESCRIPTION - LOCATION
LOCATION: BACK
LOCATION: GENERALIZED
LOCATION: GENERALIZED
LOCATION: BACK
LOCATION: BACK
LOCATION: GENERALIZED
LOCATION: BACK

## 2024-06-12 ASSESSMENT — PAIN - FUNCTIONAL ASSESSMENT: PAIN_FUNCTIONAL_ASSESSMENT: ACTIVITIES ARE NOT PREVENTED

## 2024-06-12 ASSESSMENT — PAIN DESCRIPTION - ONSET: ONSET: ON-GOING

## 2024-06-12 ASSESSMENT — PAIN SCALES - WONG BAKER: WONGBAKER_NUMERICALRESPONSE: HURTS A LITTLE BIT

## 2024-06-12 ASSESSMENT — PAIN DESCRIPTION - ORIENTATION
ORIENTATION: POSTERIOR
ORIENTATION: POSTERIOR
ORIENTATION: LOWER

## 2024-06-12 NOTE — CARE COORDINATION
Transition of Care Plan:     RUR: 35%  Prior Level of Functioning: some assistance  Disposition: Possible IPR  If SNF or IPR: Date FOC offered: 6/10/24  Date FOC received: 6/10/24  Accepting facility: [facility]  Date authorization started with reference number: To be determined after facility has accepted.   Date authorization received and expires: To be determined after facility has accepted.   Follow up appointments: To be done by facility  DME needed: hasrolling walker and wheelchair  Transportation at discharge: TBD  IM/IMM Medicare/ letter given: To be given prior to discharge.   Is patient a [place] and connected with VA? No              If yes, was [place] transfer form completed and VA notified? N/A  Caregiver Contact: [facility]--No  Barriers---Medical stabiity      [facility] denied patient. Patient informed and wants referral sent to [facility] in [place]. Referral sent. List of snf left with the patient for her and family to review.         Kinza Yung RN BSN CRM        [phone]

## 2024-06-12 NOTE — INTERDISCIPLINARY ROUNDS
Critical care interdisciplinary rounds today.  Following members present: Case Management, , Clinical Lead, Diabetes Team, Nursing, Nutrition, Pharmacy, and Physician. Family invited to participate.  Plan of care discussed.  See clinical pathway for plan of care and interventions and desired outcomes.    Plans to D/C levophed and not turn back on unless change in mentation occurs per Dr. Orozco.     Pt. Working with therapy and is OOB to recliner.

## 2024-06-13 LAB
ANION GAP SERPL CALC-SCNC: 10 MMOL/L (ref 5–15)
BASE EXCESS BLDV CALC-SCNC: 2.1 MMOL/L
BDY SITE: ABNORMAL
BUN SERPL-MCNC: 27 MG/DL (ref 6–20)
BUN/CREAT SERPL: 8 (ref 12–20)
CALCIUM SERPL-MCNC: 9 MG/DL (ref 8.5–10.1)
CHLORIDE SERPL-SCNC: 95 MMOL/L (ref 97–108)
CO2 SERPL-SCNC: 27 MMOL/L (ref 21–32)
CREAT SERPL-MCNC: 3.58 MG/DL (ref 0.55–1.02)
ERYTHROCYTE [DISTWIDTH] IN BLOOD BY AUTOMATED COUNT: 16.2 % (ref 11.5–14.5)
GLUCOSE BLD STRIP.AUTO-MCNC: 106 MG/DL (ref 65–117)
GLUCOSE BLD STRIP.AUTO-MCNC: 114 MG/DL (ref 65–117)
GLUCOSE BLD STRIP.AUTO-MCNC: 61 MG/DL (ref 65–117)
GLUCOSE BLD STRIP.AUTO-MCNC: 75 MG/DL (ref 65–117)
GLUCOSE BLD STRIP.AUTO-MCNC: 86 MG/DL (ref 65–117)
GLUCOSE SERPL-MCNC: 79 MG/DL (ref 65–100)
HCO3 BLDV-SCNC: 29 MMOL/L (ref 23–28)
HCT VFR BLD AUTO: 27.2 % (ref 35–47)
HGB BLD-MCNC: 7.6 G/DL (ref 11.5–16)
MCH RBC QN AUTO: 25.2 PG (ref 26–34)
MCHC RBC AUTO-ENTMCNC: 27.9 G/DL (ref 30–36.5)
MCV RBC AUTO: 90.1 FL (ref 80–99)
NRBC # BLD: 0.07 K/UL (ref 0–0.01)
NRBC BLD-RTO: 0.5 PER 100 WBC
PCO2 BLDV: 56.5 MMHG (ref 41–51)
PH BLDV: 7.33 (ref 7.32–7.42)
PLATELET # BLD AUTO: 390 K/UL (ref 150–400)
PMV BLD AUTO: 9.3 FL (ref 8.9–12.9)
PO2 BLDV: 44 MMHG (ref 25–40)
POTASSIUM SERPL-SCNC: 4 MMOL/L (ref 3.5–5.1)
RBC # BLD AUTO: 3.02 M/UL (ref 3.8–5.2)
SAO2 % BLDV: 76 % (ref 65–88)
SAO2% DEVICE SAO2% SENSOR NAME: ABNORMAL
SERVICE CMNT-IMP: ABNORMAL
SERVICE CMNT-IMP: ABNORMAL
SERVICE CMNT-IMP: NORMAL
SODIUM SERPL-SCNC: 132 MMOL/L (ref 136–145)
SPECIMEN SITE: ABNORMAL
WBC # BLD AUTO: 13.8 K/UL (ref 3.6–11)

## 2024-06-13 PROCEDURE — 2700000000 HC OXYGEN THERAPY PER DAY

## 2024-06-13 PROCEDURE — 6370000000 HC RX 637 (ALT 250 FOR IP): Performed by: HOSPITALIST

## 2024-06-13 PROCEDURE — 6370000000 HC RX 637 (ALT 250 FOR IP): Performed by: INTERNAL MEDICINE

## 2024-06-13 PROCEDURE — 80048 BASIC METABOLIC PNL TOTAL CA: CPT

## 2024-06-13 PROCEDURE — 82962 GLUCOSE BLOOD TEST: CPT

## 2024-06-13 PROCEDURE — 6360000002 HC RX W HCPCS: Performed by: INTERNAL MEDICINE

## 2024-06-13 PROCEDURE — 2580000003 HC RX 258: Performed by: STUDENT IN AN ORGANIZED HEALTH CARE EDUCATION/TRAINING PROGRAM

## 2024-06-13 PROCEDURE — 85027 COMPLETE CBC AUTOMATED: CPT

## 2024-06-13 PROCEDURE — 6370000000 HC RX 637 (ALT 250 FOR IP): Performed by: GENERAL ACUTE CARE HOSPITAL

## 2024-06-13 PROCEDURE — 2060000000 HC ICU INTERMEDIATE R&B

## 2024-06-13 PROCEDURE — 2500000003 HC RX 250 WO HCPCS: Performed by: STUDENT IN AN ORGANIZED HEALTH CARE EDUCATION/TRAINING PROGRAM

## 2024-06-13 PROCEDURE — 36415 COLL VENOUS BLD VENIPUNCTURE: CPT

## 2024-06-13 PROCEDURE — 82803 BLOOD GASES ANY COMBINATION: CPT

## 2024-06-13 PROCEDURE — 83970 ASSAY OF PARATHORMONE: CPT

## 2024-06-13 RX ORDER — SORBITOL SOLUTION 70 %
30 SOLUTION, ORAL MISCELLANEOUS EVERY OTHER DAY
Status: DISCONTINUED | OUTPATIENT
Start: 2024-06-13 | End: 2024-06-14

## 2024-06-13 RX ORDER — SORBITOL SOLUTION 70 %
30 SOLUTION, ORAL MISCELLANEOUS EVERY OTHER DAY
Status: DISCONTINUED | OUTPATIENT
Start: 2024-06-13 | End: 2024-06-13

## 2024-06-13 RX ORDER — NOREPINEPHRINE BITARTRATE 0.06 MG/ML
1-100 INJECTION, SOLUTION INTRAVENOUS CONTINUOUS
Status: DISCONTINUED | OUTPATIENT
Start: 2024-06-14 | End: 2024-06-15

## 2024-06-13 RX ORDER — CASTOR OIL AND BALSAM, PERU 788; 87 MG/G; MG/G
OINTMENT TOPICAL 3 TIMES DAILY
Status: DISCONTINUED | OUTPATIENT
Start: 2024-06-13 | End: 2024-06-18 | Stop reason: HOSPADM

## 2024-06-13 RX ADMIN — MIDODRINE HYDROCHLORIDE 15 MG: 5 TABLET ORAL at 12:12

## 2024-06-13 RX ADMIN — TRAMADOL HYDROCHLORIDE 50 MG: 50 TABLET ORAL at 01:56

## 2024-06-13 RX ADMIN — ATORVASTATIN CALCIUM 20 MG: 20 TABLET, FILM COATED ORAL at 08:28

## 2024-06-13 RX ADMIN — SODIUM CHLORIDE 5 MCG/MIN: 9 INJECTION, SOLUTION INTRAVENOUS at 23:49

## 2024-06-13 RX ADMIN — ASPIRIN 81 MG CHEWABLE TABLET 81 MG: 81 TABLET CHEWABLE at 08:28

## 2024-06-13 RX ADMIN — FLUDROCORTISONE ACETATE 0.1 MG: 0.1 TABLET ORAL at 08:28

## 2024-06-13 RX ADMIN — TRAMADOL HYDROCHLORIDE 50 MG: 50 TABLET ORAL at 12:13

## 2024-06-13 RX ADMIN — MIDODRINE HYDROCHLORIDE 15 MG: 5 TABLET ORAL at 08:27

## 2024-06-13 RX ADMIN — Medication: at 12:30

## 2024-06-13 RX ADMIN — CLOPIDOGREL BISULFATE 75 MG: 75 TABLET ORAL at 08:28

## 2024-06-13 RX ADMIN — COLLAGENASE SANTYL: 250 OINTMENT TOPICAL at 08:31

## 2024-06-13 RX ADMIN — SORBITOL SOLUTION (BULK) 30 ML: 70 SOLUTION at 12:31

## 2024-06-13 RX ADMIN — AMIODARONE HYDROCHLORIDE 200 MG: 200 TABLET ORAL at 08:28

## 2024-06-13 RX ADMIN — Medication 1 AMPULE: at 21:07

## 2024-06-13 RX ADMIN — MIDODRINE HYDROCHLORIDE 15 MG: 5 TABLET ORAL at 18:33

## 2024-06-13 RX ADMIN — SEVELAMER CARBONATE 1600 MG: 800 TABLET, FILM COATED ORAL at 18:33

## 2024-06-13 RX ADMIN — Medication 1 AMPULE: at 08:29

## 2024-06-13 RX ADMIN — AMIODARONE HYDROCHLORIDE 200 MG: 200 TABLET ORAL at 21:14

## 2024-06-13 RX ADMIN — FLUDROCORTISONE ACETATE 0.1 MG: 0.1 TABLET ORAL at 21:08

## 2024-06-13 RX ADMIN — HEPARIN SODIUM 5000 UNITS: 5000 INJECTION INTRAVENOUS; SUBCUTANEOUS at 12:30

## 2024-06-13 RX ADMIN — DICLOFENAC SODIUM 4 G: 10 GEL TOPICAL at 21:08

## 2024-06-13 RX ADMIN — RANOLAZINE 500 MG: 500 TABLET, FILM COATED, EXTENDED RELEASE ORAL at 21:08

## 2024-06-13 RX ADMIN — SEVELAMER CARBONATE 1600 MG: 800 TABLET, FILM COATED ORAL at 08:27

## 2024-06-13 RX ADMIN — Medication 1 ENEMA: at 10:49

## 2024-06-13 RX ADMIN — HYDROXYZINE HYDROCHLORIDE 25 MG: 10 TABLET ORAL at 21:08

## 2024-06-13 RX ADMIN — Medication: at 18:34

## 2024-06-13 RX ADMIN — PANTOPRAZOLE SODIUM 40 MG: 40 TABLET, DELAYED RELEASE ORAL at 06:13

## 2024-06-13 RX ADMIN — COLLAGENASE SANTYL: 250 OINTMENT TOPICAL at 12:30

## 2024-06-13 RX ADMIN — HEPARIN SODIUM 5000 UNITS: 5000 INJECTION INTRAVENOUS; SUBCUTANEOUS at 21:09

## 2024-06-13 RX ADMIN — SEVELAMER CARBONATE 1600 MG: 800 TABLET, FILM COATED ORAL at 12:13

## 2024-06-13 RX ADMIN — HEPARIN SODIUM 5000 UNITS: 5000 INJECTION INTRAVENOUS; SUBCUTANEOUS at 06:13

## 2024-06-13 RX ADMIN — RANOLAZINE 500 MG: 500 TABLET, FILM COATED, EXTENDED RELEASE ORAL at 08:27

## 2024-06-13 RX ADMIN — CALCITRIOL CAPSULES 0.25 MCG 0.25 MCG: 0.25 CAPSULE ORAL at 08:26

## 2024-06-13 RX ADMIN — DICLOFENAC SODIUM 4 G: 10 GEL TOPICAL at 08:31

## 2024-06-13 RX ADMIN — MIDODRINE HYDROCHLORIDE 15 MG: 5 TABLET ORAL at 21:08

## 2024-06-13 ASSESSMENT — PAIN DESCRIPTION - LOCATION
LOCATION: GENERALIZED
LOCATION: BACK
LOCATION: GENERALIZED
LOCATION: GENERALIZED

## 2024-06-13 ASSESSMENT — PAIN SCALES - GENERAL
PAINLEVEL_OUTOF10: 0
PAINLEVEL_OUTOF10: 4
PAINLEVEL_OUTOF10: 3
PAINLEVEL_OUTOF10: 0
PAINLEVEL_OUTOF10: 6
PAINLEVEL_OUTOF10: 0
PAINLEVEL_OUTOF10: 0
PAINLEVEL_OUTOF10: 5
PAINLEVEL_OUTOF10: 0
PAINLEVEL_OUTOF10: 5
PAINLEVEL_OUTOF10: 0

## 2024-06-13 ASSESSMENT — PAIN DESCRIPTION - DESCRIPTORS
DESCRIPTORS: ACHING
DESCRIPTORS: ACHING

## 2024-06-13 ASSESSMENT — PAIN SCALES - WONG BAKER: WONGBAKER_NUMERICALRESPONSE: NO HURT

## 2024-06-13 ASSESSMENT — PAIN - FUNCTIONAL ASSESSMENT: PAIN_FUNCTIONAL_ASSESSMENT: PREVENTS OR INTERFERES SOME ACTIVE ACTIVITIES AND ADLS

## 2024-06-13 ASSESSMENT — PAIN DESCRIPTION - ORIENTATION: ORIENTATION: POSTERIOR

## 2024-06-13 NOTE — WOUND CARE
Wound Care reassessment of the left flank wound:  Continuing Santyl Collagenase ointment to further debride the wound. There is more pink tissue in the wound but still has a purple border around the wound.     The other wounds on the thighs are forming eschar on the wound beds. These will have to start getting the Santyl as well to soften the wound surface. The periwound skin is clearing up with the zinc oxide cream. The discoloration on the sacrum is still present but it is blanchable. Will start her on the Venelex ointment today.     Plan: Mobilize more if at all possible, get up in the chair. Venelex ordered for the Sacrum only.   Extend the use of the Santyl to the thigh wounds.     Mae Vasquez RN, BSN, CWON
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
## 2024-06-14 LAB
ANION GAP SERPL CALC-SCNC: 8 MMOL/L (ref 5–15)
BASOPHILS # BLD: 0.2 K/UL (ref 0–0.1)
BASOPHILS NFR BLD: 1 % (ref 0–1)
BUN SERPL-MCNC: 35 MG/DL (ref 6–20)
BUN/CREAT SERPL: 8 (ref 12–20)
CALCIUM SERPL-MCNC: 9.6 MG/DL (ref 8.5–10.1)
CALCIUM SERPL-MCNC: 9.8 MG/DL (ref 8.5–10.1)
CHLORIDE SERPL-SCNC: 94 MMOL/L (ref 97–108)
CO2 SERPL-SCNC: 29 MMOL/L (ref 21–32)
CREAT SERPL-MCNC: 4.6 MG/DL (ref 0.55–1.02)
DIFFERENTIAL METHOD BLD: ABNORMAL
EOSINOPHIL # BLD: 0.2 K/UL (ref 0–0.4)
EOSINOPHIL NFR BLD: 1 % (ref 0–7)
ERYTHROCYTE [DISTWIDTH] IN BLOOD BY AUTOMATED COUNT: 16.5 % (ref 11.5–14.5)
GLUCOSE BLD STRIP.AUTO-MCNC: 117 MG/DL (ref 65–117)
GLUCOSE BLD STRIP.AUTO-MCNC: 121 MG/DL (ref 65–117)
GLUCOSE BLD STRIP.AUTO-MCNC: 88 MG/DL (ref 65–117)
GLUCOSE SERPL-MCNC: 104 MG/DL (ref 65–100)
HCT VFR BLD AUTO: 28.8 % (ref 35–47)
HGB BLD-MCNC: 8.1 G/DL (ref 11.5–16)
IMM GRANULOCYTES # BLD AUTO: 0.2 K/UL (ref 0–0.04)
IMM GRANULOCYTES NFR BLD AUTO: 1 % (ref 0–0.5)
LYMPHOCYTES # BLD: 1.1 K/UL (ref 0.8–3.5)
LYMPHOCYTES NFR BLD: 7 % (ref 12–49)
MCH RBC QN AUTO: 25.2 PG (ref 26–34)
MCHC RBC AUTO-ENTMCNC: 28.1 G/DL (ref 30–36.5)
MCV RBC AUTO: 89.4 FL (ref 80–99)
MONOCYTES # BLD: 1.4 K/UL (ref 0–1)
MONOCYTES NFR BLD: 9 % (ref 5–13)
NEUTS SEG # BLD: 12.4 K/UL (ref 1.8–8)
NEUTS SEG NFR BLD: 81 % (ref 32–75)
NRBC # BLD: 0.1 K/UL (ref 0–0.01)
NRBC BLD-RTO: 0.6 PER 100 WBC
PLATELET # BLD AUTO: 500 K/UL (ref 150–400)
PMV BLD AUTO: 9.7 FL (ref 8.9–12.9)
POTASSIUM SERPL-SCNC: 4 MMOL/L (ref 3.5–5.1)
PTH-INTACT SERPL-MCNC: 137 PG/ML (ref 18.4–88)
RBC # BLD AUTO: 3.22 M/UL (ref 3.8–5.2)
RBC MORPH BLD: ABNORMAL
SERVICE CMNT-IMP: ABNORMAL
SERVICE CMNT-IMP: NORMAL
SERVICE CMNT-IMP: NORMAL
SODIUM SERPL-SCNC: 131 MMOL/L (ref 136–145)
WBC # BLD AUTO: 15.5 K/UL (ref 3.6–11)

## 2024-06-14 PROCEDURE — 6370000000 HC RX 637 (ALT 250 FOR IP): Performed by: NURSE PRACTITIONER

## 2024-06-14 PROCEDURE — 97530 THERAPEUTIC ACTIVITIES: CPT

## 2024-06-14 PROCEDURE — 90935 HEMODIALYSIS ONE EVALUATION: CPT

## 2024-06-14 PROCEDURE — 6370000000 HC RX 637 (ALT 250 FOR IP): Performed by: INTERNAL MEDICINE

## 2024-06-14 PROCEDURE — 36415 COLL VENOUS BLD VENIPUNCTURE: CPT

## 2024-06-14 PROCEDURE — 85025 COMPLETE CBC W/AUTO DIFF WBC: CPT

## 2024-06-14 PROCEDURE — 80048 BASIC METABOLIC PNL TOTAL CA: CPT

## 2024-06-14 PROCEDURE — 2000000000 HC ICU R&B

## 2024-06-14 PROCEDURE — 6360000002 HC RX W HCPCS: Performed by: INTERNAL MEDICINE

## 2024-06-14 PROCEDURE — 97535 SELF CARE MNGMENT TRAINING: CPT

## 2024-06-14 PROCEDURE — 6370000000 HC RX 637 (ALT 250 FOR IP): Performed by: GENERAL ACUTE CARE HOSPITAL

## 2024-06-14 PROCEDURE — 6360000002 HC RX W HCPCS: Performed by: NURSE PRACTITIONER

## 2024-06-14 PROCEDURE — 82962 GLUCOSE BLOOD TEST: CPT

## 2024-06-14 RX ORDER — SORBITOL SOLUTION 70 %
60 SOLUTION, ORAL MISCELLANEOUS EVERY OTHER DAY
Status: DISCONTINUED | OUTPATIENT
Start: 2024-06-15 | End: 2024-06-17

## 2024-06-14 RX ORDER — FLUOXETINE 10 MG/1
10 CAPSULE ORAL DAILY
Status: DISCONTINUED | OUTPATIENT
Start: 2024-06-14 | End: 2024-06-18 | Stop reason: HOSPADM

## 2024-06-14 RX ADMIN — ATORVASTATIN CALCIUM 20 MG: 20 TABLET, FILM COATED ORAL at 13:06

## 2024-06-14 RX ADMIN — ASPIRIN 81 MG CHEWABLE TABLET 81 MG: 81 TABLET CHEWABLE at 12:52

## 2024-06-14 RX ADMIN — PANTOPRAZOLE SODIUM 40 MG: 40 TABLET, DELAYED RELEASE ORAL at 12:54

## 2024-06-14 RX ADMIN — MIDODRINE HYDROCHLORIDE 15 MG: 5 TABLET ORAL at 18:48

## 2024-06-14 RX ADMIN — SEVELAMER CARBONATE 1600 MG: 800 TABLET, FILM COATED ORAL at 12:53

## 2024-06-14 RX ADMIN — GABAPENTIN 300 MG: 300 CAPSULE ORAL at 22:08

## 2024-06-14 RX ADMIN — DICLOFENAC SODIUM 4 G: 10 GEL TOPICAL at 12:54

## 2024-06-14 RX ADMIN — FLUDROCORTISONE ACETATE 0.1 MG: 0.1 TABLET ORAL at 21:22

## 2024-06-14 RX ADMIN — CALCITRIOL CAPSULES 0.25 MCG 0.25 MCG: 0.25 CAPSULE ORAL at 12:59

## 2024-06-14 RX ADMIN — RANOLAZINE 500 MG: 500 TABLET, FILM COATED, EXTENDED RELEASE ORAL at 12:59

## 2024-06-14 RX ADMIN — Medication: at 18:49

## 2024-06-14 RX ADMIN — HEPARIN SODIUM 5000 UNITS: 5000 INJECTION INTRAVENOUS; SUBCUTANEOUS at 22:09

## 2024-06-14 RX ADMIN — MIDODRINE HYDROCHLORIDE 15 MG: 5 TABLET ORAL at 08:01

## 2024-06-14 RX ADMIN — SEVELAMER CARBONATE 1600 MG: 800 TABLET, FILM COATED ORAL at 18:48

## 2024-06-14 RX ADMIN — MIDODRINE HYDROCHLORIDE 15 MG: 5 TABLET ORAL at 21:21

## 2024-06-14 RX ADMIN — AMIODARONE HYDROCHLORIDE 200 MG: 200 TABLET ORAL at 21:22

## 2024-06-14 RX ADMIN — CLOPIDOGREL BISULFATE 75 MG: 75 TABLET ORAL at 12:53

## 2024-06-14 RX ADMIN — Medication: at 13:06

## 2024-06-14 RX ADMIN — HEPARIN SODIUM 5000 UNITS: 5000 INJECTION INTRAVENOUS; SUBCUTANEOUS at 05:09

## 2024-06-14 RX ADMIN — Medication: at 08:00

## 2024-06-14 RX ADMIN — MIDODRINE HYDROCHLORIDE 15 MG: 5 TABLET ORAL at 12:51

## 2024-06-14 RX ADMIN — RANOLAZINE 500 MG: 500 TABLET, FILM COATED, EXTENDED RELEASE ORAL at 21:23

## 2024-06-14 RX ADMIN — EPOETIN ALFA-EPBX 20000 UNITS: 20000 INJECTION, SOLUTION INTRAVENOUS; SUBCUTANEOUS at 21:29

## 2024-06-14 RX ADMIN — COLLAGENASE SANTYL: 250 OINTMENT TOPICAL at 08:00

## 2024-06-14 RX ADMIN — Medication 1 AMPULE: at 08:00

## 2024-06-14 RX ADMIN — HEPARIN SODIUM 5000 UNITS: 5000 INJECTION INTRAVENOUS; SUBCUTANEOUS at 13:06

## 2024-06-14 RX ADMIN — DICLOFENAC SODIUM 4 G: 10 GEL TOPICAL at 22:09

## 2024-06-14 RX ADMIN — AMIODARONE HYDROCHLORIDE 200 MG: 200 TABLET ORAL at 12:53

## 2024-06-14 RX ADMIN — Medication 1 AMPULE: at 22:09

## 2024-06-14 RX ADMIN — FLUOXETINE 10 MG: 10 CAPSULE ORAL at 12:51

## 2024-06-14 RX ADMIN — FLUDROCORTISONE ACETATE 0.1 MG: 0.1 TABLET ORAL at 12:53

## 2024-06-14 ASSESSMENT — PAIN DESCRIPTION - FREQUENCY: FREQUENCY: CONTINUOUS

## 2024-06-14 ASSESSMENT — PAIN DESCRIPTION - DESCRIPTORS
DESCRIPTORS: DISCOMFORT
DESCRIPTORS: ACHING
DESCRIPTORS: ACHING;DISCOMFORT
DESCRIPTORS: ACHING

## 2024-06-14 ASSESSMENT — PAIN DESCRIPTION - LOCATION
LOCATION: LEG
LOCATION: LEG
LOCATION: BACK;SACRUM
LOCATION: ABDOMEN;BACK

## 2024-06-14 ASSESSMENT — PAIN SCALES - GENERAL
PAINLEVEL_OUTOF10: 3
PAINLEVEL_OUTOF10: 3
PAINLEVEL_OUTOF10: 4
PAINLEVEL_OUTOF10: 0
PAINLEVEL_OUTOF10: 1
PAINLEVEL_OUTOF10: 0
PAINLEVEL_OUTOF10: 3
PAINLEVEL_OUTOF10: 0

## 2024-06-14 ASSESSMENT — PAIN - FUNCTIONAL ASSESSMENT: PAIN_FUNCTIONAL_ASSESSMENT: ACTIVITIES ARE NOT PREVENTED

## 2024-06-14 ASSESSMENT — PAIN DESCRIPTION - ORIENTATION
ORIENTATION: LEFT;RIGHT;POSTERIOR
ORIENTATION: POSTERIOR
ORIENTATION: LEFT;RIGHT;POSTERIOR

## 2024-06-14 NOTE — BSMART NOTE
Liaison attempted visit with pt, but she was with her friend who was helping her to pay a bill on her phone. Liaison returned for second visit, and pt on the phone with someone having a seemingly intense conversation. Pt said she was just given her medication, and she requests to speak with her nurse. Liaison advised nurse of pt's request. Liaison will continue to monitor and to report.

## 2024-06-14 NOTE — INTERDISCIPLINARY ROUNDS
Critical care interdisciplinary rounds today.  Following members present: Case Management, , Nursing, Nutrition, Pharmacy, and Physician. Currently back on Levophed and starting HD.  Plan of care discussed.  See clinical pathw for plan of care and interventions and desired outcomes.

## 2024-06-14 NOTE — PLAN OF CARE
Problem: Occupational Therapy - Adult  Goal: By Discharge: Performs self-care activities at highest level of function for planned discharge setting.  See evaluation for individualized goals.  Description: FUNCTIONAL STATUS PRIOR TO ADMISSION:  Patient was independent to mod I for ADLs and functional mobility using rolling walker.   Receives Help From: Family, ADL Assistance: Needs assistance,  ,  ,  ,  ,  , Homemaking Assistance: Needs assistance, Ambulation Assistance: Independent, Transfer Assistance: Independent, Active : Yes     HOME SUPPORT: Patient lived alone with no local support.    Occupational Therapy Goals:  Weekly Re-eval 6/6/2024 continue all below goals, no goals met due to medical setbacks   Initiated 5/30/2024  1.  Patient will perform grooming standing at sink with Modified [place] within 7 day(s).  2.  Patient will perform upper body dressing with Modified [place] within 7 day(s).  3.  Patient will perform lower body dressing with Modified [place] within 7 day(s).  4.  Patient will perform toilet transfers with Modified [place]  within 7 day(s).  5.  Patient will perform all aspects of toileting with Modified [place] within 7 day(s).  6.  Patient will participate in upper extremity therapeutic exercise/activities with Modified [place] for 5 minutes within 7 day(s).    Outcome: Not Progressing  OCCUPATIONAL THERAPY TREATMENT  Patient: Ros Orr (49 y.o. female)  Date: 6/10/2024  Primary Diagnosis: Pericardial effusion [I31.39]  Cardiac/pericardial tamponade [I31.4]  Procedure(s) (LRB):  Pericardiocentesis (N/A) 7 Days Post-Op   Precautions:                  Chart, occupational therapy assessment, plan of care, and goals were reviewed.    ASSESSMENT  Patient continues to benefit from skilled OT services and is not progressing towards goals. Per nursing pt had requested meds for pain and was very lethargic this session, and could not keep her eyes open and 
  Problem: Occupational Therapy - Adult  Goal: By Discharge: Performs self-care activities at highest level of function for planned discharge setting.  See evaluation for individualized goals.  Description: FUNCTIONAL STATUS PRIOR TO ADMISSION:  Patient was independent to mod I for ADLs and functional mobility using rolling walker.   Receives Help From: Family, ADL Assistance: Needs assistance,  ,  ,  ,  ,  , Homemaking Assistance: Needs assistance, Ambulation Assistance: Independent, Transfer Assistance: Independent, Active : Yes     HOME SUPPORT: Patient lived alone with no local support.    Occupational Therapy Goals:  Initiated 5/30/2024  1.  Patient will perform grooming standing at sink with Modified [place] within 7 day(s).  2.  Patient will perform upper body dressing with Modified [place] within 7 day(s).  3.  Patient will perform lower body dressing with Modified [place] within 7 day(s).  4.  Patient will perform toilet transfers with Modified [place]  within 7 day(s).  5.  Patient will perform all aspects of toileting with Modified [place] within 7 day(s).  6.  Patient will participate in upper extremity therapeutic exercise/activities with Modified [place] for 5 minutes within 7 day(s).    Outcome: Progressing   OCCUPATIONAL THERAPY EVALUATION    Patient: Ros Orr (49 y.o. female)  Date: 5/30/2024  Primary Diagnosis: Pericardial effusion [I31.39]  Cardiac/pericardial tamponade [I31.4]         Precautions:                    ASSESSMENT :  The patient is limited by decreased functional mobility, independence in ADLs, high-level IADLs, ROM, strength, body mechanics, activity tolerance, endurance, safety awareness, coordination, balance, posture, fine-motor control, increased pain levels.    Based on the impairments listed above patient is functioning below her baseline for ADLs and functional mobility. She is now completing ADLs with independence to min assist and 
  Problem: Pain  Goal: Verbalizes/displays adequate comfort level or baseline comfort level  Outcome: Progressing  Flowsheets (Taken 6/12/2024 2346)  Verbalizes/displays adequate comfort level or baseline comfort level:   Encourage patient to monitor pain and request assistance   Assess pain using appropriate pain scale   Administer analgesics based on type and severity of pain and evaluate response   Implement non-pharmacological measures as appropriate and evaluate response   Consider cultural and social influences on pain and pain management   Notify Licensed Independent Practitioner if interventions unsuccessful or patient reports new pain     Problem: Safety - Adult  Goal: Free from fall injury  Recent Flowsheet Documentation  Taken 6/12/2024 2000 by Deja Rushing RN  Free From Fall Injury: Based on caregiver fall risk screen, instruct family/caregiver to ask for assistance with transferring infant if caregiver noted to have fall risk factors        
  Problem: Physical Therapy - Adult  Goal: By Discharge: Performs mobility at highest level of function for planned discharge setting.  See evaluation for individualized goals.  Description: FUNCTIONAL STATUS PRIOR TO ADMISSION: Mod I with use of rolling walker. Use of wheelchair for longer, community distances. Denies history of falls. Still driving.     HOME SUPPORT PRIOR TO ADMISSION: The patient lived alone, reports assist from friends.     Physical Therapy Goals  Initiated 5/30/2024 - remain appropriate 6/6/2024  1.  Patient will move from supine to sit and sit to supine, scoot up and down, and roll side to side in bed with modified independence within 7 day(s).    2.  Patient will perform sit to stand with modified independence within 7 day(s).  3.  Patient will transfer from bed to chair and chair to bed with modified independence using the least restrictive device within 7 day(s).  4.  Patient will ambulate with modified independence for 200 feet with the least restrictive device within 7 day(s).   5.  Patient will ascend/descend 5 stairs with 1 handrail(s) with modified independence within 7 day(s).   Outcome: Not Progressing   PHYSICAL THERAPY TREATMENT    Patient: Ros Orr (49 y.o. female)  Date: 6/10/2024  Diagnosis: Pericardial effusion [I31.39]  Cardiac/pericardial tamponade [I31.4] Pericardial effusion  Procedure(s) (LRB):  Pericardiocentesis (N/A) 7 Days Post-Op  Precautions:                        ASSESSMENT:  Patient continues to benefit from skilled PT services and is not progressing towards goals. Pt received seated EOB, BP 80s/30s, and pt lethargic, requiring increased verbal cues to maintain level of arousal necessary for participation in PT intervention. From elevated bed height, pt required maxAx2 during sit>>stand transfers. Pt assumed standing position on 2 attempts however only able to maintain static stance ~15 seconds before fatigue. Pt again noted to be increasingly lethargic and 
  Problem: Physical Therapy - Adult  Goal: By Discharge: Performs mobility at highest level of function for planned discharge setting.  See evaluation for individualized goals.  Description: FUNCTIONAL STATUS PRIOR TO ADMISSION: Mod I with use of rolling walker. Use of wheelchair for longer, community distances. Denies history of falls. Still driving.     HOME SUPPORT PRIOR TO ADMISSION: The patient lived alone, reports assist from friends.     Physical Therapy Goals  Initiated 5/30/2024 - remain appropriate 6/6/2024  1.  Patient will move from supine to sit and sit to supine, scoot up and down, and roll side to side in bed with modified independence within 7 day(s).    2.  Patient will perform sit to stand with modified independence within 7 day(s).  3.  Patient will transfer from bed to chair and chair to bed with modified independence using the least restrictive device within 7 day(s).  4.  Patient will ambulate with modified independence for 200 feet with the least restrictive device within 7 day(s).   5.  Patient will ascend/descend 5 stairs with 1 handrail(s) with modified independence within 7 day(s).   Outcome: Not Progressing  PHYSICAL THERAPY TREATMENT: WEEKLY REASSESSMENT    Patient: Ros Orr (49 y.o. female)  Date: 6/6/2024  Primary Diagnosis: Pericardial effusion [I31.39]  Cardiac/pericardial tamponade [I31.4]  Procedure(s) (LRB):  LEFT FLANK DEBRIDEMENT (Left) 1 Day Post-Op   Precautions:                        ASSESSMENT :  Patient continues to benefit from skilled PT services and is not progressing towards goals, limited by hypotension this date. Pt received seated in bedside chair, levophed recently discontinued by RN, cleared for participation with therapy and pt agreeable. Upon arrival, pt's BP 68/53 however pt asymptomatic. Pt performed BLE strengthening exercises with BP improving to 92/63. Pt assumed standing position w/ RW support with SB/CGAx1. BP 88/43 in static stance. Pt performed 
  Problem: Physical Therapy - Adult  Goal: By Discharge: Performs mobility at highest level of function for planned discharge setting.  See evaluation for individualized goals.  Description: FUNCTIONAL STATUS PRIOR TO ADMISSION: Mod I with use of rolling walker. Use of wheelchair for longer, community distances. Denies history of falls. Still driving.     HOME SUPPORT PRIOR TO ADMISSION: The patient lived alone, reports assist from friends.     Physical Therapy Goals  Initiated 5/30/2024 - remain appropriate 6/6/2024  1.  Patient will move from supine to sit and sit to supine, scoot up and down, and roll side to side in bed with modified independence within 7 day(s).    2.  Patient will perform sit to stand with modified independence within 7 day(s).  3.  Patient will transfer from bed to chair and chair to bed with modified independence using the least restrictive device within 7 day(s).  4.  Patient will ambulate with modified independence for 200 feet with the least restrictive device within 7 day(s).   5.  Patient will ascend/descend 5 stairs with 1 handrail(s) with modified independence within 7 day(s).   Outcome: Progressing   PHYSICAL THERAPY TREATMENT    Patient: Ros Orr (49 y.o. female)  Date: 6/12/2024  Diagnosis: Pericardial effusion [I31.39]  Cardiac/pericardial tamponade [I31.4] Pericardial effusion  Procedure(s) (LRB):  Pericardiocentesis (N/A) 9 Days Post-Op  Precautions:                        ASSESSMENT:  Patient continues to benefit from skilled PT services and is slowly progressing towards goals however continues to present with hypotension, poor activity tolerance, impaired sitting and standing balance, increased back pain and pain associated with sacral wound, decreased insight, grossly decreased strength, and overall impaired functional mobility. Pt required maxAx2 throughout mobility, including bed mobility and sit>>stand transfer attempts. Pt performed 3 trials of sit>>stand transfer 
  Problem: Respiratory - Adult  Goal: Achieves optimal ventilation and oxygenation  6/14/2024 1105 by Mike Wright, RT  Outcome: Progressing  Flowsheets (Taken 6/14/2024 1105)  Achieves optimal ventilation and oxygenation:   Assess for changes in respiratory status   Position to facilitate oxygenation and minimize respiratory effort   Respiratory therapy support as indicated   Oxygen supplementation based on oxygen saturation or arterial blood gases   Assess and instruct to report shortness of breath or any respiratory difficulty     
  Problem: Safety - Adult  Goal: Free from fall injury  6/1/2024 2006 by Erika Dumont RN  Outcome: Progressing  6/1/2024 1618 by Haily Espana RN  Outcome: Progressing     Problem: Discharge Planning  Goal: Discharge to home or other facility with appropriate resources  6/1/2024 1618 by Haily Espana RN  Outcome: Progressing     Problem: Chronic Conditions and Co-morbidities  Goal: Patient's chronic conditions and co-morbidity symptoms are monitored and maintained or improved  6/1/2024 2006 by Erika Dumont RN  Outcome: Progressing  6/1/2024 1618 by Haily Espana RN  Outcome: Progressing     Problem: Pain  Goal: Verbalizes/displays adequate comfort level or baseline comfort level  6/1/2024 2006 by Erika Dumont RN  Outcome: Progressing  6/1/2024 1618 by Haily Espana RN  Outcome: Progressing     Problem: ABCDS Injury Assessment  Goal: Absence of physical injury  6/1/2024 2006 by Erika Dumont RN  Outcome: Progressing  6/1/2024 1618 by Haily Espana RN  Outcome: Progressing     Problem: Cardiovascular - Adult  Goal: Maintains optimal cardiac output and hemodynamic stability  Outcome: Progressing  Goal: Absence of cardiac dysrhythmias or at baseline  Outcome: Progressing     Problem: Infection - Adult  Goal: Absence of infection at discharge  Outcome: Progressing  Goal: Absence of infection during hospitalization  Outcome: Progressing     Problem: Hematologic - Adult  Goal: Maintains hematologic stability  Outcome: Progressing     
  Problem: Safety - Adult  Goal: Free from fall injury  6/2/2024 2343 by Riddhi Bob RN  Outcome: Progressing  6/2/2024 1721 by Davina Camejo RN  Outcome: Progressing     Problem: Discharge Planning  Goal: Discharge to home or other facility with appropriate resources  6/2/2024 2343 by Riddhi Bob RN  Outcome: Progressing  Flowsheets (Taken 6/2/2024 1915)  Discharge to home or other facility with appropriate resources:   Identify barriers to discharge with patient and caregiver   Arrange for needed discharge resources and transportation as appropriate   Identify discharge learning needs (meds, wound care, etc)   Arrange for interpreters to assist at discharge as needed   Refer to discharge planning if patient needs post-hospital services based on physician order or complex needs related to functional status, cognitive ability or social support system  6/2/2024 1721 by Davina Camejo RN  Outcome: Progressing     Problem: Chronic Conditions and Co-morbidities  Goal: Patient's chronic conditions and co-morbidity symptoms are monitored and maintained or improved  6/2/2024 2343 by Riddhi Bob RN  Outcome: Progressing  Flowsheets (Taken 6/2/2024 1915)  Care Plan - Patient's Chronic Conditions and Co-Morbidity Symptoms are Monitored and Maintained or Improved:   Monitor and assess patient's chronic conditions and comorbid symptoms for stability, deterioration, or improvement   Collaborate with multidisciplinary team to address chronic and comorbid conditions and prevent exacerbation or deterioration   Update acute care plan with appropriate goals if chronic or comorbid symptoms are exacerbated and prevent overall improvement and discharge  6/2/2024 1721 by Davina Camejo RN  Outcome: Progressing     Problem: Pain  Goal: Verbalizes/displays adequate comfort level or baseline comfort level  6/2/2024 2343 by Riddhi Bob RN  Outcome: Progressing  Flowsheets (Taken 6/2/2024 
  Problem: Safety - Adult  Goal: Free from fall injury  6/3/2024 1144 by Davina Camejo RN  Outcome: Progressing  6/2/2024 2343 by Riddhi Bob RN  Outcome: Progressing     Problem: Discharge Planning  Goal: Discharge to home or other facility with appropriate resources  6/3/2024 1144 by Davina Camejo RN  Outcome: Progressing  6/2/2024 2343 by Riddhi Bob RN  Outcome: Progressing  Flowsheets (Taken 6/2/2024 1915)  Discharge to home or other facility with appropriate resources:   Identify barriers to discharge with patient and caregiver   Arrange for needed discharge resources and transportation as appropriate   Identify discharge learning needs (meds, wound care, etc)   Arrange for interpreters to assist at discharge as needed   Refer to discharge planning if patient needs post-hospital services based on physician order or complex needs related to functional status, cognitive ability or social support system     Problem: Chronic Conditions and Co-morbidities  Goal: Patient's chronic conditions and co-morbidity symptoms are monitored and maintained or improved  6/3/2024 1144 by Davina Camejo RN  Outcome: Progressing  6/2/2024 2343 by Riddhi Bob RN  Outcome: Progressing  Flowsheets (Taken 6/2/2024 1915)  Care Plan - Patient's Chronic Conditions and Co-Morbidity Symptoms are Monitored and Maintained or Improved:   Monitor and assess patient's chronic conditions and comorbid symptoms for stability, deterioration, or improvement   Collaborate with multidisciplinary team to address chronic and comorbid conditions and prevent exacerbation or deterioration   Update acute care plan with appropriate goals if chronic or comorbid symptoms are exacerbated and prevent overall improvement and discharge     Problem: Pain  Goal: Verbalizes/displays adequate comfort level or baseline comfort level  6/3/2024 1144 by Davina Camejo RN  Outcome: Progressing  6/2/2024 2343 by Riddhi Bob RN  Outcome: 
  Problem: Safety - Adult  Goal: Free from fall injury  6/8/2024 2246 by Milana Feldman, RN  Outcome: Progressing  6/8/2024 1057 by Lulú Hall RN  Outcome: Progressing     Problem: Discharge Planning  Goal: Discharge to home or other facility with appropriate resources  Outcome: Progressing     Problem: Chronic Conditions and Co-morbidities  Goal: Patient's chronic conditions and co-morbidity symptoms are monitored and maintained or improved  Outcome: Progressing     Problem: Pain  Goal: Verbalizes/displays adequate comfort level or baseline comfort level  Outcome: Progressing     Problem: ABCDS Injury Assessment  Goal: Absence of physical injury  Outcome: Progressing     Problem: Cardiovascular - Adult  Goal: Maintains optimal cardiac output and hemodynamic stability  Outcome: Progressing  Goal: Absence of cardiac dysrhythmias or at baseline  Outcome: Progressing     Problem: Infection - Adult  Goal: Absence of infection at discharge  Outcome: Progressing  Goal: Absence of infection during hospitalization  Outcome: Progressing     Problem: Hematologic - Adult  Goal: Maintains hematologic stability  Outcome: Progressing     Problem: Skin/Tissue Integrity  Goal: Absence of new skin breakdown  Description: 1.  Monitor for areas of redness and/or skin breakdown  2.  Assess vascular access sites hourly  3.  Every 4-6 hours minimum:  Change oxygen saturation probe site  4.  Every 4-6 hours:  If on nasal continuous positive airway pressure, respiratory therapy assess nares and determine need for appliance change or resting period.  Outcome: Progressing     
  Problem: Safety - Adult  Goal: Free from fall injury  Outcome: Progressing     Problem: Discharge Planning  Goal: Discharge to home or other facility with appropriate resources  Outcome: Progressing     Problem: Chronic Conditions and Co-morbidities  Goal: Patient's chronic conditions and co-morbidity symptoms are monitored and maintained or improved  Outcome: Progressing     Problem: Pain  Goal: Verbalizes/displays adequate comfort level or baseline comfort level  Outcome: Progressing     
  Problem: Safety - Adult  Goal: Free from fall injury  Outcome: Progressing     Problem: Discharge Planning  Goal: Discharge to home or other facility with appropriate resources  Outcome: Progressing     Problem: Chronic Conditions and Co-morbidities  Goal: Patient's chronic conditions and co-morbidity symptoms are monitored and maintained or improved  Outcome: Progressing     Problem: Pain  Goal: Verbalizes/displays adequate comfort level or baseline comfort level  Outcome: Progressing     Problem: ABCDS Injury Assessment  Goal: Absence of physical injury  Outcome: Progressing     Problem: Cardiovascular - Adult  Goal: Maintains optimal cardiac output and hemodynamic stability  Outcome: Progressing  Goal: Absence of cardiac dysrhythmias or at baseline  Outcome: Progressing     Problem: Infection - Adult  Goal: Absence of infection at discharge  Outcome: Progressing  Goal: Absence of infection during hospitalization  Outcome: Progressing     Problem: Hematologic - Adult  Goal: Maintains hematologic stability  Outcome: Progressing     Problem: Skin/Tissue Integrity  Goal: Absence of new skin breakdown  Description: 1.  Monitor for areas of redness and/or skin breakdown  2.  Assess vascular access sites hourly  3.  Every 4-6 hours minimum:  Change oxygen saturation probe site  4.  Every 4-6 hours:  If on nasal continuous positive airway pressure, respiratory therapy assess nares and determine need for appliance change or resting period.  Outcome: Progressing     
  Problem: Safety - Adult  Goal: Free from fall injury  Outcome: Progressing  Flowsheets (Taken 6/9/2024 2000)  Free From Fall Injury:   Instruct family/caregiver on patient safety   Based on caregiver fall risk screen, instruct family/caregiver to ask for assistance with transferring infant if caregiver noted to have fall risk factors     Problem: Discharge Planning  Goal: Discharge to home or other facility with appropriate resources  6/9/2024 2143 by Nani Mast, RN  Outcome: Progressing  Flowsheets (Taken 6/9/2024 2000)  Discharge to home or other facility with appropriate resources:   Identify barriers to discharge with patient and caregiver   Arrange for needed discharge resources and transportation as appropriate   Identify discharge learning needs (meds, wound care, etc)  6/9/2024 1519 by Lulú Hall, RN  Outcome: Progressing     Problem: Chronic Conditions and Co-morbidities  Goal: Patient's chronic conditions and co-morbidity symptoms are monitored and maintained or improved  Outcome: Progressing  Flowsheets (Taken 6/9/2024 2000)  Care Plan - Patient's Chronic Conditions and Co-Morbidity Symptoms are Monitored and Maintained or Improved:   Monitor and assess patient's chronic conditions and comorbid symptoms for stability, deterioration, or improvement   Collaborate with multidisciplinary team to address chronic and comorbid conditions and prevent exacerbation or deterioration   Update acute care plan with appropriate goals if chronic or comorbid symptoms are exacerbated and prevent overall improvement and discharge     Problem: Pain  Goal: Verbalizes/displays adequate comfort level or baseline comfort level  Outcome: Progressing  Flowsheets (Taken 6/9/2024 2000)  Verbalizes/displays adequate comfort level or baseline comfort level:   Encourage patient to monitor pain and request assistance   Assess pain using appropriate pain scale   Administer analgesics based on type and severity of pain and 
  Problem: Skin/Tissue Integrity  Goal: Absence of new skin breakdown  Description: 1.  Monitor for areas of redness and/or skin breakdown  2.  Assess vascular access sites hourly  3.  Every 4-6 hours minimum:  Change oxygen saturation probe site  4.  Every 4-6 hours:  If on nasal continuous positive airway pressure, respiratory therapy assess nares and determine need for appliance change or resting period.  Outcome: Progressing     
Called family to notify about code blue  Did not answer  
Care plan reviewed.  
FUNCTIONAL STATUS PRIOR TO ADMISSION:  Patient was independent to mod I for ADLs and functional mobility using rolling walker.   Receives Help From: Family, ADL Assistance: Needs assistance,  ,  ,  ,  ,  , Homemaking Assistance: Needs assistance, Ambulation Assistance: Independent, Transfer Assistance: Independent, Active : Yes     HOME SUPPORT: Patient lived alone with no local support.    Occupational Therapy Goals:  Weekly Re-eval 6/12/2024 goals have been changed as seen   Weekly Re-eval 6/6/2024 continue all below goals, no goals met due to medical setbacks   Initiated 5/30/2024  1.  Patient will perform grooming standing at sink with Modified [place] within 7 day(s). Downgrade: CGA  2.  Patient will perform upper body dressing with Modified [place] within 7 day(s).   3.  Patient will perform lower body dressing with Modified [place] within 7 day(s).  4.  Patient will perform toilet transfers with Modified [place]  within 7 day(s). Downgrade: Min A  5.  Patient will perform all aspects of toileting with Modified [place] within 7 day(s).   6.  Patient will participate in upper extremity therapeutic exercise/activities with Modified [place] for 5 minutes within 7 day(s).      OCCUPATIONAL THERAPY TREATMENT: WEEKLY REASSESSMENT    Patient: Ros Orr (49 y.o. female)  Date: 6/12/2024  Primary Diagnosis: Pericardial effusion [I31.39]  Cardiac/pericardial tamponade [I31.4]  Procedure(s) (LRB):  Pericardiocentesis (N/A) 9 Days Post-Op   Precautions:                  Chart, occupational therapy assessment, plan of care, and goals were reviewed.    ASSESSMENT  Patient continues to benefit from skilled OT services and is not progressing towards goals. Pt cleared to work with therapy via RN. Pt on low dose of Levo (RN stopped Levo USP through session seated at EOB), 3L NC, and reported dizziness during session. Pt positive for orthostatic hypotension, at one point BP reading was 
Patient medicated for pain. Up in chair. No acute events.       Problem: Safety - Adult  Goal: Free from fall injury  Outcome: Progressing     Problem: Chronic Conditions and Co-morbidities  Goal: Patient's chronic conditions and co-morbidity symptoms are monitored and maintained or improved  Outcome: Progressing     Problem: Pain  Goal: Verbalizes/displays adequate comfort level or baseline comfort level  Outcome: Progressing     
Problem: Occupational Therapy - Adult  Goal: By Discharge: Performs self-care activities at highest level of function for planned discharge setting.  See evaluation for individualized goals.  Description: FUNCTIONAL STATUS PRIOR TO ADMISSION:  Patient was independent to mod I for ADLs and functional mobility using rolling walker.   Receives Help From: Family, ADL Assistance: Needs assistance,  ,  ,  ,  ,  , Homemaking Assistance: Needs assistance, Ambulation Assistance: Independent, Transfer Assistance: Independent, Active : Yes     HOME SUPPORT: Patient lived alone with no local support.    Occupational Therapy Goals:  Weekly Re-eval 6/12/2024 goals have been changed as seen   Weekly Re-eval 6/6/2024 continue all below goals, no goals met due to medical setbacks   Initiated 5/30/2024  1.  Patient will perform grooming standing at sink with Modified [place] within 7 day(s). Downgrade: CGA  2.  Patient will perform upper body dressing with Modified [place] within 7 day(s).   3.  Patient will perform lower body dressing with Modified [place] within 7 day(s).  4.  Patient will perform toilet transfers with Modified [place]  within 7 day(s). Downgrade: Min A  5.  Patient will perform all aspects of toileting with Modified [place] within 7 day(s).   6.  Patient will participate in upper extremity therapeutic exercise/activities with Modified [place] for 5 minutes within 7 day(s).    Outcome: Not Progressing    OCCUPATIONAL THERAPY TREATMENT  Patient: Ros Orr (49 y.o. female)  Date: 6/14/2024  Primary Diagnosis: Pericardial effusion [I31.39]  Cardiac/pericardial tamponade [I31.4]  Procedure(s) (LRB):  Pericardiocentesis (N/A) 11 Days Post-Op   Precautions:                  Chart, occupational therapy assessment, plan of care, and goals were reviewed.    ASSESSMENT  Patient continues to benefit from skilled OT services and is not progressing towards goals. Pt received supine in bed 
(Taken 6/13/2024 2000)  Maintains optimal cardiac output and hemodynamic stability: Monitor blood pressure and heart rate  Goal: Absence of cardiac dysrhythmias or at baseline  Outcome: Progressing  Flowsheets (Taken 6/13/2024 2000)  Absence of cardiac dysrhythmias or at baseline:   Monitor cardiac rate and rhythm   Administer antiarrhythmia medication and electrolyte replacement as ordered     Problem: Infection - Adult  Goal: Absence of infection at discharge  Outcome: Progressing  Flowsheets (Taken 6/13/2024 2000)  Absence of infection at discharge:   Assess and monitor for signs and symptoms of infection   Monitor lab/diagnostic results   Monitor all insertion sites i.e., indwelling lines, tubes and drains   [place] appropriate cooling/warming therapies per order   Administer medications as ordered   Identify and instruct in appropriate isolation precautions for identified infection/condition   Instruct and encourage patient and family to use good hand hygiene technique  Goal: Absence of infection during hospitalization  Outcome: Progressing  Flowsheets (Taken 6/13/2024 2000)  Absence of infection during hospitalization:   Monitor lab/diagnostic results   Assess and monitor for signs and symptoms of infection   Monitor all insertion sites i.e., indwelling lines, tubes and drains   [place] appropriate cooling/warming therapies per order   Administer medications as ordered   Instruct and encourage patient and family to use good hand hygiene technique   Identify and instruct in appropriate isolation precautions for identified infection/condition     Problem: Hematologic - Adult  Goal: Maintains hematologic stability  Outcome: Progressing  Flowsheets (Taken 6/13/2024 2000)  Maintains hematologic stability: Assess for signs and symptoms of bleeding or hemorrhage     Problem: Skin/Tissue Integrity  Goal: Absence of new skin breakdown  Description: 1.  Monitor for areas of redness and/or skin breakdown  2.  Assess 
(occasional)  Sitting - Dynamic: Fair (occasional)  Ambulation/Gait Training:                                                                                                                                                                                                                                                                     [facility] AM-PAC®      Basic Mobility Inpatient Short Form (6-Clicks) Version 2  How much HELP from another person do you currently need... (If the patient hasn't done an activity recently, how much help from another person do you think they would need if they tried?) Total A Lot A Little None   1.  Turning from your back to your side while in a flat bed without using bedrails? [x]  1 []  2 []  3  []  4   2.  Moving from lying on your back to sitting on the side of a flat bed without using bedrails? [x]  1 []  2 []  3  []  4   3.  Moving to and from a bed to a chair (including a wheelchair)? [x]  1 []  2 []  3  []  4   4. Standing up from a chair using your arms (e.g. wheelchair or bedside chair)? [x]  1 []  2 []  3  []  4   5.  Walking in hospital room? [x]  1 []  2 []  3  []  4   6.  Climbing 3-5 steps with a railing? [x]  1 []  2 []  3  []  4     Raw Score: 6/24                            Cutoff score ?171,2,3 had higher odds of discharging home with home health or need of SNF/IPR.    1. Kiarra Viera, Amisha Prince, Geovany Howard, Sho Nathan, Bob Baig, Yahir Viera.  Validity of the AM-PAC “6-Clicks” Inpatient Daily Activity and Basic Mobility Short Forms. Physical Therapy Mar 2014, [phone]) [phone]; DOI: 10.2522/[email or web address].[id number]  2. Darnell JERRY, Tish J, Bud J, Olivia J. Association of AM-PAC \"6-Clicks\" Basic Mobility and Daily Activity Scores With Discharge Destination. Phys Ther. 2021 Apr 4;101(4):kiom650. doi: 10.1093/[email or web address]. PMID: [id number].  3. Allison NEW, Carson D, Marzena S, Devan K, Rajendra S. Activity Measure for Post-Acute Care 
Short Forms Manual 4.0. Revised 2/2020.                                                                                                                                                                                                                              Pain Rating:  Reported generalized pain all over body but did not quantify    Activity Tolerance:   VSS however pt fatigues quickly    After treatment:   Patient left in no apparent distress in bed, Call bell within reach, and Bed/ chair alarm activated    COMMUNICATION/EDUCATION:   The patient's plan of care was discussed with: occupational therapist and registered nurse    Patient Education  Education Given To: Patient  Education Provided: Role of Therapy  Education Method: Verbal  Barriers to Learning: None  Education Outcome: Verbalized understanding    Thank you for this referral.  Carolyne Streeter, PT, DPT  Minutes: 14      Physical Therapy Evaluation Charge Determination   History Examination Presentation Decision-Making   MEDIUM  Complexity : 1-2 comorbidities / personal factors will impact the outcome/ POC  MEDIUM Complexity : 3 Standardized tests and measures addressin body structure, function, activity limitation and / or participation in recreation  MEDIUM Complexity : Evolving with changing characteristics  AM-PAC  MEDIUM   Based on the above components, the patient evaluation is determined to be of the following complexity level: Medium   
treatment:   Patient left in no apparent distress sitting up in chair, Call bell within reach, Updated patient's board on functional status and mobility recommendations, and in partial trendelenburg with all lines and leads as prior to session    COMMUNICATION/EDUCATION:   The patient's plan of care was discussed with: physical therapist, registered nurse, and patient    Patient Education  Education Given To: Patient  Education Provided: Role of Therapy;Plan of Care  Education Method: Verbal  Barriers to Learning: None  Education Outcome: Verbalized understanding;Continued education needed    Thank you for this referral.  Bessy Harry OTR/L  Minutes: 19

## 2024-06-15 LAB
ANION GAP SERPL CALC-SCNC: 9 MMOL/L (ref 5–15)
BASOPHILS # BLD: 0 K/UL (ref 0–0.1)
BASOPHILS NFR BLD: 0 % (ref 0–1)
BUN SERPL-MCNC: 30 MG/DL (ref 6–20)
BUN/CREAT SERPL: 8 (ref 12–20)
CALCIUM SERPL-MCNC: 9.8 MG/DL (ref 8.5–10.1)
CHLORIDE SERPL-SCNC: 96 MMOL/L (ref 97–108)
CO2 SERPL-SCNC: 25 MMOL/L (ref 21–32)
CREAT SERPL-MCNC: 3.74 MG/DL (ref 0.55–1.02)
DIFFERENTIAL METHOD BLD: ABNORMAL
EOSINOPHIL # BLD: 0.2 K/UL (ref 0–0.4)
EOSINOPHIL NFR BLD: 1 % (ref 0–7)
ERYTHROCYTE [DISTWIDTH] IN BLOOD BY AUTOMATED COUNT: 16.6 % (ref 11.5–14.5)
GLUCOSE BLD STRIP.AUTO-MCNC: 116 MG/DL (ref 65–117)
GLUCOSE BLD STRIP.AUTO-MCNC: 148 MG/DL (ref 65–117)
GLUCOSE BLD STRIP.AUTO-MCNC: 155 MG/DL (ref 65–117)
GLUCOSE BLD STRIP.AUTO-MCNC: 161 MG/DL (ref 65–117)
GLUCOSE SERPL-MCNC: 169 MG/DL (ref 65–100)
HCT VFR BLD AUTO: 34.6 % (ref 35–47)
HGB BLD-MCNC: 9.6 G/DL (ref 11.5–16)
IMM GRANULOCYTES # BLD AUTO: 0 K/UL (ref 0–0.04)
IMM GRANULOCYTES NFR BLD AUTO: 0 % (ref 0–0.5)
LYMPHOCYTES # BLD: 0.9 K/UL (ref 0.8–3.5)
LYMPHOCYTES NFR BLD: 4 % (ref 12–49)
MCH RBC QN AUTO: 25.3 PG (ref 26–34)
MCHC RBC AUTO-ENTMCNC: 27.7 G/DL (ref 30–36.5)
MCV RBC AUTO: 91.1 FL (ref 80–99)
MONOCYTES # BLD: 1.8 K/UL (ref 0–1)
MONOCYTES NFR BLD: 8 % (ref 5–13)
NEUTS BAND NFR BLD MANUAL: 12 %
NEUTS SEG # BLD: 19.8 K/UL (ref 1.8–8)
NEUTS SEG NFR BLD: 75 % (ref 32–75)
NRBC # BLD: 0.1 K/UL (ref 0–0.01)
NRBC BLD-RTO: 0.4 PER 100 WBC
PLATELET # BLD AUTO: 488 K/UL (ref 150–400)
PMV BLD AUTO: 9.5 FL (ref 8.9–12.9)
POTASSIUM SERPL-SCNC: 4.2 MMOL/L (ref 3.5–5.1)
RBC # BLD AUTO: 3.8 M/UL (ref 3.8–5.2)
RBC MORPH BLD: ABNORMAL
RBC MORPH BLD: ABNORMAL
SERVICE CMNT-IMP: ABNORMAL
SERVICE CMNT-IMP: NORMAL
SODIUM SERPL-SCNC: 130 MMOL/L (ref 136–145)
WBC # BLD AUTO: 22.7 K/UL (ref 3.6–11)

## 2024-06-15 PROCEDURE — 6370000000 HC RX 637 (ALT 250 FOR IP): Performed by: INTERNAL MEDICINE

## 2024-06-15 PROCEDURE — 82962 GLUCOSE BLOOD TEST: CPT

## 2024-06-15 PROCEDURE — 2000000000 HC ICU R&B

## 2024-06-15 PROCEDURE — 6370000000 HC RX 637 (ALT 250 FOR IP): Performed by: GENERAL ACUTE CARE HOSPITAL

## 2024-06-15 PROCEDURE — 6370000000 HC RX 637 (ALT 250 FOR IP): Performed by: NURSE PRACTITIONER

## 2024-06-15 PROCEDURE — 85025 COMPLETE CBC W/AUTO DIFF WBC: CPT

## 2024-06-15 PROCEDURE — 36415 COLL VENOUS BLD VENIPUNCTURE: CPT

## 2024-06-15 PROCEDURE — 80048 BASIC METABOLIC PNL TOTAL CA: CPT

## 2024-06-15 PROCEDURE — 2580000003 HC RX 258: Performed by: INTERNAL MEDICINE

## 2024-06-15 PROCEDURE — 2500000003 HC RX 250 WO HCPCS: Performed by: INTERNAL MEDICINE

## 2024-06-15 PROCEDURE — 90935 HEMODIALYSIS ONE EVALUATION: CPT

## 2024-06-15 PROCEDURE — 6360000002 HC RX W HCPCS: Performed by: INTERNAL MEDICINE

## 2024-06-15 PROCEDURE — APPNB180 APP NON BILLABLE TIME > 60 MINS: Performed by: NURSE PRACTITIONER

## 2024-06-15 RX ORDER — AMIODARONE HYDROCHLORIDE 200 MG/1
200 TABLET ORAL DAILY
Status: DISCONTINUED | OUTPATIENT
Start: 2024-06-16 | End: 2024-06-18 | Stop reason: HOSPADM

## 2024-06-15 RX ORDER — TRAMADOL HYDROCHLORIDE 50 MG/1
25 TABLET ORAL EVERY 6 HOURS PRN
Status: DISCONTINUED | OUTPATIENT
Start: 2024-06-15 | End: 2024-06-17

## 2024-06-15 RX ORDER — MIDODRINE HYDROCHLORIDE 5 MG/1
10 TABLET ORAL 4 TIMES DAILY
Status: DISCONTINUED | OUTPATIENT
Start: 2024-06-15 | End: 2024-06-16

## 2024-06-15 RX ORDER — NOREPINEPHRINE BITARTRATE 0.06 MG/ML
0-20 INJECTION, SOLUTION INTRAVENOUS CONTINUOUS
Status: DISCONTINUED | OUTPATIENT
Start: 2024-06-15 | End: 2024-06-16

## 2024-06-15 RX ORDER — OXYCODONE HYDROCHLORIDE 5 MG/1
5 TABLET ORAL EVERY 4 HOURS PRN
Status: DISCONTINUED | OUTPATIENT
Start: 2024-06-15 | End: 2024-06-17

## 2024-06-15 RX ORDER — ALPRAZOLAM 0.25 MG/1
0.25 TABLET ORAL EVERY 4 HOURS PRN
Status: DISCONTINUED | OUTPATIENT
Start: 2024-06-15 | End: 2024-06-18 | Stop reason: HOSPADM

## 2024-06-15 RX ADMIN — DICLOFENAC SODIUM 4 G: 10 GEL TOPICAL at 21:36

## 2024-06-15 RX ADMIN — HEPARIN SODIUM 5000 UNITS: 5000 INJECTION INTRAVENOUS; SUBCUTANEOUS at 13:56

## 2024-06-15 RX ADMIN — OXYCODONE HYDROCHLORIDE 5 MG: 5 TABLET ORAL at 14:17

## 2024-06-15 RX ADMIN — CALCITRIOL CAPSULES 0.25 MCG 0.25 MCG: 0.25 CAPSULE ORAL at 14:17

## 2024-06-15 RX ADMIN — TRAMADOL HYDROCHLORIDE 25 MG: 50 TABLET ORAL at 08:10

## 2024-06-15 RX ADMIN — ALUMINUM HYDROXIDE, MAGNESIUM HYDROXIDE, AND SIMETHICONE 5 ML: 1200; 120; 1200 SUSPENSION ORAL at 17:45

## 2024-06-15 RX ADMIN — HEPARIN SODIUM 5000 UNITS: 5000 INJECTION INTRAVENOUS; SUBCUTANEOUS at 21:33

## 2024-06-15 RX ADMIN — ACETAMINOPHEN 650 MG: 325 TABLET ORAL at 12:16

## 2024-06-15 RX ADMIN — DICLOFENAC SODIUM 4 G: 10 GEL TOPICAL at 08:24

## 2024-06-15 RX ADMIN — ASPIRIN 81 MG CHEWABLE TABLET 81 MG: 81 TABLET CHEWABLE at 13:54

## 2024-06-15 RX ADMIN — Medication 1 AMPULE: at 08:23

## 2024-06-15 RX ADMIN — COLLAGENASE SANTYL: 250 OINTMENT TOPICAL at 08:24

## 2024-06-15 RX ADMIN — CLOPIDOGREL BISULFATE 75 MG: 75 TABLET ORAL at 08:11

## 2024-06-15 RX ADMIN — MIDODRINE HYDROCHLORIDE 10 MG: 5 TABLET ORAL at 13:54

## 2024-06-15 RX ADMIN — MIDODRINE HYDROCHLORIDE 10 MG: 5 TABLET ORAL at 17:40

## 2024-06-15 RX ADMIN — MIDODRINE HYDROCHLORIDE 15 MG: 5 TABLET ORAL at 08:10

## 2024-06-15 RX ADMIN — RANOLAZINE 500 MG: 500 TABLET, FILM COATED, EXTENDED RELEASE ORAL at 13:54

## 2024-06-15 RX ADMIN — TRAMADOL HYDROCHLORIDE 25 MG: 50 TABLET ORAL at 00:21

## 2024-06-15 RX ADMIN — Medication: at 14:21

## 2024-06-15 RX ADMIN — RANOLAZINE 500 MG: 500 TABLET, FILM COATED, EXTENDED RELEASE ORAL at 21:40

## 2024-06-15 RX ADMIN — HEPARIN SODIUM 5000 UNITS: 5000 INJECTION INTRAVENOUS; SUBCUTANEOUS at 06:03

## 2024-06-15 RX ADMIN — SORBITOL SOLUTION (BULK) 60 ML: 70 SOLUTION at 13:55

## 2024-06-15 RX ADMIN — PANTOPRAZOLE SODIUM 40 MG: 40 TABLET, DELAYED RELEASE ORAL at 06:03

## 2024-06-15 RX ADMIN — FLUDROCORTISONE ACETATE 0.1 MG: 0.1 TABLET ORAL at 08:11

## 2024-06-15 RX ADMIN — INSULIN GLARGINE 10 UNITS: 100 INJECTION, SOLUTION SUBCUTANEOUS at 21:31

## 2024-06-15 RX ADMIN — SODIUM CHLORIDE 17 MCG/MIN: 9 INJECTION, SOLUTION INTRAVENOUS at 13:30

## 2024-06-15 RX ADMIN — MIDODRINE HYDROCHLORIDE 10 MG: 5 TABLET ORAL at 21:40

## 2024-06-15 RX ADMIN — FLUOXETINE 10 MG: 10 CAPSULE ORAL at 08:11

## 2024-06-15 RX ADMIN — Medication 1 AMPULE: at 21:39

## 2024-06-15 RX ADMIN — Medication: at 21:44

## 2024-06-15 RX ADMIN — ATORVASTATIN CALCIUM 20 MG: 20 TABLET, FILM COATED ORAL at 08:11

## 2024-06-15 RX ADMIN — SEVELAMER CARBONATE 1600 MG: 800 TABLET, FILM COATED ORAL at 13:55

## 2024-06-15 RX ADMIN — Medication: at 08:11

## 2024-06-15 ASSESSMENT — PAIN DESCRIPTION - DESCRIPTORS
DESCRIPTORS: DISCOMFORT
DESCRIPTORS: ACHING;DISCOMFORT
DESCRIPTORS: DISCOMFORT
DESCRIPTORS: ACHING
DESCRIPTORS: DISCOMFORT

## 2024-06-15 ASSESSMENT — PAIN DESCRIPTION - LOCATION
LOCATION: BACK

## 2024-06-15 ASSESSMENT — PAIN SCALES - GENERAL
PAINLEVEL_OUTOF10: 6
PAINLEVEL_OUTOF10: 7
PAINLEVEL_OUTOF10: 8
PAINLEVEL_OUTOF10: 6
PAINLEVEL_OUTOF10: 7
PAINLEVEL_OUTOF10: 6
PAINLEVEL_OUTOF10: 7
PAINLEVEL_OUTOF10: 8
PAINLEVEL_OUTOF10: 6
PAINLEVEL_OUTOF10: 2
PAINLEVEL_OUTOF10: 8

## 2024-06-15 ASSESSMENT — PAIN DESCRIPTION - ORIENTATION
ORIENTATION: MID
ORIENTATION: MID
ORIENTATION: POSTERIOR

## 2024-06-15 ASSESSMENT — PAIN DESCRIPTION - FREQUENCY
FREQUENCY: CONTINUOUS

## 2024-06-15 ASSESSMENT — PAIN DESCRIPTION - PAIN TYPE
TYPE: ACUTE PAIN
TYPE: ACUTE PAIN

## 2024-06-15 ASSESSMENT — PAIN DESCRIPTION - ONSET: ONSET: ON-GOING

## 2024-06-16 LAB
ANION GAP SERPL CALC-SCNC: 9 MMOL/L (ref 5–15)
BASOPHILS # BLD: 0.2 K/UL (ref 0–0.1)
BASOPHILS NFR BLD: 1 % (ref 0–1)
BUN SERPL-MCNC: 45 MG/DL (ref 6–20)
BUN/CREAT SERPL: 13 (ref 12–20)
CALCIUM SERPL-MCNC: 9.6 MG/DL (ref 8.5–10.1)
CHLORIDE SERPL-SCNC: 96 MMOL/L (ref 97–108)
CO2 SERPL-SCNC: 26 MMOL/L (ref 21–32)
CREAT SERPL-MCNC: 3.55 MG/DL (ref 0.55–1.02)
DIFFERENTIAL METHOD BLD: ABNORMAL
EOSINOPHIL # BLD: 0.2 K/UL (ref 0–0.4)
EOSINOPHIL NFR BLD: 1 % (ref 0–7)
ERYTHROCYTE [DISTWIDTH] IN BLOOD BY AUTOMATED COUNT: 16.6 % (ref 11.5–14.5)
GLUCOSE BLD STRIP.AUTO-MCNC: 130 MG/DL (ref 65–117)
GLUCOSE BLD STRIP.AUTO-MCNC: 145 MG/DL (ref 65–117)
GLUCOSE BLD STRIP.AUTO-MCNC: 167 MG/DL (ref 65–117)
GLUCOSE BLD STRIP.AUTO-MCNC: 169 MG/DL (ref 65–117)
GLUCOSE SERPL-MCNC: 187 MG/DL (ref 65–100)
HCT VFR BLD AUTO: 35.6 % (ref 35–47)
HGB BLD-MCNC: 10.1 G/DL (ref 11.5–16)
IMM GRANULOCYTES # BLD AUTO: 0.7 K/UL (ref 0–0.04)
IMM GRANULOCYTES NFR BLD AUTO: 3 % (ref 0–0.5)
LYMPHOCYTES # BLD: 1.2 K/UL (ref 0.8–3.5)
LYMPHOCYTES NFR BLD: 5 % (ref 12–49)
MCH RBC QN AUTO: 25.3 PG (ref 26–34)
MCHC RBC AUTO-ENTMCNC: 28.4 G/DL (ref 30–36.5)
MCV RBC AUTO: 89.2 FL (ref 80–99)
MONOCYTES # BLD: 2.1 K/UL (ref 0–1)
MONOCYTES NFR BLD: 9 % (ref 5–13)
NEUTS SEG # BLD: 18.8 K/UL (ref 1.8–8)
NEUTS SEG NFR BLD: 81 % (ref 32–75)
NRBC # BLD: 0.32 K/UL (ref 0–0.01)
NRBC BLD-RTO: 1.4 PER 100 WBC
PLATELET # BLD AUTO: 502 K/UL (ref 150–400)
PLATELET COMMENT: ABNORMAL
PMV BLD AUTO: 8.9 FL (ref 8.9–12.9)
POTASSIUM SERPL-SCNC: 4.2 MMOL/L (ref 3.5–5.1)
RBC # BLD AUTO: 3.99 M/UL (ref 3.8–5.2)
RBC MORPH BLD: ABNORMAL
SERVICE CMNT-IMP: ABNORMAL
SODIUM SERPL-SCNC: 131 MMOL/L (ref 136–145)
URATE SERPL-MCNC: 3.6 MG/DL (ref 2.6–6)
WBC # BLD AUTO: 23.2 K/UL (ref 3.6–11)

## 2024-06-16 PROCEDURE — 6360000002 HC RX W HCPCS: Performed by: INTERNAL MEDICINE

## 2024-06-16 PROCEDURE — 6370000000 HC RX 637 (ALT 250 FOR IP): Performed by: NURSE PRACTITIONER

## 2024-06-16 PROCEDURE — 6370000000 HC RX 637 (ALT 250 FOR IP): Performed by: INTERNAL MEDICINE

## 2024-06-16 PROCEDURE — P9047 ALBUMIN (HUMAN), 25%, 50ML: HCPCS | Performed by: NURSE PRACTITIONER

## 2024-06-16 PROCEDURE — 82962 GLUCOSE BLOOD TEST: CPT

## 2024-06-16 PROCEDURE — 6360000002 HC RX W HCPCS: Performed by: NURSE PRACTITIONER

## 2024-06-16 PROCEDURE — 90935 HEMODIALYSIS ONE EVALUATION: CPT

## 2024-06-16 PROCEDURE — 80048 BASIC METABOLIC PNL TOTAL CA: CPT

## 2024-06-16 PROCEDURE — 84550 ASSAY OF BLOOD/URIC ACID: CPT

## 2024-06-16 PROCEDURE — 6370000000 HC RX 637 (ALT 250 FOR IP): Performed by: GENERAL ACUTE CARE HOSPITAL

## 2024-06-16 PROCEDURE — 2580000003 HC RX 258: Performed by: INTERNAL MEDICINE

## 2024-06-16 PROCEDURE — 36415 COLL VENOUS BLD VENIPUNCTURE: CPT

## 2024-06-16 PROCEDURE — 2000000000 HC ICU R&B

## 2024-06-16 PROCEDURE — 85025 COMPLETE CBC W/AUTO DIFF WBC: CPT

## 2024-06-16 PROCEDURE — 2500000003 HC RX 250 WO HCPCS: Performed by: INTERNAL MEDICINE

## 2024-06-16 RX ORDER — NOREPINEPHRINE BITARTRATE 0.06 MG/ML
0-20 INJECTION, SOLUTION INTRAVENOUS CONTINUOUS
Status: DISCONTINUED | OUTPATIENT
Start: 2024-06-16 | End: 2024-06-18 | Stop reason: HOSPADM

## 2024-06-16 RX ORDER — MIDODRINE HYDROCHLORIDE 5 MG/1
15 TABLET ORAL 4 TIMES DAILY
Status: DISCONTINUED | OUTPATIENT
Start: 2024-06-16 | End: 2024-06-18 | Stop reason: HOSPADM

## 2024-06-16 RX ADMIN — COLLAGENASE SANTYL: 250 OINTMENT TOPICAL at 08:32

## 2024-06-16 RX ADMIN — OXYCODONE HYDROCHLORIDE 5 MG: 5 TABLET ORAL at 18:07

## 2024-06-16 RX ADMIN — MIDODRINE HYDROCHLORIDE 15 MG: 5 TABLET ORAL at 21:22

## 2024-06-16 RX ADMIN — Medication: at 13:00

## 2024-06-16 RX ADMIN — SORBITOL SOLUTION (BULK) 15 ML: 70 SOLUTION at 08:30

## 2024-06-16 RX ADMIN — SEVELAMER CARBONATE 1600 MG: 800 TABLET, FILM COATED ORAL at 08:31

## 2024-06-16 RX ADMIN — PANTOPRAZOLE SODIUM 40 MG: 40 TABLET, DELAYED RELEASE ORAL at 08:31

## 2024-06-16 RX ADMIN — Medication: at 08:31

## 2024-06-16 RX ADMIN — MIDODRINE HYDROCHLORIDE 15 MG: 5 TABLET ORAL at 17:08

## 2024-06-16 RX ADMIN — Medication 1 AMPULE: at 08:31

## 2024-06-16 RX ADMIN — INSULIN GLARGINE 10 UNITS: 100 INJECTION, SOLUTION SUBCUTANEOUS at 21:22

## 2024-06-16 RX ADMIN — SEVELAMER CARBONATE 1600 MG: 800 TABLET, FILM COATED ORAL at 11:22

## 2024-06-16 RX ADMIN — SODIUM CHLORIDE 18 MCG/MIN: 9 INJECTION, SOLUTION INTRAVENOUS at 22:10

## 2024-06-16 RX ADMIN — SODIUM CHLORIDE 20 MCG/MIN: 9 INJECTION, SOLUTION INTRAVENOUS at 02:33

## 2024-06-16 RX ADMIN — MIDODRINE HYDROCHLORIDE 15 MG: 5 TABLET ORAL at 13:07

## 2024-06-16 RX ADMIN — MIDODRINE HYDROCHLORIDE 10 MG: 5 TABLET ORAL at 08:31

## 2024-06-16 RX ADMIN — HEPARIN SODIUM 5000 UNITS: 5000 INJECTION INTRAVENOUS; SUBCUTANEOUS at 21:41

## 2024-06-16 RX ADMIN — RANOLAZINE 500 MG: 500 TABLET, FILM COATED, EXTENDED RELEASE ORAL at 21:22

## 2024-06-16 RX ADMIN — SEVELAMER CARBONATE 1600 MG: 800 TABLET, FILM COATED ORAL at 17:08

## 2024-06-16 RX ADMIN — ALBUMIN (HUMAN) 25 G: 0.25 INJECTION, SOLUTION INTRAVENOUS at 13:37

## 2024-06-16 RX ADMIN — ATORVASTATIN CALCIUM 20 MG: 20 TABLET, FILM COATED ORAL at 08:31

## 2024-06-16 RX ADMIN — RANOLAZINE 500 MG: 500 TABLET, FILM COATED, EXTENDED RELEASE ORAL at 08:31

## 2024-06-16 RX ADMIN — DICLOFENAC SODIUM 4 G: 10 GEL TOPICAL at 20:09

## 2024-06-16 RX ADMIN — AMIODARONE HYDROCHLORIDE 200 MG: 200 TABLET ORAL at 08:31

## 2024-06-16 RX ADMIN — HEPARIN SODIUM 5000 UNITS: 5000 INJECTION INTRAVENOUS; SUBCUTANEOUS at 15:42

## 2024-06-16 RX ADMIN — FLUOXETINE 10 MG: 10 CAPSULE ORAL at 08:31

## 2024-06-16 RX ADMIN — CLOPIDOGREL BISULFATE 75 MG: 75 TABLET ORAL at 08:31

## 2024-06-16 RX ADMIN — CALCITRIOL CAPSULES 0.25 MCG 0.25 MCG: 0.25 CAPSULE ORAL at 08:31

## 2024-06-16 RX ADMIN — Medication 1 AMPULE: at 20:06

## 2024-06-16 RX ADMIN — DICLOFENAC SODIUM 4 G: 10 GEL TOPICAL at 08:32

## 2024-06-16 RX ADMIN — HEPARIN SODIUM 5000 UNITS: 5000 INJECTION INTRAVENOUS; SUBCUTANEOUS at 06:27

## 2024-06-16 RX ADMIN — ASPIRIN 81 MG CHEWABLE TABLET 81 MG: 81 TABLET CHEWABLE at 08:31

## 2024-06-16 RX ADMIN — Medication: at 17:10

## 2024-06-16 RX ADMIN — ONDANSETRON 4 MG: 2 INJECTION INTRAMUSCULAR; INTRAVENOUS at 20:20

## 2024-06-16 RX ADMIN — OXYCODONE HYDROCHLORIDE 5 MG: 5 TABLET ORAL at 10:50

## 2024-06-16 ASSESSMENT — PAIN DESCRIPTION - LOCATION
LOCATION: BUTTOCKS;LEG
LOCATION: BACK
LOCATION: BUTTOCKS;LEG
LOCATION: BACK

## 2024-06-16 ASSESSMENT — PAIN SCALES - GENERAL
PAINLEVEL_OUTOF10: 0
PAINLEVEL_OUTOF10: 9
PAINLEVEL_OUTOF10: 2
PAINLEVEL_OUTOF10: 5
PAINLEVEL_OUTOF10: 0
PAINLEVEL_OUTOF10: 9
PAINLEVEL_OUTOF10: 0
PAINLEVEL_OUTOF10: 3
PAINLEVEL_OUTOF10: 0
PAINLEVEL_OUTOF10: 6

## 2024-06-16 ASSESSMENT — PAIN - FUNCTIONAL ASSESSMENT: PAIN_FUNCTIONAL_ASSESSMENT: PREVENTS OR INTERFERES SOME ACTIVE ACTIVITIES AND ADLS

## 2024-06-16 ASSESSMENT — PAIN DESCRIPTION - DESCRIPTORS
DESCRIPTORS: ACHING
DESCRIPTORS: ACHING

## 2024-06-16 ASSESSMENT — PAIN DESCRIPTION - ORIENTATION
ORIENTATION: RIGHT;LEFT;UPPER;MID;LOWER
ORIENTATION: RIGHT;LEFT;POSTERIOR
ORIENTATION: POSTERIOR;LEFT;RIGHT

## 2024-06-17 ENCOUNTER — APPOINTMENT (OUTPATIENT)
Facility: HOSPITAL | Age: 49
DRG: 314 | End: 2024-06-17
Attending: INTERNAL MEDICINE
Payer: MEDICARE

## 2024-06-17 VITALS
RESPIRATION RATE: 21 BRPM | TEMPERATURE: 97.9 F | DIASTOLIC BLOOD PRESSURE: 43 MMHG | SYSTOLIC BLOOD PRESSURE: 94 MMHG | HEIGHT: 68 IN | WEIGHT: 264.99 LBS | BODY MASS INDEX: 40.16 KG/M2 | OXYGEN SATURATION: 90 % | HEART RATE: 115 BPM

## 2024-06-17 PROBLEM — R45.89 NEED FOR EMOTIONAL SUPPORT: Status: ACTIVE | Noted: 2024-06-17

## 2024-06-17 PROBLEM — Z51.5 PALLIATIVE CARE ENCOUNTER: Status: ACTIVE | Noted: 2024-06-17

## 2024-06-17 PROBLEM — Z71.89 GOALS OF CARE, COUNSELING/DISCUSSION: Status: ACTIVE | Noted: 2024-06-17

## 2024-06-17 PROBLEM — R53.81 DEBILITY: Status: ACTIVE | Noted: 2023-06-09

## 2024-06-17 LAB
ALBUMIN SERPL-MCNC: 2.3 G/DL (ref 3.5–5)
ALBUMIN/GLOB SERPL: 0.5 (ref 1.1–2.2)
ALP SERPL-CCNC: 190 U/L (ref 45–117)
ALT SERPL-CCNC: 37 U/L (ref 12–78)
AMMONIA PLAS-SCNC: 36 UMOL/L
ANION GAP SERPL CALC-SCNC: 8 MMOL/L (ref 5–15)
ARTERIAL PATENCY WRIST A: ABNORMAL
AST SERPL-CCNC: 234 U/L (ref 15–37)
BASE DEFICIT BLDA-SCNC: 0.5 MMOL/L
BASOPHILS # BLD: 0 K/UL (ref 0–0.1)
BASOPHILS NFR BLD: 0 % (ref 0–1)
BDY SITE: ABNORMAL
BILIRUB DIRECT SERPL-MCNC: 1 MG/DL (ref 0–0.2)
BILIRUB SERPL-MCNC: 1.6 MG/DL (ref 0.2–1)
BUN SERPL-MCNC: 57 MG/DL (ref 6–20)
BUN/CREAT SERPL: 14 (ref 12–20)
CALCIUM SERPL-MCNC: 10.6 MG/DL (ref 8.5–10.1)
CHLORIDE SERPL-SCNC: 94 MMOL/L (ref 97–108)
CO2 SERPL-SCNC: 28 MMOL/L (ref 21–32)
CREAT SERPL-MCNC: 4.02 MG/DL (ref 0.55–1.02)
DIFFERENTIAL METHOD BLD: ABNORMAL
EOSINOPHIL # BLD: 0 K/UL (ref 0–0.4)
EOSINOPHIL NFR BLD: 0 % (ref 0–7)
ERYTHROCYTE [DISTWIDTH] IN BLOOD BY AUTOMATED COUNT: 16.9 % (ref 11.5–14.5)
FIO2 ON VENT: 28 %
GLOBULIN SER CALC-MCNC: 4.4 G/DL (ref 2–4)
GLUCOSE BLD STRIP.AUTO-MCNC: 153 MG/DL (ref 65–117)
GLUCOSE BLD STRIP.AUTO-MCNC: 201 MG/DL (ref 65–117)
GLUCOSE SERPL-MCNC: 193 MG/DL (ref 65–100)
HCO3 BLDA-SCNC: 28 MMOL/L (ref 22–26)
HCT VFR BLD AUTO: 34.1 % (ref 35–47)
HGB BLD-MCNC: 9.7 G/DL (ref 11.5–16)
IMM GRANULOCYTES # BLD AUTO: 0 K/UL (ref 0–0.04)
IMM GRANULOCYTES NFR BLD AUTO: 0 % (ref 0–0.5)
INR PPP: 3.1 (ref 0.9–1.1)
LACTATE SERPL-SCNC: 2.6 MMOL/L (ref 0.4–2)
LACTATE SERPL-SCNC: 2.9 MMOL/L (ref 0.4–2)
LYMPHOCYTES # BLD: 1 K/UL (ref 0.8–3.5)
LYMPHOCYTES NFR BLD: 4 % (ref 12–49)
MCH RBC QN AUTO: 25.1 PG (ref 26–34)
MCHC RBC AUTO-ENTMCNC: 28.4 G/DL (ref 30–36.5)
MCV RBC AUTO: 88.3 FL (ref 80–99)
MONOCYTES # BLD: 3.1 K/UL (ref 0–1)
MONOCYTES NFR BLD: 12 % (ref 5–13)
MYELOCYTES NFR BLD MANUAL: 1 %
NEUTS BAND NFR BLD MANUAL: 8 %
NEUTS SEG # BLD: 21.2 K/UL (ref 1.8–8)
NEUTS SEG NFR BLD: 75 % (ref 32–75)
NRBC # BLD: 0.47 K/UL (ref 0–0.01)
NRBC BLD-RTO: 1.8 PER 100 WBC
PCO2 BLDA: 60 MMHG (ref 35–45)
PH BLDA: 7.28 (ref 7.35–7.45)
PLATELET # BLD AUTO: 536 K/UL (ref 150–400)
PLATELET COMMENT: ABNORMAL
PMV BLD AUTO: 9.4 FL (ref 8.9–12.9)
PO2 BLDA: 83 MMHG (ref 80–100)
POTASSIUM SERPL-SCNC: 4.3 MMOL/L (ref 3.5–5.1)
PROCALCITONIN SERPL-MCNC: 21.88 NG/ML
PROT SERPL-MCNC: 6.7 G/DL (ref 6.4–8.2)
PROTHROMBIN TIME: 30.3 SEC (ref 9–11.1)
RBC # BLD AUTO: 3.86 M/UL (ref 3.8–5.2)
RBC MORPH BLD: ABNORMAL
SAO2 % BLD: 95 % (ref 92–97)
SAO2% DEVICE SAO2% SENSOR NAME: ABNORMAL
SERVICE CMNT-IMP: ABNORMAL
SERVICE CMNT-IMP: ABNORMAL
SODIUM SERPL-SCNC: 130 MMOL/L (ref 136–145)
SPECIMEN SITE: ABNORMAL
WBC # BLD AUTO: 25.5 K/UL (ref 3.6–11)

## 2024-06-17 PROCEDURE — 82962 GLUCOSE BLOOD TEST: CPT

## 2024-06-17 PROCEDURE — 82140 ASSAY OF AMMONIA: CPT

## 2024-06-17 PROCEDURE — 6360000002 HC RX W HCPCS: Performed by: INTERNAL MEDICINE

## 2024-06-17 PROCEDURE — 6370000000 HC RX 637 (ALT 250 FOR IP): Performed by: NURSE PRACTITIONER

## 2024-06-17 PROCEDURE — 80048 BASIC METABOLIC PNL TOTAL CA: CPT

## 2024-06-17 PROCEDURE — 2000000000 HC ICU R&B

## 2024-06-17 PROCEDURE — 36415 COLL VENOUS BLD VENIPUNCTURE: CPT

## 2024-06-17 PROCEDURE — 6360000002 HC RX W HCPCS: Performed by: NURSE PRACTITIONER

## 2024-06-17 PROCEDURE — 85025 COMPLETE CBC W/AUTO DIFF WBC: CPT

## 2024-06-17 PROCEDURE — 84145 PROCALCITONIN (PCT): CPT

## 2024-06-17 PROCEDURE — 85610 PROTHROMBIN TIME: CPT

## 2024-06-17 PROCEDURE — 36620 INSERTION CATHETER ARTERY: CPT

## 2024-06-17 PROCEDURE — 6370000000 HC RX 637 (ALT 250 FOR IP): Performed by: GENERAL ACUTE CARE HOSPITAL

## 2024-06-17 PROCEDURE — 2580000003 HC RX 258: Performed by: NURSE PRACTITIONER

## 2024-06-17 PROCEDURE — 86334 IMMUNOFIX E-PHORESIS SERUM: CPT

## 2024-06-17 PROCEDURE — 6370000000 HC RX 637 (ALT 250 FOR IP): Performed by: INTERNAL MEDICINE

## 2024-06-17 PROCEDURE — 74018 RADEX ABDOMEN 1 VIEW: CPT

## 2024-06-17 PROCEDURE — P9047 ALBUMIN (HUMAN), 25%, 50ML: HCPCS | Performed by: NURSE PRACTITIONER

## 2024-06-17 PROCEDURE — 90935 HEMODIALYSIS ONE EVALUATION: CPT

## 2024-06-17 PROCEDURE — 83521 IG LIGHT CHAINS FREE EACH: CPT

## 2024-06-17 PROCEDURE — 2580000003 HC RX 258: Performed by: INTERNAL MEDICINE

## 2024-06-17 PROCEDURE — 2700000000 HC OXYGEN THERAPY PER DAY

## 2024-06-17 PROCEDURE — 87040 BLOOD CULTURE FOR BACTERIA: CPT

## 2024-06-17 PROCEDURE — 6360000002 HC RX W HCPCS: Performed by: HOSPITALIST

## 2024-06-17 PROCEDURE — 82784 ASSAY IGA/IGD/IGG/IGM EACH: CPT

## 2024-06-17 PROCEDURE — 83605 ASSAY OF LACTIC ACID: CPT

## 2024-06-17 PROCEDURE — 82803 BLOOD GASES ANY COMBINATION: CPT

## 2024-06-17 PROCEDURE — C9113 INJ PANTOPRAZOLE SODIUM, VIA: HCPCS | Performed by: INTERNAL MEDICINE

## 2024-06-17 PROCEDURE — 03HB33Z INSERTION OF INFUSION DEVICE INTO RIGHT RADIAL ARTERY, PERCUTANEOUS APPROACH: ICD-10-PCS | Performed by: ORTHOPAEDIC SURGERY

## 2024-06-17 PROCEDURE — 80076 HEPATIC FUNCTION PANEL: CPT

## 2024-06-17 PROCEDURE — 2500000003 HC RX 250 WO HCPCS: Performed by: HOSPITALIST

## 2024-06-17 PROCEDURE — 84165 PROTEIN E-PHORESIS SERUM: CPT

## 2024-06-17 RX ORDER — EPINEPHRINE IN SOD CHLOR,ISO 1 MG/10 ML
SYRINGE (ML) INTRAVENOUS
Status: COMPLETED | OUTPATIENT
Start: 2024-06-17 | End: 2024-06-17

## 2024-06-17 RX ORDER — OXYCODONE HYDROCHLORIDE 5 MG/1
5 TABLET ORAL EVERY 4 HOURS PRN
Status: DISCONTINUED | OUTPATIENT
Start: 2024-06-17 | End: 2024-06-18 | Stop reason: HOSPADM

## 2024-06-17 RX ORDER — TRAMADOL HYDROCHLORIDE 50 MG/1
25 TABLET ORAL EVERY 6 HOURS PRN
Status: DISCONTINUED | OUTPATIENT
Start: 2024-06-17 | End: 2024-06-18 | Stop reason: HOSPADM

## 2024-06-17 RX ORDER — LACTULOSE 10 G/15ML
20 SOLUTION ORAL 3 TIMES DAILY
Status: DISCONTINUED | OUTPATIENT
Start: 2024-06-17 | End: 2024-06-17

## 2024-06-17 RX ORDER — BISACODYL 10 MG
10 SUPPOSITORY, RECTAL RECTAL DAILY
Status: DISCONTINUED | OUTPATIENT
Start: 2024-06-17 | End: 2024-06-18 | Stop reason: HOSPADM

## 2024-06-17 RX ORDER — LACTULOSE 10 G/15ML
20 SOLUTION ORAL 3 TIMES DAILY
Status: DISCONTINUED | OUTPATIENT
Start: 2024-06-17 | End: 2024-06-17 | Stop reason: HOSPADM

## 2024-06-17 RX ORDER — INSULIN LISPRO 100 [IU]/ML
0-8 INJECTION, SOLUTION INTRAVENOUS; SUBCUTANEOUS EVERY 6 HOURS
Status: DISCONTINUED | OUTPATIENT
Start: 2024-06-17 | End: 2024-06-18 | Stop reason: HOSPADM

## 2024-06-17 RX ORDER — ASPIRIN 300 MG/1
300 SUPPOSITORY RECTAL DAILY
Status: DISCONTINUED | OUTPATIENT
Start: 2024-06-17 | End: 2024-06-18 | Stop reason: HOSPADM

## 2024-06-17 RX ORDER — ALBUMIN (HUMAN) 12.5 G/50ML
25 SOLUTION INTRAVENOUS ONCE
Status: COMPLETED | OUTPATIENT
Start: 2024-06-17 | End: 2024-06-17

## 2024-06-17 RX ADMIN — ALPRAZOLAM 0.25 MG: 0.25 TABLET ORAL at 13:21

## 2024-06-17 RX ADMIN — EPINEPHRINE 1 MG: 0.1 INJECTION, SOLUTION ENDOTRACHEAL; INTRACARDIAC; INTRAVENOUS at 15:43

## 2024-06-17 RX ADMIN — ALBUMIN (HUMAN) 25 G: 0.25 INJECTION, SOLUTION INTRAVENOUS at 12:00

## 2024-06-17 RX ADMIN — ASPIRIN 300 MG: 300 SUPPOSITORY RECTAL at 10:12

## 2024-06-17 RX ADMIN — ALBUMIN (HUMAN) 25 G: 0.25 INJECTION, SOLUTION INTRAVENOUS at 03:11

## 2024-06-17 RX ADMIN — PANTOPRAZOLE SODIUM 40 MG: 40 INJECTION, POWDER, FOR SOLUTION INTRAVENOUS at 10:12

## 2024-06-17 RX ADMIN — EPINEPHRINE 1 MG: 0.1 INJECTION, SOLUTION ENDOTRACHEAL; INTRACARDIAC; INTRAVENOUS at 15:36

## 2024-06-17 RX ADMIN — Medication: at 09:39

## 2024-06-17 RX ADMIN — Medication 1 AMPULE: at 09:39

## 2024-06-17 RX ADMIN — PHYTONADIONE 10 MG: 10 INJECTION, EMULSION INTRAMUSCULAR; INTRAVENOUS; SUBCUTANEOUS at 04:09

## 2024-06-17 RX ADMIN — ALPRAZOLAM 0.25 MG: 0.25 TABLET ORAL at 02:47

## 2024-06-17 RX ADMIN — LACTULOSE 20 G: 10 SOLUTION ORAL at 04:24

## 2024-06-17 RX ADMIN — SODIUM BICARBONATE 50 MEQ: 84 INJECTION, SOLUTION INTRAVENOUS at 15:28

## 2024-06-17 RX ADMIN — EPINEPHRINE 1 MG: 0.1 INJECTION, SOLUTION ENDOTRACHEAL; INTRACARDIAC; INTRAVENOUS at 15:46

## 2024-06-17 RX ADMIN — SODIUM BICARBONATE 50 MEQ: 84 INJECTION, SOLUTION INTRAVENOUS at 15:29

## 2024-06-17 RX ADMIN — Medication: at 14:14

## 2024-06-17 RX ADMIN — BISACODYL 10 MG: 10 SUPPOSITORY RECTAL at 10:12

## 2024-06-17 RX ADMIN — SODIUM BICARBONATE 50 MEQ: 84 INJECTION, SOLUTION INTRAVENOUS at 15:38

## 2024-06-17 RX ADMIN — OXYCODONE HYDROCHLORIDE 5 MG: 5 TABLET ORAL at 12:16

## 2024-06-17 RX ADMIN — COLLAGENASE SANTYL: 250 OINTMENT TOPICAL at 09:40

## 2024-06-17 RX ADMIN — EPINEPHRINE 1 MG: 0.1 INJECTION, SOLUTION ENDOTRACHEAL; INTRACARDIAC; INTRAVENOUS at 15:26

## 2024-06-17 RX ADMIN — PANTOPRAZOLE SODIUM 40 MG: 40 TABLET, DELAYED RELEASE ORAL at 05:41

## 2024-06-17 RX ADMIN — HEPARIN SODIUM 5000 UNITS: 5000 INJECTION INTRAVENOUS; SUBCUTANEOUS at 05:39

## 2024-06-17 RX ADMIN — SODIUM BICARBONATE 50 MEQ: 84 INJECTION, SOLUTION INTRAVENOUS at 15:42

## 2024-06-17 RX ADMIN — ALBUMIN (HUMAN) 25 G: 0.25 INJECTION, SOLUTION INTRAVENOUS at 10:43

## 2024-06-17 RX ADMIN — DICLOFENAC SODIUM 4 G: 10 GEL TOPICAL at 09:40

## 2024-06-17 RX ADMIN — EPINEPHRINE 1 MG: 0.1 INJECTION, SOLUTION ENDOTRACHEAL; INTRACARDIAC; INTRAVENOUS at 15:39

## 2024-06-17 RX ADMIN — PIPERACILLIN AND TAZOBACTAM 4500 MG: 4; .5 INJECTION, POWDER, LYOPHILIZED, FOR SOLUTION INTRAVENOUS at 14:13

## 2024-06-17 RX ADMIN — ONDANSETRON 4 MG: 2 INJECTION INTRAMUSCULAR; INTRAVENOUS at 12:39

## 2024-06-17 ASSESSMENT — PAIN SCALES - GENERAL
PAINLEVEL_OUTOF10: 8
PAINLEVEL_OUTOF10: 8
PAINLEVEL_OUTOF10: 10

## 2024-06-17 ASSESSMENT — PAIN DESCRIPTION - PAIN TYPE
TYPE: ACUTE PAIN
TYPE: ACUTE PAIN

## 2024-06-17 ASSESSMENT — PAIN DESCRIPTION - ORIENTATION
ORIENTATION: MID
ORIENTATION: ANTERIOR;MID

## 2024-06-17 ASSESSMENT — PAIN DESCRIPTION - DESCRIPTORS
DESCRIPTORS: ACHING
DESCRIPTORS: ACHING

## 2024-06-17 ASSESSMENT — PAIN DESCRIPTION - LOCATION
LOCATION: ABDOMEN
LOCATION: ABDOMEN
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
## 2024-06-17 NOTE — BSMART NOTE
Liaison attempted to meet f/f with pt in her room on the medical floor at [facility]. Pt receiving dialysis and appears resting at this time. Liaison will continue to monitor and to support.

## 2024-06-17 NOTE — FLOWSHEET NOTE
06/01/24 0700   Vital Signs   BP (!) 98/45   Pulse 82   Respirations 20   SpO2 98 %   Weight - Scale 126.7 kg (279 lb 5.2 oz)   Percent Weight Change 0   Pain Assessment   Pain Assessment 0-10   Pain Level 3   Treatment   Time On 0723   Time Off 0923   Treatment Goal 2.5kg in 2hrs   Observations & Evaluations   Level of Consciousness 0   Oriented X 4   Heart Rhythm Regular   Respiratory Quality/Effort Unlabored   O2 Device Nasal cannula   Bilateral Breath Sounds Clear;Diminished   Skin Condition/Temp Dry;Warm   Abdomen Inspection Distended;Obese   Edema Generalized +2   RLE Edema +2   Technical Checks   Dialysis Machine No. 03   RO Machine Number R03   Dialyzer Lot No. [id number]   [place]ub[place] Lot Number [id number]   All Connections Secure Yes   NS Bag Yes   Saline Line Double Clamped Yes   Dialyzer Revaclear 300   Prime Volume (mL) 200 mL   ICEBOAT I;C;E;B;O;A;T   RO Machine Log Sheet Completed Yes   Machine Alarm Self Test Completed;Passed   Air Foam Detector Tested;Proper Function;pH Reading   Extracorporeal Circuit Tested for Integrity Yes   Machine Conductivity 14   Manual Ph 7.4   Bleach Test (Neg) Yes   Treatment Initiation   Dialyze Hours 2   Treatment  Initiation Universal Precautions maintained;Lines secured to patient;Connections secured;Prime given;Venous Parameters set;Arterial Parameters set;Air foam detector engaged;[place] Device;Saline line double clamped;Hemo-Safe Applied;Dialyzer;Revaclear Dialyzer;REV-300   Hemodialysis Fistula/Graft Arteriovenous fistula Left Arm   No placement date or time found.   Present on Admission/Arrival: Yes  Access Type: Arteriovenous fistula  Orientation: Left  Access Location: Arm   Site Assessment Clean, dry & intact   Thrill Present   Bruit Present   Status Accessed   Venous Needle Size 15 G   Arterial Needle Size 15 G   Accessed By: Hayley   Access Attempts  1     Primary RN SBAR: Jose Luis,RN  Patient Education provided: access care  Incapacitated Nurse rogelio. 
   06/13/24 2345   Vital Signs   BP (!) 71/48   Temp 97.1 °F (36.2 °C)   Pulse 64   Respirations (!) 7   SpO2 95 %   Pain Assessment   Pain Assessment Critical Care Pain Observation Tool (CPOT)   Pain Level 0   Treatment   Time On 2345   Treatment Goal 2   Observations & Evaluations   Level of Consciousness 1   Respiratory Quality/Effort Unlabored   Bilateral Breath Sounds Diminished   Skin Condition/Temp Clammy   Edema Generalized +2   Technical Checks   Dialysis Machine No. 08   RO Machine Number 08   Dialyzer Lot No. [id number]   Tubing Lot Number [id number]   All Connections Secure Yes   NS Bag Yes   Saline Line Double Clamped Yes   Dialyzer Revaclear 300   Prime Volume (mL) 250 mL   ICEBOAT I;C;E;B;O;A;T   RO Machine Log Sheet Completed Yes   Machine Alarm Self Test Completed;Passed   Air Foam Detector Tested;Proper Function;pH Reading   Extracorporeal Circuit Tested for Integrity Yes   Machine Conductivity 13.6   Machine Ph 7.2   Bleach Test (Neg) Yes   Hemodialysis Fistula/Graft Arteriovenous fistula Left Arm   No placement date or time found.   Present on Admission/Arrival: Yes  Access Type: Arteriovenous fistula  Orientation: Left  Access Location: Arm   Site Assessment Clean, dry & intact   Thrill Present   Bruit Present   Status Accessed   Venous Needle Size 15 G   Arterial Needle Size 15 G   Accessed By: isabela   Access Attempts  1   Access Interventions Chlorhexidine;Aseptic Technique;Needles taped to patient     Primary RN SBAR: Nani Mast RN  Patient Education provided: pt resting with eyes closed; responds to name, but unable to provide any relevant education  Incapacitated Nurse edu. provided: no  Preferred Education method and Primary language: verbal / English  Hospital associated wait time; reason: n/a  Hepatitis B Surface Ag   Date/Time Value Ref Range Status   05/30/2024 01:19 AM <0.10 Index Final          06/14/24 0015   Vital Signs   BP (!) 80/34   Temp 97 °F (36.1 °C)   Pulse 62 
  Pre HD note    06/04/24 0815   Vital Signs   BP (!) 104/48   Temp 98.6 °F (37 °C)   Pulse 82   Respirations 19   SpO2 95 %   Pain Assessment   Pain Assessment 0-10   Pain Level 4   Pain Type Acute pain   Pain Location Chest   Pain Descriptors Aching;Discomfort   Treatment   Time On 0815   Treatment Goal 2500   Observations & Evaluations   Level of Consciousness 0   Oriented X 4   Heart Rhythm Regular   Respiratory Quality/Effort Unlabored   O2 Device Nasal cannula   Bilateral Breath Sounds Diminished   Edema Generalized   RUE Edema +3   LUE Edema +3   RLE Edema +3   LLE Edema +3   Technical Checks   Dialysis Machine No. 02   RO Machine Number [id number]   Dialyzer Lot No. [id number]   Tubing Lot Number [id number]   All Connections Secure Yes   NS Bag Yes   Saline Line Double Clamped Yes   Dialyzer Revaclear 300   Prime Volume (mL) 300 mL   ICEBOAT I;C;E;B;O;A;T   RO Machine Log Sheet Completed Yes   Machine Alarm Self Test Completed;Passed   Air Foam Detector Tested;Proper Function;pH Reading   Extracorporeal Circuit Tested for Integrity Yes   Machine Conductivity 13.8   Machine Ph 7.2   Manual Ph 7.2   Bleach Test (Neg) Yes   Bath Temperature 96.8 °F (36 °C)   Dialysis Bath   K+ (Potassium)    (SEQ UF)   Bicarbonate Concentrate Lot No. [id number]   Acid Concentrate Lot No. [id number]   Treatment Initiation   Dialyze Hours 2   Treatment  Initiation Universal Precautions maintained;Lines secured to patient;Connections secured;Prime given;Venous Parameters set;Arterial Parameters set;Air foam detector engaged;Dialysate;Saline line double clamped;Hemo-Safe Applied;Dialyzer;Revaclear Dialyzer;[place];REV-300   Hemodialysis Fistula/Graft Arteriovenous fistula Left Arm   No placement date or time found.   Present on Admission/Arrival: Yes  Access Type: Arteriovenous fistula  Orientation: Left  Access Location: Arm   Site Assessment Clean, dry & intact   Thrill Present   Bruit Present   Status Deaccessed   Venous Needle Size 15 G 
PRE HD: 06/12/24 1400   Vital Signs   BP (!) 91/54   Temp 98 °F (36.7 °C)   Pulse 68   Respirations 20   Pain Assessment   Pain Assessment 0-10   Pain Level 7   Pain Type Chronic pain   Pain Location Back   Treatment   Time On 1413   Time Off 1813   Treatment Goal 4hrs, 2L   Observations & Evaluations   Level of Consciousness 0   Oriented X 4   Heart Rhythm Regular   Respiratory Quality/Effort Unlabored   O2 Device Nasal cannula   Bilateral Breath Sounds Diminished   Skin Condition/Temp Warm;Dry   Abdomen Inspection Obese;Pannus   Edema Generalized   Edema Generalized +1;+2   RUE Edema +1   LUE Edema +1   RLE Edema +2   LLE Edema +2   Technical Checks   Dialysis Machine No. 06   RO Machine Number R06   Dialyzer Lot No. [id number]   Tubing Lot Number [id number]   All Connections Secure Yes   NS Bag Yes   Saline Line Double Clamped Yes   Dialyzer Revaclear 300   Prime Volume (mL) 200 mL   ICEBOAT I;C;E;B;O;A;T   RO Machine Log Sheet Completed Yes   Machine Alarm Self Test Completed;Passed   Air Foam Detector Tested;Proper Function   Extracorporeal Circuit Tested for Integrity Yes   Machine Conductivity 13.9   Manual Conductivity 14   Manual Ph 7.4   Bleach Test (Neg) Yes   Bath Temperature 96.8 °F (36 °C)   Dialysis Bath   K+ (Potassium) 3   Ca+ (Calcium) 2.5   Na+ (Sodium) 138   HCO3 (Bicarb) 38   Bicarbonate Concentrate Lot No. [id number]   Acid Concentrate Lot No. [id number]   Treatment Initiation   Dialyze Hours 4   Hemodialysis Fistula/Graft Arteriovenous fistula Left Arm   No placement date or time found.   Present on Admission/Arrival: Yes  Access Type: Arteriovenous fistula  Orientation: Left  Access Location: Arm   Site Assessment Clean, dry & intact   Thrill Present   Bruit Present   Status Accessed   Venous Needle Size 15 G   Arterial Needle Size 15 G   Accessed By: LEYLA Flores RN   Access Attempts  1   Access Interventions Chlorhexidine;Aseptic Technique;Needles taped to patient   Dressing Intervention New 
Pre-HD   06/03/24 0945   Treatment   Time On 0945   Treatment Goal 3-4kg in 3.5hr   Observations & Evaluations   Level of Consciousness 0   Oriented X 4   Heart Rhythm Regular   Respiratory Quality/Effort Unlabored;Dyspnea with exertion   O2 Device None (Room air)  (2L PRN NC)   Bilateral Breath Sounds Clear;Diminished   Edema Generalized +3;Pitting   RLE Edema +3;Pitting   LLE Edema +3;Pitting   Vital Signs   BP (!) 110/46   Temp 98 °F (36.7 °C)   Pulse 77   Respirations 18   Pain Assessment   Pain Assessment None - Denies Pain   Technical Checks   Dialysis Machine No. 05   RO Machine Number R05   Dialyzer Lot No. [id number]   [place]ub[place] Lot Number [id number]   All Connections Secure Yes   NS Bag Yes   Saline Line Double Clamped Yes   Dialyzer Revaclear 300   Prime Volume (mL) 200 mL   ICEBOAT I;C;E;B;O;A;T   RO Machine Log Sheet Completed Yes   Machine Alarm Self Test Completed;Passed   Air Foam Detector Tested;Proper Function   Extracorporeal Circuit Tested for Integrity Yes   Machine Conductivity 13.8   Machine Ph 7.4   Bleach Test (Neg) Yes   Bath Temperature 96.8 °F (36 °C)   Treatment Initiation   Dialyze Hours 3.5   Treatment  Initiation Universal Precautions maintained;Lines secured to patient;Connections secured;Prime given;Venous Parameters set;Arterial Parameters set;Air foam detector engaged;Saline line double clamped   During Hemodialysis Assessment   Blood Flow Rate (ml/min) 400 ml/min   Arterial Pressure (mmHg) -150 mmHg   Venous Pressure (mmHg) 200   TMP 70      Comments Treatment initiated   Access Visible Yes   Ultrafiltration Rate (ml/hr) 1200 ml/hr   Ultrafiltration Removed (ml) 0 ml   Hemodialysis Fistula/Graft Arteriovenous fistula Left Arm   No placement date or time found.   Present on Admission/Arrival: Yes  Access Type: Arteriovenous fistula  Orientation: Left  Access Location: Arm   Site Assessment Clean, dry & intact   Thrill Present   Bruit Present   Status Accessed   Venous Needle 
Pre-VS: unable to start treatment at this time. MD and Primary RN discussion. Levophed will be started for BP stability.   06/07/24 0845   Vital Signs   Pulse 72   Respirations 15   BP (!) 92/30   MAP (Calculated) 51   MAP (mmHg) (!) 48     After Levophed started - pre-HD BP and MAP   06/07/24 0900   Vital Signs   Pulse 73   Respirations 11   BP (!) 95/58   MAP (Calculated) 70   MAP (mmHg) 71     Pre-HD: VS and assessment   06/07/24 0902   Treatment   Time On 0903   Treatment Goal 0kg in 3.5hr   Observations & Evaluations   Level of Consciousness 0   Oriented X 4   Heart Rhythm Regular   Respiratory Quality/Effort Unlabored   O2 Device Nasal cannula  (4L O2)   Bilateral Breath Sounds Clear;Diminished   Skin Color Ecchymosis (comment)   Skin Condition/Temp Cool;Dry;Swollen/edematous   Appetite Poor   Edema Generalized +2   RUE Edema Trace   LUE Edema Trace   RLE Edema +2;Pitting   LLE Edema +2;Pitting   Comments MD in to see patient; order changes made   Vital Signs   /61   Temp (!) 96.6 °F (35.9 °C)  (warm blanket provided by Primary RN)   Pulse 72   Respirations 13   SpO2 100 %   Pain Assessment   Pain Assessment 0-10   Pain Level 5  (\"I'm ok, it's better than it was\")   Technical Checks   Dialysis Machine No. 09   RO Machine Number R09   Dialyzer Lot No. [id number]   [place]ub[place] Lot Number [id number]   All Connections Secure Yes   NS Bag Yes   Saline Line Double Clamped Yes   Dialyzer Revaclear 300   Prime Volume (mL) 200 mL   ICEBOAT I;C;E;B;O;A;T   RO Machine Log Sheet Completed Yes   Machine Alarm Self Test Completed;Passed   Air Foam Detector Tested;Proper Function   Extracorporeal Circuit Tested for Integrity Yes   Machine Conductivity 13.8   Machine Ph 7.4   Bleach Test (Neg) Yes   Bath Temperature 96.8 °F (36 °C)   Treatment Initiation   Dialyze Hours 3.5   Treatment  Initiation Universal Precautions maintained;Lines secured to patient;Connections secured;Prime given;Venous Parameters set;Arterial Parameters 
Primary RN SBAR: DARRYN Ruffin, RN  Patient Education provided: Procedural, BP Control   Incapacitated Nurse edu. provided: Yes  Preferred Education method and Primary language: Verbal/English  Hospital associated wait time; reason: N/A  Hepatitis B Surface Ag   Date/Time Value Ref Range Status   05/30/2024 01:19 AM <0.10 Index Final     CWOW  04/03/2024 HbSab immune     05/31/24 0815   Treatment   Time On 0815   Treatment Goal 2000  (Pt states she feels fluid overloaded and would like to attempt to remove 3500 ml. Discussed with pt that we could attempt this as long as her BP held up.)   Observations & Evaluations   Level of Consciousness 0   Oriented X 4   Heart Rhythm Regular   Respiratory Quality/Effort Unlabored   O2 Device Nasal cannula   Bilateral Breath Sounds Diminished   Skin Color Other (comment)  (Appropriate for ethnicity)   Skin Condition/Temp Warm;Dry   Appetite Fair   Abdomen Inspection Distended;Obese;Rounded   Bowel Sounds (All Quadrants) Present   Edema Right lower extremity;Left lower extremity   RLE Edema +2   LLE Edema +2   Vital Signs   /66   Temp 98.1 °F (36.7 °C)   Pulse 74   Respirations 10   SpO2 100 %   Pain Assessment   Pain Assessment 0-10   Pain Level 2   Pain Type Chronic pain   Technical Checks   Dialysis Machine No. 06   RO Machine Number R06   Dialyzer Lot No. [id number]   [place]ub[place] Lot Number [id number]   All Connections Secure Yes   NS Bag Yes   Saline Line Double Clamped Yes   Dialyzer Revaclear 300   Prime Volume (mL) 200 mL   ICEBOAT I;C;E;B;O;A;T   RO Machine Log Sheet Completed Yes   Machine Alarm Self Test Completed;Passed   Air Foam Detector Tested;Proper Function;pH Reading   Extracorporeal Circuit Tested for Integrity Yes   Machine Conductivity 14   Manual Ph 7.2   Bleach Test (Neg) Yes   Bath Temperature 96.8 °F (36 °C)   Treatment Initiation   Dialyze Hours 3.5   Treatment  Initiation Universal Precautions maintained;Lines secured to patient;Connections secured;Prime 
Primary RN SBAR: Fela Ruffin   Comments: See below     06/17/24 1400   Vital Signs   BP (!) 94/43  (arterial pressure)   Temp 97.9 °F (36.6 °C)   Pulse 90   Respirations 10   Pain Assessment   Pain Assessment None - Denies Pain   Pain Level 8   Pain Type Acute pain   Pain Location Abdomen   Pain Orientation Anterior;Mid   Pain Descriptors Aching   Patient's Stated Pain Goal 0 - No pain   Response to Pain Intervention   (reviewed with primary RN)   Post-Hemodialysis Assessment   Post-Treatment Procedures Blood returned;Access bleeding time < 10 minutes   Machine Disinfection Process Exterior Machine Disinfection   Rinseback Volume (ml) 300 ml   Blood Volume Processed (Liters) 61.5 L   Dialyzer Clearance Lightly streaked   Duration of Treatment (minutes)   (2.47 hours (pt moaning and demanding to end treatment early, called Dr. Gonzales and informed him of patient abdominal pain complants and wanting to end treatment early, telephone orders received that it was okay to end treatment).)   Hemodialysis Intake (ml) 500 ml   Hemodialysis Output (ml) 1646 ml   NET Removed (ml) 1146   Tolerated Treatment Fair   Interventions Taken Medication   Patient Response to Treatment Pt received Albumin 50 g IV total for BP support, in addition to titrated Levophed drip. Pt had had continious abdominal pain complaints, pt medicated for pain and anxiety per primary RN. Pt demanded to end treatment early.   Bilateral Breath Sounds Diminished   RUE Edema +1   LUE Edema +1   RLE Edema +2   LLE Edema +2   Physician Notified Yes   Time Off 1336   Patient Disposition Remain in ICU/ED   Observations & Evaluations   Level of Consciousness 0   Oriented X x4   Heart Rhythm Regular   Respiratory Quality/Effort Unlabored   O2 Device None (Room air)   Skin Color   (appropriate for ethnicity)   Skin Condition/Temp Dry;Warm   Appetite Poor   Abdomen Inspection Soft;Distended   Bowel Sounds (All Quadrants) Active        06/17/24 1400   Hemodialysis 
Primary RN SBAR: Fela Ruffin RN  Patient Education provided: Ordered Dialysis Treatment  Incapacitated Nurse edu. provided: Yes  Preferred Education method and Primary language: Verbal; English  Hospital associated wait time; reason: N/A  Hepatitis B Surface Ag   Date/Time Value Ref Range Status   05/30/2024 01:19 AM <0.10 Index Final        06/17/24 1045   Vital Signs   BP (!) 87/44  (arterial pressure)   Temp 97.9 °F (36.6 °C)   Pulse 82   Respirations (!) 8   Pain Assessment   Pain Assessment None - Denies Pain   Pain Level 8   Pain Type Acute pain   Pain Location Abdomen   Pain Orientation Mid   Pain Descriptors Aching   Patient's Stated Pain Goal 0 - No pain   Response to Pain Intervention   (informed primary RN)   Treatment   Time On 1047   Treatment Goal 2000   Observations & Evaluations   Level of Consciousness 0   Oriented X x4   Heart Rhythm Regular   Respiratory Quality/Effort Unlabored   O2 Device None (Room air)   Skin Color Other (comment)  (appropriate for ethnicity)   Skin Condition/Temp Dry;Warm   Appetite Nausea   Abdomen Inspection Obese   Bowel Sounds (All Quadrants) Active   Edema Generalized Non-pitting   Technical Checks   Dialysis Machine No. 06   RO Machine Number 06   Dialyzer Lot No. [id number]   [place] Lot Number [id number]   All Connections Secure Yes   NS Bag Yes   Saline Line Double Clamped Yes   Dialyzer Revaclear 300   Prime Volume (mL) 200 mL   ICEBOAT I;C;E;B;O;A;T   RO Machine Log Sheet Completed Yes   Machine Alarm Self Test Completed;Passed   Air Foam Detector Tested;Proper Function   Extracorporeal Circuit Tested for Integrity Yes   Machine Conductivity 13.8   Bleach Test (Neg) Yes   Bath Temperature 98.6 °F (37 °C)   Dialysis Bath   K+ (Potassium) 3   Ca+ (Calcium) 2.5   Na+ (Sodium) 138   HCO3 (Bicarb) 38   Treatment Initiation   Dialyze Hours 4   Treatment  Initiation Universal Precautions maintained;Lines secured to patient;Connections secured;Prime given;Venous Parameters 
Primary RN SBAR: ROSALINO Hull RN  Patient Education provided: Procedural  Incapacitated Nurse edu. provided: Yes  Preferred Education method and Primary language: Verbal/English  Hospital associated wait time; reason: N/A  Hepatitis B Surface Ag   Date/Time Value Ref Range Status   05/30/2024 01:19 AM <0.10 Index Final        06/05/24 1253   Treatment   Time On 1253   Treatment Goal 2000 ml   Observations & Evaluations   Level of Consciousness 0   Oriented X 4   Heart Rhythm Regular   Respiratory Quality/Effort Unlabored   O2 Device Nasal cannula   Bilateral Breath Sounds Diminished   Skin Color Pale;Ecchymosis (comment)   Skin Condition/Temp Cool;Other (comment)  (Pericardial Drain in place)   Appetite Fair   Abdomen Inspection Distended;Obese;Pannus   Bowel Sounds (All Quadrants) Present   Edema Generalized   Edema Generalized +2   RUE Edema +2   LUE Edema +2   RLE Edema +2   LLE Edema +2   Vital Signs   BP (!) 80/50   Temp 97.6 °F (36.4 °C)   Pulse 70   Respirations 13   Pain Assessment   Pain Level 5  (Primary RN aware)   Pain Location Back   Technical Checks   Dialysis Machine No. 09   RO Machine Number R09   Dialyzer Lot No. [id number]   [place]ub[place] Lot Number [id number]   All Connections Secure Yes   NS Bag Yes   Saline Line Double Clamped Yes   Dialyzer Revaclear 300   Prime Volume (mL) 200 mL   ICEBOAT I;C;E;B;O;A;T   RO Machine Log Sheet Completed Yes   Machine Alarm Self Test Completed;Passed   Air Foam Detector Tested;Proper Function;pH Reading   Extracorporeal Circuit Tested for Integrity Yes   Machine Conductivity 13.6   Manual Ph 7.2   Bleach Test (Neg) Yes   Bath Temperature 96.8 °F (36 °C)   Treatment Initiation   Dialyze Hours 3.5   Treatment  Initiation Universal Precautions maintained;Lines secured to patient;Connections secured;Prime given;Venous Parameters set;Arterial Parameters set;Air foam detector engaged;Dialysate;Saline line double clamped;Revaclear Dialyzer;REV-300   During Hemodialysis 
Primary RN SBAR: Skyla Allen, RN   Patient Education provided: procedure, access care  Incapacitated Nurse edu. provided: provided to patient  Preferred Education method and Primary language: verbal, english  Hospital associated wait time; reason: NA  Hepatitis B Surface Ag   Date/Time Value Ref Range Status   04/24/2024 02:30 PM 0.19 Index Final     CWOW  04/19/2024 hbsag negative  04/03/2024 hbsab immune   05/30/24 0130   Vital Signs   BP (!) 92/59   Temp 98.3 °F (36.8 °C)   Pain Assessment   Pain Assessment None - Denies Pain   Pain Level 0   Treatment   Time On 0137   Time Off 0507   Treatment Goal 2000   Observations & Evaluations   Level of Consciousness 0   Oriented X 4   Heart Rhythm Regular   Respiratory Quality/Effort Unlabored   O2 Device None (Room air)   Bilateral Breath Sounds Diminished   Skin Color   (ususal for ethnicity)   Skin Condition/Temp Cool;Dry   Appetite Fair   Abdomen Inspection Gross ascites;Rounded;Soft   Bowel Sounds (All Quadrants) Present   Edema Left lower extremity;Right lower extremity   RLE Edema +1   LLE Edema +1   Technical Checks   Dialysis Machine No. 02   RO Machine Number 02   Dialyzer Lot No. [id number]   [place] Lot Number [id number]   All Connections Secure Yes   NS Bag Yes   Saline Line Double Clamped Yes   Dialyzer Revaclear 300   Prime Volume (mL) 250 mL   ICEBOAT I;C;E;B;O;A;T   RO Machine Log Sheet Completed Yes   Machine Alarm Self Test Completed;Passed   Air Foam Detector Tested;Proper Function   Extracorporeal Circuit Tested for Integrity Yes   Machine Conductivity 14   Manual Ph 7.2   Bleach Test (Neg) Yes   Bath Temperature 98.6 °F (37 °C)   Dialysis Bath   K+ (Potassium) 3   Ca+ (Calcium) 2.5   Na+ (Sodium) 138   HCO3 (Bicarb) 39   Bicarbonate Concentrate Lot No. [id number]   Acid Concentrate Lot No. [id number]   Treatment Initiation   Dialyze Hours 3.5   Treatment  Initiation Universal Precautions maintained;Lines secured to patient;Connections 
Dr. Gonzales made aware.     
Location: Arm   Site Assessment Clean, dry & intact   Thrill Present   Bruit Present   Status Accessed   Venous Needle Size 15 G   Arterial Needle Size 15 G   Accessed By: NERY Rm   Access Attempts  1   Access Interventions Chlorhexidine;Aseptic Technique;Needles taped to patient   Dressing Status Clean, dry & intact      06/15/24 0905   During Hemodialysis Assessment   BP (!) 101/28   Pulse 78   Blood Flow Rate (ml/min) 200 ml/min  (Gradually increased to 400)   Arterial Pressure (mmHg) -180 mmHg   Venous Pressure (mmHg) 170   TMP 30      Comments HD intiiated   Access Visible Yes   Ultrafiltration Rate (ml/hr) 600 ml/hr   Ultrafiltration Removed (ml) 0 ml       Primary RN SBAR: Marcela Ovalles RN  Patient Education provided: Dialysis tx order  Incapacitated Nurse edu. provided: yes  Preferred Education method and Primary language: Verbal, English  Hospital associated wait time; reason: 30 min, BP low at time of arrival 70/55. Midodrine was given and Levo initiated, waited for BP to be stable enough to start HD.  Hepatitis B Surface Ag   Date/Time Value Ref Range Status   05/30/2024 01:19 AM <0.10 Index Final      POST HD TREATMENT   06/15/24 1300   Vital Signs   BP 97/62   Temp 97.9 °F (36.6 °C)   Pulse 86   Respirations 12   SpO2 98 %   Pain Assessment   Pain Assessment 0-10   Pain Level 6   Pain Location Back   Pain Descriptors Aching   Patient's Stated Pain Goal 0 - No pain   Post-Hemodialysis Assessment   Post-Treatment Procedures Blood returned;Access bleeding time < 10 minutes   Machine Disinfection Process Exterior Machine Disinfection   Rinseback Volume (ml) 300 ml   Blood Volume Processed (Liters) 84 L   Dialyzer Clearance Lightly streaked   Duration of Treatment (minutes) 225 minutes   Heparin Amount Administered During Treatment (mL) 0 mL   Hemodialysis Intake (ml) 500 ml   Hemodialysis Output (ml) 1904 ml   NET Removed (ml) 1404   Tolerated Treatment Poor   Interventions Taken Head of bed 
secured;Prime given;Venous Parameters set;Arterial Parameters set;Air foam detector engaged;Saline line double clamped;Revaclear Dialyzer   Hemodialysis Fistula/Graft Arteriovenous fistula Left Arm   No placement date or time found.   Present on Admission/Arrival: Yes  Access Type: Arteriovenous fistula  Orientation: Left  Access Location: Arm   Site Assessment Clean, dry & intact   Thrill Present   Bruit Present   Status Accessed   Venous Needle Size 15 G   Arterial Needle Size 15 G   Accessed By: He Chaparro RN   Access Attempts  1   Access Interventions Chlorhexidine;Aseptic Technique;Needles taped to patient   Dressing Intervention New   Dressing Status New dressing applied     
Absence of Bleach in HCO3 Jug;Exterior Machine Disinfection   Rinseback Volume (ml) 300 ml   Blood Volume Processed (Liters) 68.5 L   Dialyzer Clearance Lightly streaked   Duration of Treatment (minutes) 210 minutes   Hemodialysis Intake (ml) 500 ml   Hemodialysis Output (ml) 500 ml   NET Removed (ml) 0   Tolerated Treatment Fair   Interventions Taken Medication   Patient Disposition Remain in ICU/ED     Primary RN SBAR: Duglas Khan RN  
Machine Disinfection   Rinseback Volume (ml) 200 ml   Blood Volume Processed (Liters) 70.5 L   Dialyzer Clearance Lightly streaked   Duration of Treatment (minutes) 240 minutes   Heparin Amount Administered During Treatment (mL) 0 mL   Hemodialysis Intake (ml) 600 ml   Hemodialysis Output (ml) 2600 ml   NET Removed (ml) 2000   Tolerated Treatment Good   Patient Response to Treatment   (Pt. artem. hd without incident.)   Physician Notified Yes   Patient Disposition Remain in ICU/ED     RN completed patient assessment. RN reviewed technicians vital signs and procedure note. Tx completed. Reviewed by NERY Mcdermott  Pt. Artem. Hd without incident, no s/s of distress. Removed 2L. All possible blood returned to pt. Report given to primary nurse  Kaylin Padilla RN

## 2024-06-17 NOTE — DEATH NOTES
Death Pronouncement Note  Patient's Name: Ros Orr   Patient's YOB: 1975  MRN Number: [id number]    Admitting Provider: Silvio Quevedo MD  Attending Provider: Parish Cronin MD    Patient was examined and the following were absent: Pulses, Blood Pressure, and Respiratory effort    I declared the patient dead on  at 15:48.    Preliminary Cause of Death:   Coronary artery disease and end stage renal disease.      Electronically signed by Gricel Tirado MD on 6/17/24 at 4:26 PM EDT

## 2024-06-17 NOTE — DISCHARGE SUMMARY
was referred for hospital admission for possible pericardial window. She was hemodynamically stable, plans made to monitor. Patient had PEA arrest on 6/3 with 3 minutes of CPR with ROSC achieved. Pericardiocentesis performed 6/3 post arrest draining 1000 cc bloody appearing pericardial fluid. Drain was removed 24. Cultures and cytology negative. Hospital course has been complicated by persistent hypotension requiring pressor support. Today she has large volume aspiration with vomiting and developed PEA cardiac arrest. CPR was immediately started. I spoke with Ms. Hernandez (primary decision maker and she connected with Ms. Baker (co decision maker and POA). They both gave permission to stop CPR while CPR was ongoing for about 20min. Patient was pronounced dead at 15:48.       Condition at discharge:         Gricel Tirado MD  [facility]

## 2024-06-17 NOTE — PROGRESS NOTES
[facility]  [address], [place] [id number] [phone]  [email or web address]  CARDIOLOGY PROGRESS NOTE    6/7/2024 10:20 AM    Admit Date: 5/29/2024    Admit Diagnosis:   Pericardial effusion [I31.39]  Cardiac/pericardial tamponade [I31.4]    Subjective:     Ros Orr was seen at the bedside. Undergoing dialysis with pressor support.     /65   Pulse 79   Temp (!) 96.6 °F (35.9 °C) Comment: warm blanket provided by Primary RN  Resp (!) 9   Ht 1.727 m (5' 7.99\")   Wt 115.2 kg (254 lb)   LMP 05/27/2024 Comment: Pt has periods,  fr spouse  SpO2 97%   BMI 38.63 kg/m²     Current Facility-Administered Medications   Medication Dose Route Frequency    norepinephrine (LEVOPHED) 16 mg in sodium chloride 0.9 % 250 mL infusion  1-100 mcg/min IntraVENous Continuous    HYDROcodone-acetaminophen (NORCO) 5-325 MG per tablet 1 tablet  1 tablet Oral Q6H PRN    Or    HYDROcodone-acetaminophen (NORCO)  MG per tablet 1 tablet  1 tablet Oral Q6H PRN    gabapentin (NEURONTIN) capsule 300 mg  300 mg Oral Once per day on Mon Wed Fri    midodrine (PROAMATINE) tablet 20 mg  20 mg Oral TID    sorbitol 70 % solution 15 mL  15 mL Oral Daily PRN    mineral oil enema 1 enema  1 enema Rectal PRN    Epoetin Harinder-epbx (RETACRIT) injection 20,000 Units  20,000 Units SubCUTAneous Once per day on Mon Wed Fri    ondansetron (ZOFRAN) injection 4 mg  4 mg IntraVENous Q6H PRN    alcohol 62% (NOZIN) nasal  1 ampule  1 ampule Nasal Q12H    acetaminophen (TYLENOL) tablet 650 mg  650 mg Oral Q4H PRN    albumin human 25% IV solution 25 g  25 g IntraVENous PRN    collagenase ointment   Topical Daily    glucose chewable tablet 16 g  4 tablet Oral PRN    dextrose bolus 10% 125 mL  125 mL IntraVENous PRN    Or    dextrose bolus 10% 250 mL  250 mL IntraVENous PRN    glucagon injection 1 mg  1 mg SubCUTAneous PRN    dextrose 10 % infusion   IntraVENous Continuous PRN    
        [facility]  [address], [place] [id number] [phone]  [email or web address]  CARDIOLOGY PROGRESS NOTE    6/6/2024 10:23 AM    Admit Date: 5/29/2024    Admit Diagnosis:   Pericardial effusion [I31.39]  Cardiac/pericardial tamponade [I31.4]    Subjective:     Ros Orr was seen at the bedside.  Clear output in the pericardial drain.  Back on norepinephrine infusion this morning.    BP (!) 99/47   Pulse 77   Temp 98.5 °F (36.9 °C) (Oral)   Resp 15   Ht 1.727 m (5' 7.99\")   Wt 115.2 kg (254 lb)   LMP 05/27/2024 Comment: Pt has periods,  fr spouse  SpO2 98%   BMI 38.63 kg/m²     Current Facility-Administered Medications   Medication Dose Route Frequency    [START ON 6/7/2024] gabapentin (NEURONTIN) capsule 300 mg  300 mg Oral Once per day on Mon Wed Fri    midodrine (PROAMATINE) tablet 20 mg  20 mg Oral TID    mineral oil enema 1 enema  1 enema Rectal PRN    [START ON 6/7/2024] Epoetin Harinder-epbx (RETACRIT) injection 20,000 Units  20,000 Units SubCUTAneous Once per day on Mon Wed Fri    furosemide (LASIX) tablet 80 mg  80 mg Oral BID    ondansetron (ZOFRAN) injection 4 mg  4 mg IntraVENous Q6H PRN    alcohol 62% (NOZIN) nasal  1 ampule  1 ampule Nasal Q12H    acetaminophen (TYLENOL) tablet 650 mg  650 mg Oral Q4H PRN    albumin human 25% IV solution 25 g  25 g IntraVENous PRN    collagenase ointment   Topical Daily    glucose chewable tablet 16 g  4 tablet Oral PRN    dextrose bolus 10% 125 mL  125 mL IntraVENous PRN    Or    dextrose bolus 10% 250 mL  250 mL IntraVENous PRN    glucagon injection 1 mg  1 mg SubCUTAneous PRN    dextrose 10 % infusion   IntraVENous Continuous PRN    aspirin chewable tablet 81 mg  81 mg Oral Daily    atorvastatin (LIPITOR) tablet 20 mg  20 mg Oral Daily    calcitRIOL (ROCALTROL) capsule 0.25 mcg  0.25 mcg Oral Daily    [Held by provider] carvedilol (COREG) tablet 12.5 mg  12.5 mg Oral BID WC    clopidogrel 
      Hospitalist Progress Note    NAME:   Ros Orr   : 1975   MRN: [id number]     Date/Time: 2024 12:02 PM  Patient PCP: Jose Mclaughlin APRN - NP    Estimated discharge date: ?  Barriers: SNF placement      Assessment / Plan:    Worsening pericardial effusion POA-seems to be chronic/subacute  Impending cardiac tamponade POA  Cardiac Arrest POA  Due to acute hyperkalemia-resolved  Due to missed dialysis in ESRD on  schedule  Repeat echo  shows EF of 50 to 55% with greater than 2 cm pericardial effusion with no signs of overt tamponade, increased in size of pericardial effusion      Patient transferred from [facility] to [facility] for worsening pericardial effusion needing pericardial window by cardiac surgery.  Inpatient cardiovascular surgery consult noted- no need for emergent pericardial window at this time. Dr. Herrmann and Dr. Harris discussed cardiology completing a pericardiocentesis later if situation changes.   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 after midnight for further surgical plans by cardiovascular surgery.  Fingersticks before every meal and at bedtime  Sliding scale lispro as patient hypoglycemic on arrival, we will hold off on scheduled lispro with meals  Continue lower dose of Lantus tonight, titrate up as needed  Continue aspirin Plavix, Coreg, Lipitor, Ranexa  Continue Neurontin  Continue home Ranevela, calcitriol  Wound care consult while here  Inpatient case management consult for DC planning as patient was on her way to SNF from [facility] but had to be transferred here for cardiac surgery evaluation               
      Hospitalist Progress Note    NAME:   Ros Orr   : 1975   MRN: [id number]     Date/Time: 2024 2:07 PM  Patient PCP: Jose Mclaughlin APRN - NP    Estimated discharge date: 6/3?  Barriers: SNF placement, ? Pericardial drain      Assessment / Plan:    Worsening pericardial effusion POA-seems to be chronic/subacute  Impending cardiac tamponade POA  Cardiac Arrest POA  Due to acute hyperkalemia-resolved  Due to missed dialysis in ESRD on  schedule  Repeat echo  shows EF of 50 to 55% with greater than 2 cm pericardial effusion with no signs of overt tamponade, increased in size of pericardial effusion      Patient transferred from [facility] to [facility] for worsening pericardial effusion needing pericardial window by cardiac surgery.  Inpatient cardiovascular surgery consult noted- no need for emergent pericardial window at this time. Dr. Herrmann and Dr. Harris discussed cardiology completing a pericardiocentesis later if situation changes.   Inpatient nephrology consult for dialysis.  Cardiology recommended stat echo to evaluate for pericardiocentesis.     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 after midnight for further surgical plans by cardiovascular surgery.  Fingersticks before every meal and at bedtime  Sliding scale lispro as patient hypoglycemic on arrival, we will hold off on scheduled lispro with meals  Continue lower dose of Lantus tonight, titrate up as needed  Continue aspirin Plavix, Coreg, Lipitor, Ranexa  Continue Neurontin  Continue home Ranevela, calcitriol  Wound care consult while here  Inpatient case management consult for DC planning as patient was on her way to SNF from [facility] but had to be transferred here for cardiac surgery evaluation     Slow 
      Hospitalist Progress Note    NAME:   Ros Orr   : 1975   MRN: [id number]     Date/Time: 2024 5:00 PM  Patient PCP: Jose Mclaughlin APRN - NP    Estimated discharge date: ?  Barriers: SNF placement, ? Pericardial drain      Assessment / Plan:    Worsening pericardial effusion POA-seems to be chronic/subacute  Impending cardiac tamponade POA  Cardiac Arrest POA  Due to acute hyperkalemia-resolved  Due to missed dialysis in ESRD on  schedule  Repeat echo  shows EF of 50 to 55% with greater than 2 cm pericardial effusion with no signs of overt tamponade, increased in size of pericardial effusion      Patient transferred from [facility] to [facility] for worsening pericardial effusion needing pericardial window by cardiac surgery.  Inpatient cardiovascular surgery consult noted- no need for emergent pericardial window at this time. Dr. Herrmann and Dr. Harris discussed cardiology completing a pericardiocentesis later if situation changes.   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 after midnight for further surgical plans by cardiovascular surgery.  Fingersticks before every meal and at bedtime  Sliding scale lispro as patient hypoglycemic on arrival, we will hold off on scheduled lispro with meals  Continue lower dose of Lantus tonight, titrate up as needed  Continue aspirin Plavix, Coreg, Lipitor, Ranexa  Continue Neurontin  Continue home Ranevela, calcitriol  Wound care consult while 
      Hospitalist Progress Note    NAME:   Ros Orr   : 1975   MRN: [id number]     Date/Time: 6/3/2024 4:19 PM  Patient PCP: Jose Mclaughlin APRN - NP    Estimated discharge date: > 2 days  Barriers: Transferred to ICU      Assessment / Plan:    Cardiac arrest on 6/3/2024 due to respiratory depression from narcotics:  -Patient achieved ROSC in 4 minutes.  -Patient was given epinephrine.  -Patient was given naloxone.  -ICU consulted.  -Patient being transferred to ICU.      Worsening pericardial effusion POA-seems to be chronic/subacute  Impending cardiac tamponade POA  Cardiac Arrest POA  Due to acute hyperkalemia-resolved  Due to missed dialysis in ESRD on  schedule  Repeat echo  shows EF of 50 to 55% with greater than 2 cm pericardial effusion with no signs of overt tamponade, increased in size of pericardial effusion      Patient transferred from [facility] to [facility] for worsening pericardial effusion needing pericardial window by cardiac surgery.  Inpatient cardiovascular surgery consult noted- no need for emergent pericardial window at this time. Dr. Herrmann and Dr. Harris discussed cardiology completing a pericardiocentesis later if situation changes.   Inpatient nephrology consult for dialysis.  Nephrology started the patient on lasix and bumex.     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 after midnight for further surgical plans by cardiovascular surgery.  Fingersticks before every meal and at bedtime  Sliding scale lispro as patient hypoglycemic on arrival, we will hold off on scheduled lispro with meals  Continue lower dose of Lantus tonight, titrate up as needed  Continue aspirin Plavix, Coreg, Lipitor, Ranexa  Continue Neurontin  Continue home Ranevela, calcitriol  Wound care consult while 
     [facility] PROGRESS NOTE.      Name: Ros Orr   : 1975   MRN: [id number]   Date: 2024      No chief complaint on file.      Reason for ICU admission: Cardiac arrest     Hospital Course:     Briefly, based on H&P \"Ros Orr is a 49 y.o.  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  Case was discussed by the cardiologist with cardiac surgery Dr. Herrmann who recommended patient to be transferred to [facility] for evaluation for pericardial window.\" Patient was transferred to [facility] hospitalist service on  for the workup of worsening pericardial effusion.     6/3/24 - She had cardiac arrest, 3 minutes of CPR done, ROSC obtained. Patient following commands hence not intubated.   Of note, she received dilaudid prior to cardiac arrest, Narcan x 1 given post ROSC. TTE showed moderate sized pericardial effusion, patient had pericardial drain placed and 900 ml of fluid was removed. Patient arrived in ICU on levophed at 7 mcg.   24 - Patient continue to require pressor support, on levophed at 7 mcg this AM. She is on 6L nasal cannula. Had HD today with zero UF. Drain out put slowed down   24 - was off levophed in evening, back on levo at 5 mcg this AM. In no distress otherwise. No complains except reports constipation. Pericardial drain out put is minimal, getting stat TTE to rule out re accumulation and drain blockade   - sitting comfortably on the chair.    - 
     [facility] PROGRESS NOTE.      Name: Ros Orr   : 1975   MRN: [id number]   Date: 2024        Reason for ICU admission: Brief cardiac arrest     Hospital Course/SUBJ:   Initially admitted to [facility]  after [facility] attributed to hyperkalemia due to missed HD sessions. Transferred to [facility]  with large pericardial effusion for evaluation for possible pericardial window. PMH significant for ESRD on hemodialysis , hypertension, diabetes on insulin, chronic lower abdominal wound suspected due to calciphylaxis. Underwent pericardiocentesis . Suffered brief cardiac arrest around the time of procedure. Underwent 3 minutes of CPR done, ROSC obtained. Patient following commands hence not intubated. Of note, she received dilaudid prior to cardiac arrest, Narcan x 1 given post ROSC. TTE showed moderate sized pericardial effusion, patient had pericardial drain placed and 900 ml of fluid was removed. Patient arrived to ICU on levophed at 7 mcg.   24 - Patient continue to require pressor support, on levophed at 7 mcg this AM. She is on 6L nasal cannula. Had HD today with zero UF. Drain out put slowed down   24 - was off levophed in evening, back on levo at 5 mcg this AM. In no distress otherwise. No complains except reports constipation. Pericardial drain out put is minimal, getting stat TTE to rule out re accumulation and drain blockade   - sitting comfortably on the chair.    - complains of back pain.    - sitting comfortably on the chair.    - walking with the help of walker  06/10 - sitting comfortably on the bed, denies any complaints. After taking Flexeril yesterday complaints of heaviness in the legs that has been resolved. I will d/c Flexeril.    - patient is complaining of back pain and heaviness in right leg. No weakness of right leg on neuro exam, DTR's normal. Power 5/5. Sensation normal. No bowel or bladder problems. 
     [facility] PROGRESS NOTE.      Name: Ros Orr   : 1975   MRN: [id number]   Date: 2024        Reason for ICU admission: Brief cardiac arrest     Hospital Course/SUBJ:   Initially admitted to [facility]  after OHCA attributed to hyperkalemia due to missed HD sessions. Transferred to [facility]  with large pericardial effusion for evaluation for possible pericardial window. PMH significant for ESRD on hemodialysis , hypertension, diabetes on insulin, chronic lower abdominal wound suspected due to calciphylaxis. Underwent pericardiocentesis . Suffered brief cardiac arrest around the time of procedure. Underwent 3 minutes of CPR done, ROSC obtained. Patient following commands hence not intubated. Of note, she received dilaudid prior to cardiac arrest, Narcan x 1 given post ROSC. TTE showed moderate sized pericardial effusion, patient had pericardial drain placed and 900 ml of fluid was removed. Patient arrived to ICU on levophed at 7 mcg.   24 - Patient continue to require pressor support, on levophed at 7 mcg this AM. She is on 6L nasal cannula. Had HD today with zero UF. Drain out put slowed down   24 - was off levophed in evening, back on levo at 5 mcg this AM. In no distress otherwise. No complains except reports constipation. Pericardial drain out put is minimal, getting stat TTE to rule out re accumulation and drain blockade   - sitting comfortably on the chair.    - complains of back pain.    - sitting comfortably on the chair.    - walking with the help of walker  06/10 - sitting comfortably on the bed, denies any complaints. After taking Flexeril yesterday complaints of heaviness in the legs that has been resolved. I will d/c Flexeril.    - patient is complaining of back pain and heaviness in right leg. No weakness of right leg on neuro exam, DTR's normal. Power 5/5. Sensation normal. No bowel or bladder problems. 
     [facility] PROGRESS NOTE.      Name: Ros Orr   : 1975   MRN: [id number]   Date: 2024        Reason for ICU admission: Brief cardiac arrest     Hospital Course/SUBJ:   Initially admitted to [facility]  after [facility] attributed to hyperkalemia due to missed HD sessions. Transferred to [facility]  with large pericardial effusion for evaluation for possible pericardial window. PMH significant for ESRD on hemodialysis , hypertension, diabetes on insulin, chronic lower abdominal wound suspected due to calciphylaxis. Underwent pericardiocentesis . Suffered brief cardiac arrest around the time of procedure. Underwent 3 minutes of CPR done, ROSC obtained. Patient following commands hence not intubated. Of note, she received dilaudid prior to cardiac arrest, Narcan x 1 given post ROSC. TTE showed moderate sized pericardial effusion, patient had pericardial drain placed and 900 ml of fluid was removed. Patient arrived to ICU on levophed at 7 mcg.   24 - Patient continue to require pressor support, on levophed at 7 mcg this AM. She is on 6L nasal cannula. Had HD today with zero UF. Drain out put slowed down   24 - was off levophed in evening, back on levo at 5 mcg this AM. In no distress otherwise. No complains except reports constipation. Pericardial drain out put is minimal, getting stat TTE to rule out re accumulation and drain blockade   - sitting comfortably on the chair.    - complains of back pain.    - sitting comfortably on the chair.    - walking with the help of walker  06/10 - sitting comfortably on the bed, denies any complaints. After taking Flexeril yesterday complaints of heaviness in the legs that has been resolved. I will d/c Flexeril.    - patient is complaining of back pain and heaviness in right leg. No weakness of right leg on neuro exam, DTR's normal. Power 5/5. Sensation normal. No bowel or bladder problems. 
     [facility] PROGRESS NOTE.      Name: Ros Orr   : 1975   MRN: [id number]   Date: 2024      No chief complaint on file.      Reason for ICU admission: Cardiac arrest     Hospital Course:     Briefly, based on H&P \"Ros Orr is a 49 y.o.  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  Case was discussed by the cardiologist with cardiac surgery Dr. Herrmann who recommended patient to be transferred to [facility] for evaluation for pericardial window.\" Patient was transferred to [facility] hospitalist service on  for the workup of worsening pericardial effusion.     6/3/24 - She had cardiac arrest, 3 minutes of CPR done, ROSC obtained. Patient following commands hence not intubated.   Of note, she received dilaudid prior to cardiac arrest, Narcan x 1 given post ROSC. TTE showed moderate sized pericardial effusion, patient had pericardial drain placed and 900 ml of fluid was removed. Patient arrived in ICU on levophed at 7 mcg.   24 - Patient continue to require pressor support, on levophed at 7 mcg this AM. She is on 6L nasal cannula. Had HD today with zero UF. Drain out put slowed down   24 - was off levophed in evening, back on levo at 5 mcg this AM. In no distress otherwise. No complains except reports constipation. Pericardial drain out put is minimal, getting stat TTE to rule out re accumulation and drain blockade   - sitting comfortably on the chair.    - 
     [facility] PROGRESS NOTE.      Name: Ros Orr   : 1975   MRN: [id number]   Date: 6/10/2024      No chief complaint on file.      Reason for ICU admission: Cardiac arrest     Hospital Course:     Briefly, based on H&P \"Ros Orr is a 49 y.o.  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  Case was discussed by the cardiologist with cardiac surgery Dr. Herrmann who recommended patient to be transferred to [facility] for evaluation for pericardial window.\" Patient was transferred to [facility] hospitalist service on  for the workup of worsening pericardial effusion.     6/3/24 - She had cardiac arrest, 3 minutes of CPR done, ROSC obtained. Patient following commands hence not intubated.   Of note, she received dilaudid prior to cardiac arrest, Narcan x 1 given post ROSC. TTE showed moderate sized pericardial effusion, patient had pericardial drain placed and 900 ml of fluid was removed. Patient arrived in ICU on levophed at 7 mcg.   24 - Patient continue to require pressor support, on levophed at 7 mcg this AM. She is on 6L nasal cannula. Had HD today with zero UF. Drain out put slowed down   24 - was off levophed in evening, back on levo at 5 mcg this AM. In no distress otherwise. No complains except reports constipation. Pericardial drain out put is minimal, getting stat TTE to rule out re accumulation and drain blockade   - sitting comfortably on the chair.    - 
     [facility] PROGRESS NOTE.      Name: Ros Orr   : 1975   MRN: [id number]   Date: 2024      No chief complaint on file.      Reason for ICU admission: Cardiac arrest     Hospital Course:     Briefly, based on H&P \"Ros Orr is a 49 y.o.  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  Case was discussed by the cardiologist with cardiac surgery Dr. Herrmann who recommended patient to be transferred to [facility] for evaluation for pericardial window.\"  [facility] [facility] hospitalist service on  for the workup of worsening pericardial effusion.   6/3/24 - She had cardiac arrest, 3 minutes of CPR done, ROSC obtained. Patient following commands hence not intubated.   Of note, she received dilaudid prior to cardiac arrest, Narcan x 1 given post ROSC. TTE showed moderate sized pericardial effusion, patient had pericardial drain placed and 900 ml of fluid was removed. Patient arrived in ICU on levophed at 7 mcg.     24 - Patient continue to require pressor support, on levophed at 7 mcg this AM. She is on 6L nasal cannula. Had HD today with zero UF. Drain out put slowed down     Assessment & Plan     # Cardiac arrest, likely of mixed etiology with significant contribution from pericardial effusion that has been drained now and patient has drain in place. No significant ischemic damage to brain. Patient continue to be in state of shock, still needing levophed  Plan  - 
     SOUND CRITICAL CARE PROGRESS NOTE.      Name: Ros Orr   : 1975   MRN: [id number]   Date: 2024      No chief complaint on file.      Reason for ICU admission: Cardiac arrest     Hospital Course:     Briefly, based on H&P \"Ros Orr is a 49 y.o.  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  Case was discussed by the cardiologist with cardiac surgery Dr. Herrmann who recommended patient to be transferred to [facility] for evaluation for pericardial window.\" Patient was transferred to [facility] hospitalist service on  for the workup of worsening pericardial effusion.     6/3/24 - She had cardiac arrest, 3 minutes of CPR done, ROSC obtained. Patient following commands hence not intubated.   Of note, she received dilaudid prior to cardiac arrest, Narcan x 1 given post ROSC. TTE showed moderate sized pericardial effusion, patient had pericardial drain placed and 900 ml of fluid was removed. Patient arrived in ICU on levophed at 7 mcg.   24 - Patient continue to require pressor support, on levophed at 7 mcg this AM. She is on 6L nasal cannula. Had HD today with zero UF. Drain out put slowed down   24 - was off levophed in evening, back on levo at 5 mcg this AM. In no distress otherwise. No complains except reports constipation. Pericardial drain out put is minimal, getting stat TTE to rule out re accumulation and drain blockade   - sitting comfortably on the chair.    - 
     SOUND CRITICAL CARE PROGRESS NOTE.      Name: Ros Orr   : 1975   MRN: [id number]   Date: 2024      No chief complaint on file.      Reason for ICU admission: Cardiac arrest     Hospital Course:     Briefly, based on H&P \"Ros Orr is a 49 y.o.  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  Case was discussed by the cardiologist with cardiac surgery Dr. Herrmann who recommended patient to be transferred to [facility] for evaluation for pericardial window.\"  Patient was transferred to [facility] hospitalist service on  for the workup of worsening pericardial effusion.   6/3/24 - She had cardiac arrest, 3 minutes of CPR done, ROSC obtained. Patient following commands hence not intubated.   Of note, she received dilaudid prior to cardiac arrest, Narcan x 1 given post ROSC. TTE showed moderate sized pericardial effusion, patient had pericardial drain placed and 900 ml of fluid was removed. Patient arrived in ICU on levophed at 7 mcg.     24 - Patient continue to require pressor support, on levophed at 7 mcg this AM. She is on 6L nasal cannula. Had HD today with zero UF. Drain out put slowed down   24 - was off levophed in evening, back on levo at 5 mcg this AM. In no distress otherwise. No complains except reports constipation. Pericardial drain out put is minimal, getting stat TTE to rule out re accumulation and drain blockade   - sitting comfortably on the chair.    - 
     SOUND CRITICAL CARE PROGRESS NOTE.      Name: Ros rOr   : 1975   MRN: [id number]   Date: 2024      No chief complaint on file.      Reason for ICU admission: Cardiac arrest     Hospital Course:     Briefly, based on H&P \"Ros Orr is a 49 y.o.  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  Case was discussed by the cardiologist with cardiac surgery Dr. Herrmann who recommended patient to be transferred to [facility] for evaluation for pericardial window.\"  Patient was transferred to [facility] hospitalist service on  for the workup of worsening pericardial effusion.   6/3/24 - She had cardiac arrest, 3 minutes of CPR done, ROSC obtained. Patient following commands hence not intubated.   Of note, she received dilaudid prior to cardiac arrest, Narcan x 1 given post ROSC. TTE showed moderate sized pericardial effusion, patient had pericardial drain placed and 900 ml of fluid was removed. Patient arrived in ICU on levophed at 7 mcg.     24 - Patient continue to require pressor support, on levophed at 7 mcg this AM. She is on 6L nasal cannula. Had HD today with zero UF. Drain out put slowed down   24 - was off levophed in evening, back on levo at 5 mcg this AM. In no distress otherwise. No complains except reports constipation. Pericardial drain out put is minimal, getting stat TTE to rule out re accumulation and drain blockade   - sitting comfortably on the chair.     Assessment & 
    [email or web address]    [facility]   Nephrology Progress Note  Pt Name : Ros Orr  Date of Admission : 5/29/2024    CC:  Follow up for ESRD       Assessment and Plan   ESRD-HD   - Dialyzes MWF at [facility], Dr Regalado  - MICHAEL AVF for access  - next HD tomorrow     Anasarca   - additional UF today     Hypotension   Leucocytosis : pending results of blood cx from 6/12  - Multiple skin wounds,  non healing, painful. Although does not appear to be classic case of calciphylaxis, need to be excluded   -pressors, midodrine, florinef pr CCM   - stim test ok    CKD- MBD  - check PTH, Phos   - continue calcitriol     S/p cardiac arrest   Pericardial effusion -s/p drain     Anemia in CKD  - continue TIFFANY    L1 compression fracture   HERNIATED DISC  - per ortho    Discussed with : pt, ICU tea    For other plans, see orders.    Interval History:  Seen and examined. Hb down ? Bad lab draw  BP stable.     Review of Systems: A comprehensive review of systems was negative.    Current Facility-Administered Medications   Medication Dose Route Frequency    midodrine (PROAMATINE) tablet 15 mg  15 mg Oral 4x daily    lidocaine (XYLOCAINE) 2 % uro-jet   Topical PRN    diclofenac sodium (VOLTAREN) 1 % gel 4 g  4 g Topical BID    lidocaine 4 % external patch 1 patch  1 patch TransDERmal Daily    insulin glargine (LANTUS) injection vial 10 Units  10 Units SubCUTAneous Nightly    fludrocortisone (FLORINEF) tablet 0.1 mg  0.1 mg Oral BID    traMADol (ULTRAM) tablet 50 mg  50 mg Oral Q6H PRN    amiodarone (CORDARONE) tablet 200 mg  200 mg Oral BID    zinc oxide 40 % paste   Topical 4x Daily PRN    gabapentin (NEURONTIN) capsule 300 mg  300 mg Oral Once per day on Mon Wed Fri    sorbitol 70 % solution 15 mL  15 mL Oral Daily PRN    mineral oil enema 1 enema  1 enema Rectal PRN    Epoetin Harinder-epbx (RETACRIT) injection 20,000 Units  20,000 Units SubCUTAneous Once per day on 
    [email or web address]    [facility]   Nephrology Progress Note  Pt Name : Ros Orr  Date of Admission : 5/29/2024    CC:  Follow up for ESRD       Assessment and Plan   ESRD-HD   - Dialyzes MWF at [facility], Dr Regalado  - MICHAEL AVF for access  - HD for today  - will reassess for daily needs in AM    Refractory Hypotension   Leucocytosis : -ve blood cx. 6/12  - Multiple skin wounds,  non healing, painful. Although does not appear to be classic case of calciphylaxis, need to be excluded   -pressors, midodrine, florinef pr CCM   - stim test ok    CKD- MBD  - check PTH  - continue calcitriol     S/p cardiac arrest   Pericardial effusion -s/p drain  EF 35%  Mod- severe Pulm HTN, RV dysfx   Refractory Hypotension   - consider repeat Echo is persistent Hypotension   - consider AHF consult    Anemia in CKD  - continue TIFFANY    L1 compression fracture   HERNIATED DISC  - per ortho     Interval History:  Seen and examined.  C/o pain.  On levo.  Had isolated UF 2.2L yesterday.  Palliative care consulted today    Review of Systems: A comprehensive review of systems was negative.    Current Facility-Administered Medications   Medication Dose Route Frequency    pantoprazole (PROTONIX) 40 mg in sodium chloride (PF) 0.9 % 10 mL injection  40 mg IntraVENous Daily    bisacodyl (DULCOLAX) suppository 10 mg  10 mg Rectal Daily    aspirin suppository 300 mg  300 mg Rectal Daily    insulin lispro (HUMALOG,ADMELOG) injection vial 0-8 Units  0-8 Units SubCUTAneous Q6H    lactulose (CHRONULAC) 10 GM/15ML solution 20 g  20 g Per NG tube TID    iopamidol (ISOVUE-370) 76 % injection 100 mL  100 mL IntraVENous ONCE PRN    diatrizoate meglumine-sodium (GASTROGRAFIN) 66-10 % solution 30 mL  30 mL Oral ONCE PRN    norepinephrine (LEVOPHED) 16 mg in sodium chloride 0.9 % 250 mL infusion  0-20 mcg/min IntraVENous Continuous    [Held by provider] midodrine (PROAMATINE) tablet 15 
    [email or web address]    [facility]   Nephrology Progress Note  Pt Name : Ros Orr  Date of Admission : 5/29/2024    CC:  Follow up for ESRD       Assessment and Plan   ESRD-HD   - Dialyzes MWF at [facility], Dr Regalado  - MICHAEL AVF for access  - HD today per schedule     Anasarca   - UF challenge with pressor support today     Hypotension   Leucocytosis : ordered blood cx  - Multiple skin wounds,  non healing, painful. Although does not appear to be classic case of calciphylaxis, need to be excluded   -pressors, midodrine, florinef pr CCM     CKD- MBD  - check PTH, Phos   - continue calcitriol     S/p cardiac arrest   Pericardial effusion -s/p drain     Anemia in CKD  - continue TIFFANY    Discussed with :    For other plans, see orders.    Interval History:  Seen and examined. Hospital course reviewed and d/w pt  Continues to have pain in wounds and RLE  Remains on low dose levophed     Review of Systems: A comprehensive review of systems was negative.    Current Facility-Administered Medications   Medication Dose Route Frequency    lidocaine 4 % external patch 1 patch  1 patch TransDERmal Daily    insulin glargine (LANTUS) injection vial 10 Units  10 Units SubCUTAneous Nightly    fludrocortisone (FLORINEF) tablet 0.1 mg  0.1 mg Oral BID    traMADol (ULTRAM) tablet 50 mg  50 mg Oral Q6H PRN    norepinephrine (LEVOPHED) 16 mg in sodium chloride 0.9 % 250 mL infusion  1-100 mcg/min IntraVENous Continuous    amiodarone (CORDARONE) tablet 200 mg  200 mg Oral BID    zinc oxide 40 % paste   Topical 4x Daily PRN    gabapentin (NEURONTIN) capsule 300 mg  300 mg Oral Once per day on Mon Wed Fri    midodrine (PROAMATINE) tablet 20 mg  20 mg Oral TID    sorbitol 70 % solution 15 mL  15 mL Oral Daily PRN    mineral oil enema 1 enema  1 enema Rectal PRN    Epoetin Harinder-epbx (RETACRIT) injection 20,000 Units  20,000 Units SubCUTAneous Once per day on Mon Wed 
   06/14/24 1633   NIV Type   Ventilator ID [id number]   NIV Started/Stopped (S)  Off  (Patient refusing to wear BIPAP. last documented use was on 6/9/24. Has not been worn since. Removing equipment from patients room at this time.)   Equipment Type [employer] [id number]       
  Attended Interdisciplinary team rounds in the CCU where patient plan of care was discussed        Marco Antonio Newman  [facility]  [facility] Service [phone]  
  Physician Progress Note      PATIENT:               GHANSHYAM KELLY  [facility] #:                  [id number]  :                       1975  ADMIT DATE:       2024 4:59 PM  DISCH DATE:  RESPONDING  PROVIDER #:        Huan Senior MD        QUERY TEXT:    Type of Shock: Please provide further specificity, if known.    Clinical indicators include: cardiac arrest, cpr, shock, blood, antibiotics  Options provided:  -- Cardiogenic shock  -- Septic shock  -- Hypovolemic shock  -- Hemorrhagic shock due to trauma  -- Hemorrhagic shock related to surgery  -- Anaphylactic shock  -- Other - I will add my own diagnosis  -- Disagree - Not applicable / Not valid  -- Disagree - Clinically Unable to determine / Unknown        PROVIDER RESPONSE TEXT:    Provider dismissed this query because it was not applicable to the patient or   not a valid query.      Electronically signed by:  Huan Senior MD 2024 3:18 PM          
 and Intern was on the floor rounding when a CODE BLUE was called by Fela GABRIEL; she pressed the button.  provided support for the staff.  called a pause for the staff and prayed; after the patient . The doctor stated the friend Dawna Hernandez [phone] is on her way to the hospital.  was advised of the status of the patient's family.  advised staff to page when the family arrives at the hospital.  thanked the staff for their work and service.  advised the on call  of the status of the patient's family.    [facility] are available 24 hours a day as requested.     Rev. BEBETO Rendon. [facility]   Paging Service [phone]  
 on the floor rounding and to visit with Ms. Orr. She is receiving medical treatment at this time.  will follow up with her.     Spirit[facility] Services are available 24 hours a day as requested.     Rev. YOGI Rendon  [facility]   Paging Service [phone]  
 received a consult order from Benedict in reference to a visit for the patient.  talked with her nurse Fela RN in reference to her status. Ms. Orr was asleep when the  entered her room.  prayed silently.    [facility] Services are available 24 hours a day as requested.    Rev. YOGI Rendon [facility]   Paging Service [phone]  
0700- Bedside shift change report given to Ronnie (oncoming nurse) by Ally (offgoing nurse). Report included the following information Nurse Handoff Report, Index, Adult Overview, Intake/Output, MAR, and Recent Results.     1130- PICC team was able to place additional PIV access.     1700- Wound care dressing completed and pt bathed. Levo weaned down to 4.     1900- Bedside shift change report given to Milana (oncoming nurse) by Ronnie (offgoing nurse). Report included the following information Nurse Handoff Report, Index, Adult Overview, Intake/Output, MAR, and Recent Results.     
0700- Bedside shift change report given to Ronnie (oncoming nurse) by Milana (offgoing nurse). Report included the following information Nurse Handoff Report, Index, Adult Overview, Surgery Report, Intake/Output, MAR, and Recent Results.     1000- Plan to day to slowly wean levophed as tolerated. MAP goal now 60.     1430- Pt attempted to ambulate to the chair but was very weak and could barely stand. PT put back in bed. VSS.     1700- Pt bathed and wound care completed. Pt ambulated to the chair with walker and RN assistance. Complains of RLE weakness. No RUE weakness,  are equal, no facial droop, or numbness noted. Dr. Orozco made aware and will continue to monitor.     1900- Bedside shift change report given to Nani (oncoming nurse) by Ronnie (offgoing nurse). Report included the following information Nurse Handoff Report, Index, Adult Overview, Surgery Report, Intake/Output, MAR, and Recent Results.   
0700: Bedside shift change report given to NERY Camara (oncoming nurse) by NERY Bee (offgoing nurse). Report included the following information Nurse Handoff Report, Adult Overview, Intake/Output, MAR, Recent Results, Cardiac Rhythm NSR, Alarm Parameters, Quality Measures, Neuro Assessment, and Event Log.       0730: Spoke to Dr. Orozco regarding patients blood pressure. He believes cuff is positional. No new orders     0800: Shift assessment complete see flowsheet    0835: Dialysis at bedside    0853: Levo gtt started    1020: Pericardial drain     1200: Reassessment complete see flowsheet    1245: Dr. Orozco made aware patient went into A-fib during dialysis. No new orders at this time    1318: Amio bolus given    1330: Orders to hold Amio gtt per Arvind. Pt has single lumen picc. Levo and amio not compatible. Orders to start PO amio Per Dr. Orozco    1440: Echo at bedside    1530: Bath complete    1635: Xray complete    1700: Reassessment complete see flowsheet      1915: Bedside shift change report given to NERY Canales (oncoming nurse) by NERY Camara (offgoing nurse). Report included the following information Nurse Handoff Report, Adult Overview, Surgery Report, Intake/Output, Cardiac Rhythm NSR, Alarm Parameters, Quality Measures, Neuro Assessment, and Event Log.     
0700: End of Shift Note    Bedside shift change report given to NERY Ghosh (oncoming nurse) by Riddhi Bob RN (offgoing nurse).  Report included the following information SBAR, Kardex, ED Summary, Procedure Summary, Intake/Output, MAR, Recent Results, and Cardiac Rhythm NSR    Shift worked:  [id number]     Shift summary and any significant changes:    -Pt complaining of nausea. Provider noticed. Orders placed for PRN Zofran. Medication given.     -SS insulin held for     -Unable to obtain labs as midline will not draw back and pt refuses to be stuck. Will pass on to day shift RN to have labs collects with Dialysis     -PRN tylenol given for 9/10 flank pain rt wound    -Daily weight obtained via standing scale. Per pt, she would like only use standing scale and bed  measurement is not accurate.    Concerns for physician to address: -     Zone phone for oncoming shift:  [phone]       Riddhi Bob RN                            
0710- Bedside, Verbal, and Written shift change report given to Fela GABRIEL (oncoming nurse) by Michelle RN & Chloe RN (offgoing nurse). Report included the following information SBAR, Kardex, Intake/Output, MAR, and Cardiac Rhythm NSR .   HD nurse at bedside.    0800- Shift assessment completed, see flowsheets.     1100- Dr. Ortiz, cardiology, at bedside to discuss possibility of pericardiocentesis. Awaiting ECHO to determine.    1130- Wound care completed at this time per orders.    1200- Reassessment completed, see flowsheets.     1455- TRANSFER - OUT REPORT:    Verbal report given to Haily GABRIEL on Ros Orr  being transferred to [place] [id number] for routine progression of patient care       Report consisted of patient's Situation, Background, Assessment and   Recommendations(SBAR).     Information from the following report(s) Nurse Handoff Report, Index, ED Encounter Summary, Intake/Output, MAR, Recent Results, and Cardiac Rhythm NSR  was reviewed with the receiving nurse.    Opportunity for questions and clarification was provided.      Patient transported @1513 with:  Monitor, Patient-specific medications from Pharmacy, and Registered Nurse.      Fela Ruffin, RN       
0712- Bedside, Verbal, and Written shift change report given to Fela RN (oncoming nurse) by Michelle RN & Chloe RN (offgoing nurse). Report included the following information SBAR, Kardex, ED Summary, Intake/Output, Recent Results, and Cardiac Rhythm NSR .     0740- HD nurse at bedside.    0800- Shift assessment completed, see flowsheets.     1200- Reassessment completed, see flowsheets.     1215- Wound care completed following wound care orders.    1600- Reassessment completed, see flowsheets.   Pt refusing to wear cardiac monitor periodically, educated on importance of wearing continuous cardiac monitor.     1910- Bedside, Verbal, and Written shift change report given to Michelle RN & Chloe RN (oncoming nurse) by Fela RN (offgoing nurse). Report included the following information SBAR, Kardex, Intake/Output, MAR, and Cardiac Rhythm NSR .     Fela Ruffin, RN  
0716- Bedside, Verbal, and Written shift change report given to Fela RN (oncoming nurse) by Michelle RN & Chloe RN (offgoing nurse). Report included the following information SBAR, Kardex, Intake/Output, MAR, Recent Results, and Cardiac Rhythm NSR .     0800- Shift assessment completed, see flowsheets.     1200- Reassessment completed, see flowsheets.   Pt refusing NGT placement at this time.    1400- Attempted to place NGT at this time, unsuccessful.    1525- Upon entering patient's room, Arterial SBP dropped to 30's, pt not responding, called mera blue.    Fela Ruffin, RN  
0720: Bedside shift change report given to NERY Camara (oncoming nurse) by NERY Walters (offgoing nurse). Report included the following information Nurse Handoff Report, Adult Overview, Surgery Report, Intake/Output, MAR, Cardiac Rhythm NSR, Quality Measures, Neuro Assessment, and Event Log.     0800: Shift assessment complete see flowsheet    0942: Levo gtt restarted. MAP goal changed to 60 Per. Ernesto    1030: PT/OT with patient    1050: Primitivo NP CTS at bedside.     1200: Reassessment complete see flowsheet    1400: Dr. Orozco made aware of hard lumps on patients back and legs.     1540: Bath completed    1920: Bedside shift change report given to NERY Bee (oncoming nurse) by NERY Camara (offgoing nurse). Report included the following information Nurse Handoff Report, Adult Overview, Intake/Output, MAR, Recent Results, Cardiac Rhythm NSR, Alarm Parameters, Quality Measures, Neuro Assessment, and Event Log.     
0725: Bedside shift change report given to NERY Camara (oncoming nurse) by NERY Garcia (offgoing nurse). Report included the following information Nurse Handoff Report, Adult Overview, Intake/Output, MAR, Cardiac Rhythm NSR, Alarm Parameters, Quality Measures, Neuro Assessment, and Event Log.     0737: Blood glucose 63.Dr. Orozco made aware.Orders to give patient orange juice and crackers  0802: Rechecked BG level is 68.   0913: Blood glucose level is 99    0800: Shift assessment complete see flowsheet    1130: Nephrology at bedside    1200: Reassessment complete see flowsheet    1400: MRI form completed    1600: Reassessment complete see flowsheet    1845: MRI called planning to scan patient at 8pm. Will notify nightshift RN    1910: Bedside shift change report given to NERY Bee (oncoming nurse) by NERY Camara (offgoing nurse). Report included the following information Nurse Handoff Report, Adult Overview, Intake/Output, MAR, Cardiac Rhythm NSR, Alarm Parameters, Quality Measures, Neuro Assessment, and Event Log, MRI time.     
0730 Nephrology at bedside, scheduled furosemide 80mg PO BID added     0930 Pt off unit to HD suit, clopidogrel, aspirin ( for potential CT intervention of pericardial effusion) , furosemide, ranolazine held    1200 Received a call from dialysis suit, pt c/o severe left flank pain where she has a festering wound ( spider bite - per patient?), Norco administered    1430 pt returned from HD, 3.5 L of fluid pulled off    1435 At reassessment pt in severe pain and in tears, bent over sitting at the EOB begging for strong pain medication     Dr. Zhao messaged to come to bedside to re-evaluate the patient    1450 Dr Zhao at bedside, orders for 1mg IV hydromorphone received    1512 hydromorphone administered    1522 received a call from monitoring that pt has bradycardia HR 35    1524 pt found  unresponsive, no pulse in the room, code blue called, CPR started,     1525 RRT in the room, CPR continued, pt begged with 100% oxygen  1526 epinephrine administered, ROSC obtained, non -rebreather applied    1535 naloxone administered after ROSC (dilaudid received prior to cardiac arrest)    1540 Pt transferred to CCU          
1132- Spoke to APS,Park Russo, from [facility] emergency line provided by ER charge nurse.    Report # is [id number]  [facility] [phone] 1200- Spoke with Richard Vizcarra from forensics.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

## 2024-06-17 NOTE — PROCEDURES
PROCEDURE NOTE  Date: 6/17/2024   Name: Ros Orr  YOB: 1975          Arterial Catheter Insertion Procedure Note    Diagnosis: Hypotension    Indications: Continuous BP monitoring    Location:ICU    Performed by: Arnel BRADFORD    Assistants: RN    Procedure Details:  Informed/Emergent consent was obtained for the procedure from the patient or family.    Time-Out Process performed.    Under sterile conditions (hand hygiene prior to donning gloves, gown, mask, sterile drape), the skin above the right radial artery was prepped with chlorhexidine. Local anesthesia was infiltrated into the skin and subcutaneous tissues. The Radial Art-line kit was used. A introducer needle was inserted into the Right Radial Artery. A guide wire was easily inserted through the needle. The 20 G arterial catheter was advanced over the guidewire and the guide wire and needle were both removed. The arterial catheter was connected to the pressure tubing for the system. The catheter was flushed and the tracing was adequate. The catheter was sutured, and a sterile dressing was applied.    US Guidance: Yes    Complications: None    Condition: Critical      Arnel BRADFORD, ACNP-[facility] - Critical Care

## 2024-06-17 NOTE — CARE COORDINATION
Transition of Care Plan:     RUR: 35%  Prior Level of Functioning: some assistance  Disposition: Possible IPR  If SNF or IPR: Date FOC offered: 6/10/24  Date FOC received: 6/10/24  Accepting facility: [facility]  Date authorization started with reference number: To be determined after facility has accepted.   Date authorization received and expires: To be determined after facility has accepted.   Follow up appointments: To be done by facility  DME needed: hasrolling walker and wheelchair  Transportation at discharge: TBD  IM/IMM Medicare/ letter given: To be given prior to discharge.   Is patient a [place] and connected with [facility]? No              If yes, was [place] transfer form completed and [facility] notified? N/A  Caregiver Contact: [facility]--No  Barriers---Medical stabiity        Patient continues to receive care in ccu. Receiving HD at this time. Palliative meeting scheduled for 6/18/24. Will continue to monitor. [facility] in [place] continues to follow.         Kinza Yung  RN BSN CRM        [phone]

## 2024-06-17 NOTE — CODE DOCUMENTATION
Patient had large volume emesis with aspiration leading to cardiac arrest. Code blue was called and I was close to her room and responded immediately. I intubated her during CPR. Please refer to MAR for medications given during the code. I called her POAs. I spoke with Ms. Hernandez (primary decision maker and she connected with Ms. Baker (co decision maker and POA). They both gave permission to stop CPR while CPR was ongoing for about 20min. Patient was pronounced dead at 15:48.    Gricel Tirado MD  Critical care

## 2024-06-18 LAB
BACTERIA SPEC CULT: NORMAL
SERVICE CMNT-IMP: NORMAL

## 2024-06-21 LAB
ALBUMIN SERPL ELPH-MCNC: 2.6 G/DL (ref 2.9–4.4)
ALBUMIN/GLOB SERPL: 0.9 (ref 0.7–1.7)
ALPHA1 GLOB SERPL ELPH-MCNC: 0.5 G/DL (ref 0–0.4)
ALPHA2 GLOB SERPL ELPH-MCNC: 0.9 G/DL (ref 0.4–1)
B-GLOBULIN SERPL ELPH-MCNC: 0.6 G/DL (ref 0.7–1.3)
GAMMA GLOB SERPL ELPH-MCNC: 1.2 G/DL (ref 0.4–1.8)
GLOBULIN SER-MCNC: 3.2 G/DL (ref 2.2–3.9)
IGA SERPL-MCNC: 276 MG/DL (ref 87–352)
IGG SERPL-MCNC: 1195 MG/DL (ref 586–1602)
IGM SERPL-MCNC: 113 MG/DL (ref 26–217)
INTERPRETATION SERPL IEP-IMP: ABNORMAL
KAPPA LC FREE SER-MCNC: 190.3 MG/L (ref 3.3–19.4)
KAPPA LC FREE/LAMBDA FREE SER: 0.97 (ref 0.26–1.65)
LAMBDA LC FREE SERPL-MCNC: 196.1 MG/L (ref 5.7–26.3)
Lab: NORMAL
Lab: NORMAL
M PROTEIN SERPL ELPH-MCNC: ABNORMAL G/DL
PROT SERPL-MCNC: 5.8 G/DL (ref 6–8.5)
REFERENCE LAB: NORMAL

## 2024-06-23 LAB
BACTERIA SPEC CULT: NORMAL
SERVICE CMNT-IMP: NORMAL

## (undated) DEVICE — Z INACTIVE UOM QTY CHANGE USE 2863475 DEVICE INFL 20ML BRL POLYCARB ANALG

## (undated) DEVICE — THE ENDO CARRY-ON PROCEDURE KIT CONTAINS ALL OF THE SUPPLIES AND INFECTION PREVENTION PRODUCTS NEEDED FOR ENDOSCOPIC PROCEDURES: Brand: ENDO CARRY-ON PROCEDURE KIT

## (undated) DEVICE — CATHETER ANGIO JR4 PIG STD AD 4 FRX110 CM MP QUIK CARE INFIN

## (undated) DEVICE — SUTURE MONOCRYL SZ 4-0 L27IN ABSRB UD L19MM PS-2 1/2 CIR PRIM Y426H

## (undated) DEVICE — CATH BLLN ANGIO 2.50X12MM NC EUPHORIA RX

## (undated) DEVICE — CATHETER ETER DIAG JR 4 JL 4 PGTL 52FR SUP TORQ + MULTIPAC

## (undated) DEVICE — KIT ACCS INTRO 4FR L10CM NDL 21GA L7CM GWIRE L40CM

## (undated) DEVICE — PINNACLE INTRODUCER SHEATH: Brand: PINNACLE

## (undated) DEVICE — TOOL INSRT 0022IN S STL GWIRE

## (undated) DEVICE — 3M™ STERI-DRAPE™ SMALL DRAPE WITH ADHESIVE APERTURE 1092 25/BX,4/CS&#X20;: Brand: STERI-DRAPE™

## (undated) DEVICE — 48" PROBE COVER W/GEL, ULTRASOUND, STERILE: Brand: SITE-RITE

## (undated) DEVICE — SYRINGE ANGIO 20ML WHT POLYCARB VAC PRSS CAP PLUNG FIX M

## (undated) DEVICE — GLIDESHEATH SLENDER ACCESS KIT: Brand: GLIDESHEATH SLENDER

## (undated) DEVICE — DEVICE COMPR LNG 27 CM VASC BND

## (undated) DEVICE — COPILOT BLEEDBACK CONTROL VALVE: Brand: COPILOT

## (undated) DEVICE — SYRINGE MED 10ML PUR GAM COMPATIBLE POLYCARB FIX M LUER CONN

## (undated) DEVICE — KIT,1200CC CANISTER,3/16"X6' TUBING: Brand: MEDLINE INDUSTRIES, INC.

## (undated) DEVICE — Device: Brand: EAGLE EYE PLATINUM ST RX DIGITAL IVUS CATHETER

## (undated) DEVICE — TUBING ANGIO L30IN ID18MM 1200PSI POLYUR FLX BRAID AIRLESS

## (undated) DEVICE — BLADE ES ELASTOMERIC COAT INSUL DURABLE BEND UPTO 90DEG

## (undated) DEVICE — CATHETER GUID 5FR GWIRE 0.058IN COR EXTRA BKUP SUPP 3.5 ACT

## (undated) DEVICE — COPE MANDRIL WIRE GUIDE NITINOL: Brand: COPE

## (undated) DEVICE — GLIDESHEATH SLENDER NITINOL HYDROPHILIC COATED INTRODUCER SHEATH: Brand: GLIDESHEATH SLENDER

## (undated) DEVICE — PRESSURE TUBING: Brand: TRUWAVE

## (undated) DEVICE — HYPODERMIC SAFETY NEEDLE: Brand: MAGELLAN

## (undated) DEVICE — GLIDESHEATH SLENDER STAINLESS STEEL KIT: Brand: GLIDESHEATH SLENDER

## (undated) DEVICE — GUIDEWIRE VASC L260CM DIA0.035IN RAD 3MM J TIP L7CM PTFE

## (undated) DEVICE — 3M™ TEGADERM™ TRANSPARENT FILM DRESSING FRAME STYLE, 1626W, 4 IN X 4-3/4 IN (10 CM X 12 CM), 50/CT 4CT/CASE: Brand: 3M™ TEGADERM™

## (undated) DEVICE — TOWEL,OR,DSP,ST,BLUE,STD,2/PK,40PK/CS: Brand: MEDLINE

## (undated) DEVICE — CATHETER GUID EBU3.5 2.5 MM 5 FRX100 CM VISTA BRT TIP

## (undated) DEVICE — DEVICE INFL 20ML BRL POLYCARB ANALG LUMINESCENT DISPLAY ANG

## (undated) DEVICE — RUNTHROUGH NS EXTRA FLOPPY PTCA GUIDEWIRE: Brand: RUNTHROUGH

## (undated) DEVICE — SOLUTION IRRIG 1000ML 0.9% SOD CHL USP POUR PLAS BTL

## (undated) DEVICE — SURGICAL PROCEDURE TRAY CRD CATH 3 PRT

## (undated) DEVICE — CATHETER GUID 6FR L100CM DIA0.071IN NYL SHFT EBU3.0 W/O

## (undated) DEVICE — MICROPUNCTURE INTRODUCER SET SILHOUETTE TRANSITIONLESS WITH STAINLESS STEEL WIRE GUIDE: Brand: MICROPUNCTURE

## (undated) DEVICE — CATH BLLN ANGIO 2.75X12MM NC EUPHORIA RX

## (undated) DEVICE — FCPS RAD JAW 4LC 240CM W/NDL -- BX/20 RADIAL JAW 4

## (undated) DEVICE — SC 3W MP RA OFF PB - PG: Brand: NAMIC

## (undated) DEVICE — FORCEPS BX L240CM JAW DIA2.8MM L CAP W/ NDL MIC MESH TOOTH

## (undated) DEVICE — DEVICE TORQ FLRESCNT PNK FOR HEMSTAS VLV

## (undated) DEVICE — SUTURE VICRYL + SZ 3-0 L27IN ABSRB UD L26MM SH 1/2 CIR VCP416H

## (undated) DEVICE — CHEST/BREAST-MRMCASU: Brand: MEDLINE INDUSTRIES, INC.

## (undated) DEVICE — 3M™ IOBAN™ 2 ANTIMICROBIAL INCISE DRAPE 6650EZ: Brand: IOBAN™ 2

## (undated) DEVICE — BOWL MED M 16OZ PLAS CAP GRAD

## (undated) DEVICE — GUIDEWIRE VASC L260CM DIA0.035IN TIP L3MM STD EXCHG PTFE J

## (undated) DEVICE — CATH BLLN ANGIO 2.50X12MM SC EUPHORA RX

## (undated) DEVICE — PENCIL ES CRD L10FT HND SWCHING ROCK SWCH W/ EDGE COAT BLDE

## (undated) DEVICE — CATH BLLN ANGIO 2.25X15MM SC EUPHORA RX

## (undated) DEVICE — BLOCK BITE STD GRN ORAL AD W/O STRP SLD PLAS DISP BITEGARD

## (undated) DEVICE — SYRINGE MED 10ML RED POLYCARB BRL FIX M LUER CONN FLAT GRP

## (undated) DEVICE — CATH BLLN ANGIO 3X8MM NC EUPHORIA RX

## (undated) DEVICE — WASTEBAG DRIP/ADAPTER: Brand: MEDLINE INDUSTRIES, INC.

## (undated) DEVICE — 3M™ TEGADERM™ TRANSPARENT FILM DRESSING FRAME STYLE, 1627, 4 IN X 10 IN (10 CM X 25 CM), 20/CT 4CT/CASE: Brand: 3M™ TEGADERM™

## (undated) DEVICE — CATH BLLN ANGIO 2.50X15MM SC EUPHORA RX

## (undated) DEVICE — CATH BLLN ANGIO 2.50X8MM NC EUPHORIA RX

## (undated) DEVICE — HEART CATH-MRMC: Brand: MEDLINE INDUSTRIES, INC.